# Patient Record
Sex: MALE | Race: WHITE | Employment: OTHER | URBAN - METROPOLITAN AREA
[De-identification: names, ages, dates, MRNs, and addresses within clinical notes are randomized per-mention and may not be internally consistent; named-entity substitution may affect disease eponyms.]

---

## 2017-02-15 PROBLEM — I10 HTN (HYPERTENSION), BENIGN: Status: ACTIVE | Noted: 2017-02-15

## 2017-02-15 PROBLEM — L71.9 ROSACEA: Status: ACTIVE | Noted: 2017-02-15

## 2018-05-14 ENCOUNTER — HOSPITAL ENCOUNTER (OUTPATIENT)
Dept: MRI IMAGING | Age: 78
Discharge: HOME | End: 2018-05-14
Attending: FAMILY MEDICINE
Payer: MEDICARE

## 2018-05-14 DIAGNOSIS — M54.16 LUMBAR RADICULAR SYNDROME: ICD-10-CM

## 2018-05-14 DIAGNOSIS — G89.29 CHRONIC BILATERAL LOW BACK PAIN WITH BILATERAL SCIATICA: ICD-10-CM

## 2018-05-14 DIAGNOSIS — R93.7 ABNORMAL X-RAY OF PELVIS: ICD-10-CM

## 2018-05-14 DIAGNOSIS — R29.898 WEAKNESS OF BOTH LEGS: ICD-10-CM

## 2018-05-14 DIAGNOSIS — M54.42 CHRONIC BILATERAL LOW BACK PAIN WITH BILATERAL SCIATICA: ICD-10-CM

## 2018-05-14 DIAGNOSIS — M54.41 CHRONIC BILATERAL LOW BACK PAIN WITH BILATERAL SCIATICA: ICD-10-CM

## 2018-05-14 PROCEDURE — 72148 MRI LUMBAR SPINE W/O DYE: CPT

## 2018-05-14 PROCEDURE — 72195 MRI PELVIS W/O DYE: CPT

## 2018-05-15 NOTE — PROGRESS NOTES
Please let him know he has severe canal stenosis at L3-L4 with narrowing of the nerve canal is exiting from there as well. He also has moderate canal stenosis at L2-L3. Definitely need to get him in with neurosurgery for evaluation of treatment options. Please refer to [address] [facility] spine surgery.

## 2018-06-07 PROBLEM — M41.20 IDIOPATHIC SCOLIOSIS: Status: ACTIVE | Noted: 2018-06-07

## 2018-06-07 PROBLEM — M48.062 LUMBAR STENOSIS WITH NEUROGENIC CLAUDICATION: Status: ACTIVE | Noted: 2018-06-07

## 2018-06-21 ENCOUNTER — HOSPITAL ENCOUNTER (OUTPATIENT)
Dept: PHYSICAL THERAPY | Age: 78
Discharge: HOME | End: 2018-06-21
Attending: NEUROLOGICAL SURGERY
Payer: MEDICARE

## 2018-06-21 DIAGNOSIS — M41.20 OTHER IDIOPATHIC SCOLIOSIS, UNSPECIFIED SPINAL REGION: ICD-10-CM

## 2018-06-21 DIAGNOSIS — M48.062 LUMBAR STENOSIS WITH NEUROGENIC CLAUDICATION: ICD-10-CM

## 2018-06-21 PROCEDURE — G8978 MOBILITY CURRENT STATUS: HCPCS

## 2018-06-21 PROCEDURE — G8979 MOBILITY GOAL STATUS: HCPCS

## 2018-06-21 PROCEDURE — 97110 THERAPEUTIC EXERCISES: CPT

## 2018-06-21 PROCEDURE — 97161 PT EVAL LOW COMPLEX 20 MIN: CPT

## 2018-06-21 NOTE — THERAPY EVALUATION
Ly Elise [facility]  : 1940  [place] / [place] / Product Type: Medicare /  [address] at [address] [facility] [address], [address] [facility], [place] [address] [address] [facility]  [phone]   [phone]                OUTPATIENT PHYSICAL THERAPY:Initial Assessment 2018    ICD-10: Treatment Diagnosis:    Muscle weakness (generalized) (M62.81)     Lumbago with sciatica, right side (M54.41)     Lumbago with sciatica, left side (M54.42)       PRECAUTIONS/ALLERGIES:   Review of patient's allergies indicates no known allergies. FALL RISK SCORE: 2 (? 5 = High Risk)    MD ORDERS: Eval and Treat MEDICAL/REFERRING DIAGNOSIS:  Lumbar stenosis with neurogenic claudication [M48.062]  Other idiopathic scoliosis, unspecified spinal region [M41.20]    DATE OF ONSET: 2 months ago    REFERRING PHYSICIAN: Wilfredo Mckenzie MD    RETURN PHYSICIAN APPOINTMENT: TBD by patient     INITIAL ASSESSMENT:  Mr. Lakia Salguero has attended 1 physical therapy session including initial evaluation as of 18. Lakia Salguero exhibits decreased B hamstring flexibility, increased pain with extension, decreased postural and core strength, decreased functional tolerance, and decreased general LE strength. L iliac upslip with anterior innominate rotation present in sitting. Lakia Salguero will benefit from skilled PT (medically necessary) to address above deficits affecting participation in basic ADLs and overall functional tolerance. Manual techniques (stretching, joint mobilizations, soft tissue mobilization/myofascial release), postural exercises/education, therapeutic techniques/activities, and HEP will be performed as appropriate addressing Ly Jolly's current condition. PROBLEM LIST (Impacting functional limitations):  1. Decreased Strength  2. Decreased ADL/Functional Activities  3. Decreased Transfer Abilities  4.  Decreased Ambulation Ability/Technique  5. Decreased Balance  6. Increased Pain  7. Decreased Activity Tolerance  8. Increased Fatigue  9. Increased Shortness of Breath  10. Decreased Flexibility/Joint Mobility  11. Decreased [place] with Home Exercise Program INTERVENTIONS PLANNED:  1. Balance Exercise  2. Bed Mobility  3. Cold  4. Cryotherapy  5. Electrical Stimulation  6. Family Education  7. Gait Training  8. Heat  9. Home Exercise Program (HEP)  10. Manual Therapy  11. Neuromuscular Re-education/Strengthening  12. Range of Motion (ROM)  13. Therapeutic Activites  14. Therapeutic Exercise/Strengthening  15. Transfer Training  16. Mechanical traction  17. Aquatic Therapy   TREATMENT PLAN:  Effective Dates: 6/21/2018 TO 9/19/2018 (90 days). Frequency/Duration: 2 times a week for 90 Days    GOALS: (Goals have been discussed and agreed upon with patient.)  Long Term Goals 8 weeks   1. Hilaria Bhat will demonstrate an 12 point improvement on the Oswestry to show improvement in function. 2. Hilaria Bhat will report <=2/10 pain during ADLs to improve QOL. 3. Hilaria Bhat will demonstrate 5/5 LE strength on manual muscle testing to improve functional mobility. [address] will be able to perform SLS >10 seconds bilaterally to help with gait and improve balance. [address] will be able to demonstrate safe lifting and transfer mechanics without cueing for improved safety with home, childcare, and community activities. Rehabilitation Potential For Stated Goals: Good    Regarding Betty Jolly's therapy, I certify that the treatment plan above will be carried out by a therapist or under their direction. Thank you for this referral,  Abilio Garcia, PT, DPT       Referring Physician Signature: Manuel Melvin MD              Date                    HISTORY:   PATIENT GOAL FOR PHYSICAL THERAPY:  Pt reports he would like to move a little better.      HISTORY OF PRESENT INJURY/ILLNESS (REASON FOR REFERRAL):  Pt states the pain has come on over a period of 2 months. Pt states the pain began in his low back, moved to his R hip, and then down B legs. Pt states the leg pain is anterior and posterior. Pt states the leg pain is worse than the back pain. Pt describes the pain as a very sharp achy pain when he is standing walking. Pt describes the pain in his legs as a dull ache. Pt reports occasionally he will do a certain movement which will cause a sharp shooting pain that does not linger and goes away when he re-adjusts. Pt reports the most difficulty with bed mobility, walking, standing, bending over, and bending backwards. Pt states pain can get as high as 8/10 and as low as 1/10. Note: Patient denies any increase of symptoms with cough, sneeze or valsalva. Patient denies any saddle paresthesia or bowel/bladder deficits. PAST MEDICAL HISTORY/COMORBIDITIES:   Mr. Wally Torres  has a past medical history of Anxiety disorder (12/9/2014); Arthritis (12/9/2014); Depression (12/9/2014); Gout (12/9/2014); HTN (hypertension), benign (2/15/2017); Hyperlipemia (12/9/2014); Hypertension; Idiopathic chronic gout of right ankle (12/9/2014); Insomnia (12/9/2014); Kidney stone; Mixed hyperlipidemia (12/9/2014); Panic disorder (12/9/2014); Primary insomnia (12/9/2014); and Rosacea (2/15/2017). Mr. Wally Torres  has a past surgical history that includes hx other surgical; hx cyst removal; hx cyst removal; hx hernia repair (Bilateral); hx ankle fracture tx (Left, 6/1974); hx wrist fracture tx (Bilateral, 6/1993); hx colonoscopy (2007); and hx cyst removal.    SOCIAL HISTORY/LIVING ENVIRONMENT:     Pt lives in a 2 story home with spouse. Social History     Social History    Marital status:      Spouse name: N/A    Number of children: N/A    Years of education: N/A     Occupational History    Not on file.      Social History Main Topics    Smoking status: Former Smoker     Types: Cigarettes     Quit date: 1/1/1970    Smokeless tobacco: Never Used    Alcohol use 0.0 oz/week     0 Standard drinks or equivalent per week    Drug use: No    Sexual activity: Yes     Partners: Female     Other Topics Concern    Not on file     Social History Narrative       PRIOR LEVEL OF FUNCTION/WORK/ACTIVITY:  Pt very mobile prior to onset of pain. Pt is currently not working, he is retired. Wife states he was able to cut the grass and walk long distances prior to pain.     Active Ambulatory Problems     Diagnosis Date Noted    MEGGAN (generalized anxiety disorder) 12/09/2014    Arthritis 12/09/2014    Idiopathic chronic gout of right ankle 12/09/2014    Mixed hyperlipidemia 12/09/2014    Primary insomnia 12/09/2014    Panic disorder 12/09/2014    HTN (hypertension), benign 02/15/2017    Rosacea 02/15/2017    Idiopathic scoliosis 06/07/2018    Lumbar stenosis with neurogenic claudication 06/07/2018     Resolved Ambulatory Problems     Diagnosis Date Noted    Depression 12/09/2014     Past Medical History:   Diagnosis Date    Anxiety disorder 12/9/2014    Arthritis 12/9/2014    Depression 12/9/2014    Gout 12/9/2014    HTN (hypertension), benign 2/15/2017    Hyperlipemia 12/9/2014    Hypertension     Idiopathic chronic gout of right ankle 12/9/2014    Insomnia 12/9/2014    Kidney stone     Mixed hyperlipidemia 12/9/2014    Panic disorder 12/9/2014    Primary insomnia 12/9/2014    Rosacea 2/15/2017       CURRENT MEDICATIONS:    Current Outpatient Prescriptions:     diclofenac sodium (PENNSAID) 1.5 % drop, by Apply Externally route., Disp: , Rfl:     predniSONE (DELTASONE) 20 mg tablet, 60mg daily x 3 days, then 40mg x 3 days, then 20mg x 3 days, then 10mg x 3 days, then D/C., Disp: 21 Tab, Rfl: 0    zolpidem (AMBIEN) 10 mg tablet, TAKE 1 TABLET BY MOUTH AT BEDTIME AS NEEDED FOR SLEEP, Disp: 30 Tab, Rfl: 5    gabapentin (NEURONTIN) 100 mg capsule, Take 100 mg by mouth three (3) times daily., Disp: , Rfl:     atorvastatin (LIPITOR) 40 mg tablet, TAKE 1 TABLET BY MOUTH EVERY DAY, Disp: 90 Tab, Rfl: 3    potassium chloride SR (KLOR-CON 10) 10 mEq tablet, Take 1 Tab by mouth daily. , Disp: 90 Tab, Rfl: 3    amLODIPine-Olmesartan (CAROLA) 10-40 mg tab, Take 1 Tab by mouth daily. , Disp: 90 Tab, Rfl: 3    sertraline (ZOLOFT) 100 mg tablet, Take 1 Tab by mouth daily. One and half tab every day, Disp: 135 Tab, Rfl: 3    metroNIDAZOLE (METROGEL) 1 % topical gel, Apply  to affected area daily. Use a thin layer to affected areas after washing, Disp: 45 g, Rfl: 3    allopurinol (ZYLOPRIM) 100 mg tablet, Take 2 Tabs by mouth daily. , Disp: 180 Tab, Rfl: 3    diclofenac (VOLTAREN) 1 % gel, Apply 4 g to affected area four (4) times daily. , Disp: 2 Each, Rfl: 3    multivitamin (ONE A DAY) tablet, Take 1 Tab by mouth daily. , Disp: , Rfl:     cholecalciferol, vitamin D3, (VITAMIN D3) 2,000 unit tab, Take  by mouth., Disp: , Rfl:      Date Last Reviewed:  6/22/2018   Number of Personal Factors/Comorbidities that affect the Plan of Care: 0: LOW COMPLEXITY   EXAMINATION:   OBSERVATION/ORTHOSTATIC POSTURAL ASSESSMENT:          Pt sits with forward head and rounded shoulders which indicate tight anterior chest musculature, upper trapezius, and levator scapula and weak posterior scapula musculature and deep cervical flexors. Pt displays decreased core motor control indicating weak core and low back musculature.     PALPATION:    -PA glides: L1-L2= grade 2, L2-L3= grade 4, L3-L4= grade 4, L4-L5= grade 3     -TTP: B gluteal muscles, piriformis, hamstring (L>R), IT band, TFL    -L iliac upslip with anterior innominate rotation noted in sitting    AROM/PROM:    AROM/PROM         Joint: Date: 6/20/18  Date:  Date:    Active LE ROM Right Left Right Left Right Left   Hip Flexion [facility]       Hip Extension [facility] [place]       Knee Extension [facility]       Knee Flexion [facility]       Knee Extension [facility]       Lumbar Flexion 45 degrees --       Lumbar Extension 3 degrees  -painful at end range --       Lumbar Side-Bending 15 degrees 15 degrees       Lumbar Rotation WFL  -Pain at end range [facility]  -Pain at end range         STRENGTH         Joint: Date: 6/21/18  Date:  Date:     Right Left Right Left Right Left   Hip Abduction 4/5 4/5       Hip Adduction 4/5 4/5       Hip IR 4-/5 4-/5       Hip ER 4-/5 4-/5       Hip Flexion 4/5 4/5       Knee Extension 4+/5 4+/5       Knee Flexion 4+/5 4+/5       Ankle DF 4+/5 4+/5       Ankle PF 4+/5 4+/5       Ankle IV 4+/5 4+/5       Ankle EV 4+/5 4+/5         SPECIAL TESTS: Assessed @ Initial Visit    -90/90:     -R: 110    -L: 122   -SLR: Positive B (R>L)   -SI POST.  GAPPING: Negative B   -SOL 4: Positive for B tightness   -SLUMP TEST: Negative B   -LUMBAR STENOSIS:      -Bilateral symptoms: Yes      -Leg pain more than back pain: Yes      -Pain during walking/standing: Yes      -Pain relief upon sitting: Yes      -Age greater than 48 years: Yes    STRUCTURE FINDING     Primary Disc Flattened Back Yes   Radiographic Instability Excessive Lordosis  No   SI Joint Dysfunction Flattened Back No   Secondary Disc (DJD) Hypertrophic Supraspinous Ligament: thickening along spinous process No   Spine Stenosis Flattened Back Yes   Facet Impingement Flexed Back, usually unilateral No   Nerve Root Adhesion Bad Posture, Avoid Forward Flexion, Stand With Knees Bent No       NEUROLOGICAL SCREEN: Assessed @ Initial Visit    -RADIATING SYMPTOMS: YES     -DERMATOMES: Intact    -MYOTOMES Date: 6/21/18  Date:  Date:     Right Left Right Left Right Left   L2 & L3   (Hip Flexors) 4/5 4/5       L3-L4  (Knee Extensors) 5/5 5/5       L4  (Ankle DFs) 5/5 5/5       L5  (Hallux Ext) 5/5 5/5       L5-S1  (Ankle PFs) 5/5 5/5       S1-S2  (Ankle EVs) 5/5 5/5         -REFLEXES: Date: 6/22/18  Date:  Date:     Right Left Right Left Right Left   L4  (Quadriceps) NT NT       S1  (Achilles) NT NT         RED FLAGS: Date: 6/21/18 Date: Date:   Non-Mechanical pain distribution (cannot be produced, changed, or reduced during exam): NO     Cauda Equina Dysfunction: NO     Upper lumbar disc herniation in younger patients (femoral nerve tension test for lateral disc herniation in lower lumbar): NO     Lumbar compression fracture (age > 48, trauma, corticosteroid use NO     Spine Cancer (age > 48, pervious history of cancer, failure to improve in 1 month of therapy, no relief - be rest, duration > 1 month, unexplained weight loss, insidious onset, constitutional symptoms): NO     Ankylosing Spondylitis (age < 36, pain not relieved by supine, morning back stiffness, pain duration > 3 months, improved by exercise): NO     Sacral Fracture: NO         FUNCTIONAL MOBILITY:  Assessed @ Initial Visit    -Affecting participation in basic ADLs and functional tasks.   -Limited tolerance of walking and standing   -Ambulation/Gait: Slow to rise from sitting, decreased step length, shuffling gait for first few steps, forward trunk lean, decreased stance time B LEs.   -Bed mobility: Difficulty getting in and out of bed and rolling over. -Stairs: Difficulty ascending and descending stairs secondary to fatigue and low back pain that radiates into LEs.   -Transfers: Mod I   -Wheelchair: N/A    BALANCE:  SLS: Date: 6/22/18 Date: Date:   Right: NT     Left: NT          Body Structures Involved:  1. Bones  2. Joints  3. Muscles  4. Ligaments Body Functions Affected:  1. Sensory/Pain  2. Neuromusculoskeletal  3. Movement Related Activities and Participation Affected:  1. Mobility  2. Self Care  3. Domestic Life  4. Interpersonal Interactions and Relationships  5. Community, Social and [place]   Number of elements that affect the Plan of Care: 4+: HIGH COMPLEXITY   CLINICAL PRESENTATION:   Presentation: Stable and uncomplicated: LOW COMPLEXITY   CLINICAL DECISION MAKING:   OUTCOME MEASURE USED:   Tool Used:  Tool Used: Modified Oswestry Low Back Pain Questionnaire  Score:  Initial: 24/50  Most Recent: X/50 (Date: -- )   Interpretation of Score: Each section is scored on a 0-5 scale, 5 representing the greatest disability. The scores of each section are added together for a total score of 50. Score 0 1-10 11-20 21-30 31-40 41-49 50   Modifier CH CI CJ CK CL CM CN     ? Mobility - Walking and Moving Around:     - CURRENT STATUS: CK - 40%-59% impaired, limited or restricted    - GOAL STATUS:   CJ - 20%-39% impaired, limited or restricted    - D/C STATUS: ---------------To be determined---------------    [place] / Product Type: Medicare /     MEDICAL NECESSITY:  · Skilled intervention continues to be required due to above deficits affecting participation in basic ADLs and overall functional tolerance. REASON FOR SERVICES/OTHER COMMENTS:  · Patient continues to require skilled intervention due to  above deficits affecting participation in basic ADLs and overall functional tolerance. Use of outcome tool(s) and clinical judgement create a POC that gives a: Questionable prediction of patient's progress: MODERATE COMPLEXITY   TREATMENT:   (In addition to Assessment/Re-Assessment sessions the following treatments were rendered)  THERAPEUTIC EXERCISE: (10 minutes):  Exercises per grid below to improve mobility, strength and balance. Required minimal visual and verbal cues to promote proper body alignment and promote proper body posture. Progressed resistance, range and complexity of movement as indicated.      Date:  6/21/18 Date:   Date:     Activity/Exercise Parameters Parameters Parameters   Pt education POC, Anatomy, HEP     Hamstring Stretch 30\"x3     LTR 10\"X10     SKTC 30\"x3                               MANUAL THERAPY: (0 minutes): Joint mobilization, Soft tissue mobilization and Manipulation was utilized and necessary because of the patient's restricted joint motion, painful spasm, loss of articular motion and restricted motion of soft tissue. (Used abbreviations: MET - muscle energy technique; PNF - proprioceptive neuromuscular facilitation; NMR - neuromuscular re-education; AP - anterior to posterior; PA - posterior to anterior)    MODALITIES: (0 minutes):      NOT TODAY        TREATMENT/SESSION ASSESSMENT:  Hayde Patient M[place] verbalized understanding of role of PT and POC. HEP handout provided to pt. · Pain/ Symptoms: Initial:  4/10 Post Session:  4/10     · Compliance with Program/Exercises: Will assess as treatment progresses. · Recommendations/Intent for next treatment session: \"Next visit will focus on advancements to more challenging activities\". Total Treatment Duration:  PT Patient Time In/Time Out  Time In: 1105  Time Out: 202 [facility]  Patty Browne DPT

## 2018-06-21 NOTE — PROGRESS NOTES
Ambulatory/Rehab Services H2 Model Falls Risk Assessment    Risk Factor Pts. ·   Confusion/Disorientation/Impulsivity  []    4 ·   Symptomatic Depression  []   2 ·   Altered Elimination  []   1 ·   Dizziness/Vertigo  []   1 ·   Gender (Male)  [x]   1 ·   Any administered antiepileptics (anticonvulsants):  []   2 ·   Any administered benzodiazepines:  []   1 ·   Visual Impairment (specify):  []   1 ·   Portable Oxygen Use  []   1 ·   Orthostatic ? BP  []   1 ·   History of Recent Falls (within 3 mos.)  []   5     Ability to Rise from Chair (choose one) Pts. ·   Ability to rise in a single movement  []   0 ·   Pushes up, successful in one attempt  [x]   1 ·   Multiple attempts, but successful  []   3 ·   Unable to rise without assistance  []   4   Total: (5 or greater = High Risk) 2     Falls Prevention Plan:   []                Physical Limitations to Exercise (specify):   []                Mobility Assistance Device (type):   []                Exercise/Equipment Adaptation (specify):    ©2010 [facility] of [address] Patent #[id number].  Federal Law prohibits the replication, distribution or use without written permission from [facility] [place]

## 2018-06-27 ENCOUNTER — HOSPITAL ENCOUNTER (OUTPATIENT)
Dept: PHYSICAL THERAPY | Age: 78
Discharge: HOME | End: 2018-06-27
Attending: NEUROLOGICAL SURGERY
Payer: MEDICARE

## 2018-06-27 NOTE — PROGRESS NOTES
Xander Kaur [facility]  : 1940  [place]  Secondary: [place] at [facility]  [address], [address]  [phone]   [phone]      OUTPATIENT DAILY NOTE    NAME/AGE/GENDER: Fly Reddy is a 78 y.o. male. DATE: 2018    SUBJECTIVE:  Patient called to cancel yesterday for appointment today due to having a shot today. Will plan to follow up on next scheduled visit.     Selene Cruz, PT, DPT

## 2018-06-29 ENCOUNTER — HOSPITAL ENCOUNTER (OUTPATIENT)
Dept: PHYSICAL THERAPY | Age: 78
Discharge: HOME | End: 2018-06-29
Attending: NEUROLOGICAL SURGERY

## 2018-06-29 PROCEDURE — 97110 THERAPEUTIC EXERCISES: CPT

## 2018-06-29 NOTE — THERAPY EVALUATION
Sendy [facility]  : 1940  [place] / [place] / Product Type: Medicare /  [address]  at [facility]  [address], [address], [address]  [phone]   [phone]                OUTPATIENT PHYSICAL THERAPY:Daily Note 2018    ICD-10: Treatment Diagnosis:    Muscle weakness (generalized) (M62.81)     Lumbago with sciatica, right side (M54.41)     Lumbago with sciatica, left side (M54.42)       PRECAUTIONS/ALLERGIES:   Review of patient's allergies indicates no known allergies. FALL RISK SCORE: 2 (? 5 = High Risk)    MD ORDERS: Eval and Treat MEDICAL/REFERRING DIAGNOSIS:  back    DATE OF ONSET: 2 months ago    REFERRING PHYSICIAN: Anna Hooks MD    RETURN PHYSICIAN APPOINTMENT: TBD by patient     INITIAL ASSESSMENT:  Mr. Abby Bucio has attended 1 physical therapy session including initial evaluation as of 18. Abby Bucio exhibits decreased B hamstring flexibility, increased pain with extension, decreased postural and core strength, decreased functional tolerance, and decreased general LE strength. L iliac upslip with anterior innominate rotation present in sitting. Abby Bucio will benefit from skilled PT (medically necessary) to address above deficits affecting participation in basic ADLs and overall functional tolerance. Manual techniques (stretching, joint mobilizations, soft tissue mobilization/myofascial release), postural exercises/education, therapeutic techniques/activities, and HEP will be performed as appropriate addressing Sendy Jolly's current condition. PROBLEM LIST (Impacting functional limitations):  1. Decreased Strength  2. Decreased ADL/Functional Activities  3. Decreased Transfer Abilities  4. Decreased Ambulation Ability/Technique  5. Decreased Balance  6. Increased Pain  7. Decreased Activity Tolerance  8.  Increased Fatigue  9. Increased Shortness of Breath  10. Decreased Flexibility/Joint Mobility  11. Decreased [place] with Home Exercise Program INTERVENTIONS PLANNED:  1. Balance Exercise  2. Bed Mobility  3. Cold  4. Cryotherapy  5. Electrical Stimulation  6. Family Education  7. Gait Training  8. Heat  9. Home Exercise Program (HEP)  10. Manual Therapy  11. Neuromuscular Re-education/Strengthening  12. Range of Motion (ROM)  13. Therapeutic Activites  14. Therapeutic Exercise/Strengthening  15. Transfer Training  16. Mechanical traction  17. Aquatic Therapy   TREATMENT PLAN:  Effective Dates: 6/21/2018 TO 9/19/2018 (90 days). Frequency/Duration: 2 times a week for 90 Days    GOALS: (Goals have been discussed and agreed upon with patient.)  Long Term Goals 8 weeks   1. Amber Christine will demonstrate an 12 point improvement on the Oswestry to show improvement in function. 2. Amber Christine will report <=2/10 pain during ADLs to improve QOL. 3. Amber Christine will demonstrate 5/5 LE strength on manual muscle testing to improve functional mobility. [address] will be able to perform SLS >10 seconds bilaterally to help with gait and improve balance. [address] will be able to demonstrate safe lifting and transfer mechanics without cueing for improved safety with home, childcare, and community activities. Rehabilitation Potential For Stated Goals: Good    Regarding Jaleel Jolly's therapy, I certify that the treatment plan above will be carried out by a therapist or under their direction. Thank you for this referral,  Evelin Mtz, PT, DPT       Referring Physician Signature: Jenetta Kehr, MD              Date                    HISTORY:   PATIENT GOAL FOR PHYSICAL THERAPY:  Pt reports he would like to move a little better. HISTORY OF PRESENT INJURY/ILLNESS (REASON FOR REFERRAL):  Pt states the pain has come on over a period of 2 months.  Pt states the pain began in his low back, moved to his R hip, and then down B legs. Pt states the leg pain is anterior and posterior. Pt states the leg pain is worse than the back pain. Pt describes the pain as a very sharp achy pain when he is standing walking. Pt describes the pain in his legs as a dull ache. Pt reports occasionally he will do a certain movement which will cause a sharp shooting pain that does not linger and goes away when he re-adjusts. Pt reports the most difficulty with bed mobility, walking, standing, bending over, and bending backwards. Pt states pain can get as high as 8/10 and as low as 1/10. Note: Patient denies any increase of symptoms with cough, sneeze or valsalva. Patient denies any saddle paresthesia or bowel/bladder deficits. PAST MEDICAL HISTORY/COMORBIDITIES:   Mr. Kolton Boswell  has a past medical history of Anxiety disorder (12/9/2014); Arthritis (12/9/2014); Depression (12/9/2014); Gout (12/9/2014); HTN (hypertension), benign (2/15/2017); Hyperlipemia (12/9/2014); Hypertension; Idiopathic chronic gout of right ankle (12/9/2014); Insomnia (12/9/2014); Kidney stone; Mixed hyperlipidemia (12/9/2014); Panic disorder (12/9/2014); Primary insomnia (12/9/2014); and Rosacea (2/15/2017). Mr. Kolton Boswell  has a past surgical history that includes hx other surgical; hx cyst removal; hx cyst removal; hx hernia repair (Bilateral); hx ankle fracture tx (Left, 6/1974); hx wrist fracture tx (Bilateral, 6/1993); hx colonoscopy (2007); and hx cyst removal.    SOCIAL HISTORY/LIVING ENVIRONMENT:     Pt lives in a 2 story home with spouse. Social History     Social History    Marital status:      Spouse name: N/A    Number of children: N/A    Years of education: N/A     Occupational History    Not on file.      Social History Main Topics    Smoking status: Former Smoker     Types: Cigarettes     Quit date: 1/1/1970    Smokeless tobacco: Never Used    Alcohol use 0.0 oz/week     0 Standard drinks or equivalent per week    Drug use: No    Sexual activity: Yes     Partners: Female     Other Topics Concern    Not on file     Social History Narrative       PRIOR LEVEL OF FUNCTION/WORK/ACTIVITY:  Pt very mobile prior to onset of pain. Pt is currently not working, he is retired. Wife states he was able to cut the grass and walk long distances prior to pain. Active Ambulatory Problems     Diagnosis Date Noted    MEGGAN (generalized anxiety disorder) 12/09/2014    Arthritis 12/09/2014    Idiopathic chronic gout of right ankle 12/09/2014    Mixed hyperlipidemia 12/09/2014    Primary insomnia 12/09/2014    Panic disorder 12/09/2014    HTN (hypertension), benign 02/15/2017    Rosacea 02/15/2017    Idiopathic scoliosis 06/07/2018    Lumbar stenosis with neurogenic claudication 06/07/2018     Resolved Ambulatory Problems     Diagnosis Date Noted    Depression 12/09/2014     Past Medical History:   Diagnosis Date    Anxiety disorder 12/9/2014    Arthritis 12/9/2014    Depression 12/9/2014    Gout 12/9/2014    HTN (hypertension), benign 2/15/2017    Hyperlipemia 12/9/2014    Hypertension     Idiopathic chronic gout of right ankle 12/9/2014    Insomnia 12/9/2014    Kidney stone     Mixed hyperlipidemia 12/9/2014    Panic disorder 12/9/2014    Primary insomnia 12/9/2014    Rosacea 2/15/2017       CURRENT MEDICATIONS:    Current Outpatient Prescriptions:     diclofenac sodium (PENNSAID) 1.5 % drop, by Apply Externally route., Disp: , Rfl:     predniSONE (DELTASONE) 20 mg tablet, 60mg daily x 3 days, then 40mg x 3 days, then 20mg x 3 days, then 10mg x 3 days, then D/C., Disp: 21 Tab, Rfl: 0    zolpidem (AMBIEN) 10 mg tablet, TAKE 1 TABLET BY MOUTH AT BEDTIME AS NEEDED FOR SLEEP, Disp: 30 Tab, Rfl: 5    gabapentin (NEURONTIN) 100 mg capsule, Take 100 mg by mouth three (3) times daily. , Disp: , Rfl:     atorvastatin (LIPITOR) 40 mg tablet, TAKE 1 TABLET BY MOUTH EVERY DAY, Disp: 90 Tab, Rfl: 3   potassium chloride SR (KLOR-CON 10) 10 mEq tablet, Take 1 Tab by mouth daily. , Disp: 90 Tab, Rfl: 3    amLODIPine-Olmesartan (CAROLA) 10-40 mg tab, Take 1 Tab by mouth daily. , Disp: 90 Tab, Rfl: 3    sertraline (ZOLOFT) 100 mg tablet, Take 1 Tab by mouth daily. One and half tab every day, Disp: 135 Tab, Rfl: 3    metroNIDAZOLE (METROGEL) 1 % topical gel, Apply  to affected area daily. Use a thin layer to affected areas after washing, Disp: 45 g, Rfl: 3    allopurinol (ZYLOPRIM) 100 mg tablet, Take 2 Tabs by mouth daily. , Disp: 180 Tab, Rfl: 3    diclofenac (VOLTAREN) 1 % gel, Apply 4 g to affected area four (4) times daily. , Disp: 2 Each, Rfl: 3    multivitamin (ONE A DAY) tablet, Take 1 Tab by mouth daily. , Disp: , Rfl:     cholecalciferol, vitamin D3, (VITAMIN D3) 2,000 unit tab, Take  by mouth., Disp: , Rfl:      Date Last Reviewed:  6/29/2018   Number of Personal Factors/Comorbidities that affect the Plan of Care: 0: LOW COMPLEXITY   EXAMINATION:   OBSERVATION/ORTHOSTATIC POSTURAL ASSESSMENT:          Pt sits with forward head and rounded shoulders which indicate tight anterior chest musculature, upper trapezius, and levator scapula and weak posterior scapula musculature and deep cervical flexors. Pt displays decreased core motor control indicating weak core and low back musculature.     PALPATION:    -PA glides: L1-L2= grade 2, L2-L3= grade 4, L3-L4= grade 4, L4-L5= grade 3     -TTP: B gluteal muscles, piriformis, hamstring (L>R), IT band, TFL    -L iliac upslip with anterior innominate rotation noted in sitting    AROM/PROM:    AROM/PROM         Joint: Date: 6/20/18  Date:  Date:    Active LE ROM Right Left Right Left Right Left   Hip Flexion [facility]       Hip Extension [facility]       Knee Extension [facility]       Knee Flexion [facility]       Knee Extension [facility]       Lumbar Flexion 45 degrees --       Lumbar Extension 3 degrees  -painful at end range --       Lumbar Side-Bending 15 degrees 15 degrees Lumbar Rotation WFL  -Pain at end range [facility]  -Pain at end range         STRENGTH         Joint: Date: 6/21/18  Date:  Date:     Right Left Right Left Right Left   Hip Abduction 4/5 4/5       Hip Adduction 4/5 4/5       Hip IR 4-/5 4-/5       Hip ER 4-/5 4-/5       Hip Flexion 4/5 4/5       Knee Extension 4+/5 4+/5       Knee Flexion 4+/5 4+/5       Ankle DF 4+/5 4+/5       Ankle PF 4+/5 4+/5       Ankle IV 4+/5 4+/5       Ankle EV 4+/5 4+/5         SPECIAL TESTS: Assessed @ Initial Visit    -90/90:     -R: 110    -L: 122   -SLR: Positive B (R>L)   -SI POST.  GAPPING: Negative B   -SOL 4: Positive for B tightness   -SLUMP TEST: Negative B   -LUMBAR STENOSIS:      -Bilateral symptoms: Yes      -Leg pain more than back pain: Yes      -Pain during walking/standing: Yes      -Pain relief upon sitting: Yes      -Age greater than 48 years: Yes    STRUCTURE FINDING     Primary Disc Flattened Back Yes   Radiographic Instability Excessive Lordosis  No   SI Joint Dysfunction Flattened Back No   Secondary Disc (DJD) Hypertrophic Supraspinous Ligament: thickening along spinous process No   Spine Stenosis Flattened Back Yes   Facet Impingement Flexed Back, usually unilateral No   Nerve Root Adhesion Bad Posture, Avoid Forward Flexion, Stand With Knees Bent No       NEUROLOGICAL SCREEN: Assessed @ Initial Visit    -RADIATING SYMPTOMS: YES     -DERMATOMES: Intact    -MYOTOMES Date: 6/21/18  Date:  Date:     Right Left Right Left Right Left   L2 & L3   (Hip Flexors) 4/5 4/5       L3-L4  (Knee Extensors) 5/5 5/5       L4  (Ankle DFs) 5/5 5/5       L5  (Hallux Ext) 5/5 5/5       L5-S1  (Ankle PFs) 5/5 5/5       S1-S2  (Ankle EVs) 5/5 5/5         -REFLEXES: Date: 6/22/18  Date:  Date:     Right Left Right Left Right Left   L4  (Quadriceps) NT NT       S1  (Achilles) NT NT         RED FLAGS: Date: 6/21/18 Date:  Date:   Non-Mechanical pain distribution (cannot be produced, changed, or reduced during exam): NO     Cauda Equina Dysfunction: NO     Upper lumbar disc herniation in younger patients (femoral nerve tension test for lateral disc herniation in lower lumbar): NO     Lumbar compression fracture (age > 48, trauma, corticosteroid use NO     Spine Cancer (age > 48, pervious history of cancer, failure to improve in 1 month of therapy, no relief - be rest, duration > 1 month, unexplained weight loss, insidious onset, constitutional symptoms): NO     Ankylosing Spondylitis (age < 36, pain not relieved by supine, morning back stiffness, pain duration > 3 months, improved by exercise): NO     Sacral Fracture: NO         FUNCTIONAL MOBILITY:  Assessed @ Initial Visit    -Affecting participation in basic ADLs and functional tasks.   -Limited tolerance of walking and standing   -Ambulation/Gait: Slow to rise from sitting, decreased step length, shuffling gait for first few steps, forward trunk lean, decreased stance time B LEs.   -Bed mobility: Difficulty getting in and out of bed and rolling over. -Stairs: Difficulty ascending and descending stairs secondary to fatigue and low back pain that radiates into LEs.   -Transfers: Mod I   -Wheelchair: N/A    BALANCE:  SLS: Date: 6/22/18 Date: Date:   Right: NT     Left: NT          Body Structures Involved:  1. Bones  2. Joints  3. Muscles  4. Ligaments Body Functions Affected:  1. Sensory/Pain  2. Neuromusculoskeletal  3. Movement Related Activities and Participation Affected:  1. Mobility  2. Self Care  3. Domestic Life  4. Interpersonal Interactions and Relationships  5. Community, Social and [place]   Number of elements that affect the Plan of Care: 4+: HIGH COMPLEXITY   CLINICAL PRESENTATION:   Presentation: Stable and uncomplicated: LOW COMPLEXITY   CLINICAL DECISION MAKING:   OUTCOME MEASURE USED:   Tool Used:  Tool Used: Modified Oswestry Low Back Pain Questionnaire  Score:  Initial: 24/50  Most Recent: X/50 (Date: -- )   Interpretation of Score: Each section is scored on a 0-5 scale, 5 representing the greatest disability. The scores of each section are added together for a total score of 50. Score 0 1-10 11-20 21-30 31-40 41-49 50   Modifier CH CI CJ CK CL CM CN     ? Mobility - Walking and Moving Around:     - CURRENT STATUS: CK - 40%-59% impaired, limited or restricted    - GOAL STATUS:   CJ - 20%-39% impaired, limited or restricted    - D/C STATUS: ---------------To be determined---------------    [place] / [place] / Product Type: Medicare /     MEDICAL NECESSITY:  · Skilled intervention continues to be required due to above deficits affecting participation in basic ADLs and overall functional tolerance. REASON FOR SERVICES/OTHER COMMENTS:  · Patient continues to require skilled intervention due to  above deficits affecting participation in basic ADLs and overall functional tolerance. Use of outcome tool(s) and clinical judgement create a POC that gives a: Questionable prediction of patient's progress: MODERATE COMPLEXITY   TREATMENT:   (In addition to Assessment/Re-Assessment sessions the following treatments were rendered)  PRE-TREATMENT SYMPTOMS: Pt states he had a cortisone injection a few days ago which has helped with his pain tremendously. Pt reports he did not have any pain in his lower back or leg last night. Pt states he still gets slight pain when he gets out of bed or gets up from seated position. THERAPEUTIC EXERCISE: (53 minutes):  Exercises per grid below to improve mobility, strength and balance. Required minimal visual and verbal cues to promote proper body alignment and promote proper body posture. Progressed resistance, range and complexity of movement as indicated.      Date:  6/21/18 Date:  6/29/18 Date:     Activity/Exercise Parameters Parameters Parameters   Pt education POC, Anatomy, HEP     Hamstring Stretch 30\"x3     Nu-step  12min  -level 4    LTR 10\"X10 2x10 reps  -hold 5s  -red physioball    [facility] 30\"x3 3x30s    Glute set  1x10 reps  -hold 10s    Bridging  1x12 reps  -hold 3s  -slow descent     SLR  1x10 reps B  -not as high on R LE  -5s up/5s hold/5s down    Pelvic tilts  [id number]   -hold 10s    Seated piriformis stretch  3x30s    Seated hamstring stretch  3x30s    Sit to stand education  5 min  -with [id number]      MANUAL THERAPY: (0 minutes): Joint mobilization, Soft tissue mobilization and Manipulation was utilized and necessary because of the patient's restricted joint motion, painful spasm, loss of articular motion and restricted motion of soft tissue. (Used abbreviations: MET - muscle energy technique; PNF - proprioceptive neuromuscular facilitation; NMR - neuromuscular re-education; AP - anterior to posterior; PA - posterior to anterior)    MODALITIES: (0 minutes):      NOT TODAY        TREATMENT/SESSION ASSESSMENT:  [facility] displayed improved gait mechanics today and stated it was because he was non longer in any pain. Pt ambulating with cane at the moment. Pt displayed increased fatigue with all exercises on R LE. Pt reports it felt heavy and had slight pain on posterior leg during SLR exercise. Pt educated on proper sit to stand mechanics and displayed improved technique afterwards. Pt stated his R leg felt very weak and tired after session but did not complain of any pain. · Pain/ Symptoms Initial:  0/10 Post Session:  0/10     · Compliance with Program/Exercises: Will assess as treatment progresses. · Recommendations/Intent for next treatment session: \"Next visit will focus on advancements to more challenging activities\". Total Treatment Duration:  PT Patient Time In/Time Out  Time In: 1025  Time Out: Julian 91  Omer Hernandez DPT

## 2018-07-03 ENCOUNTER — HOSPITAL ENCOUNTER (OUTPATIENT)
Dept: PHYSICAL THERAPY | Age: 78
Discharge: HOME | End: 2018-07-03
Attending: NEUROLOGICAL SURGERY
Payer: MEDICARE

## 2018-07-03 PROCEDURE — 97110 THERAPEUTIC EXERCISES: CPT

## 2018-07-03 NOTE — THERAPY EVALUATION
[facility]  : 1940  [place] / Product Type: Medicare /  [address]  at [address] [facility] [address], [address] [facility], [place] [address] [address] [facility]  [phone]   [phone]                OUTPATIENT PHYSICAL THERAPY:Daily Note 7/3/2018    ICD-10: Treatment Diagnosis:    Muscle weakness (generalized) (M62.81)     Lumbago with sciatica, right side (M54.41)     Lumbago with sciatica, left side (M54.42)       PRECAUTIONS/ALLERGIES:   Review of patient's allergies indicates no known allergies. FALL RISK SCORE: 2 (? 5 = High Risk)    MD ORDERS: Eval and Treat MEDICAL/REFERRING DIAGNOSIS:  back    DATE OF ONSET: 2 months ago    REFERRING PHYSICIAN: Esperanza Del Rio MD    RETURN PHYSICIAN APPOINTMENT: TBD by patient     INITIAL ASSESSMENT:  Mr. Viki Corcoran has attended 1 physical therapy session including initial evaluation as of 18. Viki Corcoran exhibits decreased B hamstring flexibility, increased pain with extension, decreased postural and core strength, decreased functional tolerance, and decreased general LE strength. L iliac upslip with anterior innominate rotation present in sitting. Viki Corcoran will benefit from skilled PT (medically necessary) to address above deficits affecting participation in basic ADLs and overall functional tolerance. Manual techniques (stretching, joint mobilizations, soft tissue mobilization/myofascial release), postural exercises/education, therapeutic techniques/activities, and HEP will be performed as appropriate addressing Milli Jolly's current condition. PROBLEM LIST (Impacting functional limitations):  1. Decreased Strength  2. Decreased ADL/Functional Activities  3. Decreased Transfer Abilities  4. Decreased Ambulation Ability/Technique  5. Decreased Balance  6. Increased Pain  7. Decreased Activity Tolerance  8.  Increased Fatigue  9. Increased Shortness of Breath  10. Decreased Flexibility/Joint Mobility  11. Decreased Spink with Home Exercise Program INTERVENTIONS PLANNED:  1. Balance Exercise  2. Bed Mobility  3. Cold  4. Cryotherapy  5. Electrical Stimulation  6. Family Education  7. Gait Training  8. Heat  9. Home Exercise Program (HEP)  10. Manual Therapy  11. Neuromuscular Re-education/Strengthening  12. Range of Motion (ROM)  13. Therapeutic Activites  14. Therapeutic Exercise/Strengthening  15. Transfer Training  16. Mechanical traction  17. Aquatic Therapy   TREATMENT PLAN:  Effective Dates: 6/21/2018 TO 9/19/2018 (90 days). Frequency/Duration: 2 times a week for 90 Days    GOALS: (Goals have been discussed and agreed upon with patient.)  Long Term Goals 8 weeks   1. Jonna Vivas will demonstrate an 12 point improvement on the Oswestry to show improvement in function. 2. Jonna Vivas will report <=2/10 pain during ADLs to improve QOL. 3. Jonna Vivas will demonstrate 5/5 LE strength on manual muscle testing to improve functional mobility. [address] will be able to perform SLS >10 seconds bilaterally to help with gait and improve balance. [address] will be able to demonstrate safe lifting and transfer mechanics without cueing for improved safety with home, childcare, and community activities. Rehabilitation Potential For Stated Goals: Good    Regarding Zoranjaden Suzan Rik's therapy, I certify that the treatment plan above will be carried out by a therapist or under their direction. Thank you for this referral,  Demond Hancock, PT, DPT       Referring Physician Signature: Luc Villa MD              Date                    HISTORY:   PATIENT GOAL FOR PHYSICAL THERAPY:  Pt reports he would like to move a little better. HISTORY OF PRESENT INJURY/ILLNESS (REASON FOR REFERRAL):  Pt states the pain has come on over a period of 2 months.  Pt states the pain began in his low back, moved to his R hip, and then down B legs. Pt states the leg pain is anterior and posterior. Pt states the leg pain is worse than the back pain. Pt describes the pain as a very sharp achy pain when he is standing walking. Pt describes the pain in his legs as a dull ache. Pt reports occasionally he will do a certain movement which will cause a sharp shooting pain that does not linger and goes away when he re-adjusts. Pt reports the most difficulty with bed mobility, walking, standing, bending over, and bending backwards. Pt states pain can get as high as 8/10 and as low as 1/10. Note: Patient denies any increase of symptoms with cough, sneeze or valsalva. Patient denies any saddle paresthesia or bowel/bladder deficits. PAST MEDICAL HISTORY/COMORBIDITIES:   Mr. Indira Ashby  has a past medical history of Anxiety disorder (12/9/2014); Arthritis (12/9/2014); Depression (12/9/2014); Gout (12/9/2014); HTN (hypertension), benign (2/15/2017); Hyperlipemia (12/9/2014); Hypertension; Idiopathic chronic gout of right ankle (12/9/2014); Insomnia (12/9/2014); Kidney stone; Mixed hyperlipidemia (12/9/2014); Panic disorder (12/9/2014); Primary insomnia (12/9/2014); and Rosacea (2/15/2017). Mr. Indira Ashby  has a past surgical history that includes hx other surgical; hx cyst removal; hx cyst removal; hx hernia repair (Bilateral); hx ankle fracture tx (Left, 6/1974); hx wrist fracture tx (Bilateral, 6/1993); hx colonoscopy (2007); and hx cyst removal.    SOCIAL HISTORY/LIVING ENVIRONMENT:     Pt lives in a 2 story home with spouse. Social History     Social History    Marital status:      Spouse name: N/A    Number of children: N/A    Years of education: N/A     Occupational History    Not on file.      Social History Main Topics    Smoking status: Former Smoker     Types: Cigarettes     Quit date: 1/1/1970    Smokeless tobacco: Never Used    Alcohol use 0.0 oz/week     0 Standard drinks or equivalent per week    Drug use: No    Sexual activity: Yes     Partners: Female     Other Topics Concern    Not on file     Social History Narrative       PRIOR LEVEL OF FUNCTION/WORK/ACTIVITY:  Pt very mobile prior to onset of pain. Pt is currently not working, he is retired. Wife states he was able to cut the grass and walk long distances prior to pain. Active Ambulatory Problems     Diagnosis Date Noted    MEGGAN (generalized anxiety disorder) 12/09/2014    Arthritis 12/09/2014    Idiopathic chronic gout of right ankle 12/09/2014    Mixed hyperlipidemia 12/09/2014    Primary insomnia 12/09/2014    Panic disorder 12/09/2014    HTN (hypertension), benign 02/15/2017    Rosacea 02/15/2017    Idiopathic scoliosis 06/07/2018    Lumbar stenosis with neurogenic claudication 06/07/2018     Resolved Ambulatory Problems     Diagnosis Date Noted    Depression 12/09/2014     Past Medical History:   Diagnosis Date    Anxiety disorder 12/9/2014    Arthritis 12/9/2014    Depression 12/9/2014    Gout 12/9/2014    HTN (hypertension), benign 2/15/2017    Hyperlipemia 12/9/2014    Hypertension     Idiopathic chronic gout of right ankle 12/9/2014    Insomnia 12/9/2014    Kidney stone     Mixed hyperlipidemia 12/9/2014    Panic disorder 12/9/2014    Primary insomnia 12/9/2014    Rosacea 2/15/2017       CURRENT MEDICATIONS:    Current Outpatient Prescriptions:     diclofenac sodium (PENNSAID) 1.5 % drop, by Apply Externally route., Disp: , Rfl:     predniSONE (DELTASONE) 20 mg tablet, 60mg daily x 3 days, then 40mg x 3 days, then 20mg x 3 days, then 10mg x 3 days, then D/C., Disp: 21 Tab, Rfl: 0    zolpidem (AMBIEN) 10 mg tablet, TAKE 1 TABLET BY MOUTH AT BEDTIME AS NEEDED FOR SLEEP, Disp: 30 Tab, Rfl: 5    gabapentin (NEURONTIN) 100 mg capsule, Take 100 mg by mouth three (3) times daily. , Disp: , Rfl:     atorvastatin (LIPITOR) 40 mg tablet, TAKE 1 TABLET BY MOUTH EVERY DAY, Disp: 90 Tab, Rfl: 3   potassium chloride SR (KLOR-CON 10) 10 mEq tablet, Take 1 Tab by mouth daily. , Disp: 90 Tab, Rfl: 3    amLODIPine-Olmesartan (CAROLA) 10-40 mg tab, Take 1 Tab by mouth daily. , Disp: 90 Tab, Rfl: 3    sertraline (ZOLOFT) 100 mg tablet, Take 1 Tab by mouth daily. One and half tab every day, Disp: 135 Tab, Rfl: 3    metroNIDAZOLE (METROGEL) 1 % topical gel, Apply  to affected area daily. Use a thin layer to affected areas after washing, Disp: 45 g, Rfl: 3    allopurinol (ZYLOPRIM) 100 mg tablet, Take 2 Tabs by mouth daily. , Disp: 180 Tab, Rfl: 3    diclofenac (VOLTAREN) 1 % gel, Apply 4 g to affected area four (4) times daily. , Disp: 2 Each, Rfl: 3    multivitamin (ONE A DAY) tablet, Take 1 Tab by mouth daily. , Disp: , Rfl:     cholecalciferol, vitamin D3, (VITAMIN D3) 2,000 unit tab, Take  by mouth., Disp: , Rfl:      Date Last Reviewed:  7/3/2018   Number of Personal Factors/Comorbidities that affect the Plan of Care: 0: LOW COMPLEXITY   EXAMINATION:   OBSERVATION/ORTHOSTATIC POSTURAL ASSESSMENT:          Pt sits with forward head and rounded shoulders which indicate tight anterior chest musculature, upper trapezius, and levator scapula and weak posterior scapula musculature and deep cervical flexors. Pt displays decreased core motor control indicating weak core and low back musculature.     PALPATION:    -PA glides: L1-L2= grade 2, L2-L3= grade 4, L3-L4= grade 4, L4-L5= grade 3     -TTP: B gluteal muscles, piriformis, hamstring (L>R), IT band, TFL    -L iliac upslip with anterior innominate rotation noted in sitting    AROM/PROM:    AROM/PROM         Joint: Date: 6/20/18  Date:  Date:    Active LE ROM Right Left Right Left Right Left   Hip Flexion [facility]       Hip Extension [facility]       Knee Extension [facility]       Knee Flexion [facility]       Knee Extension [facility]       Lumbar Flexion 45 degrees --       Lumbar Extension 3 degrees  -painful at end range --       Lumbar Side-Bending 15 degrees 15 degrees Lumbar Rotation WFL  -Pain at end range [facility]  -Pain at end range         STRENGTH         Joint: Date: 6/21/18  Date:  Date:     Right Left Right Left Right Left   Hip Abduction 4/5 4/5       Hip Adduction 4/5 4/5       Hip IR 4-/5 4-/5       Hip ER 4-/5 4-/5       Hip Flexion 4/5 4/5       Knee Extension 4+/5 4+/5       Knee Flexion 4+/5 4+/5       Ankle DF 4+/5 4+/5       Ankle PF 4+/5 4+/5       Ankle IV 4+/5 4+/5       Ankle EV 4+/5 4+/5         SPECIAL TESTS: Assessed @ Initial Visit    -90/90:     -R: 110    -L: 122   -SLR: Positive B (R>L)   -SI POST.  GAPPING: Negative B   -SOL 4: Positive for B tightness   -SLUMP TEST: Negative B   -LUMBAR STENOSIS:      -Bilateral symptoms: Yes      -Leg pain more than back pain: Yes      -Pain during walking/standing: Yes      -Pain relief upon sitting: Yes      -Age greater than 48 years: Yes    STRUCTURE FINDING     Primary Disc Flattened Back Yes   Radiographic Instability Excessive Lordosis  No   SI Joint Dysfunction Flattened Back No   Secondary Disc (DJD) Hypertrophic Supraspinous Ligament: thickening along spinous process No   Spine Stenosis Flattened Back Yes   Facet Impingement Flexed Back, usually unilateral No   Nerve Root Adhesion Bad Posture, Avoid Forward Flexion, Stand With Knees Bent No       NEUROLOGICAL SCREEN: Assessed @ Initial Visit    -RADIATING SYMPTOMS: YES     -DERMATOMES: Intact    -MYOTOMES Date: 6/21/18  Date:  Date:     Right Left Right Left Right Left   L2 & L3   (Hip Flexors) 4/5 4/5       L3-L4  (Knee Extensors) 5/5 5/5       L4  (Ankle DFs) 5/5 5/5       L5  (Hallux Ext) 5/5 5/5       L5-S1  (Ankle PFs) 5/5 5/5       S1-S2  (Ankle EVs) 5/5 5/5         -REFLEXES: Date: 6/22/18  Date:  Date:     Right Left Right Left Right Left   L4  (Quadriceps) NT NT       S1  (Achilles) NT NT         RED FLAGS: Date: 6/21/18 Date:  Date:   Non-Mechanical pain distribution (cannot be produced, changed, or reduced during exam): NO     Cauda Equina Dysfunction: NO     Upper lumbar disc herniation in younger patients (femoral nerve tension test for lateral disc herniation in lower lumbar): NO     Lumbar compression fracture (age > 48, trauma, corticosteroid use NO     Spine Cancer (age > 48, pervious history of cancer, failure to improve in 1 month of therapy, no relief - be rest, duration > 1 month, unexplained weight loss, insidious onset, constitutional symptoms): NO     Ankylosing Spondylitis (age < 36, pain not relieved by supine, morning back stiffness, pain duration > 3 months, improved by exercise): NO     Sacral Fracture: NO         FUNCTIONAL MOBILITY:  Assessed @ Initial Visit    -Affecting participation in basic ADLs and functional tasks.   -Limited tolerance of walking and standing   -Ambulation/Gait: Slow to rise from sitting, decreased step length, shuffling gait for first few steps, forward trunk lean, decreased stance time B LEs.   -Bed mobility: Difficulty getting in and out of bed and rolling over. -Stairs: Difficulty ascending and descending stairs secondary to fatigue and low back pain that radiates into LEs.   -Transfers: Mod I   -Wheelchair: N/A    BALANCE:  SLS: Date: 6/22/18 Date: Date:   Right: NT     Left: NT          Body Structures Involved:  1. Bones  2. Joints  3. Muscles  4. Ligaments Body Functions Affected:  1. Sensory/Pain  2. Neuromusculoskeletal  3. Movement Related Activities and Participation Affected:  1. Mobility  2. Self Care  3. Domestic Life  4. Interpersonal Interactions and Relationships  5. Community, Social and [place]   Number of elements that affect the Plan of Care: 4+: HIGH COMPLEXITY   CLINICAL PRESENTATION:   Presentation: Stable and uncomplicated: LOW COMPLEXITY   CLINICAL DECISION MAKING:   OUTCOME MEASURE USED:   Tool Used:  Tool Used: Modified Oswestry Low Back Pain Questionnaire  Score:  Initial: 24/50  Most Recent: X/50 (Date: -- )   Interpretation of Score: Each section is scored on a 0-5 scale, 5 representing the greatest disability. The scores of each section are added together for a total score of 50. Score 0 1-10 11-20 21-30 31-40 41-49 50   Modifier CH CI CJ CK CL CM CN     ? Mobility - Walking and Moving Around:     - CURRENT STATUS: CK - 40%-59% impaired, limited or restricted    - GOAL STATUS:   CJ - 20%-39% impaired, limited or restricted    - D/C STATUS: ---------------To be determined---------------    [place] / Product Type: Medicare /     MEDICAL NECESSITY:  · Skilled intervention continues to be required due to above deficits affecting participation in basic ADLs and overall functional tolerance. REASON FOR SERVICES/OTHER COMMENTS:  · Patient continues to require skilled intervention due to  above deficits affecting participation in basic ADLs and overall functional tolerance. Use of outcome tool(s) and clinical judgement create a POC that gives a: Questionable prediction of patient's progress: MODERATE COMPLEXITY   TREATMENT:   (In addition to Assessment/Re-Assessment sessions the following treatments were rendered)  PRE-TREATMENT SYMPTOMS: Pt states he has not had any low back pain but is now experiencing pain in B calf region. Pt describes pain as muscle strain but is unsure as to whether it is coming from back or from walking more. Pt reports     THERAPEUTIC EXERCISE: (40 minutes):  Exercises per grid below to improve mobility, strength and balance. Required minimal visual and verbal cues to promote proper body alignment and promote proper body posture. Progressed resistance, range and complexity of movement as indicated.      Date:  6/21/18 Date:  6/29/18 Date:  7/3/18     Activity/Exercise Parameters Parameters Parameters   Pt education POC, Anatomy, HEP     Hamstring Stretch 30\"x3     Nu-step  12min  -level 4    Physio-step   10min  -level 3  5min fwd/5min bckwd   LTR 10\"X10 2x10 reps  -hold 5s  -red physioball    SKTC 30\"x3 3x30s    Glute set  1x10 reps  -hold 10s    Bridging  1x12 reps  -hold 3s  -slow descent     SLR  1x10 reps B  -not as high on R LE  -5s up/5s hold/5s down    Pelvic tilts  [id number]   -hold 10s    Seated piriformis stretch  3x30s    Seated hamstring stretch  3x30s    Standing calf/toe raises   1x15 reps   Standing hip abduction   1x15 reps   Standing hip flexion   1x15 reps   Standing hip extension   1x15 reps   Standing step-ups   1x15 reps B  -4\" step   Standing cone taps      Standing calf stretch   3x30s   Sit to stand education  5 min  -with [id number]      MANUAL THERAPY: (0 minutes): Joint mobilization, Soft tissue mobilization and Manipulation was utilized and necessary because of the patient's restricted joint motion, painful spasm, loss of articular motion and restricted motion of soft tissue. (Used abbreviations: MET - muscle energy technique; PNF - proprioceptive neuromuscular facilitation; NMR - neuromuscular re-education; AP - anterior to posterior; PA - posterior to anterior)    MODALITIES: (0 minutes):      NOT TODAY        TREATMENT/SESSION ASSESSMENT:  Lisbet Dela Cruz did not complain of calf pain during physio-step. Pt stated his legs felt very weak during exercises and stated it was due to muscle weakness. Pt continues to ambulate with cane. Pt required rest breaks after each exercise due to fatigue. Pt stated the stretching sensation he was experiencing during the standing calf stretch was the same as what he has been feeling these past few days. Pt instructed to begin performing stretching exercise at home. Pt stated he was fatigued at the end of treatment session but was not in any pain and rated it 0/10. · Pain/ Symptoms Initial:  0/10 Post Session:  0/10     · Compliance with Program/Exercises: Will assess as treatment progresses. · Recommendations/Intent for next treatment session: \"Next visit will focus on advancements to more challenging activities\".     Total Treatment Duration:  PT Patient Time In/Time Out  Time In: 1015  Time Out: 3887 [address]  [place]

## 2018-07-06 ENCOUNTER — HOSPITAL ENCOUNTER (OUTPATIENT)
Dept: PHYSICAL THERAPY | Age: 78
Discharge: HOME | End: 2018-07-06
Attending: NEUROLOGICAL SURGERY
Payer: MEDICARE

## 2018-07-06 PROCEDURE — 97110 THERAPEUTIC EXERCISES: CPT

## 2018-07-06 PROCEDURE — 97140 MANUAL THERAPY 1/> REGIONS: CPT

## 2018-07-06 NOTE — PROGRESS NOTES
Ofelia Sewell [facility]  : 1940  [place] / [place] / Product Type: Medicare /  [address]  at [facility]  [address], [address], [facility], [address] [facility]  [phone]   [phone]                OUTPATIENT PHYSICAL THERAPY:Daily Note 2018    ICD-10: Treatment Diagnosis:    Muscle weakness (generalized) (M62.81)     Lumbago with sciatica, right side (M54.41)     Lumbago with sciatica, left side (M54.42)       PRECAUTIONS/ALLERGIES:   Review of patient's allergies indicates no known allergies. FALL RISK SCORE: 2 (? 5 = High Risk)    MD ORDERS: Eval and Treat MEDICAL/REFERRING DIAGNOSIS:  back    DATE OF ONSET: 2 months ago    REFERRING PHYSICIAN: Rooney Dancer, MD    RETURN PHYSICIAN APPOINTMENT: TBD by patient     INITIAL ASSESSMENT:  Mr. Zachary Ha has attended 1 physical therapy session including initial evaluation as of 18. Zachary Ha exhibits decreased B hamstring flexibility, increased pain with extension, decreased postural and core strength, decreased functional tolerance, and decreased general LE strength. L iliac upslip with anterior innominate rotation present in sitting. Zachary Ha will benefit from skilled PT (medically necessary) to address above deficits affecting participation in basic ADLs and overall functional tolerance. Manual techniques (stretching, joint mobilizations, soft tissue mobilization/myofascial release), postural exercises/education, therapeutic techniques/activities, and HEP will be performed as appropriate addressing Ofelia Jolly's current condition. PROBLEM LIST (Impacting functional limitations):  1. Decreased Strength  2. Decreased ADL/Functional Activities  3. Decreased Transfer Abilities  4. Decreased Ambulation Ability/Technique  5. Decreased Balance  6. Increased Pain  7. Decreased Activity Tolerance  8.  Increased Fatigue  9. Increased Shortness of Breath  10. Decreased Flexibility/Joint Mobility  11. Decreased [place] with Home Exercise Program INTERVENTIONS PLANNED:  1. Balance Exercise  2. Bed Mobility  3. Cold  4. Cryotherapy  5. Electrical Stimulation  6. Family Education  7. Gait Training  8. Heat  9. Home Exercise Program (HEP)  10. Manual Therapy  11. Neuromuscular Re-education/Strengthening  12. Range of Motion (ROM)  13. Therapeutic Activites  14. Therapeutic Exercise/Strengthening  15. Transfer Training  16. Mechanical traction  17. Aquatic Therapy   TREATMENT PLAN:  Effective Dates: 6/21/2018 TO 9/19/2018 (90 days). Frequency/Duration: 2 times a week for 90 Days    GOALS: (Goals have been discussed and agreed upon with patient.)  Long Term Goals 8 weeks   1. Sanjana Clay will demonstrate an 12 point improvement on the Oswestry to show improvement in function. 2. Sanjana Clay will report <=2/10 pain during ADLs to improve QOL. 3. Sanjana Clay will demonstrate 5/5 LE strength on manual muscle testing to improve functional mobility. [address] will be able to perform SLS >10 seconds bilaterally to help with gait and improve balance. [address] will be able to demonstrate safe lifting and transfer mechanics without cueing for improved safety with home, childcare, and community activities. Rehabilitation Potential For Stated Goals: Good    Regarding Galo Jolly's therapy, I certify that the treatment plan above will be carried out by a therapist or under their direction. Thank you for this referral,  Gi Ulrich, PT, DPT       Referring Physician Signature: Beryle Few, MD              Date                    HISTORY:   PATIENT GOAL FOR PHYSICAL THERAPY:  Pt reports he would like to move a little better. HISTORY OF PRESENT INJURY/ILLNESS (REASON FOR REFERRAL):  Pt states the pain has come on over a period of 2 months.  Pt states the pain began in his low back, moved to his R hip, and then down B legs. Pt states the leg pain is anterior and posterior. Pt states the leg pain is worse than the back pain. Pt describes the pain as a very sharp achy pain when he is standing walking. Pt describes the pain in his legs as a dull ache. Pt reports occasionally he will do a certain movement which will cause a sharp shooting pain that does not linger and goes away when he re-adjusts. Pt reports the most difficulty with bed mobility, walking, standing, bending over, and bending backwards. Pt states pain can get as high as 8/10 and as low as 1/10. Note: Patient denies any increase of symptoms with cough, sneeze or valsalva. Patient denies any saddle paresthesia or bowel/bladder deficits. PAST MEDICAL HISTORY/COMORBIDITIES:   Mr. Conrad Wahl  has a past medical history of Anxiety disorder (12/9/2014); Arthritis (12/9/2014); Depression (12/9/2014); Gout (12/9/2014); HTN (hypertension), benign (2/15/2017); Hyperlipemia (12/9/2014); Hypertension; Idiopathic chronic gout of right ankle (12/9/2014); Insomnia (12/9/2014); Kidney stone; Mixed hyperlipidemia (12/9/2014); Panic disorder (12/9/2014); Primary insomnia (12/9/2014); and Rosacea (2/15/2017). Mr. Conrad Wahl  has a past surgical history that includes hx other surgical; hx cyst removal; hx cyst removal; hx hernia repair (Bilateral); hx ankle fracture tx (Left, 6/1974); hx wrist fracture tx (Bilateral, 6/1993); hx colonoscopy (2007); and hx cyst removal.    SOCIAL HISTORY/LIVING ENVIRONMENT:     Pt lives in a 2 story home with spouse. Social History     Social History    Marital status:      Spouse name: N/A    Number of children: N/A    Years of education: N/A     Occupational History    Not on file.      Social History Main Topics    Smoking status: Former Smoker     Types: Cigarettes     Quit date: 1/1/1970    Smokeless tobacco: Never Used    Alcohol use 0.0 oz/week     0 Standard drinks or equivalent per week    Drug use: No    Sexual activity: Yes     Partners: Female     Other Topics Concern    Not on file     Social History Narrative       PRIOR LEVEL OF FUNCTION/WORK/ACTIVITY:  Pt very mobile prior to onset of pain. Pt is currently not working, he is retired. Wife states he was able to cut the grass and walk long distances prior to pain. Active Ambulatory Problems     Diagnosis Date Noted    MEGGAN (generalized anxiety disorder) 12/09/2014    Arthritis 12/09/2014    Idiopathic chronic gout of right ankle 12/09/2014    Mixed hyperlipidemia 12/09/2014    Primary insomnia 12/09/2014    Panic disorder 12/09/2014    HTN (hypertension), benign 02/15/2017    Rosacea 02/15/2017    Idiopathic scoliosis 06/07/2018    Lumbar stenosis with neurogenic claudication 06/07/2018     Resolved Ambulatory Problems     Diagnosis Date Noted    Depression 12/09/2014     Past Medical History:   Diagnosis Date    Anxiety disorder 12/9/2014    Arthritis 12/9/2014    Depression 12/9/2014    Gout 12/9/2014    HTN (hypertension), benign 2/15/2017    Hyperlipemia 12/9/2014    Hypertension     Idiopathic chronic gout of right ankle 12/9/2014    Insomnia 12/9/2014    Kidney stone     Mixed hyperlipidemia 12/9/2014    Panic disorder 12/9/2014    Primary insomnia 12/9/2014    Rosacea 2/15/2017       CURRENT MEDICATIONS:    Current Outpatient Prescriptions:     LORazepam (ATIVAN) 0.5 mg tablet, TAKE 1 TABLET BY MOUTH DAILY. , Disp: 30 Tab, Rfl: 3    diclofenac sodium (PENNSAID) 1.5 % drop, by Apply Externally route., Disp: , Rfl:     predniSONE (DELTASONE) 20 mg tablet, 60mg daily x 3 days, then 40mg x 3 days, then 20mg x 3 days, then 10mg x 3 days, then D/C., Disp: 21 Tab, Rfl: 0    zolpidem (AMBIEN) 10 mg tablet, TAKE 1 TABLET BY MOUTH AT BEDTIME AS NEEDED FOR SLEEP, Disp: 30 Tab, Rfl: 5    gabapentin (NEURONTIN) 100 mg capsule, Take 100 mg by mouth three (3) times daily. , Disp: , Rfl:    atorvastatin (LIPITOR) 40 mg tablet, TAKE 1 TABLET BY MOUTH EVERY DAY, Disp: 90 Tab, Rfl: 3    potassium chloride SR (KLOR-CON 10) 10 mEq tablet, Take 1 Tab by mouth daily. , Disp: 90 Tab, Rfl: 3    amLODIPine-Olmesartan (CAROLA) 10-40 mg tab, Take 1 Tab by mouth daily. , Disp: 90 Tab, Rfl: 3    sertraline (ZOLOFT) 100 mg tablet, Take 1 Tab by mouth daily. One and half tab every day, Disp: 135 Tab, Rfl: 3    metroNIDAZOLE (METROGEL) 1 % topical gel, Apply  to affected area daily. Use a thin layer to affected areas after washing, Disp: 45 g, Rfl: 3    allopurinol (ZYLOPRIM) 100 mg tablet, Take 2 Tabs by mouth daily. , Disp: 180 Tab, Rfl: 3    diclofenac (VOLTAREN) 1 % gel, Apply 4 g to affected area four (4) times daily. , Disp: 2 Each, Rfl: 3    multivitamin (ONE A DAY) tablet, Take 1 Tab by mouth daily. , Disp: , Rfl:     cholecalciferol, vitamin D3, (VITAMIN D3) 2,000 unit tab, Take  by mouth., Disp: , Rfl:      Date Last Reviewed:  7/6/2018   Number of Personal Factors/Comorbidities that affect the Plan of Care: 0: LOW COMPLEXITY   EXAMINATION:   OBSERVATION/ORTHOSTATIC POSTURAL ASSESSMENT:          Pt sits with forward head and rounded shoulders which indicate tight anterior chest musculature, upper trapezius, and levator scapula and weak posterior scapula musculature and deep cervical flexors. Pt displays decreased core motor control indicating weak core and low back musculature.     PALPATION:    -PA glides: L1-L2= grade 2, L2-L3= grade 4, L3-L4= grade 4, L4-L5= grade 3     -TTP: B gluteal muscles, piriformis, hamstring (L>R), IT band, TFL    -L iliac upslip with anterior innominate rotation noted in sitting    AROM/PROM:    AROM/PROM         Joint: Date: 6/20/18  Date:  Date:    Active LE ROM Right Left Right Left Right Left   Hip Flexion [facility]       Hip Extension [facility]       Knee Extension [facility]       Knee Flexion [facility]       Knee Extension [facility]       Lumbar Flexion 45 degrees --       Lumbar Extension 3 degrees  -painful at end range --       Lumbar Side-Bending 15 degrees 15 degrees       Lumbar Rotation WFL  -Pain at end range [facility]  -Pain at end range         STRENGTH         Joint: Date: 6/21/18  Date:  Date:     Right Left Right Left Right Left   Hip Abduction 4/5 4/5       Hip Adduction 4/5 4/5       Hip IR 4-/5 4-/5       Hip ER 4-/5 4-/5       Hip Flexion 4/5 4/5       Knee Extension 4+/5 4+/5       Knee Flexion 4+/5 4+/5       Ankle DF 4+/5 4+/5       Ankle PF 4+/5 4+/5       Ankle IV 4+/5 4+/5       Ankle EV 4+/5 4+/5         SPECIAL TESTS: Assessed @ Initial Visit    -90/90:     -R: 110    -L: 122   -SLR: Positive B (R>L)   -SI POST.  GAPPING: Negative B   -SOL 4: Positive for B tightness   -SLUMP TEST: Negative B   -LUMBAR STENOSIS:      -Bilateral symptoms: Yes      -Leg pain more than back pain: Yes      -Pain during walking/standing: Yes      -Pain relief upon sitting: Yes      -Age greater than 48 years: Yes    STRUCTURE FINDING     Primary Disc Flattened Back Yes   Radiographic Instability Excessive Lordosis  No   SI Joint Dysfunction Flattened Back No   Secondary Disc (DJD) Hypertrophic Supraspinous Ligament: thickening along spinous process No   Spine Stenosis Flattened Back Yes   Facet Impingement Flexed Back, usually unilateral No   Nerve Root Adhesion Bad Posture, Avoid Forward Flexion, Stand With Knees Bent No       NEUROLOGICAL SCREEN: Assessed @ Initial Visit    -RADIATING SYMPTOMS: YES     -DERMATOMES: Intact    -MYOTOMES Date: 6/21/18  Date:  Date:     Right Left Right Left Right Left   L2 & L3   (Hip Flexors) 4/5 4/5       L3-L4  (Knee Extensors) 5/5 5/5       L4  (Ankle DFs) 5/5 5/5       L5  (Hallux Ext) 5/5 5/5       L5-S1  (Ankle PFs) 5/5 5/5       S1-S2  (Ankle EVs) 5/5 5/5         -REFLEXES: Date: 6/22/18  Date:  Date:     Right Left Right Left Right Left   L4  (Quadriceps) NT NT       S1  (Achilles) NT NT         RED FLAGS: Date: 6/21/18 Date:  Date:   Non-Mechanical pain distribution (cannot be produced, changed, or reduced during exam): NO     Cauda Equina Dysfunction: NO     Upper lumbar disc herniation in younger patients (femoral nerve tension test for lateral disc herniation in lower lumbar): NO     Lumbar compression fracture (age > 48, trauma, corticosteroid use NO     Spine Cancer (age > 48, pervious history of cancer, failure to improve in 1 month of therapy, no relief - be rest, duration > 1 month, unexplained weight loss, insidious onset, constitutional symptoms): NO     Ankylosing Spondylitis (age < 36, pain not relieved by supine, morning back stiffness, pain duration > 3 months, improved by exercise): NO     Sacral Fracture: NO         FUNCTIONAL MOBILITY:  Assessed @ Initial Visit    -Affecting participation in basic ADLs and functional tasks.   -Limited tolerance of walking and standing   -Ambulation/Gait: Slow to rise from sitting, decreased step length, shuffling gait for first few steps, forward trunk lean, decreased stance time B LEs.   -Bed mobility: Difficulty getting in and out of bed and rolling over. -Stairs: Difficulty ascending and descending stairs secondary to fatigue and low back pain that radiates into LEs.   -Transfers: Mod I   -Wheelchair: N/A    BALANCE:  SLS: Date: 6/22/18 Date: Date:   Right: NT     Left: NT          Body Structures Involved:  1. Bones  2. Joints  3. Muscles  4. Ligaments Body Functions Affected:  1. Sensory/Pain  2. Neuromusculoskeletal  3. Movement Related Activities and Participation Affected:  1. Mobility  2. Self Care  3. Domestic Life  4. Interpersonal Interactions and Relationships  5. Community, Social and [place]   Number of elements that affect the Plan of Care: 4+: HIGH COMPLEXITY   CLINICAL PRESENTATION:   Presentation: Stable and uncomplicated: LOW COMPLEXITY   CLINICAL DECISION MAKING:   OUTCOME MEASURE USED:   Tool Used:  Tool Used: Modified Oswestry Low Back Pain Questionnaire  Score:  Initial: 24/50 Most Recent: X/50 (Date: -- )   Interpretation of Score: Each section is scored on a 0-5 scale, 5 representing the greatest disability. The scores of each section are added together for a total score of 50. Score 0 1-10 11-20 21-30 31-40 41-49 50   Modifier CH CI CJ CK CL CM CN     ? Mobility - Walking and Moving Around:     - CURRENT STATUS: CK - 40%-59% impaired, limited or restricted    - GOAL STATUS:   CJ - 20%-39% impaired, limited or restricted    - D/C STATUS: ---------------To be determined---------------    [place] / Product Type: Medicare /     MEDICAL NECESSITY:  · Skilled intervention continues to be required due to above deficits affecting participation in basic ADLs and overall functional tolerance. REASON FOR SERVICES/OTHER COMMENTS:  · Patient continues to require skilled intervention due to  above deficits affecting participation in basic ADLs and overall functional tolerance. Use of outcome tool(s) and clinical judgement create a POC that gives a: Questionable prediction of patient's progress: MODERATE COMPLEXITY   TREATMENT:   (In addition to Assessment/Re-Assessment sessions the following treatments were rendered)  PRE-TREATMENT SYMPTOMS: Pt states he has not had any low back pain but continues to experience pain in B calf region. Pt states to no change since last treatment session. Pt rates pain 2/10 today. THERAPEUTIC EXERCISE: (25 minutes):  Exercises per grid below to improve mobility, strength and balance. Required minimal visual and verbal cues to promote proper body alignment and promote proper body posture. Progressed resistance, range and complexity of movement as indicated.      Date:  6/21/18 Date:  6/29/18 Date:  7/3/18   Date:  7/6/18   Activity/Exercise Parameters Parameters Parameters    Pt education POC, Anatomy, HEP      Hamstring Stretch 30\"x3      Nu-step  12min  -level 4     Physio-step   10min  -level 3  5min fwd/5min bckwd 10min  -level 4  5min fwd/5min bckwd   LTR 10\"X10 2x10 reps  -hold 5s  -red physioball     SKTC 30\"x3 3x30s     Glute set  1x10 reps  -hold 10s     Bridging  1x12 reps  -hold 3s  -slow descent      SLR  1x10 reps B  -not as high on R LE  -5s up/5s hold/5s down     Pelvic tilts  [id number]   -hold 10s     Seated piriformis stretch  3x30s     Seated hamstring stretch  3x30s     Standing marching    1x15 reps   Standing calf/toe raises   1x15 reps 1x15 reps   Standing hip abduction   1x15 reps 1x15 reps   Standing hip flexion   1x15 reps 1x15 reps   Standing hip extension   1x15 reps 1x15 reps   Standing step-ups   1x15 reps B  -4\" step    Standing cone taps       Standing calf stretch   3x30s 4x1min   Sit to stand education  5 min  -with [id number]       MANUAL THERAPY: (15 minutes): Joint mobilization, Soft tissue mobilization and Manipulation was utilized and necessary because of the patient's restricted joint motion, painful spasm, loss of articular motion and restricted motion of soft tissue. Pt prone for Chapincito & Ilsley roll out to B lumbar paraspinals, B gluteal region, B piriformis, B hamstrings, B gastroc. Trigger point release to L hamstring and gastroc to relieve tension and promote AROM. (Used abbreviations: MET - muscle energy technique; PNF - proprioceptive neuromuscular facilitation; NMR - neuromuscular re-education; AP - anterior to posterior; PA - posterior to anterior)    MODALITIES: (0 minutes):      NOT TODAY        TREATMENT/SESSION ASSESSMENT:  Sameera Jolly tolerated stretching well but complained of increased tension during the first few reps. Pt had increased tenderness throughout L hamstring and gastroc region with trigger points throughout. Pt reported relief afterwards and stated he did not have any tightness or discomfort in them. Pt performed exercises slowly but well. Will progress exercises next visit and include airex and weights for standing hip exercises.  Pt reported 0/10 pain at the end of treatment session. · Pain/ Symptoms Initial:  2/10 Post Session:  0/10     · Compliance with Program/Exercises: Will assess as treatment progresses. · Recommendations/Intent for next treatment session: \"Next visit will focus on advancements to more challenging activities\". Total Treatment Duration:  PT Patient Time In/Time Out  Time In: 1015  Time Out: 2732 [address].  MUKUND EspinozaT

## 2018-07-10 ENCOUNTER — HOSPITAL ENCOUNTER (OUTPATIENT)
Dept: PHYSICAL THERAPY | Age: 78
Discharge: HOME | End: 2018-07-10
Attending: NEUROLOGICAL SURGERY
Payer: MEDICARE

## 2018-07-10 PROCEDURE — 97110 THERAPEUTIC EXERCISES: CPT

## 2018-07-10 NOTE — PROGRESS NOTES
[facility]  : 1940  [place] / [place] / Product Type: Medicare /  [id number]  at [facility]  [address], [address], [address]  [phone]   [phone]                OUTPATIENT PHYSICAL THERAPY:Daily Note 7/10/2018    ICD-10: Treatment Diagnosis:    Muscle weakness (generalized) (M62.81)     Lumbago with sciatica, right side (M54.41)     Lumbago with sciatica, left side (M54.42)       PRECAUTIONS/ALLERGIES:   Review of patient's allergies indicates no known allergies. FALL RISK SCORE: 2 (? 5 = High Risk)    MD ORDERS: Eval and Treat MEDICAL/REFERRING DIAGNOSIS:  back    DATE OF ONSET: 2 months ago    REFERRING PHYSICIAN: Peter Jurado MD    RETURN PHYSICIAN APPOINTMENT: TBD by patient     INITIAL ASSESSMENT:  Mr. Sadaf Rondon has attended 1 physical therapy session including initial evaluation as of 18. Sadaf Rondon exhibits decreased B hamstring flexibility, increased pain with extension, decreased postural and core strength, decreased functional tolerance, and decreased general LE strength. L iliac upslip with anterior innominate rotation present in sitting. Sadaf Rondon will benefit from skilled PT (medically necessary) to address above deficits affecting participation in basic ADLs and overall functional tolerance. Manual techniques (stretching, joint mobilizations, soft tissue mobilization/myofascial release), postural exercises/education, therapeutic techniques/activities, and HEP will be performed as appropriate addressing Titus Love Eulaon's current condition. PROBLEM LIST (Impacting functional limitations):  1. Decreased Strength  2. Decreased ADL/Functional Activities  3. Decreased Transfer Abilities  4. Decreased Ambulation Ability/Technique  5. Decreased Balance  6. Increased Pain  7. Decreased Activity Tolerance  8.  Increased Fatigue  9. Increased Shortness of Breath  10. Decreased Flexibility/Joint Mobility  11. Decreased [place] with Home Exercise Program INTERVENTIONS PLANNED:  1. Balance Exercise  2. Bed Mobility  3. Cold  4. Cryotherapy  5. Electrical Stimulation  6. Family Education  7. Gait Training  8. Heat  9. Home Exercise Program (HEP)  10. Manual Therapy  11. Neuromuscular Re-education/Strengthening  12. Range of Motion (ROM)  13. Therapeutic Activites  14. Therapeutic Exercise/Strengthening  15. Transfer Training  16. Mechanical traction  17. Aquatic Therapy   TREATMENT PLAN:  Effective Dates: 6/21/2018 TO 9/19/2018 (90 days). Frequency/Duration: 2 times a week for 90 Days    GOALS: (Goals have been discussed and agreed upon with patient.)  Long Term Goals 8 weeks   1. Dhara Ashing will demonstrate an 12 point improvement on the Oswestry to show improvement in function. 2. Dhara Ashing will report <=2/10 pain during ADLs to improve QOL. 3. Dhara Ashing will demonstrate 5/5 LE strength on manual muscle testing to improve functional mobility. [address] will be able to perform SLS >10 seconds bilaterally to help with gait and improve balance. [address] will be able to demonstrate safe lifting and transfer mechanics without cueing for improved safety with home, childcare, and community activities. Rehabilitation Potential For Stated Goals: Good    Regarding Sameera Jolly's therapy, I certify that the treatment plan above will be carried out by a therapist or under their direction. Thank you for this referral,  Bar Henning PT, DPT       Referring Physician Signature: Mariza Mcgarry MD              Date                    HISTORY:   PATIENT GOAL FOR PHYSICAL THERAPY:  Pt reports he would like to move a little better. HISTORY OF PRESENT INJURY/ILLNESS (REASON FOR REFERRAL):  Pt states the pain has come on over a period of 2 months.  Pt states the pain began in his low back, moved to his R hip, and then down B legs. Pt states the leg pain is anterior and posterior. Pt states the leg pain is worse than the back pain. Pt describes the pain as a very sharp achy pain when he is standing walking. Pt describes the pain in his legs as a dull ache. Pt reports occasionally he will do a certain movement which will cause a sharp shooting pain that does not linger and goes away when he re-adjusts. Pt reports the most difficulty with bed mobility, walking, standing, bending over, and bending backwards. Pt states pain can get as high as 8/10 and as low as 1/10. Note: Patient denies any increase of symptoms with cough, sneeze or valsalva. Patient denies any saddle paresthesia or bowel/bladder deficits. PAST MEDICAL HISTORY/COMORBIDITIES:   Mr. Alvan Aase  has a past medical history of Anxiety disorder (12/9/2014); Arthritis (12/9/2014); Depression (12/9/2014); Gout (12/9/2014); HTN (hypertension), benign (2/15/2017); Hyperlipemia (12/9/2014); Hypertension; Idiopathic chronic gout of right ankle (12/9/2014); Insomnia (12/9/2014); Kidney stone; Mixed hyperlipidemia (12/9/2014); Panic disorder (12/9/2014); Primary insomnia (12/9/2014); and Rosacea (2/15/2017). Mr. Alvan Aase  has a past surgical history that includes hx other surgical; hx cyst removal; hx cyst removal; hx hernia repair (Bilateral); hx ankle fracture tx (Left, 6/1974); hx wrist fracture tx (Bilateral, 6/1993); hx colonoscopy (2007); and hx cyst removal.    SOCIAL HISTORY/LIVING ENVIRONMENT:     Pt lives in a 2 story home with spouse. Social History     Social History    Marital status:      Spouse name: N/A    Number of children: N/A    Years of education: N/A     Occupational History    Not on file.      Social History Main Topics    Smoking status: Former Smoker     Types: Cigarettes     Quit date: 1/1/1970    Smokeless tobacco: Never Used    Alcohol use 0.0 oz/week     0 Standard drinks or equivalent per week    Drug use: No    Sexual activity: Yes     Partners: Female     Other Topics Concern    Not on file     Social History Narrative       PRIOR LEVEL OF FUNCTION/WORK/ACTIVITY:  Pt very mobile prior to onset of pain. Pt is currently not working, he is retired. Wife states he was able to cut the grass and walk long distances prior to pain. Active Ambulatory Problems     Diagnosis Date Noted    MEGGAN (generalized anxiety disorder) 12/09/2014    Arthritis 12/09/2014    Idiopathic chronic gout of right ankle 12/09/2014    Mixed hyperlipidemia 12/09/2014    Primary insomnia 12/09/2014    Panic disorder 12/09/2014    HTN (hypertension), benign 02/15/2017    Rosacea 02/15/2017    Idiopathic scoliosis 06/07/2018    Lumbar stenosis with neurogenic claudication 06/07/2018     Resolved Ambulatory Problems     Diagnosis Date Noted    Depression 12/09/2014     Past Medical History:   Diagnosis Date    Anxiety disorder 12/9/2014    Arthritis 12/9/2014    Depression 12/9/2014    Gout 12/9/2014    HTN (hypertension), benign 2/15/2017    Hyperlipemia 12/9/2014    Hypertension     Idiopathic chronic gout of right ankle 12/9/2014    Insomnia 12/9/2014    Kidney stone     Mixed hyperlipidemia 12/9/2014    Panic disorder 12/9/2014    Primary insomnia 12/9/2014    Rosacea 2/15/2017       CURRENT MEDICATIONS:    Current Outpatient Prescriptions:     LORazepam (ATIVAN) 0.5 mg tablet, TAKE 1 TABLET BY MOUTH DAILY. , Disp: 30 Tab, Rfl: 3    diclofenac sodium (PENNSAID) 1.5 % drop, by Apply Externally route., Disp: , Rfl:     predniSONE (DELTASONE) 20 mg tablet, 60mg daily x 3 days, then 40mg x 3 days, then 20mg x 3 days, then 10mg x 3 days, then D/C., Disp: 21 Tab, Rfl: 0    zolpidem (AMBIEN) 10 mg tablet, TAKE 1 TABLET BY MOUTH AT BEDTIME AS NEEDED FOR SLEEP, Disp: 30 Tab, Rfl: 5    gabapentin (NEURONTIN) 100 mg capsule, Take 100 mg by mouth three (3) times daily. , Disp: , Rfl:    atorvastatin (LIPITOR) 40 mg tablet, TAKE 1 TABLET BY MOUTH EVERY DAY, Disp: 90 Tab, Rfl: 3    potassium chloride SR (KLOR-CON 10) 10 mEq tablet, Take 1 Tab by mouth daily. , Disp: 90 Tab, Rfl: 3    amLODIPine-Olmesartan (CAROLA) 10-40 mg tab, Take 1 Tab by mouth daily. , Disp: 90 Tab, Rfl: 3    sertraline (ZOLOFT) 100 mg tablet, Take 1 Tab by mouth daily. One and half tab every day, Disp: 135 Tab, Rfl: 3    metroNIDAZOLE (METROGEL) 1 % topical gel, Apply  to affected area daily. Use a thin layer to affected areas after washing, Disp: 45 g, Rfl: 3    allopurinol (ZYLOPRIM) 100 mg tablet, Take 2 Tabs by mouth daily. , Disp: 180 Tab, Rfl: 3    diclofenac (VOLTAREN) 1 % gel, Apply 4 g to affected area four (4) times daily. , Disp: 2 Each, Rfl: 3    multivitamin (ONE A DAY) tablet, Take 1 Tab by mouth daily. , Disp: , Rfl:     cholecalciferol, vitamin D3, (VITAMIN D3) 2,000 unit tab, Take  by mouth., Disp: , Rfl:      Date Last Reviewed:  7/10/2018   Number of Personal Factors/Comorbidities that affect the Plan of Care: 0: LOW COMPLEXITY   EXAMINATION:   OBSERVATION/ORTHOSTATIC POSTURAL ASSESSMENT:          Pt sits with forward head and rounded shoulders which indicate tight anterior chest musculature, upper trapezius, and levator scapula and weak posterior scapula musculature and deep cervical flexors. Pt displays decreased core motor control indicating weak core and low back musculature.     PALPATION:    -PA glides: L1-L2= grade 2, L2-L3= grade 4, L3-L4= grade 4, L4-L5= grade 3     -TTP: B gluteal muscles, piriformis, hamstring (L>R), IT band, TFL    -L iliac upslip with anterior innominate rotation noted in sitting    AROM/PROM:    AROM/PROM         Joint: Date: 6/20/18  Date:  Date:    Active LE ROM Right Left Right Left Right Left   Hip Flexion [facility]       Hip Extension [facility]       Knee Extension [facility]       Knee Flexion [facility]       Knee Extension [facility]       Lumbar Flexion 45 degrees --       Lumbar Extension 3 degrees  -painful at end range --       Lumbar Side-Bending 15 degrees 15 degrees       Lumbar Rotation WFL  -Pain at end range [facility]  -Pain at end range         STRENGTH         Joint: Date: 6/21/18  Date:  Date:     Right Left Right Left Right Left   Hip Abduction 4/5 4/5       Hip Adduction 4/5 4/5       Hip IR 4-/5 4-/5       Hip ER 4-/5 4-/5       Hip Flexion 4/5 4/5       Knee Extension 4+/5 4+/5       Knee Flexion 4+/5 4+/5       Ankle DF 4+/5 4+/5       Ankle PF 4+/5 4+/5       Ankle IV 4+/5 4+/5       Ankle EV 4+/5 4+/5         SPECIAL TESTS: Assessed @ Initial Visit    -90/90:     -R: 110    -L: 122   -SLR: Positive B (R>L)   -SI POST.  GAPPING: Negative B   -SOL 4: Positive for B tightness   -SLUMP TEST: Negative B   -LUMBAR STENOSIS:      -Bilateral symptoms: Yes      -Leg pain more than back pain: Yes      -Pain during walking/standing: Yes      -Pain relief upon sitting: Yes      -Age greater than 48 years: Yes    STRUCTURE FINDING     Primary Disc Flattened Back Yes   Radiographic Instability Excessive Lordosis  No   SI Joint Dysfunction Flattened Back No   Secondary Disc (DJD) Hypertrophic Supraspinous Ligament: thickening along spinous process No   Spine Stenosis Flattened Back Yes   Facet Impingement Flexed Back, usually unilateral No   Nerve Root Adhesion Bad Posture, Avoid Forward Flexion, Stand With Knees Bent No       NEUROLOGICAL SCREEN: Assessed @ Initial Visit    -RADIATING SYMPTOMS: YES     -DERMATOMES: Intact    -MYOTOMES Date: 6/21/18  Date:  Date:     Right Left Right Left Right Left   L2 & L3   (Hip Flexors) 4/5 4/5       L3-L4  (Knee Extensors) 5/5 5/5       L4  (Ankle DFs) 5/5 5/5       L5  (Hallux Ext) 5/5 5/5       L5-S1  (Ankle PFs) 5/5 5/5       S1-S2  (Ankle EVs) 5/5 5/5         -REFLEXES: Date: 6/22/18  Date:  Date:     Right Left Right Left Right Left   L4  (Quadriceps) NT NT       S1  (Achilles) NT NT         RED FLAGS: Date: 6/21/18 Date:  Date:   Non-Mechanical pain distribution (cannot be produced, changed, or reduced during exam): NO     Cauda Equina Dysfunction: NO     Upper lumbar disc herniation in younger patients (femoral nerve tension test for lateral disc herniation in lower lumbar): NO     Lumbar compression fracture (age > 48, trauma, corticosteroid use NO     Spine Cancer (age > 48, pervious history of cancer, failure to improve in 1 month of therapy, no relief - be rest, duration > 1 month, unexplained weight loss, insidious onset, constitutional symptoms): NO     Ankylosing Spondylitis (age < 36, pain not relieved by supine, morning back stiffness, pain duration > 3 months, improved by exercise): NO     Sacral Fracture: NO         FUNCTIONAL MOBILITY:  Assessed @ Initial Visit    -Affecting participation in basic ADLs and functional tasks.   -Limited tolerance of walking and standing   -Ambulation/Gait: Slow to rise from sitting, decreased step length, shuffling gait for first few steps, forward trunk lean, decreased stance time B LEs.   -Bed mobility: Difficulty getting in and out of bed and rolling over. -Stairs: Difficulty ascending and descending stairs secondary to fatigue and low back pain that radiates into LEs.   -Transfers: Mod I   -Wheelchair: N/A    BALANCE:  SLS: Date: 6/22/18 Date: Date:   Right: NT     Left: NT          Body Structures Involved:  1. Bones  2. Joints  3. Muscles  4. Ligaments Body Functions Affected:  1. Sensory/Pain  2. Neuromusculoskeletal  3. Movement Related Activities and Participation Affected:  1. Mobility  2. Self Care  3. Domestic Life  4. Interpersonal Interactions and Relationships  5. Community, Social and [place]   Number of elements that affect the Plan of Care: 4+: HIGH COMPLEXITY   CLINICAL PRESENTATION:   Presentation: Stable and uncomplicated: LOW COMPLEXITY   CLINICAL DECISION MAKING:   OUTCOME MEASURE USED:   Tool Used:  Tool Used: Modified Oswestry Low Back Pain Questionnaire  Score:  Initial: 24/50 Most Recent: X/50 (Date: -- )   Interpretation of Score: Each section is scored on a 0-5 scale, 5 representing the greatest disability. The scores of each section are added together for a total score of 50. Score 0 1-10 11-20 21-30 31-40 41-49 50   Modifier CH CI CJ CK CL CM CN     ? Mobility - Walking and Moving Around:     - CURRENT STATUS: CK - 40%-59% impaired, limited or restricted    - GOAL STATUS:   CJ - 20%-39% impaired, limited or restricted    - D/C STATUS: ---------------To be determined---------------    [place] / [place] / Product Type: Medicare /     MEDICAL NECESSITY:  · Skilled intervention continues to be required due to above deficits affecting participation in basic ADLs and overall functional tolerance. REASON FOR SERVICES/OTHER COMMENTS:  · Patient continues to require skilled intervention due to  above deficits affecting participation in basic ADLs and overall functional tolerance. Use of outcome tool(s) and clinical judgement create a POC that gives a: Questionable prediction of patient's progress: MODERATE COMPLEXITY   TREATMENT:   (In addition to Assessment/Re-Assessment sessions the following treatments were rendered)  PRE-TREATMENT SYMPTOMS: Pt states he cut the grass yesterday and did not have any back pain. Pt reports his gasroc region was very sore the day after last treatment session ias not had any low back pain but continues to experience pain in B calf region. Pt states his calf pain is a lot better today and rates all pain 0/10. THERAPEUTIC EXERCISE: (30 minutes):  Exercises per grid below to improve mobility, strength and balance. Required minimal visual and verbal cues to promote proper body alignment and promote proper body posture. Progressed resistance, range and complexity of movement as indicated.      Date:  6/21/18 Date:  6/29/18 Date:  7/3/18 Date:  7/6/18 Date:  7/10/18   Activity/Exercise Parameters Parameters Parameters     Pt education POC, Anatomy, HEP       Hamstring Stretch 30\"x3       Nu-step  12min  -level 4      Physio-step   10min  -level 3  5min fwd/5min bckwd 10min  -level 4  5min fwd/5min bckwd 12min  -level 4  5min fwd/5min bckwd   LTR 10\"X10 2x10 reps  -hold 5s  -red physioball      SKTC 30\"x3 3x30s      Glute set  1x10 reps  -hold 10s      Bridging  1x12 reps  -hold 3s  -slow descent       SLR  1x10 reps B  -not as high on R LE  -5s up/5s hold/5s down      Pelvic tilts  [id number]   -hold 10s      Seated piriformis stretch  3x30s      Seated hamstring stretch  3x30s      Standing marching    1x15 reps 1x15 reps  -2# ankle weights   Standing calf/toe raises   1x15 reps 1x15 reps 1x20 reps   Standing hip abduction   1x15 reps 1x15 reps 1x15 reps  -2# ankle weights   Standing hip flexion   1x15 reps 1x15 reps 1x15 reps  -2# ankle weights   Standing hip extension   1x15 reps 1x15 reps 1x15 reps  -2# ankle weights   Standing step-ups   1x15 reps B  -4\" step     Standing cone taps        Standing calf stretch   3x30s 4x1min 3x1min   Sit to stand education  5 min  -with [id number]      Sit to stands with weighted ball     2x10 reps  -holding yellow ball                             MANUAL THERAPY: (10 minutes): Joint mobilization, Soft tissue mobilization and Manipulation was utilized and necessary because of the patient's restricted joint motion, painful spasm, loss of articular motion and restricted motion of soft tissue. Pt prone for Chapincito & Ilsley roll out to B lumbar paraspinals, B gluteal region, B piriformis, B hamstrings, B gastroc. Trigger point release to L hamstring and gastroc to relieve tension and promote AROM.     (Used abbreviations: MET - muscle energy technique; PNF - proprioceptive neuromuscular facilitation; NMR - neuromuscular re-education; AP - anterior to posterior; PA - posterior to anterior)    MODALITIES: (0 minutes):      NOT TODAY        TREATMENT/SESSION ASSESSMENT:  Galo Gilbert Rik was unable to perform sit to stand exercise with 1# dowel overhead due to UE weakness and fatigue. Pt displayed decreased balance when UEs were overhead and modified exercise to just holding yellow weighted ball. Pt did not have as much discomfort during STM and tolerated it much better compared to last treatment session. Pt stated it was much easier for him to perform standing exercises on L LE compared to R LE indicating weakness in R LE. Pt rated pain 0/10 at the end of treatment session. · Pain/ Symptoms Initial:  0/10 Post Session:  0/10     · Compliance with Program/Exercises: Will assess as treatment progresses. · Recommendations/Intent for next treatment session: \"Next visit will focus on advancements to more challenging activities\". Total Treatment Duration:  PT Patient Time In/Time Out  Time In: 1015  Time Out: 6949 [address].  Gary Brown DPT

## 2018-07-12 ENCOUNTER — HOSPITAL ENCOUNTER (OUTPATIENT)
Dept: PHYSICAL THERAPY | Age: 78
Discharge: HOME | End: 2018-07-12
Attending: NEUROLOGICAL SURGERY
Payer: MEDICARE

## 2018-07-12 PROCEDURE — 97110 THERAPEUTIC EXERCISES: CPT

## 2018-07-12 NOTE — PROGRESS NOTES
[facility]  : 1940  [place] / [place] / Product Type: Medicare /  [id number]  at [facility]  [address], [place] [address] [address] [facility]  [phone]   [phone]                OUTPATIENT PHYSICAL THERAPY:Daily Note 2018    ICD-10: Treatment Diagnosis:    Muscle weakness (generalized) (M62.81)     Lumbago with sciatica, right side (M54.41)     Lumbago with sciatica, left side (M54.42)       PRECAUTIONS/ALLERGIES:   Review of patient's allergies indicates no known allergies. FALL RISK SCORE: 2 (? 5 = High Risk)    MD ORDERS: Eval and Treat MEDICAL/REFERRING DIAGNOSIS:  back    DATE OF ONSET: 2 months ago    REFERRING PHYSICIAN: Jacy Hoang MD    RETURN PHYSICIAN APPOINTMENT: TBD by patient     INITIAL ASSESSMENT:  Mr. Carlitos Delaney has attended 1 physical therapy session including initial evaluation as of 18. Carlitos Delaney exhibits decreased B hamstring flexibility, increased pain with extension, decreased postural and core strength, decreased functional tolerance, and decreased general LE strength. L iliac upslip with anterior innominate rotation present in sitting. Carlitos Delaney will benefit from skilled PT (medically necessary) to address above deficits affecting participation in basic ADLs and overall functional tolerance. Manual techniques (stretching, joint mobilizations, soft tissue mobilization/myofascial release), postural exercises/education, therapeutic techniques/activities, and HEP will be performed as appropriate addressing Fabiola Jolly's current condition. PROBLEM LIST (Impacting functional limitations):  1. Decreased Strength  2. Decreased ADL/Functional Activities  3. Decreased Transfer Abilities  4. Decreased Ambulation Ability/Technique  5. Decreased Balance  6. Increased Pain  7. Decreased Activity Tolerance  8.  Increased Fatigue  9. Increased Shortness of Breath  10. Decreased Flexibility/Joint Mobility  11. Decreased [place] with Home Exercise Program INTERVENTIONS PLANNED:  1. Balance Exercise  2. Bed Mobility  3. Cold  4. Cryotherapy  5. Electrical Stimulation  6. Family Education  7. Gait Training  8. Heat  9. Home Exercise Program (HEP)  10. Manual Therapy  11. Neuromuscular Re-education/Strengthening  12. Range of Motion (ROM)  13. Therapeutic Activites  14. Therapeutic Exercise/Strengthening  15. Transfer Training  16. Mechanical traction  17. Aquatic Therapy   TREATMENT PLAN:  Effective Dates: 6/21/2018 TO 9/19/2018 (90 days). Frequency/Duration: 2 times a week for 90 Days    GOALS: (Goals have been discussed and agreed upon with patient.)  Long Term Goals 8 weeks   1. Sadaf Card will demonstrate an 12 point improvement on the Oswestry to show improvement in function. 2. Sadaf Card will report <=2/10 pain during ADLs to improve QOL. 3. Sadaf Card will demonstrate 5/5 LE strength on manual muscle testing to improve functional mobility. [address] will be able to perform SLS >10 seconds bilaterally to help with gait and improve balance. [address] will be able to demonstrate safe lifting and transfer mechanics without cueing for improved safety with home, childcare, and community activities. Rehabilitation Potential For Stated Goals: Good    Regarding Titus Jolly's therapy, I certify that the treatment plan above will be carried out by a therapist or under their direction. Thank you for this referral,  Bora Meza PTA       Referring Physician Signature: Peter Jurado MD              Date                    HISTORY:   PATIENT GOAL FOR PHYSICAL THERAPY:  Pt reports he would like to move a little better. HISTORY OF PRESENT INJURY/ILLNESS (REASON FOR REFERRAL):  Pt states the pain has come on over a period of 2 months.  Pt states the pain began in his low back, moved to his R hip, and then down B legs. Pt states the leg pain is anterior and posterior. Pt states the leg pain is worse than the back pain. Pt describes the pain as a very sharp achy pain when he is standing walking. Pt describes the pain in his legs as a dull ache. Pt reports occasionally he will do a certain movement which will cause a sharp shooting pain that does not linger and goes away when he re-adjusts. Pt reports the most difficulty with bed mobility, walking, standing, bending over, and bending backwards. Pt states pain can get as high as 8/10 and as low as 1/10. Note: Patient denies any increase of symptoms with cough, sneeze or valsalva. Patient denies any saddle paresthesia or bowel/bladder deficits. PAST MEDICAL HISTORY/COMORBIDITIES:   Mr. Vandana Lopez  has a past medical history of Anxiety disorder (12/9/2014); Arthritis (12/9/2014); Depression (12/9/2014); Gout (12/9/2014); HTN (hypertension), benign (2/15/2017); Hyperlipemia (12/9/2014); Hypertension; Idiopathic chronic gout of right ankle (12/9/2014); Insomnia (12/9/2014); Kidney stone; Mixed hyperlipidemia (12/9/2014); Panic disorder (12/9/2014); Primary insomnia (12/9/2014); and Rosacea (2/15/2017). Mr. Vandana Lopez  has a past surgical history that includes hx other surgical; hx cyst removal; hx cyst removal; hx hernia repair (Bilateral); hx ankle fracture tx (Left, 6/1974); hx wrist fracture tx (Bilateral, 6/1993); hx colonoscopy (2007); and hx cyst removal.    SOCIAL HISTORY/LIVING ENVIRONMENT:     Pt lives in a 2 story home with spouse. Social History     Social History    Marital status:      Spouse name: N/A    Number of children: N/A    Years of education: N/A     Occupational History    Not on file.      Social History Main Topics    Smoking status: Former Smoker     Types: Cigarettes     Quit date: 1/1/1970    Smokeless tobacco: Never Used    Alcohol use 0.0 oz/week     0 Standard drinks or equivalent per week    Drug use: No    Sexual activity: Yes     Partners: Female     Other Topics Concern    Not on file     Social History Narrative       PRIOR LEVEL OF FUNCTION/WORK/ACTIVITY:  Pt very mobile prior to onset of pain. Pt is currently not working, he is retired. Wife states he was able to cut the grass and walk long distances prior to pain. Active Ambulatory Problems     Diagnosis Date Noted    MEGGAN (generalized anxiety disorder) 12/09/2014    Arthritis 12/09/2014    Idiopathic chronic gout of right ankle 12/09/2014    Mixed hyperlipidemia 12/09/2014    Primary insomnia 12/09/2014    Panic disorder 12/09/2014    HTN (hypertension), benign 02/15/2017    Rosacea 02/15/2017    Idiopathic scoliosis 06/07/2018    Lumbar stenosis with neurogenic claudication 06/07/2018     Resolved Ambulatory Problems     Diagnosis Date Noted    Depression 12/09/2014     Past Medical History:   Diagnosis Date    Anxiety disorder 12/9/2014    Arthritis 12/9/2014    Depression 12/9/2014    Gout 12/9/2014    HTN (hypertension), benign 2/15/2017    Hyperlipemia 12/9/2014    Hypertension     Idiopathic chronic gout of right ankle 12/9/2014    Insomnia 12/9/2014    Kidney stone     Mixed hyperlipidemia 12/9/2014    Panic disorder 12/9/2014    Primary insomnia 12/9/2014    Rosacea 2/15/2017       CURRENT MEDICATIONS:    Current Outpatient Prescriptions:     LORazepam (ATIVAN) 0.5 mg tablet, TAKE 1 TABLET BY MOUTH DAILY. , Disp: 30 Tab, Rfl: 3    diclofenac sodium (PENNSAID) 1.5 % drop, by Apply Externally route., Disp: , Rfl:     predniSONE (DELTASONE) 20 mg tablet, 60mg daily x 3 days, then 40mg x 3 days, then 20mg x 3 days, then 10mg x 3 days, then D/C., Disp: 21 Tab, Rfl: 0    zolpidem (AMBIEN) 10 mg tablet, TAKE 1 TABLET BY MOUTH AT BEDTIME AS NEEDED FOR SLEEP, Disp: 30 Tab, Rfl: 5    gabapentin (NEURONTIN) 100 mg capsule, Take 100 mg by mouth three (3) times daily. , Disp: , Rfl:    atorvastatin (LIPITOR) 40 mg tablet, TAKE 1 TABLET BY MOUTH EVERY DAY, Disp: 90 Tab, Rfl: 3    potassium chloride SR (KLOR-CON 10) 10 mEq tablet, Take 1 Tab by mouth daily. , Disp: 90 Tab, Rfl: 3    amLODIPine-Olmesartan (CAROLA) 10-40 mg tab, Take 1 Tab by mouth daily. , Disp: 90 Tab, Rfl: 3    sertraline (ZOLOFT) 100 mg tablet, Take 1 Tab by mouth daily. One and half tab every day, Disp: 135 Tab, Rfl: 3    metroNIDAZOLE (METROGEL) 1 % topical gel, Apply  to affected area daily. Use a thin layer to affected areas after washing, Disp: 45 g, Rfl: 3    allopurinol (ZYLOPRIM) 100 mg tablet, Take 2 Tabs by mouth daily. , Disp: 180 Tab, Rfl: 3    diclofenac (VOLTAREN) 1 % gel, Apply 4 g to affected area four (4) times daily. , Disp: 2 Each, Rfl: 3    multivitamin (ONE A DAY) tablet, Take 1 Tab by mouth daily. , Disp: , Rfl:     cholecalciferol, vitamin D3, (VITAMIN D3) 2,000 unit tab, Take  by mouth., Disp: , Rfl:      Date Last Reviewed:  7/12/2018   Number of Personal Factors/Comorbidities that affect the Plan of Care: 0: LOW COMPLEXITY   EXAMINATION:   OBSERVATION/ORTHOSTATIC POSTURAL ASSESSMENT:          Pt sits with forward head and rounded shoulders which indicate tight anterior chest musculature, upper trapezius, and levator scapula and weak posterior scapula musculature and deep cervical flexors. Pt displays decreased core motor control indicating weak core and low back musculature.     PALPATION:    -PA glides: L1-L2= grade 2, L2-L3= grade 4, L3-L4= grade 4, L4-L5= grade 3     -TTP: B gluteal muscles, piriformis, hamstring (L>R), IT band, TFL    -L iliac upslip with anterior innominate rotation noted in sitting    AROM/PROM:    AROM/PROM         Joint: Date: 6/20/18  Date:  Date:    Active LE ROM Right Left Right Left Right Left   Hip Flexion [facility]       Hip Extension [facility]       Knee Extension [facility]       Knee Flexion [facility]       Knee Extension [facility]       Lumbar Flexion 45 degrees --       Lumbar Extension 3 degrees  -painful at end range --       Lumbar Side-Bending 15 degrees 15 degrees       Lumbar Rotation WFL  -Pain at end range [facility]  -Pain at end range         STRENGTH         Joint: Date: 6/21/18  Date:  Date:     Right Left Right Left Right Left   Hip Abduction 4/5 4/5       Hip Adduction 4/5 4/5       Hip IR 4-/5 4-/5       Hip ER 4-/5 4-/5       Hip Flexion 4/5 4/5       Knee Extension 4+/5 4+/5       Knee Flexion 4+/5 4+/5       Ankle DF 4+/5 4+/5       Ankle PF 4+/5 4+/5       Ankle IV 4+/5 4+/5       Ankle EV 4+/5 4+/5         SPECIAL TESTS: Assessed @ Initial Visit    -90/90:     -R: 110    -L: 122   -SLR: Positive B (R>L)   -SI POST.  GAPPING: Negative B   -SOL 4: Positive for B tightness   -SLUMP TEST: Negative B   -LUMBAR STENOSIS:      -Bilateral symptoms: Yes      -Leg pain more than back pain: Yes      -Pain during walking/standing: Yes      -Pain relief upon sitting: Yes      -Age greater than 48 years: Yes    STRUCTURE FINDING     Primary Disc Flattened Back Yes   Radiographic Instability Excessive Lordosis  No   SI Joint Dysfunction Flattened Back No   Secondary Disc (DJD) Hypertrophic Supraspinous Ligament: thickening along spinous process No   Spine Stenosis Flattened Back Yes   Facet Impingement Flexed Back, usually unilateral No   Nerve Root Adhesion Bad Posture, Avoid Forward Flexion, Stand With Knees Bent No       NEUROLOGICAL SCREEN: Assessed @ Initial Visit    -RADIATING SYMPTOMS: YES     -DERMATOMES: Intact    -MYOTOMES Date: 6/21/18  Date:  Date:     Right Left Right Left Right Left   L2 & L3   (Hip Flexors) 4/5 4/5       L3-L4  (Knee Extensors) 5/5 5/5       L4  (Ankle DFs) 5/5 5/5       L5  (Hallux Ext) 5/5 5/5       L5-S1  (Ankle PFs) 5/5 5/5       S1-S2  (Ankle EVs) 5/5 5/5         -REFLEXES: Date: 6/22/18  Date:  Date:     Right Left Right Left Right Left   L4  (Quadriceps) NT NT       S1  (Achilles) NT NT         RED FLAGS: Date: 6/21/18 Date:  Date:   Non-Mechanical pain distribution (cannot be produced, changed, or reduced during exam): NO     Cauda Equina Dysfunction: NO     Upper lumbar disc herniation in younger patients (femoral nerve tension test for lateral disc herniation in lower lumbar): NO     Lumbar compression fracture (age > 48, trauma, corticosteroid use NO     Spine Cancer (age > 48, pervious history of cancer, failure to improve in 1 month of therapy, no relief - be rest, duration > 1 month, unexplained weight loss, insidious onset, constitutional symptoms): NO     Ankylosing Spondylitis (age < 36, pain not relieved by supine, morning back stiffness, pain duration > 3 months, improved by exercise): NO     Sacral Fracture: NO         FUNCTIONAL MOBILITY:  Assessed @ Initial Visit    -Affecting participation in basic ADLs and functional tasks.   -Limited tolerance of walking and standing   -Ambulation/Gait: Slow to rise from sitting, decreased step length, shuffling gait for first few steps, forward trunk lean, decreased stance time B LEs.   -Bed mobility: Difficulty getting in and out of bed and rolling over. -Stairs: Difficulty ascending and descending stairs secondary to fatigue and low back pain that radiates into LEs.   -Transfers: Mod I   -Wheelchair: N/A    BALANCE:  SLS: Date: 6/22/18 Date: Date:   Right: NT     Left: NT          Body Structures Involved:  1. Bones  2. Joints  3. Muscles  4. Ligaments Body Functions Affected:  1. Sensory/Pain  2. Neuromusculoskeletal  3. Movement Related Activities and Participation Affected:  1. Mobility  2. Self Care  3. Domestic Life  4. Interpersonal Interactions and Relationships  5. Community, Social and [place]   Number of elements that affect the Plan of Care: 4+: HIGH COMPLEXITY   CLINICAL PRESENTATION:   Presentation: Stable and uncomplicated: LOW COMPLEXITY   CLINICAL DECISION MAKING:   OUTCOME MEASURE USED:   Tool Used:  Tool Used: Modified Oswestry Low Back Pain Questionnaire  Score:  Initial: 24/50 Most Recent: X/50 (Date: -- )   Interpretation of Score: Each section is scored on a 0-5 scale, 5 representing the greatest disability. The scores of each section are added together for a total score of 50. Score 0 1-10 11-20 21-30 31-40 41-49 50   Modifier CH CI CJ CK CL CM CN     ? Mobility - Walking and Moving Around:     - CURRENT STATUS: CK - 40%-59% impaired, limited or restricted    - GOAL STATUS:   CJ - 20%-39% impaired, limited or restricted    - D/C STATUS: ---------------To be determined---------------    [place] / [place] / Product Type: Medicare /     MEDICAL NECESSITY:  · Skilled intervention continues to be required due to above deficits affecting participation in basic ADLs and overall functional tolerance. REASON FOR SERVICES/OTHER COMMENTS:  · Patient continues to require skilled intervention due to  above deficits affecting participation in basic ADLs and overall functional tolerance. Use of outcome tool(s) and clinical judgement create a POC that gives a: Questionable prediction of patient's progress: MODERATE COMPLEXITY   TREATMENT:   (In addition to Assessment/Re-Assessment sessions the following treatments were rendered)  PRE-TREATMENT SYMPTOMS: Pt states he saw his pain management MD and decided not to have anymore injections at this time. Patient states his pain has pretty much resolved and the pain in his legs are just from exercising more since starting therapy. Patient states he is pleased with progress. THERAPEUTIC EXERCISE: (45 minutes):  Exercises per grid below to improve mobility, strength and balance. Required minimal visual and verbal cues to promote proper body alignment and promote proper body posture. Progressed resistance, range and complexity of movement as indicated.      Date:  6/21/18 Date:  6/29/18 Date:  7/3/18 Date:  7/6/18 Date:  7/10/18 Date  7/12/18   Activity/Exercise Parameters Parameters Parameters Pt education POC, Anatomy, HEP        Hamstring Stretch 30\"x3        Nu-step  12min  -level 4       Physio-step   10min  -level 3  5min fwd/5min bckwd 10min  -level 4  5min fwd/5min bckwd 12min  -level 4  5min fwd/5min bckwd Level 4  12 [id number] fwd/5min bckwd   LTR 10\"X10 2x10 reps  -hold 5s  -red physioball       SKTC 30\"x3 3x30s       Glute set  1x10 reps  -hold 10s       Bridging  1x12 reps  -hold 3s  -slow descent        SLR  1x10 reps B  -not as high on R LE  -5s up/5s hold/5s down       Pelvic tilts  [id number]   -hold 10s       Seated piriformis stretch  3x30s       Seated hamstring stretch  3x30s       Standing marching    1x15 reps 1x15 reps  -2# ankle weights 2# ankle weight  X 15 reps   Standing calf/toe raises   1x15 reps 1x15 reps 1x20 reps X 20 reps B     Standing hip abduction   1x15 reps 1x15 reps 1x15 reps  -2# ankle weights 2 x 10 reps B  2# ankle weight   Standing hip flexion   1x15 reps 1x15 reps 1x15 reps  -2# ankle weights 2 x 10 reps  2# ankle weight   Standing hip extension   1x15 reps 1x15 reps 1x15 reps  -2# ankle weights 2# ankle weight  2 x 10 reps B   Step taps      6\" step  2 x 10 reps alternating feet   Standing step-ups   1x15 reps B  -4\" step   6\" step  X 10 reps B   Standing cone taps      3 cones   X 5 reps B   Standing calf stretch   3x30s 4x1min 3x1min 3 x 1 minute     Sit to stand education  5 min  -with [id number]       Sit to stands with weighted ball     2x10 reps  -holding yellow ball                                 MANUAL THERAPY: ( minutes): Joint mobilization, Soft tissue mobilization and Manipulation was utilized and necessary because of the patient's restricted joint motion, painful spasm, loss of articular motion and restricted motion of soft tissue. Pt prone for [place] roll out to B lumbar paraspinals, B gluteal region, B piriformis, B hamstrings, B gastroc. Trigger point release to L hamstring and gastroc to relieve tension and promote AROM.     (Used abbreviations: MET - muscle energy technique; PNF - proprioceptive neuromuscular facilitation; NMR - neuromuscular re-education; AP - anterior to posterior; PA - posterior to anterior)    MODALITIES: (0 minutes):      NOT TODAY      TREATMENT/SESSION ASSESSMENT:  Bishop Taylor Jolly tolerated treatment well and improving with balance exercises with less hand hold. Continue per plan of care and focus on standing exercises for strength and balance. · Pain/ Symptoms Initial:  0/10 Post Session:  Slight pain in the back of both calves but no number given     · Compliance with Program/Exercises: Will assess as treatment progresses. · Recommendations/Intent for next treatment session: \"Next visit will focus on advancements to more challenging activities\".     Total Treatment Duration:  PT Patient Time In/Time Out  Time In: 1015  Time Out: [place], [place]

## 2018-07-17 ENCOUNTER — HOSPITAL ENCOUNTER (OUTPATIENT)
Dept: PHYSICAL THERAPY | Age: 78
Discharge: HOME | End: 2018-07-17
Attending: NEUROLOGICAL SURGERY
Payer: MEDICARE

## 2018-07-17 PROCEDURE — 97110 THERAPEUTIC EXERCISES: CPT

## 2018-07-17 NOTE — PROGRESS NOTES
Nestor Albright [facility]  : 1940  [place] / [place] / Product Type: Medicare /  [address]  at [facility]  [address], [place] [address] [address] [facility]  [phone]   [phone]                OUTPATIENT PHYSICAL THERAPY:Daily Note 2018    ICD-10: Treatment Diagnosis:    Muscle weakness (generalized) (M62.81)     Lumbago with sciatica, right side (M54.41)     Lumbago with sciatica, left side (M54.42)       PRECAUTIONS/ALLERGIES:   Review of patient's allergies indicates no known allergies. FALL RISK SCORE: 2 (? 5 = High Risk)    MD ORDERS: Eval and Treat MEDICAL/REFERRING DIAGNOSIS:  back    DATE OF ONSET: 2 months ago    REFERRING PHYSICIAN: Sukhdeep Rust MD    RETURN PHYSICIAN APPOINTMENT: TBD by patient     INITIAL ASSESSMENT:  Mr. Luz Van has attended 1 physical therapy session including initial evaluation as of 18. Luz Van exhibits decreased B hamstring flexibility, increased pain with extension, decreased postural and core strength, decreased functional tolerance, and decreased general LE strength. L iliac upslip with anterior innominate rotation present in sitting. Luz Van will benefit from skilled PT (medically necessary) to address above deficits affecting participation in basic ADLs and overall functional tolerance. Manual techniques (stretching, joint mobilizations, soft tissue mobilization/myofascial release), postural exercises/education, therapeutic techniques/activities, and HEP will be performed as appropriate addressing Nestor Jolly's current condition. PROBLEM LIST (Impacting functional limitations):  1. Decreased Strength  2. Decreased ADL/Functional Activities  3. Decreased Transfer Abilities  4. Decreased Ambulation Ability/Technique  5. Decreased Balance  6. Increased Pain  7. Decreased Activity Tolerance  8.  Increased Fatigue  9. Increased Shortness of Breath  10. Decreased Flexibility/Joint Mobility  11. Decreased [place] with Home Exercise Program INTERVENTIONS PLANNED:  1. Balance Exercise  2. Bed Mobility  3. Cold  4. Cryotherapy  5. Electrical Stimulation  6. Family Education  7. Gait Training  8. Heat  9. Home Exercise Program (HEP)  10. Manual Therapy  11. Neuromuscular Re-education/Strengthening  12. Range of Motion (ROM)  13. Therapeutic Activites  14. Therapeutic Exercise/Strengthening  15. Transfer Training  16. Mechanical traction  17. Aquatic Therapy   TREATMENT PLAN:  Effective Dates: 6/21/2018 TO 9/19/2018 (90 days). Frequency/Duration: 2 times a week for 90 Days    GOALS: (Goals have been discussed and agreed upon with patient.)  Long Term Goals 8 weeks   1. Sanjana Clay will demonstrate an 12 point improvement on the Oswestry to show improvement in function. 2. Sanjana Clay will report <=2/10 pain during ADLs to improve QOL. 3. Sanjana Clay will demonstrate 5/5 LE strength on manual muscle testing to improve functional mobility. [address] will be able to perform SLS >10 seconds bilaterally to help with gait and improve balance. [address] will be able to demonstrate safe lifting and transfer mechanics without cueing for improved safety with home, childcare, and community activities. Rehabilitation Potential For Stated Goals: Good    Regarding Galo Jolly's therapy, I certify that the treatment plan above will be carried out by a therapist or under their direction. Thank you for this referral,  Noemy Ansari PTA       Referring Physician Signature: Beryle Few, MD              Date                    HISTORY:   PATIENT GOAL FOR PHYSICAL THERAPY:  Pt reports he would like to move a little better. HISTORY OF PRESENT INJURY/ILLNESS (REASON FOR REFERRAL):  Pt states the pain has come on over a period of 2 months.  Pt states the pain began in his low back, moved to his R hip, and then down B legs. Pt states the leg pain is anterior and posterior. Pt states the leg pain is worse than the back pain. Pt describes the pain as a very sharp achy pain when he is standing walking. Pt describes the pain in his legs as a dull ache. Pt reports occasionally he will do a certain movement which will cause a sharp shooting pain that does not linger and goes away when he re-adjusts. Pt reports the most difficulty with bed mobility, walking, standing, bending over, and bending backwards. Pt states pain can get as high as 8/10 and as low as 1/10. Note: Patient denies any increase of symptoms with cough, sneeze or valsalva. Patient denies any saddle paresthesia or bowel/bladder deficits. PAST MEDICAL HISTORY/COMORBIDITIES:   Mr. Gavi Carrington  has a past medical history of Anxiety disorder (12/9/2014); Arthritis (12/9/2014); Depression (12/9/2014); Gout (12/9/2014); HTN (hypertension), benign (2/15/2017); Hyperlipemia (12/9/2014); Hypertension; Idiopathic chronic gout of right ankle (12/9/2014); Insomnia (12/9/2014); Kidney stone; Mixed hyperlipidemia (12/9/2014); Panic disorder (12/9/2014); Primary insomnia (12/9/2014); and Rosacea (2/15/2017). Mr. Gavi Carrington  has a past surgical history that includes hx other surgical; hx cyst removal; hx cyst removal; hx hernia repair (Bilateral); hx ankle fracture tx (Left, 6/1974); hx wrist fracture tx (Bilateral, 6/1993); hx colonoscopy (2007); and hx cyst removal.    SOCIAL HISTORY/LIVING ENVIRONMENT:     Pt lives in a 2 story home with spouse. Social History     Social History    Marital status:      Spouse name: N/A    Number of children: N/A    Years of education: N/A     Occupational History    Not on file.      Social History Main Topics    Smoking status: Former Smoker     Types: Cigarettes     Quit date: 1/1/1970    Smokeless tobacco: Never Used    Alcohol use 0.0 oz/week     0 Standard drinks or equivalent per week    Drug use: No    Sexual activity: Yes     Partners: Female     Other Topics Concern    Not on file     Social History Narrative       PRIOR LEVEL OF FUNCTION/WORK/ACTIVITY:  Pt very mobile prior to onset of pain. Pt is currently not working, he is retired. Wife states he was able to cut the grass and walk long distances prior to pain. Active Ambulatory Problems     Diagnosis Date Noted    MEGGAN (generalized anxiety disorder) 12/09/2014    Arthritis 12/09/2014    Idiopathic chronic gout of right ankle 12/09/2014    Mixed hyperlipidemia 12/09/2014    Primary insomnia 12/09/2014    Panic disorder 12/09/2014    HTN (hypertension), benign 02/15/2017    Rosacea 02/15/2017    Idiopathic scoliosis 06/07/2018    Lumbar stenosis with neurogenic claudication 06/07/2018     Resolved Ambulatory Problems     Diagnosis Date Noted    Depression 12/09/2014     Past Medical History:   Diagnosis Date    Anxiety disorder 12/9/2014    Arthritis 12/9/2014    Depression 12/9/2014    Gout 12/9/2014    HTN (hypertension), benign 2/15/2017    Hyperlipemia 12/9/2014    Hypertension     Idiopathic chronic gout of right ankle 12/9/2014    Insomnia 12/9/2014    Kidney stone     Mixed hyperlipidemia 12/9/2014    Panic disorder 12/9/2014    Primary insomnia 12/9/2014    Rosacea 2/15/2017       CURRENT MEDICATIONS:    Current Outpatient Prescriptions:     LORazepam (ATIVAN) 0.5 mg tablet, TAKE 1 TABLET BY MOUTH DAILY. , Disp: 30 Tab, Rfl: 3    diclofenac sodium (PENNSAID) 1.5 % drop, by Apply Externally route., Disp: , Rfl:     predniSONE (DELTASONE) 20 mg tablet, 60mg daily x 3 days, then 40mg x 3 days, then 20mg x 3 days, then 10mg x 3 days, then D/C., Disp: 21 Tab, Rfl: 0    zolpidem (AMBIEN) 10 mg tablet, TAKE 1 TABLET BY MOUTH AT BEDTIME AS NEEDED FOR SLEEP, Disp: 30 Tab, Rfl: 5    gabapentin (NEURONTIN) 100 mg capsule, Take 100 mg by mouth three (3) times daily. , Disp: , Rfl:    atorvastatin (LIPITOR) 40 mg tablet, TAKE 1 TABLET BY MOUTH EVERY DAY, Disp: 90 Tab, Rfl: 3    potassium chloride SR (KLOR-CON 10) 10 mEq tablet, Take 1 Tab by mouth daily. , Disp: 90 Tab, Rfl: 3    amLODIPine-Olmesartan (CAROLA) 10-40 mg tab, Take 1 Tab by mouth daily. , Disp: 90 Tab, Rfl: 3    sertraline (ZOLOFT) 100 mg tablet, Take 1 Tab by mouth daily. One and half tab every day, Disp: 135 Tab, Rfl: 3    metroNIDAZOLE (METROGEL) 1 % topical gel, Apply  to affected area daily. Use a thin layer to affected areas after washing, Disp: 45 g, Rfl: 3    allopurinol (ZYLOPRIM) 100 mg tablet, Take 2 Tabs by mouth daily. , Disp: 180 Tab, Rfl: 3    diclofenac (VOLTAREN) 1 % gel, Apply 4 g to affected area four (4) times daily. , Disp: 2 Each, Rfl: 3    multivitamin (ONE A DAY) tablet, Take 1 Tab by mouth daily. , Disp: , Rfl:     cholecalciferol, vitamin D3, (VITAMIN D3) 2,000 unit tab, Take  by mouth., Disp: , Rfl:      Date Last Reviewed:  7/17/2018   Number of Personal Factors/Comorbidities that affect the Plan of Care: 0: LOW COMPLEXITY   EXAMINATION:   OBSERVATION/ORTHOSTATIC POSTURAL ASSESSMENT:          Pt sits with forward head and rounded shoulders which indicate tight anterior chest musculature, upper trapezius, and levator scapula and weak posterior scapula musculature and deep cervical flexors. Pt displays decreased core motor control indicating weak core and low back musculature.     PALPATION:    -PA glides: L1-L2= grade 2, L2-L3= grade 4, L3-L4= grade 4, L4-L5= grade 3     -TTP: B gluteal muscles, piriformis, hamstring (L>R), IT band, TFL    -L iliac upslip with anterior innominate rotation noted in sitting    AROM/PROM:    AROM/PROM         Joint: Date: 6/20/18  Date:  Date:    Active LE ROM Right Left Right Left Right Left   Hip Flexion [facility]       Hip Extension [facility]       Knee Extension [facility]       Knee Flexion [facility]       Knee Extension [facility]       Lumbar Flexion 45 degrees --       Lumbar Extension 3 degrees  -painful at end range --       Lumbar Side-Bending 15 degrees 15 degrees       Lumbar Rotation WFL  -Pain at end range [facility]  -Pain at end range         STRENGTH         Joint: Date: 6/21/18  Date:  Date:     Right Left Right Left Right Left   Hip Abduction 4/5 4/5       Hip Adduction 4/5 4/5       Hip IR 4-/5 4-/5       Hip ER 4-/5 4-/5       Hip Flexion 4/5 4/5       Knee Extension 4+/5 4+/5       Knee Flexion 4+/5 4+/5       Ankle DF 4+/5 4+/5       Ankle PF 4+/5 4+/5       Ankle IV 4+/5 4+/5       Ankle EV 4+/5 4+/5         SPECIAL TESTS: Assessed @ Initial Visit    -90/90:     -R: 110    -L: 122   -SLR: Positive B (R>L)   -SI POST.  GAPPING: Negative B   -SOL 4: Positive for B tightness   -SLUMP TEST: Negative B   -LUMBAR STENOSIS:      -Bilateral symptoms: Yes      -Leg pain more than back pain: Yes      -Pain during walking/standing: Yes      -Pain relief upon sitting: Yes      -Age greater than 48 years: Yes    STRUCTURE FINDING     Primary Disc Flattened Back Yes   Radiographic Instability Excessive Lordosis  No   SI Joint Dysfunction Flattened Back No   Secondary Disc (DJD) Hypertrophic Supraspinous Ligament: thickening along spinous process No   Spine Stenosis Flattened Back Yes   Facet Impingement Flexed Back, usually unilateral No   Nerve Root Adhesion Bad Posture, Avoid Forward Flexion, Stand With Knees Bent No       NEUROLOGICAL SCREEN: Assessed @ Initial Visit    -RADIATING SYMPTOMS: YES     -DERMATOMES: Intact    -MYOTOMES Date: 6/21/18  Date:  Date:     Right Left Right Left Right Left   L2 & L3   (Hip Flexors) 4/5 4/5       L3-L4  (Knee Extensors) 5/5 5/5       L4  (Ankle DFs) 5/5 5/5       L5  (Hallux Ext) 5/5 5/5       L5-S1  (Ankle PFs) 5/5 5/5       S1-S2  (Ankle EVs) 5/5 5/5         -REFLEXES: Date: 6/22/18  Date:  Date:     Right Left Right Left Right Left   L4  (Quadriceps) NT NT       S1  (Achilles) NT NT         RED FLAGS: Date: 6/21/18 Date:  Date:   Non-Mechanical pain distribution (cannot be produced, changed, or reduced during exam): NO     Cauda Equina Dysfunction: NO     Upper lumbar disc herniation in younger patients (femoral nerve tension test for lateral disc herniation in lower lumbar): NO     Lumbar compression fracture (age > 48, trauma, corticosteroid use NO     Spine Cancer (age > 48, pervious history of cancer, failure to improve in 1 month of therapy, no relief - be rest, duration > 1 month, unexplained weight loss, insidious onset, constitutional symptoms): NO     Ankylosing Spondylitis (age < 36, pain not relieved by supine, morning back stiffness, pain duration > 3 months, improved by exercise): NO     Sacral Fracture: NO         FUNCTIONAL MOBILITY:  Assessed @ Initial Visit    -Affecting participation in basic ADLs and functional tasks.   -Limited tolerance of walking and standing   -Ambulation/Gait: Slow to rise from sitting, decreased step length, shuffling gait for first few steps, forward trunk lean, decreased stance time B LEs.   -Bed mobility: Difficulty getting in and out of bed and rolling over. -Stairs: Difficulty ascending and descending stairs secondary to fatigue and low back pain that radiates into LEs.   -Transfers: Mod I   -Wheelchair: N/A    BALANCE:  SLS: Date: 6/22/18 Date: Date:   Right: NT     Left: NT          Body Structures Involved:  1. Bones  2. Joints  3. Muscles  4. Ligaments Body Functions Affected:  1. Sensory/Pain  2. Neuromusculoskeletal  3. Movement Related Activities and Participation Affected:  1. Mobility  2. Self Care  3. Domestic Life  4. Interpersonal Interactions and Relationships  5. Community, Social and [place]   Number of elements that affect the Plan of Care: 4+: HIGH COMPLEXITY   CLINICAL PRESENTATION:   Presentation: Stable and uncomplicated: LOW COMPLEXITY   CLINICAL DECISION MAKING:   OUTCOME MEASURE USED:   Tool Used:  Tool Used: Modified Oswestry Low Back Pain Questionnaire  Score:  Initial: 24/50 Most Recent: X/50 (Date: -- )   Interpretation of Score: Each section is scored on a 0-5 scale, 5 representing the greatest disability. The scores of each section are added together for a total score of 50. Score 0 1-10 11-20 21-30 31-40 41-49 50   Modifier CH CI CJ CK CL CM CN     ? Mobility - Walking and Moving Around:     - CURRENT STATUS: CK - 40%-59% impaired, limited or restricted    - GOAL STATUS:   CJ - 20%-39% impaired, limited or restricted    - D/C STATUS: ---------------To be determined---------------    [place] / Product Type: Medicare /     MEDICAL NECESSITY:  · Skilled intervention continues to be required due to above deficits affecting participation in basic ADLs and overall functional tolerance. REASON FOR SERVICES/OTHER COMMENTS:  · Patient continues to require skilled intervention due to  above deficits affecting participation in basic ADLs and overall functional tolerance. Use of outcome tool(s) and clinical judgement create a POC that gives a: Questionable prediction of patient's progress: MODERATE COMPLEXITY   TREATMENT:   (In addition to Assessment/Re-Assessment sessions the following treatments were rendered)  PRE-TREATMENT SYMPTOMS: Pt states pain in posterior thighs. Pain is 3/10. Patient states that he is not sure if pain is coming from exercises or from his low back. Better in low back since injection. THERAPEUTIC EXERCISE: (45 minutes):  Exercises per grid below to improve mobility, strength and balance. Required minimal visual and verbal cues to promote proper body alignment and promote proper body posture. Progressed resistance, range and complexity of movement as indicated.      Date:  6/21/18 Date:  6/29/18 Date:  7/3/18 Date:  7/6/18 Date:  7/10/18 Date  7/12/18 Date  7/17/18   Activity/Exercise Parameters Parameters Parameters       Pt education POC, Anatomy, HEP         Hamstring Stretch 30\"x3      Supine with strap  3 x 30 second hold B   Nu-step  12min  -level 4        Physio-step   10min  -level 3  5min fwd/5min bckwd 10min  -level 4  5min fwd/5min bckwd 12min  -level 4  5min fwd/5min bckwd Level 4  12 [id number] fwd/6min bckwd Level 4  10 [id number] fwd/5min bckwd   LTR 10\"X10 2x10 reps  -hold 5s  -red physioball        SKTC 30\"x3 3x30s        Glute set  1x10 reps  -hold 10s        Bridging  1x12 reps  -hold 3s  -slow descent         SLR  1x10 reps B  -not as high on R LE  -5s up/5s hold/5s down        Pelvic tilts  [id number]   -hold 10s        Seated piriformis stretch  3x30s        Seated hamstring stretch  3x30s        Standing marching    1x15 reps 1x15 reps  -2# ankle weights 2# ankle weight  X 15 reps 2# ankle weight  2 x 10 reps   Standing calf/toe raises   1x15 reps 1x15 reps 1x20 reps X 20 reps B   X 20 reps B   Standing hip abduction   1x15 reps 1x15 reps 1x15 reps  -2# ankle weights 2 x 10 reps B  2# ankle weight 2#  2 x 10 reps   Standing hip flexion   1x15 reps 1x15 reps 1x15 reps  -2# ankle weights 2 x 10 reps  2# ankle weight 2#   2 x 10 reps   Standing hip extension   1x15 reps 1x15 reps 1x15 reps  -2# ankle weights 2# ankle weight  2 x 10 reps B 2# ankle weight  2 x 10 reps B   Step taps      6\" step  2 x 10 reps alternating feet 6\" step  2 x 10 reps B alternating   Standing step-ups   1x15 reps B  -4\" step   6\" step  X 10 reps B 6\" step  X 10 reps B   Standing cone taps      3 cones   X 5 reps B x   Standing calf stretch   3x30s 4x1min 3x1min 3 x 1 minute   3 x 1 minute   Sit to stand education  5 min  -with [id number]        Sit to stands with weighted ball     2x10 reps  -holding yellow ball                                     MANUAL THERAPY: ( minutes): Joint mobilization, Soft tissue mobilization and Manipulation was utilized and necessary because of the patient's restricted joint motion, painful spasm, loss of articular motion and restricted motion of soft tissue.  Pt prone for [place] roll out to B lumbar paraspinals, B gluteal region, B piriformis, B hamstrings, B gastroc. Trigger point release to L hamstring and gastroc to relieve tension and promote AROM. (Used abbreviations: MET - muscle energy technique; PNF - proprioceptive neuromuscular facilitation; NMR - neuromuscular re-education; AP - anterior to posterior; PA - posterior to anterior)    MODALITIES: (0 minutes):      NOT TODAY      TREATMENT/SESSION ASSESSMENT:  John Res Menton tolerated treatment well and improving with balance exercises with less hand hold. Continue per plan of care and focus on standing exercises for strength and balance. · Pain/ Symptoms Initial:  0/10 Post Session:  Slight soreness in hamstrings      · Compliance with Program/Exercises: Will assess as treatment progresses. · Recommendations/Intent for next treatment session: \"Next visit will focus on advancements to more challenging activities\".     Total Treatment Duration:  PT Patient Time In/Time Out  Time In: 1015  Time Out: [place], [place]

## 2018-07-19 ENCOUNTER — HOSPITAL ENCOUNTER (OUTPATIENT)
Dept: PHYSICAL THERAPY | Age: 78
Discharge: HOME | End: 2018-07-19
Attending: NEUROLOGICAL SURGERY
Payer: MEDICARE

## 2018-07-19 NOTE — PROGRESS NOTES
Lena Loyd [facility]  : 1940  [place]  Secondary: [place] at [facility] [address], [address] [facility], [address]  [phone]   [phone]      OUTPATIENT DAILY NOTE    NAME/AGE/GENDER: Mohsen Clement is a 78 y.o. male. DATE: 2018    SUBJECTIVE:  Patient called this morning to cancel for appointment today due to overdoing it in the yard yesterday cutting the grass. Will plan to follow up on next scheduled visit.     Annia Gunn, PT, DPT

## 2018-07-26 ENCOUNTER — HOSPITAL ENCOUNTER (OUTPATIENT)
Dept: PHYSICAL THERAPY | Age: 78
Discharge: HOME | End: 2018-07-26
Attending: NEUROLOGICAL SURGERY
Payer: MEDICARE

## 2018-07-26 PROCEDURE — 97110 THERAPEUTIC EXERCISES: CPT

## 2018-07-26 NOTE — PROGRESS NOTES
Anthony Long [facility]  : 1940  [place] / [place] / Product Type: Medicare /  [address]  at [facility]  [address], [address], [address]  [phone]   [phone]                OUTPATIENT PHYSICAL THERAPY:Daily Note 2018    ICD-10: Treatment Diagnosis:    Muscle weakness (generalized) (M62.81)     Lumbago with sciatica, right side (M54.41)     Lumbago with sciatica, left side (M54.42)       PRECAUTIONS/ALLERGIES:   Review of patient's allergies indicates no known allergies. FALL RISK SCORE: 2 (? 5 = High Risk)    MD ORDERS: Eval and Treat MEDICAL/REFERRING DIAGNOSIS:  back    DATE OF ONSET: 2 months ago    REFERRING PHYSICIAN: Jared Rivas MD    RETURN PHYSICIAN APPOINTMENT: TBD by patient     INITIAL ASSESSMENT:  Mr. Violet Brenner has attended 1 physical therapy session including initial evaluation as of 18. Violet Brenner exhibits decreased B hamstring flexibility, increased pain with extension, decreased postural and core strength, decreased functional tolerance, and decreased general LE strength. L iliac upslip with anterior innominate rotation present in sitting. Violet Brenner will benefit from skilled PT (medically necessary) to address above deficits affecting participation in basic ADLs and overall functional tolerance. Manual techniques (stretching, joint mobilizations, soft tissue mobilization/myofascial release), postural exercises/education, therapeutic techniques/activities, and HEP will be performed as appropriate addressing Anthony Jolly's current condition. PROBLEM LIST (Impacting functional limitations):  1. Decreased Strength  2. Decreased ADL/Functional Activities  3. Decreased Transfer Abilities  4. Decreased Ambulation Ability/Technique  5. Decreased Balance  6. Increased Pain  7. Decreased Activity Tolerance  8.  Increased Fatigue  9. Increased Shortness of Breath  10. Decreased Flexibility/Joint Mobility  11. Decreased Jay with Home Exercise Program INTERVENTIONS PLANNED:  1. Balance Exercise  2. Bed Mobility  3. Cold  4. Cryotherapy  5. Electrical Stimulation  6. Family Education  7. Gait Training  8. Heat  9. Home Exercise Program (HEP)  10. Manual Therapy  11. Neuromuscular Re-education/Strengthening  12. Range of Motion (ROM)  13. Therapeutic Activites  14. Therapeutic Exercise/Strengthening  15. Transfer Training  16. Mechanical traction  17. Aquatic Therapy   TREATMENT PLAN:  Effective Dates: 6/21/2018 TO 9/19/2018 (90 days). Frequency/Duration: 2 times a week for 90 Days    GOALS: (Goals have been discussed and agreed upon with patient.)  Long Term Goals 8 weeks   1. Dhara Ashing will demonstrate an 12 point improvement on the Oswestry to show improvement in function. 2. Dhara Ashing will report <=2/10 pain during ADLs to improve QOL. 3. Dhara Ashing will demonstrate 5/5 LE strength on manual muscle testing to improve functional mobility. [address] will be able to perform SLS >10 seconds bilaterally to help with gait and improve balance. [address] will be able to demonstrate safe lifting and transfer mechanics without cueing for improved safety with home, childcare, and community activities. Rehabilitation Potential For Stated Goals: Good    Regarding Sameera Jolly's therapy, I certify that the treatment plan above will be carried out by a therapist or under their direction. Thank you for this referral,  Bar Henning PT, DPT       Referring Physician Signature: Mariza Mcgarry MD              Date                    HISTORY:   PATIENT GOAL FOR PHYSICAL THERAPY:  Pt reports he would like to move a little better. HISTORY OF PRESENT INJURY/ILLNESS (REASON FOR REFERRAL):  Pt states the pain has come on over a period of 2 months.  Pt states the pain began in his low back, moved to his R hip, and then down B legs. Pt states the leg pain is anterior and posterior. Pt states the leg pain is worse than the back pain. Pt describes the pain as a very sharp achy pain when he is standing walking. Pt describes the pain in his legs as a dull ache. Pt reports occasionally he will do a certain movement which will cause a sharp shooting pain that does not linger and goes away when he re-adjusts. Pt reports the most difficulty with bed mobility, walking, standing, bending over, and bending backwards. Pt states pain can get as high as 8/10 and as low as 1/10. Note: Patient denies any increase of symptoms with cough, sneeze or valsalva. Patient denies any saddle paresthesia or bowel/bladder deficits. PAST MEDICAL HISTORY/COMORBIDITIES:   Mr. Carla Rai  has a past medical history of Anxiety disorder (12/9/2014); Arthritis (12/9/2014); Depression (12/9/2014); Gout (12/9/2014); HTN (hypertension), benign (2/15/2017); Hyperlipemia (12/9/2014); Hypertension; Idiopathic chronic gout of right ankle (12/9/2014); Insomnia (12/9/2014); Kidney stone; Mixed hyperlipidemia (12/9/2014); Panic disorder (12/9/2014); Primary insomnia (12/9/2014); and Rosacea (2/15/2017). Mr. Carla Rai  has a past surgical history that includes hx other surgical; hx cyst removal; hx cyst removal; hx hernia repair (Bilateral); hx ankle fracture tx (Left, 6/1974); hx wrist fracture tx (Bilateral, 6/1993); hx colonoscopy (2007); and hx cyst removal.    SOCIAL HISTORY/LIVING ENVIRONMENT:     Pt lives in a 2 story home with spouse. Social History     Social History    Marital status:      Spouse name: N/A    Number of children: N/A    Years of education: N/A     Occupational History    Not on file.      Social History Main Topics    Smoking status: Former Smoker     Types: Cigarettes     Quit date: 1/1/1970    Smokeless tobacco: Never Used    Alcohol use 0.0 oz/week     0 Standard drinks or equivalent per week    Drug use: No    Sexual activity: Yes     Partners: Female     Other Topics Concern    Not on file     Social History Narrative       PRIOR LEVEL OF FUNCTION/WORK/ACTIVITY:  Pt very mobile prior to onset of pain. Pt is currently not working, he is retired. Wife states he was able to cut the grass and walk long distances prior to pain. Active Ambulatory Problems     Diagnosis Date Noted    MEGGAN (generalized anxiety disorder) 12/09/2014    Arthritis 12/09/2014    Idiopathic chronic gout of right ankle 12/09/2014    Mixed hyperlipidemia 12/09/2014    Primary insomnia 12/09/2014    Panic disorder 12/09/2014    HTN (hypertension), benign 02/15/2017    Rosacea 02/15/2017    Idiopathic scoliosis 06/07/2018    Lumbar stenosis with neurogenic claudication 06/07/2018     Resolved Ambulatory Problems     Diagnosis Date Noted    Depression 12/09/2014     Past Medical History:   Diagnosis Date    Anxiety disorder 12/9/2014    Arthritis 12/9/2014    Depression 12/9/2014    Gout 12/9/2014    HTN (hypertension), benign 2/15/2017    Hyperlipemia 12/9/2014    Hypertension     Idiopathic chronic gout of right ankle 12/9/2014    Insomnia 12/9/2014    Kidney stone     Mixed hyperlipidemia 12/9/2014    Panic disorder 12/9/2014    Primary insomnia 12/9/2014    Rosacea 2/15/2017       CURRENT MEDICATIONS:    Current Outpatient Prescriptions:     LORazepam (ATIVAN) 0.5 mg tablet, TAKE 1 TABLET BY MOUTH DAILY. , Disp: 30 Tab, Rfl: 3    diclofenac sodium (PENNSAID) 1.5 % drop, by Apply Externally route., Disp: , Rfl:     predniSONE (DELTASONE) 20 mg tablet, 60mg daily x 3 days, then 40mg x 3 days, then 20mg x 3 days, then 10mg x 3 days, then D/C., Disp: 21 Tab, Rfl: 0    zolpidem (AMBIEN) 10 mg tablet, TAKE 1 TABLET BY MOUTH AT BEDTIME AS NEEDED FOR SLEEP, Disp: 30 Tab, Rfl: 5    gabapentin (NEURONTIN) 100 mg capsule, Take 100 mg by mouth three (3) times daily. , Disp: , Rfl:    atorvastatin (LIPITOR) 40 mg tablet, TAKE 1 TABLET BY MOUTH EVERY DAY, Disp: 90 Tab, Rfl: 3    potassium chloride SR (KLOR-CON 10) 10 mEq tablet, Take 1 Tab by mouth daily. , Disp: 90 Tab, Rfl: 3    amLODIPine-Olmesartan (CAROLA) 10-40 mg tab, Take 1 Tab by mouth daily. , Disp: 90 Tab, Rfl: 3    sertraline (ZOLOFT) 100 mg tablet, Take 1 Tab by mouth daily. One and half tab every day, Disp: 135 Tab, Rfl: 3    metroNIDAZOLE (METROGEL) 1 % topical gel, Apply  to affected area daily. Use a thin layer to affected areas after washing, Disp: 45 g, Rfl: 3    allopurinol (ZYLOPRIM) 100 mg tablet, Take 2 Tabs by mouth daily. , Disp: 180 Tab, Rfl: 3    diclofenac (VOLTAREN) 1 % gel, Apply 4 g to affected area four (4) times daily. , Disp: 2 Each, Rfl: 3    multivitamin (ONE A DAY) tablet, Take 1 Tab by mouth daily. , Disp: , Rfl:     cholecalciferol, vitamin D3, (VITAMIN D3) 2,000 unit tab, Take  by mouth., Disp: , Rfl:      Date Last Reviewed:  7/26/2018   Number of Personal Factors/Comorbidities that affect the Plan of Care: 0: LOW COMPLEXITY   EXAMINATION:   OBSERVATION/ORTHOSTATIC POSTURAL ASSESSMENT:          Pt sits with forward head and rounded shoulders which indicate tight anterior chest musculature, upper trapezius, and levator scapula and weak posterior scapula musculature and deep cervical flexors. Pt displays decreased core motor control indicating weak core and low back musculature.     PALPATION:    -PA glides: L1-L2= grade 2, L2-L3= grade 4, L3-L4= grade 4, L4-L5= grade 3     -TTP: B gluteal muscles, piriformis, hamstring (L>R), IT band, TFL    -L iliac upslip with anterior innominate rotation noted in sitting    AROM/PROM:    AROM/PROM         Joint: Date: 6/20/18  Date:  Date:    Active LE ROM Right Left Right Left Right Left   Hip Flexion [facility]       Hip Extension [facility]       Knee Extension [facility]       Knee Flexion [facility]       Knee Extension [facility]       Lumbar Flexion 45 degrees --       Lumbar Extension 3 degrees  -painful at end range --       Lumbar Side-Bending 15 degrees 15 degrees       Lumbar Rotation WFL  -Pain at end range [facility]  -Pain at end range         STRENGTH         Joint: Date: 6/21/18  Date:  Date:     Right Left Right Left Right Left   Hip Abduction 4/5 4/5       Hip Adduction 4/5 4/5       Hip IR 4-/5 4-/5       Hip ER 4-/5 4-/5       Hip Flexion 4/5 4/5       Knee Extension 4+/5 4+/5       Knee Flexion 4+/5 4+/5       Ankle DF 4+/5 4+/5       Ankle PF 4+/5 4+/5       Ankle IV 4+/5 4+/5       Ankle EV 4+/5 4+/5         SPECIAL TESTS: Assessed @ Initial Visit    -90/90:     -R: 110    -L: 122   -SLR: Positive B (R>L)   -SI POST.  GAPPING: Negative B   -SOL 4: Positive for B tightness   -SLUMP TEST: Negative B   -LUMBAR STENOSIS:      -Bilateral symptoms: Yes      -Leg pain more than back pain: Yes      -Pain during walking/standing: Yes      -Pain relief upon sitting: Yes      -Age greater than 48 years: Yes    STRUCTURE FINDING     Primary Disc Flattened Back Yes   Radiographic Instability Excessive Lordosis  No   SI Joint Dysfunction Flattened Back No   Secondary Disc (DJD) Hypertrophic Supraspinous Ligament: thickening along spinous process No   Spine Stenosis Flattened Back Yes   Facet Impingement Flexed Back, usually unilateral No   Nerve Root Adhesion Bad Posture, Avoid Forward Flexion, Stand With Knees Bent No       NEUROLOGICAL SCREEN: Assessed @ Initial Visit    -RADIATING SYMPTOMS: YES     -DERMATOMES: Intact    -MYOTOMES Date: 6/21/18  Date:  Date:     Right Left Right Left Right Left   L2 & L3   (Hip Flexors) 4/5 4/5       L3-L4  (Knee Extensors) 5/5 5/5       L4  (Ankle DFs) 5/5 5/5       L5  (Hallux Ext) 5/5 5/5       L5-S1  (Ankle PFs) 5/5 5/5       S1-S2  (Ankle EVs) 5/5 5/5         -REFLEXES: Date: 6/22/18  Date:  Date:     Right Left Right Left Right Left   L4  (Quadriceps) NT NT       S1  (Achilles) NT NT         RED FLAGS: Date: 6/21/18 Date:  Date:   Non-Mechanical pain distribution (cannot be produced, changed, or reduced during exam): NO     Cauda Equina Dysfunction: NO     Upper lumbar disc herniation in younger patients (femoral nerve tension test for lateral disc herniation in lower lumbar): NO     Lumbar compression fracture (age > 48, trauma, corticosteroid use NO     Spine Cancer (age > 48, pervious history of cancer, failure to improve in 1 month of therapy, no relief - be rest, duration > 1 month, unexplained weight loss, insidious onset, constitutional symptoms): NO     Ankylosing Spondylitis (age < 36, pain not relieved by supine, morning back stiffness, pain duration > 3 months, improved by exercise): NO     Sacral Fracture: NO         FUNCTIONAL MOBILITY:  Assessed @ Initial Visit    -Affecting participation in basic ADLs and functional tasks.   -Limited tolerance of walking and standing   -Ambulation/Gait: Slow to rise from sitting, decreased step length, shuffling gait for first few steps, forward trunk lean, decreased stance time B LEs.   -Bed mobility: Difficulty getting in and out of bed and rolling over. -Stairs: Difficulty ascending and descending stairs secondary to fatigue and low back pain that radiates into LEs.   -Transfers: Mod I   -Wheelchair: N/A    BALANCE:  SLS: Date: 6/22/18 Date: Date:   Right: NT     Left: NT          Body Structures Involved:  1. Bones  2. Joints  3. Muscles  4. Ligaments Body Functions Affected:  1. Sensory/Pain  2. Neuromusculoskeletal  3. Movement Related Activities and Participation Affected:  1. Mobility  2. Self Care  3. Domestic Life  4. Interpersonal Interactions and Relationships  5. Community, Social and [place]   Number of elements that affect the Plan of Care: 4+: HIGH COMPLEXITY   CLINICAL PRESENTATION:   Presentation: Stable and uncomplicated: LOW COMPLEXITY   CLINICAL DECISION MAKING:   OUTCOME MEASURE USED:   Tool Used:  Tool Used: Modified Oswestry Low Back Pain Questionnaire  Score:  Initial: 24/50 Most Recent: X/50 (Date: -- )   Interpretation of Score: Each section is scored on a 0-5 scale, 5 representing the greatest disability. The scores of each section are added together for a total score of 50. Score 0 1-10 11-20 21-30 31-40 41-49 50   Modifier CH CI CJ CK CL CM CN     ? Mobility - Walking and Moving Around:     - CURRENT STATUS: CK - 40%-59% impaired, limited or restricted    - GOAL STATUS:   CJ - 20%-39% impaired, limited or restricted    - D/C STATUS: ---------------To be determined---------------    [place] / Product Type: Medicare /     MEDICAL NECESSITY:  · Skilled intervention continues to be required due to above deficits affecting participation in basic ADLs and overall functional tolerance. REASON FOR SERVICES/OTHER COMMENTS:  · Patient continues to require skilled intervention due to  above deficits affecting participation in basic ADLs and overall functional tolerance. Use of outcome tool(s) and clinical judgement create a POC that gives a: Questionable prediction of patient's progress: MODERATE COMPLEXITY   TREATMENT:   (In addition to Assessment/Re-Assessment sessions the following treatments were rendered)  PRE-TREATMENT SYMPTOMS: Pt states he did not come last appointment because his wife was in a car accident. Pt states the pain in his legs has returned and feels it especially after physical activity. Pt states he feels it along anterior and posterior leg. Pain is 3/10 today. THERAPEUTIC EXERCISE: (40 minutes):  Exercises per grid below to improve mobility, strength and balance. Required minimal visual and verbal cues to promote proper body alignment and promote proper body posture. Progressed resistance, range and complexity of movement as indicated.      Date:  7/6/18 Date:  7/10/18 Date  7/12/18 Date  7/17/18 Date  7/25/18   Activity/Exercise        Pt education        Hamstring Stretch    Supine with strap  3 x 30 second hold B Supine with strap  3 x 30 second hold B   Nu-step        Physio-step 10min  -level 4  5min fwd/5min bckwd 12min  -level 4  5min fwd/5min bckwd Level 4  12 [id number] fwd/6min bckwd Level 4  10 [id number] fwd/5min bckwd Level 5  10 minutes   LTR     1x10 reps  -hold 3s  -green physioball   SKTC        Glute set        Bridging        SLR     1x12 reps  -no ankle weights   Pelvic tilts     1x10 reps  -hold 10s   Pelvic tilt w/ marching      1x10 reps   Seated piriformis stretch        Seated hamstring stretch        Standing marching 1x15 reps 1x15 reps  -2# ankle weights 2# ankle weight  X 15 reps 2# ankle weight  2 x 10 reps    Standing calf/toe raises 1x15 reps 1x20 reps X 20 reps B   X 20 reps B    Standing hip abduction 1x15 reps 1x15 reps  -2# ankle weights 2 x 10 reps B  2# ankle weight 2#  2 x 10 reps    Standing hip flexion 1x15 reps 1x15 reps  -2# ankle weights 2 x 10 reps  2# ankle weight 2#   2 x 10 reps    Standing hip extension 1x15 reps 1x15 reps  -2# ankle weights 2# ankle weight  2 x 10 reps B 2# ankle weight  2 x 10 reps B    Step taps   6\" step  2 x 10 reps alternating feet 6\" step  2 x 10 reps B alternating    Standing step-ups   6\" step  X 10 reps B 6\" step  X 10 reps B    Standing cone taps   3 cones   X 5 reps B x    Standing calf stretch 4x1min 3x1min 3 x 1 minute   3 x 1 minute    Sit to stand education        Sit to stands with weighted ball  2x10 reps  -holding yellow ball   2x10 reps  -holding yellow ball                             MANUAL THERAPY: (0 minutes): Joint mobilization, Soft tissue mobilization and Manipulation was utilized and necessary because of the patient's restricted joint motion, painful spasm, loss of articular motion and restricted motion of soft tissue. Pt prone for [place] [employer] [place] roll out to B lumbar paraspinals, B gluteal region, B piriformis, B hamstrings, B gastroc.  Trigger point release to L hamstring and gastroc to relieve tension and promote AROM.    (Used abbreviations: MET - muscle energy technique; PNF - proprioceptive neuromuscular facilitation; NMR - neuromuscular re-education; AP - anterior to posterior; PA - posterior to anterior)    MODALITIES: (0 minutes):      NOT TODAY      TREATMENT/SESSION ASSESSMENT:  Cande Jolly tolerated treatment well. Exercises were kept light due to pt pain in legs and complaints of soreness days after treatment session. Pt fatigued during exercises requiring motor control and core engagement. Pt performed sit to stand exercise well with slight complaints of discomfort and tightness in hamstrings. Pt reported slight relief after hamstring stretch and rated pain. Pt educated to perform stretching exercises every day. Discussed possible d/c next visit per pt request.    · Pain/ Symptoms Initial:  3/10 Post Session:  2/10-Slight soreness in hamstrings      · Compliance with Program/Exercises: Will assess as treatment progresses. · Recommendations/Intent for next treatment session: \"Next visit will focus on advancements to more challenging activities\".     Total Treatment Duration:  PT Patient Time In/Time Out  Time In: 9575  Time Out: [address], PT, DPT

## 2018-07-31 ENCOUNTER — APPOINTMENT (OUTPATIENT)
Dept: PHYSICAL THERAPY | Age: 78
End: 2018-07-31
Attending: NEUROLOGICAL SURGERY
Payer: MEDICARE

## 2018-08-02 ENCOUNTER — APPOINTMENT (OUTPATIENT)
Dept: PHYSICAL THERAPY | Age: 78
End: 2018-08-02
Attending: NEUROLOGICAL SURGERY

## 2018-08-03 ENCOUNTER — HOSPITAL ENCOUNTER (OUTPATIENT)
Dept: PHYSICAL THERAPY | Age: 78
Discharge: HOME | End: 2018-08-03
Attending: NEUROLOGICAL SURGERY
Payer: MEDICARE

## 2018-08-03 PROCEDURE — G8979 MOBILITY GOAL STATUS: HCPCS

## 2018-08-03 PROCEDURE — G8980 MOBILITY D/C STATUS: HCPCS

## 2018-08-03 PROCEDURE — 97110 THERAPEUTIC EXERCISES: CPT

## 2018-08-03 NOTE — THERAPY DISCHARGE
Salena Baxter [facility]  : 1940  [place] / [place] / Product Type: Medicare /  [address]  at [facility]  [address], [address] [facility], [address]  [phone]   [phone]                OUTPATIENT PHYSICAL THERAPY:Daily Note and Discharge 8/3/2018    ICD-10: Treatment Diagnosis:    Muscle weakness (generalized) (M62.81)     Lumbago with sciatica, right side (M54.41)     Lumbago with sciatica, left side (M54.42)       PRECAUTIONS/ALLERGIES:   Review of patient's allergies indicates no known allergies. FALL RISK SCORE: 2 (? 5 = High Risk)    MD ORDERS: Eval and Treat MEDICAL/REFERRING DIAGNOSIS:  back    DATE OF ONSET: 2 months ago    REFERRING PHYSICIAN: Jovita Nuñez MD    RETURN PHYSICIAN APPOINTMENT: TBD by patient     INITIAL ASSESSMENT:  Mr. Marin Westbrook has attended 9 physical therapy session including initial evaluation as of 8/3/18. Marin Westbrook has shown improvements in quality of lumbar flexion/extension, posture, balance, and LE strength. Pt continues to have significant tightness in B hamstrings and slightly decreased balance. Pt no longer displays iliac upslip or innominate rotation. Pt met 3/5 goals and missed the two goals only minimally. PROBLEM LIST (Impacting functional limitations):  1. Decreased Strength  2. Decreased ADL/Functional Activities  3. Decreased Transfer Abilities  4. Decreased Ambulation Ability/Technique  5. Decreased Balance  6. Increased Pain  7. Decreased Activity Tolerance  8. Increased Fatigue  9. Increased Shortness of Breath  10. Decreased Flexibility/Joint Mobility  11. Decreased [place] with Home Exercise Program INTERVENTIONS PLANNED:  1. Balance Exercise  2. Bed Mobility  3. Cold  4. Cryotherapy  5. Electrical Stimulation  6. Family Education  7. Gait Training  8. Heat  9. Home Exercise Program (HEP)  10. Manual Therapy  11.  Neuromuscular Re-education/Strengthening  12. Range of Motion (ROM)  13. Therapeutic Activites  14. Therapeutic Exercise/Strengthening  15. Transfer Training  16. Mechanical traction  17. Aquatic Therapy   TREATMENT PLAN:  Effective Dates: 6/21/2018 TO 9/19/2018 (90 days). Frequency/Duration: 2 times a week for 90 Days    GOALS: (Goals have been discussed and agreed upon with patient.)  Long Term Goals 8 weeks   1. Dominik Evans will demonstrate an 12 point improvement on the Oswestry to show improvement in function. -NOT MET 8/3/18 (by 2 points)  2. Dominik Evans will report <=2/10 pain during ADLs to improve QOL. -MET 8/3/18  3. Dominik Evans will demonstrate 5/5 LE strength on manual muscle testing to improve functional mobility. -NOT MET 8/3/18  4. Dominik Evans will be able to perform SLS >10 seconds bilaterally to help with gait and improve balance. -MET 8/3/18  5. Dominik Evans will be able to demonstrate safe lifting and transfer mechanics without cueing for improved safety with home, childcare, and community activities. - MET 8/3/18    Thank you for this referral,  Temo Sanders, PT, DPT               HISTORY:   PATIENT GOAL FOR PHYSICAL THERAPY:  Pt reports he would like to move a little better. HISTORY OF PRESENT INJURY/ILLNESS (REASON FOR REFERRAL):  Pt states the pain has come on over a period of 2 months. Pt states the pain began in his low back, moved to his R hip, and then down B legs. Pt states the leg pain is anterior and posterior. Pt states the leg pain is worse than the back pain. Pt describes the pain as a very sharp achy pain when he is standing walking. Pt describes the pain in his legs as a dull ache. Pt reports occasionally he will do a certain movement which will cause a sharp shooting pain that does not linger and goes away when he re-adjusts. Pt reports the most difficulty with bed mobility, walking, standing, bending over, and bending backwards.  Pt states pain can get as high as 8/10 and as low as 1/10. Note: Patient denies any increase of symptoms with cough, sneeze or valsalva. Patient denies any saddle paresthesia or bowel/bladder deficits. PAST MEDICAL HISTORY/COMORBIDITIES:   Mr. Jannette Vail  has a past medical history of Anxiety disorder (12/9/2014); Arthritis (12/9/2014); Depression (12/9/2014); Gout (12/9/2014); HTN (hypertension), benign (2/15/2017); Hyperlipemia (12/9/2014); Hypertension; Idiopathic chronic gout of right ankle (12/9/2014); Insomnia (12/9/2014); Kidney stone; Mixed hyperlipidemia (12/9/2014); Panic disorder (12/9/2014); Primary insomnia (12/9/2014); and Rosacea (2/15/2017). Mr. Jannette Vail  has a past surgical history that includes hx other surgical; hx cyst removal; hx cyst removal; hx hernia repair (Bilateral); hx ankle fracture tx (Left, 6/1974); hx wrist fracture tx (Bilateral, 6/1993); hx colonoscopy (2007); and hx cyst removal.    SOCIAL HISTORY/LIVING ENVIRONMENT:     Pt lives in a 2 story home with spouse. Social History     Social History    Marital status:      Spouse name: N/A    Number of children: N/A    Years of education: N/A     Occupational History    Not on file. Social History Main Topics    Smoking status: Former Smoker     Types: Cigarettes     Quit date: 1/1/1970    Smokeless tobacco: Never Used    Alcohol use 0.0 oz/week     0 Standard drinks or equivalent per week    Drug use: No    Sexual activity: Yes     Partners: Female     Other Topics Concern    Not on file     Social History Narrative       PRIOR LEVEL OF FUNCTION/WORK/ACTIVITY:  Pt very mobile prior to onset of pain. Pt is currently not working, he is retired. Wife states he was able to cut the grass and walk long distances prior to pain.     Active Ambulatory Problems     Diagnosis Date Noted    MEGGAN (generalized anxiety disorder) 12/09/2014    Arthritis 12/09/2014    Idiopathic chronic gout of right ankle 12/09/2014    Mixed hyperlipidemia 12/09/2014    Primary insomnia 12/09/2014    Panic disorder 12/09/2014    HTN (hypertension), benign 02/15/2017    Rosacea 02/15/2017    Idiopathic scoliosis 06/07/2018    Lumbar stenosis with neurogenic claudication 06/07/2018     Resolved Ambulatory Problems     Diagnosis Date Noted    Depression 12/09/2014     Past Medical History:   Diagnosis Date    Anxiety disorder 12/9/2014    Arthritis 12/9/2014    Depression 12/9/2014    Gout 12/9/2014    HTN (hypertension), benign 2/15/2017    Hyperlipemia 12/9/2014    Hypertension     Idiopathic chronic gout of right ankle 12/9/2014    Insomnia 12/9/2014    Kidney stone     Mixed hyperlipidemia 12/9/2014    Panic disorder 12/9/2014    Primary insomnia 12/9/2014    Rosacea 2/15/2017       CURRENT MEDICATIONS:    Current Outpatient Prescriptions:     LORazepam (ATIVAN) 0.5 mg tablet, TAKE 1 TABLET BY MOUTH DAILY. , Disp: 30 Tab, Rfl: 3    diclofenac sodium (PENNSAID) 1.5 % drop, by Apply Externally route., Disp: , Rfl:     predniSONE (DELTASONE) 20 mg tablet, 60mg daily x 3 days, then 40mg x 3 days, then 20mg x 3 days, then 10mg x 3 days, then D/C., Disp: 21 Tab, Rfl: 0    zolpidem (AMBIEN) 10 mg tablet, TAKE 1 TABLET BY MOUTH AT BEDTIME AS NEEDED FOR SLEEP, Disp: 30 Tab, Rfl: 5    gabapentin (NEURONTIN) 100 mg capsule, Take 100 mg by mouth three (3) times daily. , Disp: , Rfl:     atorvastatin (LIPITOR) 40 mg tablet, TAKE 1 TABLET BY MOUTH EVERY DAY, Disp: 90 Tab, Rfl: 3    potassium chloride SR (KLOR-CON 10) 10 mEq tablet, Take 1 Tab by mouth daily. , Disp: 90 Tab, Rfl: 3    amLODIPine-Olmesartan (CAROLA) 10-40 mg tab, Take 1 Tab by mouth daily. , Disp: 90 Tab, Rfl: 3    sertraline (ZOLOFT) 100 mg tablet, Take 1 Tab by mouth daily. One and half tab every day, Disp: 135 Tab, Rfl: 3    metroNIDAZOLE (METROGEL) 1 % topical gel, Apply  to affected area daily.  Use a thin layer to affected areas after washing, Disp: 45 g, Rfl: 3   allopurinol (ZYLOPRIM) 100 mg tablet, Take 2 Tabs by mouth daily. , Disp: 180 Tab, Rfl: 3    diclofenac (VOLTAREN) 1 % gel, Apply 4 g to affected area four (4) times daily. , Disp: 2 Each, Rfl: 3    multivitamin (ONE A DAY) tablet, Take 1 Tab by mouth daily. , Disp: , Rfl:     cholecalciferol, vitamin D3, (VITAMIN D3) 2,000 unit tab, Take  by mouth., Disp: , Rfl:      Date Last Reviewed:  8/3/2018   Number of Personal Factors/Comorbidities that affect the Plan of Care: 0: LOW COMPLEXITY   EXAMINATION:   OBSERVATION/ORTHOSTATIC POSTURAL ASSESSMENT:          Pt sits with forward head and rounded shoulders which indicate tight anterior chest musculature, upper trapezius, and levator scapula and weak posterior scapula musculature and deep cervical flexors. Pt displays decreased core motor control indicating weak core and low back musculature.     PALPATION: Assessed 8/3/18    -PA glides: L1-L2= grade 2, L2-L3= grade 4, L3-L4= grade 4, L4-L5= grade 3     -TTP: B hamstrings    AROM/PROM:    AROM/PROM       Joint: Date: 6/20/18  Date:  8/3/18    Active LE ROM Right Left Right Left   Hip Flexion [facility] WFL   Hip Extension [facility] WFL WFL   Knee Extension [facility] WFL WFL   Knee Flexion [facility] WFL WFL   Knee Extension [facility] WFL WFL   Lumbar Flexion 45 degrees -- 45 degrees  -hamstring tightness  --   Lumbar Extension 3 degrees  -painful at end range -- 15 degrees --   Lumbar Side-Bending 15 degrees 15 degrees 15 degrees 15 degrees   Lumbar Rotation WFL  -Pain at end range [facility]  -[facility] range [facility]     STRENGTH       Joint: Date: 6/21/18  Date: 8/3/18     Right Left Right Left   Hip Abduction 4/5 4/5 4/5 4/5   Hip Adduction 4/5 4/5 4/5 4/5   Hip IR 4-/5 4-/5 4/5 4/5   Hip ER 4-/5 4-/5 4/5 4/5   Hip Flexion 4/5 4/5 4/5 4/5   Knee Extension 4+/5 4+/5 4+/5 4+/5   Knee Flexion 4+/5 4+/5 4+/5 4+/5   Ankle DF 4+/5 4+/5 4+/5 4+/5   Ankle PF 4+/5 4+/5 4+/5 4+/5   Ankle IV 4+/5 4+/5 4+/5 4+/5   Ankle EV 4+/5 4+/5 4+/5 4+/5 SPECIAL TESTS: Assessed @ Initial Visit    -90/90:     -R: 110    -L: 122   -SLR: Positive B (R>L)   -SI POST.  GAPPING: Negative B   -SOL 4: Positive for B tightness   -SLUMP TEST: Negative B   -LUMBAR STENOSIS:      -Bilateral symptoms: Yes      -Leg pain more than back pain: Yes      -Pain during walking/standing: Yes      -Pain relief upon sitting: Yes      -Age greater than 48 years: Yes    STRUCTURE FINDING     Primary Disc Flattened Back Yes   Radiographic Instability Excessive Lordosis  No   SI Joint Dysfunction Flattened Back No   Secondary Disc (DJD) Hypertrophic Supraspinous Ligament: thickening along spinous process No   Spine Stenosis Flattened Back Yes   Facet Impingement Flexed Back, usually unilateral No   Nerve Root Adhesion Bad Posture, Avoid Forward Flexion, Stand With Knees Bent No       NEUROLOGICAL SCREEN: Assessed 8/3/18    -RADIATING SYMPTOMS: YES     -DERMATOMES: Intact    -MYOTOMES Date: 6/21/18  Date: 8/3/18     Right Left Right Left   L2 & L3   (Hip Flexors) 4/5 4/5 5/5 5/5   L3-L4  (Knee Extensors) 5/5 5/5 5/5 5/5   L4  (Ankle DFs) 5/5 5/5 5/5 5/5   L5  (Hallux Ext) 5/5 5/5 5/5 5/5   L5-S1  (Ankle PFs) 5/5 5/5 5/5 5/5   S1-S2  (Ankle EVs) 5/5 5/5 5/5 5/5     -REFLEXES: Date: 6/22/18  Date: 8/3/18     Right Left Right Left   L4  (Quadriceps) NT NT NT NT   S1  (Achilles) NT NT NT NT     RED FLAGS: Date: 6/21/18   Non-Mechanical pain distribution (cannot be produced, changed, or reduced during exam): NO   Cauda Equina Dysfunction: NO   Upper lumbar disc herniation in younger patients (femoral nerve tension test for lateral disc herniation in lower lumbar): NO   Lumbar compression fracture (age > 48, trauma, corticosteroid use NO   Spine Cancer (age > 48, pervious history of cancer, failure to improve in 1 month of therapy, no relief - be rest, duration > 1 month, unexplained weight loss, insidious onset, constitutional symptoms): NO   Ankylosing Spondylitis (age < 36, pain not relieved by supine, morning back stiffness, pain duration > 3 months, improved by exercise): NO   Sacral Fracture: NO       FUNCTIONAL MOBILITY:  Assessed 8/3/18   -Affecting participation in basic ADLs and functional tasks.   -Ambulation/Gait: Decreased step length, shuffling gait for first few steps, slight forward trunk lean, decreased stance time B LEs.   -Bed mobility: No difficulty   -Stairs: Difficulty ascending and descending stairs secondary to fatigue.   -Transfers: Mod I   -Wheelchair: N/A    BALANCE:  SLS: Date: 6/22/18 Date:  8/3/18   Right: NT NT   Left: NT NT        Body Structures Involved:  1. Bones  2. Joints  3. Muscles  4. Ligaments Body Functions Affected:  1. Sensory/Pain  2. Neuromusculoskeletal  3. Movement Related Activities and Participation Affected:  1. Mobility  2. Self Care  3. Domestic Life  4. Interpersonal Interactions and Relationships  5. Community, Social and [place]   Number of elements that affect the Plan of Care: 4+: HIGH COMPLEXITY   CLINICAL PRESENTATION:   Presentation: Stable and uncomplicated: LOW COMPLEXITY   CLINICAL DECISION MAKING:   OUTCOME MEASURE USED:   Tool Used: Tool Used: Modified Oswestry Low Back Pain Questionnaire  Score:  Initial: 24/50  Most Recent: 14/50 (Date: -- )   Interpretation of Score: Each section is scored on a 0-5 scale, 5 representing the greatest disability. The scores of each section are added together for a total score of 50. Score 0 1-10 11-20 21-30 31-40 41-49 50   Modifier CH CI CJ CK CL CM CN     ?  Mobility - Walking and Moving Around:     - CURRENT STATUS: CK - 40%-59% impaired, limited or restricted    - GOAL STATUS:   CJ - 20%-39% impaired, limited or restricted    - D/C STATUS: CJ - 20%-39% impaired, limited or restricted    [place] / [place] / Product Type: Medicare /     MEDICAL NECESSITY:  · Skilled intervention continues to be required due to above deficits affecting participation in basic ADLs and overall functional tolerance. REASON FOR SERVICES/OTHER COMMENTS:  · Patient continues to require skilled intervention due to  above deficits affecting participation in basic ADLs and overall functional tolerance. Use of outcome tool(s) and clinical judgement create a POC that gives a: Questionable prediction of patient's progress: MODERATE COMPLEXITY   TREATMENT:   (In addition to Assessment/Re-Assessment sessions the following treatments were rendered)  PRE-TREATMENT SYMPTOMS: Pt states he has been doing well but cannot shake the pain he is having in B hamstrings. Pt reports his lower leg no longer bothers him but continues to have a tightness in his hamstrings. THERAPEUTIC EXERCISE: (45 minutes):  Exercises per grid below to improve mobility, strength and balance. Required minimal visual and verbal cues to promote proper body alignment and promote proper body posture. Progressed resistance, range and complexity of movement as indicated.      Date:  7/6/18 Date:  7/10/18 Date  7/12/18 Date  7/17/18 Date  7/25/18 Date  8/3/18   Activity/Exercise         Pt education      D/C measurements   Hamstring Stretch    Supine with strap  3 x 30 second hold B Supine with strap  3 x 30 second hold B    Nu-step         Physio-step 10min  -level 4  5min fwd/5min bckwd 12min  -level 4  5min fwd/5min bckwd Level 4  12 [id number] fwd/6min bckwd Level 4  10 [id number] fwd/5min bckwd Level 5  10 minutes Level 5  10 minutes   LTR     1x10 reps  -hold 3s  -green physioball    SKTC         Glute set         Bridging         SLR     1x12 reps  -no ankle weights    Pelvic tilts     1x10 reps  -hold 10s    Pelvic tilt w/ marching      1x10 reps    Seated piriformis stretch         Seated hamstring stretch         Standing marching 1x15 reps 1x15 reps  -2# ankle weights 2# ankle weight  X 15 reps 2# ankle weight  2 x 10 reps  2.5#  2 x 10 reps   Standing calf/toe raises 1x15 reps 1x20 reps X 20 reps B   X 20 reps B  X 20 reps B   Standing hip abduction 1x15 reps 1x15 reps  -2# ankle weights 2 x 10 reps B  2# ankle weight 2#  2 x 10 reps  2.5#  2 x 10 reps  Blue airex   Standing hip flexion 1x15 reps 1x15 reps  -2# ankle weights 2 x 10 reps  2# ankle weight 2#   2 x 10 reps  2.5#  2 x 10 reps  Blue airex   Standing hip extension 1x15 reps 1x15 reps  -2# ankle weights 2# ankle weight  2 x 10 reps B 2# ankle weight  2 x 10 reps B  2.5#  2 x 10 reps  Blue airex   Step taps   6\" step  2 x 10 reps alternating feet 6\" step  2 x 10 reps B alternating     Standing step-ups   6\" step  X 10 reps B 6\" step  X 10 reps B  6\" step  X 10 reps B   Standing cone taps   3 cones   X 5 reps B x  3 cones  1x10 reps B   Standing calf stretch 4x1min 3x1min 3 x 1 minute   3 x 1 minute     Sit to stand education         Sit to stands with weighted ball  2x10 reps  -holding yellow ball   2x10 reps  -holding yellow ball    Side stepping on long airex      5 laps   Tandem balance      3x30s B              MANUAL THERAPY: (0 minutes): Joint mobilization, Soft tissue mobilization and Manipulation was utilized and necessary because of the patient's restricted joint motion, painful spasm, loss of articular motion and restricted motion of soft tissue. Pt prone for [place] [employer] [place] roll out to B lumbar paraspinals, B gluteal region, B piriformis, B hamstrings, B gastroc. Trigger point release to L hamstring and gastroc to relieve tension and promote AROM. (Used abbreviations: MET - muscle energy technique; PNF - proprioceptive neuromuscular facilitation; NMR - neuromuscular re-education; AP - anterior to posterior; PA - posterior to anterior)    MODALITIES: (0 minutes):      NOT TODAY      TREATMENT/SESSION ASSESSMENT:  Bo Jolly tolerated treatment well. Pt educated to continue performing HEP at home, especially hamstring stretching. Pt rated pain 3/10 in hamstring region. See above for outcome measures.      · Pain/ Symptoms Initial:  3/10 Post Session:  3/10-Slight soreness in hamstrings      · Compliance with Program/Exercises: Will assess as treatment progresses. · Recommendations/Intent for next treatment session: \"Next visit will focus on advancements to more challenging activities\".     Total Treatment Duration:  PT Patient Time In/Time Out  Time In: 0945  Time Out: 0540 [facility], PT, DPT

## 2018-08-16 PROBLEM — F33.9 RECURRENT DEPRESSION (HCC): Status: ACTIVE | Noted: 2018-08-16

## 2019-02-18 PROBLEM — Z98.890 HISTORY OF LUMBAR LAMINECTOMY FOR SPINAL CORD DECOMPRESSION: Status: ACTIVE | Noted: 2019-02-18

## 2019-03-13 ENCOUNTER — HOSPITAL ENCOUNTER (OUTPATIENT)
Dept: PHYSICAL THERAPY | Age: 79
Discharge: HOME | End: 2019-03-13
Payer: MEDICARE

## 2019-03-13 PROCEDURE — 97161 PT EVAL LOW COMPLEX 20 MIN: CPT

## 2019-03-13 PROCEDURE — 97110 THERAPEUTIC EXERCISES: CPT

## 2019-03-13 NOTE — THERAPY EVALUATION
Eduard Toney [facility]  : 1940  [place] / [place] / Product Type: Medicare /  [address]  at [address] [facility], [facility], [address]  [phone]   [phone]                OUTPATIENT PHYSICAL THERAPY:Initial Assessment 3/13/2019    ICD-10: Treatment Diagnosis:   Difficulty in walking, not elsewhere classified (R26.2)  Muscle weakness (generalized) (M62.81)        PRECAUTIONS/ALLERGIES:   Patient has no known allergies. FALL RISK SCORE: 2 (? 5 = High Risk)    MD ORDERS: Eval and Treat  MEDICAL/REFERRING DIAGNOSIS:  Encounter for follow-up examination after completed treatment for conditions other than malignant neoplasm [Z09]     DATE OF ONSET: Laminectomy L1-L4 2019    REFERRING PHYSICIAN: Bao Pollack MD    RETURN PHYSICIAN APPOINTMENT: TBD by patient      Ambulatory/Rehab Services H2 Model Falls Risk Assessment    Risk Factors:       (1)  Gender [Male] Ability to Rise from Chair:       (1)  Pushes up, successful in one attempt    Falls Prevention Plan: Mobility Assistance Device (specify):  rollator   Total: (5 or greater = High Risk): 2     [facility] [address] [place] [address] [place] Patent #[id number]. Federal Law prohibits the replication, distribution or use without written permission from [address] [place]:  Mr. Mark Chapa has attended 1 physical therapy session including initial evaluation as of 3/13/2019. Mark Chapa exhibits decreased flexibility, decreased postural and core strength, decreased functional tolerance and decreased general LE strength. No pelvic malalignment upon initial evaluation but decreased posture. Mark Chapa will benefit from skilled PT (medically necessary) to address above deficits affecting participation in basic ADLs and overall functional tolerance.   Manual techniques (stretching, joint mobilizations, soft tissue mobilization/myofascial release), postural exercises/education, therapeutic techniques/activities, and HEP will be performed as appropriate addressing Vamshi Jolly's current condition. PROBLEM LIST (Impacting functional limitations):  1. Decreased Strength  2. Decreased ADL/Functional Activities  3. Decreased Transfer Abilities  4. Decreased Ambulation Ability/Technique  5. Decreased Balance  6. Increased Pain  7. Decreased Activity Tolerance  8. Increased Fatigue  9. Increased Shortness of Breath  10. Decreased Flexibility/Joint Mobility  11. Decreased [place] with Home Exercise Program INTERVENTIONS PLANNED:  1. Balance Exercise  2. Bed Mobility  3. Cold  4. Cryotherapy  5. Electrical Stimulation  6. Family Education  7. Gait Training  8. Heat  9. Home Exercise Program (HEP)  10. Manual Therapy  11. Neuromuscular Re-education/Strengthening  12. Range of Motion (ROM)  13. Therapeutic Activites  14. Therapeutic Exercise/Strengthening  15. Transfer Training  16. Mechanical traction  17. Aquatic Therapy  18. Electrical Stimulation   TREATMENT PLAN:  Effective Dates: 3/13/2019 TO 6/11/2019 (90 days). Frequency/Duration: 2 times a week for 90 Days    GOALS: (Goals have been discussed and agreed upon with patient.)  Short Term Goals 4 weeks   1. Norbert Chávez will be independent with HEP to promote self-management of symptoms. 2. Norbert Chávez will demonstrate a 5 point improvement on the LEFS to show improvement in function. 40 Hattie Car will participate in core stabilization exercises to help with stabilization and improve posture during ADLs to help prevent future injuries. 40 Hattie Car will participate in LE strengthening program with weights as appropriate to help with gait and elevations.   40 Hattie Car will participate in static and dynamic balance activities to decrease the risk for falls and improve overall QOL. 40 Hattieulises Car will perform TUG time of 10 seconds with LRAD to decrease risk of falls. Long Term Goals 12 weeks   1. Rocío Arm will demonstrate a 10 point improvement on the LEFS to show improvement in function. 2. Rocío Arm will report <=2/10 pain at rest and during ADLs to improve QOL. 3. Rocío Arm will demonstrate >=4+/5 LE strength on manual muscle testing to improve functional mobility. 40 Htatie Josep will be able to perform SLS >10 seconds bilaterally to help with gait and improve balance. 40 Hattie Josep will be able to demonstrate safe transfer mechanics without cueing for improved safety with home and community activities. 40 Hattie Josep will perform TUG test with <10 seconds with LRAD to decrease risk of falls. Rehabilitation Potential For Stated Goals: Good    Regarding Otilia Jolly's therapy, I certify that the treatment plan above will be carried out by a therapist or under their direction. Thank you for this referral,  Erika Gama       Referring Physician Signature: Juan Smith MD              Date                    HISTORY:   PATIENT GOAL FOR PHYSICAL THERAPY:  \"I want to be able to walk without the rollator. \"      HISTORY OF PRESENT INJURY/ILLNESS (REASON FOR REFERRAL):  Patient had laminectomy on 2/12/2019 at levels L1-L4. Patient had been having pain in lower back for the previous year. Patient has had physical therapy in the past at this location prior to surgery as well as multiple injections to attempt to decrease pain. Note: Patient denies any increase of symptoms with cough, sneeze or valsalva. Patient denies any saddle paresthesia or bowel/bladder deficits.      PAST MEDICAL HISTORY/COMORBIDITIES:   Mr. Eduarda Barber  has a past medical history of Anxiety disorder (12/9/2014), Arthritis (12/9/2014), Depression (12/9/2014), Gout (12/9/2014), History of lumbar laminectomy for spinal cord decompression (2019), HTN (hypertension), benign (2/15/2017), Hyperlipemia (2014), Hypertension, Idiopathic chronic gout of right ankle (2014), Insomnia (2014), Kidney stone, Mixed hyperlipidemia (2014), Panic disorder (2014), Primary insomnia (2014), and Rosacea (2/15/2017). Mr. Jerri Caldwell  has a past surgical history that includes hx other surgical; hx cyst removal; hx cyst removal; hx hernia repair (Bilateral); hx ankle fracture tx (Left, 1974); hx wrist fracture tx (Bilateral, 1993); hx colonoscopy (); and hx cyst removal.    SOCIAL HISTORY/LIVING ENVIRONMENT:        Social History     Socioeconomic History    Marital status:      Spouse name: Not on file    Number of children: Not on file    Years of education: Not on file    Highest education level: Not on file   Social Needs    Financial resource strain: Not on file    Food insecurity - worry: Not on file    Food insecurity - inability: Not on file   [employer] needs - medical: Not on file   [employer] needs - non-medical: Not on file   Occupational History    Not on file   Tobacco Use    Smoking status: Former Smoker     Types: Cigarettes     Last attempt to quit: 1970     Years since quittin.2    Smokeless tobacco: Never Used   Substance and Sexual Activity    Alcohol use: Yes     Alcohol/week: 0.0 oz    Drug use: No    Sexual activity: Yes     Partners: Female   Other Topics Concern    Not on file   Social History Narrative    Not on file       LIFESTYLE Date: 3/13/2019   Occupation: Retired   Vigorous Activity: n/a   Expose individual to vibrations n/a   Unpleasant work environment n/a       PRIOR LEVEL OF FUNCTION/WORK/ACTIVITY:   Patient ambulated with straight cane prior to surgery. Patient's spouse reports he did not use straight cane correctly and should have been using rolling walker for safety.     Active Ambulatory Problems     Diagnosis Date Noted    MEGGAN (generalized anxiety disorder) 12/09/2014    Arthritis 12/09/2014    Idiopathic chronic gout of right ankle 12/09/2014    Mixed hyperlipidemia 12/09/2014    Primary insomnia 12/09/2014    Panic disorder 12/09/2014    HTN (hypertension), benign 02/15/2017    Rosacea 02/15/2017    Idiopathic scoliosis 06/07/2018    Lumbar stenosis with neurogenic claudication 06/07/2018    Recurrent depression ([facility] [address] 08/16/2018    History of lumbar laminectomy for spinal cord decompression 02/18/2019     Resolved Ambulatory Problems     Diagnosis Date Noted    Depression 12/09/2014     Past Medical History:   Diagnosis Date    Anxiety disorder 12/9/2014    Arthritis 12/9/2014    Depression 12/9/2014    Gout 12/9/2014    History of lumbar laminectomy for spinal cord decompression 2/18/2019    HTN (hypertension), benign 2/15/2017    Hyperlipemia 12/9/2014    Hypertension     Idiopathic chronic gout of right ankle 12/9/2014    Insomnia 12/9/2014    Kidney stone     Mixed hyperlipidemia 12/9/2014    Panic disorder 12/9/2014    Primary insomnia 12/9/2014    Rosacea 2/15/2017         CURRENT MEDICATIONS:    Current Outpatient Medications:     valACYclovir (VALTREX) 1 gram tablet, Take 1 Tab by mouth three (3) times daily for 7 days. , Disp: 21 Tab, Rfl: 0    zolpidem (AMBIEN) 10 mg tablet, TAKE 1 TABLET BY MOUTH AT BEDTIME AS NEEDED FOR SLEEP, Disp: 30 Tab, Rfl: 5    atorvastatin (LIPITOR) 40 mg tablet, TAKE 1 TABLET BY MOUTH EVERY DAY, Disp: 90 Tab, Rfl: 3    potassium chloride SR (KLOR-CON 10) 10 mEq tablet, Take 1 Tab by mouth daily. , Disp: 90 Tab, Rfl: 3    amLODIPine-Olmesartan (CAROLA) 10-40 mg tab, Take 1 Tab by mouth daily. , Disp: 90 Tab, Rfl: 3    sertraline (ZOLOFT) 100 mg tablet, Take 1 Tab by mouth daily. One and half tab every day, Disp: 135 Tab, Rfl: 3    metroNIDAZOLE (METROGEL) 1 % topical gel, Apply  to affected area daily.  Use a thin layer to affected areas after washing, Disp: 45 g, Rfl: 3    allopurinol (ZYLOPRIM) 100 mg tablet, Take 2 Tabs by mouth daily. , Disp: 180 Tab, Rfl: 3    Walker misc, Walker DX: lumbar stenosis with neurogenic claudication +antalgic gait with cane, Disp: 1 Each, Rfl: 0    DISABLED PLACARD (DISABLED PLACARD) DMV, To use with handicap placard form, Disp: 1 Each, Rfl: 0    gabapentin (NEURONTIN) 300 mg capsule, Take 1 Cap by mouth three (3) times daily. , Disp: 180 Cap, Rfl: 2    LORazepam (ATIVAN) 0.5 mg tablet, TAKE 1 TABLET BY MOUTH DAILY. , Disp: 30 Tab, Rfl: 3    diclofenac sodium (PENNSAID) 1.5 % drop, by Apply Externally route., Disp: , Rfl:     diclofenac (VOLTAREN) 1 % gel, Apply 4 g to affected area four (4) times daily. , Disp: 2 Each, Rfl: 3    multivitamin (ONE A DAY) tablet, Take 1 Tab by mouth daily. , Disp: , Rfl:     cholecalciferol, vitamin D3, (VITAMIN D3) 2,000 unit tab, Take  by mouth., Disp: , Rfl:      Date Last Reviewed:  3/13/2019   Number of Personal Factors/Comorbidities that affect the Plan of Care: 1-2: MODERATE COMPLEXITY   EXAMINATION:   OBSERVATION/ORTHOSTATIC POSTURAL ASSESSMENT:     -Pt sits with forward head and rounded shoulders which indicate tight anterior chest musculature, upper trapezius, and levator scapula and weak posterior scapula musculature and deep cervical flexors. Pt displays decreased core motor control indicating weak core and low back musculature.       BALANCE Date: 3/13/2019 Date: 3/13/2019   Right     Left       PELVIC COMPONENT Date:   3/13/2019 Date:  3/13/2019   Standing Mason     Standing PSIS     Standing Sacral Sulci     Gillet     Seated Mason     Seated PSIS     Supine-to-Sit leg length         AROM/PROM         Joint: Date: 3/13/2019  Date:  Date:    Active LE ROM Right Left Right Left Right Left   Hip Flexion         Hip Extension         Hip ER         Hip IR         Knee Extension         Knee Flexion         Knee Extension         Lumbar Flexion 55        Lumbar Extension 10        Lumbar Side-Bending 5        Lumbar Rotation NT          STRENGTH         Joint: Date: 3/13/2019  Date:  Date:     Right Left Right Left Right Left   Hip Abduction         Hip Adduction         Hip IR         Hip ER         Hip Flexion         Knee Extension         Knee Flexion         Ankle DF         Ankle PF         Ankle IV         Ankle EV             PALPATION Date:  3/13/2019 Date: Date:   TTP      TONE      BARBIE MCCOY        SPECIAL TESTS: Assessed @ Initial Visit    -SCOUR: Negative   -90/90:     -R: Not tested    -L: Not tested   -SLR: Not tested   -SI COMPRESSION TEST: Not tested   -SI POST.  GAPPING:  Not tested   -GILLET TEST: Not tested   -SOL 4: Negative   -SLUMP TEST: Not tested   -DEEP SQUAT: Not tested       NEUROLOGICAL SCREEN: Assessed @ Initial Visit    -RADIATING SYMPTOMS: No     -DERMATOMES: In tact    -MYOTOMES Date: 3/13/2019  Date:  Date:     Right Left Right Left Right Left   L2 & L3   (Hip Flexors) 5/5 5/5       L3-L4  (Knee Extensors) 5/5 5/5       L4  (Ankle DFs) 4+/5 4+/5       L5  (Hallux Ext) 4+/5 4+/5       L5-S1  (Ankle PFs) 4+/5 4+/5       S1-S2  (Ankle EVs) 4+/5 4+/5         RED FLAGS: Date: 3/13/2019   Non-Mechanical pain distribution (cannot be produced, changed, or reduced during exam): NO   Cauda Equina Dysfunction: NO   Upper lumbar disc herniation in younger patients (femoral nerve tension test for lateral disc herniation in lower lumbar): NO   Lumbar compression fracture (age > 48, trauma, corticosteroid use NO   Spine Cancer (age > 48, pervious history of cancer, failure to improve in 1 month of therapy, no relief - be rest, duration > 1 month, unexplained weight loss, insidious onset, constitutional symptoms): NO   Ankylosing Spondylitis (age < 36, pain not relieved by supine, morning back stiffness, pain duration > 3 months, improved by exercise): NO   Sacral Fracture: NO       FUNCTIONAL MOBILITY:  Assessed @ Initial Visit    -Affecting participation in basic ADLs and functional tasks.   -Limited tolerance of walking and standing   -Ambulation/Gait: Ambulates with mild forward head posture with use of rollator for safety.   -Bed mobility:  MOD I   -Stairs: NT   -Transfers: MOD I    -Wheelchair: N/A     Body Structures Involved:  1. Bones  2. Joints  3. Muscles  4. Ligaments Body Functions Affected:  1. Sensory/Pain  2. Neuromusculoskeletal  3. Movement Related Activities and Participation Affected:  1. Mobility  2. Self Care  3. Domestic Life  4. Interpersonal Interactions and Relationships  5. Community, Social and [place]   Number of elements that affect the Plan of Care: 4+: HIGH COMPLEXITY   CLINICAL PRESENTATION:   Presentation: Stable and uncomplicated: LOW COMPLEXITY   CLINICAL DECISION MAKING:   OUTCOME MEASURE USED:   Tool Used: Tool Used: Lower Extremity Functional Scale (LEFS)  Score:  Initial: 31/80 Most Recent: X/80 (Date: -- )   Interpretation of Score: 20 questions each scored on a 5 point scale with 0 representing \"extreme difficulty or unable to perform\" and 4 representing \"no difficulty\". The lower the score, the greater the functional disability. 80/80 represents no disability. Minimal detectable change is 9 points. Tool Used: Timed Up and Go (TUG)  Score:  Initial: 12.59 seconds (with rollator) Most Recent: X seconds (Date: -- )   Interpretation of Score: The test measures, in seconds, the time taken by an individual to stand up from a standard arm chair (seat height 46 cm [18 in], arm height 65 cm [25.6 in]), walk a distance of 3 meters (118 in, approx 10 ft), turn, walk back to the chair and sit down. If the individual takes longer than 14 seconds to complete TUG, this indicates risk for falls.     TUG attempted second time without use of rollator: 16.45 seconds    [place] / Product Type: Medicare /     MEDICAL NECESSITY:  · Skilled intervention continues to be required due to above deficits affecting participation in basic ADLs and overall functional tolerance. REASON FOR SERVICES/OTHER COMMENTS:  · Patient continues to require skilled intervention due to  above deficits affecting participation in basic ADLs and overall functional tolerance. Use of outcome tool(s) and clinical judgement create a POC that gives a: Clear prediction of patient's progress: LOW COMPLEXITY   TREATMENT:   (In addition to Assessment/Re-Assessment sessions the following treatments were rendered)  THERAPEUTIC EXERCISE: (15 minutes):  Exercises per grid below to improve mobility, strength and balance. Required minimal visual and verbal cues to promote proper body alignment and promote proper body posture. Progressed resistance, range and complexity of movement as indicated. Date:   Date:   Date:     Activity/Exercise Parameters Parameters Parameters   Heel raises 1 x 10     Supine hip abduction 1 x 10 B     Supine heel slides 1 x 10 B                               MANUAL THERAPY: (0 minutes): Joint mobilization, Soft tissue mobilization and Manipulation was utilized and necessary because of the patient's restricted joint motion, painful spasm, loss of articular motion and restricted motion of soft tissue. (Used abbreviations: MET - muscle energy technique; PNF - proprioceptive neuromuscular facilitation; NMR - neuromuscular re-education; AP - anterior to posterior; PA - posterior to anterior)    MODALITIES: (0 minutes):      Not today        TREATMENT/SESSION ASSESSMENT:  Ijeoma Jolly verbalized understanding of role of PT and POC. · Pain/ Symptoms: Initial:   0/10 Post Session:  0/10 ·   Compliance with Program/Exercises: Will assess as treatment progresses. · Recommendations/Intent for next treatment session: \"Next visit will focus on advancements to more challenging activities\". Total Treatment Duration: 45 minutes  PT Patient Time In/Time Out  Time In: 1430  Time Out: [address]   Maine Noriega DPT

## 2019-03-18 ENCOUNTER — HOSPITAL ENCOUNTER (OUTPATIENT)
Dept: PHYSICAL THERAPY | Age: 79
Discharge: HOME | End: 2019-03-18
Payer: MEDICARE

## 2019-03-18 PROCEDURE — 97110 THERAPEUTIC EXERCISES: CPT

## 2019-03-18 NOTE — PROGRESS NOTES
Vinnie Navarro [facility]  : 1940  [place] / [place] / Product Type: Medicare /  [address]  at [facility]  [address], [address], [address]  [phone]   [phone]                OUTPATIENT PHYSICAL THERAPY:Daily Note 3/18/2019    ICD-10: Treatment Diagnosis:   Difficulty in walking, not elsewhere classified (R26.2)  Muscle weakness (generalized) (M62.81)        PRECAUTIONS/ALLERGIES:   Patient has no known allergies. FALL RISK SCORE: 2 (? 5 = High Risk)    MD ORDERS: Eval and Treat  MEDICAL/REFERRING DIAGNOSIS:  Encounter for follow-up examination after completed treatment for conditions other than malignant neoplasm [Z09]     DATE OF ONSET: Laminectomy L1-L4 2019    REFERRING PHYSICIAN: Shannon Villavicencio MD    RETURN PHYSICIAN APPOINTMENT: TBD by patient      Ambulatory/Rehab Services H2 Model Falls Risk Assessment    Risk Factors:       (1)  Gender [Male] Ability to Rise from Chair:       (1)  Pushes up, successful in one attempt    Falls Prevention Plan: Mobility Assistance Device (specify):  rollator   Total: (5 or greater = High Risk): 2     [facility] [address] [place] [address] States Patent #[id number]. Federal Law prohibits the replication, distribution or use without written permission from [address] [place]:  Mr. Nighat Rashid has attended 1 physical therapy session including initial evaluation as of 3/13/2019. Nighat Rashid exhibits decreased flexibility, decreased postural and core strength, decreased functional tolerance and decreased general LE strength. No pelvic malalignment upon initial evaluation but decreased posture. Nighat Rashid will benefit from skilled PT (medically necessary) to address above deficits affecting participation in basic ADLs and overall functional tolerance.   Manual techniques (stretching, joint mobilizations, soft tissue mobilization/myofascial release), postural exercises/education, therapeutic techniques/activities, and HEP will be performed as appropriate addressing Governor Charly Jolly's current condition. PROBLEM LIST (Impacting functional limitations):  1. Decreased Strength  2. Decreased ADL/Functional Activities  3. Decreased Transfer Abilities  4. Decreased Ambulation Ability/Technique  5. Decreased Balance  6. Increased Pain  7. Decreased Activity Tolerance  8. Increased Fatigue  9. Increased Shortness of Breath  10. Decreased Flexibility/Joint Mobility  11. Decreased [place] with Home Exercise Program INTERVENTIONS PLANNED:  1. Balance Exercise  2. Bed Mobility  3. Cold  4. Cryotherapy  5. Electrical Stimulation  6. Family Education  7. Gait Training  8. Heat  9. Home Exercise Program (HEP)  10. Manual Therapy  11. Neuromuscular Re-education/Strengthening  12. Range of Motion (ROM)  13. Therapeutic Activites  14. Therapeutic Exercise/Strengthening  15. Transfer Training  16. Mechanical traction  17. Aquatic Therapy  18. Electrical Stimulation   TREATMENT PLAN:  Effective Dates: 3/13/2019 TO 6/11/2019 (90 days). Frequency/Duration: 2 times a week for 90 Days    GOALS: (Goals have been discussed and agreed upon with patient.)  Short Term Goals 4 weeks   1. Zion Fina will be independent with HEP to promote self-management of symptoms. 2. Forrestda Fina will demonstrate a 5 point improvement on the LEFS to show improvement in function. 40 Hattie Car will participate in core stabilization exercises to help with stabilization and improve posture during ADLs to help prevent future injuries. 40 Hattie Car will participate in LE strengthening program with weights as appropriate to help with gait and elevations.   40 Hattie Car will participate in static and dynamic balance activities to decrease the risk for falls and improve overall QOL. Denise Potts will perform TUG time of 10 seconds with LRAD to decrease risk of falls. Long Term Goals 12 weeks   1. Mark Chapa will demonstrate a 10 point improvement on the LEFS to show improvement in function. 2. Mark Chapa will report <=2/10 pain at rest and during ADLs to improve QOL. 3. Mark Chapa will demonstrate >=4+/5 LE strength on manual muscle testing to improve functional mobility. Denise Potts will be able to perform SLS >10 seconds bilaterally to help with gait and improve balance. Denise Potts will be able to demonstrate safe transfer mechanics without cueing for improved safety with home and community activities. Denise Potts will perform TUG test with <10 seconds with LRAD to decrease risk of falls. Rehabilitation Potential For Stated Goals: Good    Regarding Eduard Jolly's therapy, I certify that the treatment plan above will be carried out by a therapist or under their direction. Thank you for this referral,  Joshua Miller       Referring Physician Signature: Bao Pollack MD              Date                    HISTORY:   PATIENT GOAL FOR PHYSICAL THERAPY:  \"I want to be able to walk without the rollator. \"      HISTORY OF PRESENT INJURY/ILLNESS (REASON FOR REFERRAL):  Patient had laminectomy on 2/12/2019 at levels L1-L4. Patient had been having pain in lower back for the previous year. Patient has had physical therapy in the past at this location prior to surgery as well as multiple injections to attempt to decrease pain. Note: Patient denies any increase of symptoms with cough, sneeze or valsalva. Patient denies any saddle paresthesia or bowel/bladder deficits.      PAST MEDICAL HISTORY/COMORBIDITIES:   Mr. Louann Paez  has a past medical history of Anxiety disorder (12/9/2014), Arthritis (12/9/2014), Depression (12/9/2014), Gout (12/9/2014), History of lumbar laminectomy for spinal cord decompression (2019), HTN (hypertension), benign (2/15/2017), Hyperlipemia (2014), Hypertension, Idiopathic chronic gout of right ankle (2014), Insomnia (2014), Kidney stone, Mixed hyperlipidemia (2014), Panic disorder (2014), Primary insomnia (2014), and Rosacea (2/15/2017). Mr. Anabel Bishop  has a past surgical history that includes hx other surgical; hx cyst removal; hx cyst removal; hx hernia repair (Bilateral); hx ankle fracture tx (Left, 1974); hx wrist fracture tx (Bilateral, 1993); hx colonoscopy (); and hx cyst removal.    SOCIAL HISTORY/LIVING ENVIRONMENT:        Social History     Socioeconomic History    Marital status:      Spouse name: Not on file    Number of children: Not on file    Years of education: Not on file    Highest education level: Not on file   Social Needs    Financial resource strain: Not on file    Food insecurity - worry: Not on file    Food insecurity - inability: Not on file   [employer] needs - medical: Not on file   [employer] needs - non-medical: Not on file   Occupational History    Not on file   Tobacco Use    Smoking status: Former Smoker     Types: Cigarettes     Last attempt to quit: 1970     Years since quittin.2    Smokeless tobacco: Never Used   Substance and Sexual Activity    Alcohol use: Yes     Alcohol/week: 0.0 oz    Drug use: No    Sexual activity: Yes     Partners: Female   Other Topics Concern    Not on file   Social History Narrative    Not on file       LIFESTYLE Date: 3/13/2019   Occupation: Retired   Vigorous Activity: n/a   Expose individual to vibrations n/a   Unpleasant work environment n/a       PRIOR LEVEL OF FUNCTION/WORK/ACTIVITY:   Patient ambulated with straight cane prior to surgery. Patient's spouse reports he did not use straight cane correctly and should have been using rolling walker for safety.     Active Ambulatory Problems     Diagnosis Date Noted    MEGGAN (generalized anxiety disorder) 12/09/2014    Arthritis 12/09/2014    Idiopathic chronic gout of right ankle 12/09/2014    Mixed hyperlipidemia 12/09/2014    Primary insomnia 12/09/2014    Panic disorder 12/09/2014    HTN (hypertension), benign 02/15/2017    Rosacea 02/15/2017    Idiopathic scoliosis 06/07/2018    Lumbar stenosis with neurogenic claudication 06/07/2018    Recurrent depression ([facility] [address] 08/16/2018    History of lumbar laminectomy for spinal cord decompression 02/18/2019     Resolved Ambulatory Problems     Diagnosis Date Noted    Depression 12/09/2014     Past Medical History:   Diagnosis Date    Anxiety disorder 12/9/2014    Arthritis 12/9/2014    Depression 12/9/2014    Gout 12/9/2014    History of lumbar laminectomy for spinal cord decompression 2/18/2019    HTN (hypertension), benign 2/15/2017    Hyperlipemia 12/9/2014    Hypertension     Idiopathic chronic gout of right ankle 12/9/2014    Insomnia 12/9/2014    Kidney stone     Mixed hyperlipidemia 12/9/2014    Panic disorder 12/9/2014    Primary insomnia 12/9/2014    Rosacea 2/15/2017         CURRENT MEDICATIONS:    Current Outpatient Medications:     zolpidem (AMBIEN) 10 mg tablet, TAKE 1 TABLET BY MOUTH AT BEDTIME AS NEEDED FOR SLEEP, Disp: 30 Tab, Rfl: 5    atorvastatin (LIPITOR) 40 mg tablet, TAKE 1 TABLET BY MOUTH EVERY DAY, Disp: 90 Tab, Rfl: 3    potassium chloride SR (KLOR-CON 10) 10 mEq tablet, Take 1 Tab by mouth daily. , Disp: 90 Tab, Rfl: 3    amLODIPine-Olmesartan (CAROLA) 10-40 mg tab, Take 1 Tab by mouth daily. , Disp: 90 Tab, Rfl: 3    sertraline (ZOLOFT) 100 mg tablet, Take 1 Tab by mouth daily. One and half tab every day, Disp: 135 Tab, Rfl: 3    metroNIDAZOLE (METROGEL) 1 % topical gel, Apply  to affected area daily. Use a thin layer to affected areas after washing, Disp: 45 g, Rfl: 3    allopurinol (ZYLOPRIM) 100 mg tablet, Take 2 Tabs by mouth daily. , Disp: 180 Tab, Rfl: 3   Walker misc, Walker DX: lumbar stenosis with neurogenic claudication +antalgic gait with cane, Disp: 1 Each, Rfl: 0    DISABLED PLACARD (DISABLED PLACARD) DMV, To use with handicap placard form, Disp: 1 Each, Rfl: 0    gabapentin (NEURONTIN) 300 mg capsule, Take 1 Cap by mouth three (3) times daily. , Disp: 180 Cap, Rfl: 2    LORazepam (ATIVAN) 0.5 mg tablet, TAKE 1 TABLET BY MOUTH DAILY. , Disp: 30 Tab, Rfl: 3    diclofenac sodium (PENNSAID) 1.5 % drop, by Apply Externally route., Disp: , Rfl:     diclofenac (VOLTAREN) 1 % gel, Apply 4 g to affected area four (4) times daily. , Disp: 2 Each, Rfl: 3    multivitamin (ONE A DAY) tablet, Take 1 Tab by mouth daily. , Disp: , Rfl:     cholecalciferol, vitamin D3, (VITAMIN D3) 2,000 unit tab, Take  by mouth., Disp: , Rfl:      Date Last Reviewed:  3/18/2019   Number of Personal Factors/Comorbidities that affect the Plan of Care: 1-2: MODERATE COMPLEXITY   EXAMINATION:   OBSERVATION/ORTHOSTATIC POSTURAL ASSESSMENT:     -Pt sits with forward head and rounded shoulders which indicate tight anterior chest musculature, upper trapezius, and levator scapula and weak posterior scapula musculature and deep cervical flexors. Pt displays decreased core motor control indicating weak core and low back musculature.       BALANCE Date: 3/13/2019 Date: 3/13/2019   Right     Left       PELVIC COMPONENT Date:   3/13/2019 Date:  3/13/2019   Standing Mason     Standing PSIS     Standing Sacral Sulci     Gillet     Seated Mason     Seated PSIS     Supine-to-Sit leg length         AROM/PROM         Joint: Date: 3/13/2019  Date:  Date:    Active LE ROM Right Left Right Left Right Left   Hip Flexion         Hip Extension         Hip ER         Hip IR         Knee Extension         Knee Flexion         Knee Extension         Lumbar Flexion 55        Lumbar Extension 10        Lumbar Side-Bending 5        Lumbar Rotation NT          STRENGTH         Joint: Date: 3/13/2019  Date:  Date:     Right Left Right Left Right Left   Hip Abduction 4/5 4/5       Hip Adduction 4/5 4/5       Hip IR 4/5 4/5       Hip ER 4/5 4/5       Hip Flexion 4/5 4/5       Knee Extension 4/5 4/5       Knee Flexion 4/5 4/5       Ankle DF 4/5 4/5       Ankle PF 4/5 4/5       Ankle IV         Ankle EV         4/5    PALPATION Date:  3/13/2019 Date: Date:   TTP      TONE      PA GLIDES        SPECIAL TESTS: Assessed @ Initial Visit    -SCOUR: Negative   -90/90:     -R: Not tested    -L: Not tested   -SLR: Not tested   -SI COMPRESSION TEST: Not tested   -SI POST.  GAPPING:  Not tested   -GILLET TEST: Not tested   -SOL 4: Negative   -SLUMP TEST: Not tested   -DEEP SQUAT: Not tested       NEUROLOGICAL SCREEN: Assessed @ Initial Visit    -RADIATING SYMPTOMS: No     -DERMATOMES: In tact    -MYOTOMES Date: 3/13/2019  Date:  Date:     Right Left Right Left Right Left   L2 & L3   (Hip Flexors) 5/5 5/5       L3-L4  (Knee Extensors) 5/5 5/5       L4  (Ankle DFs) 4+/5 4+/5       L5  (Hallux Ext) 4+/5 4+/5       L5-S1  (Ankle PFs) 4+/5 4+/5       S1-S2  (Ankle EVs) 4+/5 4+/5         RED FLAGS: Date: 3/13/2019   Non-Mechanical pain distribution (cannot be produced, changed, or reduced during exam): NO   Cauda Equina Dysfunction: NO   Upper lumbar disc herniation in younger patients (femoral nerve tension test for lateral disc herniation in lower lumbar): NO   Lumbar compression fracture (age > 48, trauma, corticosteroid use NO   Spine Cancer (age > 48, pervious history of cancer, failure to improve in 1 month of therapy, no relief - be rest, duration > 1 month, unexplained weight loss, insidious onset, constitutional symptoms): NO   Ankylosing Spondylitis (age < 36, pain not relieved by supine, morning back stiffness, pain duration > 3 months, improved by exercise): NO   Sacral Fracture: NO       FUNCTIONAL MOBILITY:  Assessed @ Initial Visit    -Affecting participation in basic ADLs and functional tasks.   -Limited tolerance of walking and standing   -Ambulation/Gait: Ambulates with mild forward head posture with use of rollator for safety.   -Bed mobility:  MOD I   -Stairs: NT   -Transfers: MOD I    -Wheelchair: N/A     Body Structures Involved:  1. Bones  2. Joints  3. Muscles  4. Ligaments Body Functions Affected:  1. Sensory/Pain  2. Neuromusculoskeletal  3. Movement Related Activities and Participation Affected:  1. Mobility  2. Self Care  3. Domestic Life  4. Interpersonal Interactions and Relationships  5. Community, Social and [place]   Number of elements that affect the Plan of Care: 4+: HIGH COMPLEXITY   CLINICAL PRESENTATION:   Presentation: Stable and uncomplicated: LOW COMPLEXITY   CLINICAL DECISION MAKING:   OUTCOME MEASURE USED:   Tool Used: Tool Used: Lower Extremity Functional Scale (LEFS)  Score:  Initial: 31/80 Most Recent: X/80 (Date: -- )   Interpretation of Score: 20 questions each scored on a 5 point scale with 0 representing \"extreme difficulty or unable to perform\" and 4 representing \"no difficulty\". The lower the score, the greater the functional disability. 80/80 represents no disability. Minimal detectable change is 9 points. Tool Used: Timed Up and Go (TUG)  Score:  Initial: 12.59 seconds (with rollator) Most Recent: X seconds (Date: -- )   Interpretation of Score: The test measures, in seconds, the time taken by an individual to stand up from a standard arm chair (seat height 46 cm [18 in], arm height 65 cm [25.6 in]), walk a distance of 3 meters (118 in, approx 10 ft), turn, walk back to the chair and sit down. If the individual takes longer than 14 seconds to complete TUG, this indicates risk for falls.     TUG attempted second time without use of rollator: 16.45 seconds    [place] / [place] / Product Type: Medicare /     MEDICAL NECESSITY:  · Skilled intervention continues to be required due to above deficits affecting participation in basic ADLs and overall functional tolerance. REASON FOR SERVICES/OTHER COMMENTS:  · Patient continues to require skilled intervention due to  above deficits affecting participation in basic ADLs and overall functional tolerance. Use of outcome tool(s) and clinical judgement create a POC that gives a: Clear prediction of patient's progress: LOW COMPLEXITY   TREATMENT:   (In addition to Assessment/Re-Assessment sessions the following treatments were rendered)    Pre-treatment Symptoms/Complaints: Patient reports noticing pain in B hips/lower back yesterday following ambulation without rollator. Patient rates pain is 3/10 at this time in B hips/lower back. THERAPEUTIC EXERCISE: (45 minutes):  Exercises per grid below to improve mobility, strength and balance. Required minimal visual and verbal cues to promote proper body alignment and promote proper body posture. Progressed resistance, range and complexity of movement as indicated. Date:  3/13/2019 Date:  3/18/2019 Date:     Activity/Exercise Parameters Parameters Parameters   Heel raises 1 x 10 2 x 10 B    Supine hip abduction 1 x 10 B 2 x 10 B    Supine heel slides 1 x 10 B     SAQ  2 x 10 B 2# cuff    Sit to stand  2 x 5    balance on blue airex foam  3 x 30 sec without UE support    Nustep   10 mins at Level 3                        MANUAL THERAPY: (0 minutes): Joint mobilization, Soft tissue mobilization and Manipulation was utilized and necessary because of the patient's restricted joint motion, painful spasm, loss of articular motion and restricted motion of soft tissue. (Used abbreviations: MET - muscle energy technique; PNF - proprioceptive neuromuscular facilitation; NMR - neuromuscular re-education; AP - anterior to posterior; PA - posterior to anterior)    MODALITIES: (0 minutes):      Not today     TREATMENT/SESSION ASSESSMENT:  Pearletha Koyanagi Menton demonstrated fatigue in musculature with therapeutic exercise.  PT provided education regarding posture with ambulation and benefits of utilizing rollator for improved balance and safety with ambulation. · Pain/ Symptoms: Initial:   0/10 Post Session:  0/10 ·   Compliance with Program/Exercises: Will assess as treatment progresses. · Recommendations/Intent for next treatment session: \"Next visit will focus on advancements to more challenging activities\". Total Treatment Duration: 45 minutes  PT Patient Time In/Time Out  Time In: 1430  Time Out: [address]   Rosio Person DPT

## 2019-03-20 ENCOUNTER — HOSPITAL ENCOUNTER (OUTPATIENT)
Dept: PHYSICAL THERAPY | Age: 79
Discharge: HOME | End: 2019-03-20
Payer: MEDICARE

## 2019-03-20 PROCEDURE — 97110 THERAPEUTIC EXERCISES: CPT

## 2019-03-20 NOTE — PROGRESS NOTES
[facility]  : 1940  [place] / [place] / Product Type: Medicare /  [address]  at [facility]  [address] [facility] [address] [facility] [place], [place] [address] [address] [facility]  [phone]   [phone]                OUTPATIENT PHYSICAL THERAPY:Daily Note 3/20/2019    ICD-10: Treatment Diagnosis:   Difficulty in walking, not elsewhere classified (R26.2)  Muscle weakness (generalized) (M62.81)        PRECAUTIONS/ALLERGIES:   Patient has no known allergies. FALL RISK SCORE: 2 (? 5 = High Risk)    MD ORDERS: Eval and Treat  MEDICAL/REFERRING DIAGNOSIS:  Encounter for follow-up examination after completed treatment for conditions other than malignant neoplasm [Z09]     DATE OF ONSET: Laminectomy L1-L4 2019    REFERRING PHYSICIAN: Avani Llanos MD    RETURN PHYSICIAN APPOINTMENT: TBD by patient      Ambulatory/Rehab Services H2 Model Falls Risk Assessment    Risk Factors:       (1)  Gender [Male] Ability to Rise from Chair:       (1)  Pushes up, successful in one attempt    Falls Prevention Plan: Mobility Assistance Device (specify):  rollator   Total: (5 or greater = High Risk): 2     [facility] [address] [place] [address] States Patent #[id number]. Federal Law prohibits the replication, distribution or use without written permission from [address] [place]:  Mr. Prabhakar Gandara has attended 1 physical therapy session including initial evaluation as of 3/13/2019. Prabhakar Gandara exhibits decreased flexibility, decreased postural and core strength, decreased functional tolerance and decreased general LE strength. No pelvic malalignment upon initial evaluation but decreased posture. Prabhakar Gandara will benefit from skilled PT (medically necessary) to address above deficits affecting participation in basic ADLs and overall functional tolerance.   Manual techniques (stretching, joint mobilizations, soft tissue mobilization/myofascial release), postural exercises/education, therapeutic techniques/activities, and HEP will be performed as appropriate addressing Benedict Jolly's current condition. PROBLEM LIST (Impacting functional limitations):  1. Decreased Strength  2. Decreased ADL/Functional Activities  3. Decreased Transfer Abilities  4. Decreased Ambulation Ability/Technique  5. Decreased Balance  6. Increased Pain  7. Decreased Activity Tolerance  8. Increased Fatigue  9. Increased Shortness of Breath  10. Decreased Flexibility/Joint Mobility  11. Decreased [place] with Home Exercise Program INTERVENTIONS PLANNED:  1. Balance Exercise  2. Bed Mobility  3. Cold  4. Cryotherapy  5. Electrical Stimulation  6. Family Education  7. Gait Training  8. Heat  9. Home Exercise Program (HEP)  10. Manual Therapy  11. Neuromuscular Re-education/Strengthening  12. Range of Motion (ROM)  13. Therapeutic Activites  14. Therapeutic Exercise/Strengthening  15. Transfer Training  16. Mechanical traction  17. Aquatic Therapy  18. Electrical Stimulation   TREATMENT PLAN:  Effective Dates: 3/13/2019 TO 6/11/2019 (90 days). Frequency/Duration: 2 times a week for 90 Days    GOALS: (Goals have been discussed and agreed upon with patient.)  Short Term Goals 4 weeks   1. Jose Hoffman will be independent with HEP to promote self-management of symptoms. 2. Jose Hoffman will demonstrate a 5 point improvement on the LEFS to show improvement in function. 40 Hattie Car will participate in core stabilization exercises to help with stabilization and improve posture during ADLs to help prevent future injuries. 40 Hattie Car will participate in LE strengthening program with weights as appropriate to help with gait and elevations.   40 Hattieulises Car will participate in static and dynamic balance activities to decrease the risk for falls and improve overall QOL. Denise Potts will perform TUG time of 10 seconds with LRAD to decrease risk of falls. Long Term Goals 12 weeks   1. Collin Neves will demonstrate a 10 point improvement on the LEFS to show improvement in function. 2. Collin Neves will report <=2/10 pain at rest and during ADLs to improve QOL. 3. Collin Neves will demonstrate >=4+/5 LE strength on manual muscle testing to improve functional mobility. Denise Potts will be able to perform SLS >10 seconds bilaterally to help with gait and improve balance. Denise Potts will be able to demonstrate safe transfer mechanics without cueing for improved safety with home and community activities. Denise Potts will perform TUG test with <10 seconds with LRAD to decrease risk of falls. Rehabilitation Potential For Stated Goals: Good    Regarding Hayde Jolly's therapy, I certify that the treatment plan above will be carried out by a therapist or under their direction. Thank you for this referral,  Angelina York       Referring Physician Signature: David Bo MD              Date                    HISTORY:   PATIENT GOAL FOR PHYSICAL THERAPY:  \"I want to be able to walk without the rollator. \"      HISTORY OF PRESENT INJURY/ILLNESS (REASON FOR REFERRAL):  Patient had laminectomy on 2/12/2019 at levels L1-L4. Patient had been having pain in lower back for the previous year. Patient has had physical therapy in the past at this location prior to surgery as well as multiple injections to attempt to decrease pain. Note: Patient denies any increase of symptoms with cough, sneeze or valsalva. Patient denies any saddle paresthesia or bowel/bladder deficits.      PAST MEDICAL HISTORY/COMORBIDITIES:   Mr. Violeta Pate  has a past medical history of Anxiety disorder (12/9/2014), Arthritis (12/9/2014), Depression (12/9/2014), Gout (12/9/2014), History of lumbar laminectomy for spinal cord decompression (2019), HTN (hypertension), benign (2/15/2017), Hyperlipemia (2014), Hypertension, Idiopathic chronic gout of right ankle (2014), Insomnia (2014), Kidney stone, Mixed hyperlipidemia (2014), Panic disorder (2014), Primary insomnia (2014), and Rosacea (2/15/2017). Mr. Jerri Caldwell  has a past surgical history that includes hx other surgical; hx cyst removal; hx cyst removal; hx hernia repair (Bilateral); hx ankle fracture tx (Left, 1974); hx wrist fracture tx (Bilateral, 1993); hx colonoscopy (); and hx cyst removal.    SOCIAL HISTORY/LIVING ENVIRONMENT:        Social History     Socioeconomic History    Marital status:      Spouse name: Not on file    Number of children: Not on file    Years of education: Not on file    Highest education level: Not on file   Occupational History    Not on file   Social Needs    Financial resource strain: Not on file    Food insecurity:     Worry: Not on file     Inability: Not on file    Transportation needs:     Medical: Not on file     Non-medical: Not on file   Tobacco Use    Smoking status: Former Smoker     Types: Cigarettes     Last attempt to quit: 1970     Years since quittin.2    Smokeless tobacco: Never Used   Substance and Sexual Activity    Alcohol use:  Yes     Alcohol/week: 0.0 oz    Drug use: No    Sexual activity: Yes     Partners: Female   Lifestyle    Physical activity:     Days per week: Not on file     Minutes per session: Not on file    Stress: Not on file   Relationships    Social connections:     Talks on phone: Not on file     Gets together: Not on file     Attends [employer] service: Not on file     Active member of club or organization: Not on file     Attends meetings of clubs or organizations: Not on file     Relationship status: Not on file    Intimate partner violence:     Fear of current or ex partner: Not on file     Emotionally abused: Not on file     Physically abused: Not on file     Forced sexual activity: Not on file   Other Topics Concern    Not on file   Social History Narrative    Not on file       LIFESTYLE Date: 3/13/2019   Occupation: Retired   Vigorous Activity: n/a   Expose individual to vibrations n/a   Unpleasant work environment n/a       PRIOR LEVEL OF FUNCTION/WORK/ACTIVITY:   Patient ambulated with straight cane prior to surgery. Patient's spouse reports he did not use straight cane correctly and should have been using rolling walker for safety.     Active Ambulatory Problems     Diagnosis Date Noted    MEGGAN (generalized anxiety disorder) 12/09/2014    Arthritis 12/09/2014    Idiopathic chronic gout of right ankle 12/09/2014    Mixed hyperlipidemia 12/09/2014    Primary insomnia 12/09/2014    Panic disorder 12/09/2014    HTN (hypertension), benign 02/15/2017    Rosacea 02/15/2017    Idiopathic scoliosis 06/07/2018    Lumbar stenosis with neurogenic claudication 06/07/2018    Recurrent depression ([facility] [address] 08/16/2018    History of lumbar laminectomy for spinal cord decompression 02/18/2019     Resolved Ambulatory Problems     Diagnosis Date Noted    Depression 12/09/2014     Past Medical History:   Diagnosis Date    Anxiety disorder 12/9/2014    Arthritis 12/9/2014    Depression 12/9/2014    Gout 12/9/2014    History of lumbar laminectomy for spinal cord decompression 2/18/2019    HTN (hypertension), benign 2/15/2017    Hyperlipemia 12/9/2014    Hypertension     Idiopathic chronic gout of right ankle 12/9/2014    Insomnia 12/9/2014    Kidney stone     Mixed hyperlipidemia 12/9/2014    Panic disorder 12/9/2014    Primary insomnia 12/9/2014    Rosacea 2/15/2017         CURRENT MEDICATIONS:    Current Outpatient Medications:     zolpidem (AMBIEN) 10 mg tablet, TAKE 1 TABLET BY MOUTH AT BEDTIME AS NEEDED FOR SLEEP, Disp: 30 Tab, Rfl: 5    atorvastatin (LIPITOR) 40 mg tablet, TAKE 1 TABLET BY MOUTH EVERY DAY, Disp: 90 Tab, Rfl: 3    potassium chloride SR (KLOR-CON 10) 10 mEq tablet, Take 1 Tab by mouth daily. , Disp: 90 Tab, Rfl: 3    amLODIPine-Olmesartan (CAROLA) 10-40 mg tab, Take 1 Tab by mouth daily. , Disp: 90 Tab, Rfl: 3    sertraline (ZOLOFT) 100 mg tablet, Take 1 Tab by mouth daily. One and half tab every day, Disp: 135 Tab, Rfl: 3    metroNIDAZOLE (METROGEL) 1 % topical gel, Apply  to affected area daily. Use a thin layer to affected areas after washing, Disp: 45 g, Rfl: 3    allopurinol (ZYLOPRIM) 100 mg tablet, Take 2 Tabs by mouth daily. , Disp: 180 Tab, Rfl: 3    Walker misc, Walker DX: lumbar stenosis with neurogenic claudication +antalgic gait with cane, Disp: 1 Each, Rfl: 0    DISABLED PLACARD (DISABLED PLACARD) DMV, To use with handicap placard form, Disp: 1 Each, Rfl: 0    gabapentin (NEURONTIN) 300 mg capsule, Take 1 Cap by mouth three (3) times daily. , Disp: 180 Cap, Rfl: 2    LORazepam (ATIVAN) 0.5 mg tablet, TAKE 1 TABLET BY MOUTH DAILY. , Disp: 30 Tab, Rfl: 3    diclofenac sodium (PENNSAID) 1.5 % drop, by Apply Externally route., Disp: , Rfl:     diclofenac (VOLTAREN) 1 % gel, Apply 4 g to affected area four (4) times daily. , Disp: 2 Each, Rfl: 3    multivitamin (ONE A DAY) tablet, Take 1 Tab by mouth daily. , Disp: , Rfl:     cholecalciferol, vitamin D3, (VITAMIN D3) 2,000 unit tab, Take  by mouth., Disp: , Rfl:      Date Last Reviewed:  3/20/2019   Number of Personal Factors/Comorbidities that affect the Plan of Care: 1-2: MODERATE COMPLEXITY   EXAMINATION:   OBSERVATION/ORTHOSTATIC POSTURAL ASSESSMENT:     -Pt sits with forward head and rounded shoulders which indicate tight anterior chest musculature, upper trapezius, and levator scapula and weak posterior scapula musculature and deep cervical flexors. Pt displays decreased core motor control indicating weak core and low back musculature.       BALANCE Date: 3/13/2019 Date: 3/13/2019   Right     Left       PELVIC COMPONENT Date:   3/13/2019 Date:  3/13/2019   Standing Mason     Standing PSIS     Standing Sacral Sulci     Gillet     Seated Mason     Seated PSIS     Supine-to-Sit leg length         AROM/PROM         Joint: Date: 3/13/2019  Date:  Date:    Active LE ROM Right Left Right Left Right Left   Hip Flexion         Hip Extension         Hip ER         Hip IR         Knee Extension         Knee Flexion         Knee Extension         Lumbar Flexion 55        Lumbar Extension 10        Lumbar Side-Bending 5        Lumbar Rotation NT          STRENGTH         Joint: Date: 3/13/2019  Date:  Date:     Right Left Right Left Right Left   Hip Abduction 4/5 4/5       Hip Adduction 4/5 4/5       Hip IR 4/5 4/5       Hip ER 4/5 4/5       Hip Flexion 4/5 4/5       Knee Extension 4/5 4/5       Knee Flexion 4/5 4/5       Ankle DF 4/5 4/5       Ankle PF 4/5 4/5       Ankle IV         Ankle EV         4/5    PALPATION Date:  3/13/2019 Date: Date:   TTP      TONE      PA DARIUS        SPECIAL TESTS: Assessed @ Initial Visit    -SCOUR: Negative   -90/90:     -R: Not tested    -L: Not tested   -SLR: Not tested   -SI COMPRESSION TEST: Not tested   -SI POST.  GAPPING:  Not tested   -GILLET TEST: Not tested   -SOL 4: Negative   -SLUMP TEST: Not tested   -DEEP SQUAT: Not tested       NEUROLOGICAL SCREEN: Assessed @ Initial Visit    -RADIATING SYMPTOMS: No     -DERMATOMES: In tact    -MYOTOMES Date: 3/13/2019  Date:  Date:     Right Left Right Left Right Left   L2 & L3   (Hip Flexors) 5/5 5/5       L3-L4  (Knee Extensors) 5/5 5/5       L4  (Ankle DFs) 4+/5 4+/5       L5  (Hallux Ext) 4+/5 4+/5       L5-S1  (Ankle PFs) 4+/5 4+/5       S1-S2  (Ankle EVs) 4+/5 4+/5         RED FLAGS: Date: 3/13/2019   Non-Mechanical pain distribution (cannot be produced, changed, or reduced during exam): NO   Cauda Equina Dysfunction: NO   Upper lumbar disc herniation in younger patients (femoral nerve tension test for lateral disc herniation in lower lumbar): NO   Lumbar compression fracture (age > 48, trauma, corticosteroid use NO   Spine Cancer (age > 48, pervious history of cancer, failure to improve in 1 month of therapy, no relief - be rest, duration > 1 month, unexplained weight loss, insidious onset, constitutional symptoms): NO   Ankylosing Spondylitis (age < [address], pain not relieved by supine, morning back stiffness, pain duration > 3 months, improved by exercise): NO   Sacral Fracture: NO       FUNCTIONAL MOBILITY:  Assessed @ Initial Visit    -Affecting participation in basic ADLs and functional tasks.   -Limited tolerance of walking and standing   -Ambulation/Gait: Ambulates with mild forward head posture with use of rollator for safety.   -Bed mobility:  MOD I   -Stairs: NT   -Transfers: MOD I    -Wheelchair: N/A     Body Structures Involved:  1. Bones  2. Joints  3. Muscles  4. Ligaments Body Functions Affected:  1. Sensory/Pain  2. Neuromusculoskeletal  3. Movement Related Activities and Participation Affected:  1. Mobility  2. Self Care  3. Domestic Life  4. Interpersonal Interactions and Relationships  5. Community, Social and [place]   Number of elements that affect the Plan of Care: 4+: HIGH COMPLEXITY   CLINICAL PRESENTATION:   Presentation: Stable and uncomplicated: LOW COMPLEXITY   CLINICAL DECISION MAKING:   OUTCOME MEASURE USED:   Tool Used: Tool Used: Lower Extremity Functional Scale (LEFS)  Score:  Initial: 31/80 Most Recent: X/80 (Date: -- )   Interpretation of Score: 20 questions each scored on a 5 point scale with 0 representing \"extreme difficulty or unable to perform\" and 4 representing \"no difficulty\". The lower the score, the greater the functional disability. 80/80 represents no disability. Minimal detectable change is 9 points. Tool Used: Timed Up and Go (TUG)  Score:  Initial: 12.59 seconds (with rollator) Most Recent: X seconds (Date: -- )   Interpretation of Score:  The test measures, in seconds, the time taken by an individual to stand up from a standard arm chair (seat height 46 cm [18 in], arm height 65 cm [25.6 in]), walk a distance of 3 meters (118 in, approx 10 ft), turn, walk back to the chair and sit down. If the individual takes longer than 14 seconds to complete TUG, this indicates risk for falls. TUG attempted second time without use of rollator: 16.45 seconds    [place] / [place] / Product Type: Medicare /     MEDICAL NECESSITY:  · Skilled intervention continues to be required due to above deficits affecting participation in basic ADLs and overall functional tolerance. REASON FOR SERVICES/OTHER COMMENTS:  · Patient continues to require skilled intervention due to  above deficits affecting participation in basic ADLs and overall functional tolerance. Use of outcome tool(s) and clinical judgement create a POC that gives a: Clear prediction of patient's progress: LOW COMPLEXITY   TREATMENT:   (In addition to Assessment/Re-Assessment sessions the following treatments were rendered)    Pre-treatment Symptoms/Complaints:  Patient reports performing HEP yesterday and felt slight increase in pain in lower back. Patient reports mild pain 2/10, in lower back at this time. THERAPEUTIC EXERCISE: (45 minutes):  Exercises per grid below to improve mobility, strength and balance. Required minimal visual and verbal cues to promote proper body alignment and promote proper body posture. Progressed resistance, range and complexity of movement as indicated.      Date:  3/13/2019 Date:  3/18/2019 Date:  3/20/2019   Activity/Exercise Parameters Parameters Parameters   Heel raises 1 x 10 2 x 10 B 2 x 10 B   Supine hip abduction 1 x 10 B 2 x 10 B 2 x 10 B   Supine heel slides 1 x 10 B     SAQ  2 x 10 B 2# cuff 2 x 10 B 2# cuff   Sit to stand  2 x 5    balance on blue airex foam  3 x 30 sec without UE support 3 x 30 sec without UE support   Nustep   10 mins at Level 3 10 mins at Level 3   Gastrocnemius stretch   3 x 30 sec B Sidestepping in parallel bars   X 4 laps (leading with each LE)           MANUAL THERAPY: (0 minutes): Joint mobilization, Soft tissue mobilization and Manipulation was utilized and necessary because of the patient's restricted joint motion, painful spasm, loss of articular motion and restricted motion of soft tissue. (Used abbreviations: MET - muscle energy technique; PNF - proprioceptive neuromuscular facilitation; NMR - neuromuscular re-education; AP - anterior to posterior; PA - posterior to anterior)    MODALITIES: (0 minutes):      Not today     TREATMENT/SESSION ASSESSMENT:  [facility] making steady progress with motor control of B LE with therapeutic exercise. · Pain/ Symptoms: Initial:   2/10 Post Session:  2/10 ·   Compliance with Program/Exercises: Will assess as treatment progresses. · Recommendations/Intent for next treatment session: \"Next visit will focus on advancements to more challenging activities\".     Total Treatment Duration: 45 minutes  PT Patient Time In/Time Out  Time In: 1055  Time Out: 575 Norma Hylton, PT, DPT

## 2019-03-25 ENCOUNTER — HOSPITAL ENCOUNTER (OUTPATIENT)
Dept: PHYSICAL THERAPY | Age: 79
Discharge: HOME | End: 2019-03-25
Payer: MEDICARE

## 2019-03-25 PROCEDURE — 97110 THERAPEUTIC EXERCISES: CPT

## 2019-03-25 NOTE — PROGRESS NOTES
Pearletha Koyanagi [facility]  : 1940  [place] / [place] / Product Type: Medicare /  [employer]  at [facility]  [address], [address], [place] [address] [address] [facility]  [phone]   [phone]                OUTPATIENT PHYSICAL THERAPY:Daily Note 3/25/2019    ICD-10: Treatment Diagnosis:   Difficulty in walking, not elsewhere classified (R26.2)  Muscle weakness (generalized) (M62.81)        PRECAUTIONS/ALLERGIES:   Patient has no known allergies. FALL RISK SCORE: 2 (? 5 = High Risk)    MD ORDERS: Eval and Treat  MEDICAL/REFERRING DIAGNOSIS:  Encounter for follow-up examination after completed treatment for conditions other than malignant neoplasm [Z09]     DATE OF ONSET: Laminectomy L1-L4 2019    REFERRING PHYSICIAN: Diana Garsia MD    RETURN PHYSICIAN APPOINTMENT: TBD by patient      Ambulatory/Rehab Services H2 Model Falls Risk Assessment    Risk Factors:       (1)  Gender [Male] Ability to Rise from Chair:       (1)  Pushes up, successful in one attempt    Falls Prevention Plan: Mobility Assistance Device (specify):  rollator   Total: (5 or greater = High Risk): 2    © [facility] of Adriane [employer] States Patent #[id number]. Federal Law prohibits the replication, distribution or use without written permission from [address] [place]:  Mr. Tadeo Feng has attended 1 physical therapy session including initial evaluation as of 3/13/2019. Tadeo Feng exhibits decreased flexibility, decreased postural and core strength, decreased functional tolerance and decreased general LE strength. No pelvic malalignment upon initial evaluation but decreased posture. Tadeo Feng will benefit from skilled PT (medically necessary) to address above deficits affecting participation in basic ADLs and overall functional tolerance.   Manual techniques (stretching, joint mobilizations, soft tissue mobilization/myofascial release), postural exercises/education, therapeutic techniques/activities, and HEP will be performed as appropriate addressing Roro Jolly's current condition. PROBLEM LIST (Impacting functional limitations):  1. Decreased Strength  2. Decreased ADL/Functional Activities  3. Decreased Transfer Abilities  4. Decreased Ambulation Ability/Technique  5. Decreased Balance  6. Increased Pain  7. Decreased Activity Tolerance  8. Increased Fatigue  9. Increased Shortness of Breath  10. Decreased Flexibility/Joint Mobility  11. Decreased [place] with Home Exercise Program INTERVENTIONS PLANNED:  1. Balance Exercise  2. Bed Mobility  3. Cold  4. Cryotherapy  5. Electrical Stimulation  6. Family Education  7. Gait Training  8. Heat  9. Home Exercise Program (HEP)  10. Manual Therapy  11. Neuromuscular Re-education/Strengthening  12. Range of Motion (ROM)  13. Therapeutic Activites  14. Therapeutic Exercise/Strengthening  15. Transfer Training  16. Mechanical traction  17. Aquatic Therapy  18. Electrical Stimulation   TREATMENT PLAN:  Effective Dates: 3/13/2019 TO 6/11/2019 (90 days). Frequency/Duration: 2 times a week for 90 Days    GOALS: (Goals have been discussed and agreed upon with patient.)  Short Term Goals 4 weeks   1. Katerin French will be independent with HEP to promote self-management of symptoms. 2. Katerin French will demonstrate a 5 point improvement on the LEFS to show improvement in function. 40 Hattie Car will participate in core stabilization exercises to help with stabilization and improve posture during ADLs to help prevent future injuries. 40 Hattie Car will participate in LE strengthening program with weights as appropriate to help with gait and elevations.   40 Hattie Car will participate in static and dynamic balance activities to decrease the risk for falls and improve overall QOL. Denise Potts will perform TUG time of 10 seconds with LRAD to decrease risk of falls. Long Term Goals 12 weeks   1. Collin Neves will demonstrate a 10 point improvement on the LEFS to show improvement in function. 2. Collin Neves will report <=2/10 pain at rest and during ADLs to improve QOL. 3. Collin Neves will demonstrate >=4+/5 LE strength on manual muscle testing to improve functional mobility. Denise Potts will be able to perform SLS >10 seconds bilaterally to help with gait and improve balance. Denise Potts will be able to demonstrate safe transfer mechanics without cueing for improved safety with home and community activities. Denise Potts will perform TUG test with <10 seconds with LRAD to decrease risk of falls. Rehabilitation Potential For Stated Goals: Good    Regarding Hayde Jolly's therapy, I certify that the treatment plan above will be carried out by a therapist or under their direction. Thank you for this referral,  Angelina York       Referring Physician Signature: David Bo MD              Date                    HISTORY:   PATIENT GOAL FOR PHYSICAL THERAPY:  \"I want to be able to walk without the rollator. \"      HISTORY OF PRESENT INJURY/ILLNESS (REASON FOR REFERRAL):  Patient had laminectomy on 2/12/2019 at levels L1-L4. Patient had been having pain in lower back for the previous year. Patient has had physical therapy in the past at this location prior to surgery as well as multiple injections to attempt to decrease pain. Note: Patient denies any increase of symptoms with cough, sneeze or valsalva. Patient denies any saddle paresthesia or bowel/bladder deficits.      PAST MEDICAL HISTORY/COMORBIDITIES:   Mr. Violeta Pate  has a past medical history of Anxiety disorder (12/9/2014), Arthritis (12/9/2014), Depression (12/9/2014), Gout (12/9/2014), History of lumbar laminectomy for spinal cord decompression (2019), HTN (hypertension), benign (2/15/2017), Hyperlipemia (2014), Hypertension, Idiopathic chronic gout of right ankle (2014), Insomnia (2014), Kidney stone, Mixed hyperlipidemia (2014), Panic disorder (2014), Primary insomnia (2014), and Rosacea (2/15/2017). Mr. Joe Mcnally  has a past surgical history that includes hx other surgical; hx cyst removal; hx cyst removal; hx hernia repair (Bilateral); hx ankle fracture tx (Left, 1974); hx wrist fracture tx (Bilateral, 1993); hx colonoscopy (); and hx cyst removal.    SOCIAL HISTORY/LIVING ENVIRONMENT:        Social History     Socioeconomic History    Marital status:      Spouse name: Not on file    Number of children: Not on file    Years of education: Not on file    Highest education level: Not on file   Occupational History    Not on file   Social Needs    Financial resource strain: Not on file    Food insecurity:     Worry: Not on file     Inability: Not on file    Transportation needs:     Medical: Not on file     Non-medical: Not on file   Tobacco Use    Smoking status: Former Smoker     Types: Cigarettes     Last attempt to quit: 1970     Years since quittin.2    Smokeless tobacco: Never Used   Substance and Sexual Activity    Alcohol use:  Yes     Alcohol/week: 0.0 oz    Drug use: No    Sexual activity: Yes     Partners: Female   Lifestyle    Physical activity:     Days per week: Not on file     Minutes per session: Not on file    Stress: Not on file   Relationships    Social connections:     Talks on phone: Not on file     Gets together: Not on file     Attends Anabaptism service: Not on file     Active member of club or organization: Not on file     Attends meetings of clubs or organizations: Not on file     Relationship status: Not on file    Intimate partner violence:     Fear of current or ex partner: Not on file     Emotionally abused: Not on file     Physically abused: Not on file     Forced sexual activity: Not on file   Other Topics Concern    Not on file   Social History Narrative    Not on file       LIFESTYLE Date: 3/13/2019   Occupation: Retired   Vigorous Activity: n/a   Expose individual to vibrations n/a   Unpleasant work environment n/a       PRIOR LEVEL OF FUNCTION/WORK/ACTIVITY:   Patient ambulated with straight cane prior to surgery. Patient's spouse reports he did not use straight cane correctly and should have been using rolling walker for safety.     Active Ambulatory Problems     Diagnosis Date Noted    MEGGAN (generalized anxiety disorder) 12/09/2014    Arthritis 12/09/2014    Idiopathic chronic gout of right ankle 12/09/2014    Mixed hyperlipidemia 12/09/2014    Primary insomnia 12/09/2014    Panic disorder 12/09/2014    HTN (hypertension), benign 02/15/2017    Rosacea 02/15/2017    Idiopathic scoliosis 06/07/2018    Lumbar stenosis with neurogenic claudication 06/07/2018    Recurrent depression ([facility] [address] 08/16/2018    History of lumbar laminectomy for spinal cord decompression 02/18/2019     Resolved Ambulatory Problems     Diagnosis Date Noted    Depression 12/09/2014     Past Medical History:   Diagnosis Date    Anxiety disorder 12/9/2014    Arthritis 12/9/2014    Depression 12/9/2014    Gout 12/9/2014    History of lumbar laminectomy for spinal cord decompression 2/18/2019    HTN (hypertension), benign 2/15/2017    Hyperlipemia 12/9/2014    Hypertension     Idiopathic chronic gout of right ankle 12/9/2014    Insomnia 12/9/2014    Kidney stone     Mixed hyperlipidemia 12/9/2014    Panic disorder 12/9/2014    Primary insomnia 12/9/2014    Rosacea 2/15/2017         CURRENT MEDICATIONS:    Current Outpatient Medications:     zolpidem (AMBIEN) 10 mg tablet, TAKE 1 TABLET BY MOUTH AT BEDTIME AS NEEDED FOR SLEEP, Disp: 30 Tab, Rfl: 5    atorvastatin (LIPITOR) 40 mg tablet, TAKE 1 TABLET BY MOUTH EVERY DAY, Disp: 90 Tab, Rfl: 3    potassium chloride SR (KLOR-CON 10) 10 mEq tablet, Take 1 Tab by mouth daily. , Disp: 90 Tab, Rfl: 3    amLODIPine-Olmesartan (CAROLA) 10-40 mg tab, Take 1 Tab by mouth daily. , Disp: 90 Tab, Rfl: 3    sertraline (ZOLOFT) 100 mg tablet, Take 1 Tab by mouth daily. One and half tab every day, Disp: 135 Tab, Rfl: 3    metroNIDAZOLE (METROGEL) 1 % topical gel, Apply  to affected area daily. Use a thin layer to affected areas after washing, Disp: 45 g, Rfl: 3    allopurinol (ZYLOPRIM) 100 mg tablet, Take 2 Tabs by mouth daily. , Disp: 180 Tab, Rfl: 3    Walker misc, Walker DX: lumbar stenosis with neurogenic claudication +antalgic gait with cane, Disp: 1 Each, Rfl: 0    DISABLED PLACARD (DISABLED PLACARD) DMV, To use with handicap placard form, Disp: 1 Each, Rfl: 0    gabapentin (NEURONTIN) 300 mg capsule, Take 1 Cap by mouth three (3) times daily. , Disp: 180 Cap, Rfl: 2    LORazepam (ATIVAN) 0.5 mg tablet, TAKE 1 TABLET BY MOUTH DAILY. , Disp: 30 Tab, Rfl: 3    diclofenac sodium (PENNSAID) 1.5 % drop, by Apply Externally route., Disp: , Rfl:     diclofenac (VOLTAREN) 1 % gel, Apply 4 g to affected area four (4) times daily. , Disp: 2 Each, Rfl: 3    multivitamin (ONE A DAY) tablet, Take 1 Tab by mouth daily. , Disp: , Rfl:     cholecalciferol, vitamin D3, (VITAMIN D3) 2,000 unit tab, Take  by mouth., Disp: , Rfl:      Date Last Reviewed:  3/25/2019   Number of Personal Factors/Comorbidities that affect the Plan of Care: 1-2: MODERATE COMPLEXITY   EXAMINATION:   OBSERVATION/ORTHOSTATIC POSTURAL ASSESSMENT:     -Pt sits with forward head and rounded shoulders which indicate tight anterior chest musculature, upper trapezius, and levator scapula and weak posterior scapula musculature and deep cervical flexors. Pt displays decreased core motor control indicating weak core and low back musculature.       BALANCE Date: 3/13/2019 Date: 3/13/2019   Right     Left       PELVIC COMPONENT Date:   3/13/2019 Date:  3/13/2019   Standing Mason     Standing PSIS     Standing Sacral Sulci     Gillet     Seated Mason     Seated PSIS     Supine-to-Sit leg length         AROM/PROM         Joint: Date: 3/13/2019  Date:  Date:    Active LE ROM Right Left Right Left Right Left   Hip Flexion         Hip Extension         Hip ER         Hip IR         Knee Extension         Knee Flexion         Knee Extension         Lumbar Flexion 55        Lumbar Extension 10        Lumbar Side-Bending 5        Lumbar Rotation NT          STRENGTH         Joint: Date: 3/13/2019  Date:  Date:     Right Left Right Left Right Left   Hip Abduction 4/5 4/5       Hip Adduction 4/5 4/5       Hip IR 4/5 4/5       Hip ER 4/5 4/5       Hip Flexion 4/5 4/5       Knee Extension 4/5 4/5       Knee Flexion 4/5 4/5       Ankle DF 4/5 4/5       Ankle PF 4/5 4/5       Ankle IV         Ankle EV         4/5    PALPATION Date:  3/13/2019 Date: Date:   TTP      TONE      PA DARIUS        SPECIAL TESTS: Assessed @ Initial Visit    -SCOUR: Negative   -90/90:     -R: Not tested    -L: Not tested   -SLR: Not tested   -SI COMPRESSION TEST: Not tested   -SI POST.  GAPPING:  Not tested   -GILLET TEST: Not tested   -SOL 4: Negative   -SLUMP TEST: Not tested   -DEEP SQUAT: Not tested       NEUROLOGICAL SCREEN: Assessed @ Initial Visit    -RADIATING SYMPTOMS: No     -DERMATOMES: In tact    -MYOTOMES Date: 3/13/2019  Date:  Date:     Right Left Right Left Right Left   L2 & L3   (Hip Flexors) 5/5 5/5       L3-L4  (Knee Extensors) 5/5 5/5       L4  (Ankle DFs) 4+/5 4+/5       L5  (Hallux Ext) 4+/5 4+/5       L5-S1  (Ankle PFs) 4+/5 4+/5       S1-S2  (Ankle EVs) 4+/5 4+/5         RED FLAGS: Date: 3/13/2019   Non-Mechanical pain distribution (cannot be produced, changed, or reduced during exam): NO   Cauda Equina Dysfunction: NO   Upper lumbar disc herniation in younger patients (femoral nerve tension test for lateral disc herniation in lower lumbar): NO   Lumbar compression fracture (age > 48, trauma, corticosteroid use NO   Spine Cancer (age > 48, pervious history of cancer, failure to improve in 1 month of therapy, no relief - be rest, duration > 1 month, unexplained weight loss, insidious onset, constitutional symptoms): NO   Ankylosing Spondylitis (age < 36, pain not relieved by supine, morning back stiffness, pain duration > 3 months, improved by exercise): NO   Sacral Fracture: NO       FUNCTIONAL MOBILITY:  Assessed @ Initial Visit    -Affecting participation in basic ADLs and functional tasks.   -Limited tolerance of walking and standing   -Ambulation/Gait: Ambulates with mild forward head posture with use of rollator for safety.   -Bed mobility:  MOD I   -Stairs: NT   -Transfers: MOD I    -Wheelchair: N/A     Body Structures Involved:  1. Bones  2. Joints  3. Muscles  4. Ligaments Body Functions Affected:  1. Sensory/Pain  2. Neuromusculoskeletal  3. Movement Related Activities and Participation Affected:  1. Mobility  2. Self Care  3. Domestic Life  4. Interpersonal Interactions and Relationships  5. Community, Social and [place]   Number of elements that affect the Plan of Care: 4+: HIGH COMPLEXITY   CLINICAL PRESENTATION:   Presentation: Stable and uncomplicated: LOW COMPLEXITY   CLINICAL DECISION MAKING:   OUTCOME MEASURE USED:   Tool Used: Tool Used: Lower Extremity Functional Scale (LEFS)  Score:  Initial: 31/80 Most Recent: X/80 (Date: -- )   Interpretation of Score: 20 questions each scored on a 5 point scale with 0 representing \"extreme difficulty or unable to perform\" and 4 representing \"no difficulty\". The lower the score, the greater the functional disability. 80/80 represents no disability. Minimal detectable change is 9 points. Tool Used: Timed Up and Go (TUG)  Score:  Initial: 12.59 seconds (with rollator) Most Recent: X seconds (Date: -- )   Interpretation of Score:  The test measures, in seconds, the time taken by an individual to stand up from a standard arm chair (seat height 46 cm [18 in], arm height 65 cm [25.6 in]), walk a distance of 3 meters (118 in, approx 10 ft), turn, walk back to the chair and sit down. If the individual takes longer than 14 seconds to complete TUG, this indicates risk for falls. TUG attempted second time without use of rollator: 16.45 seconds    [place] / Product Type: Medicare /     MEDICAL NECESSITY:  · Skilled intervention continues to be required due to above deficits affecting participation in basic ADLs and overall functional tolerance. REASON FOR SERVICES/OTHER COMMENTS:  · Patient continues to require skilled intervention due to  above deficits affecting participation in basic ADLs and overall functional tolerance. Use of outcome tool(s) and clinical judgement create a POC that gives a: Clear prediction of patient's progress: LOW COMPLEXITY   TREATMENT:   (In addition to Assessment/Re-Assessment sessions the following treatments were rendered)    Pre-treatment Symptoms/Complaints:  Patient reports increased tightness in lower back this AM. Patient reports working in yard yesterday doing light maintenance to RV, did not use rollator while performing work. THERAPEUTIC EXERCISE: (45 minutes):  Exercises per grid below to improve mobility, strength and balance. Required minimal visual and verbal cues to promote proper body alignment and promote proper body posture. Progressed resistance, range and complexity of movement as indicated.      Date:  3/13/2019 Date:  3/18/2019 Date:  3/20/2019 Date:  3/25/2019     Activity/Exercise Parameters Parameters Parameters Parameters     Heel raises 1 x 10 2 x 10 B 2 x 10 B 2 x 10 B     Supine hip abduction 1 x 10 B 2 x 10 B 2 x 10 B      Supine heel slides 1 x 10 B        SAQ  2 x 10 B 2# cuff 2 x 10 B 2# cuff      Sit to stand  2 x 5  2 x 5 reps     balance on blue airex foam  3 x 30 sec without UE support 3 x 30 sec without UE support 3 x 30 sec without UE support     Nustep   10 mins at Level 3 10 mins at Level 3 10 mins at Level 3     Gastrocnemius stretch   3 x 30 sec B      Sidestepping in parallel bars   X 4 laps (leading with each LE)      Hip Abduction    1 x 10 B     Hip extension    1 x 10 B     Standing Marching    1 x 10 B     LAQ    2 x 10 B with 3# cuff weight     Semi tandem progressing towards Tandem stance    2 x 30 sec leading with each LE                         MANUAL THERAPY: (0 minutes): Joint mobilization, Soft tissue mobilization and Manipulation was utilized and necessary because of the patient's restricted joint motion, painful spasm, loss of articular motion and restricted motion of soft tissue. (Used abbreviations: MET - muscle energy technique; PNF - proprioceptive neuromuscular facilitation; NMR - neuromuscular re-education; AP - anterior to posterior; PA - posterior to anterior)    MODALITIES: (0 minutes):      Not today     TREATMENT/SESSION ASSESSMENT:  Santosadia Diana Fordeon demonstrated decreased strength in B hip musculature with therapeutic exercise. Patient to continue current HEP as not ready to safely progress HEP due to decreased functional strength. · Pain/ Symptoms: Initial:   2/10 Post Session:  2/10  ·   Compliance with Program/Exercises: Will assess as treatment progresses. · Recommendations/Intent for next treatment session: \"Next visit will focus on advancements to more challenging activities\".     Total Treatment Duration: 45 minutes   PT Patient Time In/Time Out  Time In: 1015  Time Out: [address], PT, DPT

## 2019-03-27 ENCOUNTER — HOSPITAL ENCOUNTER (OUTPATIENT)
Dept: PHYSICAL THERAPY | Age: 79
Discharge: HOME | End: 2019-03-27
Payer: MEDICARE

## 2019-03-27 PROCEDURE — 97110 THERAPEUTIC EXERCISES: CPT

## 2019-03-27 NOTE — PROGRESS NOTES
[facility]  : 1940  [place] / [place] / Product Type: Medicare /  [address]  at [facility]  [address], [address], [address]  [phone]   [phone]                OUTPATIENT PHYSICAL THERAPY:Daily Note 3/27/2019    ICD-10: Treatment Diagnosis:   Difficulty in walking, not elsewhere classified (R26.2)  Muscle weakness (generalized) (M62.81)        PRECAUTIONS/ALLERGIES:   Patient has no known allergies. FALL RISK SCORE: 2 (? 5 = High Risk)    MD ORDERS: Eval and Treat  MEDICAL/REFERRING DIAGNOSIS:  Encounter for follow-up examination after completed treatment for conditions other than malignant neoplasm [Z09]     DATE OF ONSET: Laminectomy L1-L4 2019    REFERRING PHYSICIAN: Delfino Torres MD    RETURN PHYSICIAN APPOINTMENT: TBD by patient      Ambulatory/Rehab Services H2 Model Falls Risk Assessment    Risk Factors:       (1)  Gender [Male] Ability to Rise from Chair:       (1)  Pushes up, successful in one attempt    Falls Prevention Plan: Mobility Assistance Device (specify):  rollator   Total: (5 or greater = High Risk): 2     [facility] [address] [place] [address] [place] Patent #[id number]. Federal Law prohibits the replication, distribution or use without written permission from [address] [place]:  Mr. Preethi King has attended 1 physical therapy session including initial evaluation as of 3/13/2019. Verline [place] exhibits decreased flexibility, decreased postural and core strength, decreased functional tolerance and decreased general LE strength. No pelvic malalignment upon initial evaluation but decreased posture. Preethi King will benefit from skilled PT (medically necessary) to address above deficits affecting participation in basic ADLs and overall functional tolerance.   Manual techniques (stretching, joint mobilizations, soft tissue mobilization/myofascial release), postural exercises/education, therapeutic techniques/activities, and HEP will be performed as appropriate addressing Otilia Jolly's current condition. PROBLEM LIST (Impacting functional limitations):  1. Decreased Strength  2. Decreased ADL/Functional Activities  3. Decreased Transfer Abilities  4. Decreased Ambulation Ability/Technique  5. Decreased Balance  6. Increased Pain  7. Decreased Activity Tolerance  8. Increased Fatigue  9. Increased Shortness of Breath  10. Decreased Flexibility/Joint Mobility  11. Decreased [place] with Home Exercise Program INTERVENTIONS PLANNED:  1. Balance Exercise  2. Bed Mobility  3. Cold  4. Cryotherapy  5. Electrical Stimulation  6. Family Education  7. Gait Training  8. Heat  9. Home Exercise Program (HEP)  10. Manual Therapy  11. Neuromuscular Re-education/Strengthening  12. Range of Motion (ROM)  13. Therapeutic Activites  14. Therapeutic Exercise/Strengthening  15. Transfer Training  16. Mechanical traction  17. Aquatic Therapy  18. Electrical Stimulation   TREATMENT PLAN:  Effective Dates: 3/13/2019 TO 6/11/2019 (90 days). Frequency/Duration: 2 times a week for 90 Days    GOALS: (Goals have been discussed and agreed upon with patient.)  Short Term Goals 4 weeks   1. Rocío Arm will be independent with HEP to promote self-management of symptoms. 2. Rocío Arm will demonstrate a 5 point improvement on the LEFS to show improvement in function. 40 Hattie Car will participate in core stabilization exercises to help with stabilization and improve posture during ADLs to help prevent future injuries. 40 Hattie Car will participate in LE strengthening program with weights as appropriate to help with gait and elevations.   40 Hattie Car will participate in static and dynamic balance activities to decrease the risk for falls and improve overall QOL. Denise Potts will perform TUG time of 10 seconds with LRAD to decrease risk of falls. Long Term Goals 12 weeks   1. Unruly Mcgrath will demonstrate a 10 point improvement on the LEFS to show improvement in function. 2. Unruly Mcgrath will report <=2/10 pain at rest and during ADLs to improve QOL. 3. Unruly Mcgrath will demonstrate >=4+/5 LE strength on manual muscle testing to improve functional mobility. Denise Potts will be able to perform SLS >10 seconds bilaterally to help with gait and improve balance. Denise 76 will be able to demonstrate safe transfer mechanics without cueing for improved safety with home and community activities. Denise 74 will perform TUG test with <10 seconds with LRAD to decrease risk of falls. Rehabilitation Potential For Stated Goals: Good    Regarding Pauly Jolly's therapy, I certify that the treatment plan above will be carried out by a therapist or under their direction. Thank you for this referral,  Lupis Martinez PTA       Referring Physician Signature: Lion Atkins MD              Date                    HISTORY:   PATIENT GOAL FOR PHYSICAL THERAPY:  \"I want to be able to walk without the rollator. \"      HISTORY OF PRESENT INJURY/ILLNESS (REASON FOR REFERRAL):  Patient had laminectomy on 2/12/2019 at levels L1-L4. Patient had been having pain in lower back for the previous year. Patient has had physical therapy in the past at this location prior to surgery as well as multiple injections to attempt to decrease pain. Note: Patient denies any increase of symptoms with cough, sneeze or valsalva. Patient denies any saddle paresthesia or bowel/bladder deficits.      PAST MEDICAL HISTORY/COMORBIDITIES:   Mr. Fidelia Flynn  has a past medical history of Anxiety disorder (12/9/2014), Arthritis (12/9/2014), Depression (12/9/2014), Gout (12/9/2014), History of lumbar laminectomy for spinal cord decompression (2019), HTN (hypertension), benign (2/15/2017), Hyperlipemia (2014), Hypertension, Idiopathic chronic gout of right ankle (2014), Insomnia (2014), Kidney stone, Mixed hyperlipidemia (2014), Panic disorder (2014), Primary insomnia (2014), and Rosacea (2/15/2017). Mr. Yolande Paez  has a past surgical history that includes hx other surgical; hx cyst removal; hx cyst removal; hx hernia repair (Bilateral); hx ankle fracture tx (Left, 1974); hx wrist fracture tx (Bilateral, 1993); hx colonoscopy (); and hx cyst removal.    SOCIAL HISTORY/LIVING ENVIRONMENT:        Social History     Socioeconomic History    Marital status:      Spouse name: Not on file    Number of children: Not on file    Years of education: Not on file    Highest education level: Not on file   Occupational History    Not on file   Social Needs    Financial resource strain: Not on file    Food insecurity:     Worry: Not on file     Inability: Not on file    Transportation needs:     Medical: Not on file     Non-medical: Not on file   Tobacco Use    Smoking status: Former Smoker     Types: Cigarettes     Last attempt to quit: 1970     Years since quittin.2    Smokeless tobacco: Never Used   Substance and Sexual Activity    Alcohol use:  Yes     Alcohol/week: 0.0 oz    Drug use: No    Sexual activity: Yes     Partners: Female   Lifestyle    Physical activity:     Days per week: Not on file     Minutes per session: Not on file    Stress: Not on file   Relationships    Social connections:     Talks on phone: Not on file     Gets together: Not on file     Attends [employer]ism service: Not on file     Active member of club or organization: Not on file     Attends meetings of clubs or organizations: Not on file     Relationship status: Not on file    Intimate partner violence:     Fear of current or ex partner: Not on file     Emotionally abused: Not on file     Physically abused: Not on file     Forced sexual activity: Not on file   Other Topics Concern    Not on file   Social History Narrative    Not on file       LIFESTYLE Date: 3/13/2019   Occupation: Retired   Vigorous Activity: n/a   Expose individual to vibrations n/a   Unpleasant work environment n/a       PRIOR LEVEL OF FUNCTION/WORK/ACTIVITY:   Patient ambulated with straight cane prior to surgery. Patient's spouse reports he did not use straight cane correctly and should have been using rolling walker for safety.     Active Ambulatory Problems     Diagnosis Date Noted    MEGGAN (generalized anxiety disorder) 12/09/2014    Arthritis 12/09/2014    Idiopathic chronic gout of right ankle 12/09/2014    Mixed hyperlipidemia 12/09/2014    Primary insomnia 12/09/2014    Panic disorder 12/09/2014    HTN (hypertension), benign 02/15/2017    Rosacea 02/15/2017    Idiopathic scoliosis 06/07/2018    Lumbar stenosis with neurogenic claudication 06/07/2018    Recurrent depression ([facility] [address] 08/16/2018    History of lumbar laminectomy for spinal cord decompression 02/18/2019     Resolved Ambulatory Problems     Diagnosis Date Noted    Depression 12/09/2014     Past Medical History:   Diagnosis Date    Anxiety disorder 12/9/2014    Arthritis 12/9/2014    Depression 12/9/2014    Gout 12/9/2014    History of lumbar laminectomy for spinal cord decompression 2/18/2019    HTN (hypertension), benign 2/15/2017    Hyperlipemia 12/9/2014    Hypertension     Idiopathic chronic gout of right ankle 12/9/2014    Insomnia 12/9/2014    Kidney stone     Mixed hyperlipidemia 12/9/2014    Panic disorder 12/9/2014    Primary insomnia 12/9/2014    Rosacea 2/15/2017         CURRENT MEDICATIONS:    Current Outpatient Medications:     zolpidem (AMBIEN) 10 mg tablet, TAKE 1 TABLET BY MOUTH AT BEDTIME AS NEEDED FOR SLEEP, Disp: 30 Tab, Rfl: 5    atorvastatin (LIPITOR) 40 mg tablet, TAKE 1 TABLET BY MOUTH EVERY DAY, Disp: 90 Tab, Rfl: 3    potassium chloride SR (KLOR-CON 10) 10 mEq tablet, Take 1 Tab by mouth daily. , Disp: 90 Tab, Rfl: 3    amLODIPine-Olmesartan (CAROLA) 10-40 mg tab, Take 1 Tab by mouth daily. , Disp: 90 Tab, Rfl: 3    sertraline (ZOLOFT) 100 mg tablet, Take 1 Tab by mouth daily. One and half tab every day, Disp: 135 Tab, Rfl: 3    metroNIDAZOLE (METROGEL) 1 % topical gel, Apply  to affected area daily. Use a thin layer to affected areas after washing, Disp: 45 g, Rfl: 3    allopurinol (ZYLOPRIM) 100 mg tablet, Take 2 Tabs by mouth daily. , Disp: 180 Tab, Rfl: 3    Walker misc, Walker DX: lumbar stenosis with neurogenic claudication +antalgic gait with cane, Disp: 1 Each, Rfl: 0    DISABLED PLACARD (DISABLED PLACARD) DMV, To use with handicap placard form, Disp: 1 Each, Rfl: 0    gabapentin (NEURONTIN) 300 mg capsule, Take 1 Cap by mouth three (3) times daily. , Disp: 180 Cap, Rfl: 2    LORazepam (ATIVAN) 0.5 mg tablet, TAKE 1 TABLET BY MOUTH DAILY. , Disp: 30 Tab, Rfl: 3    diclofenac sodium (PENNSAID) 1.5 % drop, by Apply Externally route., Disp: , Rfl:     diclofenac (VOLTAREN) 1 % gel, Apply 4 g to affected area four (4) times daily. , Disp: 2 Each, Rfl: 3    multivitamin (ONE A DAY) tablet, Take 1 Tab by mouth daily. , Disp: , Rfl:     cholecalciferol, vitamin D3, (VITAMIN D3) 2,000 unit tab, Take  by mouth., Disp: , Rfl:      Date Last Reviewed:  3/27/2019   Number of Personal Factors/Comorbidities that affect the Plan of Care: 1-2: MODERATE COMPLEXITY   EXAMINATION:   OBSERVATION/ORTHOSTATIC POSTURAL ASSESSMENT:     -Pt sits with forward head and rounded shoulders which indicate tight anterior chest musculature, upper trapezius, and levator scapula and weak posterior scapula musculature and deep cervical flexors. Pt displays decreased core motor control indicating weak core and low back musculature.       BALANCE Date: 3/13/2019 Date: 3/13/2019   Right     Left       PELVIC COMPONENT Date:   3/13/2019 Date:  3/13/2019   Standing Mason     Standing PSIS     Standing Sacral Sulci     Gillet     Seated Mason     Seated PSIS     Supine-to-Sit leg length         AROM/PROM         Joint: Date: 3/13/2019  Date:  Date:    Active LE ROM Right Left Right Left Right Left   Hip Flexion         Hip Extension         Hip ER         Hip IR         Knee Extension         Knee Flexion         Knee Extension         Lumbar Flexion 55        Lumbar Extension 10        Lumbar Side-Bending 5        Lumbar Rotation NT          STRENGTH         Joint: Date: 3/13/2019  Date:  Date:     Right Left Right Left Right Left   Hip Abduction 4/5 4/5       Hip Adduction 4/5 4/5       Hip IR 4/5 4/5       Hip ER 4/5 4/5       Hip Flexion 4/5 4/5       Knee Extension 4/5 4/5       Knee Flexion 4/5 4/5       Ankle DF 4/5 4/5       Ankle PF 4/5 4/5       Ankle IV         Ankle EV         4/5    PALPATION Date:  3/13/2019 Date: Date:   TTP      TONE      PA DARIUS        SPECIAL TESTS: Assessed @ Initial Visit    -SCOUR: Negative   -90/90:     -R: Not tested    -L: Not tested   -SLR: Not tested   -SI COMPRESSION TEST: Not tested   -SI POST.  GAPPING:  Not tested   -GILLET TEST: Not tested   -SOL 4: Negative   -SLUMP TEST: Not tested   -DEEP SQUAT: Not tested       NEUROLOGICAL SCREEN: Assessed @ Initial Visit    -RADIATING SYMPTOMS: No     -DERMATOMES: In tact    -MYOTOMES Date: 3/13/2019  Date:  Date:     Right Left Right Left Right Left   L2 & L3   (Hip Flexors) 5/5 5/5       L3-L4  (Knee Extensors) 5/5 5/5       L4  (Ankle DFs) 4+/5 4+/5       L5  (Hallux Ext) 4+/5 4+/5       L5-S1  (Ankle PFs) 4+/5 4+/5       S1-S2  (Ankle EVs) 4+/5 4+/5         RED FLAGS: Date: 3/13/2019   Non-Mechanical pain distribution (cannot be produced, changed, or reduced during exam): NO   Cauda Equina Dysfunction: NO   Upper lumbar disc herniation in younger patients (femoral nerve tension test for lateral disc herniation in lower lumbar): NO   Lumbar compression fracture (age > 48, trauma, corticosteroid use NO   Spine Cancer (age > 48, pervious history of cancer, failure to improve in 1 month of therapy, no relief - be rest, duration > 1 month, unexplained weight loss, insidious onset, constitutional symptoms): NO   Ankylosing Spondylitis (age < 36, pain not relieved by supine, morning back stiffness, pain duration > 3 months, improved by exercise): NO   Sacral Fracture: NO       FUNCTIONAL MOBILITY:  Assessed @ Initial Visit    -Affecting participation in basic ADLs and functional tasks.   -Limited tolerance of walking and standing   -Ambulation/Gait: Ambulates with mild forward head posture with use of rollator for safety.   -Bed mobility:  MOD I   -Stairs: NT   -Transfers: MOD I    -Wheelchair: N/A     Body Structures Involved:  1. Bones  2. Joints  3. Muscles  4. Ligaments Body Functions Affected:  1. Sensory/Pain  2. Neuromusculoskeletal  3. Movement Related Activities and Participation Affected:  1. Mobility  2. Self Care  3. Domestic Life  4. Interpersonal Interactions and Relationships  5. Community, Social and [place]   Number of elements that affect the Plan of Care: 4+: HIGH COMPLEXITY   CLINICAL PRESENTATION:   Presentation: Stable and uncomplicated: LOW COMPLEXITY   CLINICAL DECISION MAKING:   OUTCOME MEASURE USED:   Tool Used: Tool Used: Lower Extremity Functional Scale (LEFS)  Score:  Initial: 31/80 Most Recent: X/80 (Date: -- )   Interpretation of Score: 20 questions each scored on a 5 point scale with 0 representing \"extreme difficulty or unable to perform\" and 4 representing \"no difficulty\". The lower the score, the greater the functional disability. 80/80 represents no disability. Minimal detectable change is 9 points. Tool Used: Timed Up and Go (TUG)  Score:  Initial: 12.59 seconds (with rollator) Most Recent: X seconds (Date: -- )   Interpretation of Score:  The test measures, in seconds, the time taken by an individual to stand up from a standard arm chair (seat height 46 cm [18 in], arm height 65 cm [25.6 in]), walk a distance of 3 meters (118 in, approx 10 ft), turn, walk back to the chair and sit down. If the individual takes longer than 14 seconds to complete TUG, this indicates risk for falls. TUG attempted second time without use of rollator: 16.45 seconds    [place] / [place] / Product Type: Medicare /     MEDICAL NECESSITY:  · Skilled intervention continues to be required due to above deficits affecting participation in basic ADLs and overall functional tolerance. REASON FOR SERVICES/OTHER COMMENTS:  · Patient continues to require skilled intervention due to  above deficits affecting participation in basic ADLs and overall functional tolerance. Use of outcome tool(s) and clinical judgement create a POC that gives a: Clear prediction of patient's progress: LOW COMPLEXITY   TREATMENT:   (In addition to Assessment/Re-Assessment sessions the following treatments were rendered)    Pre-treatment Symptoms/Complaints:Patient reports just a little pain in low back and hips with pain level 2/10. THERAPEUTIC EXERCISE: (45 minutes):  Exercises per grid below to improve mobility, strength and balance. Required minimal visual and verbal cues to promote proper body alignment and promote proper body posture. Progressed resistance, range and complexity of movement as indicated.      Date:  3/13/2019 Date:  3/18/2019 Date:  3/20/2019 Date:  3/25/2019 Date  3/27/19    Activity/Exercise Parameters Parameters Parameters Parameters     Heel raises 1 x 10 2 x 10 B 2 x 10 B 2 x 10 B 2 x 10 reps B    Supine hip abduction 1 x 10 B 2 x 10 B 2 x 10 B      Supine heel slides 1 x 10 B        SAQ  2 x 10 B 2# cuff 2 x 10 B 2# cuff      Sit to stand  2 x 5  2 x 5 reps 2 x 5 reps with verbal cues and demonstration    balance on blue airex foam  3 x 30 sec without UE support 3 x 30 sec without UE support 3 x 30 sec without UE support     Nustep   10 mins at Level 3 10 mins at Level 3 10 mins at Level 3 Level 3  10 minutes     Gastrocnemius stretch   3 x 30 sec B      Sidestepping in parallel bars   X 4 laps (leading with each LE)  X 4 laps leading with each LE with minimal use of hands    Hip Abduction    1 x 10 B 1 x 10 reps B      Hip extension    1 x 10 B 1 x 10 reps B      Standing Marching    1 x 10 B 1 x 10 reps B      LAQ    2 x 10 B with 3# cuff weight 3# cuff weight  2 x 10 reps B    Semi tandem progressing towards Tandem stance    2 x 30 sec leading with each LE 2 x 30 second hold with each LE    Seated hamstring stretch     With foot propped on 8 inch step and leaning forward 3 x 30 second hold B               MANUAL THERAPY: (0 minutes): Joint mobilization, Soft tissue mobilization and Manipulation was utilized and necessary because of the patient's restricted joint motion, painful spasm, loss of articular motion and restricted motion of soft tissue. (Used abbreviations: MET - muscle energy technique; PNF - proprioceptive neuromuscular facilitation; NMR - neuromuscular re-education; AP - anterior to posterior; PA - posterior to anterior)    MODALITIES: (0 minutes):      Not today     TREATMENT/SESSION ASSESSMENT:  Lashaun Jolly  Continues to demonstrated decreased strength in B hip musculature with therapeutic exercise. Patient tolerated exercises well but all exercises challenging. Patient gives good effort but continues to need cues. Patient tolerated new hamstring stretch exercise sitting and was issued a written handout with picture. Patient to continue current HEP as not ready to safely progress HEP due to decreased functional strength. · Pain/ Symptoms: Initial:   2/10 Post Session:  2/10  ·   Compliance with Program/Exercises: Will assess as treatment progresses. · Recommendations/Intent for next treatment session: \"Next visit will focus on advancements to more challenging activities\".     Total Treatment Duration: 55 minutes   PT Patient Time In/Time Out  Time In: 1000  Time Out: Viky Basilio [address] [place], [facility]

## 2019-04-01 ENCOUNTER — HOSPITAL ENCOUNTER (OUTPATIENT)
Dept: PHYSICAL THERAPY | Age: 79
Discharge: HOME | End: 2019-04-01
Payer: MEDICARE

## 2019-04-01 PROCEDURE — 97110 THERAPEUTIC EXERCISES: CPT

## 2019-04-01 NOTE — PROGRESS NOTES
Elisa Rondon [facility]  : 1940  [place] / [place] / Product Type: Medicare /  [address]  at [facility]  [address], [address], [address]  [phone]   [phone]                OUTPATIENT PHYSICAL THERAPY:Daily Note 2019    ICD-10: Treatment Diagnosis:   Difficulty in walking, not elsewhere classified (R26.2)  Muscle weakness (generalized) (M62.81)        PRECAUTIONS/ALLERGIES:   Patient has no known allergies. FALL RISK SCORE: 2 (? 5 = High Risk)    MD ORDERS: Eval and Treat  MEDICAL/REFERRING DIAGNOSIS:  Encounter for follow-up examination after completed treatment for conditions other than malignant neoplasm [Z09]     DATE OF ONSET: Laminectomy L1-L4 2019    REFERRING PHYSICIAN: Radames Lee MD    RETURN PHYSICIAN APPOINTMENT: TBD by patient      Ambulatory/Rehab Services H2 Model Falls Risk Assessment    Risk Factors:       (1)  Gender [Male] Ability to Rise from Chair:       (1)  Pushes up, successful in one attempt    Falls Prevention Plan: Mobility Assistance Device (specify):  rollator   Total: (5 or greater = High Risk): 2     [facility] [address] [place] [address] States Patent #[id number]. Federal Law prohibits the replication, distribution or use without written permission from [address] [place]:  Mr. Elizabeth Hernandez has attended 1 physical therapy session including initial evaluation as of 3/13/2019. Elizabeth Hernandez exhibits decreased flexibility, decreased postural and core strength, decreased functional tolerance and decreased general LE strength. No pelvic malalignment upon initial evaluation but decreased posture. Elizabeth Hernandez will benefit from skilled PT (medically necessary) to address above deficits affecting participation in basic ADLs and overall functional tolerance.   Manual techniques (stretching, joint mobilizations, soft tissue mobilization/myofascial release), postural exercises/education, therapeutic techniques/activities, and HEP will be performed as appropriate addressing Juan Carlos Jolly's current condition. PROBLEM LIST (Impacting functional limitations):  1. Decreased Strength  2. Decreased ADL/Functional Activities  3. Decreased Transfer Abilities  4. Decreased Ambulation Ability/Technique  5. Decreased Balance  6. Increased Pain  7. Decreased Activity Tolerance  8. Increased Fatigue  9. Increased Shortness of Breath  10. Decreased Flexibility/Joint Mobility  11. Decreased [place] with Home Exercise Program INTERVENTIONS PLANNED:  1. Balance Exercise  2. Bed Mobility  3. Cold  4. Cryotherapy  5. Electrical Stimulation  6. Family Education  7. Gait Training  8. Heat  9. Home Exercise Program (HEP)  10. Manual Therapy  11. Neuromuscular Re-education/Strengthening  12. Range of Motion (ROM)  13. Therapeutic Activites  14. Therapeutic Exercise/Strengthening  15. Transfer Training  16. Mechanical traction  17. Aquatic Therapy  18. Electrical Stimulation   TREATMENT PLAN:  Effective Dates: 3/13/2019 TO 6/11/2019 (90 days). Frequency/Duration: 2 times a week for 90 Days    GOALS: (Goals have been discussed and agreed upon with patient.)  Short Term Goals 4 weeks   1. Leena Parisi will be independent with HEP to promote self-management of symptoms. 2. Leena Parisi will demonstrate a 5 point improvement on the LEFS to show improvement in function. 40 Hattie Car will participate in core stabilization exercises to help with stabilization and improve posture during ADLs to help prevent future injuries. 40 Hattie Car will participate in LE strengthening program with weights as appropriate to help with gait and elevations.   40 Hattie Car will participate in static and dynamic balance activities to decrease the risk for falls and improve overall QOL. Denise Potts will perform TUG time of 10 seconds with LRAD to decrease risk of falls. Long Term Goals 12 weeks   1. Cheryl Sheffield will demonstrate a 10 point improvement on the LEFS to show improvement in function. 2. Cheryl Sheffield will report <=2/10 pain at rest and during ADLs to improve QOL. 3. Cheryl Sheffield will demonstrate >=4+/5 LE strength on manual muscle testing to improve functional mobility. Denise Potts will be able to perform SLS >10 seconds bilaterally to help with gait and improve balance. Denise Potts will be able to demonstrate safe transfer mechanics without cueing for improved safety with home and community activities. Denise Potts will perform TUG test with <10 seconds with LRAD to decrease risk of falls. Rehabilitation Potential For Stated Goals: Good    Regarding Deirdre Jolly's therapy, I certify that the treatment plan above will be carried out by a therapist or under their direction. Thank you for this referral,  Pedro Pablo Wu       Referring Physician Signature: Leyla Mcpherson MD              Date                    HISTORY:   PATIENT GOAL FOR PHYSICAL THERAPY:  \"I want to be able to walk without the rollator. \"      HISTORY OF PRESENT INJURY/ILLNESS (REASON FOR REFERRAL):  Patient had laminectomy on 2/12/2019 at levels L1-L4. Patient had been having pain in lower back for the previous year. Patient has had physical therapy in the past at this location prior to surgery as well as multiple injections to attempt to decrease pain. Note: Patient denies any increase of symptoms with cough, sneeze or valsalva. Patient denies any saddle paresthesia or bowel/bladder deficits.      PAST MEDICAL HISTORY/COMORBIDITIES:   Mr. Carmen Alfaro  has a past medical history of Anxiety disorder (12/9/2014), Arthritis (12/9/2014), Depression (12/9/2014), Gout (12/9/2014), History of lumbar laminectomy for spinal cord decompression (2019), HTN (hypertension), benign (2/15/2017), Hyperlipemia (2014), Hypertension, Idiopathic chronic gout of right ankle (2014), Insomnia (2014), Kidney stone, Mixed hyperlipidemia (2014), Panic disorder (2014), Primary insomnia (2014), and Rosacea (2/15/2017). Mr. Emmett Krause  has a past surgical history that includes hx other surgical; hx cyst removal; hx cyst removal; hx hernia repair (Bilateral); hx ankle fracture tx (Left, 1974); hx wrist fracture tx (Bilateral, 1993); hx colonoscopy (); and hx cyst removal.    SOCIAL HISTORY/LIVING ENVIRONMENT:        Social History     Socioeconomic History    Marital status:      Spouse name: Not on file    Number of children: Not on file    Years of education: Not on file    Highest education level: Not on file   Occupational History    Not on file   Social Needs    Financial resource strain: Not on file    Food insecurity:     Worry: Not on file     Inability: Not on file    Transportation needs:     Medical: Not on file     Non-medical: Not on file   Tobacco Use    Smoking status: Former Smoker     Types: Cigarettes     Last attempt to quit: 1970     Years since quittin.2    Smokeless tobacco: Never Used   Substance and Sexual Activity    Alcohol use:  Yes     Alcohol/week: 0.0 oz    Drug use: No    Sexual activity: Yes     Partners: Female   Lifestyle    Physical activity:     Days per week: Not on file     Minutes per session: Not on file    Stress: Not on file   Relationships    Social connections:     Talks on phone: Not on file     Gets together: Not on file     Attends Anabaptist service: Not on file     Active member of club or organization: Not on file     Attends meetings of clubs or organizations: Not on file     Relationship status: Not on file    Intimate partner violence:     Fear of current or ex partner: Not on file     Emotionally abused: Not on file     Physically abused: Not on file     Forced sexual activity: Not on file   Other Topics Concern    Not on file   Social History Narrative    Not on file       LIFESTYLE Date: 3/13/2019   Occupation: Retired   Vigorous Activity: n/a   Expose individual to vibrations n/a   Unpleasant work environment n/a       PRIOR LEVEL OF FUNCTION/WORK/ACTIVITY:   Patient ambulated with straight cane prior to surgery. Patient's spouse reports he did not use straight cane correctly and should have been using rolling walker for safety. Active Ambulatory Problems     Diagnosis Date Noted    MEGGAN (generalized anxiety disorder) 12/09/2014    Arthritis 12/09/2014    Idiopathic chronic gout of right ankle 12/09/2014    Mixed hyperlipidemia 12/09/2014    Primary insomnia 12/09/2014    Panic disorder 12/09/2014    HTN (hypertension), benign 02/15/2017    Rosacea 02/15/2017    Idiopathic scoliosis 06/07/2018    Lumbar stenosis with neurogenic claudication 06/07/2018    Recurrent depression ([facility] [address] 08/16/2018    History of lumbar laminectomy for spinal cord decompression 02/18/2019     Resolved Ambulatory Problems     Diagnosis Date Noted    Depression 12/09/2014     Past Medical History:   Diagnosis Date    Anxiety disorder 12/9/2014    Arthritis 12/9/2014    Depression 12/9/2014    Gout 12/9/2014    History of lumbar laminectomy for spinal cord decompression 2/18/2019    HTN (hypertension), benign 2/15/2017    Hyperlipemia 12/9/2014    Hypertension     Idiopathic chronic gout of right ankle 12/9/2014    Insomnia 12/9/2014    Kidney stone     Mixed hyperlipidemia 12/9/2014    Panic disorder 12/9/2014    Primary insomnia 12/9/2014    Rosacea 2/15/2017         CURRENT MEDICATIONS:    Current Outpatient Medications:     amLODIPine-Olmesartan 10-40 mg tab, TAKE 1 TABLET BY MOUTH DAILY. , Disp: 90 Tab, Rfl: 3    KLOR-CON 10 10 mEq tablet, TAKE 1 TABLET BY MOUTH DAILY. , Disp: 90 Tab, Rfl: 3    zolpidem (AMBIEN) 10 mg tablet, TAKE 1 TABLET BY MOUTH AT BEDTIME AS NEEDED FOR SLEEP, Disp: 30 Tab, Rfl: 5    atorvastatin (LIPITOR) 40 mg tablet, TAKE 1 TABLET BY MOUTH EVERY DAY, Disp: 90 Tab, Rfl: 3    potassium chloride SR (KLOR-CON 10) 10 mEq tablet, Take 1 Tab by mouth daily. , Disp: 90 Tab, Rfl: 3    amLODIPine-Olmesartan (CAROLA) 10-40 mg tab, Take 1 Tab by mouth daily. , Disp: 90 Tab, Rfl: 3    sertraline (ZOLOFT) 100 mg tablet, Take 1 Tab by mouth daily. One and half tab every day, Disp: 135 Tab, Rfl: 3    metroNIDAZOLE (METROGEL) 1 % topical gel, Apply  to affected area daily. Use a thin layer to affected areas after washing, Disp: 45 g, Rfl: 3    allopurinol (ZYLOPRIM) 100 mg tablet, Take 2 Tabs by mouth daily. , Disp: 180 Tab, Rfl: 3    Walker misc, Walker DX: lumbar stenosis with neurogenic claudication +antalgic gait with cane, Disp: 1 Each, Rfl: 0    DISABLED PLACARD (DISABLED PLACARD) DMV, To use with handicap placard form, Disp: 1 Each, Rfl: 0    gabapentin (NEURONTIN) 300 mg capsule, Take 1 Cap by mouth three (3) times daily. , Disp: 180 Cap, Rfl: 2    LORazepam (ATIVAN) 0.5 mg tablet, TAKE 1 TABLET BY MOUTH DAILY. , Disp: 30 Tab, Rfl: 3    diclofenac sodium (PENNSAID) 1.5 % drop, by Apply Externally route., Disp: , Rfl:     diclofenac (VOLTAREN) 1 % gel, Apply 4 g to affected area four (4) times daily. , Disp: 2 Each, Rfl: 3    multivitamin (ONE A DAY) tablet, Take 1 Tab by mouth daily. , Disp: , Rfl:     cholecalciferol, vitamin D3, (VITAMIN D3) 2,000 unit tab, Take  by mouth., Disp: , Rfl:      Date Last Reviewed:  4/1/2019   Number of Personal Factors/Comorbidities that affect the Plan of Care: 1-2: MODERATE COMPLEXITY   EXAMINATION:   OBSERVATION/ORTHOSTATIC POSTURAL ASSESSMENT:     -Pt sits with forward head and rounded shoulders which indicate tight anterior chest musculature, upper trapezius, and levator scapula and weak posterior scapula musculature and deep cervical flexors.  Pt displays decreased core motor control indicating weak core and low back musculature. BALANCE Date: 3/13/2019 Date: 3/13/2019   Right     Left       PELVIC COMPONENT Date:   3/13/2019 Date:  3/13/2019   Standing Mason     Standing PSIS     Standing Sacral Sulci     Gillet     Seated Mason     Seated PSIS     Supine-to-Sit leg length         AROM/PROM         Joint: Date: 3/13/2019  Date:  Date:    Active LE ROM Right Left Right Left Right Left   Hip Flexion         Hip Extension         Hip ER         Hip IR         Knee Extension         Knee Flexion         Knee Extension         Lumbar Flexion 55        Lumbar Extension 10        Lumbar Side-Bending 5        Lumbar Rotation NT          STRENGTH         Joint: Date: 3/13/2019  Date:  Date:     Right Left Right Left Right Left   Hip Abduction 4/5 4/5       Hip Adduction 4/5 4/5       Hip IR 4/5 4/5       Hip ER 4/5 4/5       Hip Flexion 4/5 4/5       Knee Extension 4/5 4/5       Knee Flexion 4/5 4/5       Ankle DF 4/5 4/5       Ankle PF 4/5 4/5       Ankle IV         Ankle EV         4/5    PALPATION Date:  3/13/2019 Date: Date:   TTP      TONE      BARBIE MCCOY        SPECIAL TESTS: Assessed @ Initial Visit    -SCOUR: Negative   -90/90:     -R: Not tested    -L: Not tested   -SLR: Not tested   -SI COMPRESSION TEST: Not tested   -SI POST.  GAPPING:  Not tested   -GILLET TEST: Not tested   -SOL 4: Negative   -SLUMP TEST: Not tested   -DEEP SQUAT: Not tested       NEUROLOGICAL SCREEN: Assessed @ Initial Visit    -RADIATING SYMPTOMS: No     -DERMATOMES: In tact    -MYOTOMES Date: 3/13/2019  Date:  Date:     Right Left Right Left Right Left   L2 & L3   (Hip Flexors) 5/5 5/5       L3-L4  (Knee Extensors) 5/5 5/5       L4  (Ankle DFs) 4+/5 4+/5       L5  (Hallux Ext) 4+/5 4+/5       L5-S1  (Ankle PFs) 4+/5 4+/5       S1-S2  (Ankle EVs) 4+/5 4+/5         RED FLAGS: Date: 3/13/2019   Non-Mechanical pain distribution (cannot be produced, changed, or reduced during exam): NO   Cauda Equina Dysfunction: NO   Upper lumbar disc herniation in younger patients (femoral nerve tension test for lateral disc herniation in lower lumbar): NO   Lumbar compression fracture (age > 48, trauma, corticosteroid use NO   Spine Cancer (age > 48, pervious history of cancer, failure to improve in 1 month of therapy, no relief - be rest, duration > 1 month, unexplained weight loss, insidious onset, constitutional symptoms): NO   Ankylosing Spondylitis (age < 36, pain not relieved by supine, morning back stiffness, pain duration > 3 months, improved by exercise): NO   Sacral Fracture: NO       FUNCTIONAL MOBILITY:  Assessed @ Initial Visit    -Affecting participation in basic ADLs and functional tasks.   -Limited tolerance of walking and standing   -Ambulation/Gait: Ambulates with mild forward head posture with use of rollator for safety.   -Bed mobility:  MOD I   -Stairs: NT   -Transfers: MOD I    -Wheelchair: N/A     Body Structures Involved:  1. Bones  2. Joints  3. Muscles  4. Ligaments Body Functions Affected:  1. Sensory/Pain  2. Neuromusculoskeletal  3. Movement Related Activities and Participation Affected:  1. Mobility  2. Self Care  3. Domestic Life  4. Interpersonal Interactions and Relationships  5. Community, Social and [employer]   Number of elements that affect the Plan of Care: 4+: HIGH COMPLEXITY   CLINICAL PRESENTATION:   Presentation: Stable and uncomplicated: LOW COMPLEXITY   CLINICAL DECISION MAKING:   OUTCOME MEASURE USED:   Tool Used: Tool Used: Lower Extremity Functional Scale (LEFS)  Score:  Initial: 31/80 Most Recent: X/80 (Date: -- )   Interpretation of Score: 20 questions each scored on a 5 point scale with 0 representing \"extreme difficulty or unable to perform\" and 4 representing \"no difficulty\". The lower the score, the greater the functional disability. 80/80 represents no disability. Minimal detectable change is 9 points.       Tool Used: Timed Up and Go (TUG)  Score:  Initial: 12.59 seconds (with rollator) Most Recent: X seconds (Date: -- )   Interpretation of Score: The test measures, in seconds, the time taken by an individual to stand up from a standard arm chair (seat height 46 cm [18 in], arm height 65 cm [25.6 in]), walk a distance of 3 meters (118 in, approx 10 ft), turn, walk back to the chair and sit down. If the individual takes longer than 14 seconds to complete TUG, this indicates risk for falls. TUG attempted second time without use of rollator: 16.45 seconds    [place] / Product Type: Medicare /     MEDICAL NECESSITY:  · Skilled intervention continues to be required due to above deficits affecting participation in basic ADLs and overall functional tolerance. REASON FOR SERVICES/OTHER COMMENTS:  · Patient continues to require skilled intervention due to  above deficits affecting participation in basic ADLs and overall functional tolerance. Use of outcome tool(s) and clinical judgement create a POC that gives a: Clear prediction of patient's progress: LOW COMPLEXITY   TREATMENT:   (In addition to Assessment/Re-Assessment sessions the following treatments were rendered)    Pre-treatment Symptoms/Complaints: Patient reports mild ache in low back at 2/10. Patient reports performing yard work this weekend while taking seated rest breaks as needed. THERAPEUTIC EXERCISE: (45 minutes):  Exercises per grid below to improve mobility, strength and balance. Required minimal visual and verbal cues to promote proper body alignment and promote proper body posture. Progressed resistance, range and complexity of movement as indicated.      Date:  3/13/2019 Date:  3/18/2019 Date:  3/20/2019 Date:  3/25/2019 Date  3/27/19 Date:  4/1/2019   Activity/Exercise Parameters Parameters Parameters Parameters  Parameters   Heel raises 1 x 10 2 x 10 B 2 x 10 B 2 x 10 B 2 x 10 reps B 2 x 10 reps B   Supine hip abduction 1 x 10 B 2 x 10 B 2 x 10 B Supine heel slides 1 x 10 B        SAQ  2 x 10 B 2# cuff 2 x 10 B 2# cuff   2 x 10 B 2# cuff   Sit to stand  2 x 5  2 x 5 reps 2 x 5 reps with verbal cues and demonstration    balance on blue airex foam  3 x 30 sec without UE support 3 x 30 sec without UE support 3 x 30 sec without UE support  3 x 30 sec eyes closed   Nustep   10 mins at Level 3 10 mins at Level 3 10 mins at Level 3 Level 3  10 minutes  Level 3 10 minutes   Gastrocnemius stretch   3 x 30 sec B      Sidestepping in parallel bars   X 4 laps (leading with each LE)  X 4 laps leading with each LE with minimal use of hands X 4 laps leading with each LE with minimal use of hands   Hip Abduction    1 x 10 B 1 x 10 reps B      Hip extension    1 x 10 B 1 x 10 reps B      Standing Marching    1 x 10 B 1 x 10 reps B 1 x 10 reps B     LAQ    2 x 10 B with 3# cuff weight 3# cuff weight  2 x 10 reps B 2# cuff weight 2 x 10 reps B   Semi tandem progressing towards Tandem stance    2 x 30 sec leading with each LE 2 x 30 second hold with each LE    Seated hamstring stretch     With foot propped on 8 inch step and leaning forward 3 x 30 second hold B    Clamshells      1 x 10 B    Supine Bridge      1 x 10                       MANUAL THERAPY: (0 minutes): Joint mobilization, Soft tissue mobilization and Manipulation was utilized and necessary because of the patient's restricted joint motion, painful spasm, loss of articular motion and restricted motion of soft tissue. (Used abbreviations: MET - muscle energy technique; PNF - proprioceptive neuromuscular facilitation; NMR - neuromuscular re-education; AP - anterior to posterior; PA - posterior to anterior)    MODALITIES: (0 minutes):      Not today     TREATMENT/SESSION ASSESSMENT:  Mohsen Clement presents with decreased motor control of B LE and core musculature with therapeutic exercise. HEP updated with clamshells and supine bridge this date.     · Pain/ Symptoms: Initial:   2/10 Post Session:  2/10 ·   Compliance with Program/Exercises: Will assess as treatment progresses. · Recommendations/Intent for next treatment session: \"Next visit will focus on advancements to more challenging activities\".     Total Treatment Duration: 45 minutes   PT Patient Time In/Time Out  Time In: 1100  Time Out: [id number] [facility] Sloan Luong

## 2019-04-03 ENCOUNTER — HOSPITAL ENCOUNTER (OUTPATIENT)
Dept: PHYSICAL THERAPY | Age: 79
Discharge: HOME | End: 2019-04-03
Payer: MEDICARE

## 2019-04-03 PROCEDURE — 97110 THERAPEUTIC EXERCISES: CPT

## 2019-04-03 NOTE — PROGRESS NOTES
Ofelia Sewell [facility]  : 1940  [place] / [place] / Product Type: Medicare /  [address]  at [facility]  [address], [address], [address]  [phone]   [phone]                OUTPATIENT PHYSICAL THERAPY:Daily Note 4/3/2019    ICD-10: Treatment Diagnosis:   Difficulty in walking, not elsewhere classified (R26.2)  Muscle weakness (generalized) (M62.81)        PRECAUTIONS/ALLERGIES:   Patient has no known allergies. FALL RISK SCORE: 2 (? 5 = High Risk)    MD ORDERS: Eval and Treat  MEDICAL/REFERRING DIAGNOSIS:  Encounter for follow-up examination after completed treatment for conditions other than malignant neoplasm [Z09]     DATE OF ONSET: Laminectomy L1-L4 2019    REFERRING PHYSICIAN: Ella Nixon MD    RETURN PHYSICIAN APPOINTMENT: TBD by patient      Ambulatory/Rehab Services H2 Model Falls Risk Assessment    Risk Factors:       (1)  Gender [Male] Ability to Rise from Chair:       (1)  Pushes up, successful in one attempt    Falls Prevention Plan: Mobility Assistance Device (specify):  rollator   Total: (5 or greater = High Risk): 2     [facility] [address] [place] [address] States Patent #[id number]. Federal Law prohibits the replication, distribution or use without written permission from [address] [place]:  Mr. Zachary Ha has attended 1 physical therapy session including initial evaluation as of 3/13/2019. Zachary Ha exhibits decreased flexibility, decreased postural and core strength, decreased functional tolerance and decreased general LE strength. No pelvic malalignment upon initial evaluation but decreased posture. Zachary Ha will benefit from skilled PT (medically necessary) to address above deficits affecting participation in basic ADLs and overall functional tolerance.   Manual techniques (stretching, joint mobilizations, soft tissue mobilization/myofascial release), postural exercises/education, therapeutic techniques/activities, and HEP will be performed as appropriate addressing Sarah Jolly's current condition. PROBLEM LIST (Impacting functional limitations):  1. Decreased Strength  2. Decreased ADL/Functional Activities  3. Decreased Transfer Abilities  4. Decreased Ambulation Ability/Technique  5. Decreased Balance  6. Increased Pain  7. Decreased Activity Tolerance  8. Increased Fatigue  9. Increased Shortness of Breath  10. Decreased Flexibility/Joint Mobility  11. Decreased [place] with Home Exercise Program INTERVENTIONS PLANNED:  1. Balance Exercise  2. Bed Mobility  3. Cold  4. Cryotherapy  5. Electrical Stimulation  6. Family Education  7. Gait Training  8. Heat  9. Home Exercise Program (HEP)  10. Manual Therapy  11. Neuromuscular Re-education/Strengthening  12. Range of Motion (ROM)  13. Therapeutic Activites  14. Therapeutic Exercise/Strengthening  15. Transfer Training  16. Mechanical traction  17. Aquatic Therapy  18. Electrical Stimulation   TREATMENT PLAN:  Effective Dates: 3/13/2019 TO 6/11/2019 (90 days). Frequency/Duration: 2 times a week for 90 Days    GOALS: (Goals have been discussed and agreed upon with patient.)  Short Term Goals 4 weeks   1. Katarina Modi will be independent with HEP to promote self-management of symptoms. 2. Katarina Modi will demonstrate a 5 point improvement on the LEFS to show improvement in function. 40 Hattie Car will participate in core stabilization exercises to help with stabilization and improve posture during ADLs to help prevent future injuries. 40 Hattie Car will participate in LE strengthening program with weights as appropriate to help with gait and elevations.   40 Hattie Car will participate in static and dynamic balance activities to decrease the risk for falls and improve overall QOL. Sireli 76 will perform TUG time of 10 seconds with LRAD to decrease risk of falls. Long Term Goals 12 weeks   1. Cameron Lung will demonstrate a 10 point improvement on the LEFS to show improvement in function. 2. Cameron Lung will report <=2/10 pain at rest and during ADLs to improve QOL. 3. Cameron Lung will demonstrate >=4+/5 LE strength on manual muscle testing to improve functional mobility. Sireli 74 will be able to perform SLS >10 seconds bilaterally to help with gait and improve balance. Sireli 74 will be able to demonstrate safe transfer mechanics without cueing for improved safety with home and community activities. Sireli 74 will perform TUG test with <10 seconds with LRAD to decrease risk of falls. Rehabilitation Potential For Stated Goals: Good    Regarding Bagdad Gomezulises Jolly's therapy, I certify that the treatment plan above will be carried out by a therapist or under their direction. Thank you for this referral,  Michael Vega       Referring Physician Signature: Nena Blas MD              Date                    HISTORY:   PATIENT GOAL FOR PHYSICAL THERAPY:  \"I want to be able to walk without the rollator. \"      HISTORY OF PRESENT INJURY/ILLNESS (REASON FOR REFERRAL):  Patient had laminectomy on 2/12/2019 at levels L1-L4. Patient had been having pain in lower back for the previous year. Patient has had physical therapy in the past at this location prior to surgery as well as multiple injections to attempt to decrease pain. Note: Patient denies any increase of symptoms with cough, sneeze or valsalva. Patient denies any saddle paresthesia or bowel/bladder deficits.      PAST MEDICAL HISTORY/COMORBIDITIES:   Mr. Tuyet Albright  has a past medical history of Anxiety disorder (12/9/2014), Arthritis (12/9/2014), Depression (12/9/2014), Gout (12/9/2014), History of lumbar laminectomy for spinal cord decompression (2019), HTN (hypertension), benign (2/15/2017), Hyperlipemia (2014), Hypertension, Idiopathic chronic gout of right ankle (2014), Insomnia (2014), Kidney stone, Mixed hyperlipidemia (2014), Panic disorder (2014), Primary insomnia (2014), and Rosacea (2/15/2017). Mr. Miryam Schreiber  has a past surgical history that includes hx other surgical; hx cyst removal; hx cyst removal; hx hernia repair (Bilateral); hx ankle fracture tx (Left, 1974); hx wrist fracture tx (Bilateral, 1993); hx colonoscopy (); and hx cyst removal.    SOCIAL HISTORY/LIVING ENVIRONMENT:        Social History     Socioeconomic History    Marital status:      Spouse name: Not on file    Number of children: Not on file    Years of education: Not on file    Highest education level: Not on file   Occupational History    Not on file   Social Needs    Financial resource strain: Not on file    Food insecurity:     Worry: Not on file     Inability: Not on file    Transportation needs:     Medical: Not on file     Non-medical: Not on file   Tobacco Use    Smoking status: Former Smoker     Types: Cigarettes     Last attempt to quit: 1970     Years since quittin.2    Smokeless tobacco: Never Used   Substance and Sexual Activity    Alcohol use:  Yes     Alcohol/week: 0.0 oz    Drug use: No    Sexual activity: Yes     Partners: Female   Lifestyle    Physical activity:     Days per week: Not on file     Minutes per session: Not on file    Stress: Not on file   Relationships    Social connections:     Talks on phone: Not on file     Gets together: Not on file     Attends Quaker service: Not on file     Active member of club or organization: Not on file     Attends meetings of clubs or organizations: Not on file     Relationship status: Not on file    Intimate partner violence:     Fear of current or ex partner: Not on file     Emotionally abused: Not on file     Physically abused: Not on file     Forced sexual activity: Not on file   Other Topics Concern    Not on file   Social History Narrative    Not on file       LIFESTYLE Date: 3/13/2019   Occupation: Retired   Vigorous Activity: n/a   Expose individual to vibrations n/a   Unpleasant work environment n/a       PRIOR LEVEL OF FUNCTION/WORK/ACTIVITY:   Patient ambulated with straight cane prior to surgery. Patient's spouse reports he did not use straight cane correctly and should have been using rolling walker for safety. Active Ambulatory Problems     Diagnosis Date Noted    MEGGAN (generalized anxiety disorder) 12/09/2014    Arthritis 12/09/2014    Idiopathic chronic gout of right ankle 12/09/2014    Mixed hyperlipidemia 12/09/2014    Primary insomnia 12/09/2014    Panic disorder 12/09/2014    HTN (hypertension), benign 02/15/2017    Rosacea 02/15/2017    Idiopathic scoliosis 06/07/2018    Lumbar stenosis with neurogenic claudication 06/07/2018    Recurrent depression ([facility] [address] 08/16/2018    History of lumbar laminectomy for spinal cord decompression 02/18/2019     Resolved Ambulatory Problems     Diagnosis Date Noted    Depression 12/09/2014     Past Medical History:   Diagnosis Date    Anxiety disorder 12/9/2014    Arthritis 12/9/2014    Depression 12/9/2014    Gout 12/9/2014    History of lumbar laminectomy for spinal cord decompression 2/18/2019    HTN (hypertension), benign 2/15/2017    Hyperlipemia 12/9/2014    Hypertension     Idiopathic chronic gout of right ankle 12/9/2014    Insomnia 12/9/2014    Kidney stone     Mixed hyperlipidemia 12/9/2014    Panic disorder 12/9/2014    Primary insomnia 12/9/2014    Rosacea 2/15/2017         CURRENT MEDICATIONS:    Current Outpatient Medications:     amLODIPine-Olmesartan 10-40 mg tab, TAKE 1 TABLET BY MOUTH DAILY. , Disp: 90 Tab, Rfl: 3    KLOR-CON 10 10 mEq tablet, TAKE 1 TABLET BY MOUTH DAILY. , Disp: 90 Tab, Rfl: 3    zolpidem (AMBIEN) 10 mg tablet, TAKE 1 TABLET BY MOUTH AT BEDTIME AS NEEDED FOR SLEEP, Disp: 30 Tab, Rfl: 5    atorvastatin (LIPITOR) 40 mg tablet, TAKE 1 TABLET BY MOUTH EVERY DAY, Disp: 90 Tab, Rfl: 3    potassium chloride SR (KLOR-CON 10) 10 mEq tablet, Take 1 Tab by mouth daily. , Disp: 90 Tab, Rfl: 3    amLODIPine-Olmesartan (CAROLA) 10-40 mg tab, Take 1 Tab by mouth daily. , Disp: 90 Tab, Rfl: 3    sertraline (ZOLOFT) 100 mg tablet, Take 1 Tab by mouth daily. One and half tab every day, Disp: 135 Tab, Rfl: 3    metroNIDAZOLE (METROGEL) 1 % topical gel, Apply  to affected area daily. Use a thin layer to affected areas after washing, Disp: 45 g, Rfl: 3    allopurinol (ZYLOPRIM) 100 mg tablet, Take 2 Tabs by mouth daily. , Disp: 180 Tab, Rfl: 3    Walker misc, Walker DX: lumbar stenosis with neurogenic claudication +antalgic gait with cane, Disp: 1 Each, Rfl: 0    DISABLED PLACARD (DISABLED PLACARD) DMV, To use with handicap placard form, Disp: 1 Each, Rfl: 0    gabapentin (NEURONTIN) 300 mg capsule, Take 1 Cap by mouth three (3) times daily. , Disp: 180 Cap, Rfl: 2    LORazepam (ATIVAN) 0.5 mg tablet, TAKE 1 TABLET BY MOUTH DAILY. , Disp: 30 Tab, Rfl: 3    diclofenac sodium (PENNSAID) 1.5 % drop, by Apply Externally route., Disp: , Rfl:     diclofenac (VOLTAREN) 1 % gel, Apply 4 g to affected area four (4) times daily. , Disp: 2 Each, Rfl: 3    multivitamin (ONE A DAY) tablet, Take 1 Tab by mouth daily. , Disp: , Rfl:     cholecalciferol, vitamin D3, (VITAMIN D3) 2,000 unit tab, Take  by mouth., Disp: , Rfl:      Date Last Reviewed:  4/3/2019   Number of Personal Factors/Comorbidities that affect the Plan of Care: 1-2: MODERATE COMPLEXITY   EXAMINATION:   OBSERVATION/ORTHOSTATIC POSTURAL ASSESSMENT:     -Pt sits with forward head and rounded shoulders which indicate tight anterior chest musculature, upper trapezius, and levator scapula and weak posterior scapula musculature and deep cervical flexors.  Pt displays decreased core motor control indicating weak core and low back musculature. BALANCE Date: 3/13/2019 Date: 3/13/2019   Right     Left       PELVIC COMPONENT Date:   3/13/2019 Date:  3/13/2019   Standing Mason     Standing PSIS     Standing Sacral Sulci     Gillet     Seated Mason     Seated PSIS     Supine-to-Sit leg length         AROM/PROM         Joint: Date: 3/13/2019  Date:  Date:    Active LE ROM Right Left Right Left Right Left   Hip Flexion         Hip Extension         Hip ER         Hip IR         Knee Extension         Knee Flexion         Knee Extension         Lumbar Flexion 55        Lumbar Extension 10        Lumbar Side-Bending 5        Lumbar Rotation NT          STRENGTH         Joint: Date: 3/13/2019  Date:  Date:     Right Left Right Left Right Left   Hip Abduction 4/5 4/5       Hip Adduction 4/5 4/5       Hip IR 4/5 4/5       Hip ER 4/5 4/5       Hip Flexion 4/5 4/5       Knee Extension 4/5 4/5       Knee Flexion 4/5 4/5       Ankle DF 4/5 4/5       Ankle PF 4/5 4/5       Ankle IV         Ankle EV         4/5    PALPATION Date:  3/13/2019 Date: Date:   TTP      TONE      BARBIE MCCOY        SPECIAL TESTS: Assessed @ Initial Visit    -SCOUR: Negative   -90/90:     -R: Not tested    -L: Not tested   -SLR: Not tested   -SI COMPRESSION TEST: Not tested   -SI POST.  GAPPING:  Not tested   -GILLET TEST: Not tested   -SOL 4: Negative   -SLUMP TEST: Not tested   -DEEP SQUAT: Not tested       NEUROLOGICAL SCREEN: Assessed @ Initial Visit    -RADIATING SYMPTOMS: No     -DERMATOMES: In tact    -MYOTOMES Date: 3/13/2019  Date:  Date:     Right Left Right Left Right Left   L2 & L3   (Hip Flexors) 5/5 5/5       L3-L4  (Knee Extensors) 5/5 5/5       L4  (Ankle DFs) 4+/5 4+/5       L5  (Hallux Ext) 4+/5 4+/5       L5-S1  (Ankle PFs) 4+/5 4+/5       S1-S2  (Ankle EVs) 4+/5 4+/5         RED FLAGS: Date: 3/13/2019   Non-Mechanical pain distribution (cannot be produced, changed, or reduced during exam): NO   Cauda Equina Dysfunction: NO   Upper lumbar disc herniation in younger patients (femoral nerve tension test for lateral disc herniation in lower lumbar): NO   Lumbar compression fracture (age > 48, trauma, corticosteroid use NO   Spine Cancer (age > 48, pervious history of cancer, failure to improve in 1 month of therapy, no relief - be rest, duration > 1 month, unexplained weight loss, insidious onset, constitutional symptoms): NO   Ankylosing Spondylitis (age < 36, pain not relieved by supine, morning back stiffness, pain duration > 3 months, improved by exercise): NO   Sacral Fracture: NO       FUNCTIONAL MOBILITY:  Assessed @ Initial Visit    -Affecting participation in basic ADLs and functional tasks.   -Limited tolerance of walking and standing   -Ambulation/Gait: Ambulates with mild forward head posture with use of rollator for safety.   -Bed mobility:  MOD I   -Stairs: NT   -Transfers: MOD I    -Wheelchair: N/A     Body Structures Involved:  1. Bones  2. Joints  3. Muscles  4. Ligaments Body Functions Affected:  1. Sensory/Pain  2. Neuromusculoskeletal  3. Movement Related Activities and Participation Affected:  1. Mobility  2. Self Care  3. Domestic Life  4. Interpersonal Interactions and Relationships  5. Community, Social and [place]o Ferguson   Number of elements that affect the Plan of Care: 4+: HIGH COMPLEXITY   CLINICAL PRESENTATION:   Presentation: Stable and uncomplicated: LOW COMPLEXITY   CLINICAL DECISION MAKING:   OUTCOME MEASURE USED:   Tool Used: Tool Used: Lower Extremity Functional Scale (LEFS)  Score:  Initial: 31/80 Most Recent: X/80 (Date: -- )   Interpretation of Score: 20 questions each scored on a 5 point scale with 0 representing \"extreme difficulty or unable to perform\" and 4 representing \"no difficulty\". The lower the score, the greater the functional disability. 80/80 represents no disability. Minimal detectable change is 9 points.       Tool Used: Timed Up and Go (TUG)  Score:  Initial: 12.59 seconds (with rollator) Most Recent: X seconds (Date: -- )   Interpretation of Score: The test measures, in seconds, the time taken by an individual to stand up from a standard arm chair (seat height 46 cm [18 in], arm height 65 cm [25.6 in]), walk a distance of 3 meters (118 in, approx 10 ft), turn, walk back to the chair and sit down. If the individual takes longer than 14 seconds to complete TUG, this indicates risk for falls. TUG attempted second time without use of rollator: 16.45 seconds    [place] / [place] / Product Type: Medicare /     MEDICAL NECESSITY:  · Skilled intervention continues to be required due to above deficits affecting participation in basic ADLs and overall functional tolerance. REASON FOR SERVICES/OTHER COMMENTS:  · Patient continues to require skilled intervention due to  above deficits affecting participation in basic ADLs and overall functional tolerance. Use of outcome tool(s) and clinical judgement create a POC that gives a: Clear prediction of patient's progress: LOW COMPLEXITY   TREATMENT:   (In addition to Assessment/Re-Assessment sessions the following treatments were rendered)    Pre-treatment Symptoms/Complaints: Patient reports bilateral hips have ached more since last PT visit on 4/1/2019. Patient did not complete HEP yesterday due to B hip ache. THERAPEUTIC EXERCISE: (45 minutes):  Exercises per grid below to improve mobility, strength and balance. Required minimal visual and verbal cues to promote proper body alignment and promote proper body posture. Progressed resistance, range and complexity of movement as indicated.      Date:  3/13/2019 Date:  3/18/2019 Date:  3/20/2019 Date:  3/25/2019 Date  3/27/19 Date:  4/1/2019 Date:  4/3/2019    Activity/Exercise Parameters Parameters Parameters Parameters  Parameters Parameters    Heel raises 1 x 10 2 x 10 B 2 x 10 B 2 x 10 B 2 x 10 reps B 2 x 10 reps B 2 x 10 reps B    Supine hip abduction 1 x 10 B 2 x 10 B 2 x 10 B        Supine heel slides 1 x 10 B          SAQ  2 x 10 B 2# cuff 2 x 10 B 2# cuff   2 x 10 B 2# cuff     Sit to stand  2 x 5  2 x 5 reps 2 x 5 reps with verbal cues and demonstration      balance on blue airex foam  3 x 30 sec without UE support 3 x 30 sec without UE support 3 x 30 sec without UE support  3 x 30 sec eyes closed 3 x 30 sec eyes closed    Nustep   10 mins at Level 3 10 mins at Level 3 10 mins at Level 3 Level 3  10 minutes  Level 3 10 minutes Level 3 10 minutes    Gastrocnemius stretch   3 x 30 sec B        Sidestepping in parallel bars   X 4 laps (leading with each LE)  X 4 laps leading with each LE with minimal use of hands X 4 laps leading with each LE with minimal use of hands X 4 laps leading with each LE with minimal use of hands    Hip Abduction    1 x 10 B 1 x 10 reps B    1 x 10 reps B    Hip extension    1 x 10 B 1 x 10 reps B        Standing Marching    1 x 10 B 1 x 10 reps B 1 x 10 reps B       LAQ    2 x 10 B with 3# cuff weight 3# cuff weight  2 x 10 reps B 2# cuff weight 2 x 10 reps B     Semi tandem progressing towards Tandem stance    2 x 30 sec leading with each LE 2 x 30 second hold with each LE  2 x 30 second hold witheach LE    Seated hamstring stretch     With foot propped on 8 inch step and leaning forward 3 x 30 second hold B      Clamshells      1 x 10 B  1 x 10 B    Supine Bridge      1 x 10 1 x 10                            MANUAL THERAPY: (0 minutes): Joint mobilization, Soft tissue mobilization and Manipulation was utilized and necessary because of the patient's restricted joint motion, painful spasm, loss of articular motion and restricted motion of soft tissue.      (Used abbreviations: MET - muscle energy technique; PNF - proprioceptive neuromuscular facilitation; NMR - neuromuscular re-education; AP - anterior to posterior; PA - posterior to anterior)    MODALITIES: (0 minutes):      Not today     TREATMENT/SESSION ASSESSMENT: Jonna Vivas continues to make good progress with therapy. Patient demonstrating steady progress with functional strength. · Pain/ Symptoms: Initial:   3/10 Post Session:  3/10  ·   Compliance with Program/Exercises: Will assess as treatment progresses. · Recommendations/Intent for next treatment session: \"Next visit will focus on advancements to more challenging activities\".     Total Treatment Duration: 45 minutes   PT Patient Time In/Time Out  Time In: 1100  Time Out: [id number] [facility] Acacia Michel

## 2019-04-08 ENCOUNTER — HOSPITAL ENCOUNTER (OUTPATIENT)
Dept: PHYSICAL THERAPY | Age: 79
Discharge: HOME | End: 2019-04-08
Payer: MEDICARE

## 2019-04-08 PROCEDURE — 97110 THERAPEUTIC EXERCISES: CPT

## 2019-04-08 NOTE — PROGRESS NOTES
[facility]  : 1940  [place] / [place] / Product Type: Medicare /  [address]  at [facility]  [address], [address], [place] [address] [facility]  [phone]   [phone]                OUTPATIENT PHYSICAL THERAPY:Daily Note 2019    ICD-10: Treatment Diagnosis:   Difficulty in walking, not elsewhere classified (R26.2)  Muscle weakness (generalized) (M62.81)        PRECAUTIONS/ALLERGIES:   Patient has no known allergies. FALL RISK SCORE: 2 (? 5 = High Risk)    MD ORDERS: Eval and Treat  MEDICAL/REFERRING DIAGNOSIS:  Encounter for follow-up examination after completed treatment for conditions other than malignant neoplasm [Z09]     DATE OF ONSET: Laminectomy L1-L4 2019    REFERRING PHYSICIAN: Leyla Mcpherson MD    RETURN PHYSICIAN APPOINTMENT: TBD by patient      Ambulatory/Rehab Services H2 Model Falls Risk Assessment    Risk Factors:       (1)  Gender [Male] Ability to Rise from Chair:       (1)  Pushes up, successful in one attempt    Falls Prevention Plan: Mobility Assistance Device (specify):  rollator   Total: (5 or greater = High Risk): 2     [facility] [address] [place] Patent #[id number]. Federal Law prohibits the replication, distribution or use without written permission from [address] [place]:  Mr. Cheryl Sheffield has attended 1 physical therapy session including initial evaluation as of 3/13/2019. Cheryl Sheffield exhibits decreased flexibility, decreased postural and core strength, decreased functional tolerance and decreased general LE strength. No pelvic malalignment upon initial evaluation but decreased posture. Cheryl Sheffield will benefit from skilled PT (medically necessary) to address above deficits affecting participation in basic ADLs and overall functional tolerance.   Manual techniques (stretching, joint mobilizations, soft tissue mobilization/myofascial release), postural exercises/education, therapeutic techniques/activities, and HEP will be performed as appropriate addressing Eren Jolly's current condition. PROBLEM LIST (Impacting functional limitations):  1. Decreased Strength  2. Decreased ADL/Functional Activities  3. Decreased Transfer Abilities  4. Decreased Ambulation Ability/Technique  5. Decreased Balance  6. Increased Pain  7. Decreased Activity Tolerance  8. Increased Fatigue  9. Increased Shortness of Breath  10. Decreased Flexibility/Joint Mobility  11. Decreased [place] with Home Exercise Program INTERVENTIONS PLANNED:  1. Balance Exercise  2. Bed Mobility  3. Cold  4. Cryotherapy  5. Electrical Stimulation  6. Family Education  7. Gait Training  8. Heat  9. Home Exercise Program (HEP)  10. Manual Therapy  11. Neuromuscular Re-education/Strengthening  12. Range of Motion (ROM)  13. Therapeutic Activites  14. Therapeutic Exercise/Strengthening  15. Transfer Training  16. Mechanical traction  17. Aquatic Therapy  18. Electrical Stimulation   TREATMENT PLAN:  Effective Dates: 3/13/2019 TO 6/11/2019 (90 days). Frequency/Duration: 2 times a week for 90 Days    GOALS: (Goals have been discussed and agreed upon with patient.)  Short Term Goals 4 weeks   1. Aria Peace will be independent with HEP to promote self-management of symptoms. 2. Aria Peace will demonstrate a 5 point improvement on the LEFS to show improvement in function. 40 Hattie Car will participate in core stabilization exercises to help with stabilization and improve posture during ADLs to help prevent future injuries. 40 Hattie Car will participate in LE strengthening program with weights as appropriate to help with gait and elevations.   40 Hattie Car will participate in static and dynamic balance activities to decrease the risk for falls and improve overall QOL. Denise Potts will perform TUG time of 10 seconds with LRAD to decrease risk of falls. Long Term Goals 12 weeks   1. Jason Kirk will demonstrate a 10 point improvement on the LEFS to show improvement in function. 2. Jason Kirk will report <=2/10 pain at rest and during ADLs to improve QOL. 3. Jason Kirk will demonstrate >=4+/5 LE strength on manual muscle testing to improve functional mobility. Denise Potts will be able to perform SLS >10 seconds bilaterally to help with gait and improve balance. Denise Potts will be able to demonstrate safe transfer mechanics without cueing for improved safety with home and community activities. Denise Potts will perform TUG test with <10 seconds with LRAD to decrease risk of falls. Rehabilitation Potential For Stated Goals: Good    Regarding Samara Jolly's therapy, I certify that the treatment plan above will be carried out by a therapist or under their direction. Thank you for this referral,  Seven Marc PTA       Referring Physician Signature: Markus Leblanc MD              Date                    HISTORY:   PATIENT GOAL FOR PHYSICAL THERAPY:  \"I want to be able to walk without the rollator. \"      HISTORY OF PRESENT INJURY/ILLNESS (REASON FOR REFERRAL):  Patient had laminectomy on 2/12/2019 at levels L1-L4. Patient had been having pain in lower back for the previous year. Patient has had physical therapy in the past at this location prior to surgery as well as multiple injections to attempt to decrease pain. Note: Patient denies any increase of symptoms with cough, sneeze or valsalva. Patient denies any saddle paresthesia or bowel/bladder deficits.      PAST MEDICAL HISTORY/COMORBIDITIES:   Mr. Javier Baez  has a past medical history of Anxiety disorder (12/9/2014), Arthritis (12/9/2014), Depression (12/9/2014), Gout (12/9/2014), History of lumbar laminectomy for spinal cord decompression (2019), HTN (hypertension), benign (2/15/2017), Hyperlipemia (2014), Hypertension, Idiopathic chronic gout of right ankle (2014), Insomnia (2014), Kidney stone, Mixed hyperlipidemia (2014), Panic disorder (2014), Primary insomnia (2014), and Rosacea (2/15/2017). Mr. Álvaro Kee  has a past surgical history that includes hx other surgical; hx cyst removal; hx cyst removal; hx hernia repair (Bilateral); hx ankle fracture tx (Left, 1974); hx wrist fracture tx (Bilateral, 1993); hx colonoscopy (); and hx cyst removal.    SOCIAL HISTORY/LIVING ENVIRONMENT:        Social History     Socioeconomic History    Marital status:      Spouse name: Not on file    Number of children: Not on file    Years of education: Not on file    Highest education level: Not on file   Occupational History    Not on file   Social Needs    Financial resource strain: Not on file    Food insecurity:     Worry: Not on file     Inability: Not on file    Transportation needs:     Medical: Not on file     Non-medical: Not on file   Tobacco Use    Smoking status: Former Smoker     Types: Cigarettes     Last attempt to quit: 1970     Years since quittin.2    Smokeless tobacco: Never Used   Substance and Sexual Activity    Alcohol use:  Yes     Alcohol/week: 0.0 oz    Drug use: No    Sexual activity: Yes     Partners: Female   Lifestyle    Physical activity:     Days per week: Not on file     Minutes per session: Not on file    Stress: Not on file   Relationships    Social connections:     Talks on phone: Not on file     Gets together: Not on file     Attends Quaker service: Not on file     Active member of club or organization: Not on file     Attends meetings of clubs or organizations: Not on file     Relationship status: Not on file    Intimate partner violence:     Fear of current or ex partner: Not on file     Emotionally abused: Not on file     Physically abused: Not on file     Forced sexual activity: Not on file   Other Topics Concern    Not on file   Social History Narrative    Not on file       LIFESTYLE Date: 3/13/2019   Occupation: Retired   Vigorous Activity: n/a   Expose individual to vibrations n/a   Unpleasant work environment n/a       PRIOR LEVEL OF FUNCTION/WORK/ACTIVITY:   Patient ambulated with straight cane prior to surgery. Patient's spouse reports he did not use straight cane correctly and should have been using rolling walker for safety. Active Ambulatory Problems     Diagnosis Date Noted    MEGGAN (generalized anxiety disorder) 12/09/2014    Arthritis 12/09/2014    Idiopathic chronic gout of right ankle 12/09/2014    Mixed hyperlipidemia 12/09/2014    Primary insomnia 12/09/2014    Panic disorder 12/09/2014    HTN (hypertension), benign 02/15/2017    Rosacea 02/15/2017    Idiopathic scoliosis 06/07/2018    Lumbar stenosis with neurogenic claudication 06/07/2018    Recurrent depression ([facility] [address] 08/16/2018    History of lumbar laminectomy for spinal cord decompression 02/18/2019     Resolved Ambulatory Problems     Diagnosis Date Noted    Depression 12/09/2014     Past Medical History:   Diagnosis Date    Anxiety disorder 12/9/2014    Arthritis 12/9/2014    Depression 12/9/2014    Gout 12/9/2014    History of lumbar laminectomy for spinal cord decompression 2/18/2019    HTN (hypertension), benign 2/15/2017    Hyperlipemia 12/9/2014    Hypertension     Idiopathic chronic gout of right ankle 12/9/2014    Insomnia 12/9/2014    Kidney stone     Mixed hyperlipidemia 12/9/2014    Panic disorder 12/9/2014    Primary insomnia 12/9/2014    Rosacea 2/15/2017         CURRENT MEDICATIONS:    Current Outpatient Medications:     amLODIPine-Olmesartan 10-40 mg tab, TAKE 1 TABLET BY MOUTH DAILY. , Disp: 90 Tab, Rfl: 3    KLOR-CON 10 10 mEq tablet, TAKE 1 TABLET BY MOUTH DAILY. , Disp: 90 Tab, Rfl: 3    zolpidem (AMBIEN) 10 mg tablet, TAKE 1 TABLET BY MOUTH AT BEDTIME AS NEEDED FOR SLEEP, Disp: 30 Tab, Rfl: 5    atorvastatin (LIPITOR) 40 mg tablet, TAKE 1 TABLET BY MOUTH EVERY DAY, Disp: 90 Tab, Rfl: 3    potassium chloride SR (KLOR-CON 10) 10 mEq tablet, Take 1 Tab by mouth daily. , Disp: 90 Tab, Rfl: 3    amLODIPine-Olmesartan (CAROLA) 10-40 mg tab, Take 1 Tab by mouth daily. , Disp: 90 Tab, Rfl: 3    sertraline (ZOLOFT) 100 mg tablet, Take 1 Tab by mouth daily. One and half tab every day, Disp: 135 Tab, Rfl: 3    metroNIDAZOLE (METROGEL) 1 % topical gel, Apply  to affected area daily. Use a thin layer to affected areas after washing, Disp: 45 g, Rfl: 3    allopurinol (ZYLOPRIM) 100 mg tablet, Take 2 Tabs by mouth daily. , Disp: 180 Tab, Rfl: 3    Walker misc, Walker DX: lumbar stenosis with neurogenic claudication +antalgic gait with cane, Disp: 1 Each, Rfl: 0    DISABLED PLACARD (DISABLED PLACARD) DMV, To use with handicap placard form, Disp: 1 Each, Rfl: 0    gabapentin (NEURONTIN) 300 mg capsule, Take 1 Cap by mouth three (3) times daily. , Disp: 180 Cap, Rfl: 2    LORazepam (ATIVAN) 0.5 mg tablet, TAKE 1 TABLET BY MOUTH DAILY. , Disp: 30 Tab, Rfl: 3    diclofenac sodium (PENNSAID) 1.5 % drop, by Apply Externally route., Disp: , Rfl:     diclofenac (VOLTAREN) 1 % gel, Apply 4 g to affected area four (4) times daily. , Disp: 2 Each, Rfl: 3    multivitamin (ONE A DAY) tablet, Take 1 Tab by mouth daily. , Disp: , Rfl:     cholecalciferol, vitamin D3, (VITAMIN D3) 2,000 unit tab, Take  by mouth., Disp: , Rfl:      Date Last Reviewed:  4/8/2019   Number of Personal Factors/Comorbidities that affect the Plan of Care: 1-2: MODERATE COMPLEXITY   EXAMINATION:   OBSERVATION/ORTHOSTATIC POSTURAL ASSESSMENT:     -Pt sits with forward head and rounded shoulders which indicate tight anterior chest musculature, upper trapezius, and levator scapula and weak posterior scapula musculature and deep cervical flexors.  Pt displays decreased core motor control indicating weak core and low back musculature. BALANCE Date: 3/13/2019 Date: 3/13/2019   Right     Left       PELVIC COMPONENT Date:   3/13/2019 Date:  3/13/2019   Standing Mason     Standing PSIS     Standing Sacral Sulci     Gillet     Seated Mason     Seated PSIS     Supine-to-Sit leg length         AROM/PROM         Joint: Date: 3/13/2019  Date:  Date:    Active LE ROM Right Left Right Left Right Left   Hip Flexion         Hip Extension         Hip ER         Hip IR         Knee Extension         Knee Flexion         Knee Extension         Lumbar Flexion 55        Lumbar Extension 10        Lumbar Side-Bending 5        Lumbar Rotation NT          STRENGTH         Joint: Date: 3/13/2019  Date:  Date:     Right Left Right Left Right Left   Hip Abduction 4/5 4/5       Hip Adduction 4/5 4/5       Hip IR 4/5 4/5       Hip ER 4/5 4/5       Hip Flexion 4/5 4/5       Knee Extension 4/5 4/5       Knee Flexion 4/5 4/5       Ankle DF 4/5 4/5       Ankle PF 4/5 4/5       Ankle IV         Ankle EV         4/5    PALPATION Date:  3/13/2019 Date: Date:   TTP      TONE      BARBIE MCCOY        SPECIAL TESTS: Assessed @ Initial Visit    -SCOUR: Negative   -90/90:     -R: Not tested    -L: Not tested   -SLR: Not tested   -SI COMPRESSION TEST: Not tested   -SI POST.  GAPPING:  Not tested   -GILLET TEST: Not tested   -SOL 4: Negative   -SLUMP TEST: Not tested   -DEEP SQUAT: Not tested       NEUROLOGICAL SCREEN: Assessed @ Initial Visit    -RADIATING SYMPTOMS: No     -DERMATOMES: In tact    -MYOTOMES Date: 3/13/2019  Date:  Date:     Right Left Right Left Right Left   L2 & L3   (Hip Flexors) 5/5 5/5       L3-L4  (Knee Extensors) 5/5 5/5       L4  (Ankle DFs) 4+/5 4+/5       L5  (Hallux Ext) 4+/5 4+/5       L5-S1  (Ankle PFs) 4+/5 4+/5       S1-S2  (Ankle EVs) 4+/5 4+/5         RED FLAGS: Date: 3/13/2019   Non-Mechanical pain distribution (cannot be produced, changed, or reduced during exam): NO   Cauda Equina Dysfunction: NO   Upper lumbar disc herniation in younger patients (femoral nerve tension test for lateral disc herniation in lower lumbar): NO   Lumbar compression fracture (age > 48, trauma, corticosteroid use NO   Spine Cancer (age > 48, pervious history of cancer, failure to improve in 1 month of therapy, no relief - be rest, duration > 1 month, unexplained weight loss, insidious onset, constitutional symptoms): NO   Ankylosing Spondylitis (age < 36, pain not relieved by supine, morning back stiffness, pain duration > 3 months, improved by exercise): NO   Sacral Fracture: NO       FUNCTIONAL MOBILITY:  Assessed @ Initial Visit    -Affecting participation in basic ADLs and functional tasks.   -Limited tolerance of walking and standing   -Ambulation/Gait: Ambulates with mild forward head posture with use of rollator for safety.   -Bed mobility:  MOD I   -Stairs: NT   -Transfers: MOD I    -Wheelchair: N/A     Body Structures Involved:  1. Bones  2. Joints  3. Muscles  4. Ligaments Body Functions Affected:  1. Sensory/Pain  2. Neuromusculoskeletal  3. Movement Related Activities and Participation Affected:  1. Mobility  2. Self Care  3. Domestic Life  4. Interpersonal Interactions and Relationships  5. Community, Social and [place]   Number of elements that affect the Plan of Care: 4+: HIGH COMPLEXITY   CLINICAL PRESENTATION:   Presentation: Stable and uncomplicated: LOW COMPLEXITY   CLINICAL DECISION MAKING:   OUTCOME MEASURE USED:   Tool Used: Tool Used: Lower Extremity Functional Scale (LEFS)  Score:  Initial: 31/80 Most Recent: X/80 (Date: -- )   Interpretation of Score: 20 questions each scored on a 5 point scale with 0 representing \"extreme difficulty or unable to perform\" and 4 representing \"no difficulty\". The lower the score, the greater the functional disability. 80/80 represents no disability. Minimal detectable change is 9 points.       Tool Used: Timed Up and Go (TUG)  Score:  Initial: 12.59 seconds (with rollator) Most Recent: X seconds (Date: -- )   Interpretation of Score: The test measures, in seconds, the time taken by an individual to stand up from a standard arm chair (seat height 46 cm [18 in], arm height 65 cm [25.6 in]), walk a distance of 3 meters (118 in, approx 10 ft), turn, walk back to the chair and sit down. If the individual takes longer than 14 seconds to complete TUG, this indicates risk for falls. TUG attempted second time without use of rollator: 16.45 seconds    [place] / Product Type: Medicare /     MEDICAL NECESSITY:  · Skilled intervention continues to be required due to above deficits affecting participation in basic ADLs and overall functional tolerance. REASON FOR SERVICES/OTHER COMMENTS:  · Patient continues to require skilled intervention due to  above deficits affecting participation in basic ADLs and overall functional tolerance. Use of outcome tool(s) and clinical judgement create a POC that gives a: Clear prediction of patient's progress: LOW COMPLEXITY   TREATMENT:   (In addition to Assessment/Re-Assessment sessions the following treatments were rendered)    Pre-treatment Symptoms/Complaints: Patient reports back stiffness and pain level 2/10 today in low back. THERAPEUTIC EXERCISE: (45 minutes):  Exercises per grid below to improve mobility, strength and balance. Required minimal visual and verbal cues to promote proper body alignment and promote proper body posture. Progressed resistance, range and complexity of movement as indicated.      Date:  3/13/2019 Date:  3/18/2019 Date:  3/20/2019 Date:  3/25/2019 Date  3/27/19 Date:  4/1/2019 Date:  4/3/2019 Date  4/8/19   Activity/Exercise Parameters Parameters Parameters Parameters  Parameters Parameters    Heel raises 1 x 10 2 x 10 B 2 x 10 B 2 x 10 B 2 x 10 reps B 2 x 10 reps B 2 x 10 reps B 2 x 10 reps B     Supine hip abduction 1 x 10 B 2 x 10 B 2 x 10 B Supine heel slides 1 x 10 B          SAQ  2 x 10 B 2# cuff 2 x 10 B 2# cuff   2 x 10 B 2# cuff     Sit to stand  2 x 5  2 x 5 reps 2 x 5 reps with verbal cues and demonstration      balance on blue airex foam  3 x 30 sec without UE support 3 x 30 sec without UE support 3 x 30 sec without UE support  3 x 30 sec eyes closed 3 x 30 sec eyes closed 3 x 30 second hold with eyes closed   Nustep   10 mins at Level 3 10 mins at Level 3 10 mins at Level 3 Level 3  10 minutes  Level 3 10 minutes Level 3 10 minutes Level 3  10 minutes   Gastrocnemius stretch   3 x 30 sec B     Incline board stretch  3 x 30 second hold   Sidestepping in parallel bars   X 4 laps (leading with each LE)  X 4 laps leading with each LE with minimal use of hands X 4 laps leading with each LE with minimal use of hands X 4 laps leading with each LE with minimal use of hands    Hip Abduction    1 x 10 B 1 x 10 reps B    1 x 10 reps B 1 x 10 reps     Hip extension    1 x 10 B 1 x 10 reps B        Standing Marching    1 x 10 B 1 x 10 reps B 1 x 10 reps B       LAQ    2 x 10 B with 3# cuff weight 3# cuff weight  2 x 10 reps B 2# cuff weight 2 x 10 reps B     Semi tandem progressing towards Tandem stance    2 x 30 sec leading with each LE 2 x 30 second hold with each LE  2 x 30 second hold witheach LE 2 x 30 second hold with each LE with some use of hands   Seated hamstring stretch     With foot propped on 8 inch step and leaning forward 3 x 30 second hold B      Clamshells      1 x 10 B  1 x 10 B 2 x 10 reps B     Supine Bridge      1 x 10 1 x 10 1 x 10 reps      Sit to stand        2 x 5 reps with verbal cues and demonstration                MANUAL THERAPY: (0 minutes): Joint mobilization, Soft tissue mobilization and Manipulation was utilized and necessary because of the patient's restricted joint motion, painful spasm, loss of articular motion and restricted motion of soft tissue.      (Used abbreviations: MET - muscle energy technique; PNF - proprioceptive neuromuscular facilitation; NMR - neuromuscular re-education; AP - anterior to posterior; PA - posterior to anterior)    MODALITIES: (0 minutes):      Not today    TREATMENT/SESSION ASSESSMENT:  Aria Peace is progressing well with improved balance with less use of hands. Patient reported fatigue in legs today towards end of treatment but patient stated that the exercise program was fine. Patient gives good effort and well motivated. Worked on sit to stand today with patient improving with instructions. Continue per plan of care. · Pain/ Symptoms: Initial:   2/10 Post Session:  2/10 fatigue in legs ·   Compliance with Program/Exercises: Good compliance. · Recommendations/Intent for next treatment session: \"Next visit will focus on advancements to more challenging activities\".     Total Treatment Duration: 45 minutes   PT Patient Time In/Time Out  Time In: 1430  Time Out: [address] [facility] ADONIS [place]

## 2019-04-10 ENCOUNTER — HOSPITAL ENCOUNTER (OUTPATIENT)
Dept: PHYSICAL THERAPY | Age: 79
Discharge: HOME | End: 2019-04-10
Payer: MEDICARE

## 2019-04-10 PROCEDURE — 97110 THERAPEUTIC EXERCISES: CPT

## 2019-04-10 NOTE — PROGRESS NOTES
[facility]  : 1940  [place] / [place] / Product Type: Medicare /  [employer] [employer]  at [facility]  Alejandra Green \[facility]\"" [address], [place] [address] [facility]  [phone]   [phone]                OUTPATIENT PHYSICAL THERAPY:Daily Note 4/10/2019    ICD-10: Treatment Diagnosis:   Difficulty in walking, not elsewhere classified (R26.2)  Muscle weakness (generalized) (M62.81)        PRECAUTIONS/ALLERGIES:   Patient has no known allergies. FALL RISK SCORE: 2 (? 5 = High Risk)    MD ORDERS: Eval and Treat  MEDICAL/REFERRING DIAGNOSIS:  Encounter for follow-up examination after completed treatment for conditions other than malignant neoplasm [Z09]     DATE OF ONSET: Laminectomy L1-L4 2019    REFERRING PHYSICIAN: Evelin Vivar MD    RETURN PHYSICIAN APPOINTMENT: TBD by patient      Ambulatory/Rehab Services H2 Model Falls Risk Assessment    Risk Factors:       (1)  Gender [Male] Ability to Rise from Chair:       (1)  Pushes up, successful in one attempt    Falls Prevention Plan: Mobility Assistance Device (specify):  rollator   Total: (5 or greater = High Risk): 2     [facility] [address] [place] [address] States Patent #[id number]. Federal Law prohibits the replication, distribution or use without written permission from [address] [place]:  Mr. Abby Bucio has attended 1 physical therapy session including initial evaluation as of 3/13/2019. Abby Bucio exhibits decreased flexibility, decreased postural and core strength, decreased functional tolerance and decreased general LE strength. No pelvic malalignment upon initial evaluation but decreased posture. Abby Bucio will benefit from skilled PT (medically necessary) to address above deficits affecting participation in basic ADLs and overall functional tolerance.   Manual techniques (stretching, joint mobilizations, soft tissue mobilization/myofascial release), postural exercises/education, therapeutic techniques/activities, and HEP will be performed as appropriate addressing Juan Carlos Jolly's current condition. PROBLEM LIST (Impacting functional limitations):  1. Decreased Strength  2. Decreased ADL/Functional Activities  3. Decreased Transfer Abilities  4. Decreased Ambulation Ability/Technique  5. Decreased Balance  6. Increased Pain  7. Decreased Activity Tolerance  8. Increased Fatigue  9. Increased Shortness of Breath  10. Decreased Flexibility/Joint Mobility  11. Decreased [place] with Home Exercise Program INTERVENTIONS PLANNED:  1. Balance Exercise  2. Bed Mobility  3. Cold  4. Cryotherapy  5. Electrical Stimulation  6. Family Education  7. Gait Training  8. Heat  9. Home Exercise Program (HEP)  10. Manual Therapy  11. Neuromuscular Re-education/Strengthening  12. Range of Motion (ROM)  13. Therapeutic Activites  14. Therapeutic Exercise/Strengthening  15. Transfer Training  16. Mechanical traction  17. Aquatic Therapy  18. Electrical Stimulation   TREATMENT PLAN:  Effective Dates: 3/13/2019 TO 6/11/2019 (90 days). Frequency/Duration: 2 times a week for 90 Days    GOALS: (Goals have been discussed and agreed upon with patient.)  Short Term Goals 4 weeks   1. Leena Parisi will be independent with HEP to promote self-management of symptoms. 2. Leena Parisi will demonstrate a 5 point improvement on the LEFS to show improvement in function. 40 Hattie Car will participate in core stabilization exercises to help with stabilization and improve posture during ADLs to help prevent future injuries. 40 Hattie Car will participate in LE strengthening program with weights as appropriate to help with gait and elevations.   40 Hattie Car will participate in static and dynamic balance activities to decrease the risk for falls and improve overall QOL. 1509 Steven Valdez will perform TUG time of 10 seconds with LRAD to decrease risk of falls. Long Term Goals 12 weeks   1. Violet Brenner will demonstrate a 10 point improvement on the LEFS to show improvement in function. 2. Violet Brenner will report <=2/10 pain at rest and during ADLs to improve QOL. 3. Violet Brenner will demonstrate >=4+/5 LE strength on manual muscle testing to improve functional mobility. 1509 Steven Valdez will be able to perform SLS >10 seconds bilaterally to help with gait and improve balance. 1509 Steven Valdez will be able to demonstrate safe transfer mechanics without cueing for improved safety with home and community activities. 1509 Steven Valdez will perform TUG test with <10 seconds with LRAD to decrease risk of falls. Rehabilitation Potential For Stated Goals: Good    Regarding Anthony Jolly's therapy, I certify that the treatment plan above will be carried out by a therapist or under their direction. Thank you for this referral,  Lizbeth Recinos PTA       Referring Physician Signature: Aleksandr Albarran MD              Date                    HISTORY:   PATIENT GOAL FOR PHYSICAL THERAPY:  \"I want to be able to walk without the rollator. \"      HISTORY OF PRESENT INJURY/ILLNESS (REASON FOR REFERRAL):  Patient had laminectomy on 2/12/2019 at levels L1-L4. Patient had been having pain in lower back for the previous year. Patient has had physical therapy in the past at this location prior to surgery as well as multiple injections to attempt to decrease pain. Note: Patient denies any increase of symptoms with cough, sneeze or valsalva. Patient denies any saddle paresthesia or bowel/bladder deficits.      PAST MEDICAL HISTORY/COMORBIDITIES:   Mr. Vince Moore  has a past medical history of Anxiety disorder (12/9/2014), Arthritis (12/9/2014), Depression (12/9/2014), Gout (12/9/2014), History of lumbar laminectomy for spinal cord decompression (2019), HTN (hypertension), benign (2/15/2017), Hyperlipemia (2014), Hypertension, Idiopathic chronic gout of right ankle (2014), Insomnia (2014), Kidney stone, Mixed hyperlipidemia (2014), Panic disorder (2014), Primary insomnia (2014), and Rosacea (2/15/2017). Mr. Indira Ashby  has a past surgical history that includes hx other surgical; hx cyst removal; hx cyst removal; hx hernia repair (Bilateral); hx ankle fracture tx (Left, 1974); hx wrist fracture tx (Bilateral, 1993); hx colonoscopy (); and hx cyst removal.    SOCIAL HISTORY/LIVING ENVIRONMENT:        Social History     Socioeconomic History    Marital status:      Spouse name: Not on file    Number of children: Not on file    Years of education: Not on file    Highest education level: Not on file   Occupational History    Not on file   Social Needs    Financial resource strain: Not on file    Food insecurity:     Worry: Not on file     Inability: Not on file    Transportation needs:     Medical: Not on file     Non-medical: Not on file   Tobacco Use    Smoking status: Former Smoker     Types: Cigarettes     Last attempt to quit: 1970     Years since quittin.3    Smokeless tobacco: Never Used   Substance and Sexual Activity    Alcohol use:  Yes     Alcohol/week: 0.0 oz    Drug use: No    Sexual activity: Yes     Partners: Female   Lifestyle    Physical activity:     Days per week: Not on file     Minutes per session: Not on file    Stress: Not on file   Relationships    Social connections:     Talks on phone: Not on file     Gets together: Not on file     Attends Christian service: Not on file     Active member of club or organization: Not on file     Attends meetings of clubs or organizations: Not on file     Relationship status: Not on file    Intimate partner violence:     Fear of current or ex partner: Not on file     Emotionally abused: Not on file     Physically abused: Not on file     Forced sexual activity: Not on file   Other Topics Concern    Not on file   Social History Narrative    Not on file       LIFESTYLE Date: 3/13/2019   Occupation: Retired   Vigorous Activity: n/a   Expose individual to vibrations n/a   Unpleasant work environment n/a       PRIOR LEVEL OF FUNCTION/WORK/ACTIVITY:   Patient ambulated with straight cane prior to surgery. Patient's spouse reports he did not use straight cane correctly and should have been using rolling walker for safety. Active Ambulatory Problems     Diagnosis Date Noted    MEGGAN (generalized anxiety disorder) 12/09/2014    Arthritis 12/09/2014    Idiopathic chronic gout of right ankle 12/09/2014    Mixed hyperlipidemia 12/09/2014    Primary insomnia 12/09/2014    Panic disorder 12/09/2014    HTN (hypertension), benign 02/15/2017    Rosacea 02/15/2017    Idiopathic scoliosis 06/07/2018    Lumbar stenosis with neurogenic claudication 06/07/2018    Recurrent depression ([facility] [address] 08/16/2018    History of lumbar laminectomy for spinal cord decompression 02/18/2019     Resolved Ambulatory Problems     Diagnosis Date Noted    Depression 12/09/2014     Past Medical History:   Diagnosis Date    Anxiety disorder 12/9/2014    Arthritis 12/9/2014    Depression 12/9/2014    Gout 12/9/2014    History of lumbar laminectomy for spinal cord decompression 2/18/2019    HTN (hypertension), benign 2/15/2017    Hyperlipemia 12/9/2014    Hypertension     Idiopathic chronic gout of right ankle 12/9/2014    Insomnia 12/9/2014    Kidney stone     Mixed hyperlipidemia 12/9/2014    Panic disorder 12/9/2014    Primary insomnia 12/9/2014    Rosacea 2/15/2017         CURRENT MEDICATIONS:    Current Outpatient Medications:     amLODIPine-Olmesartan 10-40 mg tab, TAKE 1 TABLET BY MOUTH DAILY. , Disp: 90 Tab, Rfl: 3    KLOR-CON 10 10 mEq tablet, TAKE 1 TABLET BY MOUTH DAILY. , Disp: 90 Tab, Rfl: 3    zolpidem (AMBIEN) 10 mg tablet, TAKE 1 TABLET BY MOUTH AT BEDTIME AS NEEDED FOR SLEEP, Disp: 30 Tab, Rfl: 5    atorvastatin (LIPITOR) 40 mg tablet, TAKE 1 TABLET BY MOUTH EVERY DAY, Disp: 90 Tab, Rfl: 3    potassium chloride SR (KLOR-CON 10) 10 mEq tablet, Take 1 Tab by mouth daily. , Disp: 90 Tab, Rfl: 3    amLODIPine-Olmesartan (CAROLA) 10-40 mg tab, Take 1 Tab by mouth daily. , Disp: 90 Tab, Rfl: 3    sertraline (ZOLOFT) 100 mg tablet, Take 1 Tab by mouth daily. One and half tab every day, Disp: 135 Tab, Rfl: 3    metroNIDAZOLE (METROGEL) 1 % topical gel, Apply  to affected area daily. Use a thin layer to affected areas after washing, Disp: 45 g, Rfl: 3    allopurinol (ZYLOPRIM) 100 mg tablet, Take 2 Tabs by mouth daily. , Disp: 180 Tab, Rfl: 3    Walker misc, Walker DX: lumbar stenosis with neurogenic claudication +antalgic gait with cane, Disp: 1 Each, Rfl: 0    DISABLED PLACARD (DISABLED PLACARD) DMV, To use with handicap placard form, Disp: 1 Each, Rfl: 0    gabapentin (NEURONTIN) 300 mg capsule, Take 1 Cap by mouth three (3) times daily. , Disp: 180 Cap, Rfl: 2    LORazepam (ATIVAN) 0.5 mg tablet, TAKE 1 TABLET BY MOUTH DAILY. , Disp: 30 Tab, Rfl: 3    diclofenac sodium (PENNSAID) 1.5 % drop, by Apply Externally route., Disp: , Rfl:     diclofenac (VOLTAREN) 1 % gel, Apply 4 g to affected area four (4) times daily. , Disp: 2 Each, Rfl: 3    multivitamin (ONE A DAY) tablet, Take 1 Tab by mouth daily. , Disp: , Rfl:     cholecalciferol, vitamin D3, (VITAMIN D3) 2,000 unit tab, Take  by mouth., Disp: , Rfl:      Date Last Reviewed:  4/10/2019   Number of Personal Factors/Comorbidities that affect the Plan of Care: 1-2: MODERATE COMPLEXITY   EXAMINATION:   OBSERVATION/ORTHOSTATIC POSTURAL ASSESSMENT:     -Pt sits with forward head and rounded shoulders which indicate tight anterior chest musculature, upper trapezius, and levator scapula and weak posterior scapula musculature and deep cervical flexors.  Pt displays decreased core motor control indicating weak core and low back musculature. BALANCE Date: 3/13/2019 Date: 3/13/2019   Right     Left       PELVIC COMPONENT Date:   3/13/2019 Date:  3/13/2019   Standing Mason     Standing PSIS     Standing Sacral Sulci     Gillet     Seated Mason     Seated PSIS     Supine-to-Sit leg length         AROM/PROM         Joint: Date: 3/13/2019  Date:  Date:    Active LE ROM Right Left Right Left Right Left   Hip Flexion         Hip Extension         Hip ER         Hip IR         Knee Extension         Knee Flexion         Knee Extension         Lumbar Flexion 55        Lumbar Extension 10        Lumbar Side-Bending 5        Lumbar Rotation NT          STRENGTH         Joint: Date: 3/13/2019  Date:  Date:     Right Left Right Left Right Left   Hip Abduction 4/5 4/5       Hip Adduction 4/5 4/5       Hip IR 4/5 4/5       Hip ER 4/5 4/5       Hip Flexion 4/5 4/5       Knee Extension 4/5 4/5       Knee Flexion 4/5 4/5       Ankle DF 4/5 4/5       Ankle PF 4/5 4/5       Ankle IV         Ankle EV         4/5    PALPATION Date:  3/13/2019 Date: Date:   TTP      TONE      BARBIE MCCOY        SPECIAL TESTS: Assessed @ Initial Visit    -SCOUR: Negative   -90/90:     -R: Not tested    -L: Not tested   -SLR: Not tested   -SI COMPRESSION TEST: Not tested   -SI POST.  GAPPING:  Not tested   -GILLET TEST: Not tested   -SOL 4: Negative   -SLUMP TEST: Not tested   -DEEP SQUAT: Not tested       NEUROLOGICAL SCREEN: Assessed @ Initial Visit    -RADIATING SYMPTOMS: No     -DERMATOMES: In tact    -MYOTOMES Date: 3/13/2019  Date:  Date:     Right Left Right Left Right Left   L2 & L3   (Hip Flexors) 5/5 5/5       L3-L4  (Knee Extensors) 5/5 5/5       L4  (Ankle DFs) 4+/5 4+/5       L5  (Hallux Ext) 4+/5 4+/5       L5-S1  (Ankle PFs) 4+/5 4+/5       S1-S2  (Ankle EVs) 4+/5 4+/5         RED FLAGS: Date: 3/13/2019   Non-Mechanical pain distribution (cannot be produced, changed, or reduced during exam): NO   Cauda Equina Dysfunction: NO   Upper lumbar disc herniation in younger patients (femoral nerve tension test for lateral disc herniation in lower lumbar): NO   Lumbar compression fracture (age > 48, trauma, corticosteroid use NO   Spine Cancer (age > 48, pervious history of cancer, failure to improve in 1 month of therapy, no relief - be rest, duration > 1 month, unexplained weight loss, insidious onset, constitutional symptoms): NO   Ankylosing Spondylitis (age < 36, pain not relieved by supine, morning back stiffness, pain duration > 3 months, improved by exercise): NO   Sacral Fracture: NO       FUNCTIONAL MOBILITY:  Assessed @ Initial Visit    -Affecting participation in basic ADLs and functional tasks.   -Limited tolerance of walking and standing   -Ambulation/Gait: Ambulates with mild forward head posture with use of rollator for safety.   -Bed mobility:  MOD I   -Stairs: NT   -Transfers: MOD I    -Wheelchair: N/A     Body Structures Involved:  1. Bones  2. Joints  3. Muscles  4. Ligaments Body Functions Affected:  1. Sensory/Pain  2. Neuromusculoskeletal  3. Movement Related Activities and Participation Affected:  1. Mobility  2. Self Care  3. Domestic Life  4. Interpersonal Interactions and Relationships  5. Community, Social and [place]   Number of elements that affect the Plan of Care: 4+: HIGH COMPLEXITY   CLINICAL PRESENTATION:   Presentation: Stable and uncomplicated: LOW COMPLEXITY   CLINICAL DECISION MAKING:   OUTCOME MEASURE USED:   Tool Used: Tool Used: Lower Extremity Functional Scale (LEFS)  Score:  Initial: 31/80 Most Recent: X/80 (Date: -- )   Interpretation of Score: 20 questions each scored on a 5 point scale with 0 representing \"extreme difficulty or unable to perform\" and 4 representing \"no difficulty\". The lower the score, the greater the functional disability. 80/80 represents no disability. Minimal detectable change is 9 points.       Tool Used: Timed Up and Go (TUG)  Score:  Initial: 12.59 seconds (with rollator) Most Recent: X seconds (Date: -- )   Interpretation of Score: The test measures, in seconds, the time taken by an individual to stand up from a standard arm chair (seat height 46 cm [18 in], arm height 65 cm [25.6 in]), walk a distance of 3 meters (118 in, approx 10 ft), turn, walk back to the chair and sit down. If the individual takes longer than 14 seconds to complete TUG, this indicates risk for falls. TUG attempted second time without use of rollator: 16.45 seconds    [place] / [place] / Product Type: Medicare /     MEDICAL NECESSITY:  · Skilled intervention continues to be required due to above deficits affecting participation in basic ADLs and overall functional tolerance. REASON FOR SERVICES/OTHER COMMENTS:  · Patient continues to require skilled intervention due to  above deficits affecting participation in basic ADLs and overall functional tolerance. Use of outcome tool(s) and clinical judgement create a POC that gives a: Clear prediction of patient's progress: LOW COMPLEXITY   TREATMENT:   (In addition to Assessment/Re-Assessment sessions the following treatments were rendered)    Pre-treatment Symptoms/Complaints: Patient reports back stiffness and pain continues to be  level 2/10 today in low back. Patient ambulated into clinic without walker today. Patient with antalgic gait ambulating without walker. THERAPEUTIC EXERCISE: (45 minutes):  Exercises per grid below to improve mobility, strength and balance. Required minimal visual and verbal cues to promote proper body alignment and promote proper body posture. Progressed resistance, range and complexity of movement as indicated.      Date:  3/20/2019 Date:  3/25/2019 Date  3/27/19 Date:  4/1/2019 Date:  4/3/2019 Date  4/8/19 Date  4/10/19   Activity/Exercise Parameters Parameters  Parameters Parameters     Heel raises 2 x 10 B 2 x 10 B 2 x 10 reps B 2 x 10 reps B 2 x 10 reps B 2 x 10 reps B      Supine hip abduction 2 x 10 B         Supine heel slides          SAQ 2 x 10 B 2# cuff   2 x 10 B 2# cuff      Sit to stand  2 x 5 reps 2 x 5 reps with verbal cues and demonstration    2 x 5 reps with verbal cues and demonstration   balance on blue airex foam 3 x 30 sec without UE support 3 x 30 sec without UE support  3 x 30 sec eyes closed 3 x 30 sec eyes closed 3 x 30 second hold with eyes closed 3 x 30 second hold with eyes closed   Nustep  10 mins at Level 3 10 mins at Level 3 Level 3  10 minutes  Level 3 10 minutes Level 3 10 minutes Level 3  10 minutes Level 3  10 minutes   Gastrocnemius stretch 3 x 30 sec B     Incline board stretch  3 x 30 second hold ncline board stretch  3 x 30 second hold   Sidestepping in parallel bars X 4 laps (leading with each LE)  X 4 laps leading with each LE with minimal use of hands X 4 laps leading with each LE with minimal use of hands X 4 laps leading with each LE with minimal use of hands     Hip Abduction  1 x 10 B 1 x 10 reps B    1 x 10 reps B 1 x 10 reps   2 x 10 reps B   Hip extension  1 x 10 B 1 x 10 reps B      2 x 10 reps B   Standing Marching  1 x 10 B 1 x 10 reps B 1 x 10 reps B     Marching/high stepping in ll bars down and back x 2    LAQ  2 x 10 B with 3# cuff weight 3# cuff weight  2 x 10 reps B 2# cuff weight 2 x 10 reps B      Semi tandem progressing towards Tandem stance  2 x 30 sec leading with each LE 2 x 30 second hold with each LE  2 x 30 second hold witheach LE 2 x 30 second hold with each LE with some use of hands 2 x 30 second hold with each LE with minimal use of hands   Seated hamstring stretch   With foot propped on 8 inch step and leaning forward 3 x 30 second hold B       Clamshells    1 x 10 B  1 x 10 B 2 x 10 reps B      Supine Bridge    1 x 10 1 x 10 1 x 10 reps       Ambulation        Patient ambulated 200 feet without assistive device with SBA and verbal cues for heel strike and bigger step.  Patient ambulated 100 feet with slower gait and then instructed to walk faster for 100 feet                MANUAL THERAPY: (0 minutes): Joint mobilization, Soft tissue mobilization and Manipulation was utilized and necessary because of the patient's restricted joint motion, painful spasm, loss of articular motion and restricted motion of soft tissue. (Used abbreviations: MET - muscle energy technique; PNF - proprioceptive neuromuscular facilitation; NMR - neuromuscular re-education; AP - anterior to posterior; PA - posterior to anterior)    MODALITIES: (0 minutes):      Not today    TREATMENT/SESSION ASSESSMENT:  Samara Jolly tolerated exercises well but reported that his legs were tired towards the end of the treatment. Patient may need to continue to walk with rolling walker if gait continues to be antalgic. Patient well motivated. Continue per plan of care. · Pain/ Symptoms: Initial:   2/10 Post Session:  2/10 fatigue in legs ·   Compliance with Program/Exercises: Good compliance. · Recommendations/Intent for next treatment session: \"Next visit will focus on advancements to more challenging activities\".     Total Treatment Duration: 40 minutes   PT Patient Time In/Time Out  Time In: 1435  Time Out: 39 Yeimy Bhakta, ADONIS

## 2019-04-15 ENCOUNTER — HOSPITAL ENCOUNTER (OUTPATIENT)
Dept: PHYSICAL THERAPY | Age: 79
Discharge: HOME | End: 2019-04-15
Payer: MEDICARE

## 2019-04-15 PROCEDURE — 97110 THERAPEUTIC EXERCISES: CPT

## 2019-04-15 NOTE — PROGRESS NOTES
Titus Love [facility]  : 1940  [place] / [place] / Product Type: Medicare /  [address]  at [facility]  [address] [facility], [address]  [phone]   [phone]                OUTPATIENT PHYSICAL THERAPY:Progress Report 4/15/2019   Progress Note: 4/15/2019     ICD-10: Treatment Diagnosis:   Difficulty in walking, not elsewhere classified (R26.2)  Muscle weakness (generalized) (M62.81)        PRECAUTIONS/ALLERGIES:   Patient has no known allergies. FALL RISK SCORE: 2 (? 5 = High Risk)    MD ORDERS: Eval and Treat  MEDICAL/REFERRING DIAGNOSIS:  Encounter for follow-up examination after completed treatment for conditions other than malignant neoplasm [Z09]     DATE OF ONSET: Laminectomy L1-L4 2019    REFERRING PHYSICIAN: Janell Noble MD    RETURN PHYSICIAN APPOINTMENT: TBD by patient      Ambulatory/Rehab Services H2 Model Falls Risk Assessment    Risk Factors:       (1)  Gender [Male] Ability to Rise from Chair:       (1)  Pushes up, successful in one attempt    Falls Prevention Plan: Mobility Assistance Device (specify):  rollator   Total: (5 or greater = High Risk): 2     [facility] [address] [place] [address] States Patent #[id number]. Federal Law prohibits the replication, distribution or use without written permission from [facility] [address]:  Mr. Sadaf Rondon has attended 10 physical therapy session including initial evaluation as of 3/13/2019. Sadaf Rondon has met 5/5 STGs at this point. He is demonstrating improved dynamic balance with ambulation with no longer using rollator for assistance. Patient continues to demonstrate weakness in B hip musculature with intermittent narrowed FERMIN and trendelenburg gait pattern during single limb stance.   [facility] will benefit from continued skilled therapy to increase functional strength, balance, and coordination to improve safety with functional mobility. PROBLEM LIST (Impacting functional limitations):  1. Decreased Strength  2. Decreased ADL/Functional Activities  3. Decreased Transfer Abilities  4. Decreased Ambulation Ability/Technique  5. Decreased Balance  6. Increased Pain  7. Decreased Activity Tolerance  8. Increased Fatigue  9. Increased Shortness of Breath  10. Decreased Flexibility/Joint Mobility  11. Decreased Isabela with Home Exercise Program INTERVENTIONS PLANNED:  1. Balance Exercise  2. Bed Mobility  3. Cold  4. Cryotherapy  5. Electrical Stimulation  6. Family Education  7. Gait Training  8. Heat  9. Home Exercise Program (HEP)  10. Manual Therapy  11. Neuromuscular Re-education/Strengthening  12. Range of Motion (ROM)  13. Therapeutic Activites  14. Therapeutic Exercise/Strengthening  15. Transfer Training  16. Mechanical traction  17. Aquatic Therapy  18. Electrical Stimulation   TREATMENT PLAN:  Effective Dates: 3/13/2019 TO 6/11/2019 (90 days). Frequency/Duration: 2 times a week for 90 Days    GOALS: (Goals have been discussed and agreed upon with patient.)  Short Term Goals 4 weeks   1. Jonah Conway will be independent with HEP to promote self-management of symptoms. MET  2. Jonah Conway will demonstrate a 5 point improvement on the LEFS to show improvement in function. MET  3. Jonah Conway will participate in core stabilization exercises to help with stabilization and improve posture during ADLs to help prevent future injuries. MET  4. Jonah Conway will participate in LE strengthening program with weights as appropriate to help with gait and elevations. MET  5. Jonah Conway will participate in static and dynamic balance activities to decrease the risk for falls and improve overall QOL. MET  6. Jonah Conway will perform TUG time of 10 seconds with LRAD to decrease risk of falls.  MET    Long Term Goals 12 weeks [address] will demonstrate a 10 point improvement on the LEFS to show improvement in function. 2. Marin Westbrook will report <=2/10 pain at rest and during ADLs to improve QOL. 3. Marin Westbrook will demonstrate >=4+/5 LE strength on manual muscle testing to improve functional mobility. [address] Valdez will be able to perform SLS >10 seconds bilaterally to help with gait and improve balance. Tayla[address] Steven Valdez will be able to demonstrate safe transfer mechanics without cueing for improved safety with home and community activities. Nataly Valdez will perform TUG test with <10 seconds with LRAD to decrease risk of falls. Rehabilitation Potential For Stated Goals: Good    Regarding Salena Sahil Rik's therapy, I certify that the treatment plan above will be carried out by a therapist or under their direction. Thank you for this referral,  Keyana Early       Referring Physician Signature: Ashleigh Zurita MD              Date                    HISTORY:   PATIENT GOAL FOR PHYSICAL THERAPY:  \"I want to be able to walk without the rollator. \"      HISTORY OF PRESENT INJURY/ILLNESS (REASON FOR REFERRAL):  Patient had laminectomy on 2/12/2019 at levels L1-L4. Patient had been having pain in lower back for the previous year. Patient has had physical therapy in the past at this location prior to surgery as well as multiple injections to attempt to decrease pain. Note: Patient denies any increase of symptoms with cough, sneeze or valsalva. Patient denies any saddle paresthesia or bowel/bladder deficits.      PAST MEDICAL HISTORY/COMORBIDITIES:   Mr. Alvan Aase  has a past medical history of Anxiety disorder (12/9/2014), Arthritis (12/9/2014), Depression (12/9/2014), Gout (12/9/2014), History of lumbar laminectomy for spinal cord decompression (2/18/2019), HTN (hypertension), benign (2/15/2017), Hyperlipemia (12/9/2014), Hypertension, Idiopathic chronic gout of right ankle (2014), Insomnia (2014), Kidney stone, Mixed hyperlipidemia (2014), Panic disorder (2014), Primary insomnia (2014), and Rosacea (2/15/2017). Mr. Vince Moore  has a past surgical history that includes hx other surgical; hx cyst removal; hx cyst removal; hx hernia repair (Bilateral); hx ankle fracture tx (Left, 1974); hx wrist fracture tx (Bilateral, 1993); hx colonoscopy (); and hx cyst removal.    SOCIAL HISTORY/LIVING ENVIRONMENT:        Social History     Socioeconomic History    Marital status:      Spouse name: Not on file    Number of children: Not on file    Years of education: Not on file    Highest education level: Not on file   Occupational History    Not on file   Social Needs    Financial resource strain: Not on file    Food insecurity:     Worry: Not on file     Inability: Not on file    Transportation needs:     Medical: Not on file     Non-medical: Not on file   Tobacco Use    Smoking status: Former Smoker     Types: Cigarettes     Last attempt to quit: 1970     Years since quittin.3    Smokeless tobacco: Never Used   Substance and Sexual Activity    Alcohol use:  Yes     Alcohol/week: 0.0 oz    Drug use: No    Sexual activity: Yes     Partners: Female   Lifestyle    Physical activity:     Days per week: Not on file     Minutes per session: Not on file    Stress: Not on file   Relationships    Social connections:     Talks on phone: Not on file     Gets together: Not on file     Attends Religion service: Not on file     Active member of club or organization: Not on file     Attends meetings of clubs or organizations: Not on file     Relationship status: Not on file    Intimate partner violence:     Fear of current or ex partner: Not on file     Emotionally abused: Not on file     Physically abused: Not on file     Forced sexual activity: Not on file   Other Topics Concern    Not on file   Social History Narrative    Not on file       LIFESTYLE Date: 3/13/2019   Occupation: Retired   Vigorous Activity: n/a   Expose individual to vibrations n/a   Unpleasant work environment n/a       PRIOR LEVEL OF FUNCTION/WORK/ACTIVITY:   Patient ambulated with straight cane prior to surgery. Patient's spouse reports he did not use straight cane correctly and should have been using rolling walker for safety. Active Ambulatory Problems     Diagnosis Date Noted    MEGGAN (generalized anxiety disorder) 12/09/2014    Arthritis 12/09/2014    Idiopathic chronic gout of right ankle 12/09/2014    Mixed hyperlipidemia 12/09/2014    Primary insomnia 12/09/2014    Panic disorder 12/09/2014    HTN (hypertension), benign 02/15/2017    Rosacea 02/15/2017    Idiopathic scoliosis 06/07/2018    Lumbar stenosis with neurogenic claudication 06/07/2018    [facility] 08/16/2018    History of lumbar laminectomy for spinal cord decompression 02/18/2019     Resolved Ambulatory Problems     Diagnosis Date Noted    Depression 12/09/2014     Past Medical History:   Diagnosis Date    Anxiety disorder 12/9/2014    Arthritis 12/9/2014    Depression 12/9/2014    Gout 12/9/2014    History of lumbar laminectomy for spinal cord decompression 2/18/2019    HTN (hypertension), benign 2/15/2017    Hyperlipemia 12/9/2014    Hypertension     Idiopathic chronic gout of right ankle 12/9/2014    Insomnia 12/9/2014    Kidney stone     Mixed hyperlipidemia 12/9/2014    Panic disorder 12/9/2014    Primary insomnia 12/9/2014    Rosacea 2/15/2017         CURRENT MEDICATIONS:    Current Outpatient Medications:     amLODIPine-Olmesartan 10-40 mg tab, TAKE 1 TABLET BY MOUTH DAILY. , Disp: 90 Tab, Rfl: 3    KLOR-CON 10 10 mEq tablet, TAKE 1 TABLET BY MOUTH DAILY. , Disp: 90 Tab, Rfl: 3    zolpidem (AMBIEN) 10 mg tablet, TAKE 1 TABLET BY MOUTH AT BEDTIME AS NEEDED FOR SLEEP, Disp: 30 Tab, Rfl: 5    atorvastatin (LIPITOR) 40 mg tablet, TAKE 1 TABLET BY MOUTH EVERY DAY, Disp: 90 Tab, Rfl: 3    potassium chloride SR (KLOR-CON 10) 10 mEq tablet, Take 1 Tab by mouth daily. , Disp: 90 Tab, Rfl: 3    amLODIPine-Olmesartan (CAROLA) 10-40 mg tab, Take 1 Tab by mouth daily. , Disp: 90 Tab, Rfl: 3    sertraline (ZOLOFT) 100 mg tablet, Take 1 Tab by mouth daily. One and half tab every day, Disp: 135 Tab, Rfl: 3    metroNIDAZOLE (METROGEL) 1 % topical gel, Apply  to affected area daily. Use a thin layer to affected areas after washing, Disp: 45 g, Rfl: 3    allopurinol (ZYLOPRIM) 100 mg tablet, Take 2 Tabs by mouth daily. , Disp: 180 Tab, Rfl: 3    Walker misc, Walker DX: lumbar stenosis with neurogenic claudication +antalgic gait with cane, Disp: 1 Each, Rfl: 0    DISABLED PLACARD (DISABLED PLACARD) DMV, To use with handicap placard form, Disp: 1 Each, Rfl: 0    gabapentin (NEURONTIN) 300 mg capsule, Take 1 Cap by mouth three (3) times daily. , Disp: 180 Cap, Rfl: 2    LORazepam (ATIVAN) 0.5 mg tablet, TAKE 1 TABLET BY MOUTH DAILY. , Disp: 30 Tab, Rfl: 3    diclofenac sodium (PENNSAID) 1.5 % drop, by Apply Externally route., Disp: , Rfl:     diclofenac (VOLTAREN) 1 % gel, Apply 4 g to affected area four (4) times daily. , Disp: 2 Each, Rfl: 3    multivitamin (ONE A DAY) tablet, Take 1 Tab by mouth daily. , Disp: , Rfl:     cholecalciferol, vitamin D3, (VITAMIN D3) 2,000 unit tab, Take  by mouth., Disp: , Rfl:      Date Last Reviewed:  4/15/2019   Number of Personal Factors/Comorbidities that affect the Plan of Care: 1-2: MODERATE COMPLEXITY   EXAMINATION:   OBSERVATION/ORTHOSTATIC POSTURAL ASSESSMENT:     -Pt sits with forward head and rounded shoulders which indicate tight anterior chest musculature, upper trapezius, and levator scapula and weak posterior scapula musculature and deep cervical flexors. Pt displays decreased core motor control indicating weak core and low back musculature.       BALANCE Date: 3/13/2019 Date: 3/13/2019   Right     Left       PELVIC COMPONENT Date:   3/13/2019 Date:  3/13/2019   Standing Mason     Standing PSIS     Standing Sacral Sulci     Gillet     Seated Mason     Seated PSIS     Supine-to-Sit leg length         AROM/PROM         Joint: Date: 3/13/2019  Date:  Date:    Active LE ROM Right Left Right Left Right Left   Hip Flexion         Hip Extension         Hip ER         Hip IR         Knee Extension         Knee Flexion         Knee Extension         Lumbar Flexion 55        Lumbar Extension 10        Lumbar Side-Bending 5        Lumbar Rotation NT          STRENGTH         Joint: Date: 3/13/2019  Date:  Date:     Right Left Right Left Right Left   Hip Abduction 4/5 4/5       Hip Adduction 4/5 4/5       Hip IR 4/5 4/5       Hip ER 4/5 4/5       Hip Flexion 4/5 4/5       Knee Extension 4/5 4/5       Knee Flexion 4/5 4/5       Ankle DF 4/5 4/5       Ankle PF 4/5 4/5       Ankle IV         Ankle EV         4/5    PALPATION Date:  3/13/2019 Date: Date:   TTP      TONE      PA GLIDES        SPECIAL TESTS: Assessed @ Initial Visit    -SCOUR: Negative   -90/90:     -R: Not tested    -L: Not tested   -SLR: Not tested   -SI COMPRESSION TEST: Not tested   -SI POST.  GAPPING:  Not tested   -GILLET TEST: Not tested   -SOL 4: Negative   -SLUMP TEST: Not tested   -DEEP SQUAT: Not tested       NEUROLOGICAL SCREEN: Assessed @ Initial Visit    -RADIATING SYMPTOMS: No     -DERMATOMES: In tact    -MYOTOMES Date: 3/13/2019  Date:  Date:     Right Left Right Left Right Left   L2 & L3   (Hip Flexors) 5/5 5/5       L3-L4  (Knee Extensors) 5/5 5/5       L4  (Ankle DFs) 4+/5 4+/5       L5  (Hallux Ext) 4+/5 4+/5       L5-S1  (Ankle PFs) 4+/5 4+/5       S1-S2  (Ankle EVs) 4+/5 4+/5         RED FLAGS: Date: 3/13/2019   Non-Mechanical pain distribution (cannot be produced, changed, or reduced during exam): NO   Cauda Equina Dysfunction: NO   Upper lumbar disc herniation in younger patients (femoral nerve tension test for lateral disc herniation in lower lumbar): NO Lumbar compression fracture (age > 48, trauma, corticosteroid use NO   Spine Cancer (age > 48, pervious history of cancer, failure to improve in 1 month of therapy, no relief - be rest, duration > 1 month, unexplained weight loss, insidious onset, constitutional symptoms): NO   Ankylosing Spondylitis (age < 36, pain not relieved by supine, morning back stiffness, pain duration > 3 months, improved by exercise): NO   Sacral Fracture: NO       FUNCTIONAL MOBILITY:  Assessed @ Initial Visit    -Affecting participation in basic ADLs and functional tasks.   -Limited tolerance of walking and standing   -Ambulation/Gait: Ambulates with mild forward head posture with use of rollator for safety.   -Bed mobility:  MOD I   -Stairs: NT   -Transfers: MOD I    -Wheelchair: N/A     Body Structures Involved:  1. Bones  2. Joints  3. Muscles  4. Ligaments Body Functions Affected:  1. Sensory/Pain  2. Neuromusculoskeletal  3. Movement Related Activities and Participation Affected:  1. Mobility  2. Self Care  3. Domestic Life  4. Interpersonal Interactions and Relationships  5. Community, Social and [place]   Number of elements that affect the Plan of Care: 4+: HIGH COMPLEXITY   CLINICAL PRESENTATION:   Presentation: Stable and uncomplicated: LOW COMPLEXITY   CLINICAL DECISION MAKING:   OUTCOME MEASURE USED:   Tool Used: Tool Used: Lower Extremity Functional Scale (LEFS)  Score:  Initial: 31/80 Most Recent: 37/80 (Date: 4/15/2019 )   Interpretation of Score: 20 questions each scored on a 5 point scale with 0 representing \"extreme difficulty or unable to perform\" and 4 representing \"no difficulty\". The lower the score, the greater the functional disability. 80/80 represents no disability. Minimal detectable change is 9 points. Tool Used: Timed Up and Go (TUG)  Score:  Initial: 12.59 seconds (with rollator) Most Recent: 9.86 seconds (Date: 4/15/2019 )   Interpretation of Score:  The test measures, in seconds, the time taken by an individual to stand up from a standard arm chair (seat height 46 cm [18 in], arm height 65 cm [25.6 in]), walk a distance of 3 meters (118 in, approx 10 ft), turn, walk back to the chair and sit down. If the individual takes longer than 14 seconds to complete TUG, this indicates risk for falls. TUG attempted second time without use of rollator: 16.45 seconds    [place] / [place] / Product Type: Medicare /     MEDICAL NECESSITY:  · Skilled intervention continues to be required due to above deficits affecting participation in basic ADLs and overall functional tolerance. REASON FOR SERVICES/OTHER COMMENTS:  · Patient continues to require skilled intervention due to  above deficits affecting participation in basic ADLs and overall functional tolerance. Use of outcome tool(s) and clinical judgement create a POC that gives a: Clear prediction of patient's progress: LOW COMPLEXITY   TREATMENT:   (In addition to Assessment/Re-Assessment sessions the following treatments were rendered)    Pre-treatment Symptoms/Complaints: See assessment above. THERAPEUTIC EXERCISE: (40 minutes):  Exercises per grid below to improve mobility, strength and balance. Required minimal visual and verbal cues to promote proper body alignment and promote proper body posture. Progressed resistance, range and complexity of movement as indicated.      Date:  3/25/2019 Date  3/27/19 Date:  4/1/2019 Date:  4/3/2019 Date  4/8/19 Date  4/10/19 Date:  4/15/19   Activity/Exercise Parameters  Parameters Parameters      Heel raises 2 x 10 B 2 x 10 reps B 2 x 10 reps B 2 x 10 reps B 2 x 10 reps B    2 x 10 reps B   Supine hip abduction          Supine heel slides          SAQ   2 x 10 B 2# cuff       Sit to stand 2 x 5 reps 2 x 5 reps with verbal cues and demonstration    2 x 5 reps with verbal cues and demonstration 2 x 5 reps with verbal cues and demonstration   balance on blue airex foam 3 x 30 sec without UE support  3 x 30 sec eyes closed 3 x 30 sec eyes closed 3 x 30 second hold with eyes closed 3 x 30 second hold with eyes closed 3 x 30 second hold with eyes closed   Nustep  10 mins at Level 3 Level 3  10 minutes  Level 3 10 minutes Level 3 10 minutes Level 3  10 minutes Level 3  10 minutes Level 3   10 minutes   Gastrocnemius stretch     Incline board stretch  3 x 30 second hold ncline board stretch  3 x 30 second hold Incline board stretch 3 x 30 sec hold   Sidestepping in parallel bars  X 4 laps leading with each LE with minimal use of hands X 4 laps leading with each LE with minimal use of hands X 4 laps leading with each LE with minimal use of hands   X 4 laps leading with each LE, minimal use of hands   Hip Abduction 1 x 10 B 1 x 10 reps B    1 x 10 reps B 1 x 10 reps   2 x 10 reps B 2 x 10 reps B   Hip extension 1 x 10 B 1 x 10 reps B      2 x 10 reps B 2 x 10 reps B   Standing Marching 1 x 10 B 1 x 10 reps B 1 x 10 reps B     Marching/high stepping in ll bars down and back x 2  2 x 10 reps B   LAQ 2 x 10 B with 3# cuff weight 3# cuff weight  2 x 10 reps B 2# cuff weight 2 x 10 reps B       Semi tandem progressing towards Tandem stance 2 x 30 sec leading with each LE 2 x 30 second hold with each LE  2 x 30 second hold witheach LE 2 x 30 second hold with each LE with some use of hands 2 x 30 second hold with each LE with minimal use of hands    Seated hamstring stretch  With foot propped on 8 inch step and leaning forward 3 x 30 second hold B        Clamshells   1 x 10 B  1 x 10 B 2 x 10 reps B       Supine Bridge   1 x 10 1 x 10 1 x 10 reps        Ambulation       Patient ambulated 200 feet without assistive device with SBA and verbal cues for heel strike and bigger step. Patient ambulated 100 feet with slower gait and then instructed to walk faster for 100 feet  Patient ambulated 200 ft without assistive device with SBA and verbal cues for heel strike and wider FERMIN.                MANUAL THERAPY: (0 minutes): Joint mobilization, Soft tissue mobilization and Manipulation was utilized and necessary because of the patient's restricted joint motion, painful spasm, loss of articular motion and restricted motion of soft tissue. (Used abbreviations: MET - muscle energy technique; PNF - proprioceptive neuromuscular facilitation; NMR - neuromuscular re-education; AP - anterior to posterior; PA - posterior to anterior)    MODALITIES: (0 minutes):      Not today    TREATMENT/SESSION ASSESSMENT:  Sanjana Clay continues to ambulate with narrowed FERMIN with increased risk of falls. PT educated patient on continuing to use rollator for ambulation for improved stability. · Pain/ Symptoms: Initial:   2/10 Post Session:  2/10 fatigue in legs ·   Compliance with Program/Exercises: Good compliance. · Recommendations/Intent for next treatment session: \"Next visit will focus on advancements to more challenging activities\".     Total Treatment Duration: 45 minutes   PT Patient Time In/Time Out  Time In: 1300  Time Out: [address]

## 2019-04-17 ENCOUNTER — HOSPITAL ENCOUNTER (OUTPATIENT)
Dept: PHYSICAL THERAPY | Age: 79
Discharge: HOME | End: 2019-04-17
Payer: MEDICARE

## 2019-04-17 PROCEDURE — 97110 THERAPEUTIC EXERCISES: CPT

## 2019-04-17 NOTE — PROGRESS NOTES
Adán Cedeño [facility]  : 1940  [place] / [place] / Product Type: Medicare /  [address]  at [facility]  [address] [facility], [address]  [phone]   [phone]                OUTPATIENT PHYSICAL THERAPY:Daily Note 2019   Progress Note: 4/15/2019     ICD-10: Treatment Diagnosis:   Difficulty in walking, not elsewhere classified (R26.2)  Muscle weakness (generalized) (M62.81)        PRECAUTIONS/ALLERGIES:   Patient has no known allergies. FALL RISK SCORE: 2 (? 5 = High Risk)    MD ORDERS: Eval and Treat  MEDICAL/REFERRING DIAGNOSIS:  Encounter for follow-up examination after completed treatment for conditions other than malignant neoplasm [Z09]     DATE OF ONSET: Laminectomy L1-L4 2019    REFERRING PHYSICIAN: Anel Allison MD    RETURN PHYSICIAN APPOINTMENT: TBD by patient      Ambulatory/Rehab Services H2 Model Falls Risk Assessment    Risk Factors:       (1)  Gender [Male] Ability to Rise from Chair:       (1)  Pushes up, successful in one attempt    Falls Prevention Plan: Mobility Assistance Device (specify):  rollator   Total: (5 or greater = High Risk): 2     [facility] [address] [place] [address] States Patent #[id number]. Federal Law prohibits the replication, distribution or use without written permission from [facility]:  Mr. Millie Vela has attended 10 physical therapy session including initial evaluation as of 3/13/2019. Millie Vela has met 5/5 STGs at this point. He is demonstrating improved dynamic balance with ambulation with no longer using rollator for assistance. Patient continues to demonstrate weakness in B hip musculature with intermittent narrowed FERMIN and trendelenburg gait pattern during single limb stance.   [facility] will benefit from continued skilled therapy to increase functional strength, balance, and coordination to improve safety with functional mobility. PROBLEM LIST (Impacting functional limitations):  1. Decreased Strength  2. Decreased ADL/Functional Activities  3. Decreased Transfer Abilities  4. Decreased Ambulation Ability/Technique  5. Decreased Balance  6. Increased Pain  7. Decreased Activity Tolerance  8. Increased Fatigue  9. Increased Shortness of Breath  10. Decreased Flexibility/Joint Mobility  11. Decreased [place] with Home Exercise Program INTERVENTIONS PLANNED:  1. Balance Exercise  2. Bed Mobility  3. Cold  4. Cryotherapy  5. Electrical Stimulation  6. Family Education  7. Gait Training  8. Heat  9. Home Exercise Program (HEP)  10. Manual Therapy  11. Neuromuscular Re-education/Strengthening  12. Range of Motion (ROM)  13. Therapeutic Activites  14. Therapeutic Exercise/Strengthening  15. Transfer Training  16. Mechanical traction  17. Aquatic Therapy  18. Electrical Stimulation   TREATMENT PLAN:  Effective Dates: 3/13/2019 TO 6/11/2019 (90 days). Frequency/Duration: 2 times a week for 90 Days    GOALS: (Goals have been discussed and agreed upon with patient.)  Short Term Goals 4 weeks   1. Keshav Sandhu will be independent with HEP to promote self-management of symptoms. MET  2. Keshav Sandhu will demonstrate a 5 point improvement on the LEFS to show improvement in function. MET  3. Keshav Sandhu will participate in core stabilization exercises to help with stabilization and improve posture during ADLs to help prevent future injuries. MET  4. Keshav Sandhu will participate in LE strengthening program with weights as appropriate to help with gait and elevations. MET  5. Keshav Sandhu will participate in static and dynamic balance activities to decrease the risk for falls and improve overall QOL. MET  6. Keshav Sandhu will perform TUG time of 10 seconds with LRAD to decrease risk of falls.  MET    Long Term Goals 12 weeks [address] Valdez will demonstrate a 10 point improvement on the LEFS to show improvement in function. 2. Rosa Isela Gonzalez will report <=2/10 pain at rest and during ADLs to improve QOL. 3. Rosa Isela Gonzalez will demonstrate >=4+/5 LE strength on manual muscle testing to improve functional mobility. [address] Steven Valdez will be able to perform SLS >10 seconds bilaterally to help with gait and improve balance. Tayla9 Steven Valdez will be able to demonstrate safe transfer mechanics without cueing for improved safety with home and community activities. Nataly Valdez will perform TUG test with <10 seconds with LRAD to decrease risk of falls. Rehabilitation Potential For Stated Goals: Good    Regarding Mandeep Jolly's therapy, I certify that the treatment plan above will be carried out by a therapist or under their direction. Thank you for this referral,  Lulu Alcocer       Referring Physician Signature: Rosas Castellon MD              Date                    HISTORY:   PATIENT GOAL FOR PHYSICAL THERAPY:  \"I want to be able to walk without the rollator. \"      HISTORY OF PRESENT INJURY/ILLNESS (REASON FOR REFERRAL):  Patient had laminectomy on 2/12/2019 at levels L1-L4. Patient had been having pain in lower back for the previous year. Patient has had physical therapy in the past at this location prior to surgery as well as multiple injections to attempt to decrease pain. Note: Patient denies any increase of symptoms with cough, sneeze or valsalva. Patient denies any saddle paresthesia or bowel/bladder deficits.      PAST MEDICAL HISTORY/COMORBIDITIES:   Mr. Ashley Wilson  has a past medical history of Anxiety disorder (12/9/2014), Arthritis (12/9/2014), Depression (12/9/2014), Gout (12/9/2014), History of lumbar laminectomy for spinal cord decompression (2/18/2019), HTN (hypertension), benign (2/15/2017), Hyperlipemia (12/9/2014), Hypertension, Idiopathic chronic gout of right ankle (2014), Insomnia (2014), Kidney stone, Mixed hyperlipidemia (2014), Panic disorder (2014), Primary insomnia (2014), and Rosacea (2/15/2017). Mr. Ewa Block  has a past surgical history that includes hx other surgical; hx cyst removal; hx cyst removal; hx hernia repair (Bilateral); hx ankle fracture tx (Left, 1974); hx wrist fracture tx (Bilateral, 1993); hx colonoscopy (); and hx cyst removal.    SOCIAL HISTORY/LIVING ENVIRONMENT:        Social History     Socioeconomic History    Marital status:      Spouse name: Not on file    Number of children: Not on file    Years of education: Not on file    Highest education level: Not on file   Occupational History    Not on file   Social Needs    Financial resource strain: Not on file    Food insecurity:     Worry: Not on file     Inability: Not on file    Transportation needs:     Medical: Not on file     Non-medical: Not on file   Tobacco Use    Smoking status: Former Smoker     Types: Cigarettes     Last attempt to quit: 1970     Years since quittin.3    Smokeless tobacco: Never Used   Substance and Sexual Activity    Alcohol use:  Yes     Alcohol/week: 0.0 oz    Drug use: No    Sexual activity: Yes     Partners: Female   Lifestyle    Physical activity:     Days per week: Not on file     Minutes per session: Not on file    Stress: Not on file   Relationships    Social connections:     Talks on phone: Not on file     Gets together: Not on file     Attends Taoism service: Not on file     Active member of club or organization: Not on file     Attends meetings of clubs or organizations: Not on file     Relationship status: Not on file    Intimate partner violence:     Fear of current or ex partner: Not on file     Emotionally abused: Not on file     Physically abused: Not on file     Forced sexual activity: Not on file   Other Topics Concern    Not on file   Social History Narrative    Not on file       LIFESTYLE Date: 3/13/2019   Occupation: Retired   Vigorous Activity: n/a   Expose individual to vibrations n/a   Unpleasant work environment n/a       PRIOR LEVEL OF FUNCTION/WORK/ACTIVITY:   Patient ambulated with straight cane prior to surgery. Patient's spouse reports he did not use straight cane correctly and should have been using rolling walker for safety. Active Ambulatory Problems     Diagnosis Date Noted    MEGGAN (generalized anxiety disorder) 12/09/2014    Arthritis 12/09/2014    Idiopathic chronic gout of right ankle 12/09/2014    Mixed hyperlipidemia 12/09/2014    Primary insomnia 12/09/2014    Panic disorder 12/09/2014    HTN (hypertension), benign 02/15/2017    Rosacea 02/15/2017    Idiopathic scoliosis 06/07/2018    Lumbar stenosis with neurogenic claudication 06/07/2018    [facility] 08/16/2018    History of lumbar laminectomy for spinal cord decompression 02/18/2019     Resolved Ambulatory Problems     Diagnosis Date Noted    Depression 12/09/2014     Past Medical History:   Diagnosis Date    Anxiety disorder 12/9/2014    Arthritis 12/9/2014    Depression 12/9/2014    Gout 12/9/2014    History of lumbar laminectomy for spinal cord decompression 2/18/2019    HTN (hypertension), benign 2/15/2017    Hyperlipemia 12/9/2014    Hypertension     Idiopathic chronic gout of right ankle 12/9/2014    Insomnia 12/9/2014    Kidney stone     Mixed hyperlipidemia 12/9/2014    Panic disorder 12/9/2014    Primary insomnia 12/9/2014    Rosacea 2/15/2017         CURRENT MEDICATIONS:    Current Outpatient Medications:     amLODIPine-Olmesartan 10-40 mg tab, TAKE 1 TABLET BY MOUTH DAILY. , Disp: 90 Tab, Rfl: 3    KLOR-CON 10 10 mEq tablet, TAKE 1 TABLET BY MOUTH DAILY. , Disp: 90 Tab, Rfl: 3    zolpidem (AMBIEN) 10 mg tablet, TAKE 1 TABLET BY MOUTH AT BEDTIME AS NEEDED FOR SLEEP, Disp: 30 Tab, Rfl: 5    atorvastatin (LIPITOR) 40 mg tablet, TAKE 1 TABLET BY MOUTH EVERY DAY, Disp: 90 Tab, Rfl: 3    potassium chloride SR (KLOR-CON 10) 10 mEq tablet, Take 1 Tab by mouth daily. , Disp: 90 Tab, Rfl: 3    amLODIPine-Olmesartan (CAROLA) 10-40 mg tab, Take 1 Tab by mouth daily. , Disp: 90 Tab, Rfl: 3    sertraline (ZOLOFT) 100 mg tablet, Take 1 Tab by mouth daily. One and half tab every day, Disp: 135 Tab, Rfl: 3    metroNIDAZOLE (METROGEL) 1 % topical gel, Apply  to affected area daily. Use a thin layer to affected areas after washing, Disp: 45 g, Rfl: 3    allopurinol (ZYLOPRIM) 100 mg tablet, Take 2 Tabs by mouth daily. , Disp: 180 Tab, Rfl: 3    Walker misc, Walker DX: lumbar stenosis with neurogenic claudication +antalgic gait with cane, Disp: 1 Each, Rfl: 0    DISABLED PLACARD (DISABLED PLACARD) DMV, To use with handicap placard form, Disp: 1 Each, Rfl: 0    gabapentin (NEURONTIN) 300 mg capsule, Take 1 Cap by mouth three (3) times daily. , Disp: 180 Cap, Rfl: 2    LORazepam (ATIVAN) 0.5 mg tablet, TAKE 1 TABLET BY MOUTH DAILY. , Disp: 30 Tab, Rfl: 3    diclofenac sodium (PENNSAID) 1.5 % drop, by Apply Externally route., Disp: , Rfl:     diclofenac (VOLTAREN) 1 % gel, Apply 4 g to affected area four (4) times daily. , Disp: 2 Each, Rfl: 3    multivitamin (ONE A DAY) tablet, Take 1 Tab by mouth daily. , Disp: , Rfl:     cholecalciferol, vitamin D3, (VITAMIN D3) 2,000 unit tab, Take  by mouth., Disp: , Rfl:      Date Last Reviewed:  4/17/2019   Number of Personal Factors/Comorbidities that affect the Plan of Care: 1-2: MODERATE COMPLEXITY   EXAMINATION:   OBSERVATION/ORTHOSTATIC POSTURAL ASSESSMENT:     -Pt sits with forward head and rounded shoulders which indicate tight anterior chest musculature, upper trapezius, and levator scapula and weak posterior scapula musculature and deep cervical flexors. Pt displays decreased core motor control indicating weak core and low back musculature.       BALANCE Date: 3/13/2019 Date: 3/13/2019   Right     Left       PELVIC COMPONENT Date:   3/13/2019 Date:  3/13/2019   Standing Mason     Standing PSIS     Standing Sacral Sulci     Gillet     Seated Mason     Seated PSIS     Supine-to-Sit leg length         AROM/PROM         Joint: Date: 3/13/2019  Date:  Date:    Active LE ROM Right Left Right Left Right Left   Hip Flexion         Hip Extension         Hip ER         Hip IR         Knee Extension         Knee Flexion         Knee Extension         Lumbar Flexion 55        Lumbar Extension 10        Lumbar Side-Bending 5        Lumbar Rotation NT          STRENGTH         Joint: Date: 3/13/2019  Date:  Date:     Right Left Right Left Right Left   Hip Abduction 4/5 4/5       Hip Adduction 4/5 4/5       Hip IR 4/5 4/5       Hip ER 4/5 4/5       Hip Flexion 4/5 4/5       Knee Extension 4/5 4/5       Knee Flexion 4/5 4/5       Ankle DF 4/5 4/5       Ankle PF 4/5 4/5       Ankle IV         Ankle EV         4/5    PALPATION Date:  3/13/2019 Date: Date:   TTP      TONE      PA GLIDES        SPECIAL TESTS: Assessed @ Initial Visit    -SCOUR: Negative   -90/90:     -R: Not tested    -L: Not tested   -SLR: Not tested   -SI COMPRESSION TEST: Not tested   -SI POST.  GAPPING:  Not tested   -GILLET TEST: Not tested   -SOL 4: Negative   -SLUMP TEST: Not tested   -DEEP SQUAT: Not tested       NEUROLOGICAL SCREEN: Assessed @ Initial Visit    -RADIATING SYMPTOMS: No     -DERMATOMES: In tact    -MYOTOMES Date: 3/13/2019  Date:  Date:     Right Left Right Left Right Left   L2 & L3   (Hip Flexors) 5/5 5/5       L3-L4  (Knee Extensors) 5/5 5/5       L4  (Ankle DFs) 4+/5 4+/5       L5  (Hallux Ext) 4+/5 4+/5       L5-S1  (Ankle PFs) 4+/5 4+/5       S1-S2  (Ankle EVs) 4+/5 4+/5         RED FLAGS: Date: 3/13/2019   Non-Mechanical pain distribution (cannot be produced, changed, or reduced during exam): NO   Cauda Equina Dysfunction: NO   Upper lumbar disc herniation in younger patients (femoral nerve tension test for lateral disc herniation in lower lumbar): NO Lumbar compression fracture (age > 48, trauma, corticosteroid use NO   Spine Cancer (age > 48, pervious history of cancer, failure to improve in 1 month of therapy, no relief - be rest, duration > 1 month, unexplained weight loss, insidious onset, constitutional symptoms): NO   Ankylosing Spondylitis (age < 36, pain not relieved by supine, morning back stiffness, pain duration > 3 months, improved by exercise): NO   Sacral Fracture: NO       FUNCTIONAL MOBILITY:  Assessed @ Initial Visit    -Affecting participation in basic ADLs and functional tasks.   -Limited tolerance of walking and standing   -Ambulation/Gait: Ambulates with mild forward head posture with use of rollator for safety.   -Bed mobility:  MOD I   -Stairs: NT   -Transfers: MOD I    -Wheelchair: N/A     Body Structures Involved:  1. Bones  2. Joints  3. Muscles  4. Ligaments Body Functions Affected:  1. Sensory/Pain  2. Neuromusculoskeletal  3. Movement Related Activities and Participation Affected:  1. Mobility  2. Self Care  3. Domestic Life  4. Interpersonal Interactions and Relationships  5. Community, Social and [employer]   Number of elements that affect the Plan of Care: 4+: HIGH COMPLEXITY   CLINICAL PRESENTATION:   Presentation: Stable and uncomplicated: LOW COMPLEXITY   CLINICAL DECISION MAKING:   OUTCOME MEASURE USED:   Tool Used: Tool Used: Lower Extremity Functional Scale (LEFS)  Score:  Initial: 31/80 Most Recent: 37/80 (Date: 4/15/2019 )   Interpretation of Score: 20 questions each scored on a 5 point scale with 0 representing \"extreme difficulty or unable to perform\" and 4 representing \"no difficulty\". The lower the score, the greater the functional disability. 80/80 represents no disability. Minimal detectable change is 9 points. Tool Used: Timed Up and Go (TUG)  Score:  Initial: 12.59 seconds (with rollator) Most Recent: 9.86 seconds (Date: 4/15/2019 )   Interpretation of Score:  The test measures, in seconds, the time taken by an individual to stand up from a standard arm chair (seat height 46 cm [18 in], arm height 65 cm [25.6 in]), walk a distance of 3 meters (118 in, approx 10 ft), turn, walk back to the chair and sit down. If the individual takes longer than 14 seconds to complete TUG, this indicates risk for falls. TUG attempted second time without use of rollator: 16.45 seconds    [place] / [place] / Product Type: Medicare /     MEDICAL NECESSITY:  · Skilled intervention continues to be required due to above deficits affecting participation in basic ADLs and overall functional tolerance. REASON FOR SERVICES/OTHER COMMENTS:  · Patient continues to require skilled intervention due to  above deficits affecting participation in basic ADLs and overall functional tolerance. Use of outcome tool(s) and clinical judgement create a POC that gives a: Clear prediction of patient's progress: LOW COMPLEXITY   TREATMENT:   (In addition to Assessment/Re-Assessment sessions the following treatments were rendered)    Pre-treatment Symptoms/Complaints: Patient reports he feels his walking is slowly improving. THERAPEUTIC EXERCISE: (45 minutes):  Exercises per grid below to improve mobility, strength and balance. Required minimal visual and verbal cues to promote proper body alignment and promote proper body posture. Progressed resistance, range and complexity of movement as indicated.      Date  3/27/19 Date:  4/1/2019 Date:  4/3/2019 Date  4/8/19 Date  4/10/19 Date:  4/15/19 Date:  4/17/19   Activity/Exercise  Parameters Parameters       Heel raises 2 x 10 reps B 2 x 10 reps B 2 x 10 reps B 2 x 10 reps B    2 x 10 reps B 2 x 10 reps B   SAQ  2 x 10 B 2# cuff        Sit to stand 2 x 5 reps with verbal cues and demonstration    2 x 5 reps with verbal cues and demonstration 2 x 5 reps with verbal cues and demonstration 2 x 5 reps with verbal cues and demonstration   balance on blue airex foam  3 x 30 sec eyes closed 3 x 30 sec eyes closed 3 x 30 second hold with eyes closed 3 x 30 second hold with eyes closed 3 x 30 second hold with eyes closed 3 x 30 second hold with eyes closed   Nustep  Level 3  10 minutes  Level 3 10 minutes Level 3 10 minutes Level 3  10 minutes Level 3  10 minutes Level 3   10 minutes Level 3   10 minutes   Gastrocnemius stretch    Incline board stretch  3 x 30 second hold ncline board stretch  3 x 30 second hold Incline board stretch 3 x 30 sec hold Incline board stretch 3 x 30 sec hold   Sidestepping in parallel bars X 4 laps leading with each LE with minimal use of hands X 4 laps leading with each LE with minimal use of hands X 4 laps leading with each LE with minimal use of hands   X 4 laps leading with each LE, minimal use of hands X 4 laps leading with each LE, minimal use of hands   Hip Abduction 1 x 10 reps B    1 x 10 reps B 1 x 10 reps   2 x 10 reps B 2 x 10 reps B 2 x 10 reps B   Hip extension 1 x 10 reps B      2 x 10 reps B 2 x 10 reps B 2 x 10 reps B   Standing Marching 1 x 10 reps B 1 x 10 reps B     Marching/high stepping in ll bars down and back x 2  2 x 10 reps B 2 x 10 reps B   LAQ 3# cuff weight  2 x 10 reps B 2# cuff weight 2 x 10 reps B        Semi tandem progressing towards Tandem stance 2 x 30 second hold with each LE  2 x 30 second hold witheach LE 2 x 30 second hold with each LE with some use of hands 2 x 30 second hold with each LE with minimal use of hands     Seated hamstring stretch With foot propped on 8 inch step and leaning forward 3 x 30 second hold B         Clamshells  1 x 10 B  1 x 10 B 2 x 10 reps B     2 x 10 reps B   Supine Bridge  1 x 10 1 x 10 1 x 10 reps      1 x 10 reps   Ambulation      Patient ambulated 200 feet without assistive device with SBA and verbal cues for heel strike and bigger step.  Patient ambulated 100 feet with slower gait and then instructed to walk faster for 100 feet  Patient ambulated 200 ft without assistive device with SBA and verbal cues for heel strike and wider FERMIN. Patient ambulated 200 ft without assistive device with  Verbal cues for wider FERMIN               MANUAL THERAPY: (0 minutes): Joint mobilization, Soft tissue mobilization and Manipulation was utilized and necessary because of the patient's restricted joint motion, painful spasm, loss of articular motion and restricted motion of soft tissue. (Used abbreviations: MET - muscle energy technique; PNF - proprioceptive neuromuscular facilitation; NMR - neuromuscular re-education; AP - anterior to posterior; PA - posterior to anterior)    MODALITIES: (0 minutes):      Not today    TREATMENT/SESSION ASSESSMENT:  Fermin Speaker demonstrated improved safety with ambulation with verbal cues for wider FERMIN. Improved bilateral hip stability with therapeutic exercise. · Pain/ Symptoms: Initial:   2/10 Post Session:  2/10 fatigue in legs ·   Compliance with Program/Exercises: Good compliance. · Recommendations/Intent for next treatment session: \"Next visit will focus on advancements to more challenging activities\".     Total Treatment Duration: 45 minutes   PT Patient Time In/Time Out  Time In: 1300  Time Out: [address]

## 2019-04-22 ENCOUNTER — HOSPITAL ENCOUNTER (OUTPATIENT)
Dept: PHYSICAL THERAPY | Age: 79
Discharge: HOME | End: 2019-04-22
Payer: MEDICARE

## 2019-04-22 PROCEDURE — 97110 THERAPEUTIC EXERCISES: CPT

## 2019-04-22 NOTE — PROGRESS NOTES
Sarah Moran [facility]  : 1940  [place] / [place] / Product Type: Medicare /  [address]  at   Alejandra Green [facility], [address], [place] [address] [facility]  [phone]   [phone]                OUTPATIENT PHYSICAL THERAPY:Daily Note 2019   Progress Note: 4/15/2019     ICD-10: Treatment Diagnosis:   Difficulty in walking, not elsewhere classified (R26.2)  Muscle weakness (generalized) (M62.81)        PRECAUTIONS/ALLERGIES:   Patient has no known allergies. FALL RISK SCORE: 2 (? 5 = High Risk)    MD ORDERS: Eval and Treat  MEDICAL/REFERRING DIAGNOSIS:  Encounter for follow-up examination after completed treatment for conditions other than malignant neoplasm [Z09]     DATE OF ONSET: Laminectomy L1-L4 2019    REFERRING PHYSICIAN: Barb Martin MD    RETURN PHYSICIAN APPOINTMENT: TBD by patient      Ambulatory/Rehab Services H2 Model Falls Risk Assessment    Risk Factors:       (1)  Gender [Male] Ability to Rise from Chair:       (1)  Pushes up, successful in one attempt    Falls Prevention Plan: Mobility Assistance Device (specify):  rollator   Total: (5 or greater = High Risk): 2     [facility] [address] [place] [address] States Patent #[id number]. Federal Law prohibits the replication, distribution or use without written permission from [facility] [address]:  Mr. Katarina Modi has attended 10 physical therapy session including initial evaluation as of 3/13/2019. Katarina Modi has met 5/5 STGs at this point. He is demonstrating improved dynamic balance with ambulation with no longer using rollator for assistance. Patient continues to demonstrate weakness in B hip musculature with intermittent narrowed FERMIN and trendelenburg gait pattern during single limb stance.   [facility] will benefit from continued skilled therapy to increase functional strength, balance, and coordination to improve safety with functional mobility. PROBLEM LIST (Impacting functional limitations):  1. Decreased Strength  2. Decreased ADL/Functional Activities  3. Decreased Transfer Abilities  4. Decreased Ambulation Ability/Technique  5. Decreased Balance  6. Increased Pain  7. Decreased Activity Tolerance  8. Increased Fatigue  9. Increased Shortness of Breath  10. Decreased Flexibility/Joint Mobility  11. Decreased [place] with Home Exercise Program INTERVENTIONS PLANNED:  1. Balance Exercise  2. Bed Mobility  3. Cold  4. Cryotherapy  5. Electrical Stimulation  6. Family Education  7. Gait Training  8. Heat  9. Home Exercise Program (HEP)  10. Manual Therapy  11. Neuromuscular Re-education/Strengthening  12. Range of Motion (ROM)  13. Therapeutic Activites  14. Therapeutic Exercise/Strengthening  15. Transfer Training  16. Mechanical traction  17. Aquatic Therapy  18. Electrical Stimulation   TREATMENT PLAN:  Effective Dates: 3/13/2019 TO 6/11/2019 (90 days). Frequency/Duration: 2 times a week for 90 Days    GOALS: (Goals have been discussed and agreed upon with patient.)  Short Term Goals 4 weeks   1. Jason Kirk will be independent with HEP to promote self-management of symptoms. MET  2. Jason Kirk will demonstrate a 5 point improvement on the LEFS to show improvement in function. MET  3. Jason Kirk will participate in core stabilization exercises to help with stabilization and improve posture during ADLs to help prevent future injuries. MET  4. Jason Kirk will participate in LE strengthening program with weights as appropriate to help with gait and elevations. MET  5. Jason Kirk will participate in static and dynamic balance activities to decrease the risk for falls and improve overall QOL. MET  6. Jason Kirk will perform TUG time of 10 seconds with LRAD to decrease risk of falls.  MET    Long Term Goals 12 weeks [address] Valdez will demonstrate a 10 point improvement on the LEFS to show improvement in function. 2. Sadaf Rondon will report <=2/10 pain at rest and during ADLs to improve QOL. 3. Sadaf Rondon will demonstrate >=4+/5 LE strength on manual muscle testing to improve functional mobility. [address] Valdez will be able to perform SLS >10 seconds bilaterally to help with gait and improve balance. Nataly Valdez will be able to demonstrate safe transfer mechanics without cueing for improved safety with home and community activities. Nataly Valdez will perform TUG test with <10 seconds with LRAD to decrease risk of falls. Rehabilitation Potential For Stated Goals: Good    Regarding Wykami Kate Jolly's therapy, I certify that the treatment plan above will be carried out by a therapist or under their direction. Thank you for this referral,  Junior Loja       Referring Physician Signature: Janell Noble MD              Date                    HISTORY:   PATIENT GOAL FOR PHYSICAL THERAPY:  \"I want to be able to walk without the rollator. \"      HISTORY OF PRESENT INJURY/ILLNESS (REASON FOR REFERRAL):  Patient had laminectomy on 2/12/2019 at levels L1-L4. Patient had been having pain in lower back for the previous year. Patient has had physical therapy in the past at this location prior to surgery as well as multiple injections to attempt to decrease pain. Note: Patient denies any increase of symptoms with cough, sneeze or valsalva. Patient denies any saddle paresthesia or bowel/bladder deficits.      PAST MEDICAL HISTORY/COMORBIDITIES:   Mr. Tiffany Cavazos  has a past medical history of Anxiety disorder (12/9/2014), Arthritis (12/9/2014), Depression (12/9/2014), Gout (12/9/2014), History of lumbar laminectomy for spinal cord decompression (2/18/2019), HTN (hypertension), benign (2/15/2017), Hyperlipemia (12/9/2014), Hypertension, Idiopathic chronic gout of right ankle (2014), Insomnia (2014), Kidney stone, Mixed hyperlipidemia (2014), Panic disorder (2014), Primary insomnia (2014), and Rosacea (2/15/2017). Mr. Alvan Aase  has a past surgical history that includes hx other surgical; hx cyst removal; hx cyst removal; hx hernia repair (Bilateral); hx ankle fracture tx (Left, 1974); hx wrist fracture tx (Bilateral, 1993); hx colonoscopy (); and hx cyst removal.    SOCIAL HISTORY/LIVING ENVIRONMENT:        Social History     Socioeconomic History    Marital status:      Spouse name: Not on file    Number of children: Not on file    Years of education: Not on file    Highest education level: Not on file   Occupational History    Not on file   Social Needs    Financial resource strain: Not on file    Food insecurity:     Worry: Not on file     Inability: Not on file    Transportation needs:     Medical: Not on file     Non-medical: Not on file   Tobacco Use    Smoking status: Former Smoker     Types: Cigarettes     Last attempt to quit: 1970     Years since quittin.3    Smokeless tobacco: Never Used   Substance and Sexual Activity    Alcohol use:  Yes     Alcohol/week: 0.0 oz    Drug use: No    Sexual activity: Yes     Partners: Female   Lifestyle    Physical activity:     Days per week: Not on file     Minutes per session: Not on file    Stress: Not on file   Relationships    Social connections:     Talks on phone: Not on file     Gets together: Not on file     Attends Holiness service: Not on file     Active member of club or organization: Not on file     Attends meetings of clubs or organizations: Not on file     Relationship status: Not on file    Intimate partner violence:     Fear of current or ex partner: Not on file     Emotionally abused: Not on file     Physically abused: Not on file     Forced sexual activity: Not on file   Other Topics Concern    Not on file   Social History Narrative    Not on file       LIFESTYLE Date: 3/13/2019   Occupation: Retired   Vigorous Activity: n/a   Expose individual to vibrations n/a   Unpleasant work environment n/a       PRIOR LEVEL OF FUNCTION/WORK/ACTIVITY:   Patient ambulated with straight cane prior to surgery. Patient's spouse reports he did not use straight cane correctly and should have been using rolling walker for safety. Active Ambulatory Problems     Diagnosis Date Noted    MEGGAN (generalized anxiety disorder) 12/09/2014    Arthritis 12/09/2014    Idiopathic chronic gout of right ankle 12/09/2014    Mixed hyperlipidemia 12/09/2014    Primary insomnia 12/09/2014    Panic disorder 12/09/2014    HTN (hypertension), benign 02/15/2017    Rosacea 02/15/2017    Idiopathic scoliosis 06/07/2018    Lumbar stenosis with neurogenic claudication 06/07/2018    [facility] 08/16/2018    History of lumbar laminectomy for spinal cord decompression 02/18/2019     Resolved Ambulatory Problems     Diagnosis Date Noted    Depression 12/09/2014     Past Medical History:   Diagnosis Date    Anxiety disorder 12/9/2014    Arthritis 12/9/2014    Depression 12/9/2014    Gout 12/9/2014    History of lumbar laminectomy for spinal cord decompression 2/18/2019    HTN (hypertension), benign 2/15/2017    Hyperlipemia 12/9/2014    Hypertension     Idiopathic chronic gout of right ankle 12/9/2014    Insomnia 12/9/2014    Kidney stone     Mixed hyperlipidemia 12/9/2014    Panic disorder 12/9/2014    Primary insomnia 12/9/2014    Rosacea 2/15/2017         CURRENT MEDICATIONS:    Current Outpatient Medications:     amLODIPine-Olmesartan 10-40 mg tab, TAKE 1 TABLET BY MOUTH DAILY. , Disp: 90 Tab, Rfl: 3    KLOR-CON 10 10 mEq tablet, TAKE 1 TABLET BY MOUTH DAILY. , Disp: 90 Tab, Rfl: 3    zolpidem (AMBIEN) 10 mg tablet, TAKE 1 TABLET BY MOUTH AT BEDTIME AS NEEDED FOR SLEEP, Disp: 30 Tab, Rfl: 5    atorvastatin (LIPITOR) 40 mg tablet, TAKE 1 TABLET BY MOUTH EVERY DAY, Disp: 90 Tab, Rfl: 3    potassium chloride SR (KLOR-CON 10) 10 mEq tablet, Take 1 Tab by mouth daily. , Disp: 90 Tab, Rfl: 3    amLODIPine-Olmesartan (CAROLA) 10-40 mg tab, Take 1 Tab by mouth daily. , Disp: 90 Tab, Rfl: 3    sertraline (ZOLOFT) 100 mg tablet, Take 1 Tab by mouth daily. One and half tab every day, Disp: 135 Tab, Rfl: 3    metroNIDAZOLE (METROGEL) 1 % topical gel, Apply  to affected area daily. Use a thin layer to affected areas after washing, Disp: 45 g, Rfl: 3    allopurinol (ZYLOPRIM) 100 mg tablet, Take 2 Tabs by mouth daily. , Disp: 180 Tab, Rfl: 3    Walker misc, Walker DX: lumbar stenosis with neurogenic claudication +antalgic gait with cane, Disp: 1 Each, Rfl: 0    DISABLED PLACARD (DISABLED PLACARD) DMV, To use with handicap placard form, Disp: 1 Each, Rfl: 0    gabapentin (NEURONTIN) 300 mg capsule, Take 1 Cap by mouth three (3) times daily. , Disp: 180 Cap, Rfl: 2    LORazepam (ATIVAN) 0.5 mg tablet, TAKE 1 TABLET BY MOUTH DAILY. , Disp: 30 Tab, Rfl: 3    diclofenac sodium (PENNSAID) 1.5 % drop, by Apply Externally route., Disp: , Rfl:     diclofenac (VOLTAREN) 1 % gel, Apply 4 g to affected area four (4) times daily. , Disp: 2 Each, Rfl: 3    multivitamin (ONE A DAY) tablet, Take 1 Tab by mouth daily. , Disp: , Rfl:     cholecalciferol, vitamin D3, (VITAMIN D3) 2,000 unit tab, Take  by mouth., Disp: , Rfl:      Date Last Reviewed:  4/22/2019   Number of Personal Factors/Comorbidities that affect the Plan of Care: 1-2: MODERATE COMPLEXITY   EXAMINATION:   OBSERVATION/ORTHOSTATIC POSTURAL ASSESSMENT:     -Pt sits with forward head and rounded shoulders which indicate tight anterior chest musculature, upper trapezius, and levator scapula and weak posterior scapula musculature and deep cervical flexors. Pt displays decreased core motor control indicating weak core and low back musculature.       BALANCE Date: 3/13/2019 Date: 3/13/2019   Right     Left       PELVIC COMPONENT Date:   3/13/2019 Date:  3/13/2019   Standing Mason     Standing PSIS     Standing Sacral Sulci     Gillet     Seated Mason     Seated PSIS     Supine-to-Sit leg length         AROM/PROM         Joint: Date: 3/13/2019  Date:  Date:    Active LE ROM Right Left Right Left Right Left   Hip Flexion         Hip Extension         Hip ER         Hip IR         Knee Extension         Knee Flexion         Knee Extension         Lumbar Flexion 55        Lumbar Extension 10        Lumbar Side-Bending 5        Lumbar Rotation NT          STRENGTH         Joint: Date: 3/13/2019  Date:  Date:     Right Left Right Left Right Left   Hip Abduction 4/5 4/5       Hip Adduction 4/5 4/5       Hip IR 4/5 4/5       Hip ER 4/5 4/5       Hip Flexion 4/5 4/5       Knee Extension 4/5 4/5       Knee Flexion 4/5 4/5       Ankle DF 4/5 4/5       Ankle PF 4/5 4/5       Ankle IV         Ankle EV         4/5    PALPATION Date:  3/13/2019 Date: Date:   TTP      TONE      PA GLIDES        SPECIAL TESTS: Assessed @ Initial Visit    -SCOUR: Negative   -90/90:     -R: Not tested    -L: Not tested   -SLR: Not tested   -SI COMPRESSION TEST: Not tested   -SI POST.  GAPPING:  Not tested   -GILLET TEST: Not tested   -SOL 4: Negative   -SLUMP TEST: Not tested   -DEEP SQUAT: Not tested       NEUROLOGICAL SCREEN: Assessed @ Initial Visit    -RADIATING SYMPTOMS: No     -DERMATOMES: In tact    -MYOTOMES Date: 3/13/2019  Date:  Date:     Right Left Right Left Right Left   L2 & L3   (Hip Flexors) 5/5 5/5       L3-L4  (Knee Extensors) 5/5 5/5       L4  (Ankle DFs) 4+/5 4+/5       L5  (Hallux Ext) 4+/5 4+/5       L5-S1  (Ankle PFs) 4+/5 4+/5       S1-S2  (Ankle EVs) 4+/5 4+/5         RED FLAGS: Date: 3/13/2019   Non-Mechanical pain distribution (cannot be produced, changed, or reduced during exam): NO   Cauda Equina Dysfunction: NO   Upper lumbar disc herniation in younger patients (femoral nerve tension test for lateral disc herniation in lower lumbar): NO Lumbar compression fracture (age > 48, trauma, corticosteroid use NO   Spine Cancer (age > 48, pervious history of cancer, failure to improve in 1 month of therapy, no relief - be rest, duration > 1 month, unexplained weight loss, insidious onset, constitutional symptoms): NO   Ankylosing Spondylitis (age < 36, pain not relieved by supine, morning back stiffness, pain duration > 3 months, improved by exercise): NO   Sacral Fracture: NO       FUNCTIONAL MOBILITY:  Assessed @ Initial Visit    -Affecting participation in basic ADLs and functional tasks.   -Limited tolerance of walking and standing   -Ambulation/Gait: Ambulates with mild forward head posture with use of rollator for safety.   -Bed mobility:  MOD I   -Stairs: NT   -Transfers: MOD I    -Wheelchair: N/A     Body Structures Involved:  1. Bones  2. Joints  3. Muscles  4. Ligaments Body Functions Affected:  1. Sensory/Pain  2. Neuromusculoskeletal  3. Movement Related Activities and Participation Affected:  1. Mobility  2. Self Care  3. Domestic Life  4. Interpersonal Interactions and Relationships  5. Community, Social and [place]   Number of elements that affect the Plan of Care: 4+: HIGH COMPLEXITY   CLINICAL PRESENTATION:   Presentation: Stable and uncomplicated: LOW COMPLEXITY   CLINICAL DECISION MAKING:   OUTCOME MEASURE USED:   Tool Used: Tool Used: Lower Extremity Functional Scale (LEFS)  Score:  Initial: 31/80 Most Recent: 37/80 (Date: 4/15/2019 )   Interpretation of Score: 20 questions each scored on a 5 point scale with 0 representing \"extreme difficulty or unable to perform\" and 4 representing \"no difficulty\". The lower the score, the greater the functional disability. 80/80 represents no disability. Minimal detectable change is 9 points. Tool Used: Timed Up and Go (TUG)  Score:  Initial: 12.59 seconds (with rollator) Most Recent: 9.86 seconds (Date: 4/15/2019 )   Interpretation of Score:  The test measures, in seconds, the time taken by an individual to stand up from a standard arm chair (seat height 46 cm [18 in], arm height 65 cm [25.6 in]), walk a distance of 3 meters (118 in, approx 10 ft), turn, walk back to the chair and sit down. If the individual takes longer than 14 seconds to complete TUG, this indicates risk for falls. TUG attempted second time without use of rollator: 16.45 seconds    [place] / Product Type: Medicare /     MEDICAL NECESSITY:  · Skilled intervention continues to be required due to above deficits affecting participation in basic ADLs and overall functional tolerance. REASON FOR SERVICES/OTHER COMMENTS:  · Patient continues to require skilled intervention due to  above deficits affecting participation in basic ADLs and overall functional tolerance. Use of outcome tool(s) and clinical judgement create a POC that gives a: Clear prediction of patient's progress: LOW COMPLEXITY   TREATMENT:   (In addition to Assessment/Re-Assessment sessions the following treatments were rendered)    Pre-treatment Symptoms/Complaints: Patient reports not performing HEP over weekend due to holiday weekend. Patient continuing to increase time with ambulation without use of rollator. THERAPEUTIC EXERCISE: (45 minutes):  Exercises per grid below to improve mobility, strength and balance. Required minimal visual and verbal cues to promote proper body alignment and promote proper body posture. Progressed resistance, range and complexity of movement as indicated.      Date:  4/1/2019 Date:  4/3/2019 Date  4/8/19 Date  4/10/19 Date:  4/15/19 Date:  4/17/19 Date:  4/22/19   Activity/Exercise Parameters Parameters        Heel raises 2 x 10 reps B 2 x 10 reps B 2 x 10 reps B    2 x 10 reps B 2 x 10 reps B 2 x 10 reps B   SAQ 2 x 10 B 2# cuff         Sit to stand    2 x 5 reps with verbal cues and demonstration 2 x 5 reps with verbal cues and demonstration 2 x 5 reps with verbal cues and demonstration 2 x 5 reps with verbal cues and demonstartion   balance on blue airex foam 3 x 30 sec eyes closed 3 x 30 sec eyes closed 3 x 30 second hold with eyes closed 3 x 30 second hold with eyes closed 3 x 30 second hold with eyes closed 3 x 30 second hold with eyes closed 3 x 30 second hold with eyes closed   Nustep  Level 3 10 minutes Level 3 10 minutes Level 3  10 minutes Level 3  10 minutes Level 3   10 minutes Level 3   10 minutes Level 3   10 minutes   Gastrocnemius stretch   Incline board stretch  3 x 30 second hold ncline board stretch  3 x 30 second hold Incline board stretch 3 x 30 sec hold Incline board stretch 3 x 30 sec hold Incline board stretch 3 x 30 sec hold   Sidestepping in parallel bars X 4 laps leading with each LE with minimal use of hands X 4 laps leading with each LE with minimal use of hands   X 4 laps leading with each LE, minimal use of hands X 4 laps leading with each LE, minimal use of hands    Hip Abduction    1 x 10 reps B 1 x 10 reps   2 x 10 reps B 2 x 10 reps B 2 x 10 reps B 2 x 10 reps B   Hip extension      2 x 10 reps B 2 x 10 reps B 2 x 10 reps B 2 x 10 reps B   Standing Marching 1 x 10 reps B     Marching/high stepping in ll bars down and back x 2  2 x 10 reps B 2 x 10 reps B 2 x 10 reps B   LAQ 2# cuff weight 2 x 10 reps B         Semi tandem progressing towards Tandem stance  2 x 30 second hold witheach LE 2 x 30 second hold with each LE with some use of hands 2 x 30 second hold with each LE with minimal use of hands      Seated hamstring stretch          Clamshells 1 x 10 B  1 x 10 B 2 x 10 reps B     2 x 10 reps B    Supine Bridge 1 x 10 1 x 10 1 x 10 reps      1 x 10 reps    Ambulation     Patient ambulated 200 feet without assistive device with SBA and verbal cues for heel strike and bigger step.  Patient ambulated 100 feet with slower gait and then instructed to walk faster for 100 feet  Patient ambulated 200 ft without assistive device with SBA and verbal cues for heel strike and wider FERMIN. Patient ambulated 200 ft without assistive device with  Verbal cues for wider FERMIN Patient ambulated 15' x 3 with mirror placed in front of patient for visual cues for feedback regarding body mechanics gait. MANUAL THERAPY: (0 minutes): Joint mobilization, Soft tissue mobilization and Manipulation was utilized and necessary because of the patient's restricted joint motion, painful spasm, loss of articular motion and restricted motion of soft tissue. (Used abbreviations: MET - muscle energy technique; PNF - proprioceptive neuromuscular facilitation; NMR - neuromuscular re-education; AP - anterior to posterior; PA - posterior to anterior)    MODALITIES: (0 minutes):      Not today    TREATMENT/SESSION ASSESSMENT:  Sadaf Card demonstrated increased endurance with therapeutic exercise. Patient continues to demonstrate weakness in B hip musculature which may be leading towards increased lateral trunk lean during single limb stance with ambulation. · Pain/ Symptoms: Initial:   2/10 Post Session:  2/10 fatigue in legs ·   Compliance with Program/Exercises: Good compliance. · Recommendations/Intent for next treatment session: \"Next visit will focus on advancements to more challenging activities\".     Total Treatment Duration: 45 minutes   PT Patient Time In/Time Out  Time In: 1100  Time Out: [id number] [facility]

## 2019-04-24 ENCOUNTER — HOSPITAL ENCOUNTER (OUTPATIENT)
Dept: PHYSICAL THERAPY | Age: 79
Discharge: HOME | End: 2019-04-24
Payer: MEDICARE

## 2019-04-24 PROCEDURE — 97110 THERAPEUTIC EXERCISES: CPT

## 2019-04-24 NOTE — PROGRESS NOTES
Juan Carlos Hassan [facility]  : 1940  [place] / [place] / Product Type: Medicare /  [address]  at [address]  [phone]   [phone]                OUTPATIENT PHYSICAL THERAPY:Daily Note 2019   Progress Note: 4/15/2019     ICD-10: Treatment Diagnosis:   Difficulty in walking, not elsewhere classified (R26.2)  Muscle weakness (generalized) (M62.81)        PRECAUTIONS/ALLERGIES:   Patient has no known allergies. FALL RISK SCORE: 2 (? 5 = High Risk)    MD ORDERS: Eval and Treat  MEDICAL/REFERRING DIAGNOSIS:  Encounter for follow-up examination after completed treatment for conditions other than malignant neoplasm [Z09]     DATE OF ONSET: Laminectomy L1-L4 2019    REFERRING PHYSICIAN: Valentin Sullivan MD    RETURN PHYSICIAN APPOINTMENT: TBD by patient      Ambulatory/Rehab Services H2 Model Falls Risk Assessment    Risk Factors:       (1)  Gender [Male] Ability to Rise from Chair:       (1)  Pushes up, successful in one attempt    Falls Prevention Plan: Mobility Assistance Device (specify):  rollator   Total: (5 or greater = High Risk): 2     [facility] [place] [address] [place] [address] States Patent #[id number]. Federal Law prohibits the replication, distribution or use without written permission from [facility]:  Mr. Leena Parisi has attended 10 physical therapy session including initial evaluation as of 3/13/2019. Leena Parisi has met 5/5 STGs at this point. He is demonstrating improved dynamic balance with ambulation with no longer using rollator for assistance. Patient continues to demonstrate weakness in B hip musculature with intermittent narrowed FERMIN and trendelenburg gait pattern during single limb stance.  Mr. CAMACHO [facility] will benefit from continued skilled therapy to increase functional strength, balance, and coordination to improve safety with functional mobility. PROBLEM LIST (Impacting functional limitations):  1. Decreased Strength  2. Decreased ADL/Functional Activities  3. Decreased Transfer Abilities  4. Decreased Ambulation Ability/Technique  5. Decreased Balance  6. Increased Pain  7. Decreased Activity Tolerance  8. Increased Fatigue  9. Increased Shortness of Breath  10. Decreased Flexibility/Joint Mobility  11. Decreased Peck with Home Exercise Program INTERVENTIONS PLANNED:  1. Balance Exercise  2. Bed Mobility  3. Cold  4. Cryotherapy  5. Electrical Stimulation  6. Family Education  7. Gait Training  8. Heat  9. Home Exercise Program (HEP)  10. Manual Therapy  11. Neuromuscular Re-education/Strengthening  12. Range of Motion (ROM)  13. Therapeutic Activites  14. Therapeutic Exercise/Strengthening  15. Transfer Training  16. Mechanical traction  17. Aquatic Therapy  18. Electrical Stimulation   TREATMENT PLAN:  Effective Dates: 3/13/2019 TO 6/11/2019 (90 days). Frequency/Duration: 2 times a week for 90 Days    GOALS: (Goals have been discussed and agreed upon with patient.)  Short Term Goals 4 weeks   1. Rosa Isela Gonzalez will be independent with HEP to promote self-management of symptoms. MET  2. Rosa Isela Gonzalez will demonstrate a 5 point improvement on the LEFS to show improvement in function. MET  3. Rosa Isela Gonzalez will participate in core stabilization exercises to help with stabilization and improve posture during ADLs to help prevent future injuries. MET  4. Rosa Isela Gonzalez will participate in LE strengthening program with weights as appropriate to help with gait and elevations. MET  5. Rosa Isela Gonzalez will participate in static and dynamic balance activities to decrease the risk for falls and improve overall QOL. MET  6. Rosa Isela Gonzalez will perform TUG time of 10 seconds with LRAD to decrease risk of falls.  MET    Long Term Goals 12 weeks [address] will demonstrate a 10 point improvement on the LEFS to show improvement in function. 2. Katarina Modi will report <=2/10 pain at rest and during ADLs to improve QOL. 3. Katarina Modi will demonstrate >=4+/5 LE strength on manual muscle testing to improve functional mobility. [address] Valdez will be able to perform SLS >10 seconds bilaterally to help with gait and improve balance. Tayla9 Steven Valdez will be able to demonstrate safe transfer mechanics without cueing for improved safety with home and community activities. Tayla9 Steven Valdez will perform TUG test with <10 seconds with LRAD to decrease risk of falls. Rehabilitation Potential For Stated Goals: Good    Regarding Sarahdiomedes Jolly's therapy, I certify that the treatment plan above will be carried out by a therapist or under their direction. Thank you for this referral,  Zayra Dooley       Referring Physician Signature: Barb Martin MD              Date                    HISTORY:   PATIENT GOAL FOR PHYSICAL THERAPY:  \"I want to be able to walk without the rollator. \"      HISTORY OF PRESENT INJURY/ILLNESS (REASON FOR REFERRAL):  Patient had laminectomy on 2/12/2019 at levels L1-L4. Patient had been having pain in lower back for the previous year. Patient has had physical therapy in the past at this location prior to surgery as well as multiple injections to attempt to decrease pain. Note: Patient denies any increase of symptoms with cough, sneeze or valsalva. Patient denies any saddle paresthesia or bowel/bladder deficits.      PAST MEDICAL HISTORY/COMORBIDITIES:   Mr. Jodie Dance  has a past medical history of Anxiety disorder (12/9/2014), Arthritis (12/9/2014), Depression (12/9/2014), Gout (12/9/2014), History of lumbar laminectomy for spinal cord decompression (2/18/2019), HTN (hypertension), benign (2/15/2017), Hyperlipemia (12/9/2014), Hypertension, Idiopathic chronic gout of right ankle (2014), Insomnia (2014), Kidney stone, Mixed hyperlipidemia (2014), Panic disorder (2014), Primary insomnia (2014), and Rosacea (2/15/2017). Mr. Lito Moody  has a past surgical history that includes hx other surgical; hx cyst removal; hx cyst removal; hx hernia repair (Bilateral); hx ankle fracture tx (Left, 1974); hx wrist fracture tx (Bilateral, 1993); hx colonoscopy (); and hx cyst removal.    SOCIAL HISTORY/LIVING ENVIRONMENT:        Social History     Socioeconomic History    Marital status:      Spouse name: Not on file    Number of children: Not on file    Years of education: Not on file    Highest education level: Not on file   Occupational History    Not on file   Social Needs    Financial resource strain: Not on file    Food insecurity:     Worry: Not on file     Inability: Not on file    Transportation needs:     Medical: Not on file     Non-medical: Not on file   Tobacco Use    Smoking status: Former Smoker     Types: Cigarettes     Last attempt to quit: 1970     Years since quittin.3    Smokeless tobacco: Never Used   Substance and Sexual Activity    Alcohol use:  Yes     Alcohol/week: 0.0 oz    Drug use: No    Sexual activity: Yes     Partners: Female   Lifestyle    Physical activity:     Days per week: Not on file     Minutes per session: Not on file    Stress: Not on file   Relationships    Social connections:     Talks on phone: Not on file     Gets together: Not on file     Attends [employer] service: Not on file     Active member of club or organization: Not on file     Attends meetings of clubs or organizations: Not on file     Relationship status: Not on file    Intimate partner violence:     Fear of current or ex partner: Not on file     Emotionally abused: Not on file     Physically abused: Not on file     Forced sexual activity: Not on file   Other Topics Concern    Not on file   Social History Narrative    Not on file       LIFESTYLE Date: 3/13/2019   Occupation: Retired   Vigorous Activity: n/a   Expose individual to vibrations n/a   Unpleasant work environment n/a       PRIOR LEVEL OF FUNCTION/WORK/ACTIVITY:   Patient ambulated with straight cane prior to surgery. Patient's spouse reports he did not use straight cane correctly and should have been using rolling walker for safety. Active Ambulatory Problems     Diagnosis Date Noted    MEGGAN (generalized anxiety disorder) 12/09/2014    Arthritis 12/09/2014    Idiopathic chronic gout of right ankle 12/09/2014    Mixed hyperlipidemia 12/09/2014    Primary insomnia 12/09/2014    Panic disorder 12/09/2014    HTN (hypertension), benign 02/15/2017    Rosacea 02/15/2017    Idiopathic scoliosis 06/07/2018    Lumbar stenosis with neurogenic claudication 06/07/2018    [facility] 08/16/2018    History of lumbar laminectomy for spinal cord decompression 02/18/2019     Resolved Ambulatory Problems     Diagnosis Date Noted    Depression 12/09/2014     Past Medical History:   Diagnosis Date    Anxiety disorder 12/9/2014    Arthritis 12/9/2014    Depression 12/9/2014    Gout 12/9/2014    History of lumbar laminectomy for spinal cord decompression 2/18/2019    HTN (hypertension), benign 2/15/2017    Hyperlipemia 12/9/2014    Hypertension     Idiopathic chronic gout of right ankle 12/9/2014    Insomnia 12/9/2014    Kidney stone     Mixed hyperlipidemia 12/9/2014    Panic disorder 12/9/2014    Primary insomnia 12/9/2014    Rosacea 2/15/2017         CURRENT MEDICATIONS:    Current Outpatient Medications:     amLODIPine-Olmesartan 10-40 mg tab, TAKE 1 TABLET BY MOUTH DAILY. , Disp: 90 Tab, Rfl: 3    KLOR-CON 10 10 mEq tablet, TAKE 1 TABLET BY MOUTH DAILY. , Disp: 90 Tab, Rfl: 3    zolpidem (AMBIEN) 10 mg tablet, TAKE 1 TABLET BY MOUTH AT BEDTIME AS NEEDED FOR SLEEP, Disp: 30 Tab, Rfl: 5    atorvastatin (LIPITOR) 40 mg tablet, TAKE 1 TABLET BY MOUTH EVERY DAY, Disp: 90 Tab, Rfl: 3    potassium chloride SR (KLOR-CON 10) 10 mEq tablet, Take 1 Tab by mouth daily. , Disp: 90 Tab, Rfl: 3    amLODIPine-Olmesartan (CAROLA) 10-40 mg tab, Take 1 Tab by mouth daily. , Disp: 90 Tab, Rfl: 3    sertraline (ZOLOFT) 100 mg tablet, Take 1 Tab by mouth daily. One and half tab every day, Disp: 135 Tab, Rfl: 3    metroNIDAZOLE (METROGEL) 1 % topical gel, Apply  to affected area daily. Use a thin layer to affected areas after washing, Disp: 45 g, Rfl: 3    allopurinol (ZYLOPRIM) 100 mg tablet, Take 2 Tabs by mouth daily. , Disp: 180 Tab, Rfl: 3    Walker misc, Walker DX: lumbar stenosis with neurogenic claudication +antalgic gait with cane, Disp: 1 Each, Rfl: 0    DISABLED PLACARD (DISABLED PLACARD) DMV, To use with handicap placard form, Disp: 1 Each, Rfl: 0    gabapentin (NEURONTIN) 300 mg capsule, Take 1 Cap by mouth three (3) times daily. , Disp: 180 Cap, Rfl: 2    LORazepam (ATIVAN) 0.5 mg tablet, TAKE 1 TABLET BY MOUTH DAILY. , Disp: 30 Tab, Rfl: 3    diclofenac sodium (PENNSAID) 1.5 % drop, by Apply Externally route., Disp: , Rfl:     diclofenac (VOLTAREN) 1 % gel, Apply 4 g to affected area four (4) times daily. , Disp: 2 Each, Rfl: 3    multivitamin (ONE A DAY) tablet, Take 1 Tab by mouth daily. , Disp: , Rfl:     cholecalciferol, vitamin D3, (VITAMIN D3) 2,000 unit tab, Take  by mouth., Disp: , Rfl:      Date Last Reviewed:  4/24/2019   Number of Personal Factors/Comorbidities that affect the Plan of Care: 1-2: MODERATE COMPLEXITY   EXAMINATION:   OBSERVATION/ORTHOSTATIC POSTURAL ASSESSMENT:     -Pt sits with forward head and rounded shoulders which indicate tight anterior chest musculature, upper trapezius, and levator scapula and weak posterior scapula musculature and deep cervical flexors. Pt displays decreased core motor control indicating weak core and low back musculature.       BALANCE Date: 3/13/2019 Date: 3/13/2019   Right     Left       PELVIC COMPONENT Date:   3/13/2019 Date:  3/13/2019   Standing Mason     Standing PSIS     Standing Sacral Sulci     Gillet     Seated Mason     Seated PSIS     Supine-to-Sit leg length         AROM/PROM         Joint: Date: 3/13/2019  Date:  Date:    Active LE ROM Right Left Right Left Right Left   Hip Flexion         Hip Extension         Hip ER         Hip IR         Knee Extension         Knee Flexion         Knee Extension         Lumbar Flexion 55        Lumbar Extension 10        Lumbar Side-Bending 5        Lumbar Rotation NT          STRENGTH         Joint: Date: 3/13/2019  Date:  Date:     Right Left Right Left Right Left   Hip Abduction 4/5 4/5       Hip Adduction 4/5 4/5       Hip IR 4/5 4/5       Hip ER 4/5 4/5       Hip Flexion 4/5 4/5       Knee Extension 4/5 4/5       Knee Flexion 4/5 4/5       Ankle DF 4/5 4/5       Ankle PF 4/5 4/5       Ankle IV         Ankle EV         4/5    PALPATION Date:  3/13/2019 Date: Date:   TTP      TONE      PA GLIDES        SPECIAL TESTS: Assessed @ Initial Visit    -SCOUR: Negative   -90/90:     -R: Not tested    -L: Not tested   -SLR: Not tested   -SI COMPRESSION TEST: Not tested   -SI POST.  GAPPING:  Not tested   -GILLET TEST: Not tested   -SOL 4: Negative   -SLUMP TEST: Not tested   -DEEP SQUAT: Not tested       NEUROLOGICAL SCREEN: Assessed @ Initial Visit    -RADIATING SYMPTOMS: No     -DERMATOMES: In tact    -MYOTOMES Date: 3/13/2019  Date:  Date:     Right Left Right Left Right Left   L2 & L3   (Hip Flexors) 5/5 5/5       L3-L4  (Knee Extensors) 5/5 5/5       L4  (Ankle DFs) 4+/5 4+/5       L5  (Hallux Ext) 4+/5 4+/5       L5-S1  (Ankle PFs) 4+/5 4+/5       S1-S2  (Ankle EVs) 4+/5 4+/5         RED FLAGS: Date: 3/13/2019   Non-Mechanical pain distribution (cannot be produced, changed, or reduced during exam): NO   Cauda Equina Dysfunction: NO   Upper lumbar disc herniation in younger patients (femoral nerve tension test for lateral disc herniation in lower lumbar): NO Lumbar compression fracture (age > 48, trauma, corticosteroid use NO   Spine Cancer (age > 48, pervious history of cancer, failure to improve in 1 month of therapy, no relief - be rest, duration > 1 month, unexplained weight loss, insidious onset, constitutional symptoms): NO   Ankylosing Spondylitis (age < 36, pain not relieved by supine, morning back stiffness, pain duration > 3 months, improved by exercise): NO   Sacral Fracture: NO       FUNCTIONAL MOBILITY:  Assessed @ Initial Visit    -Affecting participation in basic ADLs and functional tasks.   -Limited tolerance of walking and standing   -Ambulation/Gait: Ambulates with mild forward head posture with use of rollator for safety.   -Bed mobility:  MOD I   -Stairs: NT   -Transfers: MOD I    -Wheelchair: N/A     Body Structures Involved:  1. Bones  2. Joints  3. Muscles  4. Ligaments Body Functions Affected:  1. Sensory/Pain  2. Neuromusculoskeletal  3. Movement Related Activities and Participation Affected:  1. Mobility  2. Self Care  3. Domestic Life  4. Interpersonal Interactions and Relationships  5. Community, Social and [place]   Number of elements that affect the Plan of Care: 4+: HIGH COMPLEXITY   CLINICAL PRESENTATION:   Presentation: Stable and uncomplicated: LOW COMPLEXITY   CLINICAL DECISION MAKING:   OUTCOME MEASURE USED:   Tool Used: Tool Used: Lower Extremity Functional Scale (LEFS)  Score:  Initial: 31/80 Most Recent: 37/80 (Date: 4/15/2019 )   Interpretation of Score: 20 questions each scored on a 5 point scale with 0 representing \"extreme difficulty or unable to perform\" and 4 representing \"no difficulty\". The lower the score, the greater the functional disability. 80/80 represents no disability. Minimal detectable change is 9 points. Tool Used: Timed Up and Go (TUG)  Score:  Initial: 12.59 seconds (with rollator) Most Recent: 9.86 seconds (Date: 4/15/2019 )   Interpretation of Score:  The test measures, in seconds, the time taken by an individual to stand up from a standard arm chair (seat height 46 cm [18 in], arm height 65 cm [25.6 in]), walk a distance of 3 meters (118 in, approx 10 ft), turn, walk back to the chair and sit down. If the individual takes longer than 14 seconds to complete TUG, this indicates risk for falls. TUG attempted second time without use of rollator: 16.45 seconds    [place] / [place] / Product Type: Medicare /     MEDICAL NECESSITY:  · Skilled intervention continues to be required due to above deficits affecting participation in basic ADLs and overall functional tolerance. REASON FOR SERVICES/OTHER COMMENTS:  · Patient continues to require skilled intervention due to  above deficits affecting participation in basic ADLs and overall functional tolerance. Use of outcome tool(s) and clinical judgement create a POC that gives a: Clear prediction of patient's progress: LOW COMPLEXITY   TREATMENT:   (In addition to Assessment/Re-Assessment sessions the following treatments were rendered)    Pre-treatment Symptoms/Complaints: Mr. Nima Powell reports he felt sore yesterday in bilateral hips following therapy session on Monday (4/22/19). Patient reports he is using rollator less and less at home. THERAPEUTIC EXERCISE: (45 minutes):  Exercises per grid below to improve mobility, strength and balance. Required minimal visual and verbal cues to promote proper body alignment and promote proper body posture. Progressed resistance, range and complexity of movement as indicated.      Date:  4/3/2019 Date  4/8/19 Date  4/10/19 Date:  4/15/19 Date:  4/17/19 Date:  4/22/19 Date:  4/24/19   Activity/Exercise Parameters         Heel raises 2 x 10 reps B 2 x 10 reps B    2 x 10 reps B 2 x 10 reps B 2 x 10 reps B 2 x 10 reps B   SAQ          Sit to stand   2 x 5 reps with verbal cues and demonstration 2 x 5 reps with verbal cues and demonstration 2 x 5 reps with verbal cues and demonstration 2 x 5 reps with verbal cues and demonstartion    balance on blue airex foam 3 x 30 sec eyes closed 3 x 30 second hold with eyes closed 3 x 30 second hold with eyes closed 3 x 30 second hold with eyes closed 3 x 30 second hold with eyes closed 3 x 30 second hold with eyes closed    Nustep  Level 3 10 minutes Level 3  10 minutes Level 3  10 minutes Level 3   10 minutes Level 3   10 minutes Level 3   10 minutes Level 3  10 minutes   Gastrocnemius stretch  Incline board stretch  3 x 30 second hold ncline board stretch  3 x 30 second hold Incline board stretch 3 x 30 sec hold Incline board stretch 3 x 30 sec hold Incline board stretch 3 x 30 sec hold Incline board stretch 3 x 30 sec hold   Sidestepping in parallel bars X 4 laps leading with each LE with minimal use of hands   X 4 laps leading with each LE, minimal use of hands X 4 laps leading with each LE, minimal use of hands     Hip Abduction 1 x 10 reps B 1 x 10 reps   2 x 10 reps B 2 x 10 reps B 2 x 10 reps B 2 x 10 reps B 2 x 10 reps B   Hip extension   2 x 10 reps B 2 x 10 reps B 2 x 10 reps B 2 x 10 reps B 2 x 10 reps B   Standing Marching   Marching/high stepping in ll bars down and back x 2  2 x 10 reps B 2 x 10 reps B 2 x 10 reps B 2 x 10 reps B   Balance Board       AP/ML tapping x 20    AP/ML 30 sec hold for balance x 2   Semi tandem progressing towards Tandem stance 2 x 30 second hold witheach LE 2 x 30 second hold with each LE with some use of hands 2 x 30 second hold with each LE with minimal use of hands    2 x 30 sec hold with each LE leading, minimal hold of B UE   Seated hamstring stretch          Clamshells 1 x 10 B 2 x 10 reps B     2 x 10 reps B     Supine Bridge 1 x 10 1 x 10 reps      1 x 10 reps     Ambulation    Patient ambulated 200 feet without assistive device with SBA and verbal cues for heel strike and bigger step.  Patient ambulated 100 feet with slower gait and then instructed to walk faster for 100 feet  Patient ambulated 200 ft without assistive device with SBA and verbal cues for heel strike and wider FERMIN. Patient ambulated 200 ft without assistive device with  Verbal cues for wider FERMIN Patient ambulated 15' x 3 with mirror placed in front of patient for visual cues for feedback regarding body mechanics gait. Patient ambulated 200 ft without assistive device with verbal cues for wider FERMIN               MANUAL THERAPY: (0 minutes): Joint mobilization, Soft tissue mobilization and Manipulation was utilized and necessary because of the patient's restricted joint motion, painful spasm, loss of articular motion and restricted motion of soft tissue. (Used abbreviations: MET - muscle energy technique; PNF - proprioceptive neuromuscular facilitation; NMR - neuromuscular re-education; AP - anterior to posterior; PA - posterior to anterior)    MODALITIES: (0 minutes):      Not today    TREATMENT/SESSION ASSESSMENT:  Elizabeth Hernandez demonstrated improved balance with therapeutic exercise this visit. Reported increased fatigue in legs following session. · Pain/ Symptoms: Initial:   2/10 Post Session:  2/10 fatigue in legs ·   Compliance with Program/Exercises: Good compliance. · Recommendations/Intent for next treatment session: \"Next visit will focus on advancements to more challenging activities\".     Total Treatment Duration: 45 minutes   PT Patient Time In/Time Out  Time In: 1015  Time Out: [facility]

## 2019-04-29 ENCOUNTER — HOSPITAL ENCOUNTER (OUTPATIENT)
Dept: PHYSICAL THERAPY | Age: 79
Discharge: HOME | End: 2019-04-29
Payer: MEDICARE

## 2019-04-29 PROCEDURE — 97110 THERAPEUTIC EXERCISES: CPT

## 2019-04-29 NOTE — PROGRESS NOTES
[facility]  : 1940  [place] / [place] / Product Type: Medicare /  [address]  at [facility]  [address] [facility], [address]  [phone]   [phone]                OUTPATIENT PHYSICAL THERAPY:Daily Note 2019   Progress Note: 4/15/2019     ICD-10: Treatment Diagnosis:   Difficulty in walking, not elsewhere classified (R26.2)  Muscle weakness (generalized) (M62.81)        PRECAUTIONS/ALLERGIES:   Patient has no known allergies. FALL RISK SCORE: 2 (? 5 = High Risk)    MD ORDERS: Eval and Treat  MEDICAL/REFERRING DIAGNOSIS:  Encounter for follow-up examination after completed treatment for conditions other than malignant neoplasm [Z09]     DATE OF ONSET: Laminectomy L1-L4 2019    REFERRING PHYSICIAN: Khari Wick MD    RETURN PHYSICIAN APPOINTMENT: TBD by patient      Ambulatory/Rehab Services H2 Model Falls Risk Assessment    Risk Factors:       (1)  Gender [Male] Ability to Rise from Chair:       (1)  Pushes up, successful in one attempt    Falls Prevention Plan: Mobility Assistance Device (specify):  rollator   Total: (5 or greater = High Risk): 2     [facility] [address] [place] [address] States Patent #[id number]. Federal Law prohibits the replication, distribution or use without written permission from [facility] [address]:  Mr. Carlos Apple has attended 10 physical therapy session including initial evaluation as of 3/13/2019. Carlos Apple has met 5/5 STGs at this point. He is demonstrating improved dynamic balance with ambulation with no longer using rollator for assistance. Patient continues to demonstrate weakness in B hip musculature with intermittent narrowed FERMIN and trendelenburg gait pattern during single limb stance.   [facility] will benefit from continued skilled therapy to increase functional strength, balance, and coordination to improve safety with functional mobility. PROBLEM LIST (Impacting functional limitations):  1. Decreased Strength  2. Decreased ADL/Functional Activities  3. Decreased Transfer Abilities  4. Decreased Ambulation Ability/Technique  5. Decreased Balance  6. Increased Pain  7. Decreased Activity Tolerance  8. Increased Fatigue  9. Increased Shortness of Breath  10. Decreased Flexibility/Joint Mobility  11. Decreased [place] with Home Exercise Program INTERVENTIONS PLANNED:  1. Balance Exercise  2. Bed Mobility  3. Cold  4. Cryotherapy  5. Electrical Stimulation  6. Family Education  7. Gait Training  8. Heat  9. Home Exercise Program (HEP)  10. Manual Therapy  11. Neuromuscular Re-education/Strengthening  12. Range of Motion (ROM)  13. Therapeutic Activites  14. Therapeutic Exercise/Strengthening  15. Transfer Training  16. Mechanical traction  17. Aquatic Therapy  18. Electrical Stimulation   TREATMENT PLAN:  Effective Dates: 3/13/2019 TO 6/11/2019 (90 days). Frequency/Duration: 2 times a week for 90 Days    GOALS: (Goals have been discussed and agreed upon with patient.)  Short Term Goals 4 weeks   1. Sanjana Clay will be independent with HEP to promote self-management of symptoms. MET  2. Sanjana Clay will demonstrate a 5 point improvement on the LEFS to show improvement in function. MET  3. Sanjana Clay will participate in core stabilization exercises to help with stabilization and improve posture during ADLs to help prevent future injuries. MET  4. Sanjana Clay will participate in LE strengthening program with weights as appropriate to help with gait and elevations. MET  5. Sanjana Clay will participate in static and dynamic balance activities to decrease the risk for falls and improve overall QOL. MET  6. Sanjana Clay will perform TUG time of 10 seconds with LRAD to decrease risk of falls.  MET    Long Term Goals 12 weeks [address] will demonstrate a 10 point improvement on the LEFS to show improvement in function. 2. Violet Brenner will report <=2/10 pain at rest and during ADLs to improve QOL. 3. Violet Brenner will demonstrate >=4+/5 LE strength on manual muscle testing to improve functional mobility. [address] will be able to perform SLS >10 seconds bilaterally to help with gait and improve balance. [address] will be able to demonstrate safe transfer mechanics without cueing for improved safety with home and community activities. [address] will perform TUG test with <10 seconds with LRAD to decrease risk of falls. Rehabilitation Potential For Stated Goals: Good    Regarding Anthony Jolly's therapy, I certify that the treatment plan above will be carried out by a therapist or under their direction. Thank you for this referral,  Tree Kaiser       Referring Physician Signature: Aleksandr Albarran MD              Date                    HISTORY:   PATIENT GOAL FOR PHYSICAL THERAPY:  \"I want to be able to walk without the rollator. \"      HISTORY OF PRESENT INJURY/ILLNESS (REASON FOR REFERRAL):  Patient had laminectomy on 2/12/2019 at levels L1-L4. Patient had been having pain in lower back for the previous year. Patient has had physical therapy in the past at this location prior to surgery as well as multiple injections to attempt to decrease pain. Note: Patient denies any increase of symptoms with cough, sneeze or valsalva. Patient denies any saddle paresthesia or bowel/bladder deficits.      PAST MEDICAL HISTORY/COMORBIDITIES:   Mr. Vince Moore  has a past medical history of Anxiety disorder (12/9/2014), Arthritis (12/9/2014), Depression (12/9/2014), Gout (12/9/2014), History of lumbar laminectomy for spinal cord decompression (2/18/2019), HTN (hypertension), benign (2/15/2017), Hyperlipemia (12/9/2014), Hypertension, Idiopathic chronic gout of right ankle (2014), Insomnia (2014), Kidney stone, Mixed hyperlipidemia (2014), Panic disorder (2014), Primary insomnia (2014), and Rosacea (2/15/2017). Mr. Di Conde  has a past surgical history that includes hx other surgical; hx cyst removal; hx cyst removal; hx hernia repair (Bilateral); hx ankle fracture tx (Left, 1974); hx wrist fracture tx (Bilateral, 1993); hx colonoscopy (); and hx cyst removal.    SOCIAL HISTORY/LIVING ENVIRONMENT:        Social History     Socioeconomic History    Marital status:      Spouse name: Not on file    Number of children: Not on file    Years of education: Not on file    Highest education level: Not on file   Occupational History    Not on file   Social Needs    Financial resource strain: Not on file    Food insecurity:     Worry: Not on file     Inability: Not on file    Transportation needs:     Medical: Not on file     Non-medical: Not on file   Tobacco Use    Smoking status: Former Smoker     Types: Cigarettes     Last attempt to quit: 1970     Years since quittin.3    Smokeless tobacco: Never Used   Substance and Sexual Activity    Alcohol use:  Yes     Alcohol/week: 0.0 oz    Drug use: No    Sexual activity: Yes     Partners: Female   Lifestyle    Physical activity:     Days per week: Not on file     Minutes per session: Not on file    Stress: Not on file   Relationships    Social connections:     Talks on phone: Not on file     Gets together: Not on file     Attends [employer] service: Not on file     Active member of club or organization: Not on file     Attends meetings of clubs or organizations: Not on file     Relationship status: Not on file    Intimate partner violence:     Fear of current or ex partner: Not on file     Emotionally abused: Not on file     Physically abused: Not on file     Forced sexual activity: Not on file   Other Topics Concern    Not on file   Social History Narrative    Not on file       LIFESTYLE Date: 3/13/2019   Occupation: Retired   Vigorous Activity: n/a   Expose individual to vibrations n/a   Unpleasant work environment n/a       PRIOR LEVEL OF FUNCTION/WORK/ACTIVITY:   Patient ambulated with straight cane prior to surgery. Patient's spouse reports he did not use straight cane correctly and should have been using rolling walker for safety. Active Ambulatory Problems     Diagnosis Date Noted    MEGGAN (generalized anxiety disorder) 12/09/2014    Arthritis 12/09/2014    Idiopathic chronic gout of right ankle 12/09/2014    Mixed hyperlipidemia 12/09/2014    Primary insomnia 12/09/2014    Panic disorder 12/09/2014    HTN (hypertension), benign 02/15/2017    Rosacea 02/15/2017    Idiopathic scoliosis 06/07/2018    Lumbar stenosis with neurogenic claudication 06/07/2018    [facility] 08/16/2018    History of lumbar laminectomy for spinal cord decompression 02/18/2019     Resolved Ambulatory Problems     Diagnosis Date Noted    Depression 12/09/2014     Past Medical History:   Diagnosis Date    Anxiety disorder 12/9/2014    Arthritis 12/9/2014    Depression 12/9/2014    Gout 12/9/2014    History of lumbar laminectomy for spinal cord decompression 2/18/2019    HTN (hypertension), benign 2/15/2017    Hyperlipemia 12/9/2014    Hypertension     Idiopathic chronic gout of right ankle 12/9/2014    Insomnia 12/9/2014    Kidney stone     Mixed hyperlipidemia 12/9/2014    Panic disorder 12/9/2014    Primary insomnia 12/9/2014    Rosacea 2/15/2017         CURRENT MEDICATIONS:    Current Outpatient Medications:     amLODIPine-Olmesartan 10-40 mg tab, TAKE 1 TABLET BY MOUTH DAILY. , Disp: 90 Tab, Rfl: 3    KLOR-CON 10 10 mEq tablet, TAKE 1 TABLET BY MOUTH DAILY. , Disp: 90 Tab, Rfl: 3    zolpidem (AMBIEN) 10 mg tablet, TAKE 1 TABLET BY MOUTH AT BEDTIME AS NEEDED FOR SLEEP, Disp: 30 Tab, Rfl: 5    atorvastatin (LIPITOR) 40 mg tablet, TAKE 1 TABLET BY MOUTH EVERY DAY, Disp: 90 Tab, Rfl: 3    potassium chloride SR (KLOR-CON 10) 10 mEq tablet, Take 1 Tab by mouth daily. , Disp: 90 Tab, Rfl: 3    amLODIPine-Olmesartan (CAROLA) 10-40 mg tab, Take 1 Tab by mouth daily. , Disp: 90 Tab, Rfl: 3    sertraline (ZOLOFT) 100 mg tablet, Take 1 Tab by mouth daily. One and half tab every day, Disp: 135 Tab, Rfl: 3    metroNIDAZOLE (METROGEL) 1 % topical gel, Apply  to affected area daily. Use a thin layer to affected areas after washing, Disp: 45 g, Rfl: 3    allopurinol (ZYLOPRIM) 100 mg tablet, Take 2 Tabs by mouth daily. , Disp: 180 Tab, Rfl: 3    Walker misc, Walker DX: lumbar stenosis with neurogenic claudication +antalgic gait with cane, Disp: 1 Each, Rfl: 0    DISABLED PLACARD (DISABLED PLACARD) DMV, To use with handicap placard form, Disp: 1 Each, Rfl: 0    gabapentin (NEURONTIN) 300 mg capsule, Take 1 Cap by mouth three (3) times daily. , Disp: 180 Cap, Rfl: 2    LORazepam (ATIVAN) 0.5 mg tablet, TAKE 1 TABLET BY MOUTH DAILY. , Disp: 30 Tab, Rfl: 3    diclofenac sodium (PENNSAID) 1.5 % drop, by Apply Externally route., Disp: , Rfl:     diclofenac (VOLTAREN) 1 % gel, Apply 4 g to affected area four (4) times daily. , Disp: 2 Each, Rfl: 3    multivitamin (ONE A DAY) tablet, Take 1 Tab by mouth daily. , Disp: , Rfl:     cholecalciferol, vitamin D3, (VITAMIN D3) 2,000 unit tab, Take  by mouth., Disp: , Rfl:      Date Last Reviewed:  4/29/2019   Number of Personal Factors/Comorbidities that affect the Plan of Care: 1-2: MODERATE COMPLEXITY   EXAMINATION:   OBSERVATION/ORTHOSTATIC POSTURAL ASSESSMENT:     -Pt sits with forward head and rounded shoulders which indicate tight anterior chest musculature, upper trapezius, and levator scapula and weak posterior scapula musculature and deep cervical flexors. Pt displays decreased core motor control indicating weak core and low back musculature.       BALANCE Date: 3/13/2019 Date: 3/13/2019   Right     Left       PELVIC COMPONENT Date:   3/13/2019 Date:  3/13/2019   Standing Mason     Standing PSIS     Standing Sacral Sulci     Gillet     Seated Mason     Seated PSIS     Supine-to-Sit leg length         AROM/PROM         Joint: Date: 3/13/2019  Date:  Date:    Active LE ROM Right Left Right Left Right Left   Hip Flexion         Hip Extension         Hip ER         Hip IR         Knee Extension         Knee Flexion         Knee Extension         Lumbar Flexion 55        Lumbar Extension 10        Lumbar Side-Bending 5        Lumbar Rotation NT          STRENGTH         Joint: Date: 3/13/2019  Date:  Date:     Right Left Right Left Right Left   Hip Abduction 4/5 4/5       Hip Adduction 4/5 4/5       Hip IR 4/5 4/5       Hip ER 4/5 4/5       Hip Flexion 4/5 4/5       Knee Extension 4/5 4/5       Knee Flexion 4/5 4/5       Ankle DF 4/5 4/5       Ankle PF 4/5 4/5       Ankle IV         Ankle EV         4/5    PALPATION Date:  3/13/2019 Date: Date:   TTP      TONE      PA GLIDES        SPECIAL TESTS: Assessed @ Initial Visit    -SCOUR: Negative   -90/90:     -R: Not tested    -L: Not tested   -SLR: Not tested   -SI COMPRESSION TEST: Not tested   -SI POST.  GAPPING:  Not tested   -GILLET TEST: Not tested   -SOL 4: Negative   -SLUMP TEST: Not tested   -DEEP SQUAT: Not tested       NEUROLOGICAL SCREEN: Assessed @ Initial Visit    -RADIATING SYMPTOMS: No     -DERMATOMES: In tact    -MYOTOMES Date: 3/13/2019  Date:  Date:     Right Left Right Left Right Left   L2 & L3   (Hip Flexors) 5/5 5/5       L3-L4  (Knee Extensors) 5/5 5/5       L4  (Ankle DFs) 4+/5 4+/5       L5  (Hallux Ext) 4+/5 4+/5       L5-S1  (Ankle PFs) 4+/5 4+/5       S1-S2  (Ankle EVs) 4+/5 4+/5         RED FLAGS: Date: 3/13/2019   Non-Mechanical pain distribution (cannot be produced, changed, or reduced during exam): NO   Cauda Equina Dysfunction: NO   Upper lumbar disc herniation in younger patients (femoral nerve tension test for lateral disc herniation in lower lumbar): NO Lumbar compression fracture (age > 48, trauma, corticosteroid use NO   Spine Cancer (age > 48, pervious history of cancer, failure to improve in 1 month of therapy, no relief - be rest, duration > 1 month, unexplained weight loss, insidious onset, constitutional symptoms): NO   Ankylosing Spondylitis (age < 36, pain not relieved by supine, morning back stiffness, pain duration > 3 months, improved by exercise): NO   Sacral Fracture: NO       FUNCTIONAL MOBILITY:  Assessed @ Initial Visit    -Affecting participation in basic ADLs and functional tasks.   -Limited tolerance of walking and standing   -Ambulation/Gait: Ambulates with mild forward head posture with use of rollator for safety.   -Bed mobility:  MOD I   -Stairs: NT   -Transfers: MOD I    -Wheelchair: N/A     Body Structures Involved:  1. Bones  2. Joints  3. Muscles  4. Ligaments Body Functions Affected:  1. Sensory/Pain  2. Neuromusculoskeletal  3. Movement Related Activities and Participation Affected:  1. Mobility  2. Self Care  3. Domestic Life  4. Interpersonal Interactions and Relationships  5. Community, Social and [place]   Number of elements that affect the Plan of Care: 4+: HIGH COMPLEXITY   CLINICAL PRESENTATION:   Presentation: Stable and uncomplicated: LOW COMPLEXITY   CLINICAL DECISION MAKING:   OUTCOME MEASURE USED:   Tool Used: Tool Used: Lower Extremity Functional Scale (LEFS)  Score:  Initial: 31/80 Most Recent: 37/80 (Date: 4/15/2019 )   Interpretation of Score: 20 questions each scored on a 5 point scale with 0 representing \"extreme difficulty or unable to perform\" and 4 representing \"no difficulty\". The lower the score, the greater the functional disability. 80/80 represents no disability. Minimal detectable change is 9 points. Tool Used: Timed Up and Go (TUG)  Score:  Initial: 12.59 seconds (with rollator) Most Recent: 9.86 seconds (Date: 4/15/2019 )   Interpretation of Score:  The test measures, in seconds, the time taken by an individual to stand up from a standard arm chair (seat height 46 cm [18 in], arm height 65 cm [25.6 in]), walk a distance of 3 meters (118 in, approx 10 ft), turn, walk back to the chair and sit down. If the individual takes longer than 14 seconds to complete TUG, this indicates risk for falls. TUG attempted second time without use of rollator: 16.45 seconds    [place] / [place] / Product Type: Medicare /     MEDICAL NECESSITY:  · Skilled intervention continues to be required due to above deficits affecting participation in basic ADLs and overall functional tolerance. REASON FOR SERVICES/OTHER COMMENTS:  · Patient continues to require skilled intervention due to  above deficits affecting participation in basic ADLs and overall functional tolerance. Use of outcome tool(s) and clinical judgement create a POC that gives a: Clear prediction of patient's progress: LOW COMPLEXITY   TREATMENT:   (In addition to Assessment/Re-Assessment sessions the following treatments were rendered)    Pre-treatment Symptoms/Complaints: Mr. Indira Ashby reports working in [employer] over weekend. He had lawn chair outside with him where he was able to take seated rest breaks throughout his work. Patient pleased with progress. THERAPEUTIC EXERCISE: (45 minutes):  Exercises per grid below to improve mobility, strength and balance. Required minimal visual and verbal cues to promote proper body alignment and promote proper body posture. Progressed resistance, range and complexity of movement as indicated.      Date:  4/3/2019 Date  4/8/19 Date  4/10/19 Date:  4/15/19 Date:  4/17/19 Date:  4/22/19 Date:  4/24/19   Activity/Exercise Parameters         Heel raises 2 x 10 reps B 2 x 10 reps B    2 x 10 reps B 2 x 10 reps B 2 x 10 reps B 2 x 10 reps B   SAQ          Sit to stand   2 x 5 reps with verbal cues and demonstration 2 x 5 reps with verbal cues and demonstration 2 x 5 reps with verbal cues and demonstration 2 x 5 reps with verbal cues and demonstartion    balance on blue airex foam 3 x 30 sec eyes closed 3 x 30 second hold with eyes closed 3 x 30 second hold with eyes closed 3 x 30 second hold with eyes closed 3 x 30 second hold with eyes closed 3 x 30 second hold with eyes closed    Nustep  Level 3 10 minutes Level 3  10 minutes Level 3  10 minutes Level 3   10 minutes Level 3   10 minutes Level 3   10 minutes Level 3  10 minutes   Gastrocnemius stretch  Incline board stretch  3 x 30 second hold ncline board stretch  3 x 30 second hold Incline board stretch 3 x 30 sec hold Incline board stretch 3 x 30 sec hold Incline board stretch 3 x 30 sec hold Incline board stretch 3 x 30 sec hold   Sidestepping in parallel bars X 4 laps leading with each LE with minimal use of hands   X 4 laps leading with each LE, minimal use of hands X 4 laps leading with each LE, minimal use of hands     Hip Abduction 1 x 10 reps B 1 x 10 reps   2 x 10 reps B 2 x 10 reps B 2 x 10 reps B 2 x 10 reps B 2 x 10 reps B   Hip extension   2 x 10 reps B 2 x 10 reps B 2 x 10 reps B 2 x 10 reps B 2 x 10 reps B   Standing Marching   Marching/high stepping in ll bars down and back x 2  2 x 10 reps B 2 x 10 reps B 2 x 10 reps B 2 x 10 reps B   Balance Board       AP/ML tapping x 20    AP/ML 30 sec hold for balance x 2   Semi tandem progressing towards Tandem stance 2 x 30 second hold witheach LE 2 x 30 second hold with each LE with some use of hands 2 x 30 second hold with each LE with minimal use of hands    2 x 30 sec hold with each LE leading, minimal hold of B UE   Seated hamstring stretch          Clamshells 1 x 10 B 2 x 10 reps B     2 x 10 reps B     Supine Bridge 1 x 10 1 x 10 reps      1 x 10 reps     Ambulation    Patient ambulated 200 feet without assistive device with SBA and verbal cues for heel strike and bigger step.  Patient ambulated 100 feet with slower gait and then instructed to walk faster for 100 feet  Patient ambulated 200 ft without assistive device with SBA and verbal cues for heel strike and wider FERMIN. Patient ambulated 200 ft without assistive device with  Verbal cues for wider FERMIN Patient ambulated 15' x 3 with mirror placed in front of patient for visual cues for feedback regarding body mechanics gait. Patient ambulated 200 ft without assistive device with verbal cues for wider FERMIN   PT assist Hamstring stretch       3 x 30 sec B     MANUAL THERAPY: (0 minutes): Joint mobilization, Soft tissue mobilization and Manipulation was utilized and necessary because of the patient's restricted joint motion, painful spasm, loss of articular motion and restricted motion of soft tissue. (Used abbreviations: MET - muscle energy technique; PNF - proprioceptive neuromuscular facilitation; NMR - neuromuscular re-education; AP - anterior to posterior; PA - posterior to anterior)    MODALITIES: (0 minutes):      Not today    TREATMENT/SESSION ASSESSMENT:  Fly Reddy demonstrated increased tightness in B LE musculature with PT assisted stretching this AM. Patient educated on HEP and continuing to stretch bilateral hamstrings to improve ROM and upright posture. · Pain/ Symptoms: Initial:   2/10 Post Session:  2/10 fatigue in legs ·   Compliance with Program/Exercises: Good compliance. · Recommendations/Intent for next treatment session: \"Next visit will focus on advancements to more challenging activities\".     Total Treatment Duration: 45 minutes   PT Patient Time In/Time Out  Time In: 1015  Time Out: 325 [facility]

## 2019-05-01 ENCOUNTER — HOSPITAL ENCOUNTER (OUTPATIENT)
Dept: PHYSICAL THERAPY | Age: 79
Discharge: HOME | End: 2019-05-01
Payer: MEDICARE

## 2019-05-01 PROCEDURE — 97110 THERAPEUTIC EXERCISES: CPT

## 2019-05-01 NOTE — PROGRESS NOTES
Sarah Moran [facility]  : 1940  [place] / [place] / Product Type: Medicare /  [address]  at [facility]  [address] [facility], [address]  [phone]   [phone]                OUTPATIENT PHYSICAL THERAPY:Daily Note 2019   Progress Note: 4/15/2019     ICD-10: Treatment Diagnosis:   Difficulty in walking, not elsewhere classified (R26.2)  Muscle weakness (generalized) (M62.81)        PRECAUTIONS/ALLERGIES:   Patient has no known allergies. FALL RISK SCORE: 2 (? 5 = High Risk)    MD ORDERS: Eval and Treat  MEDICAL/REFERRING DIAGNOSIS:  Encounter for follow-up examination after completed treatment for conditions other than malignant neoplasm [Z09]     DATE OF ONSET: Laminectomy L1-L4 2019    REFERRING PHYSICIAN: Barb Martin MD    RETURN PHYSICIAN APPOINTMENT: TBD by patient      Ambulatory/Rehab Services H2 Model Falls Risk Assessment    Risk Factors:       (1)  Gender [Male] Ability to Rise from Chair:       (1)  Pushes up, successful in one attempt    Falls Prevention Plan: Mobility Assistance Device (specify):  rollator   Total: (5 or greater = High Risk): 2     [facility] [place] [address] [place] [address] States Patent #[id number]. Federal Law prohibits the replication, distribution or use without written permission from [facility] [address]:  Mr. Katarina Modi has attended 10 physical therapy session including initial evaluation as of 3/13/2019. Katarina Modi has met 5/5 STGs at this point. He is demonstrating improved dynamic balance with ambulation with no longer using rollator for assistance. Patient continues to demonstrate weakness in B hip musculature with intermittent narrowed FERMIN and trendelenburg gait pattern during single limb stance.   [facility] will benefit from continued skilled therapy to increase functional strength, balance, and coordination to improve safety with functional mobility. PROBLEM LIST (Impacting functional limitations):  1. Decreased Strength  2. Decreased ADL/Functional Activities  3. Decreased Transfer Abilities  4. Decreased Ambulation Ability/Technique  5. Decreased Balance  6. Increased Pain  7. Decreased Activity Tolerance  8. Increased Fatigue  9. Increased Shortness of Breath  10. Decreased Flexibility/Joint Mobility  11. Decreased [place] with Home Exercise Program INTERVENTIONS PLANNED:  1. Balance Exercise  2. Bed Mobility  3. Cold  4. Cryotherapy  5. Electrical Stimulation  6. Family Education  7. Gait Training  8. Heat  9. Home Exercise Program (HEP)  10. Manual Therapy  11. Neuromuscular Re-education/Strengthening  12. Range of Motion (ROM)  13. Therapeutic Activites  14. Therapeutic Exercise/Strengthening  15. Transfer Training  16. Mechanical traction  17. Aquatic Therapy  18. Electrical Stimulation   TREATMENT PLAN:  Effective Dates: 3/13/2019 TO 6/11/2019 (90 days). Frequency/Duration: 2 times a week for 90 Days    GOALS: (Goals have been discussed and agreed upon with patient.)  Short Term Goals 4 weeks   1. Abby Bucio will be independent with HEP to promote self-management of symptoms. MET  2. Abby Bucio will demonstrate a 5 point improvement on the LEFS to show improvement in function. MET  3. Abby Bucio will participate in core stabilization exercises to help with stabilization and improve posture during ADLs to help prevent future injuries. MET  4. Abby Bucio will participate in LE strengthening program with weights as appropriate to help with gait and elevations. MET  5. Abby Bucio will participate in static and dynamic balance activities to decrease the risk for falls and improve overall QOL. MET  6. Abby Bucio will perform TUG time of 10 seconds with LRAD to decrease risk of falls.  MET    Long Term Goals 12 weeks [address] will demonstrate a 10 point improvement on the LEFS to show improvement in function. 2. Fermin Speaker will report <=2/10 pain at rest and during ADLs to improve QOL. 3. Fermin Speaker will demonstrate >=4+/5 LE strength on manual muscle testing to improve functional mobility. [address] will be able to perform SLS >10 seconds bilaterally to help with gait and improve balance. Nataly Valdez will be able to demonstrate safe transfer mechanics without cueing for improved safety with home and community activities. Nataly Valdez will perform TUG test with <10 seconds with LRAD to decrease risk of falls. Rehabilitation Potential For Stated Goals: Good    Regarding Sana Jolly's therapy, I certify that the treatment plan above will be carried out by a therapist or under their direction. Thank you for this referral,  Leonardo Fernandez       Referring Physician Signature: Karina Hein MD              Date                    HISTORY:   PATIENT GOAL FOR PHYSICAL THERAPY:  \"I want to be able to walk without the rollator. \"      HISTORY OF PRESENT INJURY/ILLNESS (REASON FOR REFERRAL):  Patient had laminectomy on 2/12/2019 at levels L1-L4. Patient had been having pain in lower back for the previous year. Patient has had physical therapy in the past at this location prior to surgery as well as multiple injections to attempt to decrease pain. Note: Patient denies any increase of symptoms with cough, sneeze or valsalva. Patient denies any saddle paresthesia or bowel/bladder deficits.      PAST MEDICAL HISTORY/COMORBIDITIES:   Mr. Pop Macias  has a past medical history of Anxiety disorder (12/9/2014), Arthritis (12/9/2014), Depression (12/9/2014), Gout (12/9/2014), History of lumbar laminectomy for spinal cord decompression (2/18/2019), HTN (hypertension), benign (2/15/2017), Hyperlipemia (12/9/2014), Hypertension, Idiopathic chronic gout of right ankle (2014), Insomnia (2014), Kidney stone, Mixed hyperlipidemia (2014), Panic disorder (2014), Primary insomnia (2014), and Rosacea (2/15/2017). Mr. Roland Dueñas  has a past surgical history that includes hx other surgical; hx cyst removal; hx cyst removal; hx hernia repair (Bilateral); hx ankle fracture tx (Left, 1974); hx wrist fracture tx (Bilateral, 1993); hx colonoscopy (); and hx cyst removal.    SOCIAL HISTORY/LIVING ENVIRONMENT:        Social History     Socioeconomic History    Marital status:      Spouse name: Not on file    Number of children: Not on file    Years of education: Not on file    Highest education level: Not on file   Occupational History    Not on file   Social Needs    Financial resource strain: Not on file    Food insecurity:     Worry: Not on file     Inability: Not on file    Transportation needs:     Medical: Not on file     Non-medical: Not on file   Tobacco Use    Smoking status: Former Smoker     Types: Cigarettes     Last attempt to quit: 1970     Years since quittin.3    Smokeless tobacco: Never Used   Substance and Sexual Activity    Alcohol use:  Yes     Alcohol/week: 0.0 oz    Drug use: No    Sexual activity: Yes     Partners: Female   Lifestyle    Physical activity:     Days per week: Not on file     Minutes per session: Not on file    Stress: Not on file   Relationships    Social connections:     Talks on phone: Not on file     Gets together: Not on file     Attends Voodoo service: Not on file     Active member of club or organization: Not on file     Attends meetings of clubs or organizations: Not on file     Relationship status: Not on file    Intimate partner violence:     Fear of current or ex partner: Not on file     Emotionally abused: Not on file     Physically abused: Not on file     Forced sexual activity: Not on file   Other Topics Concern    Not on file   Social History Narrative    Not on file       LIFESTYLE Date: 3/13/2019   Occupation: Retired   Vigorous Activity: n/a   Expose individual to vibrations n/a   Unpleasant work environment n/a       PRIOR LEVEL OF FUNCTION/WORK/ACTIVITY:   Patient ambulated with straight cane prior to surgery. Patient's spouse reports he did not use straight cane correctly and should have been using rolling walker for safety. Active Ambulatory Problems     Diagnosis Date Noted    MEGGAN (generalized anxiety disorder) 12/09/2014    Arthritis 12/09/2014    Idiopathic chronic gout of right ankle 12/09/2014    Mixed hyperlipidemia 12/09/2014    Primary insomnia 12/09/2014    Panic disorder 12/09/2014    HTN (hypertension), benign 02/15/2017    Rosacea 02/15/2017    Idiopathic scoliosis 06/07/2018    Lumbar stenosis with neurogenic claudication 06/07/2018    [facility] 08/16/2018    History of lumbar laminectomy for spinal cord decompression 02/18/2019     Resolved Ambulatory Problems     Diagnosis Date Noted    Depression 12/09/2014     Past Medical History:   Diagnosis Date    Anxiety disorder 12/9/2014    Arthritis 12/9/2014    Depression 12/9/2014    Gout 12/9/2014    History of lumbar laminectomy for spinal cord decompression 2/18/2019    HTN (hypertension), benign 2/15/2017    Hyperlipemia 12/9/2014    Hypertension     Idiopathic chronic gout of right ankle 12/9/2014    Insomnia 12/9/2014    Kidney stone     Mixed hyperlipidemia 12/9/2014    Panic disorder 12/9/2014    Primary insomnia 12/9/2014    Rosacea 2/15/2017         CURRENT MEDICATIONS:    Current Outpatient Medications:     amLODIPine-Olmesartan 10-40 mg tab, TAKE 1 TABLET BY MOUTH DAILY. , Disp: 90 Tab, Rfl: 3    KLOR-CON 10 10 mEq tablet, TAKE 1 TABLET BY MOUTH DAILY. , Disp: 90 Tab, Rfl: 3    zolpidem (AMBIEN) 10 mg tablet, TAKE 1 TABLET BY MOUTH AT BEDTIME AS NEEDED FOR SLEEP, Disp: 30 Tab, Rfl: 5    atorvastatin (LIPITOR) 40 mg tablet, TAKE 1 TABLET BY MOUTH EVERY DAY, Disp: 90 Tab, Rfl: 3    potassium chloride SR (KLOR-CON 10) 10 mEq tablet, Take 1 Tab by mouth daily. , Disp: 90 Tab, Rfl: 3    amLODIPine-Olmesartan (CAROLA) 10-40 mg tab, Take 1 Tab by mouth daily. , Disp: 90 Tab, Rfl: 3    sertraline (ZOLOFT) 100 mg tablet, Take 1 Tab by mouth daily. One and half tab every day, Disp: 135 Tab, Rfl: 3    metroNIDAZOLE (METROGEL) 1 % topical gel, Apply  to affected area daily. Use a thin layer to affected areas after washing, Disp: 45 g, Rfl: 3    allopurinol (ZYLOPRIM) 100 mg tablet, Take 2 Tabs by mouth daily. , Disp: 180 Tab, Rfl: 3    Walker misc, Walker DX: lumbar stenosis with neurogenic claudication +antalgic gait with cane, Disp: 1 Each, Rfl: 0    DISABLED PLACARD (DISABLED PLACARD) DMV, To use with handicap placard form, Disp: 1 Each, Rfl: 0    gabapentin (NEURONTIN) 300 mg capsule, Take 1 Cap by mouth three (3) times daily. , Disp: 180 Cap, Rfl: 2    LORazepam (ATIVAN) 0.5 mg tablet, TAKE 1 TABLET BY MOUTH DAILY. , Disp: 30 Tab, Rfl: 3    diclofenac sodium (PENNSAID) 1.5 % drop, by Apply Externally route., Disp: , Rfl:     diclofenac (VOLTAREN) 1 % gel, Apply 4 g to affected area four (4) times daily. , Disp: 2 Each, Rfl: 3    multivitamin (ONE A DAY) tablet, Take 1 Tab by mouth daily. , Disp: , Rfl:     cholecalciferol, vitamin D3, (VITAMIN D3) 2,000 unit tab, Take  by mouth., Disp: , Rfl:      Date Last Reviewed:  5/1/2019   Number of Personal Factors/Comorbidities that affect the Plan of Care: 1-2: MODERATE COMPLEXITY   EXAMINATION:   OBSERVATION/ORTHOSTATIC POSTURAL ASSESSMENT:     -Pt sits with forward head and rounded shoulders which indicate tight anterior chest musculature, upper trapezius, and levator scapula and weak posterior scapula musculature and deep cervical flexors. Pt displays decreased core motor control indicating weak core and low back musculature.       BALANCE Date: 3/13/2019 Date: 3/13/2019   Right     Left       PELVIC COMPONENT Date:   3/13/2019 Date:  3/13/2019   Standing Mason     Standing PSIS     Standing Sacral Sulci     Gillet     Seated Mason     Seated PSIS     Supine-to-Sit leg length         AROM/PROM         Joint: Date: 3/13/2019  Date:  Date:    Active LE ROM Right Left Right Left Right Left   Hip Flexion         Hip Extension         Hip ER         Hip IR         Knee Extension         Knee Flexion         Knee Extension         Lumbar Flexion 55        Lumbar Extension 10        Lumbar Side-Bending 5        Lumbar Rotation NT          STRENGTH         Joint: Date: 3/13/2019  Date:  Date:     Right Left Right Left Right Left   Hip Abduction 4/5 4/5       Hip Adduction 4/5 4/5       Hip IR 4/5 4/5       Hip ER 4/5 4/5       Hip Flexion 4/5 4/5       Knee Extension 4/5 4/5       Knee Flexion 4/5 4/5       Ankle DF 4/5 4/5       Ankle PF 4/5 4/5       Ankle IV         Ankle EV         4/5    PALPATION Date:  3/13/2019 Date: Date:   TTP      TONE      PA GLIDES        SPECIAL TESTS: Assessed @ Initial Visit    -SCOUR: Negative   -90/90:     -R: Not tested    -L: Not tested   -SLR: Not tested   -SI COMPRESSION TEST: Not tested   -SI POST.  GAPPING:  Not tested   -GILLET TEST: Not tested   -SOL 4: Negative   -SLUMP TEST: Not tested   -DEEP SQUAT: Not tested       NEUROLOGICAL SCREEN: Assessed @ Initial Visit    -RADIATING SYMPTOMS: No     -DERMATOMES: In tact    -MYOTOMES Date: 3/13/2019  Date:  Date:     Right Left Right Left Right Left   L2 & L3   (Hip Flexors) 5/5 5/5       L3-L4  (Knee Extensors) 5/5 5/5       L4  (Ankle DFs) 4+/5 4+/5       L5  (Hallux Ext) 4+/5 4+/5       L5-S1  (Ankle PFs) 4+/5 4+/5       S1-S2  (Ankle EVs) 4+/5 4+/5         RED FLAGS: Date: 3/13/2019   Non-Mechanical pain distribution (cannot be produced, changed, or reduced during exam): NO   Cauda Equina Dysfunction: NO   Upper lumbar disc herniation in younger patients (femoral nerve tension test for lateral disc herniation in lower lumbar): NO   Lumbar compression fracture (age > 48, trauma, corticosteroid use NO   Spine Cancer (age > 48, pervious history of cancer, failure to improve in 1 month of therapy, no relief - be rest, duration > 1 month, unexplained weight loss, insidious onset, constitutional symptoms): NO   Ankylosing Spondylitis (age < 36, pain not relieved by supine, morning back stiffness, pain duration > 3 months, improved by exercise): NO   Sacral Fracture: NO       FUNCTIONAL MOBILITY:  Assessed @ Initial Visit    -Affecting participation in basic ADLs and functional tasks.   -Limited tolerance of walking and standing   -Ambulation/Gait: Ambulates with mild forward head posture with use of rollator for safety.   -Bed mobility:  MOD I   -Stairs: NT   -Transfers: MOD I    -Wheelchair: N/A     Body Structures Involved:  1. Bones  2. Joints  3. Muscles  4. Ligaments Body Functions Affected:  1. Sensory/Pain  2. Neuromusculoskeletal  3. Movement Related Activities and Participation Affected:  1. Mobility  2. Self Care  3. Domestic Life  4. Interpersonal Interactions and Relationships  5. Community, Social and [place]   Number of elements that affect the Plan of Care: 4+: HIGH COMPLEXITY   CLINICAL PRESENTATION:   Presentation: Stable and uncomplicated: LOW COMPLEXITY   CLINICAL DECISION MAKING:   OUTCOME MEASURE USED:   Tool Used: Tool Used: Lower Extremity Functional Scale (LEFS)  Score:  Initial: 31/80 Most Recent: 37/80 (Date: 4/15/2019 )   Interpretation of Score: 20 questions each scored on a 5 point scale with 0 representing \"extreme difficulty or unable to perform\" and 4 representing \"no difficulty\". The lower the score, the greater the functional disability. 80/80 represents no disability. Minimal detectable change is 9 points. Tool Used: Timed Up and Go (TUG)  Score:  Initial: 12.59 seconds (with rollator) Most Recent: 9.86 seconds (Date: 4/15/2019 )   Interpretation of Score:  The test measures, in seconds, the time taken by an individual to stand up from a standard arm chair (seat height 46 cm [18 in], arm height 65 cm [25.6 in]), walk a distance of 3 meters (118 in, approx 10 ft), turn, walk back to the chair and sit down. If the individual takes longer than 14 seconds to complete TUG, this indicates risk for falls. TUG attempted second time without use of rollator: 16.45 seconds    [place] / [place] / Product Type: Medicare /     MEDICAL NECESSITY:  · Skilled intervention continues to be required due to above deficits affecting participation in basic ADLs and overall functional tolerance. REASON FOR SERVICES/OTHER COMMENTS:  · Patient continues to require skilled intervention due to  above deficits affecting participation in basic ADLs and overall functional tolerance. Use of outcome tool(s) and clinical judgement create a POC that gives a: Clear prediction of patient's progress: LOW COMPLEXITY   TREATMENT:   (In addition to Assessment/Re-Assessment sessions the following treatments were rendered)    Pre-treatment Symptoms/Complaints: Patient reports having MD appointment earlier today. Patient reports not using rollator at all yesterday and didn't feel off balance ambulating around house. THERAPEUTIC EXERCISE: (45 minutes):  Exercises per grid below to improve mobility, strength and balance. Required minimal visual and verbal cues to promote proper body alignment and promote proper body posture. Progressed resistance, range and complexity of movement as indicated.      Date  4/8/19 Date  4/10/19 Date:  4/15/19 Date:  4/17/19 Date:  4/22/19 Date:  4/24/19 Date:  5/1/19   Activity/Exercise          Heel raises 2 x 10 reps B    2 x 10 reps B 2 x 10 reps B 2 x 10 reps B 2 x 10 reps B 2 x 10 reps B   SAQ          Sit to stand  2 x 5 reps with verbal cues and demonstration 2 x 5 reps with verbal cues and demonstration 2 x 5 reps with verbal cues and demonstration 2 x 5 reps with verbal cues and demonstartion     balance on blue airex foam 3 x 30 second hold with eyes closed 3 x 30 second hold with eyes closed 3 x 30 second hold with eyes closed 3 x 30 second hold with eyes closed 3 x 30 second hold with eyes closed     Nustep  Level 3  10 minutes Level 3  10 minutes Level 3   10 minutes Level 3   10 minutes Level 3   10 minutes Level 3  10 minutes Level 3  12 minutes   Gastrocnemius stretch Incline board stretch  3 x 30 second hold ncline board stretch  3 x 30 second hold Incline board stretch 3 x 30 sec hold Incline board stretch 3 x 30 sec hold Incline board stretch 3 x 30 sec hold Incline board stretch 3 x 30 sec hold Incline board stretch 3 x 30 sec hold   Sidestepping in parallel bars   X 4 laps leading with each LE, minimal use of hands X 4 laps leading with each LE, minimal use of hands   X  4 laps leading with each LE, no use of hands   Hip Abduction 1 x 10 reps   2 x 10 reps B 2 x 10 reps B 2 x 10 reps B 2 x 10 reps B 2 x 10 reps B 2 x 10 reps B   Hip extension  2 x 10 reps B 2 x 10 reps B 2 x 10 reps B 2 x 10 reps B 2 x 10 reps B 2 x 10 reps B   Standing Marching  Marching/high stepping in ll bars down and back x 2  2 x 10 reps B 2 x 10 reps B 2 x 10 reps B 2 x 10 reps B 2 x 10 reps B   Balance Board      AP/ML tapping x 20    AP/ML 30 sec hold for balance x 2 AP/ML tapping x 20    AP/ML 30 sec hold for balance x 2   Semi tandem progressing towards Tandem stance 2 x 30 second hold with each LE with some use of hands 2 x 30 second hold with each LE with minimal use of hands    2 x 30 sec hold with each LE leading, minimal hold of B UE    Seated hamstring stretch          Clamshells 2 x 10 reps B     2 x 10 reps B      Supine Bridge 1 x 10 reps      1 x 10 reps      Ambulation   Patient ambulated 200 feet without assistive device with SBA and verbal cues for heel strike and bigger step.  Patient ambulated 100 feet with slower gait and then instructed to walk faster for 100 feet  Patient ambulated 200 ft without assistive device with SBA and verbal cues for heel strike and wider FERMIN. Patient ambulated 200 ft without assistive device with  Verbal cues for wider FERMIN Patient ambulated 15' x 3 with mirror placed in front of patient for visual cues for feedback regarding body mechanics gait. Patient ambulated 200 ft without assistive device with verbal cues for wider FERMIN Patient ambulated 200 ft without assistive device with verbal cues for wider FERMIN   PT assist Hamstring stretch      3 x 30 sec B      MANUAL THERAPY: (0 minutes): Joint mobilization, Soft tissue mobilization and Manipulation was utilized and necessary because of the patient's restricted joint motion, painful spasm, loss of articular motion and restricted motion of soft tissue. (Used abbreviations: MET - muscle energy technique; PNF - proprioceptive neuromuscular facilitation; NMR - neuromuscular re-education; AP - anterior to posterior; PA - posterior to anterior)    MODALITIES: (0 minutes):      Not today    TREATMENT/SESSION ASSESSMENT:  Carlos Apple demonstrated improved dynamic balance and strength with therapeutic exercise this date. Patient continuing to make good progress towards goals. · Pain/ Symptoms: Initial:   2/10 Post Session:  1/10 fatigue in legs ·   Compliance with Program/Exercises: Good compliance. · Recommendations/Intent for next treatment session: \"Next visit will focus on advancements to more challenging activities\".     Total Treatment Duration: 45 minutes   PT Patient Time In/Time Out  Time In: 1300  Time Out: 0441 Abundio Parra

## 2019-05-06 ENCOUNTER — HOSPITAL ENCOUNTER (OUTPATIENT)
Dept: PHYSICAL THERAPY | Age: 79
Discharge: HOME | End: 2019-05-06
Payer: MEDICARE

## 2019-05-06 PROCEDURE — 97140 MANUAL THERAPY 1/> REGIONS: CPT

## 2019-05-06 PROCEDURE — 97110 THERAPEUTIC EXERCISES: CPT

## 2019-05-06 NOTE — PROGRESS NOTES
Bo Dowd [facility]  : 1940  [place] / [place] / Product Type: Medicare /  [address]  at [facility]  [address], [address], [address]  [phone]   [phone]                OUTPATIENT PHYSICAL THERAPY:Daily Note 2019   Progress Note: 4/15/2019     ICD-10: Treatment Diagnosis:   Difficulty in walking, not elsewhere classified (R26.2)  Muscle weakness (generalized) (M62.81)        PRECAUTIONS/ALLERGIES:   Patient has no known allergies. FALL RISK SCORE: 2 (? 5 = High Risk)    MD ORDERS: Eval and Treat  MEDICAL/REFERRING DIAGNOSIS:  Encounter for follow-up examination after completed treatment for conditions other than malignant neoplasm [Z09]     DATE OF ONSET: Laminectomy L1-L4 2019    REFERRING PHYSICIAN: Brynn Lamar MD    RETURN PHYSICIAN APPOINTMENT: TBD by patient      Ambulatory/Rehab Services H2 Model Falls Risk Assessment    Risk Factors:       (1)  Gender [Male] Ability to Rise from Chair:       (1)  Pushes up, successful in one attempt    Falls Prevention Plan: Mobility Assistance Device (specify):  rollator   Total: (5 or greater = High Risk): 2     [facility] [address] [place] [address] States Patent #[id number]. Federal Law prohibits the replication, distribution or use without written permission from [facility] [address]:  Mr. Clark Reed has attended 10 physical therapy session including initial evaluation as of 3/13/2019. Clark Reed has met 5/5 STGs at this point. He is demonstrating improved dynamic balance with ambulation with no longer using rollator for assistance. Patient continues to demonstrate weakness in B hip musculature with intermittent narrowed FERMIN and trendelenburg gait pattern during single limb stance.   [facility] will benefit from continued skilled therapy to increase functional strength, balance, and coordination to improve safety with functional mobility. PROBLEM LIST (Impacting functional limitations):  1. Decreased Strength  2. Decreased ADL/Functional Activities  3. Decreased Transfer Abilities  4. Decreased Ambulation Ability/Technique  5. Decreased Balance  6. Increased Pain  7. Decreased Activity Tolerance  8. Increased Fatigue  9. Increased Shortness of Breath  10. Decreased Flexibility/Joint Mobility  11. Decreased [place] with Home Exercise Program INTERVENTIONS PLANNED:  1. Balance Exercise  2. Bed Mobility  3. Cold  4. Cryotherapy  5. Electrical Stimulation  6. Family Education  7. Gait Training  8. Heat  9. Home Exercise Program (HEP)  10. Manual Therapy  11. Neuromuscular Re-education/Strengthening  12. Range of Motion (ROM)  13. Therapeutic Activites  14. Therapeutic Exercise/Strengthening  15. Transfer Training  16. Mechanical traction  17. Aquatic Therapy  18. Electrical Stimulation   TREATMENT PLAN:  Effective Dates: 3/13/2019 TO 6/11/2019 (90 days). Frequency/Duration: 2 times a week for 90 Days    GOALS: (Goals have been discussed and agreed upon with patient.)  Short Term Goals 4 weeks   1. Carlos Apple will be independent with HEP to promote self-management of symptoms. MET  2. Carlos Apple will demonstrate a 5 point improvement on the LEFS to show improvement in function. MET  3. Carlos Apple will participate in core stabilization exercises to help with stabilization and improve posture during ADLs to help prevent future injuries. MET  4. Carlos Apple will participate in LE strengthening program with weights as appropriate to help with gait and elevations. MET  5. Carlos Apple will participate in static and dynamic balance activities to decrease the risk for falls and improve overall QOL. MET  6. Carlos Apple will perform TUG time of 10 seconds with LRAD to decrease risk of falls.  MET    Long Term Goals 12 weeks [address] will demonstrate a 10 point improvement on the LEFS to show improvement in function. 2. Elizabeth Hernandez will report <=2/10 pain at rest and during ADLs to improve QOL. 3. Elizabeth Hernandez will demonstrate >=4+/5 LE strength on manual muscle testing to improve functional mobility. [address] will be able to perform SLS >10 seconds bilaterally to help with gait and improve balance. Tayla[address] Steven Valdez will be able to demonstrate safe transfer mechanics without cueing for improved safety with home and community activities. Nataly Valdez will perform TUG test with <10 seconds with LRAD to decrease risk of falls. Rehabilitation Potential For Stated Goals: Good    Regarding Elisa Jolly's therapy, I certify that the treatment plan above will be carried out by a therapist or under their direction. Thank you for this referral,  Darci Recinos       Referring Physician Signature: Radames Lee MD              Date                    HISTORY:   PATIENT GOAL FOR PHYSICAL THERAPY:  \"I want to be able to walk without the rollator. \"      HISTORY OF PRESENT INJURY/ILLNESS (REASON FOR REFERRAL):  Patient had laminectomy on 2/12/2019 at levels L1-L4. Patient had been having pain in lower back for the previous year. Patient has had physical therapy in the past at this location prior to surgery as well as multiple injections to attempt to decrease pain. Note: Patient denies any increase of symptoms with cough, sneeze or valsalva. Patient denies any saddle paresthesia or bowel/bladder deficits.      PAST MEDICAL HISTORY/COMORBIDITIES:   Mr. Gavi Carrington  has a past medical history of Anxiety disorder (12/9/2014), Arthritis (12/9/2014), Depression (12/9/2014), Gout (12/9/2014), History of lumbar laminectomy for spinal cord decompression (2/18/2019), HTN (hypertension), benign (2/15/2017), Hyperlipemia (12/9/2014), Hypertension, Idiopathic chronic gout of right ankle (2014), Insomnia (2014), Kidney stone, Mixed hyperlipidemia (2014), Panic disorder (2014), Primary insomnia (2014), and Rosacea (2/15/2017). Mr. Vandana Lopez  has a past surgical history that includes hx other surgical; hx cyst removal; hx cyst removal; hx hernia repair (Bilateral); hx ankle fracture tx (Left, 1974); hx wrist fracture tx (Bilateral, 1993); hx colonoscopy (); and hx cyst removal.    SOCIAL HISTORY/LIVING ENVIRONMENT:        Social History     Socioeconomic History    Marital status:      Spouse name: Not on file    Number of children: Not on file    Years of education: Not on file    Highest education level: Not on file   Occupational History    Not on file   Social Needs    Financial resource strain: Not on file    Food insecurity:     Worry: Not on file     Inability: Not on file    Transportation needs:     Medical: Not on file     Non-medical: Not on file   Tobacco Use    Smoking status: Former Smoker     Types: Cigarettes     Last attempt to quit: 1970     Years since quittin.3    Smokeless tobacco: Never Used   Substance and Sexual Activity    Alcohol use:  Yes     Alcohol/week: 0.0 oz    Drug use: No    Sexual activity: Yes     Partners: Female   Lifestyle    Physical activity:     Days per week: Not on file     Minutes per session: Not on file    Stress: Not on file   Relationships    Social connections:     Talks on phone: Not on file     Gets together: Not on file     Attends Mandaen service: Not on file     Active member of club or organization: Not on file     Attends meetings of clubs or organizations: Not on file     Relationship status: Not on file    Intimate partner violence:     Fear of current or ex partner: Not on file     Emotionally abused: Not on file     Physically abused: Not on file     Forced sexual activity: Not on file   Other Topics Concern    Not on file   Social History Narrative    Not on file       LIFESTYLE Date: 3/13/2019   Occupation: Retired   Vigorous Activity: n/a   Expose individual to vibrations n/a   Unpleasant work environment n/a       PRIOR LEVEL OF FUNCTION/WORK/ACTIVITY:   Patient ambulated with straight cane prior to surgery. Patient's spouse reports he did not use straight cane correctly and should have been using rolling walker for safety. Active Ambulatory Problems     Diagnosis Date Noted    MEGGAN (generalized anxiety disorder) 12/09/2014    Arthritis 12/09/2014    Idiopathic chronic gout of right ankle 12/09/2014    Mixed hyperlipidemia 12/09/2014    Primary insomnia 12/09/2014    Panic disorder 12/09/2014    HTN (hypertension), benign 02/15/2017    Rosacea 02/15/2017    Idiopathic scoliosis 06/07/2018    Lumbar stenosis with neurogenic claudication 06/07/2018    [facility] 08/16/2018    History of lumbar laminectomy for spinal cord decompression 02/18/2019     Resolved Ambulatory Problems     Diagnosis Date Noted    Depression 12/09/2014     Past Medical History:   Diagnosis Date    Anxiety disorder 12/9/2014    Arthritis 12/9/2014    Depression 12/9/2014    Gout 12/9/2014    History of lumbar laminectomy for spinal cord decompression 2/18/2019    HTN (hypertension), benign 2/15/2017    Hyperlipemia 12/9/2014    Hypertension     Idiopathic chronic gout of right ankle 12/9/2014    Insomnia 12/9/2014    Kidney stone     Mixed hyperlipidemia 12/9/2014    Panic disorder 12/9/2014    Primary insomnia 12/9/2014    Rosacea 2/15/2017         CURRENT MEDICATIONS:    Current Outpatient Medications:     amLODIPine-Olmesartan 10-40 mg tab, TAKE 1 TABLET BY MOUTH DAILY. , Disp: 90 Tab, Rfl: 3    KLOR-CON 10 10 mEq tablet, TAKE 1 TABLET BY MOUTH DAILY. , Disp: 90 Tab, Rfl: 3    zolpidem (AMBIEN) 10 mg tablet, TAKE 1 TABLET BY MOUTH AT BEDTIME AS NEEDED FOR SLEEP, Disp: 30 Tab, Rfl: 5    atorvastatin (LIPITOR) 40 mg tablet, TAKE 1 TABLET BY MOUTH EVERY DAY, Disp: 90 Tab, Rfl: 3    potassium chloride SR (KLOR-CON 10) 10 mEq tablet, Take 1 Tab by mouth daily. , Disp: 90 Tab, Rfl: 3    amLODIPine-Olmesartan (CAROLA) 10-40 mg tab, Take 1 Tab by mouth daily. , Disp: 90 Tab, Rfl: 3    sertraline (ZOLOFT) 100 mg tablet, Take 1 Tab by mouth daily. One and half tab every day, Disp: 135 Tab, Rfl: 3    metroNIDAZOLE (METROGEL) 1 % topical gel, Apply  to affected area daily. Use a thin layer to affected areas after washing, Disp: 45 g, Rfl: 3    allopurinol (ZYLOPRIM) 100 mg tablet, Take 2 Tabs by mouth daily. , Disp: 180 Tab, Rfl: 3    Walker misc, Walker DX: lumbar stenosis with neurogenic claudication +antalgic gait with cane, Disp: 1 Each, Rfl: 0    DISABLED PLACARD (DISABLED PLACARD) DMV, To use with handicap placard form, Disp: 1 Each, Rfl: 0    gabapentin (NEURONTIN) 300 mg capsule, Take 1 Cap by mouth three (3) times daily. , Disp: 180 Cap, Rfl: 2    LORazepam (ATIVAN) 0.5 mg tablet, TAKE 1 TABLET BY MOUTH DAILY. , Disp: 30 Tab, Rfl: 3    diclofenac sodium (PENNSAID) 1.5 % drop, by Apply Externally route., Disp: , Rfl:     diclofenac (VOLTAREN) 1 % gel, Apply 4 g to affected area four (4) times daily. , Disp: 2 Each, Rfl: 3    multivitamin (ONE A DAY) tablet, Take 1 Tab by mouth daily. , Disp: , Rfl:     cholecalciferol, vitamin D3, (VITAMIN D3) 2,000 unit tab, Take  by mouth., Disp: , Rfl:      Date Last Reviewed:  5/6/2019   Number of Personal Factors/Comorbidities that affect the Plan of Care: 1-2: MODERATE COMPLEXITY   EXAMINATION:   OBSERVATION/ORTHOSTATIC POSTURAL ASSESSMENT:     -Pt sits with forward head and rounded shoulders which indicate tight anterior chest musculature, upper trapezius, and levator scapula and weak posterior scapula musculature and deep cervical flexors. Pt displays decreased core motor control indicating weak core and low back musculature.       BALANCE Date: 3/13/2019 Date: 3/13/2019   Right     Left       PELVIC COMPONENT Date:   3/13/2019 Date:  3/13/2019   Standing Mason     Standing PSIS     Standing Sacral Sulci     Gillet     Seated Mason     Seated PSIS     Supine-to-Sit leg length         AROM/PROM         Joint: Date: 3/13/2019  Date:  Date:    Active LE ROM Right Left Right Left Right Left   Hip Flexion         Hip Extension         Hip ER         Hip IR         Knee Extension         Knee Flexion         Knee Extension         Lumbar Flexion 55        Lumbar Extension 10        Lumbar Side-Bending 5        Lumbar Rotation NT          STRENGTH         Joint: Date: 3/13/2019  Date:  Date:     Right Left Right Left Right Left   Hip Abduction 4/5 4/5       Hip Adduction 4/5 4/5       Hip IR 4/5 4/5       Hip ER 4/5 4/5       Hip Flexion 4/5 4/5       Knee Extension 4/5 4/5       Knee Flexion 4/5 4/5       Ankle DF 4/5 4/5       Ankle PF 4/5 4/5       Ankle IV         Ankle EV         4/5    PALPATION Date:  3/13/2019 Date: Date:   TTP      TONE      PA GLIDES        SPECIAL TESTS: Assessed @ Initial Visit    -SCOUR: Negative   -90/90:     -R: Not tested    -L: Not tested   -SLR: Not tested   -SI COMPRESSION TEST: Not tested   -SI POST.  GAPPING:  Not tested   -GILLET TEST: Not tested   -SOL 4: Negative   -SLUMP TEST: Not tested   -DEEP SQUAT: Not tested       NEUROLOGICAL SCREEN: Assessed @ Initial Visit    -RADIATING SYMPTOMS: No     -DERMATOMES: In tact    -MYOTOMES Date: 3/13/2019  Date:  Date:     Right Left Right Left Right Left   L2 & L3   (Hip Flexors) 5/5 5/5       L3-L4  (Knee Extensors) 5/5 5/5       L4  (Ankle DFs) 4+/5 4+/5       L5  (Hallux Ext) 4+/5 4+/5       L5-S1  (Ankle PFs) 4+/5 4+/5       S1-S2  (Ankle EVs) 4+/5 4+/5         RED FLAGS: Date: 3/13/2019   Non-Mechanical pain distribution (cannot be produced, changed, or reduced during exam): NO   Cauda Equina Dysfunction: NO   Upper lumbar disc herniation in younger patients (femoral nerve tension test for lateral disc herniation in lower lumbar): NO   Lumbar compression fracture (age > 48, trauma, corticosteroid use NO   Spine Cancer (age > 48, pervious history of cancer, failure to improve in 1 month of therapy, no relief - be rest, duration > 1 month, unexplained weight loss, insidious onset, constitutional symptoms): NO   Ankylosing Spondylitis (age < 36, pain not relieved by supine, morning back stiffness, pain duration > 3 months, improved by exercise): NO   Sacral Fracture: NO       FUNCTIONAL MOBILITY:  Assessed @ Initial Visit    -Affecting participation in basic ADLs and functional tasks.   -Limited tolerance of walking and standing   -Ambulation/Gait: Ambulates with mild forward head posture with use of rollator for safety.   -Bed mobility:  MOD I   -Stairs: NT   -Transfers: MOD I    -Wheelchair: N/A     Body Structures Involved:  1. Bones  2. Joints  3. Muscles  4. Ligaments Body Functions Affected:  1. Sensory/Pain  2. Neuromusculoskeletal  3. Movement Related Activities and Participation Affected:  1. Mobility  2. Self Care  3. Domestic Life  4. Interpersonal Interactions and Relationships  5. Community, Social and [place]   Number of elements that affect the Plan of Care: 4+: HIGH COMPLEXITY   CLINICAL PRESENTATION:   Presentation: Stable and uncomplicated: LOW COMPLEXITY   CLINICAL DECISION MAKING:   OUTCOME MEASURE USED:   Tool Used: Tool Used: Lower Extremity Functional Scale (LEFS)  Score:  Initial: 31/80 Most Recent: 37/80 (Date: 4/15/2019 )   Interpretation of Score: 20 questions each scored on a 5 point scale with 0 representing \"extreme difficulty or unable to perform\" and 4 representing \"no difficulty\". The lower the score, the greater the functional disability. 80/80 represents no disability. Minimal detectable change is 9 points. Tool Used: Timed Up and Go (TUG)  Score:  Initial: 12.59 seconds (with rollator) Most Recent: 9.86 seconds (Date: 4/15/2019 )   Interpretation of Score:  The test measures, in seconds, the time taken by an individual to stand up from a standard arm chair (seat height 46 cm [18 in], arm height 65 cm [25.6 in]), walk a distance of 3 meters (118 in, approx 10 ft), turn, walk back to the chair and sit down. If the individual takes longer than 14 seconds to complete TUG, this indicates risk for falls. TUG attempted second time without use of rollator: 16.45 seconds    [place] / [place] / Product Type: Medicare /     MEDICAL NECESSITY:  · Skilled intervention continues to be required due to above deficits affecting participation in basic ADLs and overall functional tolerance. REASON FOR SERVICES/OTHER COMMENTS:  · Patient continues to require skilled intervention due to  above deficits affecting participation in basic ADLs and overall functional tolerance. Use of outcome tool(s) and clinical judgement create a POC that gives a: Clear prediction of patient's progress: LOW COMPLEXITY   TREATMENT:   (In addition to Assessment/Re-Assessment sessions the following treatments were rendered)    Pre-treatment Symptoms/Complaints: Patient drove to PT session on his own, no ambulation device. Patient does report increased tightness in bilateral hamstring musculature. THERAPEUTIC EXERCISE: (35 minutes):  Exercises per grid below to improve mobility, strength and balance. Required minimal visual and verbal cues to promote proper body alignment and promote proper body posture. Progressed resistance, range and complexity of movement as indicated.      Date  4/10/19 Date:  4/15/19 Date:  4/17/19 Date:  4/22/19 Date:  4/24/19 Date:  5/1/19 Date:  5/6/19   Activity/Exercise          Heel raises  2 x 10 reps B 2 x 10 reps B 2 x 10 reps B 2 x 10 reps B 2 x 10 reps B 1 x 10 B   Sit to stand 2 x 5 reps with verbal cues and demonstration 2 x 5 reps with verbal cues and demonstration 2 x 5 reps with verbal cues and demonstration 2 x 5 reps with verbal cues and demonstartion      balance on blue airex foam 3 x 30 second hold with eyes closed 3 x 30 second hold with eyes closed 3 x 30 second hold with eyes closed 3 x 30 second hold with eyes closed      Nustep  Level 3  10 minutes Level 3   10 minutes Level 3   10 minutes Level 3   10 minutes Level 3  10 minutes Level 3  12 minutes Level 3  10 minutes   Gastrocnemius stretch ncline board stretch  3 x 30 second hold Incline board stretch 3 x 30 sec hold Incline board stretch 3 x 30 sec hold Incline board stretch 3 x 30 sec hold Incline board stretch 3 x 30 sec hold Incline board stretch 3 x 30 sec hold Incline board stretch 3 x 30 sec hold   Sidestepping in parallel bars  X 4 laps leading with each LE, minimal use of hands X 4 laps leading with each LE, minimal use of hands   X  4 laps leading with each LE, no use of hands    Hip Abduction 2 x 10 reps B 2 x 10 reps B 2 x 10 reps B 2 x 10 reps B 2 x 10 reps B 2 x 10 reps B 1 x 10 reps B   Hip extension 2 x 10 reps B 2 x 10 reps B 2 x 10 reps B 2 x 10 reps B 2 x 10 reps B 2 x 10 reps B 1 x 10 reps B   Standing Marching Marching/high stepping in ll bars down and back x 2  2 x 10 reps B 2 x 10 reps B 2 x 10 reps B 2 x 10 reps B 2 x 10 reps B 1 x 10 reps B   Balance Board     AP/ML tapping x 20    AP/ML 30 sec hold for balance x 2 AP/ML tapping x 20    AP/ML 30 sec hold for balance x 2    Semi tandem progressing towards Tandem stance 2 x 30 second hold with each LE with minimal use of hands    2 x 30 sec hold with each LE leading, minimal hold of B UE     Seated hamstring stretch       3 x 30 sec B   Clamshells   2 x 10 reps B       Supine Bridge   1 x 10 reps    2 x 10 reps B   Ambulation  Patient ambulated 200 feet without assistive device with SBA and verbal cues for heel strike and bigger step. Patient ambulated 100 feet with slower gait and then instructed to walk faster for 100 feet  Patient ambulated 200 ft without assistive device with SBA and verbal cues for heel strike and wider FERMIN.  Patient ambulated 200 ft without assistive device with  Verbal cues for wider FERMIN Patient ambulated 15' x 3 with mirror placed in front of patient for visual cues for feedback regarding body mechanics gait. Patient ambulated 200 ft without assistive device with verbal cues for wider FERMIN Patient ambulated 200 ft without assistive device with verbal cues for wider FERMIN    PT assist Hamstring stretch     3 x 30 sec B       MANUAL THERAPY: (10 minutes): Joint mobilization, Soft tissue mobilization and Manipulation was utilized and necessary because of the patient's restricted joint motion, painful spasm, loss of articular motion and restricted motion of soft tissue to patient's bilateral hamstring musculature. (Used abbreviations: MET - muscle energy technique; PNF - proprioceptive neuromuscular facilitation; NMR - neuromuscular re-education; AP - anterior to posterior; PA - posterior to anterior)    MODALITIES: (0 minutes):      Not today    TREATMENT/SESSION ASSESSMENT:  [facility] demonstrated increase tightness in bilateral hamstring musculature. PT re-educated patient on seated hamstring stretch and importance of continuing to stretch for increased ROM and decreased pain. · Pain/ Symptoms: Initial:   2/10 Post Session:  1/10 fatigue in legs ·   Compliance with Program/Exercises: Good compliance. · Recommendations/Intent for next treatment session: \"Next visit will focus on advancements to more challenging activities\".     Total Treatment Duration: 45 minutes   PT Patient Time In/Time Out  Time In: 1015  Time Out: [address] [facility]

## 2019-05-08 ENCOUNTER — HOSPITAL ENCOUNTER (OUTPATIENT)
Dept: PHYSICAL THERAPY | Age: 79
Discharge: HOME | End: 2019-05-08
Payer: MEDICARE

## 2019-05-08 PROCEDURE — 97110 THERAPEUTIC EXERCISES: CPT

## 2019-05-08 NOTE — PROGRESS NOTES
Chava Martinez [facility]  : 1940  [place] / [place] / Product Type: Medicare /  [address]  at [facility]  [address] [facility], [address]  [phone]   [phone]                OUTPATIENT PHYSICAL THERAPY:Daily Note 2019   Progress Note: 4/15/2019     ICD-10: Treatment Diagnosis:   Difficulty in walking, not elsewhere classified (R26.2)  Muscle weakness (generalized) (M62.81)        PRECAUTIONS/ALLERGIES:   Patient has no known allergies. FALL RISK SCORE: 2 (? 5 = High Risk)    MD ORDERS: Eval and Treat  MEDICAL/REFERRING DIAGNOSIS:  Encounter for follow-up examination after completed treatment for conditions other than malignant neoplasm [Z09]     DATE OF ONSET: Laminectomy L1-L4 2019    REFERRING PHYSICIAN: Naomy Garcia MD    RETURN PHYSICIAN APPOINTMENT: TBD by patient      Ambulatory/Rehab Services H2 Model Falls Risk Assessment    Risk Factors:       (1)  Gender [Male] Ability to Rise from Chair:       (1)  Pushes up, successful in one attempt    Falls Prevention Plan: Mobility Assistance Device (specify):  rollator   Total: (5 or greater = High Risk): 2     [facility] States Patent #[id number]. Federal Law prohibits the replication, distribution or use without written permission from [facility]:  Mr. Sonia Nobles has attended 10 physical therapy session including initial evaluation as of 3/13/2019. Sonia Nobles has met 5/5 STGs at this point. He is demonstrating improved dynamic balance with ambulation with no longer using rollator for assistance. Patient continues to demonstrate weakness in B hip musculature with intermittent narrowed FERMIN and trendelenburg gait pattern during single limb stance.  Mr. JANICE MCKINNEY will benefit from continued skilled therapy to increase functional strength, balance, and coordination to improve safety with functional mobility. PROBLEM LIST (Impacting functional limitations):  1. Decreased Strength  2. Decreased ADL/Functional Activities  3. Decreased Transfer Abilities  4. Decreased Ambulation Ability/Technique  5. Decreased Balance  6. Increased Pain  7. Decreased Activity Tolerance  8. Increased Fatigue  9. Increased Shortness of Breath  10. Decreased Flexibility/Joint Mobility  11. Decreased [place] with Home Exercise Program INTERVENTIONS PLANNED:  1. Balance Exercise  2. Bed Mobility  3. Cold  4. Cryotherapy  5. Electrical Stimulation  6. Family Education  7. Gait Training  8. Heat  9. Home Exercise Program (HEP)  10. Manual Therapy  11. Neuromuscular Re-education/Strengthening  12. Range of Motion (ROM)  13. Therapeutic Activites  14. Therapeutic Exercise/Strengthening  15. Transfer Training  16. Mechanical traction  17. Aquatic Therapy  18. Electrical Stimulation   TREATMENT PLAN:  Effective Dates: 3/13/2019 TO 6/11/2019 (90 days). Frequency/Duration: 2 times a week for 90 Days    GOALS: (Goals have been discussed and agreed upon with patient.)  Short Term Goals 4 weeks   1. Jonah Conway will be independent with HEP to promote self-management of symptoms. MET  2. Jonah Conway will demonstrate a 5 point improvement on the LEFS to show improvement in function. MET  3. Jonah Conway will participate in core stabilization exercises to help with stabilization and improve posture during ADLs to help prevent future injuries. MET  4. Jonah Conway will participate in LE strengthening program with weights as appropriate to help with gait and elevations. MET  5. Jonah Conway will participate in static and dynamic balance activities to decrease the risk for falls and improve overall QOL. MET  6. Jonah Conway will perform TUG time of 10 seconds with LRAD to decrease risk of falls.  MET    Long Term Goals 12 weeks [address] Valdez will demonstrate a 10 point improvement on the LEFS to show improvement in function. 2. Jonah Conway will report <=2/10 pain at rest and during ADLs to improve QOL. 3. Jonah Conway will demonstrate >=4+/5 LE strength on manual muscle testing to improve functional mobility. [address] Valdez will be able to perform SLS >10 seconds bilaterally to help with gait and improve balance. Nataly Valdez will be able to demonstrate safe transfer mechanics without cueing for improved safety with home and community activities. Nataly Valdez will perform TUG test with <10 seconds with LRAD to decrease risk of falls. Rehabilitation Potential For Stated Goals: Good    Regarding Bishop Taylor Jolly's therapy, I certify that the treatment plan above will be carried out by a therapist or under their direction. Thank you for this referral,  Juan Pablo Nguyen       Referring Physician Signature: Lisandra Goss MD              Date                    HISTORY:   PATIENT GOAL FOR PHYSICAL THERAPY:  \"I want to be able to walk without the rollator. \"      HISTORY OF PRESENT INJURY/ILLNESS (REASON FOR REFERRAL):  Patient had laminectomy on 2/12/2019 at levels L1-L4. Patient had been having pain in lower back for the previous year. Patient has had physical therapy in the past at this location prior to surgery as well as multiple injections to attempt to decrease pain. Note: Patient denies any increase of symptoms with cough, sneeze or valsalva. Patient denies any saddle paresthesia or bowel/bladder deficits.      PAST MEDICAL HISTORY/COMORBIDITIES:   Mr. Henderson Or  has a past medical history of Anxiety disorder (12/9/2014), Arthritis (12/9/2014), Depression (12/9/2014), Gout (12/9/2014), History of lumbar laminectomy for spinal cord decompression (2/18/2019), HTN (hypertension), benign (2/15/2017), Hyperlipemia (12/9/2014), Hypertension, Idiopathic chronic gout of right ankle (2014), Insomnia (2014), Kidney stone, Mixed hyperlipidemia (2014), Panic disorder (2014), Primary insomnia (2014), and Rosacea (2/15/2017). Mr. Vince Moore  has a past surgical history that includes hx other surgical; hx cyst removal; hx cyst removal; hx hernia repair (Bilateral); hx ankle fracture tx (Left, 1974); hx wrist fracture tx (Bilateral, 1993); hx colonoscopy (); and hx cyst removal.    SOCIAL HISTORY/LIVING ENVIRONMENT:        Social History     Socioeconomic History    Marital status:      Spouse name: Not on file    Number of children: Not on file    Years of education: Not on file    Highest education level: Not on file   Occupational History    Not on file   Social Needs    Financial resource strain: Not on file    Food insecurity:     Worry: Not on file     Inability: Not on file    Transportation needs:     Medical: Not on file     Non-medical: Not on file   Tobacco Use    Smoking status: Former Smoker     Types: Cigarettes     Last attempt to quit: 1970     Years since quittin.3    Smokeless tobacco: Never Used   Substance and Sexual Activity    Alcohol use:  Yes     Alcohol/week: 0.0 oz    Drug use: No    Sexual activity: Yes     Partners: Female   Lifestyle    Physical activity:     Days per week: Not on file     Minutes per session: Not on file    Stress: Not on file   Relationships    Social connections:     Talks on phone: Not on file     Gets together: Not on file     Attends Mandaen service: Not on file     Active member of club or organization: Not on file     Attends meetings of clubs or organizations: Not on file     Relationship status: Not on file    Intimate partner violence:     Fear of current or ex partner: Not on file     Emotionally abused: Not on file     Physically abused: Not on file     Forced sexual activity: Not on file   Other Topics Concern    Not on file   Social History Narrative    Not on file       LIFESTYLE Date: 3/13/2019   Occupation: Retired   Vigorous Activity: n/a   Expose individual to vibrations n/a   Unpleasant work environment n/a       PRIOR LEVEL OF FUNCTION/WORK/ACTIVITY:   Patient ambulated with straight cane prior to surgery. Patient's spouse reports he did not use straight cane correctly and should have been using rolling walker for safety. Active Ambulatory Problems     Diagnosis Date Noted    MEGGAN (generalized anxiety disorder) 12/09/2014    Arthritis 12/09/2014    Idiopathic chronic gout of right ankle 12/09/2014    Mixed hyperlipidemia 12/09/2014    Primary insomnia 12/09/2014    Panic disorder 12/09/2014    HTN (hypertension), benign 02/15/2017    Rosacea 02/15/2017    Idiopathic scoliosis 06/07/2018    Lumbar stenosis with neurogenic claudication 06/07/2018    [facility] 08/16/2018    History of lumbar laminectomy for spinal cord decompression 02/18/2019     Resolved Ambulatory Problems     Diagnosis Date Noted    Depression 12/09/2014     Past Medical History:   Diagnosis Date    Anxiety disorder 12/9/2014    Arthritis 12/9/2014    Depression 12/9/2014    Gout 12/9/2014    History of lumbar laminectomy for spinal cord decompression 2/18/2019    HTN (hypertension), benign 2/15/2017    Hyperlipemia 12/9/2014    Hypertension     Idiopathic chronic gout of right ankle 12/9/2014    Insomnia 12/9/2014    Kidney stone     Mixed hyperlipidemia 12/9/2014    Panic disorder 12/9/2014    Primary insomnia 12/9/2014    Rosacea 2/15/2017         CURRENT MEDICATIONS:    Current Outpatient Medications:     amLODIPine-Olmesartan 10-40 mg tab, TAKE 1 TABLET BY MOUTH DAILY. , Disp: 90 Tab, Rfl: 3    KLOR-CON 10 10 mEq tablet, TAKE 1 TABLET BY MOUTH DAILY. , Disp: 90 Tab, Rfl: 3    zolpidem (AMBIEN) 10 mg tablet, TAKE 1 TABLET BY MOUTH AT BEDTIME AS NEEDED FOR SLEEP (Patient taking differently: 10 mg.  TAKE 1 TABLET BY MOUTH AT BEDTIME AS NEEDED FOR SLEEP  Indications: Difficulty Falling Asleep), Disp: 30 Tab, Rfl: 5    atorvastatin (LIPITOR) 40 mg tablet, TAKE 1 TABLET BY MOUTH EVERY DAY, Disp: 90 Tab, Rfl: 3    potassium chloride SR (KLOR-CON 10) 10 mEq tablet, Take 1 Tab by mouth daily. , Disp: 90 Tab, Rfl: 3    amLODIPine-Olmesartan (CAROLA) 10-40 mg tab, Take 1 Tab by mouth daily. , Disp: 90 Tab, Rfl: 3    sertraline (ZOLOFT) 100 mg tablet, Take 1 Tab by mouth daily. One and half tab every day, Disp: 135 Tab, Rfl: 3    metroNIDAZOLE (METROGEL) 1 % topical gel, Apply  to affected area daily. Use a thin layer to affected areas after washing, Disp: 45 g, Rfl: 3    allopurinol (ZYLOPRIM) 100 mg tablet, Take 2 Tabs by mouth daily. , Disp: 180 Tab, Rfl: 3    Walker misc, Walker DX: lumbar stenosis with neurogenic claudication +antalgic gait with cane, Disp: 1 Each, Rfl: 0    DISABLED PLACARD (DISABLED PLACARD) DMV, To use with handicap placard form, Disp: 1 Each, Rfl: 0    gabapentin (NEURONTIN) 300 mg capsule, Take 1 Cap by mouth three (3) times daily. , Disp: 180 Cap, Rfl: 2    LORazepam (ATIVAN) 0.5 mg tablet, TAKE 1 TABLET BY MOUTH DAILY. , Disp: 30 Tab, Rfl: 3    diclofenac sodium (PENNSAID) 1.5 % drop, by Apply Externally route., Disp: , Rfl:     diclofenac (VOLTAREN) 1 % gel, Apply 4 g to affected area four (4) times daily. , Disp: 2 Each, Rfl: 3    multivitamin (ONE A DAY) tablet, Take 1 Tab by mouth daily. , Disp: , Rfl:     cholecalciferol, vitamin D3, (VITAMIN D3) 2,000 unit tab, Take  by mouth., Disp: , Rfl:      Date Last Reviewed:  5/8/2019   Number of Personal Factors/Comorbidities that affect the Plan of Care: 1-2: MODERATE COMPLEXITY   EXAMINATION:   OBSERVATION/ORTHOSTATIC POSTURAL ASSESSMENT:     -Pt sits with forward head and rounded shoulders which indicate tight anterior chest musculature, upper trapezius, and levator scapula and weak posterior scapula musculature and deep cervical flexors.  Pt displays decreased core motor control indicating weak core and low back musculature. BALANCE Date: 3/13/2019 Date: 3/13/2019   Right     Left       PELVIC COMPONENT Date:   3/13/2019 Date:  3/13/2019   Standing Mason     Standing PSIS     Standing Sacral Sulci     Gillet     Seated Mason     Seated PSIS     Supine-to-Sit leg length         AROM/PROM         Joint: Date: 3/13/2019  Date:  Date:    Active LE ROM Right Left Right Left Right Left   Hip Flexion         Hip Extension         Hip ER         Hip IR         Knee Extension         Knee Flexion         Knee Extension         Lumbar Flexion 55        Lumbar Extension 10        Lumbar Side-Bending 5        Lumbar Rotation NT          STRENGTH         Joint: Date: 3/13/2019  Date:  Date:     Right Left Right Left Right Left   Hip Abduction 4/5 4/5       Hip Adduction 4/5 4/5       Hip IR 4/5 4/5       Hip ER 4/5 4/5       Hip Flexion 4/5 4/5       Knee Extension 4/5 4/5       Knee Flexion 4/5 4/5       Ankle DF 4/5 4/5       Ankle PF 4/5 4/5       Ankle IV         Ankle EV         4/5    PALPATION Date:  3/13/2019 Date: Date:   TTP      TONE      PA GLIMARSHA        SPECIAL TESTS: Assessed @ Initial Visit    -SCOUR: Negative   -90/90:     -R: Not tested    -L: Not tested   -SLR: Not tested   -SI COMPRESSION TEST: Not tested   -SI POST.  GAPPING:  Not tested   -GILLET TEST: Not tested   -SOL 4: Negative   -SLUMP TEST: Not tested   -DEEP SQUAT: Not tested       NEUROLOGICAL SCREEN: Assessed @ Initial Visit    -RADIATING SYMPTOMS: No     -DERMATOMES: In tact    -MYOTOMES Date: 3/13/2019  Date:  Date:     Right Left Right Left Right Left   L2 & L3   (Hip Flexors) 5/5 5/5       L3-L4  (Knee Extensors) 5/5 5/5       L4  (Ankle DFs) 4+/5 4+/5       L5  (Hallux Ext) 4+/5 4+/5       L5-S1  (Ankle PFs) 4+/5 4+/5       S1-S2  (Ankle EVs) 4+/5 4+/5         RED FLAGS: Date: 3/13/2019   Non-Mechanical pain distribution (cannot be produced, changed, or reduced during exam): NO   Cauda Equina Dysfunction: NO Upper lumbar disc herniation in younger patients (femoral nerve tension test for lateral disc herniation in lower lumbar): NO   Lumbar compression fracture (age > 48, trauma, corticosteroid use NO   Spine Cancer (age > 48, pervious history of cancer, failure to improve in 1 month of therapy, no relief - be rest, duration > 1 month, unexplained weight loss, insidious onset, constitutional symptoms): NO   Ankylosing Spondylitis (age < 36, pain not relieved by supine, morning back stiffness, pain duration > 3 months, improved by exercise): NO   Sacral Fracture: NO       FUNCTIONAL MOBILITY:  Assessed @ Initial Visit    -Affecting participation in basic ADLs and functional tasks.   -Limited tolerance of walking and standing   -Ambulation/Gait: Ambulates with mild forward head posture with use of rollator for safety.   -Bed mobility:  MOD I   -Stairs: NT   -Transfers: MOD I    -Wheelchair: N/A     Body Structures Involved:  1. Bones  2. Joints  3. Muscles  4. Ligaments Body Functions Affected:  1. Sensory/Pain  2. Neuromusculoskeletal  3. Movement Related Activities and Participation Affected:  1. Mobility  2. Self Care  3. Domestic Life  4. Interpersonal Interactions and Relationships  5. Community, Social and [place]in   Number of elements that affect the Plan of Care: 4+: HIGH COMPLEXITY   CLINICAL PRESENTATION:   Presentation: Stable and uncomplicated: LOW COMPLEXITY   CLINICAL DECISION MAKING:   OUTCOME MEASURE USED:   Tool Used: Tool Used: Lower Extremity Functional Scale (LEFS)  Score:  Initial: 31/80 Most Recent: 37/80 (Date: 4/15/2019 )   Interpretation of Score: 20 questions each scored on a 5 point scale with 0 representing \"extreme difficulty or unable to perform\" and 4 representing \"no difficulty\". The lower the score, the greater the functional disability. 80/80 represents no disability. Minimal detectable change is 9 points.       Tool Used: Timed Up and Go (TUG)  Score:  Initial: 12.59 seconds (with rollator) Most Recent: 9.86 seconds (Date: 4/15/2019 )   Interpretation of Score: The test measures, in seconds, the time taken by an individual to stand up from a standard arm chair (seat height 46 cm [18 in], arm height 65 cm [25.6 in]), walk a distance of 3 meters (118 in, approx 10 ft), turn, walk back to the chair and sit down. If the individual takes longer than 14 seconds to complete TUG, this indicates risk for falls. TUG attempted second time without use of rollator: 16.45 seconds    [place] / [place] / Product Type: Medicare /     MEDICAL NECESSITY:  · Skilled intervention continues to be required due to above deficits affecting participation in basic ADLs and overall functional tolerance. REASON FOR SERVICES/OTHER COMMENTS:  · Patient continues to require skilled intervention due to  above deficits affecting participation in basic ADLs and overall functional tolerance. Use of outcome tool(s) and clinical judgement create a POC that gives a: Clear prediction of patient's progress: LOW COMPLEXITY   TREATMENT:   (In addition to Assessment/Re-Assessment sessions the following treatments were rendered)    Pre-treatment Symptoms/Complaints: Patient reports no pain this morning. Has not completed stretching HEP. THERAPEUTIC EXERCISE: (45 minutes):  Exercises per grid below to improve mobility, strength and balance. Required minimal visual and verbal cues to promote proper body alignment and promote proper body posture. Progressed resistance, range and complexity of movement as indicated.      Date:  4/15/19 Date:  4/17/19 Date:  4/22/19 Date:  4/24/19 Date:  5/1/19 Date:  5/6/19 Date:  5/8/19   Activity/Exercise          Heel raises 2 x 10 reps B 2 x 10 reps B 2 x 10 reps B 2 x 10 reps B 2 x 10 reps B 1 x 10 B 1 x 10 B   Sit to stand 2 x 5 reps with verbal cues and demonstration 2 x 5 reps with verbal cues and demonstration 2 x 5 reps with verbal cues and demonstartion       balance on blue airex foam 3 x 30 second hold with eyes closed 3 x 30 second hold with eyes closed 3 x 30 second hold with eyes closed       Nustep  Level 3   10 minutes Level 3   10 minutes Level 3   10 minutes Level 3  10 minutes Level 3  12 minutes Level 3  10 minutes Level 3  10 minutes   Gastrocnemius stretch Incline board stretch 3 x 30 sec hold Incline board stretch 3 x 30 sec hold Incline board stretch 3 x 30 sec hold Incline board stretch 3 x 30 sec hold Incline board stretch 3 x 30 sec hold Incline board stretch 3 x 30 sec hold Incline board stretch 3 x 30 sec hold   Sidestepping in parallel bars X 4 laps leading with each LE, minimal use of hands X 4 laps leading with each LE, minimal use of hands   X  4 laps leading with each LE, no use of hands     Hip Abduction 2 x 10 reps B 2 x 10 reps B 2 x 10 reps B 2 x 10 reps B 2 x 10 reps B 1 x 10 reps B 1 x 10 reps B   Hip extension 2 x 10 reps B 2 x 10 reps B 2 x 10 reps B 2 x 10 reps B 2 x 10 reps B 1 x 10 reps B 1 x 10 reps B   Standing Marching 2 x 10 reps B 2 x 10 reps B 2 x 10 reps B 2 x 10 reps B 2 x 10 reps B 1 x 10 reps B 1  X 10 reps B   Balance Board    AP/ML tapping x 20    AP/ML 30 sec hold for balance x 2 AP/ML tapping x 20    AP/ML 30 sec hold for balance x 2  AP/ML tapping x 20    AP/ML 30 sec hold for balance x 2   Semi tandem progressing towards Tandem stance    2 x 30 sec hold with each LE leading, minimal hold of B UE      Seated hamstring stretch      3 x 30 sec B 3 x 30 sec B   Clamshells  2 x 10 reps B     2 x 10 reps B   Supine Bridge  1 x 10 reps    2 x 10 reps B 2 x 10 reps B   Ambulation  Patient ambulated 200 ft without assistive device with SBA and verbal cues for heel strike and wider FERMIN. Patient ambulated 200 ft without assistive device with  Verbal cues for wider FERMIN Patient ambulated 15' x 3 with mirror placed in front of patient for visual cues for feedback regarding body mechanics gait.  Patient ambulated 200 ft without assistive device with verbal cues for wider FERMIN Patient ambulated 200 ft without assistive device with verbal cues for wider FERMIN     PT assist Hamstring stretch    3 x 30 sec B        MANUAL THERAPY: (0 minutes): Joint mobilization, Soft tissue mobilization and Manipulation was utilized and necessary because of the patient's restricted joint motion, painful spasm, loss of articular motion and restricted motion of soft tissue to patient's bilateral hamstring musculature. (Used abbreviations: MET - muscle energy technique; PNF - proprioceptive neuromuscular facilitation; NMR - neuromuscular re-education; AP - anterior to posterior; PA - posterior to anterior)    MODALITIES: (0 minutes):      Not today    TREATMENT/SESSION ASSESSMENT:  Dominik Evans demonstrated improved dynamic balance with therapeutic exercise this AM. Patient educated on continuing stretching HEP to improve ROM and decrease stiffness. · Pain/ Symptoms: Initial:   0/10 Post Session:  0/10  ·   Compliance with Program/Exercises: Good compliance. · Recommendations/Intent for next treatment session: \"Next visit will focus on advancements to more challenging activities\".     Total Treatment Duration: 45 minutes   PT Patient Time In/Time Out  Time In: 1015  Time Out: 325 [facility]

## 2019-05-13 ENCOUNTER — HOSPITAL ENCOUNTER (OUTPATIENT)
Dept: PHYSICAL THERAPY | Age: 79
Discharge: HOME | End: 2019-05-13
Payer: MEDICARE

## 2019-05-13 PROCEDURE — 97110 THERAPEUTIC EXERCISES: CPT

## 2019-05-13 NOTE — PROGRESS NOTES
[facility]  : 1940  [place] / [place] / Product Type: Medicare /  [address]  at [facility]  [address], [address], [address]  [phone]   [phone]                OUTPATIENT PHYSICAL THERAPY:Progress Report 2019   Progress Note: 2019     ICD-10: Treatment Diagnosis:   Difficulty in walking, not elsewhere classified (R26.2)  Muscle weakness (generalized) (M62.81)        PRECAUTIONS/ALLERGIES:   Patient has no known allergies. FALL RISK SCORE: 2 (? 5 = High Risk)    MD ORDERS: Eval and Treat  MEDICAL/REFERRING DIAGNOSIS:  Encounter for follow-up examination after completed treatment for conditions other than malignant neoplasm [Z09]     DATE OF ONSET: Laminectomy L1-L4 2019    REFERRING PHYSICIAN: Ольга Machado MD    RETURN PHYSICIAN APPOINTMENT: TBD by patient      Ambulatory/Rehab Services H2 Model Falls Risk Assessment    Risk Factors:       (1)  Gender [Male] Ability to Rise from Chair:       (1)  Pushes up, successful in one attempt    Falls Prevention Plan: Mobility Assistance Device (specify):  rollator   Total: (5 or greater = High Risk): 2     [facility] [address] [place] [address] States Patent #[id number]. Federal Law prohibits the replication, distribution or use without written permission from [facility] [place]:  Mr. Arnulfo Vaz has attended 18 physical therapy session including initial evaluation as of 3/13/2019. Arnulfo Vaz has met 5/5 STGs at this point as well as 4/6 LTGs. He is demonstrating improved dynamic balance with ambulation and continues to not use rollator for assistance.  [facility] reports increased tolerance for ADL's and doing his usual activities at home.   [facility] is making steady progress with functional strength but demonstrates fatigue in B hip musculature with increased ambulation or therapeutic exercise. Mr. Indira Ashby will benefit from continued skilled therapy to increase functional strength, balance, and coordination to improve safety with functional mobility. PROBLEM LIST (Impacting functional limitations):  1. Decreased Strength  2. Decreased ADL/Functional Activities  3. Decreased Transfer Abilities  4. Decreased Ambulation Ability/Technique  5. Decreased Balance  6. Increased Pain  7. Decreased Activity Tolerance  8. Increased Fatigue  9. Increased Shortness of Breath  10. Decreased Flexibility/Joint Mobility  11. Decreased [place] with Home Exercise Program INTERVENTIONS PLANNED:  1. Balance Exercise  2. Bed Mobility  3. Cold  4. Cryotherapy  5. Electrical Stimulation  6. Family Education  7. Gait Training  8. Heat  9. Home Exercise Program (HEP)  10. Manual Therapy  11. Neuromuscular Re-education/Strengthening  12. Range of Motion (ROM)  13. Therapeutic Activites  14. Therapeutic Exercise/Strengthening  15. Transfer Training  16. Mechanical traction  17. Aquatic Therapy  18. Electrical Stimulation   TREATMENT PLAN:  Effective Dates: 3/13/2019 TO 6/11/2019 (90 days). Frequency/Duration: 2 times a week for 90 Days    GOALS: (Goals have been discussed and agreed upon with patient.)  Short Term Goals 4 weeks   1. Viki Corcoran will be independent with HEP to promote self-management of symptoms. MET  2. Viki Corcoran will demonstrate a 5 point improvement on the LEFS to show improvement in function. MET  3. Viki Corcoran will participate in core stabilization exercises to help with stabilization and improve posture during ADLs to help prevent future injuries. MET  4. Viki Corcoran will participate in LE strengthening program with weights as appropriate to help with gait and elevations. MET  5. Viki Corcoran will participate in static and dynamic balance activities to decrease the risk for falls and improve overall QOL. MET  6. Ruthe Angelucci will perform TUG time of 10 seconds with LRAD to decrease risk of falls. MET    Long Term Goals 12 weeks   1. Ruthe Angelucci will demonstrate a 10 point improvement on the LEFS to show improvement in function. MET  2. Ruthe Angelucci will report <=2/10 pain at rest and during ADLs to improve QOL. MET  3. Ruthe Angelucci will demonstrate >=4+/5 LE strength on manual muscle testing to improve functional mobility. NOT MET  4. Ruthe Angelucci will be able to perform SLS >10 seconds bilaterally to help with gait and improve balance. NOT MET  5. Ruthe Angelucci will be able to demonstrate safe transfer mechanics without cueing for improved safety with home and community activities. MET  7. Ruthe Angelucci will perform TUG test with <10 seconds with LRAD to decrease risk of falls. MET      Rehabilitation Potential For Stated Goals: Good    Regarding Nnamdi Jolly's therapy, I certify that the treatment plan above will be carried out by a therapist or under their direction. Thank you for this referral,  Eyal Diaz       Referring Physician Signature: Marsha Pressley MD              Date                    HISTORY:   PATIENT GOAL FOR PHYSICAL THERAPY:  \"I want to be able to walk without the rollator. \"      HISTORY OF PRESENT INJURY/ILLNESS (REASON FOR REFERRAL):  Patient had laminectomy on 2/12/2019 at levels L1-L4. Patient had been having pain in lower back for the previous year. Patient has had physical therapy in the past at this location prior to surgery as well as multiple injections to attempt to decrease pain. Note: Patient denies any increase of symptoms with cough, sneeze or valsalva. Patient denies any saddle paresthesia or bowel/bladder deficits.      PAST MEDICAL HISTORY/COMORBIDITIES:   Mr. Brigida Peraza  has a past medical history of Anxiety disorder (12/9/2014), Arthritis (12/9/2014), Depression (12/9/2014), Gout (12/9/2014), History of lumbar laminectomy for spinal cord decompression (2019), HTN (hypertension), benign (2/15/2017), Hyperlipemia (2014), Hypertension, Idiopathic chronic gout of right ankle (2014), Insomnia (2014), Kidney stone, Mixed hyperlipidemia (2014), Panic disorder (2014), Primary insomnia (2014), and Rosacea (2/15/2017). Mr. Pop Macias  has a past surgical history that includes hx other surgical; hx cyst removal; hx cyst removal; hx hernia repair (Bilateral); hx ankle fracture tx (Left, 1974); hx wrist fracture tx (Bilateral, 1993); hx colonoscopy (); and hx cyst removal.    SOCIAL HISTORY/LIVING ENVIRONMENT:        Social History     Socioeconomic History    Marital status:      Spouse name: Not on file    Number of children: Not on file    Years of education: Not on file    Highest education level: Not on file   Occupational History    Not on file   Social Needs    Financial resource strain: Not on file    Food insecurity:     Worry: Not on file     Inability: Not on file    Transportation needs:     Medical: Not on file     Non-medical: Not on file   Tobacco Use    Smoking status: Former Smoker     Types: Cigarettes     Last attempt to quit: 1970     Years since quittin.3    Smokeless tobacco: Never Used   Substance and Sexual Activity    Alcohol use:  Yes     Alcohol/week: 0.0 oz    Drug use: No    Sexual activity: Yes     Partners: Female   Lifestyle    Physical activity:     Days per week: Not on file     Minutes per session: Not on file    Stress: Not on file   Relationships    Social connections:     Talks on phone: Not on file     Gets together: Not on file     Attends Buddhism service: Not on file     Active member of club or organization: Not on file     Attends meetings of clubs or organizations: Not on file     Relationship status: Not on file    Intimate partner violence:     Fear of current or ex partner: Not on file     Emotionally abused: Not on file     Physically abused: Not on file     Forced sexual activity: Not on file   Other Topics Concern    Not on file   Social History Narrative    Not on file       LIFESTYLE Date: 3/13/2019   Occupation: Retired   Vigorous Activity: n/a   Expose individual to vibrations n/a   Unpleasant work environment n/a       PRIOR LEVEL OF FUNCTION/WORK/ACTIVITY:   Patient ambulated with straight cane prior to surgery. Patient's spouse reports he did not use straight cane correctly and should have been using rolling walker for safety. Active Ambulatory Problems     Diagnosis Date Noted    MEGGAN (generalized anxiety disorder) 12/09/2014    Arthritis 12/09/2014    Idiopathic chronic gout of right ankle 12/09/2014    Mixed hyperlipidemia 12/09/2014    Primary insomnia 12/09/2014    Panic disorder 12/09/2014    HTN (hypertension), benign 02/15/2017    Rosacea 02/15/2017    Idiopathic scoliosis 06/07/2018    Lumbar stenosis with neurogenic claudication 06/07/2018    Recurrent depression ([facility] [address] 08/16/2018    History of lumbar laminectomy for spinal cord decompression 02/18/2019     Resolved Ambulatory Problems     Diagnosis Date Noted    Depression 12/09/2014     Past Medical History:   Diagnosis Date    Anxiety disorder 12/9/2014    Arthritis 12/9/2014    Depression 12/9/2014    Gout 12/9/2014    History of lumbar laminectomy for spinal cord decompression 2/18/2019    HTN (hypertension), benign 2/15/2017    Hyperlipemia 12/9/2014    Hypertension     Idiopathic chronic gout of right ankle 12/9/2014    Insomnia 12/9/2014    Kidney stone     Mixed hyperlipidemia 12/9/2014    Panic disorder 12/9/2014    Primary insomnia 12/9/2014    Rosacea 2/15/2017         CURRENT MEDICATIONS:    Current Outpatient Medications:     amLODIPine-Olmesartan 10-40 mg tab, TAKE 1 TABLET BY MOUTH DAILY. , Disp: 90 Tab, Rfl: 3    KLOR-CON 10 10 mEq tablet, TAKE 1 TABLET BY MOUTH DAILY. , Disp: 90 Tab, Rfl: 3   zolpidem (AMBIEN) 10 mg tablet, TAKE 1 TABLET BY MOUTH AT BEDTIME AS NEEDED FOR SLEEP (Patient taking differently: 10 mg. TAKE 1 TABLET BY MOUTH AT BEDTIME AS NEEDED FOR SLEEP  Indications: Difficulty Falling Asleep), Disp: 30 Tab, Rfl: 5    atorvastatin (LIPITOR) 40 mg tablet, TAKE 1 TABLET BY MOUTH EVERY DAY, Disp: 90 Tab, Rfl: 3    potassium chloride SR (KLOR-CON 10) 10 mEq tablet, Take 1 Tab by mouth daily. , Disp: 90 Tab, Rfl: 3    amLODIPine-Olmesartan (CAROLA) 10-40 mg tab, Take 1 Tab by mouth daily. , Disp: 90 Tab, Rfl: 3    sertraline (ZOLOFT) 100 mg tablet, Take 1 Tab by mouth daily. One and half tab every day, Disp: 135 Tab, Rfl: 3    metroNIDAZOLE (METROGEL) 1 % topical gel, Apply  to affected area daily. Use a thin layer to affected areas after washing, Disp: 45 g, Rfl: 3    allopurinol (ZYLOPRIM) 100 mg tablet, Take 2 Tabs by mouth daily. , Disp: 180 Tab, Rfl: 3    Walker misc, Walker DX: lumbar stenosis with neurogenic claudication +antalgic gait with cane, Disp: 1 Each, Rfl: 0    DISABLED PLACARD (DISABLED PLACARD) DMV, To use with handicap placard form, Disp: 1 Each, Rfl: 0    gabapentin (NEURONTIN) 300 mg capsule, Take 1 Cap by mouth three (3) times daily. , Disp: 180 Cap, Rfl: 2    LORazepam (ATIVAN) 0.5 mg tablet, TAKE 1 TABLET BY MOUTH DAILY. , Disp: 30 Tab, Rfl: 3    diclofenac sodium (PENNSAID) 1.5 % drop, by Apply Externally route., Disp: , Rfl:     diclofenac (VOLTAREN) 1 % gel, Apply 4 g to affected area four (4) times daily. , Disp: 2 Each, Rfl: 3    multivitamin (ONE A DAY) tablet, Take 1 Tab by mouth daily. , Disp: , Rfl:     cholecalciferol, vitamin D3, (VITAMIN D3) 2,000 unit tab, Take  by mouth., Disp: , Rfl:      Date Last Reviewed:  5/13/2019   Number of Personal Factors/Comorbidities that affect the Plan of Care: 1-2: MODERATE COMPLEXITY   EXAMINATION:   OBSERVATION/ORTHOSTATIC POSTURAL ASSESSMENT:     -Pt sits with forward head and rounded shoulders which indicate tight anterior chest musculature, upper trapezius, and levator scapula and weak posterior scapula musculature and deep cervical flexors. Pt displays decreased core motor control indicating weak core and low back musculature. BALANCE Date: 3/13/2019 Date: 3/13/2019   Right     Left       PELVIC COMPONENT Date:   3/13/2019 Date:  3/13/2019   Standing Mason     Standing PSIS     Standing Sacral Sulci     Gillet     Seated Mason     Seated PSIS     Supine-to-Sit leg length         AROM/PROM         Joint: Date: 3/13/2019  Date:  Date:    Active LE ROM Right Left Right Left Right Left   Hip Flexion         Hip Extension         Hip ER         Hip IR         Knee Extension         Knee Flexion         Knee Extension         Lumbar Flexion 55        Lumbar Extension 10        Lumbar Side-Bending 5        Lumbar Rotation NT          STRENGTH         Joint: Date: 3/13/2019  Date:  Date:     Right Left Right Left Right Left   Hip Abduction 4/5 4/5       Hip Adduction 4/5 4/5       Hip IR 4/5 4/5       Hip ER 4/5 4/5       Hip Flexion 4/5 4/5       Knee Extension 4/5 4/5       Knee Flexion 4/5 4/5       Ankle DF 4/5 4/5       Ankle PF 4/5 4/5       Ankle IV         Ankle EV         4/5    PALPATION Date:  3/13/2019 Date: Date:   TTP      TONE      PA GLIDES        SPECIAL TESTS: Assessed @ Initial Visit    -SCOUR: Negative   -90/90:     -R: Not tested    -L: Not tested   -SLR: Not tested   -SI COMPRESSION TEST: Not tested   -SI POST.  GAPPING:  Not tested   -GILLET TEST: Not tested   -SOL 4: Negative   -SLUMP TEST: Not tested   -DEEP SQUAT: Not tested       NEUROLOGICAL SCREEN: Assessed @ Initial Visit    -RADIATING SYMPTOMS: No     -DERMATOMES: In tact    -MYOTOMES Date: 3/13/2019  Date:  Date:     Right Left Right Left Right Left   L2 & L3   (Hip Flexors) 5/5 5/5       L3-L4  (Knee Extensors) 5/5 5/5       L4  (Ankle DFs) 4+/5 4+/5       L5  (Hallux Ext) 4+/5 4+/5       L5-S1  (Ankle PFs) 4+/5 4+/5       S1-S2  (Ankle EVs) 4+/5 4+/5         RED FLAGS: Date: 3/13/2019   Non-Mechanical pain distribution (cannot be produced, changed, or reduced during exam): NO   Cauda Equina Dysfunction: NO   Upper lumbar disc herniation in younger patients (femoral nerve tension test for lateral disc herniation in lower lumbar): NO   Lumbar compression fracture (age > 48, trauma, corticosteroid use NO   Spine Cancer (age > 48, pervious history of cancer, failure to improve in 1 month of therapy, no relief - be rest, duration > 1 month, unexplained weight loss, insidious onset, constitutional symptoms): NO   Ankylosing Spondylitis (age < 36, pain not relieved by supine, morning back stiffness, pain duration > 3 months, improved by exercise): NO   Sacral Fracture: NO       FUNCTIONAL MOBILITY:  Assessed @ Initial Visit    -Affecting participation in basic ADLs and functional tasks.   -Limited tolerance of walking and standing   -Ambulation/Gait: Ambulates with mild forward head posture with use of rollator for safety.   -Bed mobility:  MOD I   -Stairs: NT   -Transfers: MOD I    -Wheelchair: N/A     Body Structures Involved:  1. Bones  2. Joints  3. Muscles  4. Ligaments Body Functions Affected:  1. Sensory/Pain  2. Neuromusculoskeletal  3. Movement Related Activities and Participation Affected:  1. Mobility  2. Self Care  3. Domestic Life  4. Interpersonal Interactions and Relationships  5. Community, Social and [place]   Number of elements that affect the Plan of Care: 4+: HIGH COMPLEXITY   CLINICAL PRESENTATION:   Presentation: Stable and uncomplicated: LOW COMPLEXITY   CLINICAL DECISION MAKING:   OUTCOME MEASURE USED:   Tool Used: Tool Used: Lower Extremity Functional Scale (LEFS)  Score:  Initial: 31/80 Most Recent: 48/80 (Date: 5/13/2019 )   Interpretation of Score: 20 questions each scored on a 5 point scale with 0 representing \"extreme difficulty or unable to perform\" and 4 representing \"no difficulty\".   The lower the score, the greater the functional disability. 80/80 represents no disability. Minimal detectable change is 9 points. Tool Used: Timed Up and Go (TUG)  Score:  Initial: 12.59 seconds (with rollator) Most Recent: 9.86 seconds (Date: 4/15/2019 )   Interpretation of Score: The test measures, in seconds, the time taken by an individual to stand up from a standard arm chair (seat height 46 cm [18 in], arm height 65 cm [25.6 in]), walk a distance of 3 meters (118 in, approx 10 ft), turn, walk back to the chair and sit down. If the individual takes longer than 14 seconds to complete TUG, this indicates risk for falls. TUG attempted second time without use of rollator: 16.45 seconds    [place] / [place] / Product Type: Medicare /     MEDICAL NECESSITY:  · Skilled intervention continues to be required due to above deficits affecting participation in basic ADLs and overall functional tolerance. REASON FOR SERVICES/OTHER COMMENTS:  · Patient continues to require skilled intervention due to  above deficits affecting participation in basic ADLs and overall functional tolerance. Use of outcome tool(s) and clinical judgement create a POC that gives a: Clear prediction of patient's progress: LOW COMPLEXITY   TREATMENT:   (In addition to Assessment/Re-Assessment sessions the following treatments were rendered)    Pre-treatment Symptoms/Complaints: Patient reports no pain this morning. Has not completed stretching HEP. THERAPEUTIC EXERCISE: (45 minutes):  Exercises per grid below to improve mobility, strength and balance. Required minimal visual and verbal cues to promote proper body alignment and promote proper body posture. Progressed resistance, range and complexity of movement as indicated.      Date:  4/17/19 Date:  4/22/19 Date:  4/24/19 Date:  5/1/19 Date:  5/6/19 Date:  5/8/19 Date:  5/13/19   Activity/Exercise       Parameters   Heel raises 2 x 10 reps B 2 x 10 reps B 2 x 10 reps B 2 x 10 reps B 1 x 10 B 1 x 10 B 1 x 10 B   Sit to stand 2 x 5 reps with verbal cues and demonstration 2 x 5 reps with verbal cues and demonstartion        balance on blue airex foam 3 x 30 second hold with eyes closed 3 x 30 second hold with eyes closed        Nustep  Level 3   10 minutes Level 3   10 minutes Level 3  10 minutes Level 3  12 minutes Level 3  10 minutes Level 3  10 minutes Level 3  10 minutes   Gastrocnemius stretch Incline board stretch 3 x 30 sec hold Incline board stretch 3 x 30 sec hold Incline board stretch 3 x 30 sec hold Incline board stretch 3 x 30 sec hold Incline board stretch 3 x 30 sec hold Incline board stretch 3 x 30 sec hold Incline board stretch 3 x 30 sec hold   Sidestepping in parallel bars X 4 laps leading with each LE, minimal use of hands   X  4 laps leading with each LE, no use of hands      Hip Abduction 2 x 10 reps B 2 x 10 reps B 2 x 10 reps B 2 x 10 reps B 1 x 10 reps B 1 x 10 reps B 2 x 10 reps B   Hip extension 2 x 10 reps B 2 x 10 reps B 2 x 10 reps B 2 x 10 reps B 1 x 10 reps B 1 x 10 reps B 2 x 10 reps B   Standing Marching 2 x 10 reps B 2 x 10 reps B 2 x 10 reps B 2 x 10 reps B 1 x 10 reps B 1  X 10 reps B 2 x 10 reps B   Balance Board   AP/ML tapping x 20    AP/ML 30 sec hold for balance x 2 AP/ML tapping x 20    AP/ML 30 sec hold for balance x 2  AP/ML tapping x 20    AP/ML 30 sec hold for balance x 2 AP/ML tapping x 20    AP/ML 30 sec hold for balance x 3   Semi tandem progressing towards Tandem stance   2 x 30 sec hold with each LE leading, minimal hold of B UE       Seated hamstring stretch     3 x 30 sec B 3 x 30 sec B 3 x 30 sec B   Clamshells 2 x 10 reps B     2 x 10 reps B    Supine Bridge 1 x 10 reps    2 x 10 reps B 2 x 10 reps B    Ambulation  Patient ambulated 200 ft without assistive device with  Verbal cues for wider FERMIN Patient ambulated 15' x 3 with mirror placed in front of patient for visual cues for feedback regarding body mechanics gait.  Patient ambulated 200 ft without assistive device with verbal cues for wider FERMIN Patient ambulated 200 ft without assistive device with verbal cues for wider FERMIN      PT assist Hamstring stretch   3 x 30 sec B         MANUAL THERAPY: (0 minutes): Joint mobilization, Soft tissue mobilization and Manipulation was utilized and necessary because of the patient's restricted joint motion, painful spasm, loss of articular motion and restricted motion of soft tissue to patient's bilateral hamstring musculature. (Used abbreviations: MET - muscle energy technique; PNF - proprioceptive neuromuscular facilitation; NMR - neuromuscular re-education; AP - anterior to posterior; PA - posterior to anterior)    MODALITIES: (0 minutes):      Not today    TREATMENT/SESSION ASSESSMENT:  Marin Westbrook continues to make steady progress with functional strength and balance. He required one seated rest break during session as he reported his legs were getting tired. · Pain/ Symptoms: Initial:   0/10 Post Session:  0/10  ·   Compliance with Program/Exercises: Good compliance. · Recommendations/Intent for next treatment session: \"Next visit will focus on advancements to more challenging activities\".     Total Treatment Duration: 45 minutes   PT Patient Time In/Time Out  Time In: 1015  Time Out: [address]

## 2019-05-15 ENCOUNTER — HOSPITAL ENCOUNTER (OUTPATIENT)
Dept: PHYSICAL THERAPY | Age: 79
Discharge: HOME | End: 2019-05-15
Payer: MEDICARE

## 2019-05-15 NOTE — PROGRESS NOTES
Mitchel Dyerulises [facility]  : 1940  [place]  Secondary: [place] at [facility], [facility], [address]  [phone]   [phone]      PT Note:    Patient called and cancelled PT appointment today. Will follow-up with patient at next visit.     Lamin Leavitt, PT, DPT

## 2019-05-20 ENCOUNTER — HOSPITAL ENCOUNTER (OUTPATIENT)
Dept: PHYSICAL THERAPY | Age: 79
Discharge: HOME | End: 2019-05-20
Payer: MEDICARE

## 2019-05-20 PROCEDURE — 97110 THERAPEUTIC EXERCISES: CPT

## 2019-05-20 NOTE — PROGRESS NOTES
Dinah [facility]  : 1940  [place] / [place] / Product Type: Medicare /  [address]  at [facility]  [address] [facility], [address]  [phone]   [phone]                OUTPATIENT PHYSICAL THERAPY:Daily Note 2019   Progress Note: 2019     ICD-10: Treatment Diagnosis:   Difficulty in walking, not elsewhere classified (R26.2)  Muscle weakness (generalized) (M62.81)        PRECAUTIONS/ALLERGIES:   Patient has no known allergies. FALL RISK SCORE: 2 (? 5 = High Risk)    MD ORDERS: Eval and Treat  MEDICAL/REFERRING DIAGNOSIS:  Encounter for follow-up examination after completed treatment for conditions other than malignant neoplasm [Z09]     DATE OF ONSET: Laminectomy L1-L4 2019    REFERRING PHYSICIAN: Eduard Solomon MD    RETURN PHYSICIAN APPOINTMENT: TBD by patient      Ambulatory/Rehab Services H2 Model Falls Risk Assessment    Risk Factors:       (1)  Gender [Male] Ability to Rise from Chair:       (1)  Pushes up, successful in one attempt    Falls Prevention Plan: Mobility Assistance Device (specify):  rollator   Total: (5 or greater = High Risk): 2     [facility] [employer] States Patent #[id number]. Federal Law prohibits the replication, distribution or use without written permission from [facility] [address]:  Mr. Keshav Sandhu has attended 18 physical therapy session including initial evaluation as of 3/13/2019. Keshav Sandhu has met 5/5 STGs at this point as well as 4/6 LTGs. He is demonstrating improved dynamic balance with ambulation and continues to not use rollator for assistance.  [facility] reports increased tolerance for ADL's and doing his usual activities at home.   [facility] HANK is making steady progress with functional strength but demonstrates fatigue in B hip musculature with increased ambulation or therapeutic exercise. Mr. Paris Baird will benefit from continued skilled therapy to increase functional strength, balance, and coordination to improve safety with functional mobility. PROBLEM LIST (Impacting functional limitations):  1. Decreased Strength  2. Decreased ADL/Functional Activities  3. Decreased Transfer Abilities  4. Decreased Ambulation Ability/Technique  5. Decreased Balance  6. Increased Pain  7. Decreased Activity Tolerance  8. Increased Fatigue  9. Increased Shortness of Breath  10. Decreased Flexibility/Joint Mobility  11. Decreased Coos with Home Exercise Program INTERVENTIONS PLANNED:  1. Balance Exercise  2. Bed Mobility  3. Cold  4. Cryotherapy  5. Electrical Stimulation  6. Family Education  7. Gait Training  8. Heat  9. Home Exercise Program (HEP)  10. Manual Therapy  11. Neuromuscular Re-education/Strengthening  12. Range of Motion (ROM)  13. Therapeutic Activites  14. Therapeutic Exercise/Strengthening  15. Transfer Training  16. Mechanical traction  17. Aquatic Therapy  18. Electrical Stimulation   TREATMENT PLAN:  Effective Dates: 3/13/2019 TO 6/11/2019 (90 days). Frequency/Duration: 2 times a week for 90 Days    GOALS: (Goals have been discussed and agreed upon with patient.)  Short Term Goals 4 weeks   1. Dhara Austin will be independent with HEP to promote self-management of symptoms. MET  2. Dhara Austin will demonstrate a 5 point improvement on the LEFS to show improvement in function. MET  3. Dhara Austin will participate in core stabilization exercises to help with stabilization and improve posture during ADLs to help prevent future injuries. MET  4. Dhara Austin will participate in LE strengthening program with weights as appropriate to help with gait and elevations. MET  5. Dhara Austin will participate in static and dynamic balance activities to decrease the risk for falls and improve overall QOL. MET  6.  Sameera Grant Rik will perform TUG time of 10 seconds with LRAD to decrease risk of falls. MET    Long Term Goals 12 weeks   1. Levon Hi will demonstrate a 10 point improvement on the LEFS to show improvement in function. MET  2. Levon Hi will report <=2/10 pain at rest and during ADLs to improve QOL. MET  3. Levon Hi will demonstrate >=4+/5 LE strength on manual muscle testing to improve functional mobility. NOT MET  4. Levon Hi will be able to perform SLS >10 seconds bilaterally to help with gait and improve balance. NOT MET  5. Levon Hi will be able to demonstrate safe transfer mechanics without cueing for improved safety with home and community activities. MET  7. Levon Hi will perform TUG test with <10 seconds with LRAD to decrease risk of falls. MET      Rehabilitation Potential For Stated Goals: Good    Regarding Yusuf Jolly's therapy, I certify that the treatment plan above will be carried out by a therapist or under their direction. Thank you for this referral,  Yaneth Meehan       Referring Physician Signature: Skye Cochran MD              Date                    HISTORY:   PATIENT GOAL FOR PHYSICAL THERAPY:  \"I want to be able to walk without the rollator. \"      HISTORY OF PRESENT INJURY/ILLNESS (REASON FOR REFERRAL):  Patient had laminectomy on 2/12/2019 at levels L1-L4. Patient had been having pain in lower back for the previous year. Patient has had physical therapy in the past at this location prior to surgery as well as multiple injections to attempt to decrease pain. Note: Patient denies any increase of symptoms with cough, sneeze or valsalva. Patient denies any saddle paresthesia or bowel/bladder deficits.      PAST MEDICAL HISTORY/COMORBIDITIES:   Mr. JANICE MCKINNEY  has a past medical history of Anxiety disorder (12/9/2014), Arthritis (12/9/2014), Depression (12/9/2014), Gout (12/9/2014), History of lumbar laminectomy for spinal cord decompression (2019), HTN (hypertension), benign (2/15/2017), Hyperlipemia (2014), Hypertension, Idiopathic chronic gout of right ankle (2014), Insomnia (2014), Kidney stone, Mixed hyperlipidemia (2014), Panic disorder (2014), Primary insomnia (2014), and Rosacea (2/15/2017). Mr. Álvaro Kee  has a past surgical history that includes hx other surgical; hx cyst removal; hx cyst removal; hx hernia repair (Bilateral); hx ankle fracture tx (Left, 1974); hx wrist fracture tx (Bilateral, 1993); hx colonoscopy (); and hx cyst removal.    SOCIAL HISTORY/LIVING ENVIRONMENT:        Social History     Socioeconomic History    Marital status:      Spouse name: Not on file    Number of children: Not on file    Years of education: Not on file    Highest education level: Not on file   Occupational History    Not on file   Social Needs    Financial resource strain: Not on file    Food insecurity:     Worry: Not on file     Inability: Not on file    Transportation needs:     Medical: Not on file     Non-medical: Not on file   Tobacco Use    Smoking status: Former Smoker     Types: Cigarettes     Last attempt to quit: 1970     Years since quittin.4    Smokeless tobacco: Never Used   Substance and Sexual Activity    Alcohol use:  Yes     Alcohol/week: 0.0 oz    Drug use: No    Sexual activity: Yes     Partners: Female   Lifestyle    Physical activity:     Days per week: Not on file     Minutes per session: Not on file    Stress: Not on file   Relationships    Social connections:     Talks on phone: Not on file     Gets together: Not on file     Attends [employer] service: Not on file     Active member of club or organization: Not on file     Attends meetings of clubs or organizations: Not on file     Relationship status: Not on file    Intimate partner violence:     Fear of current or ex partner: Not on file     Emotionally abused: Not on file Physically abused: Not on file     Forced sexual activity: Not on file   Other Topics Concern    Not on file   Social History Narrative    Not on file       LIFESTYLE Date: 3/13/2019   Occupation: Retired   Vigorous Activity: n/a   Expose individual to vibrations n/a   Unpleasant work environment n/a       PRIOR LEVEL OF FUNCTION/WORK/ACTIVITY:   Patient ambulated with straight cane prior to surgery. Patient's spouse reports he did not use straight cane correctly and should have been using rolling walker for safety. Active Ambulatory Problems     Diagnosis Date Noted    MEGGAN (generalized anxiety disorder) 12/09/2014    Arthritis 12/09/2014    Idiopathic chronic gout of right ankle 12/09/2014    Mixed hyperlipidemia 12/09/2014    Primary insomnia 12/09/2014    Panic disorder 12/09/2014    HTN (hypertension), benign 02/15/2017    Rosacea 02/15/2017    Idiopathic scoliosis 06/07/2018    Lumbar stenosis with neurogenic claudication 06/07/2018    Recurrent depression ([facility] [address] 08/16/2018    History of lumbar laminectomy for spinal cord decompression 02/18/2019     Resolved Ambulatory Problems     Diagnosis Date Noted    Depression 12/09/2014     Past Medical History:   Diagnosis Date    Anxiety disorder 12/9/2014    Arthritis 12/9/2014    Depression 12/9/2014    Gout 12/9/2014    History of lumbar laminectomy for spinal cord decompression 2/18/2019    HTN (hypertension), benign 2/15/2017    Hyperlipemia 12/9/2014    Hypertension     Idiopathic chronic gout of right ankle 12/9/2014    Insomnia 12/9/2014    Kidney stone     Mixed hyperlipidemia 12/9/2014    Panic disorder 12/9/2014    Primary insomnia 12/9/2014    Rosacea 2/15/2017         CURRENT MEDICATIONS:    Current Outpatient Medications:     amLODIPine-Olmesartan 10-40 mg tab, TAKE 1 TABLET BY MOUTH DAILY. , Disp: 90 Tab, Rfl: 3    KLOR-CON 10 10 mEq tablet, TAKE 1 TABLET BY MOUTH DAILY. , Disp: 90 Tab, Rfl: 3    zolpidem (AMBIEN) 10 mg tablet, TAKE 1 TABLET BY MOUTH AT BEDTIME AS NEEDED FOR SLEEP (Patient taking differently: 10 mg. TAKE 1 TABLET BY MOUTH AT BEDTIME AS NEEDED FOR SLEEP  Indications: Difficulty Falling Asleep), Disp: 30 Tab, Rfl: 5    atorvastatin (LIPITOR) 40 mg tablet, TAKE 1 TABLET BY MOUTH EVERY DAY, Disp: 90 Tab, Rfl: 3    potassium chloride SR (KLOR-CON 10) 10 mEq tablet, Take 1 Tab by mouth daily. , Disp: 90 Tab, Rfl: 3    amLODIPine-Olmesartan (CAROLA) 10-40 mg tab, Take 1 Tab by mouth daily. , Disp: 90 Tab, Rfl: 3    sertraline (ZOLOFT) 100 mg tablet, Take 1 Tab by mouth daily. One and half tab every day, Disp: 135 Tab, Rfl: 3    metroNIDAZOLE (METROGEL) 1 % topical gel, Apply  to affected area daily. Use a thin layer to affected areas after washing, Disp: 45 g, Rfl: 3    allopurinol (ZYLOPRIM) 100 mg tablet, Take 2 Tabs by mouth daily. , Disp: 180 Tab, Rfl: 3    Walker misc, Walker DX: lumbar stenosis with neurogenic claudication +antalgic gait with cane, Disp: 1 Each, Rfl: 0    DISABLED PLACARD (DISABLED PLACARD) DMV, To use with handicap placard form, Disp: 1 Each, Rfl: 0    gabapentin (NEURONTIN) 300 mg capsule, Take 1 Cap by mouth three (3) times daily. , Disp: 180 Cap, Rfl: 2    LORazepam (ATIVAN) 0.5 mg tablet, TAKE 1 TABLET BY MOUTH DAILY. , Disp: 30 Tab, Rfl: 3    diclofenac sodium (PENNSAID) 1.5 % drop, by Apply Externally route., Disp: , Rfl:     diclofenac (VOLTAREN) 1 % gel, Apply 4 g to affected area four (4) times daily. , Disp: 2 Each, Rfl: 3    multivitamin (ONE A DAY) tablet, Take 1 Tab by mouth daily. , Disp: , Rfl:     cholecalciferol, vitamin D3, (VITAMIN D3) 2,000 unit tab, Take  by mouth., Disp: , Rfl:      Date Last Reviewed:  5/20/2019   Number of Personal Factors/Comorbidities that affect the Plan of Care: 1-2: MODERATE COMPLEXITY   EXAMINATION:   OBSERVATION/ORTHOSTATIC POSTURAL ASSESSMENT:     -Pt sits with forward head and rounded shoulders which indicate tight anterior chest musculature, upper trapezius, and levator scapula and weak posterior scapula musculature and deep cervical flexors. Pt displays decreased core motor control indicating weak core and low back musculature. BALANCE Date: 3/13/2019 Date: 3/13/2019   Right     Left       PELVIC COMPONENT Date:   3/13/2019 Date:  3/13/2019   Standing Mason     Standing PSIS     Standing Sacral Sulci     Gillet     Seated Mason     Seated PSIS     Supine-to-Sit leg length         AROM/PROM         Joint: Date: 3/13/2019  Date:  Date:    Active LE ROM Right Left Right Left Right Left   Hip Flexion         Hip Extension         Hip ER         Hip IR         Knee Extension         Knee Flexion         Knee Extension         Lumbar Flexion 55        Lumbar Extension 10        Lumbar Side-Bending 5        Lumbar Rotation NT          STRENGTH         Joint: Date: 3/13/2019  Date:  Date:     Right Left Right Left Right Left   Hip Abduction 4/5 4/5       Hip Adduction 4/5 4/5       Hip IR 4/5 4/5       Hip ER 4/5 4/5       Hip Flexion 4/5 4/5       Knee Extension 4/5 4/5       Knee Flexion 4/5 4/5       Ankle DF 4/5 4/5       Ankle PF 4/5 4/5       Ankle IV         Ankle EV         4/5    PALPATION Date:  3/13/2019 Date: Date:   TTP      TONE      PA GLIMARSHA        SPECIAL TESTS: Assessed @ Initial Visit    -SCOUR: Negative   -90/90:     -R: Not tested    -L: Not tested   -SLR: Not tested   -SI COMPRESSION TEST: Not tested   -SI POST.  GAPPING:  Not tested   -GILLET TEST: Not tested   -SOL 4: Negative   -SLUMP TEST: Not tested   -DEEP SQUAT: Not tested       NEUROLOGICAL SCREEN: Assessed @ Initial Visit    -RADIATING SYMPTOMS: No     -DERMATOMES: In tact    -MYOTOMES Date: 3/13/2019  Date:  Date:     Right Left Right Left Right Left   L2 & L3   (Hip Flexors) 5/5 5/5       L3-L4  (Knee Extensors) 5/5 5/5       L4  (Ankle DFs) 4+/5 4+/5       L5  (Hallux Ext) 4+/5 4+/5       L5-S1  (Ankle PFs) 4+/5 4+/5       S1-S2  (Ankle EVs) 4+/5 4+/5         RED FLAGS: Date: 3/13/2019   Non-Mechanical pain distribution (cannot be produced, changed, or reduced during exam): NO   Cauda Equina Dysfunction: NO   Upper lumbar disc herniation in younger patients (femoral nerve tension test for lateral disc herniation in lower lumbar): NO   Lumbar compression fracture (age > 48, trauma, corticosteroid use NO   Spine Cancer (age > 48, pervious history of cancer, failure to improve in 1 month of therapy, no relief - be rest, duration > 1 month, unexplained weight loss, insidious onset, constitutional symptoms): NO   Ankylosing Spondylitis (age < 36, pain not relieved by supine, morning back stiffness, pain duration > 3 months, improved by exercise): NO   Sacral Fracture: NO       FUNCTIONAL MOBILITY:  Assessed @ Initial Visit    -Affecting participation in basic ADLs and functional tasks.   -Limited tolerance of walking and standing   -Ambulation/Gait: Ambulates with mild forward head posture with use of rollator for safety.   -Bed mobility:  MOD I   -Stairs: NT   -Transfers: MOD I    -Wheelchair: N/A     Body Structures Involved:  1. Bones  2. Joints  3. Muscles  4. Ligaments Body Functions Affected:  1. Sensory/Pain  2. Neuromusculoskeletal  3. Movement Related Activities and Participation Affected:  1. Mobility  2. Self Care  3. Domestic Life  4. Interpersonal Interactions and Relationships  5. Community, Social and [place]   Number of elements that affect the Plan of Care: 4+: HIGH COMPLEXITY   CLINICAL PRESENTATION:   Presentation: Stable and uncomplicated: LOW COMPLEXITY   CLINICAL DECISION MAKING:   OUTCOME MEASURE USED:   Tool Used: Tool Used: Lower Extremity Functional Scale (LEFS)  Score:  Initial: 31/80 Most Recent: 48/80 (Date: 5/13/2019 )   Interpretation of Score: 20 questions each scored on a 5 point scale with 0 representing \"extreme difficulty or unable to perform\" and 4 representing \"no difficulty\". The lower the score, the greater the functional disability.  80/80 represents no disability. Minimal detectable change is 9 points. Tool Used: Timed Up and Go (TUG)  Score:  Initial: 12.59 seconds (with rollator) Most Recent: 9.86 seconds (Date: 4/15/2019 )   Interpretation of Score: The test measures, in seconds, the time taken by an individual to stand up from a standard arm chair (seat height 46 cm [18 in], arm height 65 cm [25.6 in]), walk a distance of 3 meters (118 in, approx 10 ft), turn, walk back to the chair and sit down. If the individual takes longer than 14 seconds to complete TUG, this indicates risk for falls. TUG attempted second time without use of rollator: 16.45 seconds    [place] / [place] / Product Type: Medicare /     MEDICAL NECESSITY:  · Skilled intervention continues to be required due to above deficits affecting participation in basic ADLs and overall functional tolerance. REASON FOR SERVICES/OTHER COMMENTS:  · Patient continues to require skilled intervention due to  above deficits affecting participation in basic ADLs and overall functional tolerance. Use of outcome tool(s) and clinical judgement create a POC that gives a: Clear prediction of patient's progress: LOW COMPLEXITY   TREATMENT:   (In addition to Assessment/Re-Assessment sessions the following treatments were rendered)    Pre-treatment Symptoms/Complaints: Patient reports he started projects while doing yard work that he had to finish so he ended up working longer than expected and experienced increased pain in lower back and fatigue, causing to cancel last appointment. Patient reports today he feels the usual mild low back pain and rates it at 1/10. THERAPEUTIC EXERCISE: (45 minutes):  Exercises per grid below to improve mobility, strength and balance. Required minimal visual and verbal cues to promote proper body alignment and promote proper body posture. Progressed resistance, range and complexity of movement as indicated. Date:  4/22/19 Date:  4/24/19 Date:  5/1/19 Date:  5/6/19 Date:  5/8/19 Date:  5/13/19 Date:  5/20/19   Activity/Exercise      Parameters Parameters   Heel raises 2 x 10 reps B 2 x 10 reps B 2 x 10 reps B 1 x 10 B 1 x 10 B 1 x 10 B 1 x 10 B   Sit to stand 2 x 5 reps with verbal cues and demonstartion         balance on blue airex foam 3 x 30 second hold with eyes closed         Nustep  Level 3   10 minutes Level 3  10 minutes Level 3  12 minutes Level 3  10 minutes Level 3  10 minutes Level 3  10 minutes Level 4   10 minutes   Gastrocnemius stretch Incline board stretch 3 x 30 sec hold Incline board stretch 3 x 30 sec hold Incline board stretch 3 x 30 sec hold Incline board stretch 3 x 30 sec hold Incline board stretch 3 x 30 sec hold Incline board stretch 3 x 30 sec hold Incline board stretch 3 x 30 sec hold   Sidestepping in parallel bars   X  4 laps leading with each LE, no use of hands       Hip Abduction 2 x 10 reps B 2 x 10 reps B 2 x 10 reps B 1 x 10 reps B 1 x 10 reps B 2 x 10 reps B 2 x 10 B   Hip extension 2 x 10 reps B 2 x 10 reps B 2 x 10 reps B 1 x 10 reps B 1 x 10 reps B 2 x 10 reps B 2 x 10 B   Standing Marching 2 x 10 reps B 2 x 10 reps B 2 x 10 reps B 1 x 10 reps B 1  X 10 reps B 2 x 10 reps B 2 x 10 B   Balance Board  AP/ML tapping x 20    AP/ML 30 sec hold for balance x 2 AP/ML tapping x 20    AP/ML 30 sec hold for balance x 2  AP/ML tapping x 20    AP/ML 30 sec hold for balance x 2 AP/ML tapping x 20    AP/ML 30 sec hold for balance x 3 AP/ML tapping x 20    AP/ML 30 sec hold for balance x 3   Semi tandem progressing towards Tandem stance  2 x 30 sec hold with each LE leading, minimal hold of B UE        Seated hamstring stretch    3 x 30 sec B 3 x 30 sec B 3 x 30 sec B 3 x 30 sec B   Clamshells     2 x 10 reps B     Supine Bridge    2 x 10 reps B 2 x 10 reps B     Ambulation  Patient ambulated 15' x 3 with mirror placed in front of patient for visual cues for feedback regarding body mechanics gait. Patient ambulated 200 ft without assistive device with verbal cues for wider FERMIN Patient ambulated 200 ft without assistive device with verbal cues for wider FERMIN       PT assist Hamstring stretch  3 x 30 sec B          MANUAL THERAPY: (0 minutes): Joint mobilization, Soft tissue mobilization and Manipulation was utilized and necessary because of the patient's restricted joint motion, painful spasm, loss of articular motion and restricted motion of soft tissue to patient's bilateral hamstring musculature. (Used abbreviations: MET - muscle energy technique; PNF - proprioceptive neuromuscular facilitation; NMR - neuromuscular re-education; AP - anterior to posterior; PA - posterior to anterior)    MODALITIES: (0 minutes):      Not today    TREATMENT/SESSION ASSESSMENT:  Leena Parisi demonstrated improved dynamic balance with therapeutic exercise and improved core stabilization with advanced standing positions. · Pain/ Symptoms: Initial:   1/10 Post Session:  0/10  ·   Compliance with Program/Exercises: Good compliance. · Recommendations/Intent for next treatment session: \"Next visit will focus on advancements to more challenging activities\".     Total Treatment Duration: 45 minutes   PT Patient Time In/Time Out  Time In: 1015  Time Out: [facility]

## 2019-05-22 ENCOUNTER — HOSPITAL ENCOUNTER (OUTPATIENT)
Dept: PHYSICAL THERAPY | Age: 79
Discharge: HOME | End: 2019-05-22
Payer: MEDICARE

## 2019-05-22 PROCEDURE — 97110 THERAPEUTIC EXERCISES: CPT

## 2019-05-22 NOTE — PROGRESS NOTES
Mandeep Keller [facility]  : 1940  [place] / [place] / Product Type: Medicare /  [address]  at [facility]  [address], [address], [facility], [address] [facility]  [phone]   [phone]                OUTPATIENT PHYSICAL THERAPY:Daily Note 2019   Progress Note: 2019     ICD-10: Treatment Diagnosis:   Difficulty in walking, not elsewhere classified (R26.2)  Muscle weakness (generalized) (M62.81)        PRECAUTIONS/ALLERGIES:   Patient has no known allergies. FALL RISK SCORE: 2 (? 5 = High Risk)    MD ORDERS: Eval and Treat  MEDICAL/REFERRING DIAGNOSIS:  Encounter for follow-up examination after completed treatment for conditions other than malignant neoplasm [Z09]     DATE OF ONSET: Laminectomy L1-L4 2019    REFERRING PHYSICIAN: Rosas Castellon MD    RETURN PHYSICIAN APPOINTMENT: TBD by patient      Ambulatory/Rehab Services H2 Model Falls Risk Assessment    Risk Factors:       (1)  Gender [Male] Ability to Rise from Chair:       (1)  Pushes up, successful in one attempt    Falls Prevention Plan: Mobility Assistance Device (specify):  rollator   Total: (5 or greater = High Risk): 2     [facility] [address] [place] [address] States Patent #[id number]. Federal Law prohibits the replication, distribution or use without written permission from [facility] [address]:  Mr. Rosa Isela Gonzalez has attended 18 physical therapy session including initial evaluation as of 3/13/2019. Rosa Isela Gonzalez has met 5/5 STGs at this point as well as 4/6 LTGs. He is demonstrating improved dynamic balance with ambulation and continues to not use rollator for assistance. Mr. JANICE MCKINNEY reports increased tolerance for ADL's and doing his usual activities at home.  Mr. JANICE MCKINNEY is making steady progress with functional strength but demonstrates fatigue in B hip musculature with increased ambulation or therapeutic exercise. Mr. Conrad Wahl will benefit from continued skilled therapy to increase functional strength, balance, and coordination to improve safety with functional mobility. PROBLEM LIST (Impacting functional limitations):  1. Decreased Strength  2. Decreased ADL/Functional Activities  3. Decreased Transfer Abilities  4. Decreased Ambulation Ability/Technique  5. Decreased Balance  6. Increased Pain  7. Decreased Activity Tolerance  8. Increased Fatigue  9. Increased Shortness of Breath  10. Decreased Flexibility/Joint Mobility  11. Decreased [place] with Home Exercise Program INTERVENTIONS PLANNED:  1. Balance Exercise  2. Bed Mobility  3. Cold  4. Cryotherapy  5. Electrical Stimulation  6. Family Education  7. Gait Training  8. Heat  9. Home Exercise Program (HEP)  10. Manual Therapy  11. Neuromuscular Re-education/Strengthening  12. Range of Motion (ROM)  13. Therapeutic Activites  14. Therapeutic Exercise/Strengthening  15. Transfer Training  16. Mechanical traction  17. Aquatic Therapy  18. Electrical Stimulation   TREATMENT PLAN:  Effective Dates: 3/13/2019 TO 6/11/2019 (90 days). Frequency/Duration: 2 times a week for 90 Days    GOALS: (Goals have been discussed and agreed upon with patient.)  Short Term Goals 4 weeks   1. Tiffany Shelby will be independent with HEP to promote self-management of symptoms. MET  2. Tiffany Shelby will demonstrate a 5 point improvement on the LEFS to show improvement in function. MET  3. Tiffany Shelby will participate in core stabilization exercises to help with stabilization and improve posture during ADLs to help prevent future injuries. MET  4. Tiffany Shelby will participate in LE strengthening program with weights as appropriate to help with gait and elevations. MET  5. Tiffany Shelby will participate in static and dynamic balance activities to decrease the risk for falls and improve overall QOL. MET  6.  Cande Abdi Rik will perform TUG time of 10 seconds with LRAD to decrease risk of falls. MET    Long Term Goals 12 weeks   1. Amber Christine will demonstrate a 10 point improvement on the LEFS to show improvement in function. MET  2. Amber Christine will report <=2/10 pain at rest and during ADLs to improve QOL. MET  3. Amber Crhistine will demonstrate >=4+/5 LE strength on manual muscle testing to improve functional mobility. NOT MET  4. Amber Christine will be able to perform SLS >10 seconds bilaterally to help with gait and improve balance. NOT MET  5. Amber Christine will be able to demonstrate safe transfer mechanics without cueing for improved safety with home and community activities. MET  7. Amber Christine will perform TUG test with <10 seconds with LRAD to decrease risk of falls. MET      Rehabilitation Potential For Stated Goals: Good    Regarding Jaleel Jolly's therapy, I certify that the treatment plan above will be carried out by a therapist or under their direction. Thank you for this referral,  Renita Graves       Referring Physician Signature: Nabor Belle MD              Date                    HISTORY:   PATIENT GOAL FOR PHYSICAL THERAPY:  \"I want to be able to walk without the rollator. \"      HISTORY OF PRESENT INJURY/ILLNESS (REASON FOR REFERRAL):  Patient had laminectomy on 2/12/2019 at levels L1-L4. Patient had been having pain in lower back for the previous year. Patient has had physical therapy in the past at this location prior to surgery as well as multiple injections to attempt to decrease pain. Note: Patient denies any increase of symptoms with cough, sneeze or valsalva. Patient denies any saddle paresthesia or bowel/bladder deficits.      PAST MEDICAL HISTORY/COMORBIDITIES:   Mr. Miryam Schreiber  has a past medical history of Anxiety disorder (12/9/2014), Arthritis (12/9/2014), Depression (12/9/2014), Gout (12/9/2014), History of lumbar laminectomy for spinal cord decompression (2019), HTN (hypertension), benign (2/15/2017), Hyperlipemia (2014), Hypertension, Idiopathic chronic gout of right ankle (2014), Insomnia (2014), Kidney stone, Mixed hyperlipidemia (2014), Panic disorder (2014), Primary insomnia (2014), and Rosacea (2/15/2017). Mr. Angle Edwards  has a past surgical history that includes hx other surgical; hx cyst removal; hx cyst removal; hx hernia repair (Bilateral); hx ankle fracture tx (Left, 1974); hx wrist fracture tx (Bilateral, 1993); hx colonoscopy (); and hx cyst removal.    SOCIAL HISTORY/LIVING ENVIRONMENT:        Social History     Socioeconomic History    Marital status:      Spouse name: Not on file    Number of children: Not on file    Years of education: Not on file    Highest education level: Not on file   Occupational History    Not on file   Social Needs    Financial resource strain: Not on file    Food insecurity:     Worry: Not on file     Inability: Not on file    Transportation needs:     Medical: Not on file     Non-medical: Not on file   Tobacco Use    Smoking status: Former Smoker     Types: Cigarettes     Last attempt to quit: 1970     Years since quittin.4    Smokeless tobacco: Never Used   Substance and Sexual Activity    Alcohol use:  Yes     Alcohol/week: 0.0 oz    Drug use: No    Sexual activity: Yes     Partners: Female   Lifestyle    Physical activity:     Days per week: Not on file     Minutes per session: Not on file    Stress: Not on file   Relationships    Social connections:     Talks on phone: Not on file     Gets together: Not on file     Attends Latter day service: Not on file     Active member of club or organization: Not on file     Attends meetings of clubs or organizations: Not on file     Relationship status: Not on file    Intimate partner violence:     Fear of current or ex partner: Not on file     Emotionally abused: Not on file Physically abused: Not on file     Forced sexual activity: Not on file   Other Topics Concern    Not on file   Social History Narrative    Not on file       LIFESTYLE Date: 3/13/2019   Occupation: Retired   Vigorous Activity: n/a   Expose individual to vibrations n/a   Unpleasant work environment n/a       PRIOR LEVEL OF FUNCTION/WORK/ACTIVITY:   Patient ambulated with straight cane prior to surgery. Patient's spouse reports he did not use straight cane correctly and should have been using rolling walker for safety. Active Ambulatory Problems     Diagnosis Date Noted    MEGGAN (generalized anxiety disorder) 12/09/2014    Arthritis 12/09/2014    Idiopathic chronic gout of right ankle 12/09/2014    Mixed hyperlipidemia 12/09/2014    Primary insomnia 12/09/2014    Panic disorder 12/09/2014    HTN (hypertension), benign 02/15/2017    Rosacea 02/15/2017    Idiopathic scoliosis 06/07/2018    Lumbar stenosis with neurogenic claudication 06/07/2018    Recurrent depression ([facility] [address] 08/16/2018    History of lumbar laminectomy for spinal cord decompression 02/18/2019     Resolved Ambulatory Problems     Diagnosis Date Noted    Depression 12/09/2014     Past Medical History:   Diagnosis Date    Anxiety disorder 12/9/2014    Arthritis 12/9/2014    Depression 12/9/2014    Gout 12/9/2014    History of lumbar laminectomy for spinal cord decompression 2/18/2019    HTN (hypertension), benign 2/15/2017    Hyperlipemia 12/9/2014    Hypertension     Idiopathic chronic gout of right ankle 12/9/2014    Insomnia 12/9/2014    Kidney stone     Mixed hyperlipidemia 12/9/2014    Panic disorder 12/9/2014    Primary insomnia 12/9/2014    Rosacea 2/15/2017         CURRENT MEDICATIONS:    Current Outpatient Medications:     amLODIPine-Olmesartan 10-40 mg tab, TAKE 1 TABLET BY MOUTH DAILY. , Disp: 90 Tab, Rfl: 3    KLOR-CON 10 10 mEq tablet, TAKE 1 TABLET BY MOUTH DAILY. , Disp: 90 Tab, Rfl: 3    zolpidem (AMBIEN) 10 mg tablet, TAKE 1 TABLET BY MOUTH AT BEDTIME AS NEEDED FOR SLEEP (Patient taking differently: 10 mg. TAKE 1 TABLET BY MOUTH AT BEDTIME AS NEEDED FOR SLEEP  Indications: Difficulty Falling Asleep), Disp: 30 Tab, Rfl: 5    atorvastatin (LIPITOR) 40 mg tablet, TAKE 1 TABLET BY MOUTH EVERY DAY, Disp: 90 Tab, Rfl: 3    potassium chloride SR (KLOR-CON 10) 10 mEq tablet, Take 1 Tab by mouth daily. , Disp: 90 Tab, Rfl: 3    amLODIPine-Olmesartan (CAROLA) 10-40 mg tab, Take 1 Tab by mouth daily. , Disp: 90 Tab, Rfl: 3    sertraline (ZOLOFT) 100 mg tablet, Take 1 Tab by mouth daily. One and half tab every day, Disp: 135 Tab, Rfl: 3    metroNIDAZOLE (METROGEL) 1 % topical gel, Apply  to affected area daily. Use a thin layer to affected areas after washing, Disp: 45 g, Rfl: 3    allopurinol (ZYLOPRIM) 100 mg tablet, Take 2 Tabs by mouth daily. , Disp: 180 Tab, Rfl: 3    Walker misc, Walker DX: lumbar stenosis with neurogenic claudication +antalgic gait with cane, Disp: 1 Each, Rfl: 0    DISABLED PLACARD (DISABLED PLACARD) DMV, To use with handicap placard form, Disp: 1 Each, Rfl: 0    gabapentin (NEURONTIN) 300 mg capsule, Take 1 Cap by mouth three (3) times daily. , Disp: 180 Cap, Rfl: 2    LORazepam (ATIVAN) 0.5 mg tablet, TAKE 1 TABLET BY MOUTH DAILY. , Disp: 30 Tab, Rfl: 3    diclofenac sodium (PENNSAID) 1.5 % drop, by Apply Externally route., Disp: , Rfl:     diclofenac (VOLTAREN) 1 % gel, Apply 4 g to affected area four (4) times daily. , Disp: 2 Each, Rfl: 3    multivitamin (ONE A DAY) tablet, Take 1 Tab by mouth daily. , Disp: , Rfl:     cholecalciferol, vitamin D3, (VITAMIN D3) 2,000 unit tab, Take  by mouth., Disp: , Rfl:      Date Last Reviewed:  5/22/2019   Number of Personal Factors/Comorbidities that affect the Plan of Care: 1-2: MODERATE COMPLEXITY   EXAMINATION:   OBSERVATION/ORTHOSTATIC POSTURAL ASSESSMENT:     -Pt sits with forward head and rounded shoulders which indicate tight anterior chest musculature, upper trapezius, and levator scapula and weak posterior scapula musculature and deep cervical flexors. Pt displays decreased core motor control indicating weak core and low back musculature. BALANCE Date: 3/13/2019 Date: 3/13/2019   Right     Left       PELVIC COMPONENT Date:   3/13/2019 Date:  3/13/2019   Standing Mason     Standing PSIS     Standing Sacral Sulci     Gillet     Seated Mason     Seated PSIS     Supine-to-Sit leg length         AROM/PROM         Joint: Date: 3/13/2019  Date:  Date:    Active LE ROM Right Left Right Left Right Left   Hip Flexion         Hip Extension         Hip ER         Hip IR         Knee Extension         Knee Flexion         Knee Extension         Lumbar Flexion 55        Lumbar Extension 10        Lumbar Side-Bending 5        Lumbar Rotation NT          STRENGTH         Joint: Date: 3/13/2019  Date:  Date:     Right Left Right Left Right Left   Hip Abduction 4/5 4/5       Hip Adduction 4/5 4/5       Hip IR 4/5 4/5       Hip ER 4/5 4/5       Hip Flexion 4/5 4/5       Knee Extension 4/5 4/5       Knee Flexion 4/5 4/5       Ankle DF 4/5 4/5       Ankle PF 4/5 4/5       Ankle IV         Ankle EV         4/5    PALPATION Date:  3/13/2019 Date: Date:   TTP      TONE      PA GLIMARSHA        SPECIAL TESTS: Assessed @ Initial Visit    -SCOUR: Negative   -90/90:     -R: Not tested    -L: Not tested   -SLR: Not tested   -SI COMPRESSION TEST: Not tested   -SI POST.  GAPPING:  Not tested   -GILLET TEST: Not tested   -SOL 4: Negative   -SLUMP TEST: Not tested   -DEEP SQUAT: Not tested       NEUROLOGICAL SCREEN: Assessed @ Initial Visit    -RADIATING SYMPTOMS: No     -DERMATOMES: In tact    -MYOTOMES Date: 3/13/2019  Date:  Date:     Right Left Right Left Right Left   L2 & L3   (Hip Flexors) 5/5 5/5       L3-L4  (Knee Extensors) 5/5 5/5       L4  (Ankle DFs) 4+/5 4+/5       L5  (Hallux Ext) 4+/5 4+/5       L5-S1  (Ankle PFs) 4+/5 4+/5       S1-S2  (Ankle EVs) 4+/5 4+/5         RED FLAGS: Date: 3/13/2019   Non-Mechanical pain distribution (cannot be produced, changed, or reduced during exam): NO   Cauda Equina Dysfunction: NO   Upper lumbar disc herniation in younger patients (femoral nerve tension test for lateral disc herniation in lower lumbar): NO   Lumbar compression fracture (age > 48, trauma, corticosteroid use NO   Spine Cancer (age > 48, pervious history of cancer, failure to improve in 1 month of therapy, no relief - be rest, duration > 1 month, unexplained weight loss, insidious onset, constitutional symptoms): NO   Ankylosing Spondylitis (age < 36, pain not relieved by supine, morning back stiffness, pain duration > 3 months, improved by exercise): NO   Sacral Fracture: NO       FUNCTIONAL MOBILITY:  Assessed @ Initial Visit    -Affecting participation in basic ADLs and functional tasks.   -Limited tolerance of walking and standing   -Ambulation/Gait: Ambulates with mild forward head posture with use of rollator for safety.   -Bed mobility:  MOD I   -Stairs: NT   -Transfers: MOD I    -Wheelchair: N/A     Body Structures Involved:  1. Bones  2. Joints  3. Muscles  4. Ligaments Body Functions Affected:  1. Sensory/Pain  2. Neuromusculoskeletal  3. Movement Related Activities and Participation Affected:  1. Mobility  2. Self Care  3. Domestic Life  4. Interpersonal Interactions and Relationships  5. Community, Social and [place]   Number of elements that affect the Plan of Care: 4+: HIGH COMPLEXITY   CLINICAL PRESENTATION:   Presentation: Stable and uncomplicated: LOW COMPLEXITY   CLINICAL DECISION MAKING:   OUTCOME MEASURE USED:   Tool Used: Tool Used: Lower Extremity Functional Scale (LEFS)  Score:  Initial: 31/80 Most Recent: 48/80 (Date: 5/13/2019 )   Interpretation of Score: 20 questions each scored on a 5 point scale with 0 representing \"extreme difficulty or unable to perform\" and 4 representing \"no difficulty\". The lower the score, the greater the functional disability.  80/80 represents no disability. Minimal detectable change is 9 points. Tool Used: Timed Up and Go (TUG)  Score:  Initial: 12.59 seconds (with rollator) Most Recent: 9.86 seconds (Date: 4/15/2019 )   Interpretation of Score: The test measures, in seconds, the time taken by an individual to stand up from a standard arm chair (seat height 46 cm [18 in], arm height 65 cm [25.6 in]), walk a distance of 3 meters (118 in, approx 10 ft), turn, walk back to the chair and sit down. If the individual takes longer than 14 seconds to complete TUG, this indicates risk for falls. TUG attempted second time without use of rollator: 16.45 seconds    [place] / [place] / Product Type: Medicare /     MEDICAL NECESSITY:  · Skilled intervention continues to be required due to above deficits affecting participation in basic ADLs and overall functional tolerance. REASON FOR SERVICES/OTHER COMMENTS:  · Patient continues to require skilled intervention due to  above deficits affecting participation in basic ADLs and overall functional tolerance. Use of outcome tool(s) and clinical judgement create a POC that gives a: Clear prediction of patient's progress: LOW COMPLEXITY   TREATMENT:   (In addition to Assessment/Re-Assessment sessions the following treatments were rendered)    Pre-treatment Symptoms/Complaints: Patient reports he was busy yesterday running errands. Patient reports he feels he needs to continue to work on his endurance. PT educated patient on Well Walkers program to increase endurance with ambulation in a safe controlled environment. THERAPEUTIC EXERCISE: (45 minutes):  Exercises per grid below to improve mobility, strength and balance. Required minimal visual and verbal cues to promote proper body alignment and promote proper body posture. Progressed resistance, range and complexity of movement as indicated.      Date:  4/24/19 Date:  5/1/19 Date:  5/6/19 Date:  5/8/19 Date:  5/13/19 Date:  5/20/19 Date:  5/22/19   Activity/Exercise     Parameters Parameters Parameters   Heel raises 2 x 10 reps B 2 x 10 reps B 1 x 10 B 1 x 10 B 1 x 10 B 1 x 10 B 1 x 10 B   Sit to stand          balance on blue airex foam          Nustep  Level 3  10 minutes Level 3  12 minutes Level 3  10 minutes Level 3  10 minutes Level 3  10 minutes Level 4   10 minutes Level 4  10 minutes   Gastrocnemius stretch Incline board stretch 3 x 30 sec hold Incline board stretch 3 x 30 sec hold Incline board stretch 3 x 30 sec hold Incline board stretch 3 x 30 sec hold Incline board stretch 3 x 30 sec hold Incline board stretch 3 x 30 sec hold Incline board stretch 3 x 30 sec hold   Sidestepping in parallel bars  X  4 laps leading with each LE, no use of hands        Hip Abduction 2 x 10 reps B 2 x 10 reps B 1 x 10 reps B 1 x 10 reps B 2 x 10 reps B 2 x 10 B 2 x 10 B   Hip extension 2 x 10 reps B 2 x 10 reps B 1 x 10 reps B 1 x 10 reps B 2 x 10 reps B 2 x 10 B 2 x 10 B   Standing Marching 2 x 10 reps B 2 x 10 reps B 1 x 10 reps B 1  X 10 reps B 2 x 10 reps B 2 x 10 B 2 x 10 B   Balance Board AP/ML tapping x 20    AP/ML 30 sec hold for balance x 2 AP/ML tapping x 20    AP/ML 30 sec hold for balance x 2  AP/ML tapping x 20    AP/ML 30 sec hold for balance x 2 AP/ML tapping x 20    AP/ML 30 sec hold for balance x 3 AP/ML tapping x 20    AP/ML 30 sec hold for balance x 3 AP/ML tapping x 20    AP/ML 30 sec hold for balance x 3   Semi tandem progressing towards Tandem stance 2 x 30 sec hold with each LE leading, minimal hold of B UE         Seated hamstring stretch   3 x 30 sec B 3 x 30 sec B 3 x 30 sec B 3 x 30 sec B 3 x 30 sec B   Clamshells    2 x 10 reps B      Supine Bridge   2 x 10 reps B 2 x 10 reps B      Ambulation  Patient ambulated 200 ft without assistive device with verbal cues for wider FERMIN Patient ambulated 200 ft without assistive device with verbal cues for wider FERMIN        PT assist Hamstring stretch 3 x 30 sec B           MANUAL THERAPY: (0 minutes): Joint mobilization, Soft tissue mobilization and Manipulation was utilized and necessary because of the patient's restricted joint motion, painful spasm, loss of articular motion and restricted motion of soft tissue to patient's bilateral hamstring musculature. (Used abbreviations: MET - muscle energy technique; PNF - proprioceptive neuromuscular facilitation; NMR - neuromuscular re-education; AP - anterior to posterior; PA - posterior to anterior)    MODALITIES: (0 minutes):      Not today    TREATMENT/SESSION ASSESSMENT:  Katarina Modi demonstrated improved flexibility in bilateral lower extremity musculature with functional mobility and therapeutic exercise. · Pain/ Symptoms: Initial:   1/10 Post Session:  0/10  ·   Compliance with Program/Exercises: Good compliance. · Recommendations/Intent for next treatment session: \"Next visit will focus on advancements to more challenging activities\".     Total Treatment Duration: 45 minutes   PT Patient Time In/Time Out  Time In: 1015  Time Out: [facility]

## 2019-05-29 ENCOUNTER — HOSPITAL ENCOUNTER (OUTPATIENT)
Dept: PHYSICAL THERAPY | Age: 79
Discharge: HOME | End: 2019-05-29
Payer: MEDICARE

## 2019-05-29 PROCEDURE — 97110 THERAPEUTIC EXERCISES: CPT

## 2019-05-29 NOTE — THERAPY DISCHARGE
Nathalia Mares [facility]  : 1940  [place] / [place] / Product Type: Medicare /  [address]  at [facility]  [address] \[facility] [place] [facility]\"", [address]  [phone]   [phone]                OUTPATIENT PHYSICAL THERAPY:Discharge 2019   Progress Note: 2019     ICD-10: Treatment Diagnosis:   Difficulty in walking, not elsewhere classified (R26.2)  Muscle weakness (generalized) (M62.81)        PRECAUTIONS/ALLERGIES:   Patient has no known allergies. FALL RISK SCORE: 1 (? 5 = High Risk)    MD ORDERS: Eval and Treat  MEDICAL/REFERRING DIAGNOSIS:  Encounter for follow-up examination after completed treatment for conditions other than malignant neoplasm [Z09]     DATE OF ONSET: Laminectomy L1-L4 2019    REFERRING PHYSICIAN: Suman Phillips MD    RETURN PHYSICIAN APPOINTMENT: TBD by patient      Ambulatory/Rehab Services H2 Model Falls Risk Assessment    Risk Factors:       (1)  Gender [Male] Ability to Rise from Chair:       (0)  Ability to rise in a single movement    Falls Prevention Plan:       No modifications necessary   Total: (5 or greater = High Risk): 1     [facility] of [employer]. All Rights Reserved. [facility] Patent #[id number]. Federal Law prohibits the replication, distribution or use without written permission from [facility] of [facility] [employer]:  Mr. Jonna Vivas has attended 21 physical therapy session including initial evaluation as of 3/13/2019. Jonna Vivas has met 5/5 STGs at this point as well as 6/6 LTGs. He is demonstrating improved dynamic balance with ambulation and no longer relies on using rollator for balance with ambulation.  [facility] reports increased tolerance for ADL's and doing his usual activities at home.   [facility] HANK benefited from therapeutic exercise, stretching, and balance activities to allow for greater independence with functional mobility. Mr. Aiden Lane will be discharged from physical therapy at this time with detailed HEP to continue strengthening and balance program.         PROBLEM LIST (Impacting functional limitations):  1. Decreased Strength  2. Decreased ADL/Functional Activities  3. Decreased Transfer Abilities  4. Decreased Ambulation Ability/Technique  5. Decreased Balance  6. Increased Pain  7. Decreased Activity Tolerance  8. Increased Fatigue  9. Increased Shortness of Breath  10. Decreased Flexibility/Joint Mobility  11. Decreased [place] with Home Exercise Program INTERVENTIONS PLANNED:  1. Balance Exercise  2. Bed Mobility  3. Cold  4. Cryotherapy  5. Electrical Stimulation  6. Family Education  7. Gait Training  8. Heat  9. Home Exercise Program (HEP)  10. Manual Therapy  11. Neuromuscular Re-education/Strengthening  12. Range of Motion (ROM)  13. Therapeutic Activites  14. Therapeutic Exercise/Strengthening  15. Transfer Training  16. Mechanical traction  17. Aquatic Therapy  18. Electrical Stimulation   TREATMENT PLAN:  Effective Dates: 3/13/2019 TO 6/11/2019 (90 days). Frequency/Duration: 2 times a week for 90 Days    GOALS: (Goals have been discussed and agreed upon with patient.)  Short Term Goals 4 weeks   1. Carlos Apple will be independent with HEP to promote self-management of symptoms. MET  2. Carlos Apple will demonstrate a 5 point improvement on the LEFS to show improvement in function. MET  3. Carlos Apple will participate in core stabilization exercises to help with stabilization and improve posture during ADLs to help prevent future injuries. MET  4. Carlos Apple will participate in LE strengthening program with weights as appropriate to help with gait and elevations. MET  5. Carlos Apple will participate in static and dynamic balance activities to decrease the risk for falls and improve overall QOL. MET  6.  Carlos Apple will perform TUG time of 10 seconds with LRAD to decrease risk of falls. MET    Long Term Goals 12 weeks   1. Tiffany Shelby will demonstrate a 10 point improvement on the LEFS to show improvement in function. MET  2. Tiffany Shelby will report <=2/10 pain at rest and during ADLs to improve QOL. MET  3. Tiffany Shelby will demonstrate >=4+/5 LE strength on manual muscle testing to improve functional mobility. MET  4. Tiffany Shelby will be able to perform SLS >10 seconds bilaterally to help with gait and improve balance. MET  5. Tiffany Shelby will be able to demonstrate safe transfer mechanics without cueing for improved safety with home and community activities. MET  7. Tiffany Shelby will perform TUG test with <10 seconds with LRAD to decrease risk of falls. MET      Rehabilitation Potential For Stated Goals: Good    Regarding Cande Jolly's therapy, I certify that the treatment plan above will be carried out by a therapist or under their direction. Thank you for this referral,  Ani Schwarz               HISTORY:   PATIENT GOAL FOR PHYSICAL THERAPY:  \"I want to be able to walk without the rollator. \"      HISTORY OF PRESENT INJURY/ILLNESS (REASON FOR REFERRAL):  Patient had laminectomy on 2/12/2019 at levels L1-L4. Patient had been having pain in lower back for the previous year. Patient has had physical therapy in the past at this location prior to surgery as well as multiple injections to attempt to decrease pain. Note: Patient denies any increase of symptoms with cough, sneeze or valsalva. Patient denies any saddle paresthesia or bowel/bladder deficits.      PAST MEDICAL HISTORY/COMORBIDITIES:   Mr. Conrad Wahl  has a past medical history of Anxiety disorder (12/9/2014), Arthritis (12/9/2014), Depression (12/9/2014), Gout (12/9/2014), History of lumbar laminectomy for spinal cord decompression (2/18/2019), HTN (hypertension), benign (2/15/2017), Hyperlipemia (12/9/2014), Hypertension, Idiopathic chronic gout of right ankle (2014), Insomnia (2014), Kidney stone, Mixed hyperlipidemia (2014), Panic disorder (2014), Primary insomnia (2014), and Rosacea (2/15/2017). Mr. Carmen Alfaro  has a past surgical history that includes hx other surgical; hx cyst removal; hx cyst removal; hx hernia repair (Bilateral); hx ankle fracture tx (Left, 1974); hx wrist fracture tx (Bilateral, 1993); hx colonoscopy (); and hx cyst removal.    SOCIAL HISTORY/LIVING ENVIRONMENT:        Social History     Socioeconomic History    Marital status:      Spouse name: Not on file    Number of children: Not on file    Years of education: Not on file    Highest education level: Not on file   Occupational History    Not on file   Social Needs    Financial resource strain: Not on file    Food insecurity:     Worry: Not on file     Inability: Not on file    Transportation needs:     Medical: Not on file     Non-medical: Not on file   Tobacco Use    Smoking status: Former Smoker     Types: Cigarettes     Last attempt to quit: 1970     Years since quittin.4    Smokeless tobacco: Never Used   Substance and Sexual Activity    Alcohol use:  Yes     Alcohol/week: 0.0 oz    Drug use: No    Sexual activity: Yes     Partners: Female   Lifestyle    Physical activity:     Days per week: Not on file     Minutes per session: Not on file    Stress: Not on file   Relationships    Social connections:     Talks on phone: Not on file     Gets together: Not on file     Attends Scientologist service: Not on file     Active member of club or organization: Not on file     Attends meetings of clubs or organizations: Not on file     Relationship status: Not on file    Intimate partner violence:     Fear of current or ex partner: Not on file     Emotionally abused: Not on file     Physically abused: Not on file     Forced sexual activity: Not on file   Other Topics Concern    Not on file   Social History Narrative  Not on file       LIFESTYLE Date: 3/13/2019   Occupation: Retired   Vigorous Activity: n/a   Expose individual to vibrations n/a   Unpleasant work environment n/a       PRIOR LEVEL OF FUNCTION/WORK/ACTIVITY:   Patient ambulated with straight cane prior to surgery. Patient's spouse reports he did not use straight cane correctly and should have been using rolling walker for safety. Active Ambulatory Problems     Diagnosis Date Noted    MEGGAN (generalized anxiety disorder) 12/09/2014    Arthritis 12/09/2014    Idiopathic chronic gout of right ankle 12/09/2014    Mixed hyperlipidemia 12/09/2014    Primary insomnia 12/09/2014    Panic disorder 12/09/2014    HTN (hypertension), benign 02/15/2017    Rosacea 02/15/2017    Idiopathic scoliosis 06/07/2018    Lumbar stenosis with neurogenic claudication 06/07/2018    [facility] 08/16/2018    History of lumbar laminectomy for spinal cord decompression 02/18/2019     Resolved Ambulatory Problems     Diagnosis Date Noted    Depression 12/09/2014     Past Medical History:   Diagnosis Date    Anxiety disorder 12/9/2014    Arthritis 12/9/2014    Depression 12/9/2014    Gout 12/9/2014    History of lumbar laminectomy for spinal cord decompression 2/18/2019    HTN (hypertension), benign 2/15/2017    Hyperlipemia 12/9/2014    Hypertension     Idiopathic chronic gout of right ankle 12/9/2014    Insomnia 12/9/2014    Kidney stone     Mixed hyperlipidemia 12/9/2014    Panic disorder 12/9/2014    Primary insomnia 12/9/2014    Rosacea 2/15/2017         CURRENT MEDICATIONS:    Current Outpatient Medications:     amLODIPine-Olmesartan 10-40 mg tab, TAKE 1 TABLET BY MOUTH DAILY. , Disp: 90 Tab, Rfl: 3    KLOR-CON 10 10 mEq tablet, TAKE 1 TABLET BY MOUTH DAILY. , Disp: 90 Tab, Rfl: 3    zolpidem (AMBIEN) 10 mg tablet, TAKE 1 TABLET BY MOUTH AT BEDTIME AS NEEDED FOR SLEEP (Patient taking differently: 10 mg.  TAKE 1 TABLET BY MOUTH AT BEDTIME AS NEEDED FOR SLEEP  Indications: Difficulty Falling Asleep), Disp: 30 Tab, Rfl: 5    atorvastatin (LIPITOR) 40 mg tablet, TAKE 1 TABLET BY MOUTH EVERY DAY, Disp: 90 Tab, Rfl: 3    potassium chloride SR (KLOR-CON 10) 10 mEq tablet, Take 1 Tab by mouth daily. , Disp: 90 Tab, Rfl: 3    amLODIPine-Olmesartan (CAROLA) 10-40 mg tab, Take 1 Tab by mouth daily. , Disp: 90 Tab, Rfl: 3    sertraline (ZOLOFT) 100 mg tablet, Take 1 Tab by mouth daily. One and half tab every day, Disp: 135 Tab, Rfl: 3    metroNIDAZOLE (METROGEL) 1 % topical gel, Apply  to affected area daily. Use a thin layer to affected areas after washing, Disp: 45 g, Rfl: 3    allopurinol (ZYLOPRIM) 100 mg tablet, Take 2 Tabs by mouth daily. , Disp: 180 Tab, Rfl: 3    Walker misc, Walker DX: lumbar stenosis with neurogenic claudication +antalgic gait with cane, Disp: 1 Each, Rfl: 0    DISABLED PLACARD (DISABLED PLACARD) DMV, To use with handicap placard form, Disp: 1 Each, Rfl: 0    gabapentin (NEURONTIN) 300 mg capsule, Take 1 Cap by mouth three (3) times daily. , Disp: 180 Cap, Rfl: 2    LORazepam (ATIVAN) 0.5 mg tablet, TAKE 1 TABLET BY MOUTH DAILY. , Disp: 30 Tab, Rfl: 3    diclofenac sodium (PENNSAID) 1.5 % drop, by Apply Externally route., Disp: , Rfl:     diclofenac (VOLTAREN) 1 % gel, Apply 4 g to affected area four (4) times daily. , Disp: 2 Each, Rfl: 3    multivitamin (ONE A DAY) tablet, Take 1 Tab by mouth daily. , Disp: , Rfl:     cholecalciferol, vitamin D3, (VITAMIN D3) 2,000 unit tab, Take  by mouth., Disp: , Rfl:      Date Last Reviewed:  5/29/2019   Number of Personal Factors/Comorbidities that affect the Plan of Care: 1-2: MODERATE COMPLEXITY   EXAMINATION:   OBSERVATION/ORTHOSTATIC POSTURAL ASSESSMENT:     -Pt sits with forward head and rounded shoulders which indicate tight anterior chest musculature, upper trapezius, and levator scapula and weak posterior scapula musculature and deep cervical flexors.  Pt displays decreased core motor control indicating weak core and low back musculature. BALANCE Date: 3/13/2019 Date: 3/13/2019   Right     Left       PELVIC COMPONENT Date:   3/13/2019 Date:  3/13/2019   Standing Mason     Standing PSIS     Standing Sacral Sulci     Gillet     Seated Mason     Seated PSIS     Supine-to-Sit leg length         AROM/PROM         Joint: Date: 3/13/2019  Date:  Date:    Active LE ROM Right Left Right Left Right Left   Hip Flexion         Hip Extension         Hip ER         Hip IR         Knee Extension         Knee Flexion         Knee Extension         Lumbar Flexion 55        Lumbar Extension 10        Lumbar Side-Bending 5        Lumbar Rotation NT          STRENGTH         Joint: Date: 3/13/2019  Date:  5/29/19  Date:     Right Left Right Left Right Left   Hip Abduction 4/5 4/5 5/5 5/5     Hip Adduction 4/5 4/5 5/5 5/5     Hip IR 4/5 4/5 5/5 5/5     Hip ER 4/5 4/5 5/5 5/5     Hip Flexion 4/5 4/5 5/5 5/5     Knee Extension 4/5 4/5 5/5 5/5     Knee Flexion 4/5 4/5 5/5 5/5     Ankle DF 4/5 4/5 5/5 5/5     Ankle PF 4/5 4/5 5/5 5/5     Ankle IV         Ankle EV         4/5    PALPATION Date:  3/13/2019 Date: Date:   TTP      TONE      PA GLIDES        SPECIAL TESTS: Assessed @ Initial Visit    -SCOUR: Negative   -90/90:     -R: Not tested    -L: Not tested   -SLR: Not tested   -SI COMPRESSION TEST: Not tested   -SI POST.  GAPPING:  Not tested   -GILLET TEST: Not tested   -SOL 4: Negative   -SLUMP TEST: Not tested   -DEEP SQUAT: Not tested       NEUROLOGICAL SCREEN: Assessed @ Initial Visit    -RADIATING SYMPTOMS: No     -DERMATOMES: In tact    -MYOTOMES Date: 3/13/2019  Date:  Date:     Right Left Right Left Right Left   L2 & L3   (Hip Flexors) 5/5 5/5       L3-L4  (Knee Extensors) 5/5 5/5       L4  (Ankle DFs) 4+/5 4+/5       L5  (Hallux Ext) 4+/5 4+/5       L5-S1  (Ankle PFs) 4+/5 4+/5       S1-S2  (Ankle EVs) 4+/5 4+/5         RED FLAGS: Date: 3/13/2019   Non-Mechanical pain distribution (cannot be produced, changed, or reduced during exam): NO   Cauda Equina Dysfunction: NO   Upper lumbar disc herniation in younger patients (femoral nerve tension test for lateral disc herniation in lower lumbar): NO   Lumbar compression fracture (age > 48, trauma, corticosteroid use NO   Spine Cancer (age > 48, pervious history of cancer, failure to improve in 1 month of therapy, no relief - be rest, duration > 1 month, unexplained weight loss, insidious onset, constitutional symptoms): NO   Ankylosing Spondylitis (age < 36, pain not relieved by supine, morning back stiffness, pain duration > 3 months, improved by exercise): NO   Sacral Fracture: NO       FUNCTIONAL MOBILITY:  Assessed @ Initial Visit    -Affecting participation in basic ADLs and functional tasks.   -Limited tolerance of walking and standing   -Ambulation/Gait: Ambulates with mild forward head posture with use of rollator for safety.   -Bed mobility:  MOD I   -Stairs: NT   -Transfers: MOD I    -Wheelchair: N/A     Body Structures Involved:  1. Bones  2. Joints  3. Muscles  4. Ligaments Body Functions Affected:  1. Sensory/Pain  2. Neuromusculoskeletal  3. Movement Related Activities and Participation Affected:  1. Mobility  2. Self Care  3. Domestic Life  4. Interpersonal Interactions and Relationships  5. Community, Social and [place]   Number of elements that affect the Plan of Care: 4+: HIGH COMPLEXITY   CLINICAL PRESENTATION:   Presentation: Stable and uncomplicated: LOW COMPLEXITY   CLINICAL DECISION MAKING:   OUTCOME MEASURE USED:   Tool Used: Tool Used: Lower Extremity Functional Scale (LEFS)  Score:  Initial: 31/80 Most Recent: 57/80 (Date: 5/29/2019 )   Interpretation of Score: 20 questions each scored on a 5 point scale with 0 representing \"extreme difficulty or unable to perform\" and 4 representing \"no difficulty\". The lower the score, the greater the functional disability. 80/80 represents no disability.   Minimal detectable change is 9 points. Tool Used: Timed Up and Go (TUG)  Score:  Initial: 12.59 seconds (with rollator) Most Recent: 9.1 seconds (Date: 5/29/2019 ) (without rollator)   Interpretation of Score: The test measures, in seconds, the time taken by an individual to stand up from a standard arm chair (seat height 46 cm [18 in], arm height 65 cm [25.6 in]), walk a distance of 3 meters (118 in, approx 10 ft), turn, walk back to the chair and sit down. If the individual takes longer than 14 seconds to complete TUG, this indicates risk for falls. TUG attempted second time without use of rollator: 16.45 seconds at initial evaluation. [place] / [place] B / Product Type: Medicare /     MEDICAL NECESSITY:  · Skilled intervention continues to be required due to above deficits affecting participation in basic ADLs and overall functional tolerance. REASON FOR SERVICES/OTHER COMMENTS:  · Patient continues to require skilled intervention due to  above deficits affecting participation in basic ADLs and overall functional tolerance. Use of outcome tool(s) and clinical judgement create a POC that gives a: Clear prediction of patient's progress: LOW COMPLEXITY   TREATMENT:   (In addition to Assessment/Re-Assessment sessions the following treatments were rendered)    Pre-treatment Symptoms/Complaints: Patient reports his legs feel sore this morning. THERAPEUTIC EXERCISE: (40 minutes):  Exercises per grid below to improve mobility, strength and balance. Required minimal visual and verbal cues to promote proper body alignment and promote proper body posture. Progressed resistance, range and complexity of movement as indicated.      Date:  4/24/19 Date:  5/1/19 Date:  5/6/19 Date:  5/8/19 Date:  5/13/19 Date:  5/20/19 Date:  5/22/19 Date:  5/29/19   Activity/Exercise     Parameters Parameters Parameters Parameters   Heel raises 2 x 10 reps B 2 x 10 reps B 1 x 10 B 1 x 10 B 1 x 10 B 1 x 10 B 1 x 10 B 1 x 10 B   Sit to stand           balance on blue airex foam           Nustep  Level 3  10 minutes Level 3  12 minutes Level 3  10 minutes Level 3  10 minutes Level 3  10 minutes Level 4   10 minutes Level 4  10 minutes Level 4  10 minutes   Gastrocnemius stretch Incline board stretch 3 x 30 sec hold Incline board stretch 3 x 30 sec hold Incline board stretch 3 x 30 sec hold Incline board stretch 3 x 30 sec hold Incline board stretch 3 x 30 sec hold Incline board stretch 3 x 30 sec hold Incline board stretch 3 x 30 sec hold Incline board stretch 3 x 30 sec hold   Sidestepping in parallel bars  X  4 laps leading with each LE, no use of hands         Hip Abduction 2 x 10 reps B 2 x 10 reps B 1 x 10 reps B 1 x 10 reps B 2 x 10 reps B 2 x 10 B 2 x 10 B 2 x 10 B   Hip extension 2 x 10 reps B 2 x 10 reps B 1 x 10 reps B 1 x 10 reps B 2 x 10 reps B 2 x 10 B 2 x 10 B 2 x 10 B   Standing Marching 2 x 10 reps B 2 x 10 reps B 1 x 10 reps B 1  X 10 reps B 2 x 10 reps B 2 x 10 B 2 x 10 B 2 x 10 B   Balance Board AP/ML tapping x 20    AP/ML 30 sec hold for balance x 2 AP/ML tapping x 20    AP/ML 30 sec hold for balance x 2  AP/ML tapping x 20    AP/ML 30 sec hold for balance x 2 AP/ML tapping x 20    AP/ML 30 sec hold for balance x 3 AP/ML tapping x 20    AP/ML 30 sec hold for balance x 3 AP/ML tapping x 20    AP/ML 30 sec hold for balance x 3 AP/ML tapping x 20    AP/ML 30 sec hold for balance x 3   Semi tandem progressing towards Tandem stance 2 x 30 sec hold with each LE leading, minimal hold of B UE          Seated hamstring stretch   3 x 30 sec B 3 x 30 sec B 3 x 30 sec B 3 x 30 sec B 3 x 30 sec B 3 x 30 sec B   Clamshells    2 x 10 reps B       Supine Bridge   2 x 10 reps B 2 x 10 reps B       Ambulation  Patient ambulated 200 ft without assistive device with verbal cues for wider FERMIN Patient ambulated 200 ft without assistive device with verbal cues for wider FERMIN         PT assist Hamstring stretch 3 x 30 sec B MANUAL THERAPY: (0 minutes): Joint mobilization, Soft tissue mobilization and Manipulation was utilized and necessary because of the patient's restricted joint motion, painful spasm, loss of articular motion and restricted motion of soft tissue to patient's bilateral hamstring musculature. (Used abbreviations: MET - muscle energy technique; PNF - proprioceptive neuromuscular facilitation; NMR - neuromuscular re-education; AP - anterior to posterior; PA - posterior to anterior)    MODALITIES: (0 minutes):      Not today    TREATMENT/SESSION ASSESSMENT:  Ruthe Angelucci demonstrated good understanding of HEP and body mechanics with functional activities. · Pain/ Symptoms: Initial:   0/10 Post Session:  0/10  ·   Compliance with Program/Exercises: Good compliance. · Recommendations/Intent for next treatment session: Patient to be discharged with detailed HEP at this time.     Total Treatment Duration: 45 minutes   PT Patient Time In/Time Out  Time In: 2756  Time Out: [id number] [facility]

## 2019-08-19 PROBLEM — F33.9 RECURRENT DEPRESSION (HCC): Status: RESOLVED | Noted: 2018-08-16 | Resolved: 2019-08-19

## 2020-09-28 PROBLEM — M48.062 LUMBAR STENOSIS WITH NEUROGENIC CLAUDICATION: Status: RESOLVED | Noted: 2018-06-07 | Resolved: 2020-09-28

## 2020-12-25 ENCOUNTER — APPOINTMENT (OUTPATIENT)
Dept: GENERAL RADIOLOGY | Age: 80
DRG: 025 | End: 2020-12-25
Attending: EMERGENCY MEDICINE
Payer: MEDICARE

## 2020-12-25 ENCOUNTER — HOSPITAL ENCOUNTER (INPATIENT)
Age: 80
LOS: 10 days | Discharge: NURSING, REHAB OR LONG-TERM CARE | DRG: 025 | End: 2021-01-04
Attending: EMERGENCY MEDICINE | Admitting: INTERNAL MEDICINE
Payer: MEDICARE

## 2020-12-25 ENCOUNTER — APPOINTMENT (OUTPATIENT)
Dept: CT IMAGING | Age: 80
DRG: 025 | End: 2020-12-25
Attending: EMERGENCY MEDICINE
Payer: MEDICARE

## 2020-12-25 ENCOUNTER — APPOINTMENT (OUTPATIENT)
Dept: MRI IMAGING | Age: 80
DRG: 025 | End: 2020-12-25
Attending: INTERNAL MEDICINE
Payer: MEDICARE

## 2020-12-25 DIAGNOSIS — F51.01 PRIMARY INSOMNIA: ICD-10-CM

## 2020-12-25 DIAGNOSIS — I61.9 HEMORRHAGIC STROKE (HCC): ICD-10-CM

## 2020-12-25 DIAGNOSIS — R56.9 SEIZURES (HCC): ICD-10-CM

## 2020-12-25 DIAGNOSIS — R41.89 UNRESPONSIVE STATE: ICD-10-CM

## 2020-12-25 DIAGNOSIS — G93.40 ENCEPHALOPATHY: ICD-10-CM

## 2020-12-25 DIAGNOSIS — Z98.890 HISTORY OF LUMBAR LAMINECTOMY FOR SPINAL CORD DECOMPRESSION: ICD-10-CM

## 2020-12-25 DIAGNOSIS — I10 ESSENTIAL HYPERTENSION: Chronic | ICD-10-CM

## 2020-12-25 DIAGNOSIS — I10 HTN (HYPERTENSION), BENIGN: ICD-10-CM

## 2020-12-25 DIAGNOSIS — M19.90 ARTHRITIS: ICD-10-CM

## 2020-12-25 DIAGNOSIS — F41.0 PANIC DISORDER: ICD-10-CM

## 2020-12-25 DIAGNOSIS — N17.9 ACUTE RENAL FAILURE, UNSPECIFIED ACUTE RENAL FAILURE TYPE (HCC): ICD-10-CM

## 2020-12-25 DIAGNOSIS — F41.1 GAD (GENERALIZED ANXIETY DISORDER): ICD-10-CM

## 2020-12-25 DIAGNOSIS — R90.0 INTRACRANIAL MASS: ICD-10-CM

## 2020-12-25 DIAGNOSIS — E78.2 MIXED HYPERLIPIDEMIA: ICD-10-CM

## 2020-12-25 DIAGNOSIS — D49.6 BRAIN TUMOR (HCC): Primary | ICD-10-CM

## 2020-12-25 DIAGNOSIS — I61.0 NONTRAUMATIC SUBCORTICAL HEMORRHAGE OF RIGHT CEREBRAL HEMISPHERE (HCC): ICD-10-CM

## 2020-12-25 LAB
ABO + RH BLD: NORMAL
ANION GAP SERPL CALC-SCNC: 11 MMOL/L (ref 7–16)
ARTERIAL PATENCY WRIST A: YES
ATRIAL RATE: 227 BPM
BASE DEFICIT BLD-SCNC: 12 MMOL/L
BASOPHILS # BLD: 0.2 K/UL (ref 0–0.2)
BASOPHILS NFR BLD: 1 % (ref 0–2)
BDY SITE: ABNORMAL
BLOOD GROUP ANTIBODIES SERPL: NORMAL
BUN SERPL-MCNC: 26 MG/DL (ref 8–23)
CALCIUM SERPL-MCNC: 9.1 MG/DL (ref 8.3–10.4)
CALCULATED R AXIS, ECG10: 41 DEGREES
CALCULATED T AXIS, ECG11: -5 DEGREES
CHLORIDE SERPL-SCNC: 112 MMOL/L (ref 98–107)
CO2 BLD-SCNC: 17 MMOL/L
CO2 SERPL-SCNC: 19 MMOL/L (ref 21–32)
COLLECT TIME,HTIME: 614
CREAT SERPL-MCNC: 2.12 MG/DL (ref 0.8–1.5)
DIAGNOSIS, 93000: NORMAL
DIFFERENTIAL METHOD BLD: ABNORMAL
EOSINOPHIL # BLD: 0 K/UL (ref 0–0.8)
EOSINOPHIL NFR BLD: 0 % (ref 0.5–7.8)
ERYTHROCYTE [DISTWIDTH] IN BLOOD BY AUTOMATED COUNT: 13.8 % (ref 11.9–14.6)
EST. AVERAGE GLUCOSE BLD GHB EST-MCNC: 126 MG/DL
EXHALED MINUTE VOLUME, VE: 25 L/MIN
GAS FLOW.O2 O2 DELIVERY SYS: ABNORMAL L/MIN
GLUCOSE BLD STRIP.AUTO-MCNC: 258 MG/DL (ref 65–100)
GLUCOSE SERPL-MCNC: 177 MG/DL (ref 65–100)
HBA1C MFR BLD: 6 % (ref 4.8–6)
HCO3 BLD-SCNC: 15.7 MMOL/L (ref 22–26)
HCT VFR BLD AUTO: 42.9 % (ref 41.1–50.3)
HGB BLD-MCNC: 14.4 G/DL (ref 13.6–17.2)
IMM GRANULOCYTES # BLD AUTO: 1.3 K/UL (ref 0–0.5)
IMM GRANULOCYTES NFR BLD AUTO: 4 % (ref 0–5)
INR BLD: 1.9 (ref 0.9–1.2)
INR PPP: 1.1
LYMPHOCYTES # BLD: 1.7 K/UL (ref 0.5–4.6)
LYMPHOCYTES NFR BLD: 5 % (ref 13–44)
MCH RBC QN AUTO: 31.9 PG (ref 26.1–32.9)
MCHC RBC AUTO-ENTMCNC: 33.6 G/DL (ref 31.4–35)
MCV RBC AUTO: 94.9 FL (ref 79.6–97.8)
MONOCYTES # BLD: 1.8 K/UL (ref 0.1–1.3)
MONOCYTES NFR BLD: 5 % (ref 4–12)
NEUTS SEG # BLD: 31.3 K/UL (ref 1.7–8.2)
NEUTS SEG NFR BLD: 86 % (ref 43–78)
NRBC # BLD: 0.02 K/UL (ref 0–0.2)
O2/TOTAL GAS SETTING VFR VENT: 100 %
PCO2 BLD: 43.2 MMHG (ref 35–45)
PEEP RESPIRATORY: 8 CMH2O
PH BLD: 7.17 [PH] (ref 7.35–7.45)
PLATELET # BLD AUTO: 227 K/UL (ref 150–450)
PMV BLD AUTO: 11.6 FL (ref 9.4–12.3)
PO2 BLD: 128 MMHG (ref 75–100)
POTASSIUM SERPL-SCNC: 4.4 MMOL/L (ref 3.5–5.1)
PRESSURE SUPPORT SETTING VENT: 12 CMH2O
PROTHROMBIN TIME: 14.2 SEC (ref 12.5–14.7)
PT BLD: 22.5 SECS (ref 9.6–11.6)
Q-T INTERVAL, ECG07: 322 MS
QRS DURATION, ECG06: 82 MS
QTC CALCULATION (BEZET), ECG08: 441 MS
RBC # BLD AUTO: 4.52 M/UL (ref 4.23–5.6)
SAO2 % BLD: 98 % (ref 95–98)
SERVICE CMNT-IMP: ABNORMAL
SERVICE CMNT-IMP: ABNORMAL
SODIUM SERPL-SCNC: 142 MMOL/L (ref 136–145)
SPECIMEN EXP DATE BLD: NORMAL
SPECIMEN TYPE: ABNORMAL
TOTAL RESP. RATE, ITRR: 28
TROPONIN-HIGH SENSITIVITY: 105.5 PG/ML (ref 0–14)
VENTILATION MODE VENT: ABNORMAL
VENTRICULAR RATE, ECG03: 113 BPM
WBC # BLD AUTO: 36.3 K/UL (ref 4.3–11.1)

## 2020-12-25 PROCEDURE — 74011000258 HC RX REV CODE- 258: Performed by: EMERGENCY MEDICINE

## 2020-12-25 PROCEDURE — 65610000006 HC RM INTENSIVE CARE

## 2020-12-25 PROCEDURE — 74011000250 HC RX REV CODE- 250: Performed by: EMERGENCY MEDICINE

## 2020-12-25 PROCEDURE — 77030040393 HC DRSG OPTIFOAM GENT MDII -B

## 2020-12-25 PROCEDURE — 93005 ELECTROCARDIOGRAM TRACING: CPT | Performed by: EMERGENCY MEDICINE

## 2020-12-25 PROCEDURE — 96375 TX/PRO/DX INJ NEW DRUG ADDON: CPT

## 2020-12-25 PROCEDURE — 74011000636 HC RX REV CODE- 636: Performed by: EMERGENCY MEDICINE

## 2020-12-25 PROCEDURE — 77030040361 HC SLV COMPR DVT MDII -B

## 2020-12-25 PROCEDURE — 83036 HEMOGLOBIN GLYCOSYLATED A1C: CPT

## 2020-12-25 PROCEDURE — 70553 MRI BRAIN STEM W/O & W/DYE: CPT

## 2020-12-25 PROCEDURE — 70450 CT HEAD/BRAIN W/O DYE: CPT

## 2020-12-25 PROCEDURE — 36415 COLL VENOUS BLD VENIPUNCTURE: CPT

## 2020-12-25 PROCEDURE — 71045 X-RAY EXAM CHEST 1 VIEW: CPT

## 2020-12-25 PROCEDURE — 82803 BLOOD GASES ANY COMBINATION: CPT

## 2020-12-25 PROCEDURE — 82962 GLUCOSE BLOOD TEST: CPT

## 2020-12-25 PROCEDURE — 36600 WITHDRAWAL OF ARTERIAL BLOOD: CPT

## 2020-12-25 PROCEDURE — 95816 EEG AWAKE AND DROWSY: CPT | Performed by: INTERNAL MEDICINE

## 2020-12-25 PROCEDURE — 99223 1ST HOSP IP/OBS HIGH 75: CPT | Performed by: INTERNAL MEDICINE

## 2020-12-25 PROCEDURE — 70498 CT ANGIOGRAPHY NECK: CPT

## 2020-12-25 PROCEDURE — 74011250636 HC RX REV CODE- 250/636: Performed by: EMERGENCY MEDICINE

## 2020-12-25 PROCEDURE — 36430 TRANSFUSION BLD/BLD COMPNT: CPT

## 2020-12-25 PROCEDURE — 74011000258 HC RX REV CODE- 258: Performed by: INTERNAL MEDICINE

## 2020-12-25 PROCEDURE — 85025 COMPLETE CBC W/AUTO DIFF WBC: CPT

## 2020-12-25 PROCEDURE — A9575 INJ GADOTERATE MEGLUMI 0.1ML: HCPCS | Performed by: INTERNAL MEDICINE

## 2020-12-25 PROCEDURE — 99285 EMERGENCY DEPT VISIT HI MDM: CPT

## 2020-12-25 PROCEDURE — P9059 PLASMA, FRZ BETWEEN 8-24HOUR: HCPCS

## 2020-12-25 PROCEDURE — 94002 VENT MGMT INPAT INIT DAY: CPT

## 2020-12-25 PROCEDURE — 5A1945Z RESPIRATORY VENTILATION, 24-96 CONSECUTIVE HOURS: ICD-10-PCS | Performed by: EMERGENCY MEDICINE

## 2020-12-25 PROCEDURE — 99223 1ST HOSP IP/OBS HIGH 75: CPT | Performed by: PSYCHIATRY & NEUROLOGY

## 2020-12-25 PROCEDURE — 0BH17EZ INSERTION OF ENDOTRACHEAL AIRWAY INTO TRACHEA, VIA NATURAL OR ARTIFICIAL OPENING: ICD-10-PCS | Performed by: EMERGENCY MEDICINE

## 2020-12-25 PROCEDURE — 51702 INSERT TEMP BLADDER CATH: CPT

## 2020-12-25 PROCEDURE — 0T9B70Z DRAINAGE OF BLADDER WITH DRAINAGE DEVICE, VIA NATURAL OR ARTIFICIAL OPENING: ICD-10-PCS | Performed by: INTERNAL MEDICINE

## 2020-12-25 PROCEDURE — 85610 PROTHROMBIN TIME: CPT

## 2020-12-25 PROCEDURE — 2709999900 HC NON-CHARGEABLE SUPPLY

## 2020-12-25 PROCEDURE — 80048 BASIC METABOLIC PNL TOTAL CA: CPT

## 2020-12-25 PROCEDURE — 84484 ASSAY OF TROPONIN QUANT: CPT

## 2020-12-25 PROCEDURE — 96374 THER/PROPH/DIAG INJ IV PUSH: CPT

## 2020-12-25 PROCEDURE — 74018 RADEX ABDOMEN 1 VIEW: CPT

## 2020-12-25 PROCEDURE — 30233K1 TRANSFUSION OF NONAUTOLOGOUS FROZEN PLASMA INTO PERIPHERAL VEIN, PERCUTANEOUS APPROACH: ICD-10-PCS | Performed by: INTERNAL MEDICINE

## 2020-12-25 PROCEDURE — 74011250636 HC RX REV CODE- 250/636: Performed by: INTERNAL MEDICINE

## 2020-12-25 PROCEDURE — 86901 BLOOD TYPING SEROLOGIC RH(D): CPT

## 2020-12-25 PROCEDURE — 4A03X5D MEASUREMENT OF ARTERIAL FLOW, INTRACRANIAL, EXTERNAL APPROACH: ICD-10-PCS | Performed by: RADIOLOGY

## 2020-12-25 RX ORDER — GADOTERATE MEGLUMINE 376.9 MG/ML
20 INJECTION INTRAVENOUS
Status: COMPLETED | OUTPATIENT
Start: 2020-12-25 | End: 2020-12-25

## 2020-12-25 RX ORDER — FENTANYL CITRATE-0.9 % NACL/PF 25 MCG/ML
0-200 PLASTIC BAG, INJECTION (ML) INJECTION
Status: DISCONTINUED | OUTPATIENT
Start: 2020-12-25 | End: 2020-12-26

## 2020-12-25 RX ORDER — ONDANSETRON 2 MG/ML
4 INJECTION INTRAMUSCULAR; INTRAVENOUS
Status: DISCONTINUED | OUTPATIENT
Start: 2020-12-25 | End: 2021-01-04 | Stop reason: HOSPADM

## 2020-12-25 RX ORDER — POLYETHYLENE GLYCOL 3350 17 G/17G
17 POWDER, FOR SOLUTION ORAL DAILY PRN
Status: DISCONTINUED | OUTPATIENT
Start: 2020-12-25 | End: 2021-01-04 | Stop reason: HOSPADM

## 2020-12-25 RX ORDER — ONDANSETRON 2 MG/ML
4 INJECTION INTRAMUSCULAR; INTRAVENOUS
Status: COMPLETED | OUTPATIENT
Start: 2020-12-25 | End: 2020-12-25

## 2020-12-25 RX ORDER — DEXAMETHASONE SODIUM PHOSPHATE 100 MG/10ML
10 INJECTION INTRAMUSCULAR; INTRAVENOUS
Status: COMPLETED | OUTPATIENT
Start: 2020-12-25 | End: 2020-12-25

## 2020-12-25 RX ORDER — SODIUM CHLORIDE 0.9 % (FLUSH) 0.9 %
10 SYRINGE (ML) INJECTION
Status: COMPLETED | OUTPATIENT
Start: 2020-12-25 | End: 2020-12-25

## 2020-12-25 RX ORDER — SODIUM CHLORIDE 9 MG/ML
250 INJECTION, SOLUTION INTRAVENOUS AS NEEDED
Status: DISCONTINUED | OUTPATIENT
Start: 2020-12-25 | End: 2021-01-04 | Stop reason: HOSPADM

## 2020-12-25 RX ORDER — SODIUM CHLORIDE, SODIUM LACTATE, POTASSIUM CHLORIDE, CALCIUM CHLORIDE 600; 310; 30; 20 MG/100ML; MG/100ML; MG/100ML; MG/100ML
75 INJECTION, SOLUTION INTRAVENOUS CONTINUOUS
Status: DISCONTINUED | OUTPATIENT
Start: 2020-12-25 | End: 2020-12-27

## 2020-12-25 RX ORDER — DEXAMETHASONE SODIUM PHOSPHATE 100 MG/10ML
6 INJECTION INTRAMUSCULAR; INTRAVENOUS EVERY 6 HOURS
Status: DISCONTINUED | OUTPATIENT
Start: 2020-12-25 | End: 2020-12-29

## 2020-12-25 RX ORDER — ACETAMINOPHEN 650 MG/1
650 SUPPOSITORY RECTAL
Status: DISCONTINUED | OUTPATIENT
Start: 2020-12-25 | End: 2021-01-04 | Stop reason: HOSPADM

## 2020-12-25 RX ORDER — MIDAZOLAM HYDROCHLORIDE 1 MG/ML
5 INJECTION, SOLUTION INTRAMUSCULAR; INTRAVENOUS
Status: COMPLETED | OUTPATIENT
Start: 2020-12-25 | End: 2020-12-25

## 2020-12-25 RX ORDER — SODIUM CHLORIDE 0.9 % (FLUSH) 0.9 %
5-40 SYRINGE (ML) INJECTION EVERY 8 HOURS
Status: DISCONTINUED | OUTPATIENT
Start: 2020-12-25 | End: 2021-01-04 | Stop reason: HOSPADM

## 2020-12-25 RX ORDER — PROMETHAZINE HYDROCHLORIDE 25 MG/1
12.5 TABLET ORAL
Status: DISCONTINUED | OUTPATIENT
Start: 2020-12-25 | End: 2021-01-04 | Stop reason: HOSPADM

## 2020-12-25 RX ORDER — SODIUM CHLORIDE 0.9 % (FLUSH) 0.9 %
5-40 SYRINGE (ML) INJECTION AS NEEDED
Status: DISCONTINUED | OUTPATIENT
Start: 2020-12-25 | End: 2021-01-04 | Stop reason: HOSPADM

## 2020-12-25 RX ORDER — PROPOFOL 10 MG/ML
0-50 VIAL (ML) INTRAVENOUS
Status: DISCONTINUED | OUTPATIENT
Start: 2020-12-25 | End: 2020-12-26

## 2020-12-25 RX ORDER — ACETAMINOPHEN 325 MG/1
650 TABLET ORAL
Status: DISCONTINUED | OUTPATIENT
Start: 2020-12-25 | End: 2021-01-04 | Stop reason: HOSPADM

## 2020-12-25 RX ORDER — PROPOFOL 10 MG/ML
0-50 VIAL (ML) INTRAVENOUS
Status: DISCONTINUED | OUTPATIENT
Start: 2020-12-25 | End: 2020-12-25

## 2020-12-25 RX ADMIN — Medication 40 ML: at 13:26

## 2020-12-25 RX ADMIN — MIDAZOLAM 5 MG: 1 INJECTION INTRAMUSCULAR; INTRAVENOUS at 05:44

## 2020-12-25 RX ADMIN — DEXAMETHASONE SODIUM PHOSPHATE 6 MG: 10 INJECTION, SOLUTION INTRAMUSCULAR; INTRAVENOUS at 18:00

## 2020-12-25 RX ADMIN — SODIUM CHLORIDE 1000 MG: 900 INJECTION, SOLUTION INTRAVENOUS at 07:48

## 2020-12-25 RX ADMIN — SODIUM CHLORIDE, SODIUM LACTATE, POTASSIUM CHLORIDE, AND CALCIUM CHLORIDE 150 ML/HR: 600; 310; 30; 20 INJECTION, SOLUTION INTRAVENOUS at 08:17

## 2020-12-25 RX ADMIN — PROPOFOL 35 MCG/KG/MIN: 10 INJECTION, EMULSION INTRAVENOUS at 09:36

## 2020-12-25 RX ADMIN — PROPOFOL 10 MCG/KG/MIN: 10 INJECTION, EMULSION INTRAVENOUS at 05:42

## 2020-12-25 RX ADMIN — Medication 10 ML: at 20:58

## 2020-12-25 RX ADMIN — Medication 10 ML: at 15:18

## 2020-12-25 RX ADMIN — IOPAMIDOL 100 ML: 755 INJECTION, SOLUTION INTRAVENOUS at 05:53

## 2020-12-25 RX ADMIN — SODIUM CHLORIDE, SODIUM LACTATE, POTASSIUM CHLORIDE, AND CALCIUM CHLORIDE 150 ML/HR: 600; 310; 30; 20 INJECTION, SOLUTION INTRAVENOUS at 21:57

## 2020-12-25 RX ADMIN — ONDANSETRON 4 MG: 2 INJECTION INTRAMUSCULAR; INTRAVENOUS at 05:44

## 2020-12-25 RX ADMIN — PROPOFOL 40 MCG/KG/MIN: 10 INJECTION, EMULSION INTRAVENOUS at 19:14

## 2020-12-25 RX ADMIN — Medication 10 ML: at 05:53

## 2020-12-25 RX ADMIN — NICARDIPINE HYDROCHLORIDE 5 MG/HR: 2.5 INJECTION, SOLUTION INTRAVENOUS at 09:51

## 2020-12-25 RX ADMIN — SODIUM CHLORIDE 1000 MG: 900 INJECTION, SOLUTION INTRAVENOUS at 20:43

## 2020-12-25 RX ADMIN — DEXAMETHASONE SODIUM PHOSPHATE 10 MG: 10 INJECTION INTRAMUSCULAR; INTRAVENOUS at 06:45

## 2020-12-25 RX ADMIN — GADOTERATE MEGLUMINE 20 ML: 376.9 INJECTION INTRAVENOUS at 15:18

## 2020-12-25 RX ADMIN — SODIUM CHLORIDE, SODIUM LACTATE, POTASSIUM CHLORIDE, AND CALCIUM CHLORIDE 150 ML/HR: 600; 310; 30; 20 INJECTION, SOLUTION INTRAVENOUS at 14:22

## 2020-12-25 RX ADMIN — PROPOFOL 40 MCG/KG/MIN: 10 INJECTION, EMULSION INTRAVENOUS at 14:22

## 2020-12-25 RX ADMIN — FENTANYL CITRATE 50 MCG/HR: 0.05 INJECTION, SOLUTION INTRAMUSCULAR; INTRAVENOUS at 09:32

## 2020-12-25 RX ADMIN — SODIUM CHLORIDE 100 ML: 900 INJECTION, SOLUTION INTRAVENOUS at 05:53

## 2020-12-25 RX ADMIN — NICARDIPINE HYDROCHLORIDE 5 MG/HR: 2.5 INJECTION, SOLUTION INTRAVENOUS at 15:52

## 2020-12-25 RX ADMIN — NICARDIPINE HYDROCHLORIDE 5 MG/HR: 2.5 INJECTION, SOLUTION INTRAVENOUS at 20:44

## 2020-12-25 NOTE — H&P
H&P HPI NOTE    [facility]    12/25/2020    Date of Admission:  12/25/2020    The patient's chart is reviewed and the patient is discussed with the staff. Subjective:     Patient is a 68 y.o.  male seen and evaluated at the request of  ED staff. History was obtained by the wife by the bedside patient does not provide any history is intubated patient is hypertensive obese snores he does not smoke his wife went to bed around 9 or 10 PM usually he goes to bed after that she woke up at 4 did not found him in bed looked for him found in the bathroom she thinks that he was stiffened up as she calls it either seizing or just stuff up in one position and making weird noises with his breathing called EMS EMS intubated him but an eye on his left shoulder got him to the ED in the ED patient had a CAT scan which showed that he had either intracranial hemorrhage or mass patient was stabilized on propofol no evidence of seizure here wife is denying patient had fever chills or any systemic symptoms before this he had never history and he had no history of seizures anticoagulation or and closed head trauma or if she is aware of any intracranial pathology. Neurosurgery and telemetry surgery was called teleneurology neurosurgery no surgical intervention teleneurology follow-up MRI with and without contrast.    Review of Systems  Review of systems not obtained due to patient factors. Patient Active Problem List   Diagnosis Code    MEGGAN (generalized anxiety disorder) F41.1    Arthritis M19.90    Idiopathic chronic gout of right ankle M1A.0710    Mixed hyperlipidemia E78.2    Primary insomnia F51.01    Panic disorder F41.0    HTN (hypertension), benign I10    Rosacea L71.9    Idiopathic scoliosis M41.20    History of lumbar laminectomy for spinal cord decompression Z98.890    ICH (intracerebral hemorrhage) ([facility] [address] I61.9       Home DME company .     Prior to Admission Medications Prescriptions Last Dose Informant Patient Reported? Taking?   allopurinoL (ZYLOPRIM) 100 mg tablet   No No   Sig: Take 2 Tabs by mouth daily. amLODIPine-Olmesartan (Cuco) 10-40 mg tab   No No   Sig: Take 1 Tab by mouth daily. atorvastatin (LIPITOR) 40 mg tablet   No No   Sig: TAKE 1 TABLET BY MOUTH EVERY DAY   cholecalciferol, vitamin D3, (VITAMIN D3) 2,000 unit tab   Yes No   Sig: Take  by mouth. diclofenac (VOLTAREN) 1 % gel   No No   Sig: Apply 4 g to affected area four (4) times daily. diclofenac sodium (PENNSAID) 1.5 % drop   Yes No   Sig: by Apply Externally route. furosemide (LASIX) 20 mg tablet   No No   Sig: Take 1 Tab by mouth daily. metroNIDAZOLE (METROGEL) 1 % topical gel   No No   Sig: Apply  to affected area daily. Use a thin layer to affected areas after washing   multivitamin (ONE A DAY) tablet   Yes No   Sig: Take 1 Tab by mouth daily. potassium chloride SR (Klor-Con 10) 10 mEq tablet   No No   Sig: Take 1 Tab by mouth daily. sertraline (ZOLOFT) 100 mg tablet   No No   Sig: Take 1.5 Tabs by mouth daily.  One and half tab every day   zolpidem (AMBIEN) 10 mg tablet   No No   Sig: TAKE 1 TABLET BY MOUTH AT BEDTIME AS NEEDED FOR SLEEP  Indications: difficulty falling asleep      Facility-Administered Medications: None       Past Medical History:   Diagnosis Date    Anxiety disorder 12/9/2014    Arthritis 12/9/2014    Depression 12/9/2014    Gout 12/9/2014    History of lumbar laminectomy for spinal cord decompression 2/18/2019    HTN (hypertension), benign 2/15/2017    Hyperlipemia 12/9/2014    Hypertension     Idiopathic chronic gout of right ankle 12/9/2014    Insomnia 12/9/2014    Kidney stone     Mixed hyperlipidemia 12/9/2014    Panic disorder 12/9/2014    Primary insomnia 12/9/2014    Rosacea 2/15/2017     Past Surgical History:   Procedure Laterality Date    HX ANKLE FRACTURE TX Left 6/1974    HX COLONOSCOPY  2007    HX CYST REMOVAL      HX CYST REMOVAL pylonidal cyst    HX CYST REMOVAL      HX HERNIA REPAIR Bilateral     HX OTHER SURGICAL      wrist    HX WRIST FRACTURE TX Bilateral 1993    wrist plate/pin     Social History     Socioeconomic History    Marital status:      Spouse name: Not on file    Number of children: Not on file    Years of education: Not on file    Highest education level: Not on file   Occupational History    Not on file   Social Needs    Financial resource strain: Not on file    Food insecurity     Worry: Not on file     Inability: Not on file    Transportation needs     Medical: Not on file     Non-medical: Not on file   Tobacco Use    Smoking status: Former Smoker     Types: Cigarettes     Quit date: 1970     Years since quittin.0    Smokeless tobacco: Never Used   Substance and Sexual Activity    Alcohol use:  Yes     Alcohol/week: 0.0 standard drinks     Comment: occ    Drug use: No    Sexual activity: Yes     Partners: Female   Lifestyle    Physical activity     Days per week: Not on file     Minutes per session: Not on file    Stress: Not on file   Relationships    Social connections     Talks on phone: Not on file     Gets together: Not on file     Attends Zoroastrian service: Not on file     Active member of club or organization: Not on file     Attends meetings of clubs or organizations: Not on file     Relationship status: Not on file    Intimate partner violence     Fear of current or ex partner: Not on file     Emotionally abused: Not on file     Physically abused: Not on file     Forced sexual activity: Not on file   Other Topics Concern    Not on file   Social History Narrative    Not on file     Family History   Problem Relation Age of Onset    Cancer Mother         lung ca    Hypertension Mother     Heart Disease Mother     Arthritis-osteo Father     Cancer Brother         stomach     No Known Allergies    Current Facility-Administered Medications   Medication Dose Route Frequency  propofol (DIPRIVAN) 10 mg/mL infusion  0-50 mcg/kg/min IntraVENous TITRATE    niCARdipine in Saline (CARDENE) 25 MG/250 mL infusion kit  5-15 mg/hr IntraVENous TITRATE    0.9% sodium chloride infusion 250 mL  250 mL IntraVENous PRN    levETIRAcetam (KEPPRA) 1,000 mg in 0.9% sodium chloride 100 mL IVPB  1,000 mg IntraVENous Q12H    propofol (DIPRIVAN) 10 mg/mL infusion  0-50 mcg/kg/min IntraVENous TITRATE    fentaNYL in normal saline (pf) 25 mcg/mL infusion  0-200 mcg/hr IntraVENous TITRATE     Current Outpatient Medications   Medication Sig    sertraline (ZOLOFT) 100 mg tablet Take 1.5 Tabs by mouth daily. One and half tab every day    allopurinoL (ZYLOPRIM) 100 mg tablet Take 2 Tabs by mouth daily.  amLODIPine-Olmesartan (Cuco) 10-40 mg tab Take 1 Tab by mouth daily.  atorvastatin (LIPITOR) 40 mg tablet TAKE 1 TABLET BY MOUTH EVERY DAY    zolpidem (AMBIEN) 10 mg tablet TAKE 1 TABLET BY MOUTH AT BEDTIME AS NEEDED FOR SLEEP  Indications: difficulty falling asleep    potassium chloride SR (Klor-Con 10) 10 mEq tablet Take 1 Tab by mouth daily.  metroNIDAZOLE (METROGEL) 1 % topical gel Apply  to affected area daily. Use a thin layer to affected areas after washing    furosemide (LASIX) 20 mg tablet Take 1 Tab by mouth daily.  diclofenac sodium (PENNSAID) 1.5 % drop by Apply Externally route.  diclofenac (VOLTAREN) 1 % gel Apply 4 g to affected area four (4) times daily.  multivitamin (ONE A DAY) tablet Take 1 Tab by mouth daily.  cholecalciferol, vitamin D3, (VITAMIN D3) 2,000 unit tab Take  by mouth.          Objective:     Vitals:    12/25/20 0701 12/25/20 0702 12/25/20 0710 12/25/20 0729   BP:       Pulse: (!) 111 (!) 111 (!) 108 (!) 104   Resp: 22 21 19 16   Temp:       SpO2: 100% 99% 100% 98%   Weight:           PHYSICAL EXAM     Constitutional: Intubated sedated no evidence of seizures  EENMT:  Sclera clear, pupils equal, oral mucosa moist  Respiratory: Good bilateral air entry cardiovascular:  RRR without M,G,R  Gastrointestinal: soft and non-tender; with positive bowel sounds. Musculoskeletal: warm without cyanosis. There is no lower extremity edema. Skin:  no jaundice or rashes, no wounds   Neurologic: no gross neuro deficits     Psychiatric: Deeply sedated Babinski unequivocal.  CXR:        Recent Labs     12/25/20 0627   WBC 36.3*   HGB 14.4   HCT 42.9      INR 1.1  1.9*     No results for input(s): NA, K, CL, GLU, CO2, BUN, CREA, MG, PHOS, CA, TROIQ, ALB, TBIL, TBILI, ALT, BNPP in the last 72 hours. No lab exists for component: TROIP, GPT, SGOT  Recent Labs     12/25/20  0616   PHI 7.17*   PCO2I 43.2   PO2I 128*   HCO3I 15.7*     No results for input(s): LCAD, LAC in the last 72 hours. Assessment:  (Medical Decision Making)     Hospital Problems  Date Reviewed: 9/28/2020          Codes Class Noted POA    ICH (intracerebral hemorrhage) ([facility] [address] ICD-10-CM: I61.9  ICD-9-CM: 366  12/25/2020 Unknown              Plan:  (Medical Decision Making)     --Intracranial hemorrhage. --Possible seizure. --Possible intracranial mass. --Respiratory arrest leading to intubation. --We will start the patient on antiseizure medication Keppra  --Get an EEG  --Get an MRI with and without contrast  --No anticoagulation  --Follow-up with neurology and neurosurgery after that. --Keep systolic blood pressure below 140 and above 90. --Continue with vent support until mental status is improved prognosis identified. --Call us with questions thank you for this consultation. More than 50% of the time documented was spent in face-to-face contact with the patient and in the care of the patient on the floor/unit where the patient is located. Thank you very much for this referral.  We appreciate the opportunity to participate in this patient's care. Will follow along with above stated plan.     Christian Lopez MD

## 2020-12-25 NOTE — PROGRESS NOTES
Problem: Patient Education: Go to Patient Education Activity  Goal: Patient/Family Education  Outcome: Resolved/Not Met  Note: Talked with and educated wife on Hemorrhagic stroke pathway. She verbalized understanding and all questions were answered at this time     Problem: Hemorrhagic Stroke: Admission Day (0-3 hours)  Goal: Off Pathway (Use only if patient is Off Pathway)  Outcome: Not Progressing Towards Goal  Goal: Activity/Safety  Outcome: Not Progressing Towards Goal  Goal: Consults, if ordered  Outcome: Progressing Towards Goal  Goal: Diagnostic Test/Procedures  Outcome: Progressing Towards Goal  Goal: Nutrition/Diet  Outcome: Progressing Towards Goal  Goal: Medications  Outcome: Progressing Towards Goal  Goal: Respiratory  Outcome: Progressing Towards Goal     Problem: Falls - Risk of  Goal: *Absence of Falls  Description: Document Kalpana Fall Risk and appropriate interventions in the flowsheet. Outcome: Progressing Towards Goal  Note: Fall Risk Interventions:            Medication Interventions: Bed/chair exit alarm, Patient to call before getting OOB    Elimination Interventions: Bed/chair exit alarm, Call light in reach              Problem: Patient Education: Go to Patient Education Activity  Goal: Patient/Family Education  Outcome: Progressing Towards Goal     Problem: Pressure Injury - Risk of  Goal: *Prevention of pressure injury  Description: Document Marquez Scale and appropriate interventions in the flowsheet.   Outcome: Progressing Towards Goal  Note: Pressure Injury Interventions:  Sensory Interventions: Assess changes in LOC, Check visual cues for pain, Float heels         Activity Interventions: Chair cushion, Increase time out of bed, Pressure redistribution bed/mattress(bed type), PT/OT evaluation    Mobility Interventions: Chair cushion, Float heels, HOB 30 degrees or less, Pressure redistribution bed/mattress (bed type), PT/OT evaluation    Nutrition Interventions: Document food/fluid/supplement intake, Discuss nutritional consult with provider, Offer support with meals,snacks and hydration    Friction and Shear Interventions: Feet elevated on foot rest, HOB 30 degrees or less, Lift sheet, Lift team/patient mobility team                Problem: Patient Education: Go to Patient Education Activity  Goal: Patient/Family Education  Outcome: Progressing Towards Goal

## 2020-12-25 NOTE — ED TRIAGE NOTES
Patient in via [id number]. Per EMS wife found patient unresponsive on toilet around [phone]. Wife last saw patient at 11pm. Patient was given 30mg etomidate, 150 succ, and 10mg of Versed and RSI'ed in field. Code stroke called by Dr. Genet Worthington.

## 2020-12-25 NOTE — CONSULTS
Consult    Patient: Raoul Merino MRN: [id number]  SSN: [id number]    YOB: 1940  Age: 68 y.o. Sex: male        Assessment:       Probable mass with hemorrhage, RT parietal. Patient intubated and sedated. Because these GCS is scored as invalid in the circumstance the ICH score cannot be calculated. Hospital Problems  Date Reviewed: 9/28/2020          Codes Class Noted POA    ICH (intracerebral hemorrhage) ([facility] [address] ICD-10-CM: I61.9  ICD-9-CM: 702  12/25/2020 Unknown              Plan:     MRI brain W wo contrast.     ICH Management   Admit to ICU with [facility] neurological checks  Maintain SBP < 140 mmHg  STAT CT head for clinical worsening  Head of the bed at 30 degrees with neck in the neutral position  Repeat CT head 6 hours after initial imaging then 24 hours after initial imaging   Consider MRI or CT with contrast and CTA to investigate underlying pathology such as neoplasms or vascular malformations, respectively  Maintain normoglycemia and normothermia   Dysphagia screen prior to PO intake       Subjective:      Raoul Merino is a 68 y.o. male who is being seen for ICH. According to the medical record the patient was found down around 4:00 this morning. EMS was called. The patient was intubated in the field. At this time patient is intubated and on propofol drip. No seizure activity described. Patient's wife states that he has been without complaint prior to this onset. Past medical history is positive for hypertension.     Past Medical History:   Diagnosis Date    Anxiety disorder 12/9/2014    Arthritis 12/9/2014    Depression 12/9/2014    Gout 12/9/2014    History of lumbar laminectomy for spinal cord decompression 2/18/2019    HTN (hypertension), benign 2/15/2017    Hyperlipemia 12/9/2014    Hypertension     Idiopathic chronic gout of right ankle 12/9/2014    Insomnia 12/9/2014    Kidney stone     Mixed hyperlipidemia 12/9/2014    Panic disorder 12/9/2014  Primary insomnia 2014    Rosacea 2/15/2017     Past Surgical History:   Procedure Laterality Date    HX ANKLE FRACTURE TX Left 1974    HX COLONOSCOPY      HX CYST REMOVAL      HX CYST REMOVAL      pylonidal cyst    HX CYST REMOVAL      HX HERNIA REPAIR Bilateral     HX OTHER SURGICAL      wrist    HX WRIST FRACTURE TX Bilateral 1993    wrist plate/pin      Family History   Problem Relation Age of Onset    Cancer Mother         lung ca    Hypertension Mother     Heart Disease Mother     Arthritis-osteo Father     Cancer Brother         stomach     Social History     Tobacco Use    Smoking status: Former Smoker     Types: Cigarettes     Quit date: 1970     Years since quittin.0    Smokeless tobacco: Never Used   Substance Use Topics    Alcohol use:  Yes     Alcohol/week: 0.0 standard drinks     Comment: occ      Current Facility-Administered Medications   Medication Dose Route Frequency Provider Last Rate Last Admin    propofol (DIPRIVAN) 10 mg/mL infusion  0-50 mcg/kg/min IntraVENous TITRATE Lashaun Freedman MD 18 mL/hr at 20 0641 30 mcg/kg/min at 20 0641    niCARdipine in Saline (CARDENE) 25 MG/250 mL infusion kit  5-15 mg/hr IntraVENous TITRATE Lashaun Freedman MD        0.9% sodium chloride infusion 250 mL  250 mL IntraVENous PRN Lashaun Freedman MD        levETIRAcetam (KEPPRA) 1,000 mg in 0.9% sodium chloride 100 mL IVPB  1,000 mg IntraVENous Q12H Viki Chapa MD   1,000 mg at 20 7191    propofol (DIPRIVAN) 10 mg/mL infusion  0-50 mcg/kg/min IntraVENous TITRATE Viki Chapa MD        fentaNYL in normal saline (pf) 25 mcg/mL infusion  0-200 mcg/hr IntraVENous TITRATE Viki Chapa MD        lactated Ringers infusion  150 mL/hr IntraVENous CONTINUOUS Viki Chapa  mL/hr at 20 6108 150 mL/hr at 20 6396     Current Outpatient Medications   Medication Sig Dispense Refill    sertraline (ZOLOFT) 100 mg tablet Take 1.5 Tabs by mouth daily. One and half tab every day 135 Tab 1    allopurinoL (ZYLOPRIM) 100 mg tablet Take 2 Tabs by mouth daily. 180 Tab 1    amLODIPine-Olmesartan (Cuco) 10-40 mg tab Take 1 Tab by mouth daily. 90 Tab 1    atorvastatin (LIPITOR) 40 mg tablet TAKE 1 TABLET BY MOUTH EVERY DAY 90 Tab 1    zolpidem (AMBIEN) 10 mg tablet TAKE 1 TABLET BY MOUTH AT BEDTIME AS NEEDED FOR SLEEP  Indications: difficulty falling asleep 30 Tab 5    potassium chloride SR (Klor-Con 10) 10 mEq tablet Take 1 Tab by mouth daily. 90 Tab 3    metroNIDAZOLE (METROGEL) 1 % topical gel Apply  to affected area daily. Use a thin layer to affected areas after washing 45 g 3    furosemide (LASIX) 20 mg tablet Take 1 Tab by mouth daily. 90 Tab 1    diclofenac sodium (PENNSAID) 1.5 % drop by Apply Externally route.  diclofenac (VOLTAREN) 1 % gel Apply 4 g to affected area four (4) times daily. 2 Each 3    multivitamin (ONE A DAY) tablet Take 1 Tab by mouth daily.  cholecalciferol, vitamin D3, (VITAMIN D3) 2,000 unit tab Take  by mouth.           No Known Allergies    Review of Systems:    Intubated cannot provide review of system  Objective:     Vitals:    12/25/20 0729 12/25/20 0732 12/25/20 0744 12/25/20 0751   BP:  (!) 101/56 (!) 106/59    Pulse: (!) 104 (!) 106 (!) 105 (!) 103   Resp: 16 19 17 16   Temp:       SpO2: 98% 98%  94%   Weight:            Physical Exam:  Intubated propofol infusing    GCS invalid due to intubation and sedation    Cannot calculate ICH score without GCS    Pupils slightly unequal but reactive to light    No motor response to pain     Deep tendon reflexes are slightly asymmetrical been greater on the left than the right plantar responses are mute bilaterally      Recent Results (from the past 24 hour(s))   POC G3    Collection Time: 12/25/20  6:16 AM   Result Value Ref Range    Device: VENT      FIO2 (POC) 100 %    pH (POC) 7.17 (LL) 7.35 - 7.45      pCO2 (POC) 43.2 35 - 45 MMHG    pO2 (POC) 128 (H) 75 - 100 MMHG    HCO3 (POC) 15.7 (L) 22 - 26 MMOL/L    sO2 (POC) 98 95 - 98 %    Base deficit (POC) 12 mmol/L    Mode VENTILATOR/SPONTANEOUS/PRESSURE SUPPORT      PEEP/CPAP (POC) 8 cmH2O    Pressure support 12 cmH2O    Allens test (POC) YES      Total resp. rate 28      Site RIGHT RADIAL      Specimen type (POC) ARTERIAL      Performed by BerryLulaRT     CO2, POC 17 MMOL/L    Respiratory comment: NurseNotified     Exhaled minute volume 25.00 L/min    COLLECT TIME 614     GLUCOSE, POC    Collection Time: 12/25/20  6:18 AM   Result Value Ref Range    Glucose (POC) 258 (H) 65 - 100 mg/dL   CBC WITH AUTOMATED DIFF    Collection Time: 12/25/20  6:27 AM   Result Value Ref Range    WBC 36.3 (H) 4.3 - 11.1 K/uL    RBC 4.52 4.23 - 5.6 M/uL    HGB 14.4 13.6 - 17.2 g/dL    HCT 42.9 41.1 - 50.3 %    MCV 94.9 79.6 - 97.8 FL    MCH 31.9 26.1 - 32.9 PG    MCHC 33.6 31.4 - 35.0 g/dL    RDW 13.8 11.9 - 14.6 %    PLATELET 793 143 - 189 K/uL    MPV 11.6 9.4 - 12.3 FL    ABSOLUTE NRBC 0.02 0.0 - 0.2 K/uL    DF AUTOMATED      NEUTROPHILS 86 (H) 43 - 78 %    LYMPHOCYTES 5 (L) 13 - 44 %    MONOCYTES 5 4.0 - 12.0 %    EOSINOPHILS 0 (L) 0.5 - 7.8 %    BASOPHILS 1 0.0 - 2.0 %    IMMATURE GRANULOCYTES 4 0.0 - 5.0 %    ABS. NEUTROPHILS 31.3 (H) 1.7 - 8.2 K/UL    ABS. LYMPHOCYTES 1.7 0.5 - 4.6 K/UL    ABS. MONOCYTES 1.8 (H) 0.1 - 1.3 K/UL    ABS. EOSINOPHILS 0.0 0.0 - 0.8 K/UL    ABS. BASOPHILS 0.2 0.0 - 0.2 K/UL    ABS. IMM. GRANS.  1.3 (H) 0.0 - 0.5 K/UL   PROTHROMBIN TIME + INR    Collection Time: 12/25/20  6:27 AM   Result Value Ref Range    Prothrombin time 14.2 12.5 - 14.7 sec    INR 1.1     POC PT/INR    Collection Time: 12/25/20  6:27 AM   Result Value Ref Range    Prothrombin time (POC) 22.5 (H) 9.6 - 11.6 SECS    INR (POC) 1.9 (H) 0.9 - 1.2     EKG, 12 LEAD, INITIAL    Collection Time: 12/25/20  6:33 AM   Result Value Ref Range    Ventricular Rate 113 BPM    Atrial Rate 227 BPM    QRS Duration 82 ms    Q-T Interval 322 ms    QTC Calculation (Bezet) 441 ms    Calculated R Axis 41 degrees    Calculated T Axis -5 degrees    Diagnosis       !! AGE AND GENDER SPECIFIC ECG ANALYSIS !!   Accelerated Junctional rhythm  Nonspecific T wave abnormality  Abnormal ECG  When compared with ECG of 25-DEC-2020 06:29,  Inverted T waves have replaced nonspecific T wave abnormality in Inferior   leads     PLASMA, ALLOCATE    Collection Time: 12/25/20  7:00 AM   Result Value Ref Range    Unit number [id number]     Blood component type FP 24h, Thaw     Unit division 00     Status of unit ISSUED    METABOLIC PANEL, BASIC    Collection Time: 12/25/20  7:31 AM   Result Value Ref Range    Sodium 142 136 - 145 mmol/L    Potassium 4.4 3.5 - 5.1 mmol/L    Chloride 112 (H) 98 - 107 mmol/L    CO2 19 (L) 21 - 32 mmol/L    Anion gap 11 7 - 16 mmol/L    Glucose 177 (H) 65 - 100 mg/dL    BUN 26 (H) 8 - 23 MG/DL    Creatinine 2.12 (H) 0.8 - 1.5 MG/DL    GFR est AA 39 (L) >60 ml/min/1.73m2    GFR est non-AA 32 (L) >60 ml/min/1.73m2    Calcium 9.1 8.3 - 10.4 MG/DL   TYPE & SCREEN    Collection Time: 12/25/20  7:31 AM   Result Value Ref Range    Crossmatch Expiration 12/28/2020,2359     ABO/Rh(D) B POSITIVE     Antibody screen NEG    TROPONIN-HIGH SENSITIVITY    Collection Time: 12/25/20  7:31 AM   Result Value Ref Range    Troponin-High Sensitivity 105.5 (HH) 0 - 14 pg/mL       Lab Results   Component Value Date/Time    Cholesterol, total 110 03/02/2020 12:25 PM    HDL Cholesterol 30 (L) 03/02/2020 12:25 PM    LDL, calculated 37 03/02/2020 12:25 PM    VLDL, calculated 43 (H) 03/02/2020 12:25 PM    Triglyceride 213 (H) 03/02/2020 12:25 PM        Lab Results   Component Value Date/Time    Hemoglobin A1c, External 5.1 05/06/2014        CT Results (most recent):  Results from Hospital Encounter encounter on 12/25/20   CTA CODE NEURO HEAD AND NECK W CONT       Results for orders placed or performed during the hospital encounter of 12/25/20   EKG, 12 LEAD, INITIAL   Result Value Ref Range    Ventricular Rate 113 BPM    Atrial Rate 227 BPM    QRS Duration 82 ms    Q-T Interval 322 ms    QTC Calculation (Bezet) 441 ms    Calculated R Axis 41 degrees    Calculated T Axis -5 degrees    Diagnosis       !! AGE AND GENDER SPECIFIC ECG ANALYSIS !! Accelerated Junctional rhythm  Nonspecific T wave abnormality  Abnormal ECG  When compared with ECG of 25-DEC-2020 06:29,  Inverted T waves have replaced nonspecific T wave abnormality in Inferior   leads             US Results (most recent):  No results found for this or any previous visit. Most recent CTA  Results from [place] encounter on 12/25/20   CTA CODE NEURO HEAD AND NECK W CONT     CT scan of the head reviewed by me demonstrates subtle area of mass-effect with surrounding vasogenic edema in the right parietal region. There is a small juxtacortical area of hemorrhage. In addition there is low-density lesion without mass-effect in the left thalamus.         Signed By: Jane Almanza MD     December 25, 2020

## 2020-12-25 NOTE — PROGRESS NOTES
Patient was intubated with a number 7.5 ET Tube. Tube placement verified by auscultation, by CXR and ETCO2 monitor. ET Tube is secured at the 22 cm gera at the lip. Patient was intubated by EMS on the unknown attempt. Breath sounds are diminished. Patient is Negative for subcutaneous air and chest excursion is symmetric. Trachea is midline. Patient is also Negative for cyanosis and is Negative for pitting edema. Patient placed on ventilator on documented settings. All alarms are set and audible. Resuscitation bag is at the head of the bed.       Ventilator Settings  Mode FIO2 Rate Tidal Volume Pressure PEEP I:E Ratio   Pressure support  80 %       12 cm H2O  8 cm H20         Peak airway pressure: 21 cm H2O   Minute ventilation: 21.7 l/min

## 2020-12-25 NOTE — PROGRESS NOTES
Asked to review uncontrasted CT brain in a man found down at home. Pt reportedly intubated in ER. 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 There is a small hemorrhage within the area. This is located in the right posterior temperal lobe. Some local mass effect with out midline shift. Basilar cisterns are open. 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 This it relative lack of mass effect would not cause his LOC. Seizure or postictal state would be likely vs metabolic issue. Recommend Decadron, anticonvulsant, MRI Brain with and without contrast and CT body, and  neurology consultation.

## 2020-12-25 NOTE — PROGRESS NOTES
TRANSFER - IN REPORT:    Verbal report received from [address] on [address] being received from ED for routine progression of care      Report consisted of patients Situation, Background, Assessment and Recommendations(SBAR). Information from the following report(s) SBAR, Kardex, ED Summary, Intake/Output and Cardiac Rhythm NSR was reviewed with the receiving nurse. Opportunity for questions and clarification was provided. Assessment completed upon patients arrival to unit and care assumed.

## 2020-12-25 NOTE — PROGRESS NOTES
12/25/20 0729   Patient Observations   Pulse (Heart Rate) (!) 104   Resp Rate 16   O2 Sat (%) 98 %   Airway - Endotracheal Tube 12/25/20 Oral   Placement Date: 12/25/20   Inserted By: EMS  Present on Admission/Arrival: Yes  Location: Oral  Placement Verified: BBS; Auscultation;EtCO2  Airway Types: Endotracheal, cuffed  Airway Tube Size: 7.5 mm  Anesthesia ETT Insertion Depth: 22 cm  Anesthesia. Reuben Salter Reuben Salter    Insertion Depth (cm) 22 cm   Line Pernell Lips   Side Secured Device   Cuff Pressure 28 cmH20   Respiratory   Respiratory (WDL) X   Vent Settings   FIO2 (%) 60 %   SpO2/FIO2 Ratio 163.33   Back-Up Rate 15   Pressure Support (cm H2O) 12 cm H2O   PEEP/VENT (cm H2O) 8 cm H20   Flow Trigger 2   Ventilator Measurements   Resp Rate Observed 16   Vt Exhaled (Machine Breath) (ml) 650 ml   Vt Spont (ml) 0 ml   Ve Observed (l/min) 12 l/min   PIP Observed (cm H2O) 20 cm H2O   MAP (cm H2O) 12   Auto PEEP Observed (cm H2O) 0 cm H2O   Safety & Alarms   Backup Mode Checked/Apnea Yes   Pressure Max 50 cm H2O   Pressure Min 2 cm H2O   Ve Min 2   Ve Max 22   RR Min 5   RR Max 50   Ambu Bag Yes   Ambu Mask Yes   Weaning Parameters   Spontaneous Breathing Trial Complete Yes   Vent Method/Mode   Ventilation Method Conventional   Ventilator Mode Pressure support

## 2020-12-25 NOTE — PROGRESS NOTES
Guideline     Guideline Number: -[id number]     Title: Management of the Patient with Mechanical Ventilation (including weaning) and ABCDE Bundle    Effective Date:  03/00    Revised Date: 02/09, 03/10, 7/12, 5/13,                                  10/13, 8/14  Reviewed Date: 07/2015, 04/2016, 06/2017       I. Policy:  Management of the patient requiring mechanical ventilation, including readiness to wean and weaning protocol. The information provided serves as a guideline for patient management. Included in this guidelines is the ABCDE Bundle to provide guidance to staff for evidence based management of pain, agitation/anxiety and delirium in the intensive care unit. The goals of critical care analgesia and sedation are to facilitate mechanical ventilation, to prevent patient and caregiver injury, and to avoid the psychological and physiologic consequences of inadequate treatment of pain, anxiety, agitation, and delirium by maintaining a light level of sedation. Pain occurs commonly in adult ICU patients, regardless of admitting diagnosis. Therefore, pain should be frequently assessed and analgesic medications titrated to prevent adverse effects associated with either inadequate or excessive analgesia. Once pain has been addressed, anxiolytic and/or antipsychotic medications can be utilized to treat unresolved agitation/anxiety and delirium, with the goal to prevent over- or under sedation by using the Petty Agitation Sedation Scale (RASS). Assertive management of these issues has been shown to reduce costs, improve ICU outcomes such as successful extubation and ICU length of stay, and allow for patients to participate in their own care. II. Purpose: The respiratory care practitioner and the critical care RN will utilize the following guideline to provide the most efficient and effective management of mechanical ventilators and weaning and extubation processes.     Goals of Treatment:  1. Adequate management of patients pain and discomfort while maintaining a light level of                   sedation (RASS score of 0 to -1)  2. Both chronic and acute sources of pain should be identified and treated. 3. Sedative agents should be considered if patient still expresses discomfort and/or is not at RASS         goal of 0 to -1 despite adequate management of pain. 4. Patients requiring neuromuscular blockage must have continuous infusions of analgesic and              sedative agents. III. Responsibility: Director Respiratory Care Services and all Respiratory Care Practitioners  with documented competency as well as Critical Care RN staff. General Guidelines    1. Introduction to Ventilator Plan Phase  a. Ventilator Management Phase, General Statement  -  The plan should be initiated in patients who have a secure airway/require invasive mechanical ventilation (endotracheal or tracheostomy) only  -   The provider determines the appropriate medications used for analgesia and agitation/anxiety along with the clinical pharmacist    2. Monitoring Levels of Comfort  a. Pain Assessment  - Pain is monitored using the numerical scales  - A pain assessment should be conducted, at a minimum, every 4 hours and as needed and per guidelines. - The level of pain should be determined as satisfactory by the patient based on patients baseline level of pain , considering any chronic pain that the patient may have. - If the patient is unable to communicate pain level, the nurse can assess for nonverbal indicators including facial grimacing, moaning, tachypnea, tachycardia, hypertension, diaphoresis, etc as a cue to begin further pain assessment.             b.  Sedation Assessment  - Sedation is monitored using the Petty Agitation Sedation Scale (RASS)  Target RASS RASS Description   +4 Combative, violent, danger to staff     +3 Pulls or removes tubes(s) or catheters; aggressive   +2 Frequent nonpurposeful movement, fights ventilator   +1 Anxious, apprehensive, but not aggressive   0 Alert and calm   -1 Awakens to voice (eye opening/contact) > 10 sec   -2 Light sedation, briefly awakens to voice (eye opening/contact) < 10 sec   -3 Moderate sedation, movement or eye opening. No eye contact   -4 Deep sedation, no response to voice, but movement or eye opening to physical stimulation   -5 Unarousable, no response to voice or physical stimulation     -  Goal RASS is 0 to -1, unless otherwise specified by providers order.  - Nursing staff should conduct the RASS every 4 hours and as needed to maintain goal RASS of 0 to -1.  - If RASS is outside of goal range, discuss treatment options with provider.  - A RASS score of +2 to +4 requires further assessment by the nurse. Causes of agitation/anxiety that should be considered include:  a. Pulmonary -   endotracheal tube malposition or patency, mode of ventilation, pneumothorax, hypoxemia, hypercarbia  b. Metabolic  hypoglycemia, hyponatremia, acute renal or hepatic failure  c. Emotional upset  with information and awareness of critical condition, prognosis, need for surgical or invasive procedures, other interventions or complications, family or personal stressors  - C. Sedation Assessment while using Neuromusclar Blocking Agents  - D. Delirium Assessment  a. the ICU ([facility]  ICU)   3. Analgesia  The incidence of significant pain has been reported to be 50% or higher in both medical and surgical ICU patients. These patients also experience discomfort during routine/procedural ICU care and at rest.  However, patients may be unable to self-report their pain (either verbally or with other signs) because of an altered level of consciousness, the use of mechanical ventilation, or high doses of sedative agents or neuromuscular blocking agents.   The short and long term consequences of unrelieved or inadequately treated pain are significant and include patient discomfort, decreased satisfaction with care by family and patient, delirium, agitation/anxiety, post traumatic stress disorder and depression. Therefore, routine assessment and treatment of pain should occur in all ICU patients. Causes and Treatment of Pain in the ICU  a. Acute pain (post-operative, procedural pain, discomfort with usual ICU care or other acute episodes of pain-related to underlying disease)  1. Consider use of PCA for alert and oriented patients with pain needs not met by PRN dosing or opoids. 2. Preemptive analgesia should occur prior to chest tube removal, and should be considered for other procedural pain such as turning and repositioning, would drain removal, wound dressing change, tracheal suctioning, femoral catheter removal or place of central venous catheter. 3. Appropriate analgesic medications for preemptive analgesia are short acting intravenous (IV) agents (i.e. fentanyl, morphine, hydromorphone)  a. Administration of analgesia before patient experiences noxious stimuli prevents amplification and hyperexcitability of the central nervous system. b. Analgesia for Mechanically Ventilated Patients:  1. The approach to sedation and analgesia management for mechanically ventilated patients favors use of analgesia first sedation. The primary goal of this strategy is to address pain and discomfort first, and then if necessary, add anxiolytic agent. 2. Analgesia first sedation reduces dose escalation of medications, decreases the duration of mechanical ventilation and the incidence of VAP, improves the probability of successful extubation, and ultimately shortens ICU length of stay. 3. For pain management, analgesic medications are determined by the provider. Intermittent dosing of the analgesic should be attempted first.    If the patient requires more than 3 doses within 1 hour then provider should be contacted to consider continuous infusion.   4.  Analgesic options for mechanically ventilated patients include:  a. Fentanyl which is considered the drug of choice for patients requiring continuous infusion. b.Morphine may be considered for those patients without renal dysfunction who are hemodynamically stable and require intermittent pain medication. Continuous infusions of morphine may be used for patientl who are receiving comfort care as part of end of life care. c.Hydromorphone is reserved for patients who are refractory to fentanyl or morphine and is typically admininstered by intermittent dosing. 4.  Agitation/Anxiety    Background  Agitation and anxiety frequently occur in ICU patients. Anxiolytic/sedation agents may be indicated to help relieve discomfort, improve synchrony with mechanical ventilation, and decrease the overall work of breathing. Pain control alone may be sufficient to make patients comfortable enough to require no anxiolytic/sedative agent. In addition, non-pharmacologic interventions such as repositioning or verbal assurance may be helpful to comfort or redirect an agitated patient. If these methods are unsuccessful, then anxiolytic/sedative medications such as propofol, dexmedetomidine, or benzodiazepines can be used. Selection of an anxiolytic should be based on the pharmacokinetic properties of the medication, patient specific characteristics, and sedation goal.   However, nonbenzodiazepine sedatives (ie propofol or dexmedetomidine) may be preferable over benzodiazepines (ie midazolam or larazepam) due to more favorable outcomes such as delirum. Causes and Treatment of Agitation/Anxiety  a. Possible underlying causes of agitation and anxiety include pain, delirium, hypoxemia, hypoglycemia, hypotension, or withdrawal from alcohol  and other drugs. b. Analgesia first sedation should be attempted initially to manage pain and provide sedation in appropriate patients. Analgesia alone may be adequate to reach RASS goal of 0 to -1.   If patient remains agitated or anxious despite adequate analgesia (ie RASS +2 to +4) then anxiolytic/sedative should be considered. c. The choice of anxiolytic should be based on desired levels of sedation (ie light sedation or deep sedation) with preference for the use of nonbenzodiazepines such as propofol or dexmedetomidine if appropriate. While light sedation (ie RASS 0 to -1) is preferred for most patients, there are instances when deep sedation (ie RASS -4 to -5) is desired. For example, in the setting of ventilator dysynchrony due to ARDS or for patients receiving NMB agents. d. Medications to maintain light sedation (ie RASS 0 to -1) include  1. Propofol continuous infusion can be considered for hemodynamically stable (ie SBP = 100, MAP = 65 and/or not requiring vasopressor support) patients requiring light sedation. Propofol has a quick onset (1-2 minutes) and offset of action, making it a good agent to assess neurological status and facilitate liberation from the mechanical ventilator. 2.Dexmedetomidine continuous infusion is a good option for hemodynamically stable patients requiring light sedation as it allows for a more awake, interactive patient is associated with less delirium. It has an intermediate onset of action (5-10 min). Therefore, abrupt titrations should be avoided, but use of prn haloperidol or benzodiazepine may be useful to manage agitation until the medication takes effect. 3. Antipsychotics are another option. In particular, haloperidol intermittently dosed may be useful for patients with symptoms of agitation/anxiety and delirium. 4.Benzodiazapines can also be considered for light sedation, but should be intermittently doses. Midazolam is an option for patients without renal dysfunction. It has a short onset of action (2-5 minutes) making it a good agent for acute agitation/anxiety, but short duration of action resulting in frequent dosing. Lorazepam is another option.   It has a longer onset of action (15-20 minutes) in comparison to midazolam, but longer duration of action. e. Medications to maintain deep sedation (RASS -4 to -5) include:  1) Propofol continuous infusion should be considered as a first line option for hemodynamically stable patients. 2)Benzodiazepines can be considered as second line options for deep sedation. Studies comparing these agents to other sedatives have shown that they lead to worse outcomes including delirium, oversedation, delayed extubation, and longer time to discharge. Midazolam is one option for patients without renal dysfunction and lorazepam is another options. If patient requires more than 3 doses within 1 hour then contact provider  to consider initiation of continuous infusion. 5.  Daily Sedation Awakening Trial (SAT) from IV Continuous Analgesia/Sedation  a. Patients are to have daily awakening from sedation while on continuous IV analgesia and/or sedation in the ICU. Continuous analgesia infusions may be maintained only if needed for active pain and RASS is at goal 0 - -1. Unit guideline is for the SAT to occur following ICP rounds each morning.    b. The sedation awakening trial (SAT) is done regardless if the patient meets criteria for spontaneous breathing trial (SBT). c. SAT safety screen is assessed and SAT should not be performed if sedation is being used for active seizures, alcohol withdrawal, hemodynamically unstable or requiring support of vasoactive medications , in conjunction with NMB agents, if ICP is greater than  20mmHg or if sedation is being used to control ICP, patients RASS is +3 or +4 (very agitated or combative). Other exclusion criteria are:  if there is documentation of myocardial ischemia in the past 24 hours; or patient is receiving high frequency oscillator ventilation (HFOV) , if the patient has an open chest /abdomen or is receiving comfort care.   d. Criteria for passing the SAT are the patient opened their eyes to verbal stimuli or tolerated sedative interruption without exhibiting failure criteria.  e. Patients fail the SAT if the develop sustained anxiety, agitation, or pain; a respiratory rate of 35 per minutes for 5 minutes or longer, an SpO2 less than 88% for 5 minutes or longer; an acute cardiac dysrhythmia; two or more signs of respiratory distress including tachycardia, bradycardia; use of accessory muscles; diaphoresis; marked dyspnea; or myocardial ischemia. f. Respiratory therapy staff must verify with the nurse that continuous IV analgesia (unless being use  for active pain) and sedation (unless patient is receiving dexmedetomidine) is off prior to placing patient on SBT. g. DO NOT interrupt infusion of analgesia and sedation medications if patient is receiving neuromuscular blockade.  h. Monitor level of wakefulness unless patient is awake and follows commands (RASS 0 to -1) or patient becomes uncomfortable or agitated (RASS +3 to +4)  i. If agitation prevents successful awakening , administer bolus of analgesia and/or sedation then resume infusion of the medication at ½ previous dose and titrate as needed.  j. If oversedation prevents successful awakening, hole infusion until at goal and resume ½ of prior infusion rate/dose if clinically indicated. 6. Delirium  Background  Delirium is characterized by the acute onset of cerebral dysfunction with a change or fluctuation baseline  mental status, inattention, and either disorganized thinking or an altered level of consciousness. It affects up to 80% of mechanically ventilated adult ICU patients, and is associated with increased mortality,   and treatment is important and may in turn allow for a patient to be conscious yet cooperative enough to participate   in ventilator weaning trials and early mobilization efforts.     Delirium can only be assessed in patients who are able to sufficiently interact and communicate with bedside  clinicians (ie RASS -3 to +4). IV. Procedure:  A. Assessment: The following criteria must be assessed prior to the initiation of weaning from mechanical ventilation. Note: The criteria are general guidelines and must be individualized for each patient. The patients primary nurse will be responsible, in coordination with the RT, the Spontaneous Awakening Trial). The RT will perform the SBT. B. Spontaneous Awakening Trials (SATs  also referred to as Sedation Vacation) and Spontaneous Breathing Trials (SBTs) performed to determine readiness to wean. 1. For patients who meet established criteria, such as those without active seizures, alcohol withdrawal and agitation, myocardial ischemia or those requiring cardiac support devices, without increased intracranial pressure and those not receiving neuromuscular blockade, the nurse will reduce the infusions of sedative by 50% of current used for sedation that was used to achieve a level of light sedation (Chavarria Score 2 or RASS score of 0 to -1) and evaluate patient response to reduction of sedation. Analgesics required for pain control are continued during the test.  Obtain MD order to cover no SAT for that time period if patient has any exclusion criteria as described above. 2. Failure of the spontaneous awakening trial occurs when the patient shows symptoms such as increased agitation, anxiety, pain or signs of respiratory distress including respiratory rate >35/min or oxygen saturation <88% as well as development of acute cardiac arrhythmias. If these symptoms develop during the SAT, the nurse then restarts sedation at 75% of the previous dose and titrates the medications until the patient is comfortable and/or symptoms have abated. 3.  If the patient passes the SAT then the patient moves on to the Spontaneous Breathing Trials as performed by the RT.    The SBT Safety Screen included the following:  No agitation, oxygen saturation > 88%, FIO2 < 50%, PEEP < 7.5 cm H20, no myocardial ischemia, no vasopressor use, and with inspiratory efforts. 4. Patients who pass the spontaneous awakening trial but fail the spontaneous breathing trial are placed back on full ventilator support and reassessed the next day. 5. Failure of the SBT (spontaneous breathing trail) includes any of the following:  Respiratory rate > 35/min, respiratory rate < 8/min, oxygen saturation < 88%, respiratory distress, mental status change, acute cardiac arrhythmia. 6. Extubation is considered for patients who tolerate the spontaneous awakening trial and pass the spontaneous breathing trial.    C. Can the cause of respiratory failure be reversed (i.e. absence of high spinal cord injury or advanced ALS)? D. Is gas exchange adequate? 1. PaO2/FIO2 ratio > 150  200,  2. PEEP < 8 cm H20  3. FIO2 < 50  4. pH > 7.30  5. Rapid shallow breathing index (f/VT) < 105  E. Is patient hemodynamically stable? 1. Absence of clinically significant hypotension (minimal vasopressors such as Dopamine < 5mcg/kg/minute)? F. Is there evidence of intact respiratory drive (NIP/NIF >-56 FKB71, stable VC02)? G. Does patient have an adequate cough, airway clearance ability? H. Is there absence of excessive secretions? V. Initiation:     A. The therapist shall consult with RN to determine if sedation can be discontinued or significantly decreased. If this can be achieved, the therapist shall implement the                     followin. Identify patient and verify name and account number via ID [email or web address]. 2. Perform hand hygiene per hospital policy utilizing Standard Precautions for all patients and following transmission-based isolation as indicated per hospital policy. 3. Perform a ONE-MINUTE SPONTANEOUS TRIAL AND ASSESSMENT. 4. Measure Rapid Shallow Breathing Index (RSBI) and monitor SpO2 and cardiovascular parameters during the spontaneous breathing assessment.   5. If SpO2 and cardiovascular parameters are stable, continue spontaneous breathing trial for at least 30 minutes and up to 120 minutes, as patient tolerates. 6. Monitor ventilatory status, SpO2, and cardiovascular status during spontaneous breathing trial.  7. If patient has a successful trial, consider patient as a candidate for extubation and obtain order. 8. If patient fails the weaning trial, place back on ventilator and adjust settings to provide a non-fatiguing form of ventilatory support for the remainder of the day and night. 9. One attempt at weaning shall be performed each day until successful weaning occurs. The RCP will make every attempt to begin the spontaneous breathing trials between 0500 and 0600 to provide documentation of the trial when the pulmonologist makes rounds. B.  Assessment of SBT or PST:  1. Is gas exchange acceptable? 2. PaO2 > 60 mm Hg. 3. PH > 7.30  4. Increase in PaCO2  < 10 mm Hg  C. Is patient hemodynamically stable? 1. HR < 120 beats/minute  2. HR  < 20%   3. Systolic BP < 291 and > 90 mmHg  4. BP  < 20%, no vasopressors required  D. Does patient have stable ventilatory pattern? 1. Sustained RR < 30 breaths per minute  2. Normal and stable VCO2  3. Patient is not demonstrating any signs of increased work of breathing, such as increased use of accessory muscles. E. Mental status stable throughout trial?  1. Absence of changes such as somnolence, excessive agitation or anxiety  2. Absence of diaphoresis during trial?  IV. Safety:    A. The RCP shall monitor patient according to the above guidelines. If at any time during the weaning process, the respiratory therapist or nurse feels that the patient is not tolerating weaning, the therapist shall place patient back on previous ventilator settings. B. The patient shall be reassessed and the weaning process should be continued the following day. V. Reportable Conditions:    A.  The therapist shall notify the physician, as appropriate, for any of the following conditions:  1. FIO2 increase (sustained) at 10% or greater  2. Poor patient/ventilator interface in spite of adjustments  3. Need for increased sedation for respiratory distress  4. Need for increasing ventilating pressures (i.e. PEEP, PIP, MAP)  5. ABG results meeting panic value criteria or other clinical signs indicating deterioration of patients condition. 6. Unplanned extubation. 7. Unexplained sustained increase in PIP greater than 10 cm H2O.  8. Assessment results regarding ventilator discontinuance process. VI. Ventilator protocol management   A. The following items should be maintained for patients who are being mechanically           ventilated. 1. Obtain STAT Chest X-Ray for ET tube placement after insertion. 2. Chest X-Ray q a.m. while on ventilator. 3. ABGs 30 - 60 minutes after being stable on the ventilator. 4. ABG's q a.m. while on ventilator and prn.  5. Do spontaneous breathing trials when patient is hemodynamically stable, responsive, and without fever. 6. Terminate trials if patient exhibits signs of respiratory distress. 7. Therapists should maintain ABG s as follows:      a. pH -  7.30 - 7.50                   b. PaO2 -   60  100        8. Racemic Epinephrine (0.5cc) for post extubation stridor (2 UA treatments max.)    VII.   Early Mobilization    Mobility Level Criteria Start at Level 1 if:   PaO2/FIO2 <250   Positive end-expiratory Pressure (PEEP) >=10 cm H2O   O2 saturation <90%   Respiratory Rate (RR) Not within 10-30 per min   Cardiac arrhythmias or ischemia New onset   Heart Rate  (HR) <60 or >120 beats per min   Mean arterial pressure (MAP) <55 or >988 mmHg   Systolic blood pressure (SBP) <90 or > 180 mmHg   Vasopressor infusion New or increasing   Petty Agitation Scale (RASS) < - 3       Level I:   Breathe  (Rass -5 to -3)  HOB Angle  improve VAP protocol compliance   Visually confirm the [facility] is elevated >= 30 degrees to comply with VAP prevention protocols   The Centers for Disease Control and Prevention recommends an HOB angle of 30-45 degrees , unless contraindicated  Additional activities to be implemented   Every 2 hour turning   Passive range of motion   Up to 20 degrees reverse trendelenburg with lower extremity exercise/retracting footboard   Continuous lateral rotation therapy can be considered part of early mobility therapy in patients who are at high risk for pulmonary complications  Move to Level 2 when the patient  - Has acceptable oxygenation/hemodynamics  - Tolerates q 2 turning  - Tolerates HOB > 30 degrees or up to 20 degrees reverse trendelenburg    Level 2 :Tilt  Patient Assessment Rass > -3  (eg, opens eyes, may have profound weakness)  Up to 20 degrees Reverse Trendelenburg position and 10 degrees minimum HOB  - Reverse Trendelenburg positioning allows for orthostatic position in fragile patients  - If available , use in conjunction with retracting foot section to allow for partial weight bearing prior to sitting up in the bed or getting out of bed  Additional activities to be implemented  -  Maintain HOB >/= 30 degrees  - Q 2 hour turning  - Passive/active range of motion  - Legs dependent  - PT consultation       Move to Level 3 when the patient . .    -Tolerates active- assistance exercises 2 times per day    -Tolerates lower extremity exercises against footboard/Up to 20 degrees Reverse Trendelenburg    -Tolerates legs dependent /HOB 45 degrees  Level 3 :  SIT  (Rass >- 1 (eg , weak but may move arms/legs independently)   Full chair position (footboard on)   Full upright positioning allows for diaphragmatic excursion and lung expansion   Sitting with legs in a dependent position facilitates gas exchange  Additional activities to be implemented  - Maintain HOB >= 30 degrees  - Q 2 hour turning (assisted)  - Active range of motion  PT/OT actively involved  - Encourage activities of daily living  - Dangling, if patient can move arm against gravity  Move to Level 4 when the patient . .  - Tolerates increasing active exercise in bed  - Actively assists with every- 2- hour turning or turns independently  - Tolerates full chair position 3 times/day  Level 4:  Stand ( RASS >0 (eg, weak but may tolerate increased activity)      Stand Attempts   Full chair position (footboard off/feet on the floor)   Consider using a sit-to-stand lift   Pivot to chair, it tolerates partial weight bearing  Additional activities to be implemented  - Maintain head of bed >= 30 degrees  - Q 2 hr turning (self/assisted)  - Active range of motion  - Encourage activities of daily living  - PT/OT actively involved      Move to Level 5 when the patient .  - Can successfully comply with all activities  - Tolerates trial periods of full chair position (footboard off/feet on floor) 3 times per day  - Tolerates partial weight-bearing stand and pivots to chair    Level 5 :  Move  (RASS > 0    (eg, weak but may tolerate increased activity)   Achieve out of bed   Utilize mobile floor life to ambulate to bedside chair  Additional activities to be implemented  - Maintain HOB > = 30 degrees  - Q 2 hour turning (self/assisted)  - Active range of motion  - Patient stands/bears weight > 1 minute  - Patient marches in place  - PT/OT actively involved    Patient continues to ambulate progressively longer distances as tolerated until they consistently participate and move independently.         E Approved by [address] 2-19-09   N [facility] Clinical Guidelines             OTONIEL HILARIO [facility] Flowsheet Content Variables to select when addressing section Comments   OTONIEL Initiated  Yes/No  RN to address minimum q 24 hours (day shift)   Target RASS  0 = alert and oriented   -1 = drowsy   -2 = light sedation   -3= moderate sedation   -4= deep sedation Target on standard ventilator setting should be -2; -4 with oscillator   CAM -ICU  Positive   Negative   Unable to assess Delirium assessment   SAT Safety Screen Passed  Yes   No Select yes if proceeding on to the sedation vacation (reduction of continuous sedative drip by directed by MD)  Select no if your patient has any of the below reasons for not proceeding on to the daily awakening sedation vacation trial   SAT Screen for Failure  Active seizures   Acute delirium tremors   Agitation that threatens accidental line/tube removal   On paralytics   MI (24-48hr)   Abnormal ICP   Open abdomen Select one of the options when the patient will not undergo the sedation vacation  ALSO MUST OBTAIN AN ORDER FOR no Lurdes Sensing written under nursing miscellaneous for now by either the NP or Intensivist   Daily sedation Vacation/assessment of   Yes   No   Not applicable If yes, MUST see the reduction in sedation on the [facility] and please place in the comment section of the sedative sedation vacation started   SBT Safety Screen Passed  Yes   No Select Yes if the patient has none of the below listed reasons for not proceeding on to the SBT following reduction of sedation   SBT Screen Reason for Failure  Agitation   O2 Sat < or = 88%   FIO2 > 50%   PEEP >7   MI   Vasopressor Use   Bilevel setting on Vent   Oscillator in use   Increased resp effort Select reason as appropriate for NOT proceeding on to the SBT                                               Wake Up and Breathe Protocol

## 2020-12-25 NOTE — ED PROVIDER NOTES
[address] [facility] EMERGENCY DEPT   Arrival Date/Time: 12/25/2020 @  5:40 AM      Marcelo Uriostegiu  MRN: [id number]      68 y.o. male    YOB: 1940   Telephone Information:   Mobile [phone] (home)     Seen in: ER07/07  Seen on 12/25/2020 @ 5:41 AM       Today's Chief Complaint:   Chief Complaint   Patient presents with   Dayne Umanzor     HPI (3): 44-year-old male found on floor by family. Unknown downtime. Last seen about 11 PM    He was intubated by EMS. Pupils are sluggish. HPI    Review of Systems (10+): Review of Systems   Unable to perform ROS: Intubated       Past Medical History: Primary Care Doctor: Scotty Watson DO  [X] Meds, Medical Hx, Surgical Hx, Family Hx are reviewed & located at the end of this note. SOCIAL History:   Tobacco:  reports that he quit smoking about 51 years ago. His smoking use included cigarettes. He has never used smokeless tobacco.    Alcohol:  reports current alcohol use. Drugs:  reports no history of drug use. Allergies: No Known Allergies      Key Anti-Platelet Anticoagulant Meds     The patient is on no antiplatelet meds or anticoagulants. Physical Exam (8+):  [X] Nursing documentation reviewed. Patient Vitals for the past 24 hrs:   Temp Pulse Resp BP SpO2   12/25/20 0659 96.8 °F (36 °C)       12/25/20 0624  (!) 113 20  96 %   12/25/20 0605  (!) 115 30 (!) 145/74 95 %      Estimated body mass index is 31.63 kg/m² as calculated from the following:    Height as of 9/28/20: 5' 10\" (1.778 m). Weight as of this encounter: 100 kg (220 lb 7.4 oz). Vital signs were reviewed. Physical Exam  Vitals signs and nursing note reviewed. Constitutional:       General: He is in acute distress. HENT:      Head: Normocephalic and atraumatic. Eyes:      General: No scleral icterus. Cardiovascular:      Rate and Rhythm: Regular rhythm. Tachycardia present. Pulses: Normal pulses. Heart sounds: Normal heart sounds. Pulmonary:      Comments: Intubated. Breath sounds present bilaterally  Neurological:      Comments: Patient has spontaneous movements of all four extremities. Unable to be further assessed further secondary to being intubated. [facility]  MEDICAL DECISION MAKING: (Including Differential Dx, and Plan)   61-year-old male found unresponsive on the floor at home. Intubated in route. Differential Diagnosis: Stroke, intracranial hemorrhage, electrolyte abnormality, hypoglycemia, arrhythmia   Plan: Code stroke was called. CT of the head obtained. Check labs. Speak with family. This is a new problem that does need additional workup   I requested old records. Labs/Radiograph/ECG were ordered: yesECG/CT/US/XRay/MRI visualized by me: yes   Obtained and Reviewed Old Records or History from someone other than the patient: yes     The patient's problem is:  high    Diagnostic Options are:  low risk    Management Options are:  high risk     Data/Management:  (yunier, . clemencia)   Lab findings during this visit:   Recent Results (from the past 48 hour(s))   POC G3    Collection Time: 12/25/20  6:16 AM   Result Value Ref Range    Device: VENT      FIO2 (POC) 100 %    pH (POC) 7.17 (LL) 7.35 - 7.45      pCO2 (POC) 43.2 35 - 45 MMHG    pO2 (POC) 128 (H) 75 - 100 MMHG    HCO3 (POC) 15.7 (L) 22 - 26 MMOL/L    sO2 (POC) 98 95 - 98 %    Base deficit (POC) 12 mmol/L    Mode VENTILATOR/SPONTANEOUS/PRESSURE SUPPORT      PEEP/CPAP (POC) 8 cmH2O    Pressure support 12 cmH2O    Allens test (POC) YES      Total resp.  rate 28      Site RIGHT RADIAL      Specimen type (POC) ARTERIAL      Performed by Trinity     CO2, POC 17 MMOL/L    Respiratory comment: NurseNotified     Exhaled minute volume 25.00 L/min    COLLECT TIME 614     GLUCOSE, POC    Collection Time: 12/25/20  6:18 AM   Result Value Ref Range    Glucose (POC) 258 (H) 65 - 100 mg/dL   POC PT/INR    Collection Time: 12/25/20  6:27 AM   Result Value Ref Range    Prothrombin time (POC) 22.5 (H) 9.6 - 11.6 SECS    INR (POC) 1.9 (H) 0.9 - 1.2       Radiology studies during this visit: Xr Chest Port    Result Date: 12/25/2020  IMPRESSION: Bibasilar atelectasis or pneumonia. Ct Code Neuro Head Wo Contrast    Result Date: 12/25/2020  IMPRESSION: Right MCA hemorrhagic infarct versus mass. Recommend MRI of the brain with and without contrast. Date of Dictation: 12/25/2020 6:00 AM     Medications given in the ED:   Medications   propofol (DIPRIVAN) 10 mg/mL infusion (30 mcg/kg/min × 100 kg IntraVENous Rate Change 12/25/20 0641)   niCARdipine in Saline (CARDENE) 25 MG/250 mL infusion kit (has no administration in time range)   0.9% sodium chloride infusion 250 mL (has no administration in time range)   midazolam (VERSED) injection 5 mg (5 mg IntraVENous Given 12/25/20 0544)   ondansetron (ZOFRAN) injection 4 mg (4 mg IntraVENous Given 12/25/20 0544)   iopamidoL (ISOVUE-370) 76 % injection 100 mL (100 mL IntraVENous Given 12/25/20 0553)   sodium chloride 0.9 % bolus infusion 100 mL (0 mL IntraVENous IV Completed 12/25/20 0629)   saline peripheral flush soln 10 mL (10 mL InterCATHeter Given 12/25/20 0553)   dexamethasone (DECADRON) 10 mg/mL injection 10 mg (10 mg IntraVENous Given 12/25/20 0645)        Procedure Documentation:    (.addlac  .addabscess   . addreduction   . addintubation    . addprocdoc)      Procedures    Recheck and Additional Documentation:  (use .addrecheck  . addsepsis   . addstroke   . addhip  .addcctime . emergcnt )     I went to CT with the patient. Images visualized. Patient noted to have hemorrhage in the right MCA territory. Questionable underlying mass. A CTA was ordered to evaluate for the presence of an aneurysm. Neuro interventional team was contacted via perfect serve. Consult to neurosurgery.   Since patient has hemorrhage with some underlying vasogenic edema possible mass we will go ahead and give a dose of Decadron. No Antiepileptics    D/w patient's wife --she saw him while at about 11 PM.  About 430 she woke up and heard a noise in the bathroom and went in there and found him laid on the floor stiff shaking. She called 911. CRITICAL CARE Documentation: This patient is critically ill and there is a high probability of of imminent or life threatening deterioration in the patient's condition without immediate management. The nature of the patient's clinical problem is: ams, hemorrhagic stroke    I have spent 50 minutes in direct patient care, documentation, review of labs/xrays/old records, discussion with Family, Staff, Colleague . The time involved in the performance of separately reportable procedures was not counted toward critical care time. Marlon Castellon MD; 12/25/2020 @6:35 AM     Other ED Course Notes:     ED Course as of Dec 25 0701   Fri Dec 25, 2020   3847 Reviewed teleneurology consult    [GH]   0700 ECg reviewed by me in the absence of a cardiologist -- [address] [facility] [address] @ [address], no ST/T wave elevation    [GH]      ED Course User Index  [GH] Fiona Richter MD     0645-discussed with Dr. Ana Lucio from neurosurgery. He recommends MRI of the brain. Will likely also need CT of the chest abdomen and pelvis to identify source of underlying mass. Patient is not on any anticoagulant or antiplatelet medications. Uncertain as to the etiology of his mild coagulopathy with an INR of 1.9. will start FFP    I wore appropriate PPE throughout this patient's ED encounter. Impression and Disposition: (.jasson riojas   . addhandoff  )     Disposition:   Admitted to intensivist service @ 6:12 AM in critical condition     Impression:     ICD-10-CM ICD-9-CM   1. Hemorrhagic stroke ([facility] [address]  I61.9 431   2.  Unresponsive state  R41.89 780.09            Past Medical History:      Past Medical History:   Diagnosis Date    Anxiety disorder 12/9/2014    Arthritis 12/9/2014    Depression 12/9/2014  Gout 2014    History of lumbar laminectomy for spinal cord decompression 2019    HTN (hypertension), benign 2/15/2017    Hyperlipemia 2014    Hypertension     Idiopathic chronic gout of right ankle 2014    Insomnia 2014    Kidney stone     Mixed hyperlipidemia 2014    Panic disorder 2014    Primary insomnia 2014    Rosacea 2/15/2017     Past Surgical History:   Procedure Laterality Date    HX ANKLE FRACTURE TX Left 1974    HX COLONOSCOPY      HX CYST REMOVAL      HX CYST REMOVAL      pylonidal cyst    HX CYST REMOVAL      HX HERNIA REPAIR Bilateral     HX OTHER SURGICAL      wrist    HX WRIST FRACTURE TX Bilateral 1993    wrist plate/pin     Family History   Problem Relation Age of Onset    Cancer Mother         lung ca    Hypertension Mother     Heart Disease Mother     Arthritis-osteo Father     Cancer Brother         stomach      Social History     Tobacco Use    Smoking status: Former Smoker     Types: Cigarettes     Quit date: 1970     Years since quittin.0    Smokeless tobacco: Never Used   Substance Use Topics    Alcohol use: Yes     Alcohol/week: 0.0 standard drinks     Comment: occ    Drug use: No     Prior to Admission Medications   Prescriptions Last Dose Informant Patient Reported? Taking?   allopurinoL (ZYLOPRIM) 100 mg tablet   No No   Sig: Take 2 Tabs by mouth daily. amLODIPine-Olmesartan (Cuco) 10-40 mg tab   No No   Sig: Take 1 Tab by mouth daily. atorvastatin (LIPITOR) 40 mg tablet   No No   Sig: TAKE 1 TABLET BY MOUTH EVERY DAY   cholecalciferol, vitamin D3, (VITAMIN D3) 2,000 unit tab   Yes No   Sig: Take  by mouth. diclofenac (VOLTAREN) 1 % gel   No No   Sig: Apply 4 g to affected area four (4) times daily. diclofenac sodium (PENNSAID) 1.5 % drop   Yes No   Sig: by Apply Externally route. furosemide (LASIX) 20 mg tablet   No No   Sig: Take 1 Tab by mouth daily.    metroNIDAZOLE (METROGEL) 1 % topical gel   No No   Sig: Apply  to affected area daily. Use a thin layer to affected areas after washing   multivitamin (ONE A DAY) tablet   Yes No   Sig: Take 1 Tab by mouth daily. potassium chloride SR (Klor-Con 10) 10 mEq tablet   No No   Sig: Take 1 Tab by mouth daily. sertraline (ZOLOFT) 100 mg tablet   No No   Sig: Take 1.5 Tabs by mouth daily.  One and half tab every day   zolpidem (AMBIEN) 10 mg tablet   No No   Sig: TAKE 1 TABLET BY MOUTH AT BEDTIME AS NEEDED FOR SLEEP  Indications: difficulty falling asleep      Facility-Administered Medications: None

## 2020-12-25 NOTE — ED NOTES
TRANSFER - OUT REPORT:    Verbal report given to Marco A Jurado (name) on Yolande Richards  being transferred to CVICU(unit) for routine progression of care       Report consisted of patients Situation, Background, Assessment and   Recommendations(SBAR). Information from the following report(s) SBAR, ED Summary, MAR, Recent Results, Quality Measures and Dual Neuro Assessment was reviewed with the receiving nurse. Lines:   Peripheral IV 12/25/20 Right Hand (Active)   Site Assessment Clean, dry, & intact 12/25/20 0630   Phlebitis Assessment 0 12/25/20 0630   Infiltration Assessment 0 12/25/20 0630   Dressing Status Clean, dry, & intact 12/25/20 0630   Hub Color/Line Status Blue 12/25/20 0630       Peripheral IV 12/25/20 Right Wrist (Active)   Site Assessment Clean, dry, & intact 12/25/20 0742   Phlebitis Assessment 0 12/25/20 0742   Infiltration Assessment 0 12/25/20 0742   Dressing Status Clean, dry, & intact 12/25/20 0742       Peripheral IV 12/25/20 Right Antecubital (Active)        Opportunity for questions and clarification was provided.       Patient transported with:   Monitor  Registered Nurse   RT

## 2020-12-25 NOTE — PROGRESS NOTES
Admission skin assessment completed with second RN and reveals the following: skin is warm and dry; sacrum is pink and blanchable. Allevyn placed on sacrum for prevention.

## 2020-12-26 ENCOUNTER — APPOINTMENT (OUTPATIENT)
Dept: CT IMAGING | Age: 80
DRG: 025 | End: 2020-12-26
Attending: INTERNAL MEDICINE
Payer: MEDICARE

## 2020-12-26 PROBLEM — N17.9 ACUTE RENAL FAILURE (ARF) (HCC): Status: ACTIVE | Noted: 2020-12-26

## 2020-12-26 PROBLEM — I10 ESSENTIAL HYPERTENSION: Status: ACTIVE | Noted: 2020-12-26

## 2020-12-26 PROBLEM — R90.0 INTRACRANIAL MASS: Status: ACTIVE | Noted: 2020-12-26

## 2020-12-26 PROBLEM — G93.40 ENCEPHALOPATHY: Status: ACTIVE | Noted: 2020-12-26

## 2020-12-26 LAB
ANION GAP SERPL CALC-SCNC: 6 MMOL/L (ref 7–16)
ARTERIAL PATENCY WRIST A: YES
BASE EXCESS BLD CALC-SCNC: 0 MMOL/L
BDY SITE: NORMAL
BLD PROD TYP BPU: NORMAL
BPU ID: NORMAL
BUN SERPL-MCNC: 20 MG/DL (ref 8–23)
CALCIUM SERPL-MCNC: 9.3 MG/DL (ref 8.3–10.4)
CHLORIDE SERPL-SCNC: 106 MMOL/L (ref 98–107)
CO2 BLD-SCNC: 27 MMOL/L
CO2 SERPL-SCNC: 27 MMOL/L (ref 21–32)
COLLECT TIME,HTIME: 330
CREAT SERPL-MCNC: 1.18 MG/DL (ref 0.8–1.5)
ERYTHROCYTE [DISTWIDTH] IN BLOOD BY AUTOMATED COUNT: 12.9 % (ref 11.9–14.6)
EXHALED MINUTE VOLUME, VE: 7 L/MIN
GAS FLOW.O2 O2 DELIVERY SYS: NORMAL L/MIN
GLUCOSE BLD STRIP.AUTO-MCNC: 154 MG/DL (ref 65–100)
GLUCOSE BLD STRIP.AUTO-MCNC: 156 MG/DL (ref 65–100)
GLUCOSE BLD STRIP.AUTO-MCNC: 157 MG/DL (ref 65–100)
GLUCOSE SERPL-MCNC: 161 MG/DL (ref 65–100)
HCO3 BLD-SCNC: 25.2 MMOL/L (ref 22–26)
HCT VFR BLD AUTO: 43.3 % (ref 41.1–50.3)
HGB BLD-MCNC: 14.8 G/DL (ref 13.6–17.2)
MAGNESIUM SERPL-MCNC: 2.5 MG/DL (ref 1.8–2.4)
MCH RBC QN AUTO: 31.8 PG (ref 26.1–32.9)
MCHC RBC AUTO-ENTMCNC: 34.2 G/DL (ref 31.4–35)
MCV RBC AUTO: 93.1 FL (ref 79.6–97.8)
NRBC # BLD: 0 K/UL (ref 0–0.2)
O2/TOTAL GAS SETTING VFR VENT: 45 %
PCO2 BLD: 43.6 MMHG (ref 35–45)
PEEP RESPIRATORY: 8 CMH2O
PH BLD: 7.37 [PH] (ref 7.35–7.45)
PLATELET # BLD AUTO: 148 K/UL (ref 150–450)
PMV BLD AUTO: 10.1 FL (ref 9.4–12.3)
PO2 BLD: 94 MMHG (ref 75–100)
POTASSIUM SERPL-SCNC: 4.6 MMOL/L (ref 3.5–5.1)
PRESSURE SUPPORT SETTING VENT: 12 CMH2O
RBC # BLD AUTO: 4.65 M/UL (ref 4.23–5.6)
SAO2 % BLD: 97 % (ref 95–98)
SERVICE CMNT-IMP: NORMAL
SODIUM SERPL-SCNC: 139 MMOL/L (ref 138–145)
SPECIMEN TYPE: NORMAL
STATUS OF UNIT,%ST: NORMAL
TOTAL RESP. RATE, ITRR: 10
UNIT DIVISION, %UDIV: 0
VENTILATION MODE VENT: NORMAL
WBC # BLD AUTO: 19.4 K/UL (ref 4.3–11.1)

## 2020-12-26 PROCEDURE — 85027 COMPLETE CBC AUTOMATED: CPT

## 2020-12-26 PROCEDURE — 82962 GLUCOSE BLOOD TEST: CPT

## 2020-12-26 PROCEDURE — 80048 BASIC METABOLIC PNL TOTAL CA: CPT

## 2020-12-26 PROCEDURE — 36415 COLL VENOUS BLD VENIPUNCTURE: CPT

## 2020-12-26 PROCEDURE — 70450 CT HEAD/BRAIN W/O DYE: CPT

## 2020-12-26 PROCEDURE — 99232 SBSQ HOSP IP/OBS MODERATE 35: CPT | Performed by: PSYCHIATRY & NEUROLOGY

## 2020-12-26 PROCEDURE — 74011000250 HC RX REV CODE- 250: Performed by: EMERGENCY MEDICINE

## 2020-12-26 PROCEDURE — 74011250637 HC RX REV CODE- 250/637: Performed by: INTERNAL MEDICINE

## 2020-12-26 PROCEDURE — 74011636637 HC RX REV CODE- 636/637: Performed by: INTERNAL MEDICINE

## 2020-12-26 PROCEDURE — 74011250636 HC RX REV CODE- 250/636: Performed by: INTERNAL MEDICINE

## 2020-12-26 PROCEDURE — 99291 CRITICAL CARE FIRST HOUR: CPT | Performed by: INTERNAL MEDICINE

## 2020-12-26 PROCEDURE — 83735 ASSAY OF MAGNESIUM: CPT

## 2020-12-26 PROCEDURE — 82803 BLOOD GASES ANY COMBINATION: CPT

## 2020-12-26 PROCEDURE — 74011250636 HC RX REV CODE- 250/636: Performed by: EMERGENCY MEDICINE

## 2020-12-26 PROCEDURE — 65610000006 HC RM INTENSIVE CARE

## 2020-12-26 PROCEDURE — 2709999900 HC NON-CHARGEABLE SUPPLY

## 2020-12-26 PROCEDURE — 74011000258 HC RX REV CODE- 258: Performed by: INTERNAL MEDICINE

## 2020-12-26 PROCEDURE — 94003 VENT MGMT INPAT SUBQ DAY: CPT

## 2020-12-26 PROCEDURE — 36600 WITHDRAWAL OF ARTERIAL BLOOD: CPT

## 2020-12-26 RX ORDER — AMLODIPINE BESYLATE 5 MG/1
10 TABLET ORAL DAILY
Status: DISCONTINUED | OUTPATIENT
Start: 2020-12-26 | End: 2020-12-27

## 2020-12-26 RX ORDER — FUROSEMIDE 40 MG/1
20 TABLET ORAL DAILY
Status: DISCONTINUED | OUTPATIENT
Start: 2020-12-26 | End: 2020-12-27

## 2020-12-26 RX ADMIN — DEXAMETHASONE SODIUM PHOSPHATE 6 MG: 10 INJECTION, SOLUTION INTRAMUSCULAR; INTRAVENOUS at 19:10

## 2020-12-26 RX ADMIN — DEXAMETHASONE SODIUM PHOSPHATE 6 MG: 10 INJECTION, SOLUTION INTRAMUSCULAR; INTRAVENOUS at 00:00

## 2020-12-26 RX ADMIN — SODIUM CHLORIDE 1000 MG: 900 INJECTION, SOLUTION INTRAVENOUS at 08:29

## 2020-12-26 RX ADMIN — DEXAMETHASONE SODIUM PHOSPHATE 6 MG: 10 INJECTION, SOLUTION INTRAMUSCULAR; INTRAVENOUS at 12:00

## 2020-12-26 RX ADMIN — INSULIN HUMAN 2 UNITS: 100 INJECTION, SOLUTION PARENTERAL at 19:18

## 2020-12-26 RX ADMIN — DEXAMETHASONE SODIUM PHOSPHATE 6 MG: 10 INJECTION, SOLUTION INTRAMUSCULAR; INTRAVENOUS at 06:00

## 2020-12-26 RX ADMIN — Medication 10 ML: at 21:34

## 2020-12-26 RX ADMIN — SODIUM CHLORIDE, SODIUM LACTATE, POTASSIUM CHLORIDE, AND CALCIUM CHLORIDE 75 ML/HR: 600; 310; 30; 20 INJECTION, SOLUTION INTRAVENOUS at 15:40

## 2020-12-26 RX ADMIN — Medication 40 ML: at 13:34

## 2020-12-26 RX ADMIN — NICARDIPINE HYDROCHLORIDE 5 MG/HR: 2.5 INJECTION, SOLUTION INTRAVENOUS at 08:00

## 2020-12-26 RX ADMIN — FUROSEMIDE 20 MG: 40 TABLET ORAL at 10:50

## 2020-12-26 RX ADMIN — SODIUM CHLORIDE 1000 MG: 900 INJECTION, SOLUTION INTRAVENOUS at 21:32

## 2020-12-26 RX ADMIN — INSULIN HUMAN 2 UNITS: 100 INJECTION, SOLUTION PARENTERAL at 12:46

## 2020-12-26 RX ADMIN — NICARDIPINE HYDROCHLORIDE 5 MG/HR: 2.5 INJECTION, SOLUTION INTRAVENOUS at 02:10

## 2020-12-26 RX ADMIN — AMLODIPINE BESYLATE 10 MG: 5 TABLET ORAL at 10:48

## 2020-12-26 RX ADMIN — Medication 10 ML: at 05:51

## 2020-12-26 NOTE — PROGRESS NOTES
Consult    Patient: Dash Quick MRN: [id number]  SSN: [id number]    YOB: 1940  Age: 68 y.o. Sex: male        Assessment:     1. 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 We can relax blood pressure parameters and work toward safe transfer to a non-ICU bed    Hospital Problems  Date Reviewed: 9/28/2020          Codes Class Noted POA    Essential hypertension ICD-10-CM: I10  ICD-9-CM: 401.9  12/26/2020 Unknown        Acute renal failure (ARF) (HCC) ICD-10-CM: N17.9  ICD-9-CM: 584.9  12/26/2020 Unknown        Intracranial mass ICD-10-CM: R90.0  ICD-9-CM: 784.2  12/26/2020 Unknown        Encephalopathy ICD-10-CM: G93.40  ICD-9-CM: 348.30  12/26/2020 Unknown        ICH (intracerebral hemorrhage) ([facility] [address] ICD-10-CM: I61.9  ICD-9-CM: 572  12/25/2020 Unknown              Plan:     Recommend brief survey for primary malignancy (CT chest abdomen pelvis)    Biopsy versus excision per neurosurgery    Continue Decadron    Subjective:      Dash Quick is a 68 y.o. male who is being seen for right parietal mass versus hemorrhage. The patient is now extubated. No events overnight otherwise. Vishal Joshi     Past Medical History:   Diagnosis Date    Anxiety disorder 12/9/2014    Arthritis 12/9/2014    Depression 12/9/2014    Gout 12/9/2014    History of lumbar laminectomy for spinal cord decompression 2/18/2019    HTN (hypertension), benign 2/15/2017    Hyperlipemia 12/9/2014    Hypertension     Idiopathic chronic gout of right ankle 12/9/2014    Insomnia 12/9/2014    Kidney stone     Mixed hyperlipidemia 12/9/2014    Panic disorder 12/9/2014    Primary insomnia 12/9/2014    Rosacea 2/15/2017     Past Surgical History:   Procedure Laterality Date    HX ANKLE FRACTURE TX Left 6/1974    HX COLONOSCOPY  2007    HX CYST REMOVAL      HX CYST REMOVAL      pylonidal cyst    HX CYST REMOVAL      HX HERNIA REPAIR Bilateral     HX OTHER SURGICAL      wrist    HX WRIST FRACTURE TX Bilateral 1993    wrist plate/pin      Family History   Problem Relation Age of Onset    Cancer Mother         lung ca    Hypertension Mother     Heart Disease Mother     Arthritis-osteo Father     Cancer Brother         stomach     Social History     Tobacco Use    Smoking status: Former Smoker     Types: Cigarettes     Quit date: 1970     Years since quittin.0    Smokeless tobacco: Never Used   Substance Use Topics    Alcohol use:  Yes     Alcohol/week: 0.0 standard drinks     Comment: occ      Current Facility-Administered Medications   Medication Dose Route Frequency Provider Last Rate Last Admin    furosemide (LASIX) tablet 20 mg  20 mg Oral DAILY Emmanuel Daigle MD        amLODIPine (NORVASC) tablet 10 mg  10 mg Per G Tube DAILY Emmanuel Daigle MD        insulin regular (Zhen Pucker R, HUMULIN R) injection   SubCUTAneous Q6H Emmanuel Daigle MD        niCARdipine in Saline (CARDENE) 25 MG/250 mL infusion kit  5-15 mg/hr IntraVENous TITRATE Michelle French MD 50 mL/hr at 20 0210 5 mg/hr at 20 0210    0.9% sodium chloride infusion 250 mL  250 mL IntraVENous PRN Michelle French MD        levETIRAcetam (KEPPRA) 1,000 mg in 0.9% sodium chloride 100 mL IVPB  1,000 mg IntraVENous Q12H Alley Vega MD 0 mL/hr at 20 0803 1,000 mg at 20 0829    sodium chloride (NS) flush 5-40 mL  5-40 mL IntraVENous Q8H Alley Vega MD   10 mL at 20 0551    sodium chloride (NS) flush 5-40 mL  5-40 mL IntraVENous PRN Alley Vega MD        acetaminophen (TYLENOL) tablet 650 mg  650 mg Oral Q6H PRN Alley Vega MD        Or    acetaminophen (TYLENOL) suppository 650 mg  650 mg Rectal Q6H PRN Alley Vega MD        polyethylene glycol [facility]) packet 17 g  17 g Oral DAILY PRN Alley Vega MD        [facility]) tablet 12.5 mg  12.5 mg Oral Q6H PRN [facility], [facility]        Or    ondansetron (ZOFRAN) injection 4 mg  4 mg IntraVENous Q6H PRN Rita Zheng MD        lactated Ringers infusion  75 mL/hr IntraVENous CONTINUOUS Garfield Gaytan MD 75 mL/hr at 12/26/20 0828 75 mL/hr at 12/26/20 0828    dexamethasone (DECADRON) 10 mg/mL injection 6 mg  6 mg IntraVENous Q6H Rita Zheng MD   6 mg at 12/26/20 0600        No Known Allergies    Review of Systems:    Patient is very drowsy and unable to provide a review of systems  Objective:     Vitals:    12/26/20 0830 12/26/20 0845 12/26/20 0900 12/26/20 0915   BP: 136/69 (!) 140/72 138/72 133/70   Pulse: 98 97 97 96   Resp: 11 12 12 12   Temp:       SpO2: 95% 96% 96% 96%   Weight:            Physical Exam:  Patient drowsy. Follows commands with difficulty. Nonverbal    Pupils equal and reactive to light    Withdraws to pain in all extremities          Recent Results (from the past 24 hour(s))   HEMOGLOBIN A1C WITH EAG    Collection Time: 12/25/20  7:25 PM   Result Value Ref Range    Hemoglobin A1c 6.0 4.8 - 6.0 %    Est. average glucose 126 mg/dL   POC G3    Collection Time: 12/26/20  3:36 AM   Result Value Ref Range    Device: VENT      FIO2 (POC) 45 %    pH (POC) 7.37 7.35 - 7.45      pCO2 (POC) 43.6 35 - 45 MMHG    pO2 (POC) 94 75 - 100 MMHG    HCO3 (POC) 25.2 22 - 26 MMOL/L    sO2 (POC) 97 95 - 98 %    Base excess (POC) 0 mmol/L    Mode VENTILATOR/SPONTANEOUS/PRESSURE SUPPORT      PEEP/CPAP (POC) 8 cmH2O    Pressure support 12 cmH2O    Allens test (POC) YES      Total resp.  rate 10      Site RIGHT RADIAL      Specimen type (POC) ARTERIAL      Performed by Felicity     CO2, POC 27 MMOL/L    Critical value read back 03:35     Respiratory comment: NurseNotified     Exhaled minute volume 7.00 L/min    COLLECT TIME 330         Lab Results   Component Value Date/Time    Cholesterol, total 110 03/02/2020 12:25 PM    HDL Cholesterol 30 (L) 03/02/2020 12:25 PM    LDL, calculated 37 03/02/2020 12:25 PM    VLDL, calculated 43 (H) 03/02/2020 12:25 PM    Triglyceride 213 (H) 03/02/2020 12:25 PM        Lab Results   Component Value Date/Time    Hemoglobin A1c 6.0 12/25/2020 07:25 PM    Hemoglobin A1c, External 5.1 05/06/2014        CT Results (most recent):  Results from [place] encounter on 12/25/20   CTA CODE NEURO HEAD AND NECK W CONT    Narrative Title:  CT arteriogram of the neck and head. Indication: Cerebrovascular accident (CVA). .    Technique: Axial images of the neck and head were obtained after the uneventful   administration of intravenous iodinated contrast media. Contrast was used to  best identify the arterial structures. Images were reviewed on a separate, free  standing, three-dimensional workstation as per the referring physicians request.       All stenosis percentages derived by comparing the narrowest segment with the  distal Internal Carotid Artery luminal diameter, as described in the [place]  American Symptomatic Carotid Endarterectomy Trial (NASCET) criteria. All CT scans at this facility are performed using dose reduction/dose modulation  techniques, as appropriate the performed exam, including the following:   Automated Exposure Control; Adjustment of the mA and/or kV according to patient  size (this includes techniques or standardized protocols for targeted exams  where dose is matched to indication/reason for exam); and Use of Iterative  Reconstruction Technique. The study was analyzed by the [address] ai algorithm. Comparison: None. Findings:     Lungs:  Atelectatic changes in the dependent portions of the lungs. No focal  consolidation or pleural effusions. Endotracheal tube in appropriate  positioning. No suspicious pulmonary nodules. .    Bones:  No osseous destruction. . Moderate to advanced degenerative changes in  the cervical spine. This is most focal at C5-C6 where there is spinal canal  stenosis secondary to a posterior osteophyte complex. Paranasal sinuses: [facility] Polypoid mucosal thickening in the right maxillary sinus. Brain:  Low-density focus in the right parietal lobe with a small focus of high  density suspicious for intraparenchymal hemorrhage measuring 1.3 x 0.8 cm. Minimal midline shift. No definite enhancing mass lesions. Remote infarct in the  left basal ganglia. No hydrocephalus. Soft tissues:  Within normal limits. .     Dural venous sinuses:  Patent. Aortic arch:  Nonaneurysmal. Moderate calcific atherosclerosis. .    Right brachiocephalic artery:  No significant stenosis or occlusion. .  . Right subclavian artery:  No significant stenosis or occlusion. .  . Left subclavian artery:  No significant stenosis or occlusion. .  . Right common carotid artery:  No significant stenosis or occlusion. .  . Right external carotid artery:  No significant stenosis or occlusion. .  . Right internal carotid artery:  No significant stenosis or occlusion. .  . Vascular calcifications are present along the carotid siphon. Left common carotid artery: No significant stenosis or occlusion. .  . Left external carotid artery:  No significant stenosis or occlusion. .  . Left internal carotid artery: Moderate calcific atherosclerosis at the left  carotid bulb. Vascular calcifications are present along the carotid siphon. .      Right vertebral artery:  No significant stenosis or occlusion. .. Left vertebral artery:  No significant stenosis or occlusion. .   Basilar artery:  No significant stenosis, occlusion, or aneurysm. .      Right middle cerebral artery:  No significant stenosis, occlusion, or aneurysm. Right anterior cerebral artery:  No significant stenosis, occlusion, or  aneurysm. Rawleigh Billing Anterior communicating artery: No significant stenosis, occlusion, or aneurysm. Rawleigh Billing Left middle cerebral artery:  No significant stenosis, occlusion, or aneurysm. Rawleigh Billing Left anterior cerebral artery:  No significant stenosis, occlusion, or  aneurysm. .      Right posterior communicating artery: No significant stenosis, occlusion, or  aneurysm. [place] St[place] Left posterior communicating artery:  No significant stenosis, occlusion, or  aneurysm. .      Right posterior cerebral artery:  No significant stenosis, occlusion, or  aneurysm. [place] St[place] Left posterior cerebral artery:  No significant stenosis, occlusion, or  aneurysm. .        Impression Impression:    1. No large vessel occlusion is identified. Moderate atherosclerotic changes  without high-grade stenosis. 2.  Low-density focus in the right parietal lobe, concerning for infarct. There  is an associated small focus of high density suspicious for intraparenchymal  hemorrhage measuring 1.3 x 0.8 cm. MRI is recommended for further evaluation. 3.  Atelectatic changes in the dependent portions of the lungs. Results for orders placed or performed during the hospital encounter of 12/25/20   EKG, 12 LEAD, INITIAL   Result Value Ref Range    Ventricular Rate 113 BPM    Atrial Rate 227 BPM    QRS Duration 82 ms    Q-T Interval 322 ms    QTC Calculation (Bezet) 441 ms    Calculated R Axis 41 degrees    Calculated T Axis -5 degrees    Diagnosis       Normal sinus rhythm  !!! Poor data quality, interpretation may be adversely affected  Nonspecific T wave abnormality  Nonspecific ST abnormality  Abnormal ECG  No previous ECGs available  Confirmed by Joann Cruz ([id number]) on 12/25/2020 9:36:55 AM          MRI Results (most recent):  Results from [place] encounter on 12/25/20   MRI BRAIN W WO CONT    Narrative MRI brain with and without contrast    History: Intracranial hemorrhage. Abnormal CT scan. Patient found on floor. Imaging sequences: Sagittal short TR/short TE, axial short TR/short TE, long  TR/long TE, FLAIR, gradient recall, diffusion weighted images and ADC mapping. Axial and coronal short TR/short TE postcontrast images.  Imaging was performed  on a 1.5 Claudia magnet, utilizing the uneventful administration of 20 mL of  intravenous Dotarem and order to better evaluate for intracranial pathology. Comparison: None. Correlation is made to the CT scan of the brain 12/25/2020. Findings: There is a heterogeneously enhancing mass within the right  temporoparietal region measuring approximately 3.7 x 3.3 x 3.2 cm in AP,  transverse and craniocaudal dimensions, respectively. There are components of  intrinsic T1 hyperintensity with blooming artifact on the gradient recall  sequence suggesting the presence of calcification and/or hemorrhage. There is  significant surrounding edema. There is mass effect upon the atria of the right  lateral ventricle. There is no significant midline shift. The ventricles are  otherwise normal in size. There are no extra-axial fluid collections. Normal flow voids are present  within all of the major intracranial vessels. There are no areas of restricted  diffusion to suggest an acute or subacute infarction. There is cystic CSF signal  intensity within the left thalamus measuring approximately 2.0 x 1.9 cm, most  likely representing remote infarction. Few scattered punctate foci of T2  hyperintensity within the supratentorial white matter suggest chronic small  vessel ischemic change, unremarkable for age. There are small retention cysts  within the right maxillary sinus. Impression Impression:   1. Enhancing mass within the right temporoparietal region with associated  hemorrhage and/or calcification. The findings would be concerning for neoplasm  which would include a solitary metastatic lesion or primary glial tumor. 2. Cystic changes involving the left thalamus suggesting remote infarction. US Results (most recent):  No results found for this or any previous visit. Most recent CTA  Results from [place] encounter on 12/25/20   CTA CODE NEURO HEAD AND NECK W CONT    Narrative Title:  CT arteriogram of the neck and head.       Indication: Cerebrovascular accident (CVA)..    Technique: Axial images of the neck and head were obtained after the uneventful   administration of intravenous iodinated contrast media. Contrast was used to  best identify the arterial structures. Images were reviewed on a separate, free  standing, three-dimensional workstation as per the referring physicians request.       All stenosis percentages derived by comparing the narrowest segment with the  distal Internal Carotid Artery luminal diameter, as described in the [place]  American Symptomatic Carotid Endarterectomy Trial (NASCET) criteria. All CT scans at this facility are performed using dose reduction/dose modulation  techniques, as appropriate the performed exam, including the following:   Automated Exposure Control; Adjustment of the mA and/or kV according to patient  size (this includes techniques or standardized protocols for targeted exams  where dose is matched to indication/reason for exam); and Use of Iterative  Reconstruction Technique. The study was analyzed by the [address] ai algorithm. Comparison: None. Findings:     Lungs:  Atelectatic changes in the dependent portions of the lungs. No focal  consolidation or pleural effusions. Endotracheal tube in appropriate  positioning. No suspicious pulmonary nodules. .    Bones:  No osseous destruction. . Moderate to advanced degenerative changes in  the cervical spine. This is most focal at C5-C6 where there is spinal canal  stenosis secondary to a posterior osteophyte complex. Paranasal sinuses:  . Polypoid mucosal thickening in the right maxillary sinus. Brain:  Low-density focus in the right parietal lobe with a small focus of high  density suspicious for intraparenchymal hemorrhage measuring 1.3 x 0.8 cm. Minimal midline shift. No definite enhancing mass lesions. Remote infarct in the  left basal ganglia. No hydrocephalus. Soft tissues:  Within normal limits. .     Dural venous sinuses:  Patent.      Aortic arch: Nonaneurysmal. Moderate calcific atherosclerosis. .    Right brachiocephalic artery:  No significant stenosis or occlusion. .  . Right subclavian artery:  No significant stenosis or occlusion. .  . Left subclavian artery:  No significant stenosis or occlusion. .  . Right common carotid artery:  No significant stenosis or occlusion. .  . Right external carotid artery:  No significant stenosis or occlusion. .  . Right internal carotid artery:  No significant stenosis or occlusion. .  . Vascular calcifications are present along the carotid siphon. Left common carotid artery: No significant stenosis or occlusion. .  . Left external carotid artery:  No significant stenosis or occlusion. .  . Left internal carotid artery: Moderate calcific atherosclerosis at the left  carotid bulb. Vascular calcifications are present along the carotid siphon. .      Right vertebral artery:  No significant stenosis or occlusion. .. Left vertebral artery:  No significant stenosis or occlusion. .   Basilar artery:  No significant stenosis, occlusion, or aneurysm. .      Right middle cerebral artery:  No significant stenosis, occlusion, or aneurysm. Right anterior cerebral artery:  No significant stenosis, occlusion, or  aneurysm. María Elena Lara Anterior communicating artery: No significant stenosis, occlusion, or aneurysm. María Elena Lara Left middle cerebral artery:  No significant stenosis, occlusion, or aneurysm. María Elena Jane Left anterior cerebral artery:  No significant stenosis, occlusion, or  aneurysm. .      Right posterior communicating artery: No significant stenosis, occlusion, or  aneurysm. María Elena Janen Left posterior communicating artery:  No significant stenosis, occlusion, or  aneurysm. .      Right posterior cerebral artery:  No significant stenosis, occlusion, or  aneurysm. María Elena Janen Left posterior cerebral artery:  No significant stenosis, occlusion, or  aneurysm. .        Impression Impression:    1. No large vessel occlusion is identified. Moderate atherosclerotic changes  without high-grade stenosis. 2.  Low-density focus in the right parietal lobe, concerning for infarct. There  is an associated small focus of high density suspicious for intraparenchymal  hemorrhage measuring 1.3 x 0.8 cm. MRI is recommended for further evaluation. 3.  Atelectatic changes in the dependent portions of the lungs. Most recent MRI  Results from [place] encounter on 12/25/20   MRI BRAIN W WO CONT    Narrative MRI brain with and without contrast    History: Intracranial hemorrhage. Abnormal CT scan. Patient found on floor. Imaging sequences: Sagittal short TR/short TE, axial short TR/short TE, long  TR/long TE, FLAIR, gradient recall, diffusion weighted images and ADC mapping. Axial and coronal short TR/short TE postcontrast images. Imaging was performed  on a 1.5 Claudia magnet, utilizing the uneventful administration of 20 mL of  intravenous Dotarem and order to better evaluate for intracranial pathology. Comparison: None. Correlation is made to the CT scan of the brain 12/25/2020. Findings: There is a heterogeneously enhancing mass within the right  temporoparietal region measuring approximately 3.7 x 3.3 x 3.2 cm in AP,  transverse and craniocaudal dimensions, respectively. There are components of  intrinsic T1 hyperintensity with blooming artifact on the gradient recall  sequence suggesting the presence of calcification and/or hemorrhage. There is  significant surrounding edema. There is mass effect upon the atria of the right  lateral ventricle. There is no significant midline shift. The ventricles are  otherwise normal in size. There are no extra-axial fluid collections. Normal flow voids are present  within all of the major intracranial vessels. There are no areas of restricted  diffusion to suggest an acute or subacute infarction.  There is cystic CSF signal  intensity within the left thalamus measuring approximately 2.0 x 1.9 cm, most  likely representing remote infarction. Few scattered punctate foci of T2  hyperintensity within the supratentorial white matter suggest chronic small  vessel ischemic change, unremarkable for age. There are small retention cysts  within the right maxillary sinus. Impression Impression:   1. Enhancing mass within the right temporoparietal region with associated  hemorrhage and/or calcification. The findings would be concerning for neoplasm  which would include a solitary metastatic lesion or primary glial tumor. 2. Cystic changes involving the left thalamus suggesting remote infarction.                Signed By: Genaro Johnson MD     December 26, 2020

## 2020-12-26 NOTE — PROGRESS NOTES
Ventilator check complete; patient has a #7.5 ET tube secured at the 24 at the lip. Patient is not sedated. Patient is not able to follow commands. Breath sounds are clear. Trachea is midline, Negative for subcutaneous air, and chest excursion is symmetrical. Patient is also Negative for cyanosis and is Negative for pitting edema. All alarms are set and audible. Resuscitation bag and mask are is at the head of the bed.       Ventilator Settings  Mode FIO2 Rate Tidal Volume Pressure PEEP I:E Ratio   Pressure support  45 %   spont   Avg spont 680 12 cm H2O  8 cm H20  1:2.8      Peak airway pressure: 20 cm H2O   Minute ventilation: 7 l/min

## 2020-12-26 NOTE — PROGRESS NOTES
Critical Care Daily Progress Note: 12/26/2020  Admission Date: 12/25/2020     The patient's chart is reviewed and the patient is discussed with the staff. Panda Andres is a 89KIU with history of hypertension, obesity, anxiety, hyperlipidemia who was admitted on 12/25 with encephalopathy and possible seizure. CT of the head suggested probable right parietal mass with hemorrhage. He is intubated in the CVICU for ICH management. Subjective: This morning Mr. Isabel Glover is awake and is able to follow commands. He is requiring very little ventilator support. Remains on a low rate of nicardipine to maintain BP <140/90  Hyperglycemic and creatinine is up to 2.12  Current Facility-Administered Medications   Medication Dose Route Frequency    niCARdipine in Saline (CARDENE) 25 MG/250 mL infusion kit  5-15 mg/hr IntraVENous TITRATE    0.9% sodium chloride infusion 250 mL  250 mL IntraVENous PRN    levETIRAcetam (KEPPRA) 1,000 mg in 0.9% sodium chloride 100 mL IVPB  1,000 mg IntraVENous Q12H    sodium chloride (NS) flush 5-40 mL  5-40 mL IntraVENous Q8H    sodium chloride (NS) flush 5-40 mL  5-40 mL IntraVENous PRN    acetaminophen (TYLENOL) tablet 650 mg  650 mg Oral Q6H PRN    Or    acetaminophen (TYLENOL) suppository 650 mg  650 mg Rectal Q6H PRN    polyethylene glycol (MIRALAX) packet 17 g  17 g Oral DAILY PRN    promethazine (PHENERGAN) tablet 12.5 mg  12.5 mg Oral Q6H PRN    Or    ondansetron (ZOFRAN) injection 4 mg  4 mg IntraVENous Q6H PRN    lactated Ringers infusion  75 mL/hr IntraVENous CONTINUOUS    dexamethasone (DECADRON) 10 mg/mL injection 6 mg  6 mg IntraVENous Q6H       Review of Systems   Unobtainable due to patient status.     Objective:     Vitals:    12/26/20 0645 12/26/20 0700 12/26/20 0715 12/26/20 0730   BP: 129/62 132/69 134/71 131/69   Pulse: 85 89 87 85   Resp: (!) 7 8 8 8   Temp:   98.2 °F (36.8 °C)    SpO2: 96% 97% 97% 97%   Weight: 217 lb 9.5 oz (98.7 kg) Intake/Output Summary (Last 24 hours) at 12/26/2020 0808  Last data filed at 12/26/2020 0715  Gross per 24 hour   Intake 5339.93 ml   Output 2200 ml   Net 3139.93 ml         Physical Exam:          Constitutional:  intubated and mechanically ventilated. EENMT:  Sclera clear, pupils equal, oral mucosa moist  Respiratory: CTAB  Cardiovascular:  RRR with no M,G,R;  Gastrointestinal:  soft with no tenderness; positive bowel sounds present  Musculoskeletal:  warm with no cyanosis, nolower extremity edema  Skin:  no jaundice or ecchymosis  Neurologic: answers yes/no questions. Able to move all 4 extremities. Follows commands intermittently. LINES:    ETT    DRIPS:    Propofol    CXR:      CT head (review by Dr. Lesa Lomeli) CT scan of the head reviewed by me demonstrates subtle area of mass-effect with surrounding vasogenic edema in the right parietal region. There is a small juxtacortical area of hemorrhage. In addition there is low-density lesion without mass-effect in the left thalamus. MRI    Ventilator Settings  Mode FIO2 Rate Tidal Volume Pressure PEEP   Pressure support  45 %       12 cm H2O  8 cm H20      Peak airway pressure: 20 cm H2O   Minute ventilation: 7 l/min     ABG:   Recent Labs     12/26/20  0336 12/25/20  0616   PHI 7.37 7.17*   PCO2I 43.6 43.2   PO2I 94 128*   HCO3I 25.2 15.7*       LAB  Recent Labs     12/25/20  0618   GLUCPOC 258*     Recent Labs     12/25/20  0627   WBC 36.3*   HGB 14.4   HCT 42.9      INR 1.1  1.9*     Recent Labs     12/25/20  0731      K 4.4   *   CO2 19*   *   BUN 26*   CREA 2.12*   CA 9.1     No results for input(s): LCAD, LAC in the last 72 hours.       Patient Active Problem List   Diagnosis Code    MEGGAN (generalized anxiety disorder) F41.1    Arthritis M19.90    Idiopathic chronic gout of right ankle M1A.0710    Mixed hyperlipidemia E78.2    Primary insomnia F51.01    Panic disorder F41.0    HTN (hypertension), benign I10    Rosacea L71.9    Idiopathic scoliosis M41.20    History of lumbar laminectomy for spinal cord decompression Z98.890    ICH (intracerebral hemorrhage) (HCC) I61.9    Essential hypertension I10    Acute renal failure (ARF) (HCC) N17.9    Intracranial mass R90.0    Encephalopathy G93.40         Assessment:  (Medical Decision Making)     Hospital Problems  Date Reviewed: 9/28/2020          Codes Class Noted POA    Essential hypertension ICD-10-CM: I10  ICD-9-CM: 401.9  12/26/2020 Unknown    Resume daily amlodipine 10mg, lasix 20mg    Acute renal failure (ARF) ([facility] [address] ICD-10-CM: N17.9  ICD-9-CM: 584.9  12/26/2020 Unknown    Continue LR and follow up lab work today. Intracranial mass ICD-10-CM: R90.0  ICD-9-CM: 784.2  12/26/2020 Unknown    Will need further workup likely with neurosurgical diagnostics    Encephalopathy ICD-10-CM: G93.40  ICD-9-CM: 348.30  12/26/2020 Unknown        [facility] ICD-10-CM: I61.9  ICD-9-CM: 024  12/25/2020 Unknown              Plan:  (Medical Decision Making)     --Extubate this morning after propofol discontinued. --Continue ICU mgmt for ICH per neurology:       Goal BP <140/90     Normoglycemia (adding insulin sliding scale now). HOB at [address]     Repeat CT today 24h after initial imaging     Dysphagia screen prior to po intake  --Intracranial mass workup pending. Repeat CT ordered for today per ICH recs yesterday. --consult hospitalist  --consult speech therapy, physical therapy, occupational therapy. More than 50% of the time documented was spent in face-to-face contact with the patient and in the care of the patient on the floor/unit where the patient is located.     Demetris León MD

## 2020-12-26 NOTE — PROGRESS NOTES
TIMEOUT performed prior to extubation on [address] with RT, primary RN, and charge RN present on 12/26/2020 at 8:59 AM.     Per anesthesia team on admission, patient's intubation was Normal    ABG results as follows:    Lab Results   Component Value Date/Time    pH (POC) 7.37 12/26/2020 03:36 AM    pCO2 (POC) 43.6 12/26/2020 03:36 AM    pO2 (POC) 94 12/26/2020 03:36 AM    HCO3 (POC) 25.2 12/26/2020 03:36 AM    sO2 (POC) 97 12/26/2020 03:36 AM    Base deficit (POC) 12 12/25/2020 06:16 AM         The patient is hemodynamically stable and can demonstrate the following on a consistent basis:  follow commands , elevate head off the pillow, nods appropriately to questions. Dr. Ree Gallagher notified of weaning parameters and order to extubate obtained. Patient extubated by (RT name) Swapna Jarvis to (Type of O2 Device) HF NC at (Amount of of O2) 71F without complication.

## 2020-12-26 NOTE — PROGRESS NOTES
Respiratory Mechanics completed and are as follows:  Weaning Parameters  Spontaneous Breathing Trial Complete: Yes  Resp Rate Observed: 12  Ve: 9.6  VT: 800  RSBI: 15  NIF: -25  Petty Agitation Sedation Scale (RASS): Alert and calm  Patient extubated to a 11L High Flow NC. Patient is able to communicate and is negative for stridor. Breath sounds are diminished. No complications with extubation.      Amanuel Labrum

## 2020-12-26 NOTE — PROGRESS NOTES
Pt opening eye's to voice, does not track, spontaneous movement in all extremities, does not follow any commands. Continue to monitor.

## 2020-12-26 NOTE — PROGRESS NOTES
SPEECH PATHOLOGY NOTE:  Consult received and chart reviewed. Patient is currently intubated. Will follow along and initiate speech/swallow evaluation as medically appropriate.   Anh Beth MA, CCC-SLP

## 2020-12-27 ENCOUNTER — APPOINTMENT (OUTPATIENT)
Dept: CT IMAGING | Age: 80
DRG: 025 | End: 2020-12-27
Attending: INTERNAL MEDICINE
Payer: MEDICARE

## 2020-12-27 LAB
ANION GAP SERPL CALC-SCNC: 4 MMOL/L (ref 7–16)
BUN SERPL-MCNC: 24 MG/DL (ref 8–23)
CALCIUM SERPL-MCNC: 9.2 MG/DL (ref 8.3–10.4)
CHLORIDE SERPL-SCNC: 104 MMOL/L (ref 98–107)
CO2 SERPL-SCNC: 31 MMOL/L (ref 21–32)
CREAT SERPL-MCNC: 1.4 MG/DL (ref 0.8–1.5)
ERYTHROCYTE [DISTWIDTH] IN BLOOD BY AUTOMATED COUNT: 12.6 % (ref 11.9–14.6)
GLUCOSE BLD STRIP.AUTO-MCNC: 141 MG/DL (ref 65–100)
GLUCOSE BLD STRIP.AUTO-MCNC: 143 MG/DL (ref 65–100)
GLUCOSE BLD STRIP.AUTO-MCNC: 174 MG/DL (ref 65–100)
GLUCOSE BLD STRIP.AUTO-MCNC: 177 MG/DL (ref 65–100)
GLUCOSE SERPL-MCNC: 141 MG/DL (ref 65–100)
HCT VFR BLD AUTO: 41.9 % (ref 41.1–50.3)
HGB BLD-MCNC: 14.5 G/DL (ref 13.6–17.2)
MCH RBC QN AUTO: 31.5 PG (ref 26.1–32.9)
MCHC RBC AUTO-ENTMCNC: 34.6 G/DL (ref 31.4–35)
MCV RBC AUTO: 90.9 FL (ref 79.6–97.8)
NRBC # BLD: 0 K/UL (ref 0–0.2)
PLATELET # BLD AUTO: 178 K/UL (ref 150–450)
PMV BLD AUTO: 10 FL (ref 9.4–12.3)
POTASSIUM SERPL-SCNC: 3.9 MMOL/L (ref 3.5–5.1)
RBC # BLD AUTO: 4.61 M/UL (ref 4.23–5.6)
SODIUM SERPL-SCNC: 139 MMOL/L (ref 136–145)
WBC # BLD AUTO: 20.6 K/UL (ref 4.3–11.1)

## 2020-12-27 PROCEDURE — 74011250636 HC RX REV CODE- 250/636: Performed by: INTERNAL MEDICINE

## 2020-12-27 PROCEDURE — 74011636637 HC RX REV CODE- 636/637: Performed by: INTERNAL MEDICINE

## 2020-12-27 PROCEDURE — 80048 BASIC METABOLIC PNL TOTAL CA: CPT

## 2020-12-27 PROCEDURE — 36415 COLL VENOUS BLD VENIPUNCTURE: CPT

## 2020-12-27 PROCEDURE — 74011250637 HC RX REV CODE- 250/637: Performed by: INTERNAL MEDICINE

## 2020-12-27 PROCEDURE — 77030008771 HC TU NG SALEM SUMP -A

## 2020-12-27 PROCEDURE — 99233 SBSQ HOSP IP/OBS HIGH 50: CPT | Performed by: INTERNAL MEDICINE

## 2020-12-27 PROCEDURE — 71260 CT THORAX DX C+: CPT

## 2020-12-27 PROCEDURE — 2709999900 HC NON-CHARGEABLE SUPPLY

## 2020-12-27 PROCEDURE — 74011000258 HC RX REV CODE- 258: Performed by: INTERNAL MEDICINE

## 2020-12-27 PROCEDURE — 65660000000 HC RM CCU STEPDOWN

## 2020-12-27 PROCEDURE — 82962 GLUCOSE BLOOD TEST: CPT

## 2020-12-27 PROCEDURE — 92610 EVALUATE SWALLOWING FUNCTION: CPT

## 2020-12-27 PROCEDURE — 74011000636 HC RX REV CODE- 636: Performed by: INTERNAL MEDICINE

## 2020-12-27 PROCEDURE — 85027 COMPLETE CBC AUTOMATED: CPT

## 2020-12-27 PROCEDURE — 95816 EEG AWAKE AND DROWSY: CPT | Performed by: PSYCHIATRY & NEUROLOGY

## 2020-12-27 RX ORDER — AMLODIPINE BESYLATE 10 MG/1
10 TABLET ORAL DAILY
Status: DISCONTINUED | OUTPATIENT
Start: 2020-12-28 | End: 2021-01-04 | Stop reason: HOSPADM

## 2020-12-27 RX ORDER — SODIUM CHLORIDE, SODIUM LACTATE, POTASSIUM CHLORIDE, CALCIUM CHLORIDE 600; 310; 30; 20 MG/100ML; MG/100ML; MG/100ML; MG/100ML
125 INJECTION, SOLUTION INTRAVENOUS CONTINUOUS
Status: DISCONTINUED | OUTPATIENT
Start: 2020-12-27 | End: 2020-12-28

## 2020-12-27 RX ORDER — SODIUM CHLORIDE 0.9 % (FLUSH) 0.9 %
10 SYRINGE (ML) INJECTION
Status: COMPLETED | OUTPATIENT
Start: 2020-12-27 | End: 2020-12-27

## 2020-12-27 RX ORDER — FUROSEMIDE 20 MG/1
20 TABLET ORAL DAILY
Status: DISCONTINUED | OUTPATIENT
Start: 2020-12-28 | End: 2021-01-04 | Stop reason: HOSPADM

## 2020-12-27 RX ADMIN — SODIUM CHLORIDE 100 ML: 900 INJECTION, SOLUTION INTRAVENOUS at 07:55

## 2020-12-27 RX ADMIN — SODIUM CHLORIDE, SODIUM LACTATE, POTASSIUM CHLORIDE, AND CALCIUM CHLORIDE 125 ML/HR: 600; 310; 30; 20 INJECTION, SOLUTION INTRAVENOUS at 17:42

## 2020-12-27 RX ADMIN — INSULIN HUMAN 2 UNITS: 100 INJECTION, SOLUTION PARENTERAL at 11:21

## 2020-12-27 RX ADMIN — FUROSEMIDE 20 MG: 40 TABLET ORAL at 08:15

## 2020-12-27 RX ADMIN — LEVETIRACETAM 1000 MG: 100 SOLUTION ORAL at 21:36

## 2020-12-27 RX ADMIN — Medication 10 ML: at 06:05

## 2020-12-27 RX ADMIN — DEXAMETHASONE SODIUM PHOSPHATE 6 MG: 10 INJECTION, SOLUTION INTRAMUSCULAR; INTRAVENOUS at 11:39

## 2020-12-27 RX ADMIN — DEXAMETHASONE SODIUM PHOSPHATE 6 MG: 10 INJECTION, SOLUTION INTRAMUSCULAR; INTRAVENOUS at 17:43

## 2020-12-27 RX ADMIN — Medication 10 ML: at 07:55

## 2020-12-27 RX ADMIN — INSULIN HUMAN 2 UNITS: 100 INJECTION, SOLUTION PARENTERAL at 17:52

## 2020-12-27 RX ADMIN — DIATRIZOATE MEGLUMINE AND DIATRIZOATE SODIUM 15 ML: 660; 100 LIQUID ORAL; RECTAL at 06:43

## 2020-12-27 RX ADMIN — IOPAMIDOL 100 ML: 755 INJECTION, SOLUTION INTRAVENOUS at 07:55

## 2020-12-27 RX ADMIN — SODIUM CHLORIDE, SODIUM LACTATE, POTASSIUM CHLORIDE, AND CALCIUM CHLORIDE 125 ML/HR: 600; 310; 30; 20 INJECTION, SOLUTION INTRAVENOUS at 08:17

## 2020-12-27 RX ADMIN — Medication 10 ML: at 21:37

## 2020-12-27 RX ADMIN — Medication 40 ML: at 14:18

## 2020-12-27 RX ADMIN — AMLODIPINE BESYLATE 10 MG: 5 TABLET ORAL at 08:15

## 2020-12-27 RX ADMIN — ACETAMINOPHEN 650 MG: 325 TABLET, FILM COATED ORAL at 08:15

## 2020-12-27 RX ADMIN — DEXAMETHASONE SODIUM PHOSPHATE 6 MG: 10 INJECTION, SOLUTION INTRAMUSCULAR; INTRAVENOUS at 06:00

## 2020-12-27 RX ADMIN — SODIUM CHLORIDE 1000 MG: 900 INJECTION, SOLUTION INTRAVENOUS at 08:15

## 2020-12-27 RX ADMIN — DEXAMETHASONE SODIUM PHOSPHATE 6 MG: 10 INJECTION, SOLUTION INTRAMUSCULAR; INTRAVENOUS at 00:00

## 2020-12-27 NOTE — PROCEDURES
[facility] Neurology   Routine Electroencephalogram Report      DATE:  12/26/2020  Time Start: 4866 Time End: 1612    EEG Number:      Indication:  Seizure RT parietal mass    Medications:   Current Facility-Administered Medications   Medication Dose Route Frequency Provider Last Rate Last Admin    furosemide (LASIX) tablet 20 mg  20 mg Oral DAILY Moshe Bruno MD   20 mg at 12/26/20 1050    amLODIPine (NORVASC) tablet 10 mg  10 mg Per G Tube DAILY Moshe Bruno MD   10 mg at 12/26/20 1048    insulin regular (Gonzalo Light, HUMULIN R) injection   SubCUTAneous Q6H Moshe Bruno MD   Stopped at 12/27/20 0000    niCARdipine in Saline (CARDENE) 25 MG/250 mL infusion kit  5-15 mg/hr IntraVENous TITRATE Katia Brown MD   Stopped at 12/26/20 1058    0.9% sodium chloride infusion 250 mL  250 mL IntraVENous PRN Katia Brown MD        levETIRAcetam (KEPPRA) 1,000 mg in 0.9% sodium chloride 100 mL IVPB  1,000 mg IntraVENous Q12H Haylee Kathleen MD 0 mL/hr at 12/25/20 0803 1,000 mg at 12/26/20 2132    sodium chloride (NS) flush 5-40 mL  5-40 mL IntraVENous Q8H Haylee Kathleen MD   10 mL at 12/27/20 9836    sodium chloride (NS) flush 5-40 mL  5-40 mL IntraVENous PRN Haylee Kathleen MD        acetaminophen (TYLENOL) tablet 650 mg  650 mg Oral Q6H PRN Haylee Kathleen MD        Or    acetaminophen (TYLENOL) suppository 650 mg  650 mg Rectal Q6H PRN Haylee Kathleen MD        polyethylene glycol [facility]) packet 17 g  17 g Oral DAILY PRN Haylee Kathleen MD        promethazine (PHENERGAN) tablet 12.5 mg  12.5 mg Oral Q6H PRN Haylee Kathleen MD        Or    ondansetron [facility] injection 4 mg  4 mg IntraVENous Q6H PRN Haylee Kathleen MD        lactated Ringers infusion  75 mL/hr IntraVENous CONTINUOUS Moshe Bruno MD 75 mL/hr at 12/26/20 1540 75 mL/hr at 12/26/20 1540    dexamethasone (DECADRON) 10 mg/mL injection 6 mg  6 mg IntraVENous Q6H Alexis July Karla Pantoja MD   6 mg at 12/27/20 0600       Technique: The EEG was recorded on a 32 channel Verastem digital EEG machine. A full electrode headset was applied in accordance with the International 10-20 System of Electrode Placement. All impedances are less than 5000 Ohms. Time locked digital video of the patient was recorded and reviewed as needed for clinical correlation     State of Consciousness: drowsy       Description:  EEG demonstrates diffuse mixed frequency 1-1.5 Hz,  µV frontal slowing which is more prominent on the right than on the left and continuous 5-7 Hz, 50-70 µV semirhythmic slowing which is seen diffusely. Irregular 0.5 to 1 Hz 50-70 µV slowing is seen focally on the right with occasional right frontal and bifrontal sharply contoured slowing. Intermittent well-defined focal sharp waves are noted centrally on the right. Activation Procedures:  Hyperventilation: Was not performed  Photic Stimulation: Did not alter the record      Interpretation: This is a severely abnormal EEG due to the presence of due to diffuse slowing which is more prominent on the right than the left and focal and bifrontal epileptiform activity. These findings indicate a diffuse disturbance of cerebral function which is worse on the right where there is also an area of cortical hyperexcitability consistent with an epileptic focus.

## 2020-12-27 NOTE — PROGRESS NOTES
12/27/20 1656   Dual Skin Pressure Injury Assessment   Dual Skin Pressure Injury Assessment WDL   Second Care Provider (Based on [address] [place] [address]) Skyler Kaufman   Skin Integumentary   Skin Integumentary (WDL) X    Pressure  Injury Documentation No Pressure Injury Noted-Pressure Ulcer Prevention Initiated   Skin Color Appropriate for ethnicity   Skin Condition/Temp Warm;Dry   Skin Integrity Intact;Scars (comment)   Turgor Non-tenting   Hair Growth Present   Wound Prevention and Protection Methods   Orientation of Wound Prevention Posterior   Location of Wound Prevention Sacrum/Coccyx   Dressing Present  No   Wound Offloading (Prevention Methods) Bed, pressure reduction mattress;Pillows;Repositioning;Turning

## 2020-12-27 NOTE — PROGRESS NOTES
Critical Care Daily Progress Note: 12/27/2020  Admission Date: 12/25/2020     The patient's chart is reviewed and the patient is discussed with the staff. Yaneth Duran is a 48DEX with history of hypertension, obesity, anxiety, hyperlipidemia who was admitted on 12/25 with encephalopathy and possible seizure. CT of the head suggested probable right parietal mass with hemorrhage. He is intubated in the CVICU for ICH management. Subjective: This morning Mr. Kirstie Davidson is awake and is able to follow commands. He was extubated yesterday able to protect airway is awake interactiveHas NG tube has been cleared by speech going for CT abdomen pelvis chest to rule out any primary malignancy disclosing the brain mass he has leukocytosis from dexamethasone.   Current Facility-Administered Medications   Medication Dose Route Frequency    lactated Ringers infusion  125 mL/hr IntraVENous CONTINUOUS    furosemide (LASIX) tablet 20 mg  20 mg Oral DAILY    amLODIPine (NORVASC) tablet 10 mg  10 mg Per G Tube DAILY    insulin regular (NOVOLIN R, HUMULIN R) injection   SubCUTAneous Q6H    niCARdipine in Saline (CARDENE) 25 MG/250 mL infusion kit  5-15 mg/hr IntraVENous TITRATE    0.9% sodium chloride infusion 250 mL  250 mL IntraVENous PRN    levETIRAcetam (KEPPRA) 1,000 mg in 0.9% sodium chloride 100 mL IVPB  1,000 mg IntraVENous Q12H    sodium chloride (NS) flush 5-40 mL  5-40 mL IntraVENous Q8H    sodium chloride (NS) flush 5-40 mL  5-40 mL IntraVENous PRN    acetaminophen (TYLENOL) tablet 650 mg  650 mg Oral Q6H PRN    Or    acetaminophen (TYLENOL) suppository 650 mg  650 mg Rectal Q6H PRN    polyethylene glycol (MIRALAX) packet 17 g  17 g Oral DAILY PRN    promethazine (PHENERGAN) tablet 12.5 mg  12.5 mg Oral Q6H PRN    Or    ondansetron (ZOFRAN) injection 4 mg  4 mg IntraVENous Q6H PRN    dexamethasone (DECADRON) 10 mg/mL injection 6 mg  6 mg IntraVENous Q6H       Review of Systems Unobtainable due to patient status. Objective:     Vitals:    12/27/20 0600 12/27/20 0630 12/27/20 0700 12/27/20 0730   BP: (!) 150/67 (!) 159/81 (!) 161/73 (!) 150/72   Pulse: 86 100 91 90   Resp: 16 (!) 32 16 14   Temp:    98.2 °F (36.8 °C)   SpO2: (!) 89% 93% 94% 95%   Weight: 91.5 kg (201 lb 11.5 oz)            Intake/Output Summary (Last 24 hours) at 12/27/2020 2088  Last data filed at 12/26/2020 1930  Gross per 24 hour   Intake 1384.53 ml   Output 3300 ml   Net -1915.47 ml         Physical Exam:          Constitutional: Awake able to protect airway sluggish pupils equal reactive to light weak but able to move upper and lower extremities. EENMT:  Sclera clear, pupils equal, oral mucosa moist  Respiratory: CTAB  Cardiovascular:  RRR with no M,G,R;  Gastrointestinal:  soft with no tenderness; positive bowel sounds present  Musculoskeletal:  warm with no cyanosis, nolower extremity edema  Skin:  no jaundice or ecchymosis  Neurologic: answers yes/no questions. Able to move all 4 extremities. Follows commands intermittently. LINES:    ETT    DRIPS:    Propofol    CXR:      CT head (review by Dr. Annia Monae) CT scan of the head reviewed by me demonstrates subtle area of mass-effect with surrounding vasogenic edema in the right parietal region. There is a small juxtacortical area of hemorrhage. In addition there is low-density lesion without mass-effect in the left thalamus.     MRI    Ventilator Settings  Mode FIO2 Rate Tidal Volume Pressure PEEP   Pressure support  45 %       12 cm H2O  8 cm H20      Peak airway pressure: 20 cm H2O   Minute ventilation: 7 l/min     ABG:   Recent Labs     12/26/20  0336 12/25/20  0616   PHI 7.37 7.17*   PCO2I 43.6 43.2   PO2I 94 128*   HCO3I 25.2 15.7*       LAB  Recent Labs     12/27/20  0610 12/27/20  0102 12/26/20 2125 12/26/20  1917 12/26/20  1243   GLUCPOC 141* 143* 154* 157* 156*     Recent Labs     12/27/20  0336 12/26/20  1233 12/25/20  0627   WBC 20.6* 19.4* 36.3* HGB 14.5 14.8 14.4   HCT 41.9 43.3 42.9    148* 227   INR  --   --  1.1  1.9*     Recent Labs     12/27/20  0336 12/26/20  1233 12/25/20  0731    139 142   K 3.9 4.6 4.4    106 112*   CO2 31 27 19*   * 161* 177*   BUN 24* 20 26*   CREA 1.40 1.18 2.12*   MG  --  2.5*  --    CA 9.2 9.3 9.1     No results for input(s): LCAD, LAC in the last 72 hours. Patient Active Problem List   Diagnosis Code    MEGGAN (generalized anxiety disorder) F41.1    Arthritis M19.90    Idiopathic chronic gout of right ankle M1A.0710    Mixed hyperlipidemia E78.2    Primary insomnia F51.01    Panic disorder F41.0    HTN (hypertension), benign I10    Rosacea L71.9    Idiopathic scoliosis M41.20    History of lumbar laminectomy for spinal cord decompression Z98.890    ICH (intracerebral hemorrhage) (HCC) I61.9    Essential hypertension I10    Acute renal failure (ARF) ([facility] [address] N17.9    Intracranial mass R90.0    Encephalopathy G93.40         Assessment:  (Medical Decision Making)     Hospital Problems  Date Reviewed: 9/28/2020          Codes Class Noted POA    Essential hypertension ICD-10-CM: I10  ICD-9-CM: 401.9  12/26/2020 Unknown    Resume daily amlodipine 10mg, lasix 20mg    Acute renal failure (ARF) ([facility] [address] ICD-10-CM: N17.9  ICD-9-CM: 584.9  12/26/2020 Unknown    Continue LR and follow up lab work today.     Intracranial mass ICD-10-CM: R90.0  ICD-9-CM: 784.2  12/26/2020 Unknown    Will need further workup likely with neurosurgical diagnostics    Encephalopathy ICD-10-CM: G93.40  ICD-9-CM: 348.30  12/26/2020 Unknown        [facility] ICD-10-CM: I61.9  ICD-9-CM: 674  12/25/2020 Unknown              Plan:  (Medical Decision Making)     --Follow-up on the CT scan results of the abdomen pelvis chest  --No need to get a tight blood pressure patient is more of a mass and less often intracranial hemorrhage is low blood pressure to go up to 160     normoglycemia (adding insulin sliding scale now). Dysphagia screen prior to po intake  --Intracranial mass workup pending. Repeat CT ordered for today per ICH recs yesterday. --consult hospitalist and transfer to telemetry bed. --Continue with Keppra and dexamethasone. --consult speech therapy, physical therapy, occupational therapy. More than 50% of the time documented was spent in face-to-face contact with the patient and in the care of the patient on the floor/unit where the patient is located.     Lance Vargas MD

## 2020-12-27 NOTE — PROGRESS NOTES
Problem: Dysphagia (Adult)  Goal: *Speech Goal: (INSERT TEXT)  Description: LTG: Patient will tolerate least restrictive diet without overt signs or symptoms of airway compromise by discharge. STG: Patient will tolerate chopped mechanical soft diet and nectar liquids without overt signs or symptoms of aspiration. STG: Patient will perform laryngeal strengthening exercises x10 each with 70% accuracy to improve strength and coordination of swallowing function. STG: Patient will participate in modified barium swallow study as clinically indicated. STG: Patient will participate in cognitive/linguistic assessment x1. Outcome: Progressing Towards Goal     SPEECH LANGUAGE PATHOLOGY: DYSPHAGIA- Initial Assessment    NAME/AGE/GENDER: Raoul Merino is a 68 y.o. male  DATE: 12/27/2020  PRIMARY DIAGNOSIS: [facility] [I61.9]      ICD-10: Treatment Diagnosis: R13.12 Dysphagia, Oropharyngeal Phase    RECOMMENDATIONS   DIET:    PO:  Mechanical soft with chopped meat and vegetables   Liquids:  nectar by cup only    MEDICATIONS: Crushed in puree     COMPENSATORY STRATEGIES/MODIFICATIONS INCLUDING:  · Fully awake/alert  · Upright for all PO  · Supervision with all PO  · Small bites and sips  · Cup/sip  · No straws  · Remain upright for 20-30 min after any PO  · Slow rate of PO intake  · Check mouth for pocketed PO  · Meticulous oral care     OTHER RECOMMENDATIONS (including follow up treatment recommendations):   · Treatment to improve/facilitate oral/pharyngeal skills   · Training in laryngeal strengthening and coordination exercises  · Training in use of compensatory safe swallowing strategies/feeding guidelines  · Patient/family education  · MBS as deemed necessary      RECOMMENDATIONS for CONTINUED SPEECH THERAPY:   YES: Anticipate need for ongoing speech therapy during this hospitalization and at next level of care. ASSESSMENT   Patient presents with slow verbal responses.  No bilabial closure with oral manipulation of ice chip with eventual cough after swallow. Patient did close lips around spoon presentation of thin liquids with timely swallow and no overt signs or symptoms of aspiration. However, swallows of cup presentations of thin liquids resulted in immediate strong cough. No overt signs or symptoms of airway compromise with cup presentations of nectar thick liquids. Patient declined straw trials. Timely swallow with no overt signs or symptoms of respiratory compromise observed with applesauce. Mildly increased mastication time with fruit, but patient does eventually clear oral cavity with min cues and no overt signs or symptoms of aspiration. Cracker deferred per patient request.  Recommend initiate chopped mechanical soft diet and nectar liquids by cup. Crush meds in puree. Patient will need supervision with all PO to ensure adequate oral clearance and upright positioning. SLP to follow for diet tolerance, PO trials as indicated for potential diet advancement and cognitive/linguistic assessment as clinically indicated. CONTINUATION OF SKILLED SERVICES/MEDICAL NECESSITY:   Patient is expected to demonstrate progress in  swallow strength, swallow timeliness, swallow function, diet tolerance and swallow safety in order to  improve swallow safety, work toward diet advancement and decrease aspiration risk.  Patient continues to require skilled intervention due to dysphagia. EDUCATION:  · Recommendations discussed with Patient and RN  REHABILITATION POTENTIAL FOR STATED GOALS: Good    PLAN    FREQUENCY/DURATION: Continue to follow patient 3 times a week for duration of hospital stay to address above goals. - Recommendations for next treatment session: Next treatment will address diet tolerance, PO trials, cognitive assessment    SUBJECTIVE   Patient pleasantly confused but cooperative. Slow responses.  RN reports patient is hard of hearing and wears bilateral hearing aids (not available). Oxygen Device: 6L NC  Pain: Pain Scale 1: Numeric (0 - 10)  Pain Intensity 1: 0    History of Present Injury/Illness: Mr. Flores Mata  has a past medical history of Anxiety disorder (12/9/2014), Arthritis (12/9/2014), Depression (12/9/2014), Gout (12/9/2014), History of lumbar laminectomy for spinal cord decompression (2/18/2019), HTN (hypertension), benign (2/15/2017), Hyperlipemia (12/9/2014), Hypertension, Idiopathic chronic gout of right ankle (12/9/2014), Insomnia (12/9/2014), Kidney stone, Mixed hyperlipidemia (12/9/2014), Panic disorder (12/9/2014), Primary insomnia (12/9/2014), and Rosacea (2/15/2017). Emmanuel Elliott He also  has a past surgical history that includes hx other surgical; hx cyst removal; hx cyst removal; hx hernia repair (Bilateral); hx ankle fracture tx (Left, 6/1974); hx wrist fracture tx (Bilateral, 6/1993); hx colonoscopy (2007); and hx cyst removal. PRECAUTIONS/ALLERGIES: Patient has no known allergies. Problem List:  (Impairments causing functional limitations):  1. ICH vs neoplasm    Previous Dysphagia: NONE REPORTED  Diet Prior to Evaluation: NPO - NGT in place but not in use    Orientation:  Person  Place - \"hospital\" \"[place]\"  States year as 2002, even after correction    Cognitive-Linguistic Screening:   Speech Production:   o Slow rate   Expressive Language:  o Needs further assessment   Receptive Language:  o Needs further assessment   Cognition:   o Needs further assessment    Recommendations: Given results of screening, further evaluation is indicated to assess cognitive deficits. OBJECTIVE   Oral Motor:   · Labial: Decreased rate and Impaired coordination  · Dentition: Upper and lower partials placed prior to PO trials  · Oral Hygiene: Dry  · Lingual: Decreased rate and Impaired coordination    Swallow evaluation:   Patient consumed trials of ice chips, thin liquids, nectar liquids, applesauce, mixed consistency. Cracker deferred.  SLP presented all trials, as patient is in bilateral restraints. Tool Used: Dysphagia Outcome and Severity Scale (YUNIOR)    Score Comments   Normal Diet  [] 7 With no strategies or extra time needed   Functional Swallow  [] 6 May have mild oral or pharyngeal delay   Mild Dysphagia  [] 5 Which may require one diet consistency restricted    Mild-Moderate Dysphagia  [] 4 With 1-2 diet consistencies restricted   Moderate Dysphagia  [] 3 With 2 or more diet consistencies restricted   Moderate-Severe Dysphagia  [] 2 With partial PO strategies (trials with ST only)   Severe Dysphagia  [] 1 With inability to tolerate any PO safely      Score:  Initial: 4 Most Recent: 4 (Date 12/27/20 )   Interpretation of Tool: The Dysphagia Outcome and Severity Scale (YUNIOR) is a simple, easy-to-use, 7-point scale developed to systematically rate the functional severity of dysphagia based on objective assessment and make recommendations for diet level, independence level, and type of nutrition. Current Medications:   No current facility-administered medications on file prior to encounter. Current Outpatient Medications on File Prior to Encounter   Medication Sig Dispense Refill    sertraline (ZOLOFT) 100 mg tablet Take 1.5 Tabs by mouth daily. One and half tab every day 135 Tab 1    allopurinoL (ZYLOPRIM) 100 mg tablet Take 2 Tabs by mouth daily. 180 Tab 1    amLODIPine-Olmesartan (Cuco) 10-40 mg tab Take 1 Tab by mouth daily. 90 Tab 1    atorvastatin (LIPITOR) 40 mg tablet TAKE 1 TABLET BY MOUTH EVERY DAY 90 Tab 1    zolpidem (AMBIEN) 10 mg tablet TAKE 1 TABLET BY MOUTH AT BEDTIME AS NEEDED FOR SLEEP  Indications: difficulty falling asleep 30 Tab 5    potassium chloride SR (Klor-Con 10) 10 mEq tablet Take 1 Tab by mouth daily. 90 Tab 3    multivitamin (ONE A DAY) tablet Take 1 Tab by mouth daily.  cholecalciferol, vitamin D3, (VITAMIN D3) 2,000 unit tab Take  by mouth.       metroNIDAZOLE (METROGEL) 1 % topical gel Apply  to affected area daily. Use a thin layer to affected areas after washing 45 g 3    furosemide (LASIX) 20 mg tablet Take 1 Tab by mouth daily. 90 Tab 1    diclofenac sodium (PENNSAID) 1.5 % drop by Apply Externally route.  diclofenac (VOLTAREN) 1 % gel Apply 4 g to affected area four (4) times daily.  2 Each 3        INTERDISCIPLINARY COLLABORATION: RN    After treatment position/precautions:  · Upright in bed  · Restraints in place  · RN notified    Total Treatment Duration:   Time In: 8704  Time Out: 800 [facility] DIANA Bruno, CCC-SLP

## 2020-12-27 NOTE — PROGRESS NOTES
Pt transferred to CT scan at 1845 on monitor and oxygen with RN, returned to room at this time tolerated well.

## 2020-12-27 NOTE — PROGRESS NOTES
Patient transported to room 701 with transporter. Patient left CVICU in stable condition. Wife aware of new room.

## 2020-12-27 NOTE — PROGRESS NOTES
Neurosurgey Consult    Patient: Marcelo Uriostegui MRN: [id number]  SSN: [id number]    YOB: 1940  Age: 68 y.o. Sex: male        Assessment:     1. 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 Abscess is less likely possibility  2. Likely seizure causing admission obtundation resulting in  intubation. Hospital Problems  Date Reviewed: 9/28/2020          Codes Class Noted POA    Essential hypertension ICD-10-CM: I10  ICD-9-CM: 401.9  12/26/2020 Unknown        Acute renal failure (ARF) (HCC) ICD-10-CM: N17.9  ICD-9-CM: 584.9  12/26/2020 Unknown        Intracranial mass ICD-10-CM: R90.0  ICD-9-CM: 784.2  12/26/2020 Unknown        Encephalopathy ICD-10-CM: G93.40  ICD-9-CM: 348.30  12/26/2020 Unknown        ICH (intracerebral hemorrhage) ([facility] ICD-10-CM: I61.9  ICD-9-CM: 163  12/25/2020 Unknown              Plan:     Recommend brief survey for primary malignancy (CT chest abdomen pelvis)    Surgery if no other obvious primary amenable to biopsy with less risk than the crainial lesion. Continue Decadron    Subjective:      Marcelo Uriostegui is a 68 y.o. male who is being seen for right parietal mass with  hemorrhage. The patient is now extubated. Much more awake. Suspect depressed mental status on presentation is seizure with no other obvious cause.     Past Medical History:   Diagnosis Date    Anxiety disorder 12/9/2014    Arthritis 12/9/2014    Depression 12/9/2014    Gout 12/9/2014    History of lumbar laminectomy for spinal cord decompression 2/18/2019    HTN (hypertension), benign 2/15/2017    Hyperlipemia 12/9/2014    Hypertension     Idiopathic chronic gout of right ankle 12/9/2014    Insomnia 12/9/2014    Kidney stone     Mixed hyperlipidemia 12/9/2014    Panic disorder 12/9/2014    Primary insomnia 12/9/2014    Rosacea 2/15/2017     Past Surgical History:   Procedure Laterality Date    HX ANKLE FRACTURE TX Left 6/1974    HX COLONOSCOPY      HX CYST REMOVAL      HX CYST REMOVAL      pylonidal cyst    HX CYST REMOVAL      HX HERNIA REPAIR Bilateral     HX OTHER SURGICAL      wrist    HX WRIST FRACTURE TX Bilateral 1993    wrist plate/pin      Family History   Problem Relation Age of Onset    Cancer Mother         lung ca    Hypertension Mother     Heart Disease Mother     Arthritis-osteo Father     Cancer Brother         stomach     Social History     Tobacco Use    Smoking status: Former Smoker     Types: Cigarettes     Quit date: 1970     Years since quittin.0    Smokeless tobacco: Never Used   Substance Use Topics    Alcohol use:  Yes     Alcohol/week: 0.0 standard drinks     Comment: occ      Current Facility-Administered Medications   Medication Dose Route Frequency Provider Last Rate Last Admin    furosemide (LASIX) tablet 20 mg  20 mg Oral DAILY Amber Miller MD   20 mg at 20 1050    amLODIPine (NORVASC) tablet 10 mg  10 mg Per G Tube DAILY Amber Miller MD   10 mg at 20 1048    insulin regular (Donah Sauger R, HUMULIN R) injection   SubCUTAneous Q6H Amber Miller MD   2 Units at 20 1918    niCARdipine in Saline (CARDENE) 25 MG/250 mL infusion kit  5-15 mg/hr IntraVENous TITRATE Jasbir Alamo MD   Stopped at 20 1058    0.9% sodium chloride infusion 250 mL  250 mL IntraVENous PRN Jasbir Alamo MD        levETIRAcetam (KEPPRA) 1,000 mg in 0.9% sodium chloride 100 mL IVPB  1,000 mg IntraVENous Q12H Evelyn Khan MD 0 mL/hr at 20 0803 1,000 mg at 20 2132    sodium chloride (NS) flush 5-40 mL  5-40 mL IntraVENous Q8H Evelyn Khan MD   10 mL at 20 2134    sodium chloride (NS) flush 5-40 mL  5-40 mL IntraVENous PRN Evelyn Khan MD        acetaminophen (TYLENOL) tablet 650 mg  650 mg Oral Q6H PRN Evelyn Khan MD        Or    acetaminophen (TYLENOL) suppository 650 mg  650 mg Rectal Q6H PRN Evelyn Khan MD  polyethylene glycol (MIRALAX) packet 17 g  17 g Oral DAILY PRN Tianna Lyons MD        promethazine [facility]) tablet 12.5 mg  12.5 mg Oral Q6H PRN Tianna Lyons MD        Or    ondansetron [facility]) injection 4 mg  4 mg IntraVENous Q6H PRN Tianna Lyons MD        lactated Ringers infusion  75 mL/hr IntraVENous CONTINUOUS Geraline MD Sebastián 75 mL/hr at 12/26/20 1540 75 mL/hr at 12/26/20 1540    dexamethasone (DECADRON) 10 mg/mL injection 6 mg  6 mg IntraVENous Q6H Tianna Lyons MD   6 mg at 12/26/20 1910        No Known Allergies    Review of Systems:    Patient is very drowsy and unable to provide a review of systems  Objective:     Vitals:    12/26/20 1915 12/26/20 1930 12/26/20 1945 12/26/20 2000   BP: 138/66 (!) 145/70 (!) 147/71 (!) 146/71   Pulse: 96 100 95 95   Resp: 12 17 15 15   Temp:       SpO2: 92% 94% 91% 90%   Weight:            Physical Exam:  Patient drowsy. Follows commands with difficulty. Answers simple questions. Oriented to name only. Pupils equal and reactive to light. Withdraws to pain in all extremities. Reflexes symmetric    Chest: clear  Heart: RRR  ABd: soft   Ext: atraumatic    Gait not tested. MRI: right parietal ring enhancing lesion with associated vasogenic edema. Differential included primary vs metastatic neoplasm. abcess less likely.       Signed By: Andrei Springer MD     December 26, 2020

## 2020-12-28 PROBLEM — I10 ESSENTIAL HYPERTENSION: Chronic | Status: ACTIVE | Noted: 2020-12-26

## 2020-12-28 PROBLEM — R56.9 SEIZURES (HCC): Status: ACTIVE | Noted: 2020-12-28

## 2020-12-28 PROBLEM — N17.9 ACUTE RENAL FAILURE (ARF) (HCC): Status: RESOLVED | Noted: 2020-12-26 | Resolved: 2020-12-28

## 2020-12-28 LAB
ANION GAP SERPL CALC-SCNC: 6 MMOL/L (ref 7–16)
BUN SERPL-MCNC: 31 MG/DL (ref 8–23)
CALCIUM SERPL-MCNC: 8.7 MG/DL (ref 8.3–10.4)
CHLORIDE SERPL-SCNC: 103 MMOL/L (ref 98–107)
CO2 SERPL-SCNC: 31 MMOL/L (ref 21–32)
CREAT SERPL-MCNC: 1.2 MG/DL (ref 0.8–1.5)
ERYTHROCYTE [DISTWIDTH] IN BLOOD BY AUTOMATED COUNT: 12.2 % (ref 11.9–14.6)
GLUCOSE BLD STRIP.AUTO-MCNC: 152 MG/DL (ref 65–100)
GLUCOSE BLD STRIP.AUTO-MCNC: 160 MG/DL (ref 65–100)
GLUCOSE BLD STRIP.AUTO-MCNC: 161 MG/DL (ref 65–100)
GLUCOSE BLD STRIP.AUTO-MCNC: 162 MG/DL (ref 65–100)
GLUCOSE BLD STRIP.AUTO-MCNC: 166 MG/DL (ref 65–100)
GLUCOSE SERPL-MCNC: 137 MG/DL (ref 65–100)
HCT VFR BLD AUTO: 40.3 % (ref 41.1–50.3)
HGB BLD-MCNC: 14.1 G/DL (ref 13.6–17.2)
MCH RBC QN AUTO: 31.8 PG (ref 26.1–32.9)
MCHC RBC AUTO-ENTMCNC: 35 G/DL (ref 31.4–35)
MCV RBC AUTO: 91 FL (ref 79.6–97.8)
NRBC # BLD: 0 K/UL (ref 0–0.2)
PLATELET # BLD AUTO: 173 K/UL (ref 150–450)
PMV BLD AUTO: 9.9 FL (ref 9.4–12.3)
POTASSIUM SERPL-SCNC: 3.8 MMOL/L (ref 3.5–5.1)
RBC # BLD AUTO: 4.43 M/UL (ref 4.23–5.6)
SODIUM SERPL-SCNC: 140 MMOL/L (ref 136–145)
WBC # BLD AUTO: 16.4 K/UL (ref 4.3–11.1)

## 2020-12-28 PROCEDURE — 36415 COLL VENOUS BLD VENIPUNCTURE: CPT

## 2020-12-28 PROCEDURE — 99232 SBSQ HOSP IP/OBS MODERATE 35: CPT | Performed by: PSYCHIATRY & NEUROLOGY

## 2020-12-28 PROCEDURE — 74011250637 HC RX REV CODE- 250/637: Performed by: INTERNAL MEDICINE

## 2020-12-28 PROCEDURE — 65660000000 HC RM CCU STEPDOWN

## 2020-12-28 PROCEDURE — 80048 BASIC METABOLIC PNL TOTAL CA: CPT

## 2020-12-28 PROCEDURE — 74011636637 HC RX REV CODE- 636/637: Performed by: INTERNAL MEDICINE

## 2020-12-28 PROCEDURE — 85027 COMPLETE CBC AUTOMATED: CPT

## 2020-12-28 PROCEDURE — 74011250636 HC RX REV CODE- 250/636: Performed by: INTERNAL MEDICINE

## 2020-12-28 PROCEDURE — 82962 GLUCOSE BLOOD TEST: CPT

## 2020-12-28 PROCEDURE — 97162 PT EVAL MOD COMPLEX 30 MIN: CPT

## 2020-12-28 PROCEDURE — 92526 ORAL FUNCTION THERAPY: CPT

## 2020-12-28 PROCEDURE — 97112 NEUROMUSCULAR REEDUCATION: CPT

## 2020-12-28 RX ORDER — LOSARTAN POTASSIUM 50 MG/1
25 TABLET ORAL DAILY
Status: DISCONTINUED | OUTPATIENT
Start: 2020-12-28 | End: 2021-01-01

## 2020-12-28 RX ADMIN — AMLODIPINE BESYLATE 10 MG: 5 TABLET ORAL at 08:50

## 2020-12-28 RX ADMIN — LEVETIRACETAM 1000 MG: 100 SOLUTION ORAL at 08:51

## 2020-12-28 RX ADMIN — INSULIN HUMAN 2 UNITS: 100 INJECTION, SOLUTION PARENTERAL at 05:26

## 2020-12-28 RX ADMIN — DEXAMETHASONE SODIUM PHOSPHATE 6 MG: 10 INJECTION, SOLUTION INTRAMUSCULAR; INTRAVENOUS at 05:25

## 2020-12-28 RX ADMIN — DEXAMETHASONE SODIUM PHOSPHATE 6 MG: 10 INJECTION, SOLUTION INTRAMUSCULAR; INTRAVENOUS at 11:35

## 2020-12-28 RX ADMIN — LEVETIRACETAM 1000 MG: 100 SOLUTION ORAL at 23:06

## 2020-12-28 RX ADMIN — INSULIN HUMAN 2 UNITS: 100 INJECTION, SOLUTION PARENTERAL at 23:23

## 2020-12-28 RX ADMIN — SODIUM CHLORIDE, SODIUM LACTATE, POTASSIUM CHLORIDE, AND CALCIUM CHLORIDE 125 ML/HR: 600; 310; 30; 20 INJECTION, SOLUTION INTRAVENOUS at 02:12

## 2020-12-28 RX ADMIN — DEXAMETHASONE SODIUM PHOSPHATE 6 MG: 10 INJECTION, SOLUTION INTRAMUSCULAR; INTRAVENOUS at 23:22

## 2020-12-28 RX ADMIN — Medication 10 ML: at 13:02

## 2020-12-28 RX ADMIN — INSULIN HUMAN 2 UNITS: 100 INJECTION, SOLUTION PARENTERAL at 11:35

## 2020-12-28 RX ADMIN — DEXAMETHASONE SODIUM PHOSPHATE 6 MG: 10 INJECTION, SOLUTION INTRAMUSCULAR; INTRAVENOUS at 02:14

## 2020-12-28 RX ADMIN — INSULIN HUMAN 2 UNITS: 100 INJECTION, SOLUTION PARENTERAL at 02:15

## 2020-12-28 RX ADMIN — LOSARTAN POTASSIUM 25 MG: 25 TABLET, FILM COATED ORAL at 23:06

## 2020-12-28 RX ADMIN — INSULIN HUMAN 2 UNITS: 100 INJECTION, SOLUTION PARENTERAL at 17:29

## 2020-12-28 RX ADMIN — Medication 10 ML: at 23:10

## 2020-12-28 RX ADMIN — DEXAMETHASONE SODIUM PHOSPHATE 6 MG: 10 INJECTION, SOLUTION INTRAMUSCULAR; INTRAVENOUS at 17:30

## 2020-12-28 RX ADMIN — Medication 10 ML: at 05:26

## 2020-12-28 RX ADMIN — FUROSEMIDE 20 MG: 40 TABLET ORAL at 08:50

## 2020-12-28 NOTE — PROGRESS NOTES
NEUROSURGERY PROGRESS NOTE:   Admit Date: 12/25/2020  Subjective:   No acute overnight events. Patient is doing better this am     Objective:  Visit Vitals  BP (!) 146/75 (BP 1 Location: Right arm, BP Patient Position: At rest)   Pulse 82   Temp 97.6 °F (36.4 °C)   Resp 18   Wt 212 lb (96.2 kg)   SpO2 93%   BMI 30.42 kg/m²     General: No acute distress  Awake, alert, and oriented to person, place, time, and situation, with some cognitive slowing   Eyes open spontaneously   PERRL, EOMI, visual fields grossly intact to light and confrontation   Hearing grossly intact   Face symmetric and tongue mid-line on protrusion   No pronation or drift on exam   Patient with 5/5 strength in the bilateral upper and bilateral lower extremities   Mild left drift present     Assessment and Plan:   Hawa Jolly 68 y.o. 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   - No observable lesions present on the CT Chest Abdomen Pelvis   - Will discuss options of biopsy versus resection with family and patient   - Tentative plan for biopsy versus resection later this week   - Continue decadron 4 mg q6H for now   - Appreciate medicine and neurology recommendations    Adam R. Fraser Boast, [address] [place] [address]

## 2020-12-28 NOTE — CONSULTS
HOSPITALIST H&P/CONSULT  NAME:  Venancio Dotson   Age:  68 y.o.  :   1940   MRN:   [id number]  PCP: Millie Davison  Consulting MD:  Treatment Team: Attending Provider: Ori Rosen MD; Consulting Provider: Pj Schultz MD; Utilization Review: Alissa Marin; Consulting Provider: Sussy Luke MD; Student Nurse: Le Hassan RN; Speech Language Pathologist: Karena Noble Nurse: Renetta Ramon; Physical Therapist: Yohan Taylor DPT  HPI:   81yoM with history of hypertension, obesity, anxiety, hyperlipidemia who was admitted on  with encephalopathy and possible seizure. CT of the head suggested probable right parietal mass with hemorrhage. He was intubated in for ICH management. Extubated, cleared by speech. MRI brain showed right temporoparietal region mass with hemorrhage. CT abd pelvis and chest without evidence of metastatic disease. Also seen by neurology and neurosurgery. Hospitalist consulted to resume care on .    : Patient seen and examined, off oxygen, wife at bedside. Reports he feels fine and breathing okay. No seizure-like activity noted. 10 point ROS done and is negative except as noted in HPI.   Past Medical History:   Diagnosis Date    Anxiety disorder 2014    Arthritis 2014    Depression 2014    Gout 2014    History of lumbar laminectomy for spinal cord decompression 2019    HTN (hypertension), benign 2/15/2017    Hyperlipemia 2014    Hypertension     Idiopathic chronic gout of right ankle 2014    Insomnia 2014    Kidney stone     Mixed hyperlipidemia 2014    Panic disorder 2014    Primary insomnia 2014    Rosacea 2/15/2017      Past Surgical History:   Procedure Laterality Date    HX ANKLE FRACTURE TX Left 1974    HX COLONOSCOPY      HX CYST REMOVAL      HX CYST REMOVAL      pylonidal cyst    HX CYST REMOVAL      HX HERNIA REPAIR Bilateral     HX OTHER SURGICAL      wrist    HX WRIST FRACTURE TX Bilateral 1993    wrist plate/pin      Prior to Admission Medications   Prescriptions Last Dose Informant Patient Reported? Taking?   allopurinoL (ZYLOPRIM) 100 mg tablet 2020 at Unknown time  No Yes   Sig: Take 2 Tabs by mouth daily. amLODIPine-Olmesartan (Cuco) 10-40 mg tab 2020 at Unknown time  No Yes   Sig: Take 1 Tab by mouth daily. atorvastatin (LIPITOR) 40 mg tablet 2020 at Unknown time  No Yes   Sig: TAKE 1 TABLET BY MOUTH EVERY DAY   cholecalciferol, vitamin D3, (VITAMIN D3) 2,000 unit tab 2020 at Unknown time  Yes Yes   Sig: Take  by mouth. diclofenac (VOLTAREN) 1 % gel Not Taking at Unknown time  No No   Sig: Apply 4 g to affected area four (4) times daily. diclofenac sodium (PENNSAID) 1.5 % drop Not Taking at Unknown time  Yes No   Sig: by Apply Externally route. furosemide (LASIX) 20 mg tablet Not Taking at Unknown time  No No   Sig: Take 1 Tab by mouth daily. metroNIDAZOLE (METROGEL) 1 % topical gel Not Taking at Unknown time  No No   Sig: Apply  to affected area daily. Use a thin layer to affected areas after washing   multivitamin (ONE A DAY) tablet 2020 at Unknown time  Yes Yes   Sig: Take 1 Tab by mouth daily. potassium chloride SR (Klor-Con 10) 10 mEq tablet 2020 at Unknown time  No Yes   Sig: Take 1 Tab by mouth daily. sertraline (ZOLOFT) 100 mg tablet 2020 at Unknown time  No Yes   Sig: Take 1.5 Tabs by mouth daily. One and half tab every day   zolpidem (AMBIEN) 10 mg tablet 2020 at Unknown time  No Yes   Sig: TAKE 1 TABLET BY MOUTH AT BEDTIME AS NEEDED FOR SLEEP  Indications: difficulty falling asleep      Facility-Administered Medications: None     Home meds reconciled.   No Known Allergies   Social History     Tobacco Use    Smoking status: Former Smoker     Types: Cigarettes     Quit date: 1970     Years since quittin.0    Smokeless tobacco: Never Used   Substance Use Topics    Alcohol use: Yes     Alcohol/week: 0.0 standard drinks     Comment: occ      Family History   Problem Relation Age of Onset    Cancer Mother         lung ca    Hypertension Mother     Heart Disease Mother     Arthritis-osteo Father     Cancer Brother         stomach      Immunization History   Administered Date(s) Administered    Influenza High Dose Vaccine PF 02/10/2016, 10/02/2017, 2018, 10/09/2019    Influenza Vaccine 2013, 2014, 10/09/2019    Influenza, Quadrivalent, Adjuvanted (>65 Yrs FLUAD QUAD 23221) 2020    Pneumococcal Conjugate (PCV-13) 02/10/2016    Pneumococcal Vaccine (Unspecified Type) 10/19/1999, 2013    Td 2011    Zoster Recombinant 2019, 2020    Zoster Vaccine, Live 2011     Objective:     Visit Vitals  BP (!) 149/71 (BP 1 Location: Right arm, BP Patient Position: At rest)   Pulse 72   Temp 97.8 °F (36.6 °C)   Resp 18   Wt 96.2 kg (212 lb)   SpO2 95%   BMI 30.42 kg/m²      Temp (24hrs), Av.2 °F (36.8 °C), Min:97.8 °F (36.6 °C), Max:98.8 °F (37.1 °C)    Oxygen Therapy  O2 Sat (%): 95 % (20 0800)  Pulse via Oximetry: 79 beats per minute (20 1430)  O2 Device: Hi flow nasal cannula (20 1100)  O2 Flow Rate (L/min): 6 l/min (20 1100)  FIO2 (%): 45 % (20 0000)  Physical Exam:  General:    Alert, cooperative, no distress   Head:   NCAT. No obvious deformity  Nose:  Nares normal. No drainage  Lungs:   CTABL. No wheezing/rhonchi/rales  Heart:   RRR. No m/r/g. Abdomen:   S/nt/nd. Bowel sounds normal.   Extremities: No cyanosis. Skin:     No rashes. Has a dark mole on right sided abdominal area. Neurologic: Moves all extremities.   no gross focal deficits      Data Review:   Recent Results (from the past 24 hour(s))   GLUCOSE, POC    Collection Time: 20 11:03 AM   Result Value Ref Range    Glucose (POC) 174 (H) 65 - 100 mg/dL   GLUCOSE, POC    Collection Time: 12/27/20  5:51 PM   Result Value Ref Range    Glucose (POC) 177 (H) 65 - 100 mg/dL   GLUCOSE, POC    Collection Time: 12/28/20  2:12 AM   Result Value Ref Range    Glucose (POC) 166 (H) 65 - 100 mg/dL   GLUCOSE, POC    Collection Time: 12/28/20  5:25 AM   Result Value Ref Range    Glucose (POC) 152 (H) 65 - 974 mg/dL   METABOLIC PANEL, BASIC    Collection Time: 12/28/20  5:43 AM   Result Value Ref Range    Sodium 140 136 - 145 mmol/L    Potassium 3.8 3.5 - 5.1 mmol/L    Chloride 103 98 - 107 mmol/L    CO2 31 21 - 32 mmol/L    Anion gap 6 (L) 7 - 16 mmol/L    Glucose 137 (H) 65 - 100 mg/dL    BUN 31 (H) 8 - 23 MG/DL    Creatinine 1.20 0.8 - 1.5 MG/DL    GFR est AA >60 >60 ml/min/1.73m2    GFR est non-AA >60 >60 ml/min/1.73m2    Calcium 8.7 8.3 - 10.4 MG/DL   CBC W/O DIFF    Collection Time: 12/28/20  5:43 AM   Result Value Ref Range    WBC 16.4 (H) 4.3 - 11.1 K/uL    RBC 4.43 4.23 - 5.6 M/uL    HGB 14.1 13.6 - 17.2 g/dL    HCT 40.3 (L) 41.1 - 50.3 %    MCV 91.0 79.6 - 97.8 FL    MCH 31.8 26.1 - 32.9 PG    MCHC 35.0 31.4 - 35.0 g/dL    RDW 12.2 11.9 - 14.6 %    PLATELET 713 913 - 997 K/uL    MPV 9.9 9.4 - 12.3 FL    ABSOLUTE NRBC 0.00 0.0 - 0.2 K/uL     Imaging /Procedures /Studies:  I personally reviewed all labs, imaging, and other studies this admission:  CXR reviewed by me. EKG reviewed by me. CXR Results  (Last 48 hours)    None        CT Results  (Last 48 hours)               12/27/20 0801  CT CHEST ABD PELV W CONT Final result    Impression:  IMPRESSION:   1. No evidence of metastatic disease in the chest, abdomen or pelvis. 2.  Dependent consolidative opacities in the lower lobes favor atelectasis. 3.  Hepatomegaly and hepatic steatosis. 4.  Simple bilateral renal cysts as described. 5.  Diverticulosis coli. No acute diverticulitis. 6.  Bilateral gynecomastia. 7.  Hernandez catheter within a decompressed bladder. 8.  Prostatic hypertrophy.        Narrative:          CT CHEST ABD PELV W CONT 12/27/2020 0756 hours. INDICATION: Patient found unresponsive. Brain mass with hemorrhage. Evaluation   for primary malignancy. COMPARISON: CT abdomen/pelvis 5/20/2018. TECHNIQUE: ET examination of the chest, abdomen and pelvis was performed   following the administration of 100 cc of Isovue-370. Intravenous contrast was   used for better evaluation of solid organs and vascular structures. Oral   contrast was used for bowel opacification. Coronal reconstructions obtained. Radiation dose reduction techniques were used for this study. Our CT scanners   use one or all of the following: Automated exposure control, adjustment of the   mA and/or kV according to patient size, iterative reconstruction. FINDINGS:       Chest:       Life Support: Gastric tube courses below the diaphragm, tip terminating in the   distal gastric body. Thyroid: Unremarkable. Lymph Nodes: No enlarged mediastinal, hilar or morphologically abnormal axillary   lymph nodes. Mediastinum: Heart is normal in size with no pericardial effusion. Scattered   coronary artery calcifications. The thoracic aorta, great vessels of the aortic   arch and main pulmonary artery are normal in caliber and patent. No acute   mediastinal abnormality. Esophagus: Unremarkable. Lungs and pleura: The trachea and mainstem bronchi are patent and clear retained   secretions. Lungs are adequately expanded with dependent consolidative airspace   opacities in the bases favoring atelectasis. No pleural effusion or   pneumothorax. No suspicious pulmonary nodules or mass. Chest wall: Superficial soft tissues of the thorax intact. Bilateral   gynecomastia. Bones are osteopenic with bridging marginal osteophytes of the   thoracic spine. Abdomen:       Evaluation of the abdomen is degraded by motion artifact. Liver is mildly enlarged and diffusely low in attenuation relative to the   spleen.  No conspicuous focal abnormality within limitations. The gallbladder is   mildly distended with high density material consistent with vicarious excretion   of contrast. No biliary ductal dilatation. The hepatic, portal, superior mesenteric and splenic veins are patent. The spleen is upper limits in normal size. Pancreas is atrophic without focal   abnormality. Adrenal glands normal in appearance. Kidneys normal in location and size with no hydronephrosis, calculi or   suspicious enhancing renal lesion. Mild bilateral perinephric stranding. Incidental note of bilateral perinephric renal cyst. Additional simple exophytic   cyst arising from the lower pole of the right kidney. Ureters are normal in   course and caliber. Delayed images through the abdomen and pelvis obtained demonstrating   opacification of the renal collecting system with no conspicuous filling defect. The ureters are not opacified along their course. The stomach is decompressed. Duodenal sweep is unremarkable. No bowel   obstruction, ileus or focal bowel wall thickening. Oral contrast seen to the   level of the rectum. Diverticuli in the sigmoid colon. No pericolonic   inflammatory changes. The abdominal aorta and major branch vessels are normal in caliber and patent. Scattered atherosclerotic calcifications. No free intraperitoneal air, fluid or adenopathy. Superficial soft tissues of the abdomen intact. Laxity of the right lateral   lower abdominal wall. Scar versus dependent body wall edema, lower midline back. Bones are osteopenic. Right curvature of the lumbar spine with multilevel disc   degenerative changes most pronounced at L2-L4. No aggressive osseous lesion. Pelvis:       Bladder is decompressed with a Hernandez catheter in place. Seminal vesicles   unremarkable. Prostate enlarged. No free pelvic fluid or adenopathy. Rectum   unremarkable. Overlying soft tissues intact.                12/26/20 1850 CT HEAD WO CONT Final result    Impression:  IMPRESSION:    1. Stable appearance right temporoparietal mass with adjacent cerebral edema. Inferior region hematoma versus calcification stable. No new bleed or   herniation. Narrative:  NONCONTRAST CT OF THE BRAIN 12/26/2020       COMPARISON: MR brain 12/25/2020       INDICATION: Follow-up intracranial hematoma       TECHNIQUE: Contiguous axial images were obtained from the skull base through the   vertex without IV contrast. Radiation dose reduction techniques were used for   this study:  Our CT scanners use one or all of the following: Automated exposure   control, adjustment of the mA and/or kVp according to patient's size, iterative   reconstruction. FINDINGS:    Right temporoparietal mass and adjacent white matter edema again identified with   similar elevated density along the inferior margin. No new sites of density. No   midline shift or herniation. No interval evidence for acute cortical based   infarction. Multicystic process left basal ganglia stable. Assessment and Plan: Active Hospital Problems    Diagnosis Date Noted    Seizures ([address] 12/28/2020    Essential hypertension 12/26/2020    Acute renal failure (ARF) ([address] 12/26/2020    Intracranial mass 12/26/2020    Encephalopathy 12/26/2020    ICH (intracerebral hemorrhage) ([address] 12/25/2020       PLAN    · Intracranial mass with hemorrhage: Neurosurgery following. Goal systolic blood pressure less than 160. Dr. Hector Mena will discuss with family and patient regarding biopsy versus excision of mass later this week. Continue with Decadron 4 mg every 6 hours. Follow mental status closely. · Seizures: Confirmed on EEG, started on ant antiepileptics per neurology. Neuro signing off as seizures are likely secondary to brain mass and defer further management to neurosurgery. .  · Hypertension: Blood pressure at goal on Norvasc 10 mg.   Will add ARB as well for better BP control. · INGRID: Resolved after IV fluids. · Acute encephalopathy: Secondary to above problems. Improving mental status. · Depression: Restart Zoloft. · PT/OT/STmodified barium tomorrow. · Acute respiratory failure requiring intubation: Resolved and pulmonary signed off. Remains on low-dose Lasix. We will resume care for this patient    FEN: Cardiac diet  DVT ppx: SCDs  Code status: Full code  Estimated LOS: To be determined  Risk assessment: High risk patient  Plan of care discussed with: patient and wife.     Signed By: Kirkwood Merlin, MD     December 28, 2020

## 2020-12-28 NOTE — PROGRESS NOTES
Critical Care Daily Progress Note: 12/28/2020  Admission Date: 12/25/2020     The patient's chart is reviewed and the patient is discussed with the staff. Fernando Mao is a 47BTK with history of hypertension, obesity, anxiety, hyperlipidemia who was admitted on 12/25 with encephalopathy and possible seizure. CT of the head suggested probable right parietal mass with hemorrhage. He is intubated in the CVICU for ICH management. Extubated, cleared by speech. Ct abd pelvis and chest without evidence of metastatic disease. Subjective:     Transferred to the floor  CT scan without evidence of metastatic disease. Alert, conversing appropriately. He wants to get up. MRI with enhancing mass.       Current Facility-Administered Medications   Medication Dose Route Frequency    white petrolatum-mineral oiL (AKWA TEARS) 83-15 % ophthalmic ointment   Both Eyes PRN    lactated Ringers infusion  125 mL/hr IntraVENous CONTINUOUS    amLODIPine (NORVASC) tablet 10 mg  10 mg Oral DAILY    furosemide (LASIX) tablet 20 mg  20 mg Oral DAILY    levETIRAcetam (KEPPRA) oral solution 1,000 mg  1,000 mg Oral Q12H    insulin regular (NOVOLIN R, HUMULIN R) injection   SubCUTAneous Q6H    niCARdipine in Saline (CARDENE) 25 MG/250 mL infusion kit  5-15 mg/hr IntraVENous TITRATE    0.9% sodium chloride infusion 250 mL  250 mL IntraVENous PRN    sodium chloride (NS) flush 5-40 mL  5-40 mL IntraVENous Q8H    sodium chloride (NS) flush 5-40 mL  5-40 mL IntraVENous PRN    acetaminophen (TYLENOL) tablet 650 mg  650 mg Oral Q6H PRN    Or    acetaminophen (TYLENOL) suppository 650 mg  650 mg Rectal Q6H PRN    polyethylene glycol (MIRALAX) packet 17 g  17 g Oral DAILY PRN    promethazine (PHENERGAN) tablet 12.5 mg  12.5 mg Oral Q6H PRN    Or    ondansetron (ZOFRAN) injection 4 mg  4 mg IntraVENous Q6H PRN    dexamethasone (DECADRON) 10 mg/mL injection 6 mg  6 mg IntraVENous Q6H       Review of Systems Unobtainable due to patient status. Objective:     Vitals:    12/28/20 0000 12/28/20 0400 12/28/20 0638 12/28/20 0800   BP: (!) 155/69 (!) 143/76  (!) 149/71   Pulse: 78 77  72   Resp: 18 18  18   Temp: 98.2 °F (36.8 °C) 98.5 °F (36.9 °C)  97.8 °F (36.6 °C)   SpO2: 98% 98%  95%   Weight:   212 lb (96.2 kg)          Intake/Output Summary (Last 24 hours) at 12/28/2020 1110  Last data filed at 12/27/2020 1400  Gross per 24 hour   Intake    Output 600 ml   Net -600 ml         Physical Exam:          Constitutional:  weak but able to move upper and lower extremities. EENMT:  Sclera clear, pupils equal, oral mucosa moist  Respiratory: CTAB  Cardiovascular:  RRR with no M,G,R;  Gastrointestinal:  soft with no tenderness; positive bowel sounds present  Musculoskeletal:  warm with no cyanosis, nolower extremity edema  Skin:  no jaundice or ecchymosis  Neurologic: answers yes/no questions. Able to move all 4 extremities. Follows commands intermittently. CXR:      CT head (review by Dr. Gonzales Pollack) CT scan of the head reviewed by me demonstrates subtle area of mass-effect with surrounding vasogenic edema in the right parietal region. There is a small juxtacortical area of hemorrhage. In addition there is low-density lesion without mass-effect in the left thalamus.     MRI      ABG:   Recent Labs     12/26/20  0336   PHI 7.37   PCO2I 43.6   PO2I 94   HCO3I 25.2       LAB  Recent Labs     12/28/20  0525 12/28/20  0212 12/27/20  1751 12/27/20  1103 12/27/20  0610   GLUCPOC 152* 166* 177* 174* 141*     Recent Labs     12/28/20  0543 12/27/20  0336 12/26/20  1233   WBC 16.4* 20.6* 19.4*   HGB 14.1 14.5 14.8   HCT 40.3* 41.9 43.3    178 148*     Recent Labs     12/28/20  0543 12/27/20  0336 12/26/20  1233    139 139   K 3.8 3.9 4.6    104 106   CO2 31 31 27   * 141* 161*   BUN 31* 24* 20   CREA 1.20 1.40 1.18   MG  --   --  2.5*   CA 8.7 9.2 9.3     No results for input(s): LCAD, LAC in the last 72 hours. CT abd/pelvis and chest  IMPRESSION:  1. No evidence of metastatic disease in the chest, abdomen or pelvis. 2.  Dependent consolidative opacities in the lower lobes favor atelectasis. 3.  Hepatomegaly and hepatic steatosis. 4.  Simple bilateral renal cysts as described. 5.  Diverticulosis coli. No acute diverticulitis. 6.  Bilateral gynecomastia. 7.  Hernandez catheter within a decompressed bladder. 8.  Prostatic hypertrophy. MRI: right parietal ring enhancing lesion with associated vasogenic edema. Differential included primary vs metastatic neoplasm. abcess less likely.     Impression:   1. Enhancing mass within the right temporoparietal region with associated  hemorrhage and/or calcification. The findings would be concerning for neoplasm  which would include a solitary metastatic lesion or primary glial tumor. 2. Cystic changes involving the left thalamus suggesting remote infarction.     Assessment:  (Medical Decision Making)     Hospital Problems  Date Reviewed: 9/28/2020          Codes Class Noted POA    Essential hypertension ICD-10-CM: I10  ICD-9-CM: 401.9  12/26/2020 Unknown    Resume daily amlodipine 10mg, lasix 20mg    Acute renal failure (ARF) (HCC) ICD-10-CM: N17.9  ICD-9-CM: 584.9  12/26/2020 Unknown    Continue LR and follow up lab work today. Intracranial mass ICD-10-CM: R90.0  ICD-9-CM: 784.2  12/26/2020 Unknown    Will need further workup likely with neurosurgical diagnostics    Encephalopathy ICD-10-CM: G93.40  ICD-9-CM: 348.30  12/26/2020 Unknown        ICH (intracerebral hemorrhage) ([facility] [address] ICD-10-CM: I61.9  ICD-9-CM: 015  12/25/2020 Unknown              Plan:  (Medical Decision Making)   -- hospitalist now following  --neurology signed off. POC per Neurosurgery   -- norvasc and lasix 20 mg daily. -- Stop IV fluids  -- Continue with Keppra and dexamethasone as outlined by neurosurgery  -- speech therapy, physical therapy, occupational therapy.     Respiratory status stable. No further respiratory needs noted at this time. Will sign off of the treatment team.  Please call if we can be of further assistance. Thank you. More than 50% of the time documented was spent in face-to-face contact with the patient and in the care of the patient on the floor/unit where the patient is located. Carola Bonner, NP    I agree with the above assessment and plan as documented. We will be available if needed for any respiratory issues, but otherwise will sign off.     Autumn Velázquez MD

## 2020-12-28 NOTE — PROGRESS NOTES
Progress Note    Patient: Antonia Chavez MRN: [id number]  SSN: [id number]    YOB: 1940  Age: 68 y.o. Sex: male      Admit Date: 12/25/2020    LOS: 3 days     Subjective:     71-year-old gentleman with new onset seizure and discovery of right parietal mass with hemorrhagic component has had CT chest abdomen pelvis with no evidence of metastatic source. Objective:     Vitals:    12/28/20 0000 12/28/20 0400 12/28/20 0638 12/28/20 0800   BP: (!) 155/69 (!) 143/76  (!) 149/71   Pulse: 78 77  72   Resp: 18 18  18   Temp: 98.2 °F (36.8 °C) 98.5 °F (36.9 °C)  97.8 °F (36.6 °C)   SpO2: 98% 98%  95%   Weight:   212 lb (96.2 kg)           Physical Exam:   Awake alert mildly overall blunted however no gaze paralysis no evidence actively of seizures at the present time  Mild drift on the left side no gaze paralysis no visual field deficit.     Lab/Data Review:  Recent Results (from the past 24 hour(s))   GLUCOSE, POC    Collection Time: 12/27/20 11:03 AM   Result Value Ref Range    Glucose (POC) 174 (H) 65 - 100 mg/dL   GLUCOSE, POC    Collection Time: 12/27/20  5:51 PM   Result Value Ref Range    Glucose (POC) 177 (H) 65 - 100 mg/dL   GLUCOSE, POC    Collection Time: 12/28/20  2:12 AM   Result Value Ref Range    Glucose (POC) 166 (H) 65 - 100 mg/dL   GLUCOSE, POC    Collection Time: 12/28/20  5:25 AM   Result Value Ref Range    Glucose (POC) 152 (H) 65 - 078 mg/dL   METABOLIC PANEL, BASIC    Collection Time: 12/28/20  5:43 AM   Result Value Ref Range    Sodium 140 136 - 145 mmol/L    Potassium 3.8 3.5 - 5.1 mmol/L    Chloride 103 98 - 107 mmol/L    CO2 31 21 - 32 mmol/L    Anion gap 6 (L) 7 - 16 mmol/L    Glucose 137 (H) 65 - 100 mg/dL    BUN 31 (H) 8 - 23 MG/DL    Creatinine 1.20 0.8 - 1.5 MG/DL    GFR est AA >60 >60 ml/min/1.73m2    GFR est non-AA >60 >60 ml/min/1.73m2    Calcium 8.7 8.3 - 10.4 MG/DL   CBC W/O DIFF    Collection Time: 12/28/20  5:43 AM   Result Value Ref Range    WBC 16.4 (H) 4.3 - 11.1 K/uL    RBC 4.43 4.23 - 5.6 M/uL    HGB 14.1 13.6 - 17.2 g/dL    HCT 40.3 (L) 41.1 - 50.3 %    MCV 91.0 79.6 - 97.8 FL    MCH 31.8 26.1 - 32.9 PG    MCHC 35.0 31.4 - 35.0 g/dL    RDW 12.2 11.9 - 14.6 %    PLATELET 022 427 - 946 K/uL    MPV 9.9 9.4 - 12.3 FL    ABSOLUTE NRBC 0.00 0.0 - 0.2 K/uL          Assessment:     Active Problems:    ICH (intracerebral hemorrhage) ([facility] [address] (12/25/2020)      Essential hypertension (12/26/2020)      Acute renal failure (ARF) ([facility] [address] (12/26/2020)      Intracranial mass (12/26/2020)      Encephalopathy (12/26/2020)        Plan:      1 continue anticonvulsant coverage with Keppra. 2 results of scans reviewed.   Lesion requires biopsy  3 no other neurological suggestions-this problem is primarily neurosurgical and therefore neurology will sign off  4 only other additional step prior to neurosurgical biopsy would be a thorough skin examination in terms of melanoma as melanoma metastases have a tendency to be hemorrhagic  Signed By: Gigi Fraser MD     December 28, 2020

## 2020-12-28 NOTE — PROGRESS NOTES
ACUTE PHYSICAL THERAPY GOALS:  (Developed with and agreed upon by patient and/or caregiver.)  ST. Patient will perform bed mobility with SUPERVISION within 3 days. 2. Patient will transfer bed to chair with MINIMAL ASSISTANCE x1 within 3 days. 3. Patient will demonstrate FAIR DYNAMIC STANDING balance within 3 day(s). 4. Patient will ambulate 50+ using least restrictive assistive device and MINIMAL ASSISTANCE within 3 days. 5. Patient will tolerate 15+ minutes of therapeutic activity/exercise and/or neuromuscular re-education while maintaining stable vitals to improve functional strength and activity tolerance within 3 days. LT. Patient will perform bed mobility with INDEPENDENCE within 7 days. 2. Patient will transfer bed to chair with CONTACT GUARD ASSISTANCE within 7 days. 3. Patient will demonstrate FAIR+ DYNAMIC STANDING balance within 7 day(s). 4. Patient will ambulate 150+ using least restrictive assistive device and CONTACT GUARD ASSISTANCE within 7 days. 5. Patient will tolerate 25+ minutes of therapeutic activity/exercise and/or neuromuscular re-education while maintaining stable vitals to improve functional strength and activity tolerance within 7 days.       PHYSICAL THERAPY ASSESSMENT: Initial Assessment, Daily Note and PM PT Treatment Day # 1      Garrison Terrazas is a 68 y.o. male   PRIMARY DIAGNOSIS: ICH (intracerebral hemorrhage) ([facility] [address]  ICH (intracerebral hemorrhage) ([facility] [address] [I61.9]       Reason for Referral:    ICD-10: Treatment Diagnosis: Difficulty in walking, Not elsewhere classified (R26.2)  [place] / [place] B / Product Type: Medicare /     ASSESSMENT:     REHAB RECOMMENDATIONS:   Recommendation to date pending progress:  Setting:   IRC vs. [facility] pending progress toward goals; at this time recommend [facility]  Equipment:    To Be Determined     PRIOR LEVEL OF FUNCTION:  (Prior to Hospitalization) INITIAL/CURRENT LEVEL OF FUNCTION:  (Most Recently Demonstrated)   Bed Mobility:   Independent  Sit to Stand:   Independent  Transfers:   Independent  Gait/Mobility:   Independent Bed Mobility:   Contact Guard Assistance  Sit to Stand:   Minimal Assistance x 2  Transfers:   Minimal Assistance x 2  Gait/Mobility:   Minimal Assistance x 2     ASSESSMENT:  Mr. Aurora Gilbert is a 68year old male admitted with seizure and right parietal mass. At baseline, patient is an independent, community-level ambulator. Today, presents with generalized weakness in B LE and trunk as well as mildly decreased L UE strength and coordination. Performed bed mobility with CGA, transfers with minimal assistance x2, and stepping to chair with minimal assistance x2. Patient presents with decreased functional activity tolerance and balance/gait status from independent baseline. Recommend IRC at this time. Recommend OT consult. SUBJECTIVE:   Mr. Aurora Gilbert states, \"It feels so good to get up. \"    SOCIAL HISTORY/LIVING ENVIRONMENT: Lives with spouse in a 2 story residence, all needs on 1st  Floor, 10 steps from garage up to second level. Baseline, I with ADLs and community level ambulation. Endorses he has a build in seat in his shower and he feels like her needs to use it.    Home Environment: Private residence  # Steps to Enter: 15  One/Two Story Residence: One story  Living Alone: No  Support Systems: Spouse/Significant Other/Partner  OBJECTIVE:     PAIN: VITAL SIGNS: LINES/DRAINS:   Pre Treatment: Pain Screen  Pain Scale 1: Numeric (0 - 10)  Pain Intensity 1: 0  Post Treatment: 0/10   Hernandez Catheter and IV  O2 Device: Hi flow nasal cannula     GROSS EVALUATION:   Within Functional Limits Abnormal/ Functional Abnormal/ Non-Functional (see comments) Not Tested Comments:   AROM [x] [] [] []    PROM [] [] [] []    Strength [] [x] [] [] L UE; Generalized; Right hand dominant    Balance [] [x] [] [] Fair   Posture [] [x] [] [] Mild flexion   Sensation [x] [] [] [] Light touch B UE/LE   Coordination [] [x] [] []    Tone [x] [] [] []    Edema [x] [] [] []    Activity Tolerance [] [x] [] []     [] [] [] []      COGNITION/  PERCEPTION: Intact Impaired   (see comments) Comments:   Orientation [x] []    Vision [x] []    Hearing [x] []    Command Following [x] []    Safety Awareness [] [x] Cues for activity pacing; well recieved    [] []      MOBILITY: I Mod I S SBA CGA Min Mod Max Total  NT x2 Comments:   Bed Mobility    Rolling [] [] [] [] [x] [] [] [] [] [] []    Supine to Sit [] [] [] [] [x] [] [] [] [] [] []    Scooting [] [] [] [] [x] [] [] [] [] [] []    Sit to Supine [] [] [] [] [x] [] [] [] [] [] []    Transfers    Sit to Stand [] [] [] [] [] [x] [] [] [] [] [x]    Bed to Chair [] [] [] [] [] [x] [] [] [] [] [x]    Stand to Sit [] [] [] [] [] [x] [] [] [] [] [x]    I=Independent, Mod I=Modified Independent, S=Supervision, SBA=Standby Assistance, CGA=Contact Guard Assistance,   Min=Minimal Assistance, Mod=Moderate Assistance, Max=Maximal Assistance, Total=Total Assistance, NT=Not Tested  GAIT: I Mod I S SBA CGA Min Mod Max Total  NT x2 Comments:   Level of Assistance [] [] [] [] [] [x] [] [] [] [] [x]    Distance 4ft    DME Rolling Walker    Gait Quality Decreased step clearance bilaterally, fair dynamic balance, functional weakness    I=Independent, Mod I=Modified Independent, S=Supervision, SBA=Standby Assistance, CGA=Contact Guard Assistance,   Min=Minimal Assistance, Mod=Moderate Assistance, Max=Maximal Assistance, Total=Total Assistance, NT=Not Tested    [address] [facility] [address] [id number] AM-PAC 6 Clicks   Basic Mobility Inpatient Short Form       How much difficulty does the patient currently have. .. Unable A Lot A Little None   1. Turning over in bed (including adjusting bedclothes, sheets and blankets)? [] 1   [] 2   [x] 3   [] 4   2. Sitting down on and standing up from a chair with arms ( e.g., wheelchair, bedside commode, etc.)   [] 1   [] 2   [x] 3   [] 4   3.   Moving from lying on back to sitting on the side of the bed? [] 1   [] 2   [x] 3   [] 4   How much help from another person does the patient currently need. .. Total A Lot A Little None   4. Moving to and from a bed to a chair (including a wheelchair)? [] 1   [x] 2   [] 3   [] 4   5. Need to walk in hospital room? [] 1   [x] 2   [] 3   [] 4   6. Climbing 3-5 steps with a railing? [] 1   [x] 2   [] 3   [] 4   © 2007, Trustees of [facility] [employer], under license to [employer]. All rights reserved     Score:  Initial: 15 Most Recent: X (Date: -- )    Interpretation of Tool:  Represents activities that are increasingly more difficult (i.e. Bed mobility, Transfers, Gait). PLAN:   FREQUENCY/DURATION: PT Plan of Care: 3 times/week for duration of hospital stay or until stated goals are met, whichever comes first.    PROBLEM LIST:   (Skilled intervention is medically necessary to address:)  1. Decreased ADL/Functional Activities  2. Decreased Activity Tolerance  3. Decreased Balance  4. Decreased Gait Ability  5. Decreased Strength  6. Decreased Transfer Abilities   INTERVENTIONS PLANNED:   (Benefits and precautions of physical therapy have been discussed with the patient.)  1. Self Care Training  2. Therapeutic Activity  3. Therapeutic Exercise/HEP  4. Neuromuscular Re-education  5. Gait Training  6. Manual Therapy  7. Education     TREATMENT:     EVALUATION: Moderate Complexity : (Untimed Charge)    TREATMENT:   ($$ Neuromuscular Re-education: 23-37 mins    )  Neuromuscular Re-education (23 Minutes): Neuromuscular Re-education included Balance Training, Coordination training, Functional mobility with facilitation, Sitting balance training, Standing balance training and Visual field awareness training to improve Balance and Functional Mobility.     AFTER TREATMENT POSITION/PRECAUTIONS:  Alarm Activated, Chair, Needs within reach, RN notified and Needs met and within reach; educated on calling for assistance and oriented to call light    INTERDISCIPLINARY COLLABORATION:  RN/PCT, PT/PTA and Rehab Tech    TOTAL TREATMENT DURATION:  PT Patient Time In/Time Out  Time In: 1350  Time Out: JEFF Mas

## 2020-12-28 NOTE — PROGRESS NOTES
Problem: Ventilator Management  Goal: *Adequate oxygenation and ventilation  Outcome: Progressing Towards Goal  Goal: *Patient maintains clear airway/free of aspiration  Outcome: Progressing Towards Goal  Goal: *Absence of infection signs and symptoms  Outcome: Progressing Towards Goal  Goal: *Normal spontaneous ventilation  Outcome: Progressing Towards Goal     Problem: Patient Education: Go to Patient Education Activity  Goal: Patient/Family Education  Outcome: Progressing Towards Goal     Problem: Patient Education: Go to Patient Education Activity  Goal: Patient/Family Education  Outcome: Progressing Towards Goal     Problem: Hemorrhagic Stroke: Admission Day (0-3 hours)  Goal: Off Pathway (Use only if patient is Off Pathway)  Outcome: Progressing Towards Goal  Goal: Activity/Safety  Outcome: Progressing Towards Goal  Goal: Consults, if ordered  Outcome: Progressing Towards Goal  Goal: Diagnostic Test/Procedures  Outcome: Progressing Towards Goal  Goal: Nutrition/Diet  Outcome: Progressing Towards Goal  Goal: Medications  Outcome: Progressing Towards Goal  Goal: Respiratory  Outcome: Progressing Towards Goal     Problem: Falls - Risk of  Goal: *Absence of Falls  Description: Document Kalpana Fall Risk and appropriate interventions in the flowsheet.   Outcome: Progressing Towards Goal  Note: Fall Risk Interventions:  Mobility Interventions: Bed/chair exit alarm, Communicate number of staff needed for ambulation/transfer, Patient to call before getting OOB    Mentation Interventions: Bed/chair exit alarm, Door open when patient unattended, More frequent rounding, Reorient patient, Room close to nurse's station    Medication Interventions: Bed/chair exit alarm, Patient to call before getting OOB, Teach patient to arise slowly    Elimination Interventions: Bed/chair exit alarm, Call light in reach, Patient to call for help with toileting needs, Stay With Me (per policy), Toilet paper/wipes in reach, Toileting schedule/hourly rounds              Problem: Patient Education: Go to Patient Education Activity  Goal: Patient/Family Education  Outcome: Progressing Towards Goal     Problem: Pressure Injury - Risk of  Goal: *Prevention of pressure injury  Description: Document Marquez Scale and appropriate interventions in the flowsheet. Outcome: Progressing Towards Goal     Problem: Patient Education: Go to Patient Education Activity  Goal: Patient/Family Education  Outcome: Progressing Towards Goal     Problem: Dysphagia (Adult)  Goal: *Speech Goal: (INSERT TEXT)  Description: LTG: Patient will tolerate least restrictive diet without overt signs or symptoms of airway compromise by discharge. STG: Patient will tolerate chopped mechanical soft diet and nectar liquids without overt signs or symptoms of aspiration. STG: Patient will perform laryngeal strengthening exercises x10 each with 70% accuracy to improve strength and coordination of swallowing function. STG: Patient will participate in modified barium swallow study as clinically indicated. STG: Patient will participate in cognitive/linguistic assessment x1.       Outcome: Progressing Towards Goal     Problem: Patient Education: Go to Patient Education Activity  Goal: Patient/Family Education  Outcome: Progressing Towards Goal

## 2020-12-28 NOTE — PROGRESS NOTES
SPEECH PATHOLOGY NOTE:    Attempted to see patient for diet tolerance/po trials, however requesting assistance with restroom. Notified unit secretary and RN. Will re-attempt at later time/date.        [address] CCC-SLP

## 2020-12-28 NOTE — PROGRESS NOTES
Problem: Dysphagia (Adult)  Goal: *Speech Goal: (INSERT TEXT)  Description: LTG: Patient will tolerate least restrictive diet without overt signs or symptoms of airway compromise by discharge. STG: Patient will tolerate chopped mechanical soft diet and nectar liquids without overt signs or symptoms of aspiration. STG: Patient will perform laryngeal strengthening exercises x10 each with 70% accuracy to improve strength and coordination of swallowing function. STG: Patient will participate in modified barium swallow study as clinically indicated. STG: Patient will participate in cognitive/linguistic assessment x1. Outcome: Progressing Towards Goal     SPEECH LANGUAGE PATHOLOGY: DYSPHAGIA- Daily Note 1    NAME/AGE/GENDER: Bryan Guerra is a 68 y.o. male  DATE: 12/28/2020  PRIMARY DIAGNOSIS: [facility] [I61.9]      ICD-10: Treatment Diagnosis: R13.12 Dysphagia, Oropharyngeal Phase    RECOMMENDATIONS   DIET:    PO:  Mechanical soft with chopped meat and vegetables   Liquids:  nectar by cup only    MEDICATIONS: Crushed in puree     COMPENSATORY STRATEGIES/MODIFICATIONS INCLUDING:  · Fully awake/alert  · Upright for all PO  · Supervision with all PO  · Small bites and sips  · Cup/sip  · No straws  · Remain upright for 20-30 min after any PO  · Slow rate of PO intake  · Check mouth for pocketed PO  · Meticulous oral care     OTHER RECOMMENDATIONS (including follow up treatment recommendations):   · Treatment to improve/facilitate oral/pharyngeal skills   · Training in laryngeal strengthening and coordination exercises  · Training in use of compensatory safe swallowing strategies/feeding guidelines  · Patient/family education  · MBS as deemed necessary      RECOMMENDATIONS for CONTINUED SPEECH THERAPY:   YES: Anticipate need for ongoing speech therapy during this hospitalization and at next level of care. ASSESSMENT   Patient presents with ongoing oropharyngeal dysphagia. Slowed oral phase, suspected piecemeal swallow with liquids, and delayed strong coughing x1 with current diet recommendation. Recommend continue current diet: mechanical soft and nectar thick liquids via cup. Patient will need SUPERVISION with all po intake due to impaired cognition and dysphagia. Will objectively assess pharyngeal swallow with modified barium swallow study 12/29. Dr. Hector Mena notified. CONTINUATION OF SKILLED SERVICES/MEDICAL NECESSITY:   Patient is expected to demonstrate progress in  swallow strength, swallow timeliness, swallow function, diet tolerance and swallow safety in order to  improve swallow safety, work toward diet advancement and decrease aspiration risk.  Patient continues to require skilled intervention due to dysphagia. EDUCATION:  · Recommendations discussed with Patient and RN  REHABILITATION POTENTIAL FOR STATED GOALS: Good    PLAN    FREQUENCY/DURATION: Continue to follow patient 3 times a week for duration of hospital stay to address above goals. - Recommendations for next treatment session: Next treatment will address modified barium swallow study    SUBJECTIVE   Confused. Delayed responses. Inconsistent responses at times depending on phrasing of question. Oxygen Device:  NC  Pain: Pain Scale 1: Numeric (0 - 10)  Pain Intensity 1: 0    History of Present Injury/Illness: Mr. 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 Lorean Snare  He also  has a past surgical history that includes hx other surgical; hx cyst removal; hx cyst removal; hx hernia repair (Bilateral); hx ankle fracture tx (Left, 6/1974); hx wrist fracture tx (Bilateral, 6/1993); hx colonoscopy (2007); and hx cyst removal. PRECAUTIONS/ALLERGIES: Patient has no known allergies. Problem List:  (Impairments causing functional limitations):  1. Dysphagia   Orientation:  Person  Place - \"hospital\"  Disoriented to time    OBJECTIVE       Swallow treatment-patient seen for diet tolerance. Presented with nectar thick liquids by cup and regular cracker. Patient demonstrated slowed oral prep and increased time for complete oral clearance but functional. Piecemeal swallow with nectar thick liquids. No immediate overt s/sx with 4oz nectar thick liquids, however Delayed strong coughing x1. Vocal quality clear. Tool Used: Dysphagia Outcome and Severity Scale (YUNIOR)    Score Comments   Normal Diet  [] 7 With no strategies or extra time needed   Functional Swallow  [] 6 May have mild oral or pharyngeal delay   Mild Dysphagia  [] 5 Which may require one diet consistency restricted    Mild-Moderate Dysphagia  [] 4 With 1-2 diet consistencies restricted   Moderate Dysphagia  [] 3 With 2 or more diet consistencies restricted   Moderate-Severe Dysphagia  [] 2 With partial PO strategies (trials with ST only)   Severe Dysphagia  [] 1 With inability to tolerate any PO safely      Score:  Initial: 4 Most Recent: 4 (Date 12/28/20 )   Interpretation of Tool: The Dysphagia Outcome and Severity Scale (YUNIOR) is a simple, easy-to-use, 7-point scale developed to systematically rate the functional severity of dysphagia based on objective assessment and make recommendations for diet level, independence level, and type of nutrition.        INTERDISCIPLINARY COLLABORATION: RN    After treatment position/precautions:  · Upright in bed  · MD at bedside  · CNA notified    Total Treatment Duration:   Time In: 9540  [facility] Out: [facility] [address] [address] [facility], [place], operated [facility]

## 2020-12-29 ENCOUNTER — APPOINTMENT (OUTPATIENT)
Dept: GENERAL RADIOLOGY | Age: 80
DRG: 025 | End: 2020-12-29
Attending: INTERNAL MEDICINE
Payer: MEDICARE

## 2020-12-29 LAB
GLUCOSE BLD STRIP.AUTO-MCNC: 127 MG/DL (ref 65–100)
GLUCOSE BLD STRIP.AUTO-MCNC: 142 MG/DL (ref 65–100)
GLUCOSE BLD STRIP.AUTO-MCNC: 158 MG/DL (ref 65–100)
GLUCOSE BLD STRIP.AUTO-MCNC: 185 MG/DL (ref 65–100)

## 2020-12-29 PROCEDURE — 74011250636 HC RX REV CODE- 250/636: Performed by: NEUROLOGICAL SURGERY

## 2020-12-29 PROCEDURE — 74011250636 HC RX REV CODE- 250/636: Performed by: INTERNAL MEDICINE

## 2020-12-29 PROCEDURE — 74230 X-RAY XM SWLNG FUNCJ C+: CPT

## 2020-12-29 PROCEDURE — 2709999900 HC NON-CHARGEABLE SUPPLY

## 2020-12-29 PROCEDURE — 74011636637 HC RX REV CODE- 636/637: Performed by: INTERNAL MEDICINE

## 2020-12-29 PROCEDURE — 74011250637 HC RX REV CODE- 250/637: Performed by: INTERNAL MEDICINE

## 2020-12-29 PROCEDURE — 74011000255 HC RX REV CODE- 255: Performed by: INTERNAL MEDICINE

## 2020-12-29 PROCEDURE — 92611 MOTION FLUOROSCOPY/SWALLOW: CPT

## 2020-12-29 PROCEDURE — 82962 GLUCOSE BLOOD TEST: CPT

## 2020-12-29 PROCEDURE — 65270000029 HC RM PRIVATE

## 2020-12-29 PROCEDURE — 99221 1ST HOSP IP/OBS SF/LOW 40: CPT | Performed by: PHYSICAL MEDICINE & REHABILITATION

## 2020-12-29 RX ORDER — DEXAMETHASONE SODIUM PHOSPHATE 4 MG/ML
4 INJECTION, SOLUTION INTRA-ARTICULAR; INTRALESIONAL; INTRAMUSCULAR; INTRAVENOUS; SOFT TISSUE EVERY 6 HOURS
Status: DISCONTINUED | OUTPATIENT
Start: 2020-12-29 | End: 2021-01-04 | Stop reason: HOSPADM

## 2020-12-29 RX ORDER — INSULIN LISPRO 100 [IU]/ML
INJECTION, SOLUTION INTRAVENOUS; SUBCUTANEOUS
Status: DISCONTINUED | OUTPATIENT
Start: 2020-12-29 | End: 2021-01-04 | Stop reason: HOSPADM

## 2020-12-29 RX ORDER — CARBOXYMETHYLCELLULOSE SODIUM 10 MG/ML
1 GEL OPHTHALMIC AS NEEDED
Status: DISCONTINUED | OUTPATIENT
Start: 2020-12-29 | End: 2021-01-04 | Stop reason: HOSPADM

## 2020-12-29 RX ORDER — POLYVINYL ALCOHOL 14 MG/ML
1 SOLUTION/ DROPS OPHTHALMIC AS NEEDED
Status: DISCONTINUED | OUTPATIENT
Start: 2020-12-29 | End: 2020-12-29 | Stop reason: SDUPTHER

## 2020-12-29 RX ORDER — SERTRALINE HYDROCHLORIDE 50 MG/1
150 TABLET, FILM COATED ORAL DAILY
Status: DISCONTINUED | OUTPATIENT
Start: 2020-12-29 | End: 2021-01-04 | Stop reason: HOSPADM

## 2020-12-29 RX ADMIN — BARIUM SULFATE 45 ML: 980 POWDER, FOR SUSPENSION ORAL at 08:54

## 2020-12-29 RX ADMIN — DEXAMETHASONE SODIUM PHOSPHATE 6 MG: 10 INJECTION, SOLUTION INTRAMUSCULAR; INTRAVENOUS at 06:29

## 2020-12-29 RX ADMIN — CARBOXYMETHYLCELLULOSE SODIUM 1 DROP: 10 GEL OPHTHALMIC at 11:31

## 2020-12-29 RX ADMIN — Medication 10 ML: at 06:30

## 2020-12-29 RX ADMIN — LOSARTAN POTASSIUM 25 MG: 25 TABLET, FILM COATED ORAL at 09:42

## 2020-12-29 RX ADMIN — INSULIN LISPRO 2 UNITS: 100 INJECTION, SOLUTION INTRAVENOUS; SUBCUTANEOUS at 17:00

## 2020-12-29 RX ADMIN — SERTRALINE HYDROCHLORIDE 150 MG: 100 TABLET ORAL at 11:55

## 2020-12-29 RX ADMIN — LEVETIRACETAM 1000 MG: 100 SOLUTION ORAL at 20:52

## 2020-12-29 RX ADMIN — DEXAMETHASONE SODIUM PHOSPHATE 4 MG: 4 INJECTION, SOLUTION INTRAMUSCULAR; INTRAVENOUS at 11:56

## 2020-12-29 RX ADMIN — Medication 10 ML: at 16:56

## 2020-12-29 RX ADMIN — BARIUM SULFATE 700 MG: 700 TABLET ORAL at 08:54

## 2020-12-29 RX ADMIN — BARIUM SULFATE 15 ML: 400 PASTE ORAL at 08:54

## 2020-12-29 RX ADMIN — DEXAMETHASONE SODIUM PHOSPHATE 4 MG: 4 INJECTION, SOLUTION INTRAMUSCULAR; INTRAVENOUS at 23:46

## 2020-12-29 RX ADMIN — FUROSEMIDE 20 MG: 40 TABLET ORAL at 09:42

## 2020-12-29 RX ADMIN — Medication 10 ML: at 21:03

## 2020-12-29 RX ADMIN — LEVETIRACETAM 1000 MG: 100 SOLUTION ORAL at 09:42

## 2020-12-29 RX ADMIN — AMLODIPINE BESYLATE 10 MG: 5 TABLET ORAL at 09:42

## 2020-12-29 RX ADMIN — INSULIN LISPRO 2 UNITS: 100 INJECTION, SOLUTION INTRAVENOUS; SUBCUTANEOUS at 22:00

## 2020-12-29 RX ADMIN — DEXAMETHASONE SODIUM PHOSPHATE 4 MG: 4 INJECTION, SOLUTION INTRAMUSCULAR; INTRAVENOUS at 16:55

## 2020-12-29 RX ADMIN — CARBOXYMETHYLCELLULOSE SODIUM 1 DROP: 10 GEL OPHTHALMIC at 23:48

## 2020-12-29 NOTE — PROGRESS NOTES
Pt is a 67 yo male admitted due to a ICH. Chart reviewed. No CM consult received. Pt has been evaluated by PT/ST with the current recommendation for STR at [facility]. OT evaluation not ordered. Physiatrist consult ordered. Pt seen by neurosurgeon and tentative plan is for a biopsy or resection later this week. SW will continue to follow to assist as needed. Care Management Interventions  PCP Verified by CM: Yes  Last Visit to PCP: 09/28/20  Transition of Care Consult (CM Consult): (NO CONSULT RECEIVED)  Discharge Durable Medical Equipment: No  Physical Therapy Consult: Yes  Occupational Therapy Consult: No  Speech Therapy Consult: Yes  Current Support Network: Own Home, Lives with Spouse  Confirm Follow Up Transport: Family  The Plan for Transition of Care is Related to the Following Treatment Goals : STR to improve pt's functional abilities for a safe transition to home.   The Patient and/or Patient Representative was Provided with a Choice of Provider and Agrees with the Discharge Plan?: Yes  Freedom of Choice List was Provided with Basic Dialogue that Supports the Patient's Individualized Plan of Care/Goals, Treatment Preferences and Shares the Quality Data Associated with the Providers?: Yes  Discharge Location  Discharge Placement: Unable to determine at this time

## 2020-12-29 NOTE — PROGRESS NOTES
Hospitalist Note     Admit Date:  2020  5:40 AM   Name:  Raoul Merino   Age:  68 y.o.  :  1940   MRN:  [id number]   PCP:  Ghada Morris DO  Treatment Team: Attending Provider: Marques Sosa MD; Consulting Provider: Carola Ramey MD; Utilization Review: Doron Ashby; Consulting Provider: Berry Granger MD; Charge Nurse: Pelon Saldivar RN; Charge Nurse: Krystina Anderson; Physical Therapist: Hilaria Vaca DPT; Consulting Provider: Virgilio Marquez MD; Care Manager: Te Herring LMSW    HPI/Subjective:   Mr. Meseret Perkins is a very nice 67 y/o WM with a h/o HTN, obesity, anxiety, HLD who presented on  with encephalopathy and seizure. He was intubated. CT head with right parietal mass with hemorrhage. Neurosurgery and Neurology consulted. CT c/a/p without evidence of primary or metastatic disease. Brain MRI showed the same. He was started on Keppra. EEG abnormal. Extubated and transferred to the floor. Hospitalist consulted to assume care on . Surgical plans pending, resection vs biopsy. : Back from [facility] today, passed, diet ordered. C/o dry and red eyes. Strength good. Some slurring of speech but otherwise doing well.     No other complaints  Objective:     Patient Vitals for the past 24 hrs:   Temp Pulse Resp BP SpO2   20 0800 98.1 °F (36.7 °C) 85 16 (!) 154/84 93 %   20 0403 98.4 °F (36.9 °C) 78 16 (!) 142/78 92 %   20 2344 97.4 °F (36.3 °C) 80 16 (!) 145/81 90 %   20 2042     91 %   20 2000 (!) 95.7 °F (35.4 °C) 79 18 124/62    20 1623 97.2 °F (36.2 °C) 85 18 (!) 155/71 93 %   20 1200 97.6 °F (36.4 °C) 82 18 (!) 146/75 93 %     Oxygen Therapy  O2 Sat (%): 93 % (20 0800)  Pulse via Oximetry: 79 beats per minute (20 1430)  O2 Device: Hi flow nasal cannula (20 1100)  O2 Flow Rate (L/min): 6 l/min (20 1100)  FIO2 (%): 45 % (20 0000)    Estimated body mass index is 30.42 kg/m² as calculated from the following:    Height as of an earlier encounter on 12/25/20: 5' 10\" (1.778 m). Weight as of this encounter: 96.2 kg (212 lb). Intake/Output Summary (Last 24 hours) at 12/29/2020 0953  Last data filed at 12/29/2020 0030  Gross per 24 hour   Intake    Output 1850 ml   Net -1850 ml       *Note that automatically entered I/Os may not be accurate; dependent on patient compliance with collection and accurate  by techs. General:    Well nourished. No overt distress. Eyes:  Mild b/l conjunctival redness. CV:   RRR. No m/r/g. No edema. No JVD  Lungs:   CTAB. No wheezing, rhonchi, or rales. Unlabored  Abdomen:   Soft, nontender, nondistended. :  Hernandez. Extremities: Warm and dry. No cyanosis   Skin:     No rashes. No jaundice. Normal coloration  Neuro:  No gross focal deficits. Alert. Mild slurring of speech. Sensation intact.  UE strength normal.    Data Reviewed:  I have reviewed all labs, meds, and studies from the last 24 hours:  Recent Results (from the past 24 hour(s))   GLUCOSE, POC    Collection Time: 12/28/20 11:32 AM   Result Value Ref Range    Glucose (POC) 160 (H) 65 - 100 mg/dL   GLUCOSE, POC    Collection Time: 12/28/20  5:28 PM   Result Value Ref Range    Glucose (POC) 161 (H) 65 - 100 mg/dL   GLUCOSE, POC    Collection Time: 12/28/20 11:20 PM   Result Value Ref Range    Glucose (POC) 162 (H) 65 - 100 mg/dL   GLUCOSE, POC    Collection Time: 12/29/20  5:00 AM   Result Value Ref Range    Glucose (POC) 127 (H) 65 - 100 mg/dL       Current Meds:  Current Facility-Administered Medications   Medication Dose Route Frequency    dexamethasone (DECADRON) 4 mg/mL injection 4 mg  4 mg IntraVENous Q6H    insulin lispro (HUMALOG) injection   SubCUTAneous AC&HS    white petrolatum-mineral oiL (STYE LUBRICANT) ointment   Both Eyes PRN    losartan (COZAAR) tablet 25 mg  25 mg Oral DAILY    amLODIPine (NORVASC) tablet 10 mg  10 mg Oral DAILY    furosemide (LASIX) tablet 20 mg  20 mg Oral DAILY    levETIRAcetam (KEPPRA) oral solution 1,000 mg  1,000 mg Oral Q12H    0.9% sodium chloride infusion 250 mL  250 mL IntraVENous PRN    sodium chloride (NS) flush 5-40 mL  5-40 mL IntraVENous Q8H    sodium chloride (NS) flush 5-40 mL  5-40 mL IntraVENous PRN    acetaminophen (TYLENOL) tablet 650 mg  650 mg Oral Q6H PRN    Or    acetaminophen (TYLENOL) suppository 650 mg  650 mg Rectal Q6H PRN    polyethylene glycol (MIRALAX) packet 17 g  17 g Oral DAILY PRN    promethazine (PHENERGAN) tablet 12.5 mg  12.5 mg Oral Q6H PRN    Or    ondansetron (ZOFRAN) injection 4 mg  4 mg IntraVENous Q6H PRN       Other Studies:  No results found for this visit on 12/25/20. Xr Swallow Func Video    Result Date: 12/29/2020  HISTORY: Dysphasia, brain mass EXAM: Modified barium swallow TECHNIQUE: The patient ingested various consistencies of barium under direct fluoroscopic evaluation. The exam is performed in conjunction with speech pathology. 1.45 minutes fluoroscopy time utilized for this procedure. FINDINGS: No laryngeal penetration or aspiration with any consistency of barium. IMPRESSION: No laryngeal penetration or aspiration. Please see detailed report from speech pathology for further description.       All Micro Results     None          SARS-CoV-2 Lab Results  \"Novel Coronavirus\" Test: No results found for: COV2NT   \"Emergent Disease\" Test: No results found for: EDPR  \"SARS-COV-2\" Test: No results found for: XGCOVT  Rapid Test: No results found for: COVR         Assessment and Plan:     Hospital Problems as of 12/29/2020 Date Reviewed: 9/28/2020          Codes Class Noted - Resolved POA    Seizures ([facility] [address] ICD-10-CM: R56.9  ICD-9-CM: 780.39  12/28/2020 - Present Yes        Essential hypertension (Chronic) ICD-10-CM: I10  ICD-9-CM: 401.9  12/26/2020 - Present Yes        Intracranial mass ICD-10-CM: R90.0  ICD-9-CM: 784.2  12/26/2020 - Present Yes Encephalopathy ICD-10-CM: G93.40  ICD-9-CM: 348.30  12/26/2020 - Present Yes        * (Principal) ICH (intracerebral hemorrhage) ([facility] [address] ICD-10-CM: I61.9  ICD-9-CM: 768  12/25/2020 - Present Yes        RESOLVED: Acute renal failure (ARF) ([facility] [address] ICD-10-CM: N17.9  ICD-9-CM: 584.9  12/26/2020 - 12/28/2020 Yes              Plan:  # Right parietal mass with hemorrhage   - Neurosurgery following. Keppra, Decadron, surgical plans pending. Passed MBS today. # HTN   - ARB added to Norvasc (takes at home). SBP <160. # Seizures   - 2/2 above. On Keppra. Neuro s/o. # INGRID   - Resolved. # Acute encephalopathy   - Resolved. 2/2 seizure/parietal mass. # MDD   - Zoloft    DC planning/Dispo: Pending. Diet:  DIET REGULAR  DVT ppx: SCDs only. Other listed chronic conditions stable, cont current management.     Signed:  Zoey Magana MD

## 2020-12-29 NOTE — CONSULTS
Galilea Carrera MD  Medical Director  [address] [place] [address], [address] [facility]  Tel: [phone]       Physical Medicine & Rehabilitation Consult    Subjective:     Date of Consultation:  December 29, 2020  Referring Physician: Dr Namrata Lopez is a 68 y.o. male who is being evaluated at the request of the Hospitalist service for consideration for inpatient rehabilitation following dx of brain mass with ICH    HPI: The patient is a right hand dominant, previously functionally independent 72yo male with a PMH of HTN, HLD, nephrolithiasis, lumbar stenosis, gouty arthritis, depression and anxiety who presented to [facility] on 12/25 with encephalopathy and sz. He was intubated for airway protection. 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 CT chest abdomen pelvis with no evidence of metastatic source. Szs controlled with Keppra. 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 (peripherally enhancing right  parietal lesion with evidence of internal hemorrhage and necrosis measuring 3.7 x 3.3 x 3.2 cm ). This is associated with surrounding vasogenic edema but no significant midline shift. Pt successfully extubated and is currently on the floor and out of the ICU. He is on room air. NS, Dr Jose Juan Celaya,  consulted and will d/w family bx vs excision later this week. OT pending. Passed swallow eval ( MBS) and is on a reg diet with thin liquids. Continues on Decadron 4mg q 6hrs  12/28 PT; min assist of 2 STS and transfers. Ambulated min assist of 2 for 4ft with RW.      Principal Problem:    ICH (intracerebral hemorrhage) ([address] (12/25/2020)    Active Problems:    Essential hypertension (12/26/2020)      Intracranial mass (12/26/2020)      Encephalopathy (12/26/2020)      Seizures ([address] (12/28/2020)      Past Medical History: Diagnosis Date    Anxiety disorder 2014    Arthritis 2014    Depression 2014    Gout 2014    History of lumbar laminectomy for spinal cord decompression 2019    HTN (hypertension), benign 2/15/2017    Hyperlipemia 2014    Hypertension     Idiopathic chronic gout of right ankle 2014    Insomnia 2014    Kidney stone     Mixed hyperlipidemia 2014    Panic disorder 2014    Primary insomnia 2014    Rosacea 2/15/2017      Past Surgical History:   Procedure Laterality Date    HX ANKLE FRACTURE TX Left 1974    HX COLONOSCOPY      HX CYST REMOVAL      HX CYST REMOVAL      pylonidal cyst    HX CYST REMOVAL      HX HERNIA REPAIR Bilateral     HX OTHER SURGICAL      wrist    HX WRIST FRACTURE TX Bilateral 1993    wrist plate/pin      Family History   Problem Relation Age of Onset    Cancer Mother         lung ca    Hypertension Mother     Heart Disease Mother     Arthritis-osteo Father     Cancer Brother         stomach      Social History     Tobacco Use    Smoking status: Former Smoker     Types: Cigarettes     Quit date: 1970     Years since quittin.0    Smokeless tobacco: Never Used   Substance Use Topics    Alcohol use: Yes     Alcohol/week: 0.0 standard drinks     Comment: occ   functionally independent. Retired . [employer]. . 10steps to enter from garage. 2 story home but can live on first floor. Prior to Admission medications    Medication Sig Start Date End Date Taking? Authorizing Provider   sertraline (ZOLOFT) 100 mg tablet Take 1.5 Tabs by mouth daily. One and half tab every day 20  Yes Brothers, Aiden Burk T, DO   allopurinoL (ZYLOPRIM) 100 mg tablet Take 2 Tabs by mouth daily. 20  Yes Brothers, Aiden MÉNDEZ, DO   amLODIPine-Olmesartan (Cuco) 10-40 mg tab Take 1 Tab by mouth daily.  20  Yes Brothers, Aiden MÉNDEZ, DO   atorvastatin (LIPITOR) 40 mg tablet TAKE 1 TABLET BY MOUTH EVERY DAY 20  Yes BrothPat tolentino, DO   zolpidem (AMBIEN) 10 mg tablet TAKE 1 TABLET BY MOUTH AT BEDTIME AS NEEDED FOR SLEEP  Indications: difficulty falling asleep 20  Yes Pat Starks, DO   potassium chloride SR (Klor-Con 10) 10 mEq tablet Take 1 Tab by mouth daily. 20  Yes Brothers, Lucianulises Sree, DO   multivitamin (ONE A DAY) tablet Take 1 Tab by mouth daily. Yes Provider, Historical   cholecalciferol, vitamin D3, (VITAMIN D3) 2,000 unit tab Take  by mouth. Yes Provider, Historical   metroNIDAZOLE (METROGEL) 1 % topical gel Apply  to affected area daily. Use a thin layer to affected areas after washing 3/2/20   BARBIE Joshi   furosemide (LASIX) 20 mg tablet Take 1 Tab by mouth daily. 19   Pat Starks, DO   diclofenac sodium (PENNSAID) 1.5 % drop by Apply Externally route. Provider, Historical   diclofenac (VOLTAREN) 1 % gel Apply 4 g to affected area four (4) times daily. 2/15/17   BrothPat tolentino DEV, DO     No Known Allergies     Review of Systems:  A comprehensive review of systems was negative except for: Constitutional: positive for fatigue  Ears, nose, mouth, throat, and face: positive for nasal congestion and snoring  Respiratory: positive for dyspnea on exertion  Gastrointestinal: positive for reflux symptoms  Musculoskeletal: positive for back pain  Neurological: positive for headaches, speech problems and coordination problems  Behvioral/Psych: positive for anxiety and depression    Objective:     Vitals:  Blood pressure 137/79, pulse 75, temperature 97.3 °F (36.3 °C), resp. rate 16, weight 212 lb (96.2 kg), SpO2 92 %. Temp (24hrs), Av.4 °F (36.3 °C), Min:95.7 °F (35.4 °C), Max:98.4 °F (36.9 °C)      Intake and Output:   1901 -  0700  In: -   Out: 8430 [Urine:1850]    Physical Exam:  General:  Alert, oriented and mood affect appropriate; speech slow , mildly dysarthric, word finding difficulties at times   Lungs:   Clear to auscultation bilaterally. Heart:  Regular rate and rhythm, S1, S2 stable, no murmur, click, rub or gallop. Abdomen:   Soft, non-tender. Bowel sounds present. No masses,  No organomegaly. obese   Genitourinary: defer   Neuro Muscular: Slow speech, word finding difficulties  PERRLA, EOMI  No facial asymm  Tongue midline  Mild left sided drift , mild dysm with FTN on left  Strength intact and equal.      Skin:  No rashes, lesions, or signs/symptoms or infection.  No edema       Labs/Studies:  Recent Results (from the past 72 hour(s))   GLUCOSE, POC    Collection Time: 12/26/20  7:17 PM   Result Value Ref Range    Glucose (POC) 157 (H) 65 - 100 mg/dL   GLUCOSE, POC    Collection Time: 12/26/20  9:25 PM   Result Value Ref Range    Glucose (POC) 154 (H) 65 - 100 mg/dL   GLUCOSE, POC    Collection Time: 12/27/20  1:02 AM   Result Value Ref Range    Glucose (POC) 143 (H) 65 - 734 mg/dL   METABOLIC PANEL, BASIC    Collection Time: 12/27/20  3:36 AM   Result Value Ref Range    Sodium 139 136 - 145 mmol/L    Potassium 3.9 3.5 - 5.1 mmol/L    Chloride 104 98 - 107 mmol/L    CO2 31 21 - 32 mmol/L    Anion gap 4 (L) 7 - 16 mmol/L    Glucose 141 (H) 65 - 100 mg/dL    BUN 24 (H) 8 - 23 MG/DL    Creatinine 1.40 0.8 - 1.5 MG/DL    GFR est AA >60 >60 ml/min/1.73m2    GFR est non-AA 52 (L) >60 ml/min/1.73m2    Calcium 9.2 8.3 - 10.4 MG/DL   CBC W/O DIFF    Collection Time: 12/27/20  3:36 AM   Result Value Ref Range    WBC 20.6 (H) 4.3 - 11.1 K/uL    RBC 4.61 4.23 - 5.6 M/uL    HGB 14.5 13.6 - 17.2 g/dL    HCT 41.9 41.1 - 50.3 %    MCV 90.9 79.6 - 97.8 FL    MCH 31.5 26.1 - 32.9 PG    MCHC 34.6 31.4 - 35.0 g/dL    RDW 12.6 11.9 - 14.6 %    PLATELET 103 085 - 093 K/uL    MPV 10.0 9.4 - 12.3 FL    ABSOLUTE NRBC 0.00 0.0 - 0.2 K/uL   GLUCOSE, POC    Collection Time: 12/27/20  6:10 AM   Result Value Ref Range    Glucose (POC) 141 (H) 65 - 100 mg/dL   GLUCOSE, POC    Collection Time: 12/27/20 11:03 AM   Result Value Ref Range    Glucose (POC) 174 (H) 65 - 100 mg/dL   GLUCOSE, POC    Collection Time: 12/27/20  5:51 PM   Result Value Ref Range    Glucose (POC) 177 (H) 65 - 100 mg/dL   GLUCOSE, POC    Collection Time: 12/28/20  2:12 AM   Result Value Ref Range    Glucose (POC) 166 (H) 65 - 100 mg/dL   GLUCOSE, POC    Collection Time: 12/28/20  5:25 AM   Result Value Ref Range    Glucose (POC) 152 (H) 65 - 960 mg/dL   METABOLIC PANEL, BASIC    Collection Time: 12/28/20  5:43 AM   Result Value Ref Range    Sodium 140 136 - 145 mmol/L    Potassium 3.8 3.5 - 5.1 mmol/L    Chloride 103 98 - 107 mmol/L    CO2 31 21 - 32 mmol/L    Anion gap 6 (L) 7 - 16 mmol/L    Glucose 137 (H) 65 - 100 mg/dL    BUN 31 (H) 8 - 23 MG/DL    Creatinine 1.20 0.8 - 1.5 MG/DL    GFR est AA >60 >60 ml/min/1.73m2    GFR est non-AA >60 >60 ml/min/1.73m2    Calcium 8.7 8.3 - 10.4 MG/DL   CBC W/O DIFF    Collection Time: 12/28/20  5:43 AM   Result Value Ref Range    WBC 16.4 (H) 4.3 - 11.1 K/uL    RBC 4.43 4.23 - 5.6 M/uL    HGB 14.1 13.6 - 17.2 g/dL    HCT 40.3 (L) 41.1 - 50.3 %    MCV 91.0 79.6 - 97.8 FL    MCH 31.8 26.1 - 32.9 PG    MCHC 35.0 31.4 - 35.0 g/dL    RDW 12.2 11.9 - 14.6 %    PLATELET 823 680 - 146 K/uL    MPV 9.9 9.4 - 12.3 FL    ABSOLUTE NRBC 0.00 0.0 - 0.2 K/uL   GLUCOSE, POC    Collection Time: 12/28/20 11:32 AM   Result Value Ref Range    Glucose (POC) 160 (H) 65 - 100 mg/dL   GLUCOSE, POC    Collection Time: 12/28/20  5:28 PM   Result Value Ref Range    Glucose (POC) 161 (H) 65 - 100 mg/dL   GLUCOSE, POC    Collection Time: 12/28/20 11:20 PM   Result Value Ref Range    Glucose (POC) 162 (H) 65 - 100 mg/dL   GLUCOSE, POC    Collection Time: 12/29/20  5:00 AM   Result Value Ref Range    Glucose (POC) 127 (H) 65 - 100 mg/dL   GLUCOSE, POC    Collection Time: 12/29/20 11:47 AM   Result Value Ref Range    Glucose (POC) 142 (H) 65 - 100 mg/dL         Functional Assessment:  Functional Assessment  Baseline Functional Status: Ambulated independently  Fall in Past 12 Months: No  Decline in Gait/Transfer/Balance: No  Decline in Capacity to Feed/Dress/Bathe: No  Developmental Delay: No  Chewing/Swallowing Problems: No  Difficulty with Secretions: No  Speech Slurred/Thick/Garbled: No     Leroy Score:        Speech Assessment:  Dysphagia Screening  Vocal Quality/Secretions: (!) Abnormal  History of Dysphagia: No  O2 Saturation: Normal  Alertness: (!) Abnormal  Pre-Swallow Assessment Score: 2                Ambulation:  Activity and Safety  Activity Level: Up with Assistance  Ambulate: No (Comment)  Activity: In bed  Activity Assistance: Partial (one person)  Weight Bearing Status: WBAT (Weight Bearing as Tolerated)  Mode of Transportation: Stretcher, Oxygen, IV equipment  Repositioned: Head of bed elevated (degrees)  Patient Turned: Turns self  Head of Bed Elevated: HOB 45  Activity Response: Fairly tolerated  Assistive Device: Fall prevention device  Safety Measures: Bed/Chair alarm on, Bed/Chair-Wheels locked, Bed in low position, Call light within reach, Fall prevention (comment), Gripper socks, Nurse at bedside, Side rails X2     Impression / Assessment:     Principal Problem:    ICH (intracerebral hemorrhage) ([facility] [address] (12/25/2020)    Active Problems:    Essential hypertension (12/26/2020)      Intracranial mass (12/26/2020)      Encephalopathy (12/26/2020)      Seizures ([facility] [address] (12/28/2020)    ICH with underlying R parietal mass    Plan / Recommendations / Medical Decision Making:     Recommendations:   Continue Acute Rehab Program  Coordination of rehab / medical care  Counseling of PM&R care issues management  Monitoring and management of medical conditions per plan of care / orders  -awaiting OT eval. Cont PT/ST; will need to detm plan from a surgical standpoint.    -appropriate for [facility] when medically stable depending on progress and tolerance  -discussed rehab with wife and pt  -[facility] is transferring unit to [facility] in [facility] on 12/29 due to need for acute medical beds to meet hospital's needs  -will need Covid testing  Discussion with Family / Caregiver / Staff  Reviewed Therapies / Renell Malgorzata / Lisa Ours / Records    Thank you very much for this referral. We appreciate the opportunity to participate in this patient's care. We will continue to follow. Nohemy Good MD, Medical Director  [address] [place] [address]

## 2020-12-29 NOTE — PROGRESS NOTES
Problem: Dysphagia (Adult)  Goal: *Speech Goal: (INSERT TEXT)  Description: LTG: Patient will tolerate least restrictive diet without overt signs or symptoms of airway compromise by discharge. STG: Patient will tolerate chopped mechanical soft diet and nectar liquids without overt signs or symptoms of aspiration. MET 12/29/20  STG: Patient will perform laryngeal strengthening exercises x10 each with 70% accuracy to improve strength and coordination of swallowing function. NOT INDICATED  STG: Patient will participate in modified barium swallow study as clinically indicated. MET 12/29/30  STG: Patient will tolerate regular diet/thin liquid without s/sx of airway compromise. ADDED 12/29/30  STG: Patient will participate in cognitive/linguistic assessment x1. Outcome: Progressing Towards Goal     SPEECH LANGUAGE PATHOLOGY: MODIFIED BARIUM SWALLOW STUDY  Initial Assessment    NAME/AGE/GENDER: Keshav Ojeda is a 68 y.o. male  DATE: 12/29/2020  PRIMARY DIAGNOSIS: ICH (intracerebral hemorrhage) ([facility] [address] [I61.9]      ICD-10: Treatment Diagnosis: Oral dysphagia (R13.11)  INTERDISCIPLINARY COLLABORATION: Radiologist, Timmy Vieira  PRECAUTIONS/ALLERGIES: Patient has no known allergies. RECOMMENDATIONS/PLAN   DIET:    PO diet texture:  Regular   Liquids:  regular thin    MEDICATIONS: With liquid  Whole in puree  Per patient preference     COMPENSATORY STRATEGIES/MODIFICATIONS INCLUDING:  · Upright for all PO  · Slow rate of PO intake     OTHER RECOMMENDATIONS (including follow up treatment recommendations):   · Patient/family education  · Ongoing speech therapy for cognitive linquistic evaluation. RECOMMENDATIONS for CONTINUED SPEECH THERAPY:   YES: Anticipate need for ongoing speech therapy during this hospitalization. FREQUENCY/DURATION: Continue to follow patient 1 time a week for duration of hospital stay to address above goals. Speech therapy to follow up for assessment of cognitive-linguistic function. Recommendations for next treatment session: Next treatment will address diet tolerance, possible cognitive-linguistic evaluation       ASSESSMENT   Mr. Scooby Marrufo presents with oropharyngeal swallow function that is essentially within normal limits. Study essentially unremarkable aside from single incident of trace, transient penetration on tsp sips of thin. Penetration event related to delayed epiglottic inversion with small bolus size. Hyolaryngeal elevation and excursion is within normal limits, and appropriate epiglottic inversion on all remaining trials. No additional penetration or aspiration. Oral prep was mildly delayed with solid textures, but cleared with single swallow. No residual in pharynx after liquids or solids. Occasional dry swallow was observed after liquid and solids which is believed to be habitual.  Barium tablet attempted as patient report difficulty with large pills. Pill appropriately cleared from pharynx with taken with sip of thin liquids. Recommend regular diet/thin liquids. Medications whole with liquid wash or whole in puree- per patient preference. Will follow up for diet tolerance x1. SUBJECTIVE   Pleasant and cooperative during evaluation. Very inquisitive about radiology equipment and purpose of study. History of Present Injury/Illness: Mr. Scooby Marrufo  has a past medical history of Anxiety disorder (12/9/2014), Arthritis (12/9/2014), Depression (12/9/2014), Gout (12/9/2014), History of lumbar laminectomy for spinal cord decompression (2/18/2019), HTN (hypertension), benign (2/15/2017), Hyperlipemia (12/9/2014), Hypertension, Idiopathic chronic gout of right ankle (12/9/2014), Insomnia (12/9/2014), Kidney stone, Mixed hyperlipidemia (12/9/2014), Panic disorder (12/9/2014), Primary insomnia (12/9/2014), and Rosacea (2/15/2017). Chiquita Napier  He also  has a past surgical history that includes hx other surgical; hx cyst removal; hx cyst removal; hx hernia repair (Bilateral); hx ankle fracture tx (Left, 6/1974); hx wrist fracture tx (Bilateral, 6/1993); hx colonoscopy (2007); and hx cyst removal.   Pain:  Pain Intensity 1: 0  Pain Location 1: Head  Pain Orientation 1: Mid, Anterior  Pain Intervention(s) 1: Medication (see MAR)    Results of most recent clinical bedside swallow assessment: 12/28/20- Mechanical soft/nectar thick liquid recommendations    Current dietary status prior to evaluation today:  Mechanical soft/nectar    Previous Modified Barium Swallow studies: N/A    Current Medications:   No current facility-administered medications on file prior to encounter. Current Outpatient Medications on File Prior to Encounter   Medication Sig Dispense Refill    sertraline (ZOLOFT) 100 mg tablet Take 1.5 Tabs by mouth daily. One and half tab every day 135 Tab 1    allopurinoL (ZYLOPRIM) 100 mg tablet Take 2 Tabs by mouth daily. 180 Tab 1    amLODIPine-Olmesartan (Cuco) 10-40 mg tab Take 1 Tab by mouth daily. 90 Tab 1    atorvastatin (LIPITOR) 40 mg tablet TAKE 1 TABLET BY MOUTH EVERY DAY 90 Tab 1    zolpidem (AMBIEN) 10 mg tablet TAKE 1 TABLET BY MOUTH AT BEDTIME AS NEEDED FOR SLEEP  Indications: difficulty falling asleep 30 Tab 5    potassium chloride SR (Klor-Con 10) 10 mEq tablet Take 1 Tab by mouth daily. 90 Tab 3    multivitamin (ONE A DAY) tablet Take 1 Tab by mouth daily.  cholecalciferol, vitamin D3, (VITAMIN D3) 2,000 unit tab Take  by mouth.  metroNIDAZOLE (METROGEL) 1 % topical gel Apply  to affected area daily. Use a thin layer to affected areas after washing 45 g 3    furosemide (LASIX) 20 mg tablet Take 1 Tab by mouth daily. 90 Tab 1    diclofenac sodium (PENNSAID) 1.5 % drop by Apply Externally route.  diclofenac (VOLTAREN) 1 % gel Apply 4 g to affected area four (4) times daily.  2 Each 3       OBJECTIVE   Orientation:    Person   Place    Oral Assessment:  Labial: No impairment  Dentition: Upper Dentures and Lower Dentures  Oral Hygiene: Adequate  Lingual: No impairment  Vocal Quality: WFL    Modified barium swallow study was performed in the radiology suite with Mr. Alyson Bro in the lateral plane using C-arm. To evaluate his swallow function, barium coated liquid and food was administered in the form of thin liquids, pudding, mixed consistency and cracker. Oral phase of swallow:    slightly prolonged mastication    Pharyngeal phase of swallow:    Swallows of thin liquids were timely. There was trace and transient laryngeal penetration observed during the swallow from tsp sip that was cleared spontaneously during the swallow. No aspiration was observed. No pharyngeal residue after swallow.  Swallows of pudding were timely. No laryngeal penetration or aspiration was observed. No pharyngeal residue after swallow.  Swallows of mixed consistencies were timely. No laryngeal penetration or aspiration was observed. No pharyngeal residue after swallow.  Swallows of cracker were timely. No laryngeal penetration or aspiration was observed. No pharyngeal residue after swallow. Pharyngeal characteristics:   functional pharyngeal swallow  Attempted strategies:    none  Effective strategies:    none  Aspiration/Penetration Scale: 2 (Penetration/no residue. Contrast enters the larynx, remains above the folds/cords, and is cleared.)    Cervical esophageal phase of swallow:    adequate and timely clearance of all boluses through cervical esophagus  **Distal esophagus not assessed due to limitations of MBS study. Assessment/Reassessment only, no treatment provided today.       Tool Used: Dysphagia Outcome and Severity Scale (YUNIOR)    Comments   Normal Diet With no strategies or extra time needed   Functional Swallow May have mild oral or pharyngeal delay   Mild Dysphagia Which may require one diet consistency restricted    Mild-Moderate Dysphagia With 1-2 diet consistencies restricted   Moderate Dysphagia With 2 or more diet consistencies restricted Moderately Severe Dysphagia With partial PO strategies (trials with ST only)   Severe Dysphagia With inability to tolerate any PO safely      Score:  Initial: 7 Most Recent: x (Date:12/29/2020)   Interpretation of Tool: The Dysphagia Outcome and Severity Scale (YUNIOR) is a simple, easy-to-use, 7-point scale developed to systematically rate the functional severity of dysphagia based on objective assessment and make recommendations for diet level, independence level, and type of nutrition. [place] / [place] / Product Type: Medicare /     Education:  · Recommendations discussed with patient at end of study. Safety:   After treatment position/precautions:  · Patient waiting in radiology holding bay for transport back to room.   ·     Total Treatment Duration:  Time In: 0830   Time Out: 0900    DEENA Fernandes, CCC-SLP

## 2020-12-30 ENCOUNTER — ANESTHESIA EVENT (OUTPATIENT)
Dept: SURGERY | Age: 80
DRG: 025 | End: 2020-12-30
Payer: MEDICARE

## 2020-12-30 LAB
ANION GAP SERPL CALC-SCNC: 7 MMOL/L (ref 7–16)
BASOPHILS # BLD: 0.1 K/UL (ref 0–0.2)
BASOPHILS NFR BLD: 0 % (ref 0–2)
BUN SERPL-MCNC: 34 MG/DL (ref 8–23)
CALCIUM SERPL-MCNC: 8.5 MG/DL (ref 8.3–10.4)
CHLORIDE SERPL-SCNC: 105 MMOL/L (ref 98–107)
CO2 SERPL-SCNC: 28 MMOL/L (ref 21–32)
CREAT SERPL-MCNC: 1.14 MG/DL (ref 0.8–1.5)
DIFFERENTIAL METHOD BLD: ABNORMAL
EOSINOPHIL # BLD: 0 K/UL (ref 0–0.8)
EOSINOPHIL NFR BLD: 0 % (ref 0.5–7.8)
ERYTHROCYTE [DISTWIDTH] IN BLOOD BY AUTOMATED COUNT: 12.2 % (ref 11.9–14.6)
GLUCOSE BLD STRIP.AUTO-MCNC: 148 MG/DL (ref 65–100)
GLUCOSE BLD STRIP.AUTO-MCNC: 155 MG/DL (ref 65–100)
GLUCOSE BLD STRIP.AUTO-MCNC: 164 MG/DL (ref 65–100)
GLUCOSE BLD STRIP.AUTO-MCNC: 181 MG/DL (ref 65–100)
GLUCOSE SERPL-MCNC: 134 MG/DL (ref 65–100)
HCT VFR BLD AUTO: 41.3 % (ref 41.1–50.3)
HGB BLD-MCNC: 14.5 G/DL (ref 13.6–17.2)
IMM GRANULOCYTES # BLD AUTO: 0.3 K/UL (ref 0–0.5)
IMM GRANULOCYTES NFR BLD AUTO: 2 % (ref 0–5)
LYMPHOCYTES # BLD: 0.9 K/UL (ref 0.5–4.6)
LYMPHOCYTES NFR BLD: 6 % (ref 13–44)
MCH RBC QN AUTO: 31.7 PG (ref 26.1–32.9)
MCHC RBC AUTO-ENTMCNC: 35.1 G/DL (ref 31.4–35)
MCV RBC AUTO: 90.4 FL (ref 79.6–97.8)
MONOCYTES # BLD: 1.1 K/UL (ref 0.1–1.3)
MONOCYTES NFR BLD: 7 % (ref 4–12)
NEUTS SEG # BLD: 13.8 K/UL (ref 1.7–8.2)
NEUTS SEG NFR BLD: 86 % (ref 43–78)
NRBC # BLD: 0 K/UL (ref 0–0.2)
PLATELET # BLD AUTO: 164 K/UL (ref 150–450)
PMV BLD AUTO: 10.1 FL (ref 9.4–12.3)
POTASSIUM SERPL-SCNC: 3.6 MMOL/L (ref 3.5–5.1)
RBC # BLD AUTO: 4.57 M/UL (ref 4.23–5.6)
SODIUM SERPL-SCNC: 140 MMOL/L (ref 138–145)
WBC # BLD AUTO: 16.2 K/UL (ref 4.3–11.1)

## 2020-12-30 PROCEDURE — 97112 NEUROMUSCULAR REEDUCATION: CPT

## 2020-12-30 PROCEDURE — 74011636637 HC RX REV CODE- 636/637: Performed by: INTERNAL MEDICINE

## 2020-12-30 PROCEDURE — 85025 COMPLETE CBC W/AUTO DIFF WBC: CPT

## 2020-12-30 PROCEDURE — 36415 COLL VENOUS BLD VENIPUNCTURE: CPT

## 2020-12-30 PROCEDURE — 87635 SARS-COV-2 COVID-19 AMP PRB: CPT

## 2020-12-30 PROCEDURE — 82962 GLUCOSE BLOOD TEST: CPT

## 2020-12-30 PROCEDURE — 74011250637 HC RX REV CODE- 250/637: Performed by: INTERNAL MEDICINE

## 2020-12-30 PROCEDURE — 74011250636 HC RX REV CODE- 250/636: Performed by: NEUROLOGICAL SURGERY

## 2020-12-30 PROCEDURE — 99231 SBSQ HOSP IP/OBS SF/LOW 25: CPT | Performed by: PHYSICAL MEDICINE & REHABILITATION

## 2020-12-30 PROCEDURE — 65270000029 HC RM PRIVATE

## 2020-12-30 PROCEDURE — 92526 ORAL FUNCTION THERAPY: CPT

## 2020-12-30 PROCEDURE — 2709999900 HC NON-CHARGEABLE SUPPLY

## 2020-12-30 PROCEDURE — 80048 BASIC METABOLIC PNL TOTAL CA: CPT

## 2020-12-30 RX ORDER — SODIUM CHLORIDE 9 MG/ML
100 INJECTION, SOLUTION INTRAVENOUS CONTINUOUS
Status: DISCONTINUED | OUTPATIENT
Start: 2020-12-31 | End: 2021-01-02

## 2020-12-30 RX ADMIN — DEXAMETHASONE SODIUM PHOSPHATE 4 MG: 4 INJECTION, SOLUTION INTRAMUSCULAR; INTRAVENOUS at 05:09

## 2020-12-30 RX ADMIN — Medication 10 ML: at 05:10

## 2020-12-30 RX ADMIN — LEVETIRACETAM 1000 MG: 100 SOLUTION ORAL at 20:30

## 2020-12-30 RX ADMIN — INSULIN LISPRO 2 UNITS: 100 INJECTION, SOLUTION INTRAVENOUS; SUBCUTANEOUS at 20:29

## 2020-12-30 RX ADMIN — LEVETIRACETAM 1000 MG: 100 SOLUTION ORAL at 08:42

## 2020-12-30 RX ADMIN — DEXAMETHASONE SODIUM PHOSPHATE 4 MG: 4 INJECTION, SOLUTION INTRAMUSCULAR; INTRAVENOUS at 12:02

## 2020-12-30 RX ADMIN — Medication 10 ML: at 20:31

## 2020-12-30 RX ADMIN — Medication 10 ML: at 14:40

## 2020-12-30 RX ADMIN — INSULIN LISPRO 2 UNITS: 100 INJECTION, SOLUTION INTRAVENOUS; SUBCUTANEOUS at 12:01

## 2020-12-30 RX ADMIN — AMLODIPINE BESYLATE 10 MG: 5 TABLET ORAL at 08:43

## 2020-12-30 RX ADMIN — LOSARTAN POTASSIUM 25 MG: 25 TABLET, FILM COATED ORAL at 08:43

## 2020-12-30 RX ADMIN — INSULIN LISPRO 2 UNITS: 100 INJECTION, SOLUTION INTRAVENOUS; SUBCUTANEOUS at 17:37

## 2020-12-30 RX ADMIN — SERTRALINE HYDROCHLORIDE 150 MG: 100 TABLET ORAL at 08:42

## 2020-12-30 RX ADMIN — FUROSEMIDE 20 MG: 40 TABLET ORAL at 08:42

## 2020-12-30 RX ADMIN — DEXAMETHASONE SODIUM PHOSPHATE 4 MG: 4 INJECTION, SOLUTION INTRAMUSCULAR; INTRAVENOUS at 17:36

## 2020-12-30 NOTE — ANESTHESIA PREPROCEDURE EVALUATION
Relevant Problems   NEUROLOGY   (+) MEGGAN (generalized anxiety disorder)   (+) Panic disorder   (+) Seizures (HCC)      CARDIOVASCULAR   (+) Essential hypertension   (+) HTN (hypertension), benign      ENDOCRINE   (+) Arthritis       Anesthetic History   No history of anesthetic complications            Review of Systems / Medical History  Patient summary reviewed and pertinent labs reviewed    Pulmonary  Within defined limits                 Neuro/Psych     seizures: well controlled    Psychiatric history    Comments:   Encephalopathy Cardiovascular    Hypertension: well controlled          Hyperlipidemia    Exercise tolerance: <4 METS     GI/Hepatic/Renal  Within defined limits              Endo/Other        Arthritis     Other Findings   Comments: Admitted 12/25 with AMS, found to have R mass with edema with ICP, intubated, steroids, extubated     right parietal mass with hemorrhage              Anesthetic Plan    ASA: 3  Anesthesia type: general    Monitoring Plan: Arterial line      Induction: Intravenous  Anesthetic plan and risks discussed with: Patient and Spouse

## 2020-12-30 NOTE — PROGRESS NOTES
Clay Still MD  Medical Director  [address] [facility], [address] [facility]  Tel: [phone]       Physical Medicine & Rehab Consult Progress Note      Patient: [facility]au[facility] Laine[facility]  Admit Date: 12/25/2020  Admit Diagnosis: ICH (intracerebral hemorrhage) ([facility] [address] [I61.9]    Recommendations:   Hospital Inpatient Rehab, Continue acute rehabilitation program, Coordination of rehab / medical care, Counseling of PM&R care issues management  -Crani for bx and tumor resection 12/31 per Dr Tati Espinosa. Will f/u post op. Cont PT/OT/ST . Pt with some confusion today and appears more sleepy. Cont Decadron per Dr Tati Espinosa. -IRC pending medical stability and insurance clearance. Will be moving [facility] to [facility] today to free up acute medical beds on 9th floor due to hospital's increasing need. This was already d/w pt and wife yesterday. History / Subjective / Complaint:     Patient seen and examined, interim EMR reviewed. Sleepy, awakens to name and tactile stimuli.      No Known Allergies  Current Facility-Administered Medications   Medication Dose Route Frequency    [START ON 12/31/2020] 0.9% sodium chloride infusion  100 mL/hr IntraVENous CONTINUOUS    dexamethasone (DECADRON) 4 mg/mL injection 4 mg  4 mg IntraVENous Q6H    insulin lispro (HUMALOG) injection   SubCUTAneous AC&HS    sertraline (ZOLOFT) tablet 150 mg  150 mg Oral DAILY    carboxymethylcellulose sodium (REFRESH LIQUIGEL) 1 % ophthalmic solution 1 Drop  1 Drop Both Eyes PRN    white petrolatum-mineral oiL (STYE LUBRICANT) ointment   Both Eyes PRN    losartan (COZAAR) tablet 25 mg  25 mg Oral DAILY    amLODIPine (NORVASC) tablet 10 mg  10 mg Oral DAILY    furosemide (LASIX) tablet 20 mg  20 mg Oral DAILY    levETIRAcetam (KEPPRA) oral solution 1,000 mg  1,000 mg Oral Q12H    0.9% sodium chloride infusion 250 mL  250 mL IntraVENous PRN    sodium chloride (NS) flush 5-40 mL  5-40 mL IntraVENous Q8H  sodium chloride (NS) flush 5-40 mL  5-40 mL IntraVENous PRN    acetaminophen (TYLENOL) tablet 650 mg  650 mg Oral Q6H PRN    Or    acetaminophen (TYLENOL) suppository 650 mg  650 mg Rectal Q6H PRN    polyethylene glycol (MIRALAX) packet 17 g  17 g Oral DAILY PRN    promethazine (PHENERGAN) tablet 12.5 mg  12.5 mg Oral Q6H PRN    Or    ondansetron (ZOFRAN) injection 4 mg  4 mg IntraVENous Q6H PRN       Objective:     Vitals:  Patient Vitals for the past 8 hrs:   BP Temp Pulse Resp SpO2 Weight   12/30/20 0742 (!) 137/93 97.9 °F (36.6 °C) 84 18 93 %    12/30/20 0521      199 lb 12.8 oz (90.6 kg)   12/30/20 0317 (!) 157/72 97.8 °F (36.6 °C) 73 16 93 %       Intake and Output:  12/28 1901 - 12/30 0700  In: -   Out: 947 [Urine:675]    Physical Exam:  Sleepy, speech dysarthric, slow. Thought processing delayed  Initially thought he was at home. Reoriented easily. HEENT. NCAT PERRLA, no nystagmus  CV rrr no m  LUNGS cta b  ABD soft ntnd bs +  EXT no edema  Neuro left  4+ vs 5 on the right, mild left UE drift          Pain 1  Pain Scale 1: Numeric (0 - 10) (12/29/20 1911)  Pain Intensity 1: 0 (12/29/20 1911)  Patient Stated Pain Goal: 0 (12/29/20 1911)  Pain Reassessment 1: Yes (12/29/20 1911)  Pain Onset 1: acute (12/27/20 0815)  Pain Location 1: Head (12/27/20 0815)  Pain Orientation 1: Mid;Anterior (12/27/20 0815)  Pain Description 1: Aching; Sore (12/27/20 0815)  Pain Intervention(s) 1: Medication (see MAR) (12/27/20 0815)     Functional Assessment:                                                  Labs/Studies:  Recent Results (from the past 72 hour(s))   GLUCOSE, POC    Collection Time: 12/27/20 11:03 AM   Result Value Ref Range    Glucose (POC) 174 (H) 65 - 100 mg/dL   GLUCOSE, POC    Collection Time: 12/27/20  5:51 PM   Result Value Ref Range    Glucose (POC) 177 (H) 65 - 100 mg/dL   GLUCOSE, POC    Collection Time: 12/28/20  2:12 AM   Result Value Ref Range    Glucose (POC) 166 (H) 65 - 100 mg/dL   GLUCOSE, POC    Collection Time: 12/28/20  5:25 AM   Result Value Ref Range    Glucose (POC) 152 (H) 65 - 100 mg/dL   METABOLIC PANEL, BASIC    Collection Time: 12/28/20  5:43 AM   Result Value Ref Range    Sodium 140 136 - 145 mmol/L    Potassium 3.8 3.5 - 5.1 mmol/L    Chloride 103 98 - 107 mmol/L    CO2 31 21 - 32 mmol/L    Anion gap 6 (L) 7 - 16 mmol/L    Glucose 137 (H) 65 - 100 mg/dL    BUN 31 (H) 8 - 23 MG/DL    Creatinine 1.20 0.8 - 1.5 MG/DL    GFR est AA >60 >60 ml/min/1.73m2    GFR est non-AA >60 >60 ml/min/1.73m2    Calcium 8.7 8.3 - 10.4 MG/DL   CBC W/O DIFF    Collection Time: 12/28/20  5:43 AM   Result Value Ref Range    WBC 16.4 (H) 4.3 - 11.1 K/uL    RBC 4.43 4.23 - 5.6 M/uL    HGB 14.1 13.6 - 17.2 g/dL    HCT 40.3 (L) 41.1 - 50.3 %    MCV 91.0 79.6 - 97.8 FL    MCH 31.8 26.1 - 32.9 PG    MCHC 35.0 31.4 - 35.0 g/dL    RDW 12.2 11.9 - 14.6 %    PLATELET 173 150 - 450 K/uL    MPV 9.9 9.4 - 12.3 FL    ABSOLUTE NRBC 0.00 0.0 - 0.2 K/uL   GLUCOSE, POC    Collection Time: 12/28/20 11:32 AM   Result Value Ref Range    Glucose (POC) 160 (H) 65 - 100 mg/dL   GLUCOSE, POC    Collection Time: 12/28/20  5:28 PM   Result Value Ref Range    Glucose (POC) 161 (H) 65 - 100 mg/dL   GLUCOSE, POC    Collection Time: 12/28/20 11:20 PM   Result Value Ref Range    Glucose (POC) 162 (H) 65 - 100 mg/dL   GLUCOSE, POC    Collection Time: 12/29/20  5:00 AM   Result Value Ref Range    Glucose (POC) 127 (H) 65 - 100 mg/dL   GLUCOSE, POC    Collection Time: 12/29/20 11:47 AM   Result Value Ref Range    Glucose (POC) 142 (H) 65 - 100 mg/dL   GLUCOSE, POC    Collection Time: 12/29/20  4:59 PM   Result Value Ref Range    Glucose (POC) 185 (H) 65 - 100 mg/dL   GLUCOSE, POC    Collection Time: 12/29/20  9:07 PM   Result Value Ref Range    Glucose (POC) 158 (H) 65 - 100 mg/dL   CBC WITH AUTOMATED DIFF    Collection Time: 12/30/20  6:34 AM   Result Value Ref Range    WBC 16.2 (H) 4.3 - 11.1 K/uL    RBC 4.57 4.23 - 5.6 M/uL    HGB  14.5 13.6 - 17.2 g/dL    HCT 41.3 41.1 - 50.3 %    MCV 90.4 79.6 - 97.8 FL    MCH 31.7 26.1 - 32.9 PG    MCHC 35.1 (H) 31.4 - 35.0 g/dL    RDW 12.2 11.9 - 14.6 %    PLATELET 413 417 - 888 K/uL    MPV 10.1 9.4 - 12.3 FL    ABSOLUTE NRBC 0.00 0.0 - 0.2 K/uL    DF AUTOMATED      NEUTROPHILS 86 (H) 43 - 78 %    LYMPHOCYTES 6 (L) 13 - 44 %    MONOCYTES 7 4.0 - 12.0 %    EOSINOPHILS 0 (L) 0.5 - 7.8 %    BASOPHILS 0 0.0 - 2.0 %    IMMATURE GRANULOCYTES 2 0.0 - 5.0 %    ABS. NEUTROPHILS 13.8 (H) 1.7 - 8.2 K/UL    ABS. LYMPHOCYTES 0.9 0.5 - 4.6 K/UL    ABS. MONOCYTES 1.1 0.1 - 1.3 K/UL    ABS. EOSINOPHILS 0.0 0.0 - 0.8 K/UL    ABS. BASOPHILS 0.1 0.0 - 0.2 K/UL    ABS. IMM.  GRANS. 0.3 0.0 - 0.5 K/UL   METABOLIC PANEL, BASIC    Collection Time: 12/30/20  6:34 AM   Result Value Ref Range    Sodium 140 138 - 145 mmol/L    Potassium 3.6 3.5 - 5.1 mmol/L    Chloride 105 98 - 107 mmol/L    CO2 28 21 - 32 mmol/L    Anion gap 7 7 - 16 mmol/L    Glucose 134 (H) 65 - 100 mg/dL    BUN 34 (H) 8 - 23 MG/DL    Creatinine 1.14 0.8 - 1.5 MG/DL    GFR est AA >60 >60 ml/min/1.73m2    GFR est non-AA >60 >60 ml/min/1.73m2    Calcium 8.5 8.3 - 10.4 MG/DL   GLUCOSE, POC    Collection Time: 12/30/20  7:41 AM   Result Value Ref Range    Glucose (POC) 148 (H) 65 - 100 mg/dL        Assessment:     Principal Problem:    ICH (intracerebral hemorrhage) (HCC) (12/25/2020)    Active Problems:    Essential hypertension (12/26/2020)      Intracranial mass (12/26/2020)      Encephalopathy (12/26/2020)      Seizures (HCC) (12/28/2020)        Plan / Recommendations / Medical Decision Making:     Recommendations:   Continue acute rehabilitation program  Coordination of rehab / medical care  Counseling of PM&R care issues management  Monitoring and management of medical conditions per plan of care / orders    Discussion with Family / Caregiver / Staff    Reviewed Therapies / Harshil Cross / Medications / Records

## 2020-12-30 NOTE — PROGRESS NOTES
CM continues to follow for discharge planning and/or CM needs. At this time, tentative plan is for pt to transition to [facility] when stable if remains medically stable and insurance authorization. Pt with procedure scheduled for tomorrow, 12/31. CM to continue to monitor and update as needed.

## 2020-12-30 NOTE — PROGRESS NOTES
Hospitalist Note     Admit Date:  2020  5:40 AM   Name:  Mark Núñez   Age:  68 y.o.  :  1940   MRN:  [id number]   PCP:  Bruna Borjas DO  Treatment Team: Attending Provider: Khalif Ortega MD; Consulting Provider: Nestor Giles MD; Utilization Review: Vasyl Douglas; Consulting Provider: Fela Flanagan MD; Consulting Provider: Oanh Wheatley MD; Care Manager: Sirisha Arzola [facility]; Charge Nurse: Leann Teran; Physical Therapy Assistant: Juarez Young; Speech Language Pathologist: ZONIA Chan    HPI/Subjective:   Mr. Layla Villaseñor is a very nice 67 y/o WM with a h/o HTN, obesity, anxiety, HLD who presented on  with encephalopathy and seizure. He was intubated. CT head with right parietal mass with hemorrhage. Neurosurgery and Neurology consulted. CT c/a/p without evidence of primary or metastatic disease. Brain MRI showed the same. He was started on Keppra. EEG abnormal. Extubated and transferred to the floor. Hospitalist consulted to assume care on . Surgical plans pending, resection vs biopsy. : Sleeping comfortably, wife at bedside. Patient woke up and asked if surgery was over, but otherwise is oriented after speaking with him. Plans for surgery tomorrow.     No other complaints  Objective:     Patient Vitals for the past 24 hrs:   Temp Pulse Resp BP SpO2   20 0742 97.9 °F (36.6 °C) 84 18 (!) 137/93 93 %   20 0317 97.8 °F (36.6 °C) 73 16 (!) 157/72 93 %   20 2304 97.8 °F (36.6 °C) 80 18 (!) 158/77 94 %   20 1927 97.4 °F (36.3 °C) 71 19 (!) 143/71 93 %   20 1529 98.1 °F (36.7 °C) 94 16 (!) 148/61 97 %   20 1149 97.3 °F (36.3 °C) 75 16 137/79 92 %     Oxygen Therapy  O2 Sat (%): 93 % (20 0742)  Pulse via Oximetry: 79 beats per minute (20 1430)  O2 Device: Hi flow nasal cannula (20 1100)  O2 Flow Rate (L/min): 6 l/min (20 1100)  FIO2 (%): 45 % (20 0000)    Estimated body mass index is 28.67 kg/m² as calculated from the following:    Height as of an earlier encounter on 12/25/20: 5' 10\" (1.778 m). Weight as of this encounter: 90.6 kg (199 lb 12.8 oz). Intake/Output Summary (Last 24 hours) at 12/30/2020 1044  Last data filed at 12/30/2020 0342  Gross per 24 hour   Intake    Output 325 ml   Net -325 ml       *Note that automatically entered I/Os may not be accurate; dependent on patient compliance with collection and accurate  by techs. General:    Well nourished. No overt distress  CV:   RRR. No m/r/g. No edema. No JVD  Lungs:   CTAB. No wheezing, rhonchi, or rales. Unlabored  Abdomen:   Soft, nontender, nondistended. Extremities: Warm and dry. No cyanosis   Skin:     No rashes. No jaundice. Normal coloration  Neuro:  No gross focal deficits. Alert. Mild slurring of speech. Moves all extremities spontaneously.      Data Reviewed:  I have reviewed all labs, meds, and studies from the last 24 hours:  Recent Results (from the past 24 hour(s))   GLUCOSE, POC    Collection Time: 12/29/20 11:47 AM   Result Value Ref Range    Glucose (POC) 142 (H) 65 - 100 mg/dL   GLUCOSE, POC    Collection Time: 12/29/20  4:59 PM   Result Value Ref Range    Glucose (POC) 185 (H) 65 - 100 mg/dL   GLUCOSE, POC    Collection Time: 12/29/20  9:07 PM   Result Value Ref Range    Glucose (POC) 158 (H) 65 - 100 mg/dL   CBC WITH AUTOMATED DIFF    Collection Time: 12/30/20  6:34 AM   Result Value Ref Range    WBC 16.2 (H) 4.3 - 11.1 K/uL    RBC 4.57 4.23 - 5.6 M/uL    HGB 14.5 13.6 - 17.2 g/dL    HCT 41.3 41.1 - 50.3 %    MCV 90.4 79.6 - 97.8 FL    MCH 31.7 26.1 - 32.9 PG    MCHC 35.1 (H) 31.4 - 35.0 g/dL    RDW 12.2 11.9 - 14.6 %    PLATELET 996 743 - 348 K/uL    MPV 10.1 9.4 - 12.3 FL    ABSOLUTE NRBC 0.00 0.0 - 0.2 K/uL    DF AUTOMATED      NEUTROPHILS 86 (H) 43 - 78 %    LYMPHOCYTES 6 (L) 13 - 44 %    MONOCYTES 7 4.0 - 12.0 %    EOSINOPHILS 0 (L) 0.5 - 7.8 % BASOPHILS 0 0.0 - 2.0 %    IMMATURE GRANULOCYTES 2 0.0 - 5.0 %    ABS. NEUTROPHILS 13.8 (H) 1.7 - 8.2 K/UL    ABS. LYMPHOCYTES 0.9 0.5 - 4.6 K/UL    ABS. MONOCYTES 1.1 0.1 - 1.3 K/UL    ABS. EOSINOPHILS 0.0 0.0 - 0.8 K/UL    ABS. BASOPHILS 0.1 0.0 - 0.2 K/UL    ABS. IMM.  GRANS. 0.3 0.0 - 0.5 K/UL   METABOLIC PANEL, BASIC    Collection Time: 12/30/20  6:34 AM   Result Value Ref Range    Sodium 140 138 - 145 mmol/L    Potassium 3.6 3.5 - 5.1 mmol/L    Chloride 105 98 - 107 mmol/L    CO2 28 21 - 32 mmol/L    Anion gap 7 7 - 16 mmol/L    Glucose 134 (H) 65 - 100 mg/dL    BUN 34 (H) 8 - 23 MG/DL    Creatinine 1.14 0.8 - 1.5 MG/DL    GFR est AA >60 >60 ml/min/1.73m2    GFR est non-AA >60 >60 ml/min/1.73m2    Calcium 8.5 8.3 - 10.4 MG/DL   GLUCOSE, POC    Collection Time: 12/30/20  7:41 AM   Result Value Ref Range    Glucose (POC) 148 (H) 65 - 100 mg/dL       Current Meds:  Current Facility-Administered Medications   Medication Dose Route Frequency    [START ON 12/31/2020] 0.9% sodium chloride infusion  100 mL/hr IntraVENous CONTINUOUS    dexamethasone (DECADRON) 4 mg/mL injection 4 mg  4 mg IntraVENous Q6H    insulin lispro (HUMALOG) injection   SubCUTAneous AC&HS    sertraline (ZOLOFT) tablet 150 mg  150 mg Oral DAILY    carboxymethylcellulose sodium (REFRESH LIQUIGEL) 1 % ophthalmic solution 1 Drop  1 Drop Both Eyes PRN    white petrolatum-mineral oiL (STYE LUBRICANT) ointment   Both Eyes PRN    losartan (COZAAR) tablet 25 mg  25 mg Oral DAILY    amLODIPine (NORVASC) tablet 10 mg  10 mg Oral DAILY    furosemide (LASIX) tablet 20 mg  20 mg Oral DAILY    levETIRAcetam (KEPPRA) oral solution 1,000 mg  1,000 mg Oral Q12H    0.9% sodium chloride infusion 250 mL  250 mL IntraVENous PRN    sodium chloride (NS) flush 5-40 mL  5-40 mL IntraVENous Q8H    sodium chloride (NS) flush 5-40 mL  5-40 mL IntraVENous PRN    acetaminophen (TYLENOL) tablet 650 mg  650 mg Oral Q6H PRN    Or    acetaminophen (TYLENOL) suppository 650 mg  650 mg Rectal Q6H PRN    polyethylene glycol (MIRALAX) packet 17 g  17 g Oral DAILY PRN    promethazine (PHENERGAN) tablet 12.5 mg  12.5 mg Oral Q6H PRN    Or    ondansetron (ZOFRAN) injection 4 mg  4 mg IntraVENous Q6H PRN       Other Studies:  No results found for this visit on 12/25/20. No results found. All Micro Results     None          SARS-CoV-2 Lab Results  \"Novel Coronavirus\" Test: No results found for: COV2NT   \"Emergent Disease\" Test: No results found for: EDPR  \"SARS-COV-2\" Test: No results found for: XGCOVT  Rapid Test: No results found for: COVR         Assessment and Plan:     Hospital Problems as of 12/30/2020 Date Reviewed: 9/28/2020          Codes Class Noted - Resolved POA    Seizures ([facility] ICD-10-CM: R56.9  ICD-9-CM: 780.39  12/28/2020 - Present Yes        Essential hypertension (Chronic) ICD-10-CM: I10  ICD-9-CM: 401.9  12/26/2020 - Present Yes        Intracranial mass ICD-10-CM: R90.0  ICD-9-CM: 784.2  12/26/2020 - Present Yes        Encephalopathy ICD-10-CM: G93.40  ICD-9-CM: 348.30  12/26/2020 - Present Yes        * (Principal) ICH (intracerebral hemorrhage) ([facility] ICD-10-CM: I61.9  ICD-9-CM: 445  12/25/2020 - Present Yes        RESOLVED: Acute renal failure (ARF) ([facility] ICD-10-CM: N17.9  ICD-9-CM: 584.9  12/26/2020 - 12/28/2020 Yes              Plan:  # Right parietal mass with hemorrhage              - Neurosurgery following. Keppra, Decadron.   - To OR on 12/31 for craniotomy with bx/resection   - Con't current plan of care otherwise.     # HTN              - Con't BP meds. Range is better.     # Seizures              - 2/2 above. On Keppra. Neuro s/o.     # INGRID              - Resolved.     # Acute encephalopathy              - Resolved. 2/2 seizure/parietal mass.      # MDD              - Zoloft    DC planning/Dispo: Pending. Diet:  DIET REGULAR  DIET NPO  DVT ppx: SCDs    Other listed chronic conditions stable, cont current management.     Signed:  Alejandrina Baltazar Raman Reynolds MD

## 2020-12-30 NOTE — PROGRESS NOTES
Problem: Dysphagia (Adult)  Goal: *Speech Goal: (INSERT TEXT)  Description: LTG: Patient will tolerate least restrictive diet without overt signs or symptoms of airway compromise by discharge. STG: Patient will tolerate chopped mechanical soft diet and nectar liquids without overt signs or symptoms of aspiration. MET 12/29/20  STG: Patient will perform laryngeal strengthening exercises x10 each with 70% accuracy to improve strength and coordination of swallowing function. NOT INDICATED  STG: Patient will participate in modified barium swallow study as clinically indicated. MET 12/29/30  STG: Patient will tolerate regular diet/thin liquid without s/sx of airway compromise. ADDED 12/29/30 MET 12/30/20  STG: Patient will participate in cognitive/linguistic assessment x1. Outcome: Progressing Towards Goal     SPEECH LANGUAGE PATHOLOGY: DYSPHAGIA- Daily Note 1    NAME/AGE/GENDER: Venancio Dotson is a 68 y.o. male  DATE: 12/30/2020  PRIMARY DIAGNOSIS: ICH (intracerebral hemorrhage) ([facility] [I61.9]      ICD-10: Treatment Diagnosis: R13.12 Dysphagia, Oropharyngeal Phase    RECOMMENDATIONS   DIET:    PO:  Regular   Liquids:  regular thin     MEDICATIONS: With liquid     COMPENSATORY STRATEGIES/MODIFICATIONS INCLUDING:  · Slow rate of intake. OTHER RECOMMENDATIONS (including follow up treatment recommendations):   · Possible re-assessment of swallow function if indicated following neurosurgery      RECOMMENDATIONS for CONTINUED SPEECH THERAPY:   YES: Anticipate need for ongoing speech therapy during this hospitalization and at next level of care. ASSESSMENT   Patient tolerating regular diet/thin liquids. Dysphagia has resolved and all goals met. Recommend regular diet/thin liquids. Medications with liquid wash. No additional dysphagia treatment indicated at this time. Will plan to screen swallow function for possible need for evaluation following surgery. Neurosurgery planned for tomorrow.  Plan for full cognitive-linguistic evaluation following surgery. Wife and patient in agreement with plan. CONTINUATION OF SKILLED SERVICES/MEDICAL NECESSITY:   Patient is expected to demonstrate progress in  swallow strength, swallow timeliness, swallow function, diet tolerance and swallow safety in order to  improve swallow safety, work toward diet advancement and decrease aspiration risk.  Patient continues to require skilled intervention due to dysphagia. EDUCATION:  · Recommendations discussed with Patient and RN  REHABILITATION POTENTIAL FOR STATED GOALS: Good    PLAN    FREQUENCY/DURATION: Continue to follow patient 3 times a week for duration of hospital stay to address above goals. - Recommendations for next treatment session: Next treatment will address modified barium swallow study    SUBJECTIVE   Improved cognition today. Remains slow to respond, but able to recall details from modified barium swallow study that occurred yesterday. Oxygen Device:Room air  Pain: Pain Scale 1: Numeric (0 - 10)  Pain Intensity 1: 0      Problem List:  (Impairments causing functional limitations):  1. Dysphagia   Orientation:  Person  Place -   OBJECTIVE     Swallow treatment  Patient able to self feed thin liquids via straw, mixed consistencies, and solid crackers. Appropriate oral prep with all trials. No oral residue. Cough x1 on mixed consistencies, but no additional s/sx of airway compromise on remaining 4 ounces, serials sips of thin by straw, or cracker. Dysphagia goals met.       Tool Used: Dysphagia Outcome and Severity Scale (YUNIOR)    Score Comments   Normal Diet  [] 7 With no strategies or extra time needed   Functional Swallow  [] 6 May have mild oral or pharyngeal delay   Mild Dysphagia  [] 5 Which may require one diet consistency restricted    Mild-Moderate Dysphagia  [] 4 With 1-2 diet consistencies restricted   Moderate Dysphagia  [] 3 With 2 or more diet consistencies restricted   Moderate-Severe Dysphagia [] 2 With partial PO strategies (trials with ST only)   Severe Dysphagia  [] 1 With inability to tolerate any PO safely      Score:  Initial: 4 Most Recent: 7 (Date 12/30/20 )   Interpretation of Tool: The Dysphagia Outcome and Severity Scale (YUNIOR) is a simple, easy-to-use, 7-point scale developed to systematically rate the functional severity of dysphagia based on objective assessment and make recommendations for diet level, independence level, and type of nutrition.        INTERDISCIPLINARY COLLABORATION: RN    After treatment position/precautions:  · Upright in bed  · Wife at bedside  · RN notified    Total Treatment Duration:   Time In: 8727  Time Out: [address] CCC-SLP

## 2020-12-30 NOTE — PROGRESS NOTES
NEUROSURGERY PROGRESS NOTE:   Admit Date: 12/25/2020  Subjective:   No acute overnight events. Patient with some confusion this am as he told me \"surgery was not needed because the tumor had gone away\"    Objective:  Visit Vitals  BP (!) 137/93 (BP 1 Location: Right arm, BP Patient Position: At rest)   Pulse 84   Temp 97.9 °F (36.6 °C)   Resp 18   Wt 199 lb 12.8 oz (90.6 kg)   SpO2 93%   BMI 28.67 kg/m²     General: No acute distress  CV: regular rate  Resp: No increased work of breathing  Awake, alert, and oriented to person, place and year with cognitive slowing and situational confusion   Eyes open spontaneously   PERRL, EOMI  Hearing grossly intact   Face symmetric and tongue mid-line on protrusion   No pronation or drift on exam   Patient with 5/5 strength in the bilateral upper and bilateral lower extremities   Mild left drift present     Assessment and Plan:   Anton Fordeon 68 y.o. 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   - No observable lesions present on the CT Chest Abdomen Pelvis   - Tentative plan for craniotomy for biopsy and resection for Thursday of this week. - Please make NPO and provide IVF at mid-night     - Continue decadron 4 mg q6H for now   - Appreciate medicine and neurology recommendations    Zeb Lopez, [address] [place] [address]

## 2020-12-30 NOTE — PROGRESS NOTES
Problem: Ventilator Management  Goal: *Adequate oxygenation and ventilation  12/30/2020 1403 by Otto Wise RN  Outcome: Progressing Towards Goal  12/30/2020 1403 by Otto Wise RN  Outcome: Progressing Towards Goal  Goal: *Patient maintains clear airway/free of aspiration  12/30/2020 1403 by Otto Wise RN  Outcome: Progressing Towards Goal  12/30/2020 1403 by Otto Wise RN  Outcome: Progressing Towards Goal  Goal: *Absence of infection signs and symptoms  12/30/2020 1403 by Otto Wise RN  Outcome: Progressing Towards Goal  12/30/2020 1403 by Otto Wise RN  Outcome: Progressing Towards Goal  Goal: *Normal spontaneous ventilation  12/30/2020 1403 by Otto Wise RN  Outcome: Progressing Towards Goal  12/30/2020 1403 by Otto Wise RN  Outcome: Progressing Towards Goal     Problem: Patient Education: Go to Patient Education Activity  Goal: Patient/Family Education  Outcome: Progressing Towards Goal     Problem: Pressure Injury - Risk of  Goal: *Prevention of pressure injury  Description: Document Marquez Scale and appropriate interventions in the flowsheet.   Outcome: Progressing Towards Goal  Note: Pressure Injury Interventions:  Sensory Interventions: Assess need for specialty bed    Moisture Interventions: Absorbent underpads, Apply protective barrier, creams and emollients, Assess need for specialty bed    Activity Interventions: Pressure redistribution bed/mattress(bed type)    Mobility Interventions: Pressure redistribution bed/mattress (bed type)    Nutrition Interventions: Offer support with meals,snacks and hydration    Friction and Shear Interventions: HOB 30 degrees or less

## 2020-12-30 NOTE — PROGRESS NOTES
ACUTE PHYSICAL THERAPY GOALS:  (Developed with and agreed upon by patient and/or caregiver.)  ST. Patient will perform bed mobility with SUPERVISION within 3 days. 2. Patient will transfer bed to chair with MINIMAL ASSISTANCE x1 within 3 days. 3. Patient will demonstrate FAIR DYNAMIC STANDING balance within 3 day(s). 4. Patient will ambulate 50+ using least restrictive assistive device and MINIMAL ASSISTANCE within 3 days. 5. Patient will tolerate 15+ minutes of therapeutic activity/exercise and/or neuromuscular re-education while maintaining stable vitals to improve functional strength and activity tolerance within 3 days.     LT. Patient will perform bed mobility with INDEPENDENCE within 7 days. 2. Patient will transfer bed to chair with CONTACT GUARD ASSISTANCE within 7 days. 3. Patient will demonstrate FAIR+ DYNAMIC STANDING balance within 7 day(s). 4. Patient will ambulate 150+ using least restrictive assistive device and CONTACT GUARD ASSISTANCE within 7 days. 5. Patient will tolerate 25+ minutes of therapeutic activity/exercise and/or neuromuscular re-education while maintaining stable vitals to improve functional strength and activity tolerance within 7 days.       PHYSICAL THERAPY: Daily Note and PM Treatment Day # 2    Marcelo Uriostegui is a 68 y.o. male   PRIMARY DIAGNOSIS: ICH (intracerebral hemorrhage) ([facility]  ICH (intracerebral hemorrhage) ([facility] [I61.9]         ASSESSMENT:     REHAB RECOMMENDATIONS: CURRENT LEVEL OF FUNCTION:  (Most Recently Demonstrated)   Recommendation to date pending progress:  [address] [place] [address] vs. Short-term Rehab  Equipment:    Rolling Walker Bed Mobility:   Minimal Assistance  Sit to Stand:   Minimal Assistance  Transfers:   Minimal Assistance  Gait/Mobility:   Minimal Assistance     ASSESSMENT:  Mr. Esdras Padron increased his amb distance today and worked on balance dynamic in standing.  Needs cues for proper use of the walker and safety. All activities and movements slow and require additional time. SUBJECTIVE:   Mr. Abbe Castaneda states, \"I'm glad to get up and walk but I'm sleepy. .\"    SOCIAL HISTORY/ LIVING ENVIRONMENT:   Home Environment: Private residence  # Steps to Enter: 15  One/Two Story Residence: One story  Living Alone: No  Support Systems: Spouse/Significant Other/Partner  OBJECTIVE:     PAIN: VITAL SIGNS: LINES/DRAINS:   Pre Treatment: Pain Screen  Pain Intensity 1: 0  Post Treatment: 0   none       MOBILITY: I Mod I S SBA CGA Min Mod Max Total  NT x2 Comments:   Bed Mobility    Rolling [] [] [] [] [] [] [] [] [] [] []    Supine to Sit [] [] [] [] [] [x] [] [] [] [] []    Scooting [] [] [] [] [] [x] [] [] [] [] []    Sit to Supine [] [] [] [] [] [x] [] [] [] [] []    Transfers    Sit to Stand [] [] [] [] [] [x] [] [] [] [] []    Bed to Chair [] [] [] [] [] [x] [] [] [] [] []    Stand to Sit [] [] [] [] [] [x] [] [] [] [] []    I=Independent, Mod I=Modified Independent, S=Supervision, SBA=Standby Assistance, CGA=Contact Guard Assistance,   Min=Minimal Assistance, Mod=Moderate Assistance, Max=Maximal Assistance, Total=Total Assistance, NT=Not Tested    GAIT: I Mod I S SBA CGA Min Mod Max Total  NT x2 Comments:   Level of Assistance [] [] [] [] [] [x] [] [] [] [] []    Distance 75 x 2    DME Rolling Walker    Gait Quality Slow, unsteady.  Steps out of walker when turning    I=Independent, Mod I=Modified Independent, S=Supervision, SBA=Standby Assistance, CGA=Contact Guard Assistance,   Min=Minimal Assistance, Mod=Moderate Assistance, Max=Maximal Assistance, Total=Total Assistance, NT=Not Tested    PLAN:   FREQUENCY/DURATION: PT Plan of Care: 3 times/week for duration of hospital stay or until stated goals are met, whichever comes first.  TREATMENT:     TREATMENT:   ($$ Neuromuscular Re-education: 23-37 mins    )  Neuromuscular Re-education (24 Minutes): Neuromuscular Re-education included Balance Training, Coordination training, Functional mobility with facilitation, Postural training, Sitting balance training, Standing balance training and Visual field awareness training to improve Balance, Coordination, Functional Mobility and Postural Control.     AFTER TREATMENT POSITION/PRECAUTIONS:  Alarm Activated, Bed, Needs within reach and Visitors at bedside    INTERDISCIPLINARY COLLABORATION:  RN/PCT and PT/PTA    TOTAL TREATMENT DURATION:  PT Patient Time In/Time Out  Time In: 1358  Time Out: [address] ADONIS Talavera

## 2020-12-30 NOTE — PROGRESS NOTES
END OF SHIFT NOTE:    INTAKE/OUTPUT  12/29 0701 - 12/30 0700  In: -   Out: 325 [Urine:325]  Voiding: YES  Catheter: NO  Drain:              Flatus: Patient does have flatus present. Stool:  0 occurrences. Characteristics:  Stool Assessment  Stool Color: Brown  Stool Appearance: Formed  Stool Amount: Medium  Stool Source/Status: Rectum    Emesis: 0 occurrences. Characteristics:        VITAL SIGNS  Patient Vitals for the past 12 hrs:   Temp Pulse Resp BP SpO2   12/30/20 0317 97.8 °F (36.6 °C) 73 16 (!) 157/72 93 %   12/29/20 2304 97.8 °F (36.6 °C) 80 18 (!) 158/77 94 %   12/29/20 1927 97.4 °F (36.3 °C) 71 19 (!) 143/71 93 %       Pain Assessment  Pain Intensity 1: 0 (12/29/20 1911)  Pain Location 1: Head  Pain Intervention(s) 1: Medication (see MAR)  Patient Stated Pain Goal: 0    Ambulating  Yes    Shift report given to oncoming nurse at the bedside.     Phillip Watson RN

## 2020-12-31 ENCOUNTER — APPOINTMENT (OUTPATIENT)
Dept: MRI IMAGING | Age: 80
DRG: 025 | End: 2020-12-31
Attending: NEUROLOGICAL SURGERY
Payer: MEDICARE

## 2020-12-31 ENCOUNTER — ANESTHESIA (OUTPATIENT)
Dept: SURGERY | Age: 80
DRG: 025 | End: 2020-12-31
Payer: MEDICARE

## 2020-12-31 PROBLEM — D49.6 BRAIN TUMOR (HCC): Status: ACTIVE | Noted: 2020-12-26

## 2020-12-31 PROBLEM — G93.6 CEREBRAL EDEMA (HCC): Status: ACTIVE | Noted: 2020-12-31

## 2020-12-31 LAB
BASE EXCESS BLD CALC-SCNC: 3 MMOL/L
BASOPHILS # BLD: 0.1 K/UL (ref 0–0.2)
BASOPHILS NFR BLD: 0 % (ref 0–2)
CA-I BLD-MCNC: 1.07 MMOL/L (ref 1.12–1.32)
COVID-19 RAPID TEST, COVR: NOT DETECTED
DIFFERENTIAL METHOD BLD: ABNORMAL
EOSINOPHIL # BLD: 0 K/UL (ref 0–0.8)
EOSINOPHIL NFR BLD: 0 % (ref 0.5–7.8)
ERYTHROCYTE [DISTWIDTH] IN BLOOD BY AUTOMATED COUNT: 12.2 % (ref 11.9–14.6)
GLUCOSE BLD STRIP.AUTO-MCNC: 132 MG/DL (ref 65–100)
GLUCOSE BLD STRIP.AUTO-MCNC: 141 MG/DL (ref 65–100)
GLUCOSE BLD STRIP.AUTO-MCNC: 159 MG/DL (ref 65–100)
HCO3 BLD-SCNC: 26.7 MMOL/L (ref 22–26)
HCT VFR BLD AUTO: 39.7 % (ref 41.1–50.3)
HGB BLD-MCNC: 13.8 G/DL (ref 13.6–17.2)
IMM GRANULOCYTES # BLD AUTO: 0.3 K/UL (ref 0–0.5)
IMM GRANULOCYTES NFR BLD AUTO: 2 % (ref 0–5)
LYMPHOCYTES # BLD: 0.8 K/UL (ref 0.5–4.6)
LYMPHOCYTES NFR BLD: 5 % (ref 13–44)
MCH RBC QN AUTO: 31.5 PG (ref 26.1–32.9)
MCHC RBC AUTO-ENTMCNC: 34.8 G/DL (ref 31.4–35)
MCV RBC AUTO: 90.6 FL (ref 79.6–97.8)
MONOCYTES # BLD: 1 K/UL (ref 0.1–1.3)
MONOCYTES NFR BLD: 6 % (ref 4–12)
NEUTS SEG # BLD: 14.6 K/UL (ref 1.7–8.2)
NEUTS SEG NFR BLD: 87 % (ref 43–78)
NRBC # BLD: 0 K/UL (ref 0–0.2)
PCO2 BLD: 36.3 MMHG (ref 35–45)
PH BLD: 7.48 [PH] (ref 7.35–7.45)
PLATELET # BLD AUTO: 149 K/UL (ref 150–450)
PMV BLD AUTO: 10.1 FL (ref 9.4–12.3)
PO2 BLD: 115 MMHG (ref 75–100)
POTASSIUM BLD-SCNC: 4.1 MMOL/L (ref 3.5–5.1)
RBC # BLD AUTO: 4.38 M/UL (ref 4.23–5.6)
SAO2 % BLD: 99 % (ref 95–98)
SARS COV-2, XPGCVT: NEGATIVE
SODIUM BLD-SCNC: 134 MMOL/L (ref 136–145)
SOURCE, COVRS: NORMAL
WBC # BLD AUTO: 16.8 K/UL (ref 4.3–11.1)

## 2020-12-31 PROCEDURE — 77030014007 HC SPNG HEMSTAT J&J -B: Performed by: NEUROLOGICAL SURGERY

## 2020-12-31 PROCEDURE — 77030003158 HC GRFT DURA COLGN INLC -G1: Performed by: NEUROLOGICAL SURGERY

## 2020-12-31 PROCEDURE — 74011636637 HC RX REV CODE- 636/637: Performed by: NEUROLOGICAL SURGERY

## 2020-12-31 PROCEDURE — 77030040830 HC CATH URETH FOL MDII -A: Performed by: NEUROLOGICAL SURGERY

## 2020-12-31 PROCEDURE — 77030029099 HC BN WAX SSPC -A: Performed by: NEUROLOGICAL SURGERY

## 2020-12-31 PROCEDURE — 74011250637 HC RX REV CODE- 250/637: Performed by: INTERNAL MEDICINE

## 2020-12-31 PROCEDURE — 88331 PATH CONSLTJ SURG 1 BLK 1SPC: CPT

## 2020-12-31 PROCEDURE — 77030011267 HC ELECTRD BLD COVD -A: Performed by: NEUROLOGICAL SURGERY

## 2020-12-31 PROCEDURE — 77030013138 HC MRK XR SPHR IZIM -B: Performed by: NEUROLOGICAL SURGERY

## 2020-12-31 PROCEDURE — 77030040106 HC FCPS BIPOLAR DISP INLC -D: Performed by: NEUROLOGICAL SURGERY

## 2020-12-31 PROCEDURE — 77030005401 HC CATH RAD ARRO -A: Performed by: ANESTHESIOLOGY

## 2020-12-31 PROCEDURE — 77030014008 HC SPNG HEMSTAT J&J -C: Performed by: NEUROLOGICAL SURGERY

## 2020-12-31 PROCEDURE — 77030008542 HC TBNG MON PRSS EDWD -A: Performed by: ANESTHESIOLOGY

## 2020-12-31 PROCEDURE — 84295 ASSAY OF SERUM SODIUM: CPT

## 2020-12-31 PROCEDURE — 74011000272 HC RX REV CODE- 272: Performed by: NEUROLOGICAL SURGERY

## 2020-12-31 PROCEDURE — 74011250636 HC RX REV CODE- 250/636: Performed by: NEUROLOGICAL SURGERY

## 2020-12-31 PROCEDURE — 70552 MRI BRAIN STEM W/DYE: CPT

## 2020-12-31 PROCEDURE — 65610000006 HC RM INTENSIVE CARE

## 2020-12-31 PROCEDURE — 74011250637 HC RX REV CODE- 250/637: Performed by: NEUROLOGICAL SURGERY

## 2020-12-31 PROCEDURE — 77030020480 HC CLP SCLP RANY DISP J&J -A: Performed by: NEUROLOGICAL SURGERY

## 2020-12-31 PROCEDURE — 74011250636 HC RX REV CODE- 250/636: Performed by: INTERNAL MEDICINE

## 2020-12-31 PROCEDURE — 77030038600 HC TU BPLR IRR DISP STRY -B: Performed by: NEUROLOGICAL SURGERY

## 2020-12-31 PROCEDURE — 77030030722 HC PIN SKULL MAYFLD INLC -B: Performed by: NEUROLOGICAL SURGERY

## 2020-12-31 PROCEDURE — 85025 COMPLETE CBC W/AUTO DIFF WBC: CPT

## 2020-12-31 PROCEDURE — 88305 TISSUE EXAM BY PATHOLOGIST: CPT

## 2020-12-31 PROCEDURE — 36415 COLL VENOUS BLD VENIPUNCTURE: CPT

## 2020-12-31 PROCEDURE — 77030002916 HC SUT ETHLN J&J -A: Performed by: NEUROLOGICAL SURGERY

## 2020-12-31 PROCEDURE — 74011000250 HC RX REV CODE- 250: Performed by: NURSE ANESTHETIST, CERTIFIED REGISTERED

## 2020-12-31 PROCEDURE — 74011250636 HC RX REV CODE- 250/636: Performed by: NURSE ANESTHETIST, CERTIFIED REGISTERED

## 2020-12-31 PROCEDURE — 76210000006 HC OR PH I REC 0.5 TO 1 HR: Performed by: NEUROLOGICAL SURGERY

## 2020-12-31 PROCEDURE — 77030003162 HC GRFT DURA SUB MEDT -F: Performed by: NEUROLOGICAL SURGERY

## 2020-12-31 PROCEDURE — 77030025420 HC BUR NEUR TPS STRY -C: Performed by: NEUROLOGICAL SURGERY

## 2020-12-31 PROCEDURE — 00B00ZZ EXCISION OF BRAIN, OPEN APPROACH: ICD-10-PCS | Performed by: NEUROLOGICAL SURGERY

## 2020-12-31 PROCEDURE — 77030013705 HC HK ELAS STAY COOP -B: Performed by: NEUROLOGICAL SURGERY

## 2020-12-31 PROCEDURE — 2709999900 HC NON-CHARGEABLE SUPPLY: Performed by: NEUROLOGICAL SURGERY

## 2020-12-31 PROCEDURE — 77030028270 HC SRGFL HEMSTAT MTRX J&J -C: Performed by: NEUROLOGICAL SURGERY

## 2020-12-31 PROCEDURE — 77030013794 HC KT TRNSDUC BLD EDWD -B: Performed by: ANESTHESIOLOGY

## 2020-12-31 PROCEDURE — 74011000250 HC RX REV CODE- 250: Performed by: NEUROLOGICAL SURGERY

## 2020-12-31 PROCEDURE — 77030013951 HC SEAL TISS ADH DURASL KT INLC -G1: Performed by: NEUROLOGICAL SURGERY

## 2020-12-31 PROCEDURE — 82803 BLOOD GASES ANY COMBINATION: CPT

## 2020-12-31 PROCEDURE — 76060000040 HC ANESTHESIA 4.5 TO 5 HR: Performed by: NEUROLOGICAL SURGERY

## 2020-12-31 PROCEDURE — 76010000176 HC OR TIME 4.5 TO 5 HR INTENSV-TIER 1: Performed by: NEUROLOGICAL SURGERY

## 2020-12-31 PROCEDURE — 77030037088 HC TUBE ENDOTRACH ORAL NSL COVD-A: Performed by: ANESTHESIOLOGY

## 2020-12-31 PROCEDURE — 77030040922 HC BLNKT HYPOTHRM STRY -A: Performed by: ANESTHESIOLOGY

## 2020-12-31 PROCEDURE — 77030013292 HC BOWL MX PRSM J&J -A: Performed by: ANESTHESIOLOGY

## 2020-12-31 PROCEDURE — 77030025006 HC BUR STRY -C: Performed by: NEUROLOGICAL SURGERY

## 2020-12-31 PROCEDURE — A9575 INJ GADOTERATE MEGLUMI 0.1ML: HCPCS | Performed by: INTERNAL MEDICINE

## 2020-12-31 PROCEDURE — 77030008462 HC STPLR SKN PROX J&J -A: Performed by: NEUROLOGICAL SURGERY

## 2020-12-31 PROCEDURE — 77030002946 HC SUT NRLN J&J -B: Performed by: NEUROLOGICAL SURGERY

## 2020-12-31 PROCEDURE — 77030003029 HC SUT VCRL J&J -B: Performed by: NEUROLOGICAL SURGERY

## 2020-12-31 PROCEDURE — 74011250636 HC RX REV CODE- 250/636: Performed by: ANESTHESIOLOGY

## 2020-12-31 PROCEDURE — 82962 GLUCOSE BLOOD TEST: CPT

## 2020-12-31 PROCEDURE — 88323 CONSLTJ&REPRT MATRL PREP SLD: CPT

## 2020-12-31 PROCEDURE — 77030021678 HC GLIDESCP STAT DISP VERT -B: Performed by: ANESTHESIOLOGY

## 2020-12-31 PROCEDURE — C1769 GUIDE WIRE: HCPCS | Performed by: ANESTHESIOLOGY

## 2020-12-31 PROCEDURE — 77030016683 HC BUR ZYPHR1 STRY -C: Performed by: NEUROLOGICAL SURGERY

## 2020-12-31 PROCEDURE — 77030019908 HC STETH ESOPH SIMS -A: Performed by: ANESTHESIOLOGY

## 2020-12-31 DEVICE — DURAGEN® DURAL GRAFT MATRIX 4 IN X 5 IN (10CM X12.5CM)
Type: IMPLANTABLE DEVICE | Site: BRAIN | Status: FUNCTIONAL
Brand: DURAGEN®

## 2020-12-31 DEVICE — DURA 62105 SUBSTITUTE DUREPAIR 3X3IN NCE
Type: IMPLANTABLE DEVICE | Site: BRAIN | Status: FUNCTIONAL
Brand: DUREPAIR®

## 2020-12-31 DEVICE — DURASEAL® DURAL SEALANT SYSTEM 5ML 5 PACK
Type: IMPLANTABLE DEVICE | Site: BRAIN | Status: FUNCTIONAL
Brand: DURASEAL®

## 2020-12-31 RX ORDER — LABETALOL HYDROCHLORIDE 5 MG/ML
10 INJECTION, SOLUTION INTRAVENOUS AS NEEDED
Status: DISCONTINUED | OUTPATIENT
Start: 2020-12-31 | End: 2021-01-04 | Stop reason: HOSPADM

## 2020-12-31 RX ORDER — SODIUM CHLORIDE, SODIUM LACTATE, POTASSIUM CHLORIDE, CALCIUM CHLORIDE 600; 310; 30; 20 MG/100ML; MG/100ML; MG/100ML; MG/100ML
75 INJECTION, SOLUTION INTRAVENOUS CONTINUOUS
Status: DISCONTINUED | OUTPATIENT
Start: 2020-12-31 | End: 2020-12-31 | Stop reason: HOSPADM

## 2020-12-31 RX ORDER — SODIUM CHLORIDE 0.9 % (FLUSH) 0.9 %
5-40 SYRINGE (ML) INJECTION EVERY 8 HOURS
Status: DISCONTINUED | OUTPATIENT
Start: 2020-12-31 | End: 2021-01-04 | Stop reason: HOSPADM

## 2020-12-31 RX ORDER — LIDOCAINE HYDROCHLORIDE 20 MG/ML
INJECTION, SOLUTION EPIDURAL; INFILTRATION; INTRACAUDAL; PERINEURAL AS NEEDED
Status: DISCONTINUED | OUTPATIENT
Start: 2020-12-31 | End: 2020-12-31 | Stop reason: HOSPADM

## 2020-12-31 RX ORDER — ONDANSETRON 2 MG/ML
INJECTION INTRAMUSCULAR; INTRAVENOUS AS NEEDED
Status: DISCONTINUED | OUTPATIENT
Start: 2020-12-31 | End: 2020-12-31 | Stop reason: HOSPADM

## 2020-12-31 RX ORDER — HYDROMORPHONE HYDROCHLORIDE 2 MG/ML
0.5 INJECTION, SOLUTION INTRAMUSCULAR; INTRAVENOUS; SUBCUTANEOUS
Status: DISCONTINUED | OUTPATIENT
Start: 2020-12-31 | End: 2020-12-31 | Stop reason: HOSPADM

## 2020-12-31 RX ORDER — FENTANYL CITRATE 50 UG/ML
100 INJECTION, SOLUTION INTRAMUSCULAR; INTRAVENOUS ONCE
Status: DISCONTINUED | OUTPATIENT
Start: 2020-12-31 | End: 2020-12-31 | Stop reason: HOSPADM

## 2020-12-31 RX ORDER — NALOXONE HYDROCHLORIDE 0.4 MG/ML
0.4 INJECTION, SOLUTION INTRAMUSCULAR; INTRAVENOUS; SUBCUTANEOUS AS NEEDED
Status: DISCONTINUED | OUTPATIENT
Start: 2020-12-31 | End: 2021-01-04 | Stop reason: HOSPADM

## 2020-12-31 RX ORDER — ONDANSETRON 2 MG/ML
4 INJECTION INTRAMUSCULAR; INTRAVENOUS
Status: DISCONTINUED | OUTPATIENT
Start: 2020-12-31 | End: 2021-01-04 | Stop reason: HOSPADM

## 2020-12-31 RX ORDER — CEFAZOLIN SODIUM/WATER 2 G/20 ML
2 SYRINGE (ML) INTRAVENOUS ONCE
Status: COMPLETED | OUTPATIENT
Start: 2020-12-31 | End: 2020-12-31

## 2020-12-31 RX ORDER — ESMOLOL HYDROCHLORIDE 10 MG/ML
INJECTION INTRAVENOUS AS NEEDED
Status: DISCONTINUED | OUTPATIENT
Start: 2020-12-31 | End: 2020-12-31 | Stop reason: HOSPADM

## 2020-12-31 RX ORDER — AMLODIPINE BESYLATE 5 MG/1
10 TABLET ORAL ONCE
Status: COMPLETED | OUTPATIENT
Start: 2020-12-31 | End: 2020-12-31

## 2020-12-31 RX ORDER — OXYCODONE HYDROCHLORIDE 5 MG/1
5 TABLET ORAL
Status: DISCONTINUED | OUTPATIENT
Start: 2020-12-31 | End: 2020-12-31 | Stop reason: HOSPADM

## 2020-12-31 RX ORDER — HYDROCODONE BITARTRATE AND ACETAMINOPHEN 10; 325 MG/1; MG/1
1 TABLET ORAL
Status: DISCONTINUED | OUTPATIENT
Start: 2020-12-31 | End: 2021-01-04 | Stop reason: HOSPADM

## 2020-12-31 RX ORDER — MANNITOL 20 G/100ML
1 INJECTION, SOLUTION INTRAVENOUS ONCE
Status: COMPLETED | OUTPATIENT
Start: 2020-12-31 | End: 2020-12-31

## 2020-12-31 RX ORDER — BUPIVACAINE HYDROCHLORIDE AND EPINEPHRINE 5; 5 MG/ML; UG/ML
INJECTION, SOLUTION EPIDURAL; INTRACAUDAL; PERINEURAL AS NEEDED
Status: DISCONTINUED | OUTPATIENT
Start: 2020-12-31 | End: 2020-12-31 | Stop reason: HOSPADM

## 2020-12-31 RX ORDER — CEFAZOLIN SODIUM/WATER 2 G/20 ML
2 SYRINGE (ML) INTRAVENOUS EVERY 8 HOURS
Status: COMPLETED | OUTPATIENT
Start: 2020-12-31 | End: 2021-01-01

## 2020-12-31 RX ORDER — MORPHINE SULFATE 10 MG/ML
4 INJECTION, SOLUTION INTRAMUSCULAR; INTRAVENOUS
Status: DISCONTINUED | OUTPATIENT
Start: 2020-12-31 | End: 2021-01-04 | Stop reason: HOSPADM

## 2020-12-31 RX ORDER — VANCOMYCIN HYDROCHLORIDE 1 G/20ML
INJECTION, POWDER, LYOPHILIZED, FOR SOLUTION INTRAVENOUS AS NEEDED
Status: DISCONTINUED | OUTPATIENT
Start: 2020-12-31 | End: 2020-12-31 | Stop reason: HOSPADM

## 2020-12-31 RX ORDER — GADOTERATE MEGLUMINE 376.9 MG/ML
20 INJECTION INTRAVENOUS
Status: COMPLETED | OUTPATIENT
Start: 2020-12-31 | End: 2020-12-31

## 2020-12-31 RX ORDER — HYDROCODONE BITARTRATE AND ACETAMINOPHEN 5; 325 MG/1; MG/1
2 TABLET ORAL AS NEEDED
Status: DISCONTINUED | OUTPATIENT
Start: 2020-12-31 | End: 2020-12-31 | Stop reason: HOSPADM

## 2020-12-31 RX ORDER — PROPOFOL 10 MG/ML
INJECTION, EMULSION INTRAVENOUS AS NEEDED
Status: DISCONTINUED | OUTPATIENT
Start: 2020-12-31 | End: 2020-12-31 | Stop reason: HOSPADM

## 2020-12-31 RX ORDER — SODIUM CHLORIDE 0.9 % (FLUSH) 0.9 %
5-40 SYRINGE (ML) INJECTION AS NEEDED
Status: DISCONTINUED | OUTPATIENT
Start: 2020-12-31 | End: 2021-01-04 | Stop reason: HOSPADM

## 2020-12-31 RX ORDER — ACETAMINOPHEN 325 MG/1
650 TABLET ORAL
Status: DISCONTINUED | OUTPATIENT
Start: 2020-12-31 | End: 2021-01-04 | Stop reason: HOSPADM

## 2020-12-31 RX ORDER — SODIUM CHLORIDE 9 MG/ML
INJECTION, SOLUTION INTRAVENOUS
Status: DISCONTINUED | OUTPATIENT
Start: 2020-12-31 | End: 2020-12-31 | Stop reason: HOSPADM

## 2020-12-31 RX ORDER — SODIUM CHLORIDE 0.9 % (FLUSH) 0.9 %
10 SYRINGE (ML) INJECTION
Status: COMPLETED | OUTPATIENT
Start: 2020-12-31 | End: 2020-12-31

## 2020-12-31 RX ORDER — NICARDIPINE HYDROCHLORIDE 0.1 MG/ML
INJECTION INTRAVENOUS
Status: DISCONTINUED | OUTPATIENT
Start: 2020-12-31 | End: 2020-12-31 | Stop reason: HOSPADM

## 2020-12-31 RX ORDER — FENTANYL CITRATE 50 UG/ML
INJECTION, SOLUTION INTRAMUSCULAR; INTRAVENOUS AS NEEDED
Status: DISCONTINUED | OUTPATIENT
Start: 2020-12-31 | End: 2020-12-31 | Stop reason: HOSPADM

## 2020-12-31 RX ORDER — ROCURONIUM BROMIDE 10 MG/ML
INJECTION, SOLUTION INTRAVENOUS AS NEEDED
Status: DISCONTINUED | OUTPATIENT
Start: 2020-12-31 | End: 2020-12-31 | Stop reason: HOSPADM

## 2020-12-31 RX ORDER — MIDAZOLAM HYDROCHLORIDE 1 MG/ML
4 INJECTION, SOLUTION INTRAMUSCULAR; INTRAVENOUS ONCE
Status: DISCONTINUED | OUTPATIENT
Start: 2020-12-31 | End: 2020-12-31 | Stop reason: HOSPADM

## 2020-12-31 RX ORDER — MIDAZOLAM HYDROCHLORIDE 1 MG/ML
2 INJECTION, SOLUTION INTRAMUSCULAR; INTRAVENOUS
Status: DISCONTINUED | OUTPATIENT
Start: 2020-12-31 | End: 2020-12-31 | Stop reason: HOSPADM

## 2020-12-31 RX ORDER — LIDOCAINE HYDROCHLORIDE 10 MG/ML
0.1 INJECTION INFILTRATION; PERINEURAL AS NEEDED
Status: DISCONTINUED | OUTPATIENT
Start: 2020-12-31 | End: 2020-12-31 | Stop reason: HOSPADM

## 2020-12-31 RX ORDER — NICARDIPINE HYDROCHLORIDE 0.1 MG/ML
INJECTION INTRAVENOUS AS NEEDED
Status: DISCONTINUED | OUTPATIENT
Start: 2020-12-31 | End: 2020-12-31 | Stop reason: HOSPADM

## 2020-12-31 RX ORDER — DEXAMETHASONE SODIUM PHOSPHATE 4 MG/ML
INJECTION, SOLUTION INTRA-ARTICULAR; INTRALESIONAL; INTRAMUSCULAR; INTRAVENOUS; SOFT TISSUE AS NEEDED
Status: DISCONTINUED | OUTPATIENT
Start: 2020-12-31 | End: 2020-12-31 | Stop reason: HOSPADM

## 2020-12-31 RX ADMIN — SODIUM CHLORIDE 100 ML/HR: 900 INJECTION, SOLUTION INTRAVENOUS at 16:30

## 2020-12-31 RX ADMIN — NICARDIPINE HYDROCHLORIDE 100 MCG: 0.1 INJECTION, SOLUTION INTRAVENOUS at 12:26

## 2020-12-31 RX ADMIN — Medication 10 ML: at 16:35

## 2020-12-31 RX ADMIN — ROCURONIUM BROMIDE 10 MG: 10 INJECTION, SOLUTION INTRAVENOUS at 11:05

## 2020-12-31 RX ADMIN — DEXAMETHASONE SODIUM PHOSPHATE 10 MG: 4 INJECTION, SOLUTION INTRAMUSCULAR; INTRAVENOUS at 08:53

## 2020-12-31 RX ADMIN — HYDROCODONE BITARTRATE AND ACETAMINOPHEN 1 TABLET: 10; 325 TABLET ORAL at 18:26

## 2020-12-31 RX ADMIN — INSULIN LISPRO 2 UNITS: 100 INJECTION, SOLUTION INTRAVENOUS; SUBCUTANEOUS at 22:00

## 2020-12-31 RX ADMIN — LOSARTAN POTASSIUM 25 MG: 25 TABLET, FILM COATED ORAL at 17:08

## 2020-12-31 RX ADMIN — Medication 10 ML: at 07:25

## 2020-12-31 RX ADMIN — FENTANYL CITRATE 25 MCG: 50 INJECTION INTRAMUSCULAR; INTRAVENOUS at 10:25

## 2020-12-31 RX ADMIN — NICARDIPINE HYDROCHLORIDE 100 MCG: 0.1 INJECTION, SOLUTION INTRAVENOUS at 12:17

## 2020-12-31 RX ADMIN — ROCURONIUM BROMIDE 50 MG: 10 INJECTION, SOLUTION INTRAVENOUS at 08:56

## 2020-12-31 RX ADMIN — PROPOFOL 100 MG: 10 INJECTION, EMULSION INTRAVENOUS at 08:38

## 2020-12-31 RX ADMIN — FENTANYL CITRATE 50 MCG: 50 INJECTION INTRAMUSCULAR; INTRAVENOUS at 09:24

## 2020-12-31 RX ADMIN — SODIUM CHLORIDE 100 ML/HR: 900 INJECTION, SOLUTION INTRAVENOUS at 00:19

## 2020-12-31 RX ADMIN — Medication 10 ML: at 22:00

## 2020-12-31 RX ADMIN — DEXAMETHASONE SODIUM PHOSPHATE 4 MG: 4 INJECTION, SOLUTION INTRAMUSCULAR; INTRAVENOUS at 17:11

## 2020-12-31 RX ADMIN — LIDOCAINE HYDROCHLORIDE 100 MG: 20 INJECTION, SOLUTION EPIDURAL; INFILTRATION; INTRACAUDAL; PERINEURAL at 08:15

## 2020-12-31 RX ADMIN — DEXAMETHASONE SODIUM PHOSPHATE 4 MG: 4 INJECTION, SOLUTION INTRAMUSCULAR; INTRAVENOUS at 05:57

## 2020-12-31 RX ADMIN — PHENYLEPHRINE HYDROCHLORIDE 120 MCG: 10 INJECTION INTRAVENOUS at 09:17

## 2020-12-31 RX ADMIN — ROCURONIUM BROMIDE 50 MG: 10 INJECTION, SOLUTION INTRAVENOUS at 08:16

## 2020-12-31 RX ADMIN — ESMOLOL HYDROCHLORIDE 10 MG: 10 INJECTION, SOLUTION INTRAVENOUS at 12:38

## 2020-12-31 RX ADMIN — DEXAMETHASONE SODIUM PHOSPHATE 4 MG: 4 INJECTION, SOLUTION INTRAMUSCULAR; INTRAVENOUS at 23:25

## 2020-12-31 RX ADMIN — AMLODIPINE BESYLATE 10 MG: 5 TABLET ORAL at 17:07

## 2020-12-31 RX ADMIN — PHENYLEPHRINE HYDROCHLORIDE 10 MCG/MIN: 10 INJECTION INTRAVENOUS at 09:47

## 2020-12-31 RX ADMIN — LEVETIRACETAM 1000 MG: 100 SOLUTION ORAL at 05:55

## 2020-12-31 RX ADMIN — ROCURONIUM BROMIDE 10 MG: 10 INJECTION, SOLUTION INTRAVENOUS at 10:30

## 2020-12-31 RX ADMIN — ESMOLOL HYDROCHLORIDE 10 MG: 10 INJECTION, SOLUTION INTRAVENOUS at 12:45

## 2020-12-31 RX ADMIN — FENTANYL CITRATE 25 MCG: 50 INJECTION INTRAMUSCULAR; INTRAVENOUS at 10:23

## 2020-12-31 RX ADMIN — Medication 5 ML: at 00:23

## 2020-12-31 RX ADMIN — SUGAMMADEX 200 MG: 100 INJECTION, SOLUTION INTRAVENOUS at 12:26

## 2020-12-31 RX ADMIN — CEFAZOLIN SODIUM 2 G: 100 INJECTION, POWDER, LYOPHILIZED, FOR SOLUTION INTRAVENOUS at 17:22

## 2020-12-31 RX ADMIN — PROPOFOL 100 MG: 10 INJECTION, EMULSION INTRAVENOUS at 08:39

## 2020-12-31 RX ADMIN — SODIUM CHLORIDE, SODIUM LACTATE, POTASSIUM CHLORIDE, AND CALCIUM CHLORIDE 75 ML/HR: 600; 310; 30; 20 INJECTION, SOLUTION INTRAVENOUS at 06:25

## 2020-12-31 RX ADMIN — MANNITOL: 20 INJECTION, SOLUTION INTRAVENOUS at 08:44

## 2020-12-31 RX ADMIN — SERTRALINE HYDROCHLORIDE 150 MG: 100 TABLET ORAL at 05:56

## 2020-12-31 RX ADMIN — SODIUM CHLORIDE: 900 INJECTION, SOLUTION INTRAVENOUS at 08:45

## 2020-12-31 RX ADMIN — LABETALOL HYDROCHLORIDE 10 MG: 5 INJECTION INTRAVENOUS at 17:45

## 2020-12-31 RX ADMIN — LEVETIRACETAM 1000 MG: 100 SOLUTION ORAL at 21:00

## 2020-12-31 RX ADMIN — GADOTERATE MEGLUMINE 20 ML: 376.9 INJECTION INTRAVENOUS at 07:24

## 2020-12-31 RX ADMIN — SODIUM CHLORIDE: 900 INJECTION, SOLUTION INTRAVENOUS at 10:52

## 2020-12-31 RX ADMIN — FENTANYL CITRATE 50 MCG: 50 INJECTION INTRAMUSCULAR; INTRAVENOUS at 08:38

## 2020-12-31 RX ADMIN — Medication 2 G: at 08:57

## 2020-12-31 RX ADMIN — DEXAMETHASONE SODIUM PHOSPHATE 4 MG: 4 INJECTION, SOLUTION INTRAMUSCULAR; INTRAVENOUS at 00:22

## 2020-12-31 RX ADMIN — CARMUSTINE 30.8 MG: 7.7 WAFER INTRACAVITARY at 10:51

## 2020-12-31 RX ADMIN — Medication 5 ML: at 00:19

## 2020-12-31 RX ADMIN — AMLODIPINE BESYLATE 10 MG: 5 TABLET ORAL at 05:57

## 2020-12-31 RX ADMIN — PROPOFOL 200 MG: 10 INJECTION, EMULSION INTRAVENOUS at 08:15

## 2020-12-31 RX ADMIN — Medication 10 ML: at 16:00

## 2020-12-31 RX ADMIN — ROCURONIUM BROMIDE 10 MG: 10 INJECTION, SOLUTION INTRAVENOUS at 11:50

## 2020-12-31 RX ADMIN — NICARDIPINE HYDROCHLORIDE 5 MG/HR: 0.1 INJECTION, SOLUTION INTRAVENOUS at 12:21

## 2020-12-31 RX ADMIN — FENTANYL CITRATE 50 MCG: 50 INJECTION INTRAMUSCULAR; INTRAVENOUS at 08:15

## 2020-12-31 RX ADMIN — NICARDIPINE HYDROCHLORIDE 100 MCG: 0.1 INJECTION, SOLUTION INTRAVENOUS at 12:34

## 2020-12-31 RX ADMIN — Medication 5 ML: at 05:56

## 2020-12-31 RX ADMIN — ONDANSETRON 4 MG: 2 INJECTION INTRAMUSCULAR; INTRAVENOUS at 10:53

## 2020-12-31 RX ADMIN — NICARDIPINE HYDROCHLORIDE 100 MCG: 0.1 INJECTION, SOLUTION INTRAVENOUS at 12:20

## 2020-12-31 NOTE — OP NOTES
NEUROSURGERY OPERATIVE REPORT:    Patient Name: Zhen Briones    Patient MRN: [id number]    Date of Procedure: 12/31/2020    Patient YOB: 1940    Pre-Procedure Diagnosis:   1. 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 Encephalopathy     Post-Procedure Diagnosis: SAME AS ABOVE     Procedure:   1. Right Parietal craniotomy for resection of right parietal tumor with internal hemorrhage and surrounding vasogenic edema. 2. Microsurgical technique  3. Intra-operative Medtronic Stealth Neuronavigation   4. Placement of Intraoperative Gliadel (BCNU) chemotherapeutic wafers in the operative resection bed      Surgeon: Ade Cosby MD     Anesthesiologist: Everardo Gold MD     Surgical Staff:   See Operative Record     Anesthesia: General Endotracheal Anesthesia      Estimated Blood Loss: 250    Procedure In Detail: The patient was transported to the OR and transferred to the OR table. He underwent induction of anesthesia and was intubated by the anesthesiologist without difficulty in the standard fashion. A surgical pause time-out was performed at the beginning of the case prior to incision confirming procedure, sterility and functionality of instruments, surgical site, stability of patient, and anti-biotic administration. The patient was then positioned and padded in the standard supine fashion. I placed the patient in a [place] three point fixation headframe and attached the frame to the bed for a right pterional craniotomy approach. I then completed Stealth Neuronavigation registration and the accuracy was confirmed with anatomic land-marks prior to proceeding with the case. Our trajectory right parietal enhancing tumor with surrounding edema with marked out with the tumor borders marked out on the skin and a surgical incision that would allow access to this tumor was marked on the skin.   The hair over the linear vertical right parietal incision was shaved with clippers. The surgical site was then cleaned with prepped in a standard sterile fashion with alcohol and two chloroprep sticks. The surgical area was then draped in the standard sterile fashion and Ioband dressing was applied. All equipment was then checked for functionality. Local anesthetic was injected along the planned right parietal incision. Incision time out was performed and incision was made along a right right parietal vertical incision incision. Hemostasis was obtained with electrocautery. At this point Humberto clips were applied for hemostasis. The periosteum was elevated and a retractor was placed. At this time the navigated stylette was once again used to gera out the boundaries of the tumor on the calvarium with a marking pen. A craniotomy was planned. At this time 4 bur holes were made using a perforating high-speed drill bit. The dura was freed from the inner table of the calvarium using a Penfield 3 dissector. Then the cranial flap was turned using a high-speed drill with a router bit. The calvarial flap was elevated from the dura using Penfield dissector's. Hemostasis was achieved and the bony edges of the calvarium were waxed with bone wax. At this time the dura was opened in a cruciate fashion with the center of the dural opening being centered over the trajectory to the right parietal enhancing lesion. The dural leaflets were tacked up with dural stay sutures. At this time once again the navigation stylette was used to confirm our trajectory to target the right parietal enhancing tumor. Bipolar cautery was then used to coagulate the pial surface and a corticectomy was performed. At this time dissection was carried out in a plane that was developed between the tumor and the white matter and the tumor was resected using electrocautery, pituitary rongeur forceps and suction.   The tumor encountered was soft and suckable had the appearance of necrotic tissue. Once inside the capsule of the tumor internal hemorrhage was encountered which was evacuated with suction. The resection was carried out circumferentially around the tumor ensuring that a white matter margins were achieved circumferentially around the tumor. At this time frozen specimen was sent and permanent specimen was collected. 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 Hemostasis in the resection cavity was achieved using Gelfoam and Surgi-Charlie. This was removed and irrigated out with normal saline irrigation. Once hemostasis have been adequately achieved the resection cavity was lined with Gliadel BCNU chemotherapeutic wafers. The wafers were in direct contact with the resection cavity bed. Then Surgicel was used to cover the Gliadel wafers in line the resection cavity. After applying the chemotherapeutic wafers the instruments were removed from the field that were used to place the wafers and placed in a chemotherapy containment apparatus along with the changing of our outer gloves. At this point a watertight closure of the dura could not be performed without the assistance of a suturable dural substitute. At this time we brought in a 3 x 3 dura repair suturable dural substitute. An expansile duraplasty was performed using the dural repair substitute in a running fashion securing it to the dura with a 4-0 running Nurolon. A watertight dural closure was obtained. DuraSeal was then applied over the dura the suture lines. Then a piece of DuraGen dural substitute was placed in an onlay fashion over the close dura the calvarial bone flap that had been soaking in antibiotic irrigation was replaced and secured to the calvarium with 4 lillian hole covers and twelve 4 mm screws using a Caterna cranial plating fixation system nd new screws to secure the calvarial bone flap to the calvarium were placed. The wound was irrigated with copious antibiotic irrigation.  The Amelia Loss was closed with interrupted 2-0 Vircyl sutures. The skin was closed with a running locking 2-0 nylon suture. A clean telfa dressing was applied to the incision line. The patient was them removed from [facility]. A clean well fitting headwrap was applied to the patient. All counts were performed and accurate at the end of the case. The patient tolerated the procedure well without complication. He was extubated and transferred to PACU and will go from the PACU to the intensive care unit. Specimen:   Frozen and Permanent    [facility]

## 2020-12-31 NOTE — PROGRESS NOTES
Hospitalist Note     Admit Date:  2020  5:40 AM   Name:  Raoul Merino   Age:  68 y.o.  :  1940   MRN:  [id number]   PCP:  Ghada Morris DO  Treatment Team: Attending Provider: Marques Sosa MD; Consulting Provider: Carola Ramey MD; Utilization Review: Doron Ashby; Consulting Provider: Berry Granger MD; Consulting Provider: Virgilio Marquez MD; Care Manager: Te Herring LMSW    HPI/Subjective:   Mr. Meseret Perkins is a very nice 67 y/o WM with a h/o HTN, obesity, anxiety, HLD who presented on  with encephalopathy and seizure. He was intubated. CT head with right parietal mass with hemorrhage. Neurosurgery and Neurology consulted. CT c/a/p without evidence of primary or metastatic disease. Brain MRI showed the same. He was started on Keppra. EEG abnormal. Extubated and transferred to the floor. Hospitalist consulted to assume care on . To OR on .    : S/p parietal tumor resection today. Pt seen and examined post-op in ICU (PACU). He is tired but wakes up and smiles, moves all extremities.      No other complaints  Objective:     Patient Vitals for the past 24 hrs:   Temp Pulse Resp BP SpO2   20 1629  78  (!) 147/79 95 %   20 1614  72  (!) 140/74 95 %   20 1605  67   95 %   20 1600  70  139/76 96 %   20 1559  70  139/76 95 %   20 1548  72  137/71 95 %   20 1544  73  139/66 94 %   20 1535  74  (!) 142/67 95 %   20 1529 98.9 °F (37.2 °C) 70 16 (!) 142/68 94 %   20 1504  76 16 (!) 141/68 93 %   20 1448  76 16 (!) 141/69 93 %   20 1433  79 16 (!) 140/69 93 %   20 1419  79 16 138/63 95 %   20 1404  81 16 139/65 95 %   20 1400  82 16  95 %   20 1348  80 16 (!) 141/68 95 %   20 1343 97.8 °F (36.6 °C) 85 16 (!) 141/66 95 %   20 1338  84 16 136/63 96 %   20 1333  82 16 136/61 96 %   20 1329  84 16 133/63 95 %   12/31/20 1324  83 16 (!) 133/55 96 %   12/31/20 1319  82 16 136/62 95 %   12/31/20 1314  82 16 133/61 97 %   12/31/20 1309  84 16 129/60 97 %   12/31/20 1304  86 16 128/60 96 %   12/31/20 1259  84 16 126/60 98 %   12/31/20 1257 97.7 °F (36.5 °C) 87 13 (!) 136/58 98 %   12/31/20 0621 97.8 °F (36.6 °C) 75 18 (!) 163/79 97 %   12/31/20 0428 97.8 °F (36.6 °C) 75 18 (!) 145/72 95 %   12/30/20 2326 98.4 °F (36.9 °C) 94 18 132/75 94 %   12/30/20 1938 98.7 °F (37.1 °C) 81 18 (!) 145/73 95 %     Oxygen Therapy  O2 Sat (%): 95 % (12/31/20 1629)  Pulse via Oximetry: 71 beats per minute (12/31/20 1629)  O2 Device: Room air (12/31/20 1529)  O2 Flow Rate (L/min): 2 l/min (12/31/20 1343)  FIO2 (%): 45 % (12/26/20 0000)    Estimated body mass index is 28.67 kg/m² as calculated from the following:    Height as of an earlier encounter on 12/25/20: 5' 10\" (1.778 m). Weight as of this encounter: 90.6 kg (199 lb 12.8 oz). Intake/Output Summary (Last 24 hours) at 12/31/2020 1651  Last data filed at 12/31/2020 1252  Gross per 24 hour   Intake 2650 ml   Output 1500 ml   Net 1150 ml       *Note that automatically entered I/Os may not be accurate; dependent on patient compliance with collection and accurate  by techs. General:    Well nourished. No overt distress. Tired appearing but awakens easily. CV:   RRR. No m/r/g. No edema. No JVD  Lungs:   CTAB. No wheezing, rhonchi, or rales. Unlabored. Abdomen:   Soft, nontender, nondistended. Extremities: Warm and dry. No cyanosis   Skin:     No rashes. No jaundice. Normal coloration  Neuro:  No gross focal deficits. Alert. Head is bandaged. Moves all extremities spontaneously. Follows commands. No facial asymmetry.      Data Reviewed:  I have reviewed all labs, meds, and studies from the last 24 hours:  Recent Results (from the past 24 hour(s))   GLUCOSE, POC    Collection Time: 12/30/20  8:09 PM   Result Value Ref Range    Glucose (POC) 164 (H) 65 - 100 mg/dL   CBC WITH AUTOMATED DIFF    Collection Time: 12/31/20  5:49 AM   Result Value Ref Range    WBC 16.8 (H) 4.3 - 11.1 K/uL    RBC 4.38 4.23 - 5.6 M/uL    HGB 13.8 13.6 - 17.2 g/dL    HCT 39.7 (L) 41.1 - 50.3 %    MCV 90.6 79.6 - 97.8 FL    MCH 31.5 26.1 - 32.9 PG    MCHC 34.8 31.4 - 35.0 g/dL    RDW 12.2 11.9 - 14.6 %    PLATELET 374 (L) 179 - 450 K/uL    MPV 10.1 9.4 - 12.3 FL    ABSOLUTE NRBC 0.00 0.0 - 0.2 K/uL    DF AUTOMATED      NEUTROPHILS 87 (H) 43 - 78 %    LYMPHOCYTES 5 (L) 13 - 44 %    MONOCYTES 6 4.0 - 12.0 %    EOSINOPHILS 0 (L) 0.5 - 7.8 %    BASOPHILS 0 0.0 - 2.0 %    IMMATURE GRANULOCYTES 2 0.0 - 5.0 %    ABS. NEUTROPHILS 14.6 (H) 1.7 - 8.2 K/UL    ABS. LYMPHOCYTES 0.8 0.5 - 4.6 K/UL    ABS. MONOCYTES 1.0 0.1 - 1.3 K/UL    ABS. EOSINOPHILS 0.0 0.0 - 0.8 K/UL    ABS. BASOPHILS 0.1 0.0 - 0.2 K/UL    ABS. IMM.  GRANS. 0.3 0.0 - 0.5 K/UL   POC CG8I    Collection Time: 12/31/20  9:12 AM   Result Value Ref Range    pH (POC) 7.48 (H) 7.35 - 7.45      pCO2 (POC) 36.3 35 - 45 MMHG    pO2 (POC) 115 (H) 75 - 100 MMHG    HCO3 (POC) 26.7 (H) 22 - 26 MMOL/L    sO2 (POC) 99 (H) 95 - 98 %    Base excess (POC) 3 mmol/L    Sodium,  (L) 136 - 145 MMOL/L    Potassium, POC 4.1 3.5 - 5.1 MMOL/L    Glucose,  (H) 65 - 100 MG/DL    Calcium, ionized (POC) 1.07 (L) 1.12 - 1.32 mmol/L   GLUCOSE, POC    Collection Time: 12/31/20  4:38 PM   Result Value Ref Range    Glucose (POC) 132 (H) 65 - 100 mg/dL       Current Meds:  Current Facility-Administered Medications   Medication Dose Route Frequency    sodium chloride (NS) flush 5-40 mL  5-40 mL IntraVENous Q8H    sodium chloride (NS) flush 5-40 mL  5-40 mL IntraVENous PRN    acetaminophen (TYLENOL) tablet 650 mg  650 mg Oral Q4H PRN    HYDROcodone-acetaminophen (NORCO)  mg tablet 1 Tab  1 Tab Oral Q4H PRN    morphine 10 mg/ml injection 4 mg  4 mg IntraVENous Q4H PRN    naloxone (NARCAN) injection 0.4 mg  0.4 mg IntraVENous PRN    ceFAZolin (ANCEF) 2 g/20 mL in sterile water IV syringe  2 g IntraVENous Q8H    ondansetron (ZOFRAN) injection 4 mg  4 mg IntraVENous Q6H PRN    labetaloL (NORMODYNE;TRANDATE) injection 10 mg  10 mg IntraVENous PRN    0.9% sodium chloride infusion  100 mL/hr IntraVENous CONTINUOUS    dexamethasone (DECADRON) 4 mg/mL injection 4 mg  4 mg IntraVENous Q6H    insulin lispro (HUMALOG) injection   SubCUTAneous AC&HS    sertraline (ZOLOFT) tablet 150 mg  150 mg Oral DAILY    carboxymethylcellulose sodium (REFRESH LIQUIGEL) 1 % ophthalmic solution 1 Drop  1 Drop Both Eyes PRN    white petrolatum-mineral oiL (STYE LUBRICANT) ointment   Both Eyes PRN    losartan (COZAAR) tablet 25 mg  25 mg Oral DAILY    amLODIPine (NORVASC) tablet 10 mg  10 mg Oral DAILY    furosemide (LASIX) tablet 20 mg  20 mg Oral DAILY    levETIRAcetam (KEPPRA) oral solution 1,000 mg  1,000 mg Oral Q12H    0.9% sodium chloride infusion 250 mL  250 mL IntraVENous PRN    sodium chloride (NS) flush 5-40 mL  5-40 mL IntraVENous Q8H    sodium chloride (NS) flush 5-40 mL  5-40 mL IntraVENous PRN    acetaminophen (TYLENOL) tablet 650 mg  650 mg Oral Q6H PRN    Or    acetaminophen (TYLENOL) suppository 650 mg  650 mg Rectal Q6H PRN    polyethylene glycol (MIRALAX) packet 17 g  17 g Oral DAILY PRN    promethazine (PHENERGAN) tablet 12.5 mg  12.5 mg Oral Q6H PRN    Or    ondansetron (ZOFRAN) injection 4 mg  4 mg IntraVENous Q6H PRN       Other Studies:  No results found for this visit on 12/25/20. Mri Brain W Cont    Result Date: 12/31/2020  EXAMINATION: BRAIN MRI 12/31/2020 7:25 AM ACCESSION NUMBER: [id number] INDICATION: Pre-operative and Intraoperative iRule Neuronavigation planning. COMPARISON: Head CT 12/26/2020, brain MRI 12/25/2020 TECHNIQUE: Multiplanar multisequence MRI of the brain after the administration of 20 cc Dotarem intravenous contrast. The Stealth protocol was utilized for preoperative planning.  Stereotactic frame is in place. FINDINGS: A 3.6 x 2.9 x 3.0 cm (AP x transverse x craniocaudal) (axial postcontrast image 204 and coronal postcontrast image 168) peripherally enhancing centrally necrotic mass in the posterior lateral right parietal lobe, involving the right supramarginal gyrus, and extending into the posterior aspect of the right superior temporal gyrus is not significantly changed in size in comparison to the 12/25/2020 exam within limits of differences in measurement plane imaging technique. There is surrounding vasogenic edema. Mass effect upon the body of the right lateral ventricle is unchanged. The basilar cisterns are patent. There is no cerebellar tonsillar ectopia or herniation. There is a cluster of prominent perivascular spaces in the left thalamus. This is unchanged in comparison to prior exams. Multiple mucosal retention cysts in the right maxillary sinus. There are no new enhancing intracranial lesions. There are no suspicious osseous lesions. IMPRESSION: 1. Unchanged right parietal lobe mass, measuring 3.6 x 2.9 x 3.0 cm and as fully detailed above. 2. Unchanged clustered prominent perivascular spaces in the left thalamus. 3. No new enhancing intracranial masses.  VOICE DICTATED BY: Dr. Jayne Caldwell Results     None          SARS-CoV-2 Lab Results  \"Novel Coronavirus\" Test: No results found for: COV2NT   \"Emergent Disease\" Test: No results found for: EDPR  \"SARS-COV-2\" Test: No results found for: XGCOVT  Rapid Test:   Lab Results   Component Value Date/Time    COVR Not detected 12/30/2020 01:59 PM            Assessment and Plan:     Hospital Problems as of 12/31/2020 Date Reviewed: 9/28/2020          Codes Class Noted - Resolved POA    Cerebral edema ([facility] [address] ICD-10-CM: G93.6  ICD-9-CM: 348.5  12/31/2020 - Present Yes        Seizures ([facility] [address] ICD-10-CM: R56.9  ICD-9-CM: 780.39  12/28/2020 - Present Yes        Essential hypertension (Chronic) ICD-10-CM: I10  ICD-9-CM: 401.9  12/26/2020 - Present Yes        * (Principal) Brain tumor [facility]) ICD-10-CM: D49.6  ICD-9-CM: 239.6  12/26/2020 - Present Yes        Encephalopathy ICD-10-CM: G93.40  ICD-9-CM: 348.30  12/26/2020 - Present Yes        ICH (intracerebral hemorrhage) ([facility] [address] ICD-10-CM: I61.9  ICD-9-CM: 162  12/25/2020 - Present Yes        RESOLVED: Acute renal failure [facility]) [facility] ICD-10-CM: N17.9  ICD-9-CM: 584.9  12/26/2020 - 12/28/2020 Yes              Plan:  # Right parietal mass with hemorrhage              - Neurosurgery following. Keppra, Decadron.              - To OR today for craniotomy with resection. Goal SBP per Neurosurgery <150. Can given PO BP meds now.     # HTN              - Con't PO meds.     # Seizures              - 2/2 above. On Keppra. Neuro s/o.     # INGRID              - Resolved.     # Acute encephalopathy              - Resolved. 2/2 seizure/parietal mass.      # MDD              - Zoloft    DC planning/Dispo: Pending. Diet:  DIET NPO  DVT ppx: SCDs    Other listed chronic conditions stable, cont current management.     Signed:  Stu Ventura MD

## 2020-12-31 NOTE — PERIOP NOTES
TRANSFER - OUT REPORT:    Verbal report given to Isaiah Torres RN on [address]  being transferred to ICU overflow 21 for routine post - op       Report consisted of patients Situation, Background, Assessment and   Recommendations(SBAR). Information from the following report(s) OR Summary, Procedure Summary, Intake/Output and MAR was reviewed with the receiving nurse.     Lines:   Peripheral IV 12/25/20 Right Hand (Active)   Site Assessment Clean, dry, & intact 12/31/20 1343   Phlebitis Assessment 0 12/31/20 1343   Infiltration Assessment 0 12/31/20 1343   Dressing Status Clean, dry, & intact 12/31/20 1343   Dressing Type Tape;Transparent 12/31/20 1343   Hub Color/Line Status Patent 12/31/20 1343   Alcohol Cap Used No 12/27/20 0730       Peripheral IV 12/25/20 Right Wrist (Active)   Site Assessment Clean, dry, & intact 12/31/20 1343   Phlebitis Assessment 0 12/31/20 1343   Infiltration Assessment 0 12/31/20 1343   Dressing Status Clean, dry, & intact 12/31/20 1343   Dressing Type Tape;Transparent 12/31/20 1343   Hub Color/Line Status Patent 12/31/20 1343   Alcohol Cap Used No 12/27/20 0730       Peripheral IV 12/31/20 Left Hand (Active)   Site Assessment Clean, dry, & intact 12/31/20 1343   Phlebitis Assessment 0 12/31/20 1343   Infiltration Assessment 0 12/31/20 1343   Dressing Status Clean, dry, & intact 12/31/20 1343   Dressing Type Tape;Transparent 12/31/20 1343   Hub Color/Line Status Patent 12/31/20 1343       Arterial Line 12/31/20 (Active)       Arterial Line 12/31/20 Left Radial artery (Active)   Site Assessment Clean, dry, & intact 12/31/20 1343   Dressing Status Clean, dry, & intact 12/31/20 1343   Dressing Type Tape;Transparent 12/31/20 1343   Line Status Intact and in place 12/31/20 1343   Treatment Zeroed or re-zeroed 12/31/20 1300   Affected Extremity/Extremities Color distal to insertion site pink (or appropriate for race) 12/31/20 1343        Opportunity for questions and clarification was provided. Patient transported with:   Registered Nurse    VTE prophylaxis orders have been written for [facility]. Patient and family given floor number and nurses name. Family updated re: pt status after security code verified.

## 2020-12-31 NOTE — PROGRESS NOTES
Dr. Lucia Dick notified about cuff and artline BP not correlating. Orders to go off the cuff pressure for sbp <150.

## 2020-12-31 NOTE — PERIOP NOTES
TRANSFER - IN REPORT:    Verbal report received from [place] [facility], RN on [address]  being received from [phone] for routine progression of care      Report consisted of patients Situation, Background, Assessment and   Recommendations(SBAR). Information from the following report(s) SBAR was reviewed with the receiving nurse. Opportunity for questions and clarification was provided. Assessment to be completed upon patients arrival to unit and care assumed.

## 2020-12-31 NOTE — PROGRESS NOTES
TRANSFER - OUT REPORT:    Verbal report given to Ankur recinos RN on [address]  being transferred to Pre Op for ordered procedure       Report consisted of patients Situation, Background, Assessment and   Recommendations(SBAR). Information from the following report(s) SBAR, Kardex, [facility] and Recent Results was reviewed with the receiving nurse. Lines:   Peripheral IV 12/25/20 Right Hand (Active)   Site Assessment Clean, dry, & intact 12/30/20 0750   Phlebitis Assessment 0 12/30/20 0750   Infiltration Assessment 0 12/30/20 0750   Dressing Status Clean, dry, & intact 12/30/20 0750   Dressing Type Transparent 12/30/20 0750   Hub Color/Line Status Flushed;Patent 12/30/20 0208   Alcohol Cap Used No 12/27/20 0730       Peripheral IV 12/25/20 Right Wrist (Active)   Site Assessment Clean, dry, & intact 12/30/20 0750   Phlebitis Assessment 0 12/30/20 0750   Infiltration Assessment 0 12/30/20 0750   Dressing Status Clean, dry, & intact 12/30/20 0750   Dressing Type Transparent 12/30/20 0750   Hub Color/Line Status Flushed;Patent 12/30/20 0208   Alcohol Cap Used No 12/27/20 0730        Opportunity for questions and clarification was provided.       Patient transported with:   Registered Nurse

## 2020-12-31 NOTE — ANESTHESIA POSTPROCEDURE EVALUATION
Procedure(s):  RIGHT CRANIOTOMY WITH IMAGE GUIDANCE FOR TUMOR RESECTION MRI @0700. general    Anesthesia Post Evaluation      Multimodal analgesia: multimodal analgesia not used between 6 hours prior to anesthesia start to PACU discharge  Patient location during evaluation: PACU  Patient participation: complete - patient participated  Level of consciousness: awake and alert  Pain score: 0  Pain management: adequate  Airway patency: patent  Anesthetic complications: no  Cardiovascular status: acceptable and hemodynamically stable  Respiratory status: acceptable and spontaneous ventilation  Hydration status: acceptable  Post anesthesia nausea and vomiting:  none  Final Post Anesthesia Temperature Assessment:  Normothermia (36.0-37.5 degrees C)      INITIAL Post-op Vital signs:   Vitals Value Taken Time   /63 12/31/20 1338   Temp 36.5 °C (97.7 °F) 12/31/20 1257   Pulse 84 12/31/20 1342   Resp 16 12/31/20 1329   SpO2 95 % 12/31/20 1342   Vitals shown include unvalidated device data.

## 2020-12-31 NOTE — PROGRESS NOTES
TRANSFER - IN REPORT:    Verbal report received from Estela Little RN (name) on [facility]omo  being received from PACU (unit) for routine post - op      Report consisted of patients Situation, Background, Assessment and   Recommendations(SBAR). Information from the following report(s) SBAR, OR Summary, Procedure Summary, Intake/Output, MAR and Cardiac Rhythm nsr was reviewed with the receiving nurse. Opportunity for questions and clarification was provided. Assessment completed upon patients arrival to unit and care assumed. Dual neuro assessment performed.

## 2020-12-31 NOTE — PROGRESS NOTES
NEUROSURGERY INTERVAL H&P UPDATE:    I have examined the patient within the last thiry days there are no changes in the patient's medical status. The necessity for the procedure/care is still present and the H&P is still current.     Virginie Oconnell MD

## 2021-01-01 ENCOUNTER — HOSPITAL ENCOUNTER (INPATIENT)
Dept: CT IMAGING | Age: 81
Discharge: HOME | DRG: 025 | End: 2021-01-01
Attending: NEUROLOGICAL SURGERY
Payer: MEDICARE

## 2021-01-01 PROBLEM — G93.89: Status: ACTIVE | Noted: 2020-12-26

## 2021-01-01 LAB
ANION GAP SERPL CALC-SCNC: 6 MMOL/L (ref 7–16)
BASOPHILS # BLD: 0.1 K/UL (ref 0–0.2)
BASOPHILS NFR BLD: 0 % (ref 0–2)
BUN SERPL-MCNC: 24 MG/DL (ref 8–23)
CALCIUM SERPL-MCNC: 7.4 MG/DL (ref 8.3–10.4)
CHLORIDE SERPL-SCNC: 112 MMOL/L (ref 98–107)
CO2 SERPL-SCNC: 22 MMOL/L (ref 21–32)
CREAT SERPL-MCNC: 1.16 MG/DL (ref 0.8–1.5)
DIFFERENTIAL METHOD BLD: ABNORMAL
EOSINOPHIL # BLD: 0 K/UL (ref 0–0.8)
EOSINOPHIL NFR BLD: 0 % (ref 0.5–7.8)
ERYTHROCYTE [DISTWIDTH] IN BLOOD BY AUTOMATED COUNT: 12.3 % (ref 11.9–14.6)
GLUCOSE BLD STRIP.AUTO-MCNC: 136 MG/DL (ref 65–100)
GLUCOSE BLD STRIP.AUTO-MCNC: 137 MG/DL (ref 65–100)
GLUCOSE BLD STRIP.AUTO-MCNC: 138 MG/DL (ref 65–100)
GLUCOSE BLD STRIP.AUTO-MCNC: 202 MG/DL (ref 65–100)
GLUCOSE SERPL-MCNC: 130 MG/DL (ref 65–100)
HCT VFR BLD AUTO: 37.5 % (ref 41.1–50.3)
HGB BLD-MCNC: 12.7 G/DL (ref 13.6–17.2)
IMM GRANULOCYTES # BLD AUTO: 0.3 K/UL (ref 0–0.5)
IMM GRANULOCYTES NFR BLD AUTO: 1 % (ref 0–5)
LYMPHOCYTES # BLD: 0.7 K/UL (ref 0.5–4.6)
LYMPHOCYTES NFR BLD: 3 % (ref 13–44)
MCH RBC QN AUTO: 31.6 PG (ref 26.1–32.9)
MCHC RBC AUTO-ENTMCNC: 33.9 G/DL (ref 31.4–35)
MCV RBC AUTO: 93.3 FL (ref 79.6–97.8)
MONOCYTES # BLD: 1.6 K/UL (ref 0.1–1.3)
MONOCYTES NFR BLD: 7 % (ref 4–12)
NEUTS SEG # BLD: 20.5 K/UL (ref 1.7–8.2)
NEUTS SEG NFR BLD: 89 % (ref 43–78)
NRBC # BLD: 0 K/UL (ref 0–0.2)
PLATELET # BLD AUTO: 150 K/UL (ref 150–450)
PMV BLD AUTO: 9.9 FL (ref 9.4–12.3)
POTASSIUM SERPL-SCNC: 4.1 MMOL/L (ref 3.5–5.1)
RBC # BLD AUTO: 4.02 M/UL (ref 4.23–5.6)
SODIUM SERPL-SCNC: 140 MMOL/L (ref 136–145)
WBC # BLD AUTO: 23.1 K/UL (ref 4.3–11.1)

## 2021-01-01 PROCEDURE — 74011636637 HC RX REV CODE- 636/637: Performed by: NEUROLOGICAL SURGERY

## 2021-01-01 PROCEDURE — 74011250637 HC RX REV CODE- 250/637: Performed by: NEUROLOGICAL SURGERY

## 2021-01-01 PROCEDURE — 99231 SBSQ HOSP IP/OBS SF/LOW 25: CPT | Performed by: PHYSICAL MEDICINE & REHABILITATION

## 2021-01-01 PROCEDURE — 74011250636 HC RX REV CODE- 250/636: Performed by: NEUROLOGICAL SURGERY

## 2021-01-01 PROCEDURE — 74011250637 HC RX REV CODE- 250/637: Performed by: INTERNAL MEDICINE

## 2021-01-01 PROCEDURE — 70470 CT HEAD/BRAIN W/O & W/DYE: CPT

## 2021-01-01 PROCEDURE — 85025 COMPLETE CBC W/AUTO DIFF WBC: CPT

## 2021-01-01 PROCEDURE — 74011000250 HC RX REV CODE- 250: Performed by: NEUROLOGICAL SURGERY

## 2021-01-01 PROCEDURE — 2709999900 HC NON-CHARGEABLE SUPPLY

## 2021-01-01 PROCEDURE — 82962 GLUCOSE BLOOD TEST: CPT

## 2021-01-01 PROCEDURE — 74011000636 HC RX REV CODE- 636: Performed by: NEUROLOGICAL SURGERY

## 2021-01-01 PROCEDURE — 74011000258 HC RX REV CODE- 258: Performed by: NEUROLOGICAL SURGERY

## 2021-01-01 PROCEDURE — 80048 BASIC METABOLIC PNL TOTAL CA: CPT

## 2021-01-01 PROCEDURE — 65270000029 HC RM PRIVATE

## 2021-01-01 RX ORDER — SODIUM CHLORIDE 0.9 % (FLUSH) 0.9 %
10 SYRINGE (ML) INJECTION
Status: COMPLETED | OUTPATIENT
Start: 2021-01-01 | End: 2021-01-01

## 2021-01-01 RX ORDER — LOSARTAN POTASSIUM 50 MG/1
50 TABLET ORAL DAILY
Status: DISCONTINUED | OUTPATIENT
Start: 2021-01-01 | End: 2021-01-04 | Stop reason: HOSPADM

## 2021-01-01 RX ADMIN — SODIUM CHLORIDE 100 ML/HR: 900 INJECTION, SOLUTION INTRAVENOUS at 14:20

## 2021-01-01 RX ADMIN — IOPAMIDOL 100 ML: 755 INJECTION, SOLUTION INTRAVENOUS at 05:26

## 2021-01-01 RX ADMIN — DEXAMETHASONE SODIUM PHOSPHATE 4 MG: 4 INJECTION, SOLUTION INTRAMUSCULAR; INTRAVENOUS at 12:41

## 2021-01-01 RX ADMIN — Medication 10 ML: at 06:00

## 2021-01-01 RX ADMIN — AMLODIPINE BESYLATE 10 MG: 5 TABLET ORAL at 09:00

## 2021-01-01 RX ADMIN — SODIUM CHLORIDE 100 ML: 900 INJECTION, SOLUTION INTRAVENOUS at 05:26

## 2021-01-01 RX ADMIN — Medication 10 ML: at 22:16

## 2021-01-01 RX ADMIN — Medication 40 ML: at 13:09

## 2021-01-01 RX ADMIN — FUROSEMIDE 20 MG: 40 TABLET ORAL at 09:11

## 2021-01-01 RX ADMIN — SODIUM CHLORIDE 100 ML/HR: 900 INJECTION, SOLUTION INTRAVENOUS at 02:32

## 2021-01-01 RX ADMIN — Medication 10 ML: at 22:17

## 2021-01-01 RX ADMIN — Medication 10 ML: at 05:26

## 2021-01-01 RX ADMIN — LOSARTAN POTASSIUM 25 MG: 25 TABLET, FILM COATED ORAL at 09:11

## 2021-01-01 RX ADMIN — ACETAMINOPHEN 650 MG: 325 TABLET, FILM COATED ORAL at 09:15

## 2021-01-01 RX ADMIN — LABETALOL HYDROCHLORIDE 10 MG: 5 INJECTION INTRAVENOUS at 01:12

## 2021-01-01 RX ADMIN — SERTRALINE HYDROCHLORIDE 150 MG: 100 TABLET ORAL at 09:11

## 2021-01-01 RX ADMIN — DEXAMETHASONE SODIUM PHOSPHATE 4 MG: 4 INJECTION, SOLUTION INTRAMUSCULAR; INTRAVENOUS at 17:48

## 2021-01-01 RX ADMIN — LEVETIRACETAM 1000 MG: 100 SOLUTION ORAL at 22:06

## 2021-01-01 RX ADMIN — LOSARTAN POTASSIUM 50 MG: 50 TABLET, FILM COATED ORAL at 12:51

## 2021-01-01 RX ADMIN — DEXAMETHASONE SODIUM PHOSPHATE 4 MG: 4 INJECTION, SOLUTION INTRAMUSCULAR; INTRAVENOUS at 05:57

## 2021-01-01 RX ADMIN — LEVETIRACETAM 1000 MG: 100 SOLUTION ORAL at 09:10

## 2021-01-01 RX ADMIN — INSULIN LISPRO 4 UNITS: 100 INJECTION, SOLUTION INTRAVENOUS; SUBCUTANEOUS at 17:20

## 2021-01-01 RX ADMIN — CEFAZOLIN SODIUM 2 G: 100 INJECTION, POWDER, LYOPHILIZED, FOR SOLUTION INTRAVENOUS at 01:15

## 2021-01-01 NOTE — PROGRESS NOTES
Report given to GYPSY Magana on 7th floor. Pt transferred to room 701. Transfer of care completed at bedside.  Spouse in attendance

## 2021-01-01 NOTE — PROGRESS NOTES
Arterial line discontinued as per neurology order. Direct pressure applied to Arterial line site to left wrist for 15 minutes. No hematoma or oozing noted. Pressure dressing applied to site. Spouse in attendance during procedure.  No distress or c/o noted

## 2021-01-01 NOTE — PROGRESS NOTES
Problem: Ventilator Management  Goal: *Adequate oxygenation and ventilation  Outcome: Progressing Towards Goal     Problem: Pressure Injury - Risk of  Goal: *Prevention of pressure injury  Description: Document Marquez Scale and appropriate interventions in the flowsheet.   Outcome: Progressing Towards Goal  Note: Pressure Injury Interventions:  Sensory Interventions: Assess need for specialty bed, Assess changes in LOC, Avoid rigorous massage over bony prominences    Moisture Interventions: Absorbent underpads, Apply protective barrier, creams and emollients, Moisture barrier    Activity Interventions: Pressure redistribution bed/mattress(bed type)    Mobility Interventions: HOB 30 degrees or less, Pressure redistribution bed/mattress (bed type)    Nutrition Interventions: Document food/fluid/supplement intake    Friction and Shear Interventions: Apply protective barrier, creams and emollients, Foam dressings/transparent film/skin sealants

## 2021-01-01 NOTE — PROGRESS NOTES
Bedside and Verbal shift change report given to Javier Kelly rn (oncoming nurse) by Cody Streeter (offgoing nurse). Report included the following information SBAR, Kardex, ED Summary, OR Summary, Procedure Summary, Intake/Output, Med Rec Status, Cardiac Rhythm NSR, Alarm Parameters  and Dual Neuro Assessment.

## 2021-01-01 NOTE — PROGRESS NOTES
01/01/21 0719   NIH Stroke Scale   Interval Other (comment)   LOC 0   LOC Questions 0   LOC Commands 0   Best Gaze 0   Visual 0   Facial Palsy 0   Motor Right Arm 0   Motor Left Arm 0   Motor Right Leg 0   Motor Left Leg 0   Limb Ataxia 0   Sensory 0   Best Language 1  (delayed speech patterns)   Dysarthria 0   Extinction and Inattention 0   Total 1

## 2021-01-01 NOTE — PROGRESS NOTES
01/01/21 1330   Dual Skin Pressure Injury Assessment   Dual Skin Pressure Injury Assessment WDL   Second Care Provider (Based on [address] [place] [address]) Vimal Gupta RN    Skin Integumentary   Skin Integumentary (WDL) X    Pressure  Injury Documentation No Pressure Injury Noted-Pressure Ulcer Prevention Initiated   Skin Color Noman   Skin Condition/Temp Warm;Dry   Skin Integrity Incision (comment)  (head , blanching redness to sacrum )   Turgor Non-tenting   Wound Prevention and Protection Methods   Orientation of Wound Prevention Posterior   Location of Wound Prevention Sacrum/Coccyx   Dressing Present  Yes   Wound Offloading (Prevention Methods) Bed, pressure reduction mattress   Multiple moles to trunk.

## 2021-01-01 NOTE — PROGRESS NOTES
Comprehensive Nutrition Assessment    Type and Reason for Visit: RD nutrition re-screen/LOS    Nutrition Recommendations/Plan:    Resume regular diet when appropriate. Malnutrition Assessment:  Malnutrition Status: No malnutrition    Nutrition Assessment:   Nutrition Background: Yaneth Duran is a 68 y.o. male who presents with seizures and was found to have a peripherally enhancing right  parietal lesion with evidence of internal hemorrhage and necrosis measuring 3.7 x 3.3 x 3.2 cm with associated surrounding vasogenic edema without significant mid-line shift. Daily Update:  Patient is s/p right parietal craniotomy yesterday. He is awake and alert and answered my questions appropriately. He reports adequate oral intake, approximately the same amount that he was eating PTA. No recent weight change reported. Nutrition Related Findings:   He was seen by ST on 12/29 and cleared for a regular diet with thin liquids. Current Nutrition Therapies:  DIET NPO    Current Intake:   Average Meal Intake: Unable to assess: minimal documentation. Anthropometric Measures:  Height: 5' 10\" (177.8 cm)  Current Body Wt: 90.6 kg (199 lb 11.8 oz)(12/30/2020), Weight source: Bed scale  BMI: 28.7,       Ideal Body Wt: 166 lbs (75 kg), 120.3 %          Estimated Daily Nutrient Needs:  Energy (kcal/day): 9535-3122 (20-25 george/kg/day - overweight) (Kcal/kg, Weight Used: Current)  Protein (g/day):  (1-1.2 gm pro/kg/day) Weight Used: (Current)  Fluid (ml/day):   (1 ml/kcal)    Nutrition Diagnosis:   No nutrition diagnosis at this time. Nutrition Interventions:   Food and/or Nutrient Delivery: Continue current diet  Plan of Care discussed with Hilaria Dunne RN    Goals: Active Goal: Meet >75% of estimated daily nutrition needs within 3 days. Nutrition Monitoring and Evaluation:      Food/Nutrient Intake Outcomes: Diet advancement/tolerance       Discharge Planning:    No discharge needs at this time    La Winston LÄNGHEM RD, LD on 1/1/2021 at 9:39 AM  Contact: [phone]        Disaster Mode active

## 2021-01-01 NOTE — PROGRESS NOTES
Daja Sauceda MD  Medical Director  [address] [facility], [address] [facility]  Tel: [phone]       Physical Medicine & Rehab Consult Progress Note      Patient: Anton Perkins  Admit Date: 12/25/2020  Admit Diagnosis: ICH (intracerebral hemorrhage) ([facility] [address] [I61.9]  POD #1   Recommendations:   Hospital Inpatient Rehab, Continue acute rehabilitation program, Coordination of rehab / medical care, Counseling of PM&R care issues management  [facility] Monday 1/4 PENDING MEDICAL CLEARANCE. WILL REQUIRE TRANSPORT TO [facility] IN [place] WHERE [facility] IS LOCATED TEMPORARILY   -re order PT/OT/ST; had s/o in unit. Will f/u Mond  History / Subjective / Complaint:    interim EMR reviewed.     No Known Allergies  Current Facility-Administered Medications   Medication Dose Route Frequency    losartan (COZAAR) tablet 50 mg  50 mg Oral DAILY    sodium chloride (NS) flush 5-40 mL  5-40 mL IntraVENous Q8H    sodium chloride (NS) flush 5-40 mL  5-40 mL IntraVENous PRN    acetaminophen (TYLENOL) tablet 650 mg  650 mg Oral Q4H PRN    HYDROcodone-acetaminophen (NORCO)  mg tablet 1 Tab  1 Tab Oral Q4H PRN    morphine 10 mg/ml injection 4 mg  4 mg IntraVENous Q4H PRN    naloxone (NARCAN) injection 0.4 mg  0.4 mg IntraVENous PRN    ondansetron (ZOFRAN) injection 4 mg  4 mg IntraVENous Q6H PRN    labetaloL (NORMODYNE;TRANDATE) injection 10 mg  10 mg IntraVENous PRN    0.9% sodium chloride infusion  100 mL/hr IntraVENous CONTINUOUS    dexamethasone (DECADRON) 4 mg/mL injection 4 mg  4 mg IntraVENous Q6H    insulin lispro (HUMALOG) injection   SubCUTAneous AC&HS    sertraline (ZOLOFT) tablet 150 mg  150 mg Oral DAILY    carboxymethylcellulose sodium (REFRESH LIQUIGEL) 1 % ophthalmic solution 1 Drop  1 Drop Both Eyes PRN    white petrolatum-mineral oiL (STYE LUBRICANT) ointment   Both Eyes PRN    amLODIPine (NORVASC) tablet 10 mg  10 mg Oral DAILY    furosemide (LASIX) tablet 20 mg  20 mg Oral DAILY    levETIRAcetam (KEPPRA) oral solution 1,000 mg  1,000 mg Oral Q12H    0.9% sodium chloride infusion 250 mL  250 mL IntraVENous PRN    sodium chloride (NS) flush 5-40 mL  5-40 mL IntraVENous Q8H    sodium chloride (NS) flush 5-40 mL  5-40 mL IntraVENous PRN    acetaminophen (TYLENOL) tablet 650 mg  650 mg Oral Q6H PRN    Or    acetaminophen (TYLENOL) suppository 650 mg  650 mg Rectal Q6H PRN    polyethylene glycol (MIRALAX) packet 17 g  17 g Oral DAILY PRN    promethazine (PHENERGAN) tablet 12.5 mg  12.5 mg Oral Q6H PRN    Or    ondansetron (ZOFRAN) injection 4 mg  4 mg IntraVENous Q6H PRN       Objective:     Vitals:  Patient Vitals for the past 8 hrs:   BP Temp Pulse Resp SpO2 Height   01/01/21 1400 120/65 98.9 °F (37.2 °C) 80 18 93 %    01/01/21 1300 (!) 141/68  72  94 %    01/01/21 1230   68  93 %    01/01/21 1200 130/68  68  93 %    01/01/21 1130   67  93 %    01/01/21 1100 134/72 98.2 °F (36.8 °C) 66  93 %    01/01/21 1030 138/65  64  93 %    01/01/21 1000   67  92 %    01/01/21 0930   67  94 %    01/01/21 0920      5' 10\" (1.778 m)   01/01/21 0900   66  93 %    01/01/21 0830   68  93 %    01/01/21 0803   69  93 %    01/01/21 0800   65  93 %    01/01/21 0748   66  93 %    01/01/21 0733 137/67 98.2 °F (36.8 °C) 72  95 %    01/01/21 0730   66  94 %    01/01/21 0718   70  94 %    01/01/21 0703   67  94 %    01/01/21 0700   71  94 %    01/01/21 0648   68  93 %    01/01/21 0633   68  93 %       Intake and Output:  12/30 1901 - 01/01 0700  In: 3946.1 [I.V.:3946.1]  Out: 5462 [Urine:3075]      Incision(s)/Wound(s):   Incision 12/31/20 Head Right (Active)   Dressing Status Clean;Dry; Intact; Old drainage noted 01/01/21 0100   Number of days: 1          Pain 1  Pain Scale 1: Numeric (0 - 10) (01/01/21 0930)  Pain Intensity 1: 0 (01/01/21 0930)  Patient Stated Pain Goal: 0 (01/01/21 0930)  Pain Reassessment 1: Yes (01/01/21 0930)  Pain Onset 1: (acute) (01/01/21 0733)  Pain Location 1: Ear(right) (01/01/21 0733)  Pain Orientation 1: (generalized) (01/01/21 7778)  Pain Description 1: Sore (01/01/21 5305)  Pain Intervention(s) 1: Repositioned (01/01/21 0733)         Labs/Studies:  Recent Results (from the past 72 hour(s))   GLUCOSE, POC    Collection Time: 12/29/20  4:59 PM   Result Value Ref Range    Glucose (POC) 185 (H) 65 - 100 mg/dL   GLUCOSE, POC    Collection Time: 12/29/20  9:07 PM   Result Value Ref Range    Glucose (POC) 158 (H) 65 - 100 mg/dL   CBC WITH AUTOMATED DIFF    Collection Time: 12/30/20  6:34 AM   Result Value Ref Range    WBC 16.2 (H) 4.3 - 11.1 K/uL    RBC 4.57 4.23 - 5.6 M/uL    HGB 14.5 13.6 - 17.2 g/dL    HCT 41.3 41.1 - 50.3 %    MCV 90.4 79.6 - 97.8 FL    MCH 31.7 26.1 - 32.9 PG    MCHC 35.1 (H) 31.4 - 35.0 g/dL    RDW 12.2 11.9 - 14.6 %    PLATELET 859 665 - 426 K/uL    MPV 10.1 9.4 - 12.3 FL    ABSOLUTE NRBC 0.00 0.0 - 0.2 K/uL    DF AUTOMATED      NEUTROPHILS 86 (H) 43 - 78 %    LYMPHOCYTES 6 (L) 13 - 44 %    MONOCYTES 7 4.0 - 12.0 %    EOSINOPHILS 0 (L) 0.5 - 7.8 %    BASOPHILS 0 0.0 - 2.0 %    IMMATURE GRANULOCYTES 2 0.0 - 5.0 %    ABS. NEUTROPHILS 13.8 (H) 1.7 - 8.2 K/UL    ABS. LYMPHOCYTES 0.9 0.5 - 4.6 K/UL    ABS. MONOCYTES 1.1 0.1 - 1.3 K/UL    ABS. EOSINOPHILS 0.0 0.0 - 0.8 K/UL    ABS. BASOPHILS 0.1 0.0 - 0.2 K/UL    ABS. IMM.  GRANS. 0.3 0.0 - 0.5 K/UL   METABOLIC PANEL, BASIC    Collection Time: 12/30/20  6:34 AM   Result Value Ref Range    Sodium 140 138 - 145 mmol/L    Potassium 3.6 3.5 - 5.1 mmol/L    Chloride 105 98 - 107 mmol/L    CO2 28 21 - 32 mmol/L    Anion gap 7 7 - 16 mmol/L    Glucose 134 (H) 65 - 100 mg/dL    BUN 34 (H) 8 - 23 MG/DL    Creatinine 1.14 0.8 - 1.5 MG/DL    GFR est AA >60 >60 ml/min/1.73m2    GFR est non-AA >60 >60 ml/min/1.73m2    Calcium 8.5 8.3 - 10.4 MG/DL   GLUCOSE, POC    Collection Time: 12/30/20  7:41 AM   Result Value Ref Range    Glucose (POC) 148 (H) 65 - 100 mg/dL   GLUCOSE, POC    Collection Time: 12/30/20 11:48 AM   Result Value Ref Range    Glucose (POC) 181 (H) 65 - 100 mg/dL   SARS-COV-2    Collection Time: 12/30/20  1:59 PM   Result Value Ref Range    Specimen source Nasopharyngeal      COVID-19 rapid test Not detected NOTD      SARS CoV-2 Negative Negative     GLUCOSE, POC    Collection Time: 12/30/20  3:57 PM   Result Value Ref Range    Glucose (POC) 155 (H) 65 - 100 mg/dL   GLUCOSE, POC    Collection Time: 12/30/20  8:09 PM   Result Value Ref Range    Glucose (POC) 164 (H) 65 - 100 mg/dL   CBC WITH AUTOMATED DIFF    Collection Time: 12/31/20  5:49 AM   Result Value Ref Range    WBC 16.8 (H) 4.3 - 11.1 K/uL    RBC 4.38 4.23 - 5.6 M/uL    HGB 13.8 13.6 - 17.2 g/dL    HCT 39.7 (L) 41.1 - 50.3 %    MCV 90.6 79.6 - 97.8 FL    MCH 31.5 26.1 - 32.9 PG    MCHC 34.8 31.4 - 35.0 g/dL    RDW 12.2 11.9 - 14.6 %    PLATELET 541 (L) 437 - 450 K/uL    MPV 10.1 9.4 - 12.3 FL    ABSOLUTE NRBC 0.00 0.0 - 0.2 K/uL    DF AUTOMATED      NEUTROPHILS 87 (H) 43 - 78 %    LYMPHOCYTES 5 (L) 13 - 44 %    MONOCYTES 6 4.0 - 12.0 %    EOSINOPHILS 0 (L) 0.5 - 7.8 %    BASOPHILS 0 0.0 - 2.0 %    IMMATURE GRANULOCYTES 2 0.0 - 5.0 %    ABS. NEUTROPHILS 14.6 (H) 1.7 - 8.2 K/UL    ABS. LYMPHOCYTES 0.8 0.5 - 4.6 K/UL    ABS. MONOCYTES 1.0 0.1 - 1.3 K/UL    ABS. EOSINOPHILS 0.0 0.0 - 0.8 K/UL    ABS. BASOPHILS 0.1 0.0 - 0.2 K/UL    ABS. IMM.  GRANS. 0.3 0.0 - 0.5 K/UL   POC CG8I    Collection Time: 12/31/20  9:12 AM   Result Value Ref Range    pH (POC) 7.48 (H) 7.35 - 7.45      pCO2 (POC) 36.3 35 - 45 MMHG    pO2 (POC) 115 (H) 75 - 100 MMHG    HCO3 (POC) 26.7 (H) 22 - 26 MMOL/L    sO2 (POC) 99 (H) 95 - 98 %    Base excess (POC) 3 mmol/L    Sodium,  (L) 136 - 145 MMOL/L    Potassium, POC 4.1 3.5 - 5.1 MMOL/L    Glucose,  (H) 65 - 100 MG/DL    Calcium, ionized (POC) 1.07 (L) 1.12 - 1.32 mmol/L   GLUCOSE, POC    Collection Time: 12/31/20  4:38 PM   Result Value Ref Range Glucose (POC) 132 (H) 65 - 100 mg/dL   GLUCOSE, POC    Collection Time: 12/31/20 10:26 PM   Result Value Ref Range    Glucose (POC) 159 (H) 65 - 707 mg/dL   METABOLIC PANEL, BASIC    Collection Time: 01/01/21  3:50 AM   Result Value Ref Range    Sodium 140 136 - 145 mmol/L    Potassium 4.1 3.5 - 5.1 mmol/L    Chloride 112 (H) 98 - 107 mmol/L    CO2 22 21 - 32 mmol/L    Anion gap 6 (L) 7 - 16 mmol/L    Glucose 130 (H) 65 - 100 mg/dL    BUN 24 (H) 8 - 23 MG/DL    Creatinine 1.16 0.8 - 1.5 MG/DL    GFR est AA >60 >60 ml/min/1.73m2    GFR est non-AA >60 >60 ml/min/1.73m2    Calcium 7.4 (L) 8.3 - 10.4 MG/DL   CBC WITH AUTOMATED DIFF    Collection Time: 01/01/21  3:50 AM   Result Value Ref Range    WBC 23.1 (H) 4.3 - 11.1 K/uL    RBC 4.02 (L) 4.23 - 5.6 M/uL    HGB 12.7 (L) 13.6 - 17.2 g/dL    HCT 37.5 (L) 41.1 - 50.3 %    MCV 93.3 79.6 - 97.8 FL    MCH 31.6 26.1 - 32.9 PG    MCHC 33.9 31.4 - 35.0 g/dL    RDW 12.3 11.9 - 14.6 %    PLATELET 460 633 - 025 K/uL    MPV 9.9 9.4 - 12.3 FL    ABSOLUTE NRBC 0.00 0.0 - 0.2 K/uL    DF AUTOMATED      NEUTROPHILS 89 (H) 43 - 78 %    LYMPHOCYTES 3 (L) 13 - 44 %    MONOCYTES 7 4.0 - 12.0 %    EOSINOPHILS 0 (L) 0.5 - 7.8 %    BASOPHILS 0 0.0 - 2.0 %    IMMATURE GRANULOCYTES 1 0.0 - 5.0 %    ABS. NEUTROPHILS 20.5 (H) 1.7 - 8.2 K/UL    ABS. LYMPHOCYTES 0.7 0.5 - 4.6 K/UL    ABS. MONOCYTES 1.6 (H) 0.1 - 1.3 K/UL    ABS. EOSINOPHILS 0.0 0.0 - 0.8 K/UL    ABS. BASOPHILS 0.1 0.0 - 0.2 K/UL    ABS. IMM. GRANS. 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)    Active Problems:    ICH (intracerebral hemorrhage) ([facility] [address] (12/25/2020)      Essential hypertension (12/26/2020)      Brain tumor ([facility] [address] (12/26/2020)      [facility] (12/28/2020)      Cerebral edema ([facility] (12/31/2020)        Plan / Recommendations / Medical Decision Making:     Recommendations:   Continue acute rehabilitation program  Coordination of rehab / medical care  Counseling of PM&R care issues management  Monitoring and management of medical conditions per plan of care / orders     Reviewed Therapies / Sherrel  / Medications / Records  Nohemy Faye MD, Medical Director  [facility]  Nohemy Faye MD, Medical Director  [address] [place] [address]

## 2021-01-01 NOTE — PROGRESS NOTES
Hospitalist Note     Admit Date:  2020  5:40 AM   Name:  Connor España   Age:  68 y.o.  :  1940   MRN:  [id number]   PCP:  Elena Hook DO  Treatment Team: Attending Provider: Antonieta Car MD; Consulting Provider: Nilam Jones MD; Utilization Review: Gato Garrett; Consulting Provider: Diane Sheppard MD; Consulting Provider: Angela Dejesus MD; Care Manager: Jerson Coburn LMSW    HPI/Subjective:   Mr. Charlie Chapa is a very nice 67 y/o WM with a h/o HTN, obesity, anxiety, HLD who presented on  with encephalopathy and seizure. He was intubated. CT head with right parietal mass with hemorrhage. Neurosurgery and Neurology consulted. CT c/a/p without evidence of primary or metastatic disease. Brain MRI showed the same. He was started on Keppra. EEG abnormal. Extubated and transferred to the floor. Hospitalist consulted to assume care on . To OR on  for resection which was uncomplicated. : C/o some ear pain and rec'd Tylenol. BPs at goal currently.  Repeat CT this AM.    No other complaints  Objective:     Patient Vitals for the past 24 hrs:   Temp Pulse Resp BP SpO2   21 0830  68   93 %   21 0803  69   93 %   21 0800  65   93 %   21 0748  66   93 %   21 0733 98.2 °F (36.8 °C) 72  137/67 95 %   21 0730  66   94 %   21 0718  70   94 %   21 0703  67   94 %   21 0700  71   94 %   21 0648  68   93 %   21 0633  68   93 %   21 0618  71   97 %   21 0600  66 16 (!) 151/53 95 %   21 0545  73   92 %   21 0515  68      21 0500  74 18 (!) 144/67 93 %   21 0458  67  (!) 144/67    21 0445  67      21 0430  71      21 0415  72      21 0414  69  130/63    21 0400  70 18 131/69 93 %   21 0358  70  131/69    21 0344  68  128/60    21 0329  72  (!) 141/66    01/01/21 0314  65  (!) 146/67    01/01/21 0300 98.8 °F (37.1 °C) 67 18 136/68 93 %   01/01/21 0259  73  136/68    01/01/21 0244  66  136/68    01/01/21 0229  68  139/69    01/01/21 0214  70  133/64    01/01/21 0200  67 18 (!) 143/68 93 %   01/01/21 0159  66  (!) 143/68    01/01/21 0145  71  (!) 149/77    01/01/21 0129  69  137/62    01/01/21 0114  66  (!) 142/70    01/01/21 0112  68  (!) 171/66    01/01/21 0100  64 18 (!) 148/67 93 %   01/01/21 0059  66  (!) 148/67    01/01/21 0044  68  (!) 142/72    01/01/21 0029  75  (!) 143/72    01/01/21 0014  66  (!) 145/73    01/01/21 0000  70 18 (!) 142/67 93 %   12/31/20 2359  71  (!) 142/67    12/31/20 2345  77  (!) 142/65    12/31/20 2330  71  (!) 159/77    12/31/20 2314  75  (!) 149/116    12/31/20 2300 98.8 °F (37.1 °C) 76 18 (!) 151/70 93 %   12/31/20 2259  73  (!) 151/70    12/31/20 2245  74  (!) 158/75    12/31/20 2229  71  (!) 153/69    12/31/20 2214  70  (!) 142/72    12/31/20 2200  72 16 (!) 144/68 93 %   12/31/20 2159  66  (!) 144/68    12/31/20 2144  70  (!) 155/70    12/31/20 2130  81  (!) 149/96    12/31/20 2114  73  (!) 144/78    12/31/20 2100  77 16 (!) 171/65 93 %   12/31/20 2044  70  136/75 93 %   12/31/20 2029  73  (!) 146/77 93 %   12/31/20 2014  99  (!) 145/76 94 %   12/31/20 2000  66 (P) 16 (!) (P) 143/70 93 %   12/31/20 1958  66  (!) 143/70 93 %   12/31/20 1944  70  (!) 146/78 92 %   12/31/20 1900 98.6 °F (37 °C) 64 18 (!) 147/71 94 %   12/31/20 1859  65  (!) 147/71 93 %   12/31/20 1854  63  (!) 145/69 93 %   12/31/20 1844  63  (!) 153/69 93 %   12/31/20 1828  66  139/73 95 %   12/31/20 1814  74  (!) 141/72 94 %   12/31/20 1810  67   97 %   12/31/20 1800  74  (!) 140/72 95 %   12/31/20 1759  68  (!) 140/72 95 %   12/31/20 1749  66  (!) 141/72 93 %   12/31/20 1745  67  (!) 152/78    12/31/20 1744  68  (!) 152/78 95 % 12/31/20 1738  68  (!) 153/79 94 %   12/31/20 1730  69  (!) 154/73 95 %   12/31/20 1714  69  (!) 150/77 95 %   12/31/20 1707  67  (!) 154/82    12/31/20 1700  69  (!) 154/82 94 %   12/31/20 1645  79   94 %   12/31/20 1644  69  (!) 147/77 95 %   12/31/20 1629  78  (!) 147/79 95 %   12/31/20 1614  72  (!) 140/74 95 %   12/31/20 1605  67   95 %   12/31/20 1600  70  139/76 96 %   12/31/20 1559  70  139/76 95 %   12/31/20 1548  72  137/71 95 %   12/31/20 1544  73  139/66 94 %   12/31/20 1535  74  (!) 142/67 95 %   12/31/20 1529 98.9 °F (37.2 °C) 70 16 (!) 142/68 94 %   12/31/20 1504  76 16 (!) 141/68 93 %   12/31/20 1448  76 16 (!) 141/69 93 %   12/31/20 1433  79 16 (!) 140/69 93 %   12/31/20 1419  79 16 138/63 95 %   12/31/20 1404  81 16 139/65 95 %   12/31/20 1400  82 16  95 %   12/31/20 1348  80 16 (!) 141/68 95 %   12/31/20 1343 97.8 °F (36.6 °C) 85 16 (!) 141/66 95 %   12/31/20 1338  84 16 136/63 96 %   12/31/20 1333  82 16 136/61 96 %   12/31/20 1329  84 16 133/63 95 %   12/31/20 1324  83 16 (!) 133/55 96 %   12/31/20 1319  82 16 136/62 95 %   12/31/20 1314  82 16 133/61 97 %   12/31/20 1309  84 16 129/60 97 %   12/31/20 1304  86 16 128/60 96 %   12/31/20 1259  84 16 126/60 98 %   12/31/20 1257 97.7 °F (36.5 °C) 87 13 (!) 136/58 98 %     Oxygen Therapy  O2 Sat (%): 93 % (01/01/21 0830)  Pulse via Oximetry: 69 beats per minute (01/01/21 0830)  O2 Device: Room air (12/31/20 2300)  O2 Flow Rate (L/min): 2 l/min (12/31/20 1343)  FIO2 (%): 45 % (12/26/20 0000)    Estimated body mass index is 28.67 kg/m² as calculated from the following:    Height as of this encounter: 5' 10\" (1.778 m). Weight as of this encounter: 90.6 kg (199 lb 12.8 oz).     Intake/Output Summary (Last 24 hours) at 1/1/2021 0923  Last data filed at 1/1/2021 0620  Gross per 24 hour   Intake 3696.08 ml   Output 2750 ml   Net 946.08 ml       *Note that automatically entered I/Os may not be accurate; dependent on patient compliance with collection and accurate  by techs. General:    Well nourished. No overt distress. Poor dentition. CV:   RRR. No m/r/g. No edema. No JVD. Lungs:   CTAB. No wheezing, rhonchi, or rales. Unlabored. Abdomen:   Soft, nontender, nondistended. Extremities: Warm and dry. No cyanosis. Skin:     No rashes. No jaundice. Normal coloration  Neuro:  No gross focal deficits. Alert. Data Reviewed:  I have reviewed all labs, meds, and studies from the last 24 hours:  Recent Results (from the past 24 hour(s))   GLUCOSE, POC    Collection Time: 12/31/20  4:38 PM   Result Value Ref Range    Glucose (POC) 132 (H) 65 - 100 mg/dL   GLUCOSE, POC    Collection Time: 12/31/20 10:26 PM   Result Value Ref Range    Glucose (POC) 159 (H) 65 - 148 mg/dL   METABOLIC PANEL, BASIC    Collection Time: 01/01/21  3:50 AM   Result Value Ref Range    Sodium 140 136 - 145 mmol/L    Potassium 4.1 3.5 - 5.1 mmol/L    Chloride 112 (H) 98 - 107 mmol/L    CO2 22 21 - 32 mmol/L    Anion gap 6 (L) 7 - 16 mmol/L    Glucose 130 (H) 65 - 100 mg/dL    BUN 24 (H) 8 - 23 MG/DL    Creatinine 1.16 0.8 - 1.5 MG/DL    GFR est AA >60 >60 ml/min/1.73m2    GFR est non-AA >60 >60 ml/min/1.73m2    Calcium 7.4 (L) 8.3 - 10.4 MG/DL   CBC WITH AUTOMATED DIFF    Collection Time: 01/01/21  3:50 AM   Result Value Ref Range    WBC 23.1 (H) 4.3 - 11.1 K/uL    RBC 4.02 (L) 4.23 - 5.6 M/uL    HGB 12.7 (L) 13.6 - 17.2 g/dL    HCT 37.5 (L) 41.1 - 50.3 %    MCV 93.3 79.6 - 97.8 FL    MCH 31.6 26.1 - 32.9 PG    MCHC 33.9 31.4 - 35.0 g/dL    RDW 12.3 11.9 - 14.6 %    PLATELET 628 443 - 393 K/uL    MPV 9.9 9.4 - 12.3 FL    ABSOLUTE NRBC 0.00 0.0 - 0.2 K/uL    DF AUTOMATED      NEUTROPHILS 89 (H) 43 - 78 %    LYMPHOCYTES 3 (L) 13 - 44 %    MONOCYTES 7 4.0 - 12.0 %    EOSINOPHILS 0 (L) 0.5 - 7.8 %    BASOPHILS 0 0.0 - 2.0 %    IMMATURE GRANULOCYTES 1 0.0 - 5.0 %    ABS. NEUTROPHILS 20.5 (H) 1.7 - 8.2 K/UL    ABS. LYMPHOCYTES 0.7 0.5 - 4.6 K/UL    ABS. MONOCYTES 1.6 (H) 0.1 - 1.3 K/UL    ABS. EOSINOPHILS 0.0 0.0 - 0.8 K/UL    ABS. BASOPHILS 0.1 0.0 - 0.2 K/UL    ABS. IMM.  GRANS. 0.3 0.0 - 0.5 K/UL   GLUCOSE, POC    Collection Time: 01/01/21  7:37 AM   Result Value Ref Range    Glucose (POC) 136 (H) 65 - 100 mg/dL       Current Meds:  Current Facility-Administered Medications   Medication Dose Route Frequency    sodium chloride (NS) flush 5-40 mL  5-40 mL IntraVENous Q8H    sodium chloride (NS) flush 5-40 mL  5-40 mL IntraVENous PRN    acetaminophen (TYLENOL) tablet 650 mg  650 mg Oral Q4H PRN    HYDROcodone-acetaminophen (NORCO)  mg tablet 1 Tab  1 Tab Oral Q4H PRN    morphine 10 mg/ml injection 4 mg  4 mg IntraVENous Q4H PRN    naloxone (NARCAN) injection 0.4 mg  0.4 mg IntraVENous PRN    ondansetron (ZOFRAN) injection 4 mg  4 mg IntraVENous Q6H PRN    labetaloL (NORMODYNE;TRANDATE) injection 10 mg  10 mg IntraVENous PRN    0.9% sodium chloride infusion  100 mL/hr IntraVENous CONTINUOUS    dexamethasone (DECADRON) 4 mg/mL injection 4 mg  4 mg IntraVENous Q6H    insulin lispro (HUMALOG) injection   SubCUTAneous AC&HS    sertraline (ZOLOFT) tablet 150 mg  150 mg Oral DAILY    carboxymethylcellulose sodium (REFRESH LIQUIGEL) 1 % ophthalmic solution 1 Drop  1 Drop Both Eyes PRN    white petrolatum-mineral oiL (STYE LUBRICANT) ointment   Both Eyes PRN    losartan (COZAAR) tablet 25 mg  25 mg Oral DAILY    amLODIPine (NORVASC) tablet 10 mg  10 mg Oral DAILY    furosemide (LASIX) tablet 20 mg  20 mg Oral DAILY    levETIRAcetam (KEPPRA) oral solution 1,000 mg  1,000 mg Oral Q12H    0.9% sodium chloride infusion 250 mL  250 mL IntraVENous PRN    sodium chloride (NS) flush 5-40 mL  5-40 mL IntraVENous Q8H    sodium chloride (NS) flush 5-40 mL  5-40 mL IntraVENous PRN    acetaminophen (TYLENOL) tablet 650 mg  650 mg Oral Q6H PRN    Or    acetaminophen (TYLENOL) suppository 650 mg  650 mg Rectal Q6H PRN    polyethylene glycol (MIRALAX) packet 17 g  17 g Oral DAILY PRN    promethazine (PHENERGAN) tablet 12.5 mg  12.5 mg Oral Q6H PRN    Or    ondansetron (ZOFRAN) injection 4 mg  4 mg IntraVENous Q6H PRN       Other Studies:  No results found for this visit on 12/25/20. Ct Head W Wo Cont    Result Date: 1/1/2021  EXAM: CT head with and without IV contrast. INDICATION: Postoperative evaluation. COMPARISON: Yesterday's MRI brain. TECHNIQUE: Axial CT images of the head were obtained before and after the intravenous injection of 100 cc Isovue-370 CT contrast.  Radiation dose reduction techniques were used for this study. Our CT scanners use one or all of the following:  Automated exposure control, adjustment of the mA and/or kV according to patient size, iterative reconstruction. FINDINGS: There has been an interval right parietal craniotomy and resection of the previous right parietal mass. No significant postoperative hematoma is visualized. Along the anterior margin of the resection cavity there is mild contrast enhancement, which could relate to residual tumor or postoperative granulation tissue. There is persistent surrounding vasogenic edema, with mass effect on the right lateral ventricle but no midline shift. Cystic changes in the left thalamus are unchanged. There is postoperative pneumocephalus. IMPRESSION: Interval right parietal craniotomy and right parietal mass resection. Mild enhancement along the anterior margin of the resection cavity may relate to residual tumor or postoperative granulation tissue.        All Micro Results     None          SARS-CoV-2 Lab Results  \"Novel Coronavirus\" Test: No results found for: COV2NT   \"Emergent Disease\" Test: No results found for: EDPR  \"SARS-COV-2\" Test: No results found for: XGCOVT  Rapid Test:   Lab Results   Component Value Date/Time    COVR Not detected 12/30/2020 01:59 PM            Assessment and Plan:     Hospital Problems as of 1/1/2021 Date Reviewed: 9/28/2020          Codes Class Noted - Resolved POA    Cerebral edema ([facility] ICD-10-CM: G93.6  ICD-9-CM: 348.5  12/31/2020 - Present Yes        Seizures ([facility] ICD-10-CM: R56.9  ICD-9-CM: 780.39  12/28/2020 - Present Yes        Essential hypertension (Chronic) ICD-10-CM: I10  ICD-9-CM: 401.9  12/26/2020 - Present Yes        Brain tumor ([facility] ICD-10-CM: D49.6  ICD-9-CM: 239.6  12/26/2020 - Present Yes        * (Principal) 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  ICD-9-CM: 348.89  12/26/2020 - Present Yes        ICH (intracerebral hemorrhage) ([facility] ICD-10-CM: I61.9  ICD-9-CM: 965  12/25/2020 - Present Yes        RESOLVED: Acute renal failure (ARF) ([facility] ICD-10-CM: N17.9  ICD-9-CM: 584.9  12/26/2020 - 12/28/2020 Yes              Plan:  # Right parietal mass with hemorrhage              - Neurosurgery following. Keppra, Decadron.              - Craniotomy with resection on 12/31. 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 Goal SBP per Neurosurgery now <160. Con't PO BP meds. Repeat CT ok.     # HTN              - Con't PO meds.     # Seizures              - 2/2 above. On Keppra. Neuro s/o.     # INGRID              - Resolved.     # Acute encephalopathy              - Resolved. 2/2 seizure/parietal mass.      # MDD              - Zoloft     DC planning/Dispo: Pending. Therapy evals. D/w Neurosurgery and can transfer to the floor. Diet:  DIET NPO  DVT ppx: SCDs     Other listed chronic conditions stable, cont current management.     Signed:  Ara Avelar MD

## 2021-01-01 NOTE — PROGRESS NOTES
NEUROSURGERY PROGRESS NOTE:   Admit Date: 12/25/2020  Subjective:   No acute overnight events. Patient did well in the ICU overnight. He is awake, alert and cooperative this am.    Objective:  Visit Vitals  /67   Pulse 68   Temp 98.2 °F (36.8 °C)   Resp 16   Ht 5' 10\" (1.778 m)   Wt 199 lb 12.8 oz (90.6 kg)   SpO2 93%   BMI 28.67 kg/m²     General: No acute distress  CV: regular rate  Resp: No increased work of breathing  Awake, alert, and oriented to person, place and year with cognitive slowing Extremely hard of hearing   Eyes open spontaneously   PERRL, EOMI  Hearing grossly intact   Patient with 5/5 strength in the bilateral upper and bilateral lower extremities   Mild left drift present   Incision: dressing in place with some dried blood stains or the dressing     Assessment and Plan:   Cameron Jolly 68 y.o. 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 He is now 1 Day Post-Op from a right parietal craniotomy for resection of the aforementioned tumor with placement of Gliadel Wafers.   - Continue decadron 4 mg q6H for now   - Okay to transfer to the floor from a neurosurgical perspective with q4H neuro checks   - Patient will need PT, OT and ST   - Continue supportive care  - Appreciate medicine and neurology recommendations  - Appreciate Medicine Management   - Recommend SBP < 160 mmHg at this point   - CT Demonstrate interval resection and placement of Gliadel Wafers, with some residual surrounding vasogenic cerebral edema     Zeb Rivas, [address] [place] [address]

## 2021-01-01 NOTE — PROGRESS NOTES
Reviewed notes prior to visit  Spoke with staff  Noted:  Events leading up to admission      Will continue to follow closely during this admission    STAFF PROVIDING VERY COMPASSIONATE CARE!

## 2021-01-01 NOTE — PROGRESS NOTES
PT Note:  Therapist is discontinuing physical therapy at this time due to transfer to unit. Mr. Jolly's physical therapy goals were not met. Please reorder PT when our services are again appropriate. Thank you.   Radhika Gandhi, PT, DPT  1/1/2021

## 2021-01-02 LAB
GLUCOSE BLD STRIP.AUTO-MCNC: 123 MG/DL (ref 65–100)
GLUCOSE BLD STRIP.AUTO-MCNC: 149 MG/DL (ref 65–100)
GLUCOSE BLD STRIP.AUTO-MCNC: 149 MG/DL (ref 65–100)
GLUCOSE BLD STRIP.AUTO-MCNC: 150 MG/DL (ref 65–100)

## 2021-01-02 PROCEDURE — 74011250637 HC RX REV CODE- 250/637: Performed by: NEUROLOGICAL SURGERY

## 2021-01-02 PROCEDURE — 74011250637 HC RX REV CODE- 250/637: Performed by: INTERNAL MEDICINE

## 2021-01-02 PROCEDURE — 97165 OT EVAL LOW COMPLEX 30 MIN: CPT

## 2021-01-02 PROCEDURE — 74011636637 HC RX REV CODE- 636/637: Performed by: NEUROLOGICAL SURGERY

## 2021-01-02 PROCEDURE — 97535 SELF CARE MNGMENT TRAINING: CPT

## 2021-01-02 PROCEDURE — 65270000029 HC RM PRIVATE

## 2021-01-02 PROCEDURE — 97530 THERAPEUTIC ACTIVITIES: CPT

## 2021-01-02 PROCEDURE — 74011250636 HC RX REV CODE- 250/636: Performed by: NEUROLOGICAL SURGERY

## 2021-01-02 PROCEDURE — 97112 NEUROMUSCULAR REEDUCATION: CPT

## 2021-01-02 PROCEDURE — 82962 GLUCOSE BLOOD TEST: CPT

## 2021-01-02 PROCEDURE — 2709999900 HC NON-CHARGEABLE SUPPLY

## 2021-01-02 PROCEDURE — 97164 PT RE-EVAL EST PLAN CARE: CPT

## 2021-01-02 RX ADMIN — DEXAMETHASONE SODIUM PHOSPHATE 4 MG: 4 INJECTION, SOLUTION INTRAMUSCULAR; INTRAVENOUS at 06:03

## 2021-01-02 RX ADMIN — Medication 10 ML: at 21:41

## 2021-01-02 RX ADMIN — DEXAMETHASONE SODIUM PHOSPHATE 4 MG: 4 INJECTION, SOLUTION INTRAMUSCULAR; INTRAVENOUS at 00:24

## 2021-01-02 RX ADMIN — DEXAMETHASONE SODIUM PHOSPHATE 4 MG: 4 INJECTION, SOLUTION INTRAMUSCULAR; INTRAVENOUS at 18:05

## 2021-01-02 RX ADMIN — AMLODIPINE BESYLATE 10 MG: 5 TABLET ORAL at 08:30

## 2021-01-02 RX ADMIN — LEVETIRACETAM 1000 MG: 100 SOLUTION ORAL at 21:36

## 2021-01-02 RX ADMIN — LOSARTAN POTASSIUM 50 MG: 50 TABLET, FILM COATED ORAL at 08:30

## 2021-01-02 RX ADMIN — ACETAMINOPHEN 650 MG: 325 TABLET, FILM COATED ORAL at 10:22

## 2021-01-02 RX ADMIN — Medication 10 ML: at 00:26

## 2021-01-02 RX ADMIN — Medication 10 ML: at 06:03

## 2021-01-02 RX ADMIN — Medication 10 ML: at 15:43

## 2021-01-02 RX ADMIN — FUROSEMIDE 20 MG: 40 TABLET ORAL at 08:30

## 2021-01-02 RX ADMIN — DEXAMETHASONE SODIUM PHOSPHATE 4 MG: 4 INJECTION, SOLUTION INTRAMUSCULAR; INTRAVENOUS at 13:31

## 2021-01-02 RX ADMIN — INSULIN LISPRO 2 UNITS: 100 INJECTION, SOLUTION INTRAVENOUS; SUBCUTANEOUS at 21:36

## 2021-01-02 RX ADMIN — Medication 10 ML: at 13:32

## 2021-01-02 RX ADMIN — LEVETIRACETAM 1000 MG: 100 SOLUTION ORAL at 08:30

## 2021-01-02 RX ADMIN — SERTRALINE HYDROCHLORIDE 150 MG: 100 TABLET ORAL at 08:30

## 2021-01-02 NOTE — PROGRESS NOTES
Problem: Ventilator Management  Goal: *Adequate oxygenation and ventilation  Outcome: Progressing Towards Goal  Goal: *Absence of infection signs and symptoms  Outcome: Progressing Towards Goal  Goal: *Normal spontaneous ventilation  Outcome: Progressing Towards Goal     Problem: Patient Education: Go to Patient Education Activity  Goal: Patient/Family Education  Outcome: Progressing Towards Goal     Problem: Patient Education: Go to Patient Education Activity  Goal: Patient/Family Education  Outcome: Progressing Towards Goal     Problem: Hemorrhagic Stroke: Admission Day (0-3 hours)  Goal: Off Pathway (Use only if patient is Off Pathway)  Outcome: Progressing Towards Goal  Goal: Activity/Safety  Outcome: Progressing Towards Goal  Goal: Consults, if ordered  Outcome: Progressing Towards Goal  Goal: Diagnostic Test/Procedures  Outcome: Progressing Towards Goal  Goal: Nutrition/Diet  Outcome: Progressing Towards Goal  Goal: Medications  Outcome: Progressing Towards Goal  Goal: Respiratory  Outcome: Progressing Towards Goal     Problem: Falls - Risk of  Goal: *Absence of Falls  Description: Document Kalpana Fall Risk and appropriate interventions in the flowsheet. Outcome: Progressing Towards Goal  Note: Fall Risk Interventions:  Mobility Interventions: Bed/chair exit alarm, Patient to call before getting OOB, PT Consult for mobility concerns    Mentation Interventions: Adequate sleep, hydration, pain control, Bed/chair exit alarm, Door open when patient unattended    Medication Interventions: Bed/chair exit alarm, Patient to call before getting OOB, Teach patient to arise slowly    Elimination Interventions: Bed/chair exit alarm, Call light in reach    History of Falls Interventions: Bed/chair exit alarm, Door open when patient unattended         Problem: Pressure Injury - Risk of  Goal: *Prevention of pressure injury  Description: Document Marquez Scale and appropriate interventions in the flowsheet.   Outcome: Progressing Towards Goal  Note: Pressure Injury Interventions:  Sensory Interventions: Assess changes in LOC, Float heels, Minimize linen layers, Pad between skin to skin, Pressure redistribution bed/mattress (bed type)    Moisture Interventions: Absorbent underpads, Maintain skin hydration (lotion/cream), Minimize layers    Activity Interventions: Increase time out of bed, Pressure redistribution bed/mattress(bed type), PT/OT evaluation    Mobility Interventions: HOB 30 degrees or less, Pressure redistribution bed/mattress (bed type), PT/OT evaluation    Nutrition Interventions: Document food/fluid/supplement intake    Friction and Shear Interventions: Apply protective barrier, creams and emollients, HOB 30 degrees or less, Minimize layers                Problem: Dysphagia (Adult)  Goal: *Speech Goal: (INSERT TEXT)  Description: LTG: Patient will tolerate least restrictive diet without overt signs or symptoms of airway compromise by discharge. STG: Patient will tolerate chopped mechanical soft diet and nectar liquids without overt signs or symptoms of aspiration. STG: Patient will perform laryngeal strengthening exercises x10 each with 70% accuracy to improve strength and coordination of swallowing function. STG: Patient will participate in modified barium swallow study as clinically indicated. STG: Patient will participate in cognitive/linguistic assessment x1.       Outcome: Progressing Towards Goal

## 2021-01-02 NOTE — PROGRESS NOTES
NEUROSURGERY PROGRESS NOTE:   Admit Date: 12/25/2020  Subjective:   No acute overnight events. Patient did well on the floor overnight. He is up in a chair working with therapy this am.     Objective:  Visit Vitals  BP (!) 139/57 (BP 1 Location: Right arm, BP Patient Position: At rest)   Pulse 72   Temp 98.5 °F (36.9 °C)   Resp 19   Ht 5' 10\" (1.778 m)   Wt 199 lb 12.8 oz (90.6 kg)   SpO2 96%   BMI 28.67 kg/m²     General: No acute distress  Awake, alert, and oriented to person, place and not year with cognitive slowing   Extremely hard of hearing   Eyes open spontaneously   PERRL, EOMI  Hearing grossly intact   Patient with 5/5 strength in the bilateral upper and bilateral lower extremities   Mild left drift present   Incision: dressing in place with some dried blood stains or the dressing     Assessment and Plan:   Cici Jolly 68 y.o. 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 He is now 2 Days Post-Op from a right parietal craniotomy for resection of the aforementioned tumor with placement of Gliadel Wafers.   - Continue decadron 4 mg q6H for now for total of four days post-op then will begin decadron taper. - Appreciate therapy and rehab medicine recommendations   - Continue supportive care  - Appreciate medicine and neurology recommendations  - Appreciate Medicine Management   - Recommend SBP < 160 mmHg at this point   - Anticipate discharge to rehab when bed becomes available and insurance approves discharge to acute inpatient rehab. Gisel Ibarra.  Elroy Lam, [address] [place] [address]

## 2021-01-02 NOTE — PROGRESS NOTES
ACUTE OT GOALS:  (Developed with and agreed upon by patient and/or caregiver.)  1. Patient will complete lower body bathing and dressing with SBA and adaptive equipment as needed. 2. Patient will complete toileting with SBA. 3. Patient will tolerate 25 minutes of OT treatment with 1-2 rest breaks to increase activity tolerance for ADLs. 4. Patient will complete functional transfers with SBA and adaptive equipment as needed. 5. Patient will complete functional activity while seated edge of bed with MOD I and adaptive equipment as needed. 6. Patient will complete functional mobility for household distances with SBA and adaptive equipment as needed. 7. Patient will complete self-grooming tasks while standing edge of sink with MOD I and adaptive equipment as needed.     Timeframe: 7 visits         OCCUPATIONAL THERAPY ASSESSMENT: Initial Assessment and Daily Note OT Treatment Day # 1    Allegra Holder is a 68 y.o. male   PRIMARY DIAGNOSIS: Mass of right parietal lobe  ICH (intracerebral hemorrhage) ([facility] [I61.9]  Procedure(s) (LRB):  RIGHT CRANIOTOMY WITH IMAGE GUIDANCE FOR TUMOR RESECTION MRI @0700 (Right)  2 Days Post-Op  Reason for Referral:    ICD-10: Treatment Diagnosis: Generalized Muscle Weakness (M62.81)  Other lack of cordination (R27.8)  Difficulty in walking, Not elsewhere classified (R26.2)  [place] / [place] / Product Type: Medicare /   ASSESSMENT:     REHAB RECOMMENDATIONS:   Recommendation to date pending progress:  [address] [place] [address] vs. Short-term Rehab  Equipment:    To Be Determined     PRIOR LEVEL OF FUNCTION:  (Prior to Hospitalization)  INITIAL/CURRENT LEVEL OF FUNCTION:  (Most Recently Demonstrated)   Bathing:   Independent  Dressing:   Independent  Feeding/Grooming:   Independent  Toileting:   Independent  Functional Mobility:   Independent Bathing:   Moderate Assistance  Dressing:   Moderate Assistance  Feeding/Grooming:   Standby Assistance  Toileting:   Moderate Assistance  Functional Mobility:   Minimal Assistance x 2     ASSESSMENT:  Mr. Chance Hyatt presents s/p R craniotomy. Today, pt presents with deficits in overall strength, activity tolerance, ADL performance and functional mobility. RUE WFL; LUE AROM and strength (3+/5) are generally decreased but WFL. L visual field neglect. Min A x 2  for functional mobility/transfers. SBA for self-grooming tasks while standing edge of sink. Mild cognitive deficits most notable with executive functioning (organization and planning). At this time, Binh Olivares is functioning below baseline for ADLs and functional mobility. Pt would benefit from skilled OT services to address OT goals and and plan of care. SUBJECTIVE:   Mr. Chance Hyatt states, \"I feel like I could move some. \"    SOCIAL HISTORY/LIVING ENVIRONMENT:   Home Environment: Private residence  # Steps to Enter: 15  One/Two Story Residence: One story  Living Alone: No  Support Systems: Spouse/Significant Other/Partner    OBJECTIVE:     PAIN: VITAL SIGNS: LINES/DRAINS:   Pre Treatment: Pain Screen  Pain Scale 1: Numeric (0 - 10)  Pain Intensity 1: 0  Post Treatment:    Hernandez Catheter and IV  O2 Device: Room air     GROSS EVALUATION:  BUEs Within Functional Limits Abnormal/ Functional Abnormal/ Non-Functional (see comments) Not Tested Comments:   AROM [] [x] [] []    PROM [] [x] [] []    Strength [] [x] [] []    Balance [] [x] [] []    Posture [] [x] [] []    Sensation [] [x] [] []    Coordination [] [x] [] []    Tone [x] [] [] []    Edema [x] [] [] []    Activity Tolerance [] [x] [] []     [] [] [] []      COGNITION/  PERCEPTION: Intact Impaired   (see comments) Comments:   Orientation [x] []    Vision [x] []    Hearing [x] []    Judgment/ Insight [] [x]    Attention [] [x]    Memory [x] []    Command Following [x] []    Emotional Regulation [x] []     [] []      ACTIVITIES OF DAILY LIVING: I Mod I S SBA CGA Min Mod Max Total NT Comments   BASIC ADLs:              Bathing/ Showering [] [] [] [] [] [] [x] [] [] []    Toileting [] [] [] [] [] [] [x] [] [] []    Dressing [] [] [] [] [] [] [x] [] [] []    Feeding [] [] [x] [] [] [] [] [] [] []    Grooming [] [] [x] [] [] [] [] [] [] []    Personal Device Care [] [] [] [] [] [x] [] [] [] []    Functional Mobility [] [] [] [] [] [x] [] [] [] [] x2 x RW   INSTRUMENTAL ADLs:              Care of Others [] [] [] [] [] [] [] [] [] [x]    Community Mobility [] [] [] [] [] [] [] [] [] [x]    Financial Management [] [] [] [] [] [] [] [] [] [x]    Home Management [] [] [] [] [] [] [] [] [] [x]    Meal Preparation [] [] [] [] [] [] [] [] [] [x]    Health Management [] [] [] [] [] [] [] [] [] [x]    Work/Leisure [] [] [] [] [] [] [] [] [] [x]    I=Independent, Mod I=Modified Independent, S=Supervision, SBA=Standby Assistance, CGA=Contact Guard Assistance,   Min=Minimal Assistance, Mod=Moderate Assistance, Max=Maximal Assistance, Total=Total Assistance, NT=Not Tested    MOBILITY: I Mod I S SBA CGA Min Mod Max Total  NT x2 Comments:   Supine to sit [] [] [] [] [] [] [] [] [] [] []    Sit to supine [] [] [] [] [] [] [] [] [] [] []    Sit to stand [] [] [] [] [] [] [] [] [] [] []    Bed to chair [] [] [] [] [] [] [] [] [] [] []    I=Independent, Mod I=Modified Independent, S=Supervision, SBA=Standby Assistance, CGA=Contact Guard Assistance,   Min=Minimal Assistance, Mod=Moderate Assistance, Max=Maximal Assistance, Total=Total Assistance, NT=Not Tested    [address] [facility] [address] [address] AM-PAC 6 Clicks   Daily Activity Inpatient Short Form        How much help from another person does the patient currently need. .. Total A Lot A Little None   1. Putting on and taking off regular lower body clothing? [] 1   [] 2   [] 3   [] 4   2. Bathing (including washing, rinsing, drying)? [] 1   [] 2   [] 3   [] 4   3.   Toileting, which includes using toilet, bedpan or urinal?   [] 1   [] 2 [] 3   [] 4   4. Putting on and taking off regular upper body clothing? [] 1   [] 2   [] 3   [] 4   5. Taking care of personal grooming such as brushing teeth? [] 1   [] 2   [] 3   [] 4   6. Eating meals? [] 1   [] 2   [] 3   [] 4   © 2007, Trustees of [address] [place] [address] [address] [id number], under license to [employer]. All rights reserved     Score:  Initial: 15 Most Recent: X (Date: -- )   Interpretation of Tool:  Represents activities that are increasingly more difficult (i.e. Bed mobility, Transfers, Gait). PLAN:   FREQUENCY/DURATION: OT Plan of Care: 3 times/week for duration of hospital stay or until stated goals are met, whichever comes first.    PROBLEM LIST:   (Skilled intervention is medically necessary to address:)  1. Decreased ADL/Functional Activities  2. Decreased Activity Tolerance  3. Decreased AROM/PROM  4. Decreased Balance  5. Decreased Cognition  6. Decreased Coordination  7. Decreased Gait Ability  8. Decreased Strength  9. Decreased Transfer Abilities   INTERVENTIONS PLANNED:   (Benefits and precautions of occupational therapy have been discussed with the patient.)  1. Self Care Training  2. Therapeutic Activity  3. Therapeutic Exercise/HEP  4. Neuromuscular Re-education  5. Education     TREATMENT:     EVALUATION: Low Complexity : (Untimed Charge)    TREATMENT:   ($$ Self Care/Home Management: 8-22 mins$$ Neuromuscular Re-Education: 23-37 mins   )  Self Care (13 Minutes): Self care including Lower Body Dressing and Grooming to increase independence and decrease level of assistance required. Neuromuscular Re-education (25 Minutes): Neuromuscular Re-education included Balance Training, Coordination training, Postural training, Sitting balance training, Standing balance training and Visual field awareness training to improve Balance, Coordination and Postural Control.     AFTER TREATMENT POSITION/PRECAUTIONS:  Alarm Activated, Chair, Needs within reach and RN notified    INTERDISCIPLINARY COLLABORATION:  RN/PCT, PT/PTA and OT/CROWE    TOTAL TREATMENT DURATION:  OT Patient Time In/Time Out  Time In: 0910  Time Out: [facility], OT

## 2021-01-02 NOTE — PROGRESS NOTES
Hospitalist Note     Admit Date:  2020  5:40 AM   Name:  La Gudino   Age:  68 y.o.  :  1940   MRN:  [id number]   PCP:  Kasandra Clay DO  Treatment Team: Attending Provider: Juanita Chiu MD; Consulting Provider: Lisandra Koenig MD; Utilization Review: Cleavon Ormond; Consulting Provider: Reta Stuart MD; Consulting Provider: Monique Francis MD; Care Manager: Robert Lemons [facility]; Occupational Therapist: Imelda Traylor, OT; Physical Therapist: Prudence Lozano, PT, DPT    HPI/Subjective:   Mr. Isaías Ramirez is a very nice 69 y/o WM with a h/o HTN, obesity, anxiety, HLD who presented on  with encephalopathy and seizure. He was intubated. CT head with right parietal mass with hemorrhage. Neurosurgery and Neurology consulted. CT c/a/p without evidence of primary or metastatic disease. Brain MRI showed the same. He was started on Keppra. EEG abnormal. Extubated and transferred to the floor. Hospitalist consulted to assume care on .  To OR on  for resection which was uncomplicated. In ICU post-op, back to 7th floor on .    : Wife present. Patient up to chair resting. Feels well. C/o headache. BPs good.      No other complaints  Objective:     Patient Vitals for the past 24 hrs:   Temp Pulse Resp BP SpO2   21 1056 98.7 °F (37.1 °C) 70 19 125/67 95 %   21 0735 98.5 °F (36.9 °C) 72 19 (!) 139/57 96 %   21 0400    (!) 148/72    21 0218 98.6 °F (37 °C) 71 19 (!) 166/72 100 %   21 0015 98.3 °F (36.8 °C) 81 17 (!) 149/73 94 %   21 1910 97.8 °F (36.6 °C) 77 18 (!) 154/70 94 %   21 1500  74  129/60    21 1445  90  134/72    21 1430  83  126/60    21 1415  85  126/69    21 1400 98.9 °F (37.2 °C) 80 18 120/65 93 %   21 1300  72  (!) 141/68 94 %   21 1230  68   93 %   21 1200  68  130/68 93 %   21 1130  67   93 %     Oxygen Therapy  O2 Sat (%): 95 % (01/02/21 1056)  Pulse via Oximetry: 73 beats per minute (01/01/21 1300)  O2 Device: Room air (12/31/20 2300)  O2 Flow Rate (L/min): 2 l/min (12/31/20 1343)  FIO2 (%): 45 % (12/26/20 0000)    Estimated body mass index is 28.67 kg/m² as calculated from the following:    Height as of this encounter: 5' 10\" (1.778 m). Weight as of this encounter: 90.6 kg (199 lb 12.8 oz). Intake/Output Summary (Last 24 hours) at 1/2/2021 1112  Last data filed at 1/2/2021 0219  Gross per 24 hour   Intake 1060 ml   Output 3700 ml   Net -2640 ml       *Note that automatically entered I/Os may not be accurate; dependent on patient compliance with collection and accurate  by techs. General:    Well nourished. No overt distress. CV:   RRR. No m/r/g. No edema. No JVD  Lungs:   CTAB. No wheezing, rhonchi, or rales. Unlabored  Abdomen:   Soft, nontender, nondistended. :  Hernandez. Extremities: Warm and dry. No cyanosis   Skin:     No rashes. No jaundice. Normal coloration  Neuro:  No gross focal deficits. Alert.  strength normal and equal. No LE weakness.      Data Reviewed:  I have reviewed all labs, meds, and studies from the last 24 hours:  Recent Results (from the past 24 hour(s))   GLUCOSE, POC    Collection Time: 01/01/21 12:40 PM   Result Value Ref Range    Glucose (POC) 137 (H) 65 - 100 mg/dL   GLUCOSE, POC    Collection Time: 01/01/21  4:15 PM   Result Value Ref Range    Glucose (POC) 202 (H) 65 - 100 mg/dL   GLUCOSE, POC    Collection Time: 01/01/21 10:06 PM   Result Value Ref Range    Glucose (POC) 138 (H) 65 - 100 mg/dL   GLUCOSE, POC    Collection Time: 01/02/21  6:58 AM   Result Value Ref Range    Glucose (POC) 123 (H) 65 - 100 mg/dL       Current Meds:  Current Facility-Administered Medications   Medication Dose Route Frequency    losartan (COZAAR) tablet 50 mg  50 mg Oral DAILY    sodium chloride (NS) flush 5-40 mL  5-40 mL IntraVENous Q8H    sodium chloride (NS) flush 5-40 mL 5-40 mL IntraVENous PRN    acetaminophen (TYLENOL) tablet 650 mg  650 mg Oral Q4H PRN    HYDROcodone-acetaminophen (NORCO)  mg tablet 1 Tab  1 Tab Oral Q4H PRN    morphine 10 mg/ml injection 4 mg  4 mg IntraVENous Q4H PRN    naloxone (NARCAN) injection 0.4 mg  0.4 mg IntraVENous PRN    ondansetron (ZOFRAN) injection 4 mg  4 mg IntraVENous Q6H PRN    labetaloL (NORMODYNE;TRANDATE) injection 10 mg  10 mg IntraVENous PRN    dexamethasone (DECADRON) 4 mg/mL injection 4 mg  4 mg IntraVENous Q6H    insulin lispro (HUMALOG) injection   SubCUTAneous AC&HS    sertraline (ZOLOFT) tablet 150 mg  150 mg Oral DAILY    carboxymethylcellulose sodium (REFRESH LIQUIGEL) 1 % ophthalmic solution 1 Drop  1 Drop Both Eyes PRN    white petrolatum-mineral oiL (STYE LUBRICANT) ointment   Both Eyes PRN    amLODIPine (NORVASC) tablet 10 mg  10 mg Oral DAILY    furosemide (LASIX) tablet 20 mg  20 mg Oral DAILY    levETIRAcetam (KEPPRA) oral solution 1,000 mg  1,000 mg Oral Q12H    0.9% sodium chloride infusion 250 mL  250 mL IntraVENous PRN    sodium chloride (NS) flush 5-40 mL  5-40 mL IntraVENous Q8H    sodium chloride (NS) flush 5-40 mL  5-40 mL IntraVENous PRN    acetaminophen (TYLENOL) tablet 650 mg  650 mg Oral Q6H PRN    Or    acetaminophen (TYLENOL) suppository 650 mg  650 mg Rectal Q6H PRN    polyethylene glycol (MIRALAX) packet 17 g  17 g Oral DAILY PRN    promethazine (PHENERGAN) tablet 12.5 mg  12.5 mg Oral Q6H PRN    Or    ondansetron (ZOFRAN) injection 4 mg  4 mg IntraVENous Q6H PRN       Other Studies:  No results found for this visit on 12/25/20. No results found.     All Micro Results     None          SARS-CoV-2 Lab Results  \"Novel Coronavirus\" Test: No results found for: COV2NT   \"Emergent Disease\" Test: No results found for: EDPR  \"SARS-COV-2\" Test: No results found for: XGCOVT  Rapid Test:   Lab Results   Component Value Date/Time    COVR Not detected 12/30/2020 01:59 PM Assessment and Plan:     Hospital Problems as of 1/2/2021 Date Reviewed: 9/28/2020          Codes Class Noted - Resolved POA    Cerebral edema ([facility] ICD-10-CM: G93.6  ICD-9-CM: 348.5  12/31/2020 - Present Yes        Seizures ([facility] ICD-10-CM: R56.9  ICD-9-CM: 780.39  12/28/2020 - Present Yes        Essential hypertension (Chronic) ICD-10-CM: I10  ICD-9-CM: 401.9  12/26/2020 - Present Yes        Brain tumor ([facility] ICD-10-CM: D49.6  ICD-9-CM: 239.6  12/26/2020 - Present Yes        * (Principal) 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  ICD-9-CM: 348.89  12/26/2020 - Present Yes        ICH (intracerebral hemorrhage) ([facility] ICD-10-CM: I61.9  ICD-9-CM: 682  12/25/2020 - Present Yes        RESOLVED: Acute renal failure (ARF) ([facility] ICD-10-CM: N17.9  ICD-9-CM: 584.9  12/26/2020 - 12/28/2020 Yes              Plan:  # Right parietal mass with hemorrhage              - Keppra. Decadron taper per Neuro.               - Craniotomy with resection on 12/31. 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 Full path pending. Goal SBP now <160. Controlled.     # HTN              - Con't PO meds.     # Seizures              - 2/2 above. On Keppra. Neuro s/o.     # INGRID              - Resolved.     # Acute encephalopathy              - Resolved. 2/2 seizure/parietal mass.      # MDD              - Zoloft    DC planning/Dispo: Rehab pending, Monday? Diet:  DIET MECHANICAL SOFT  DVT ppx: SCDs    Other listed chronic conditions stable, cont current management.     Signed:  Ara Avelar MD

## 2021-01-02 NOTE — PROGRESS NOTES
SPEECH PATHOLOGY NOTE    Consult appreciated and chart reviewed. Pt already on SLP caseload and on [facility] soft diet with plan of cog/ling eval on Monday and/or once transferred to [facility]. SLP team will continue to follow Monday per plan of care.     Thank you,  Kwame Guido MA, CCC-SLP

## 2021-01-02 NOTE — PROGRESS NOTES
ACUTE PHYSICAL THERAPY GOALS:  (Developed with and agreed upon by patient and/or caregiver. )  LTG:  (1.)Mr. Titus Messer will move from supine to sit and sit to supine , scoot up and down and roll side to side in bed with MODIFIED INDEPENDENCE within 7 treatment day(s). (2.)Mr. Titus Messer will transfer from bed to chair and chair to bed with MODIFIED INDEPENDENCE using the least restrictive device within 7 treatment day(s). (3.)Mr. Jolly will ambulate with STAND BY ASSIST for 100 feet with the least restrictive device within 7 treatment day(s). (4.Mr. Titus Messer will perform standing static and dynamic balance activities x 15 minutes with SUPERVISION to improve safety within 7 day(s). (5.)Mr. Titus Messer will participate in therapeutic activity/exercises x 25 minutes for increased strength within 7 treatment days.     ________________________________________________________________________________________________      PHYSICAL THERAPY ASSESSMENT: Re-evaluation and AM PT Treatment Day # (P) 1      Erickson Salazar is a 68 y.o. male   PRIMARY DIAGNOSIS: Mass of right parietal lobe  ICH (intracerebral hemorrhage) ([facility] [I61.9]  Procedure(s) (LRB):  RIGHT CRANIOTOMY WITH IMAGE GUIDANCE FOR TUMOR RESECTION MRI @0700 (Right)  2 Days Post-Op  Reason for Referral:    ICD-10: Treatment Diagnosis: Generalized Muscle Weakness (M62.81)  [place] / Product Type: Medicare /     ASSESSMENT:     REHAB RECOMMENDATIONS:   Recommendation to date pending progress:  [address] [place] [address] vs. Short-term Rehab  Equipment:    To Be Determined     PRIOR LEVEL OF FUNCTION:  (Prior to Hospitalization) INITIAL/CURRENT LEVEL OF FUNCTION:  (Most Recently Demonstrated)   Bed Mobility:   Independent  Sit to Stand:   Independent  Transfers:   Independent  Gait/Mobility:   Independent Bed Mobility:   Standby Assistance  Sit to Stand:   Minimal Assistance  Transfers:   Minimal Assistance x 2  Gait/Mobility:   Minimal Assistance x 2     ASSESSMENT:  Mr. Abbe Castaneda was re-evaluated today following recent craniotomy. He presents to PT with decreased AROM and strength in R LE. Pt also demonstrated standing balance deficits and decreased activity tolerance. He performed STS transfers with Chantal x 2 along with ambulation using RW.  OT addressed ADLs while PT focused on transfers/ambulation. Mr. Abbe Castaneda could benefit from skilled PT as he is currently functioning below his baseline. SUBJECTIVE:   Mr. Abbe Castaneda states, Yumiko. \"    SOCIAL HISTORY/LIVING ENVIRONMENT:   Home Environment: Private residence  # Steps to Enter: 15  One/Two Story Residence: One story  Living Alone: No  Support Systems: Spouse/Significant Other/Partner  OBJECTIVE:     PAIN: VITAL SIGNS: LINES/DRAINS:   Pre Treatment: Pain Screen  Pain Scale 1: Numeric (0 - 10)  Pain Intensity 1: 0  Post Treatment: 0   Hernandez Catheter and IV  O2 Device: Room air     GROSS EVALUATION:  B LEs Within Functional Limits Abnormal/ Functional Abnormal/ Non-Functional (see comments) Not Tested Comments:   AROM [] [] [x] [] R LE   PROM [] [] [] []    Strength [] [] [x] [] R LE   Balance [] [x] [] [] FAIR standing balance   Posture [] [] [] []    Sensation [x] [] [] []    Coordination [] [] [] []    Tone [] [] [] []    Edema [] [] [] []    Activity Tolerance [] [x] [] []     [] [] [] []      COGNITION/  PERCEPTION: Intact Impaired   (see comments) Comments:   Orientation [x] []    Vision [] []    Hearing [] []    Command Following [x] []    Safety Awareness [] [x]     [] []      MOBILITY: I Mod I S SBA CGA Min Mod Max Total  NT x2 Comments:   Bed Mobility    Rolling [] [] [] [x] [] [] [] [] [] [] []    Supine to Sit [] [] [] [x] [] [] [] [] [] [] []    Scooting [] [] [] [x] [] [] [] [] [] [] []    Sit to Supine [] [] [] [] [] [] [] [] [] [x] []    Transfers    Sit to Stand [] [] [] [] [] [x] [] [] [] [] [x]    Bed to Chair [] [] [] [] [] [x] [] [] [] [] [x]    Stand to Sit [] [] [] [] [] [x] [] [] [] [] [x]    I=Independent, Mod I=Modified Independent, S=Supervision, SBA=Standby Assistance, CGA=Contact Guard Assistance,   Min=Minimal Assistance, Mod=Moderate Assistance, Max=Maximal Assistance, Total=Total Assistance, NT=Not Tested  GAIT: I Mod I S SBA CGA Min Mod Max Total  NT x2 Comments:   Level of Assistance [] [] [] [] [] [x] [] [] [] [] [x]    Distance 60 ft    DME Rolling Walker and Gait Belt    Gait Quality Decreased B step length    I=Independent, Mod I=Modified Independent, S=Supervision, SBA=Standby Assistance, CGA=Contact Guard Assistance,   Min=Minimal Assistance, Mod=Moderate Assistance, Max=Maximal Assistance, Total=Total Assistance, NT=Not Tested    [address] [place] [id number] AM-[facility] Form       How much difficulty does the patient currently have. .. Unable A Lot A Little None   1. Turning over in bed (including adjusting bedclothes, sheets and blankets)? [] 1   [] 2   [] 3   [x] 4   2. Sitting down on and standing up from a chair with arms ( e.g., wheelchair, bedside commode, etc.)   [] 1   [] 2   [x] 3   [] 4   3. Moving from lying on back to sitting on the side of the bed? [] 1   [] 2   [x] 3   [] 4   How much help from another person does the patient currently need. .. Total A Lot A Little None   4. Moving to and from a bed to a chair (including a wheelchair)? [] 1   [] 2   [x] 3   [] 4   5. Need to walk in hospital room? [] 1   [] 2   [x] 3   [] 4   6. Climbing 3-5 steps with a railing? [] 1   [] 2   [x] 3   [] 4   © 2007, Trustees of [address] [place] [id number], under license to [employer]. All rights reserved     Score:  Initial: 19 Most Recent: X (Date: -- )    Interpretation of Tool:  Represents activities that are increasingly more difficult (i.e. Bed mobility, Transfers, Gait).     PLAN:   FREQUENCY/DURATION: PT Plan of Care: (P) 3 times/week for duration of hospital stay or until stated goals are met, whichever comes first.    PROBLEM LIST:   (Skilled intervention is medically necessary to address:)  1. Decreased ADL/Functional Activities  2. Decreased Activity Tolerance  3. Decreased AROM/PROM  4. Decreased Balance  5. Decreased Cognition  6. Decreased Coordination  7. Decreased Gait Ability  8. Decreased Strength  9. Decreased Transfer Abilities   INTERVENTIONS PLANNED:   (Benefits and precautions of physical therapy have been discussed with the patient.)  1. Therapeutic Activity  2. Therapeutic Exercise/HEP  3. Neuromuscular Re-education  4. Gait Training  5. Manual Therapy  6. Education     TREATMENT:     EVALUATION: Low Complexity : (Untimed Charge)    TREATMENT:   ($$ Therapeutic Activity: 23-37 mins    )  Therapeutic Activity (23 Minutes): Therapeutic activity included Rolling, Supine to Sit, Transfer Training, Ambulation on level ground, Sitting balance  and Standing balance to improve functional Mobility, Strength and Activity tolerance.   Co-Treatment PT/OT necessary due to patient's decreased overall endurance/tolerance levels, as well as need for high level skilled assistance to complete functional transfers/mobility and functional tasks    AFTER TREATMENT POSITION/PRECAUTIONS:  Alarm Activated and Chair    INTERDISCIPLINARY COLLABORATION:  RN/PCT and PT/PTA    TOTAL TREATMENT DURATION:  PT Patient Time In/Time Out  Time In: 0910  Time Out: [address], PT, DPT

## 2021-01-03 LAB
ANION GAP SERPL CALC-SCNC: 7 MMOL/L (ref 7–16)
BUN SERPL-MCNC: 25 MG/DL (ref 8–23)
CALCIUM SERPL-MCNC: 8.7 MG/DL (ref 8.3–10.4)
CHLORIDE SERPL-SCNC: 105 MMOL/L (ref 98–107)
CO2 SERPL-SCNC: 25 MMOL/L (ref 21–32)
CREAT SERPL-MCNC: 1.09 MG/DL (ref 0.8–1.5)
ERYTHROCYTE [DISTWIDTH] IN BLOOD BY AUTOMATED COUNT: 12.3 % (ref 11.9–14.6)
GLUCOSE BLD STRIP.AUTO-MCNC: 140 MG/DL (ref 65–100)
GLUCOSE BLD STRIP.AUTO-MCNC: 150 MG/DL (ref 65–100)
GLUCOSE BLD STRIP.AUTO-MCNC: 173 MG/DL (ref 65–100)
GLUCOSE BLD STRIP.AUTO-MCNC: 92 MG/DL (ref 65–100)
GLUCOSE SERPL-MCNC: 156 MG/DL (ref 65–100)
HCT VFR BLD AUTO: 39.6 % (ref 41.1–50.3)
HGB BLD-MCNC: 13.5 G/DL (ref 13.6–17.2)
MCH RBC QN AUTO: 31.3 PG (ref 26.1–32.9)
MCHC RBC AUTO-ENTMCNC: 34.1 G/DL (ref 31.4–35)
MCV RBC AUTO: 91.7 FL (ref 79.6–97.8)
NRBC # BLD: 0 K/UL (ref 0–0.2)
PLATELET # BLD AUTO: 154 K/UL (ref 150–450)
PMV BLD AUTO: 10.8 FL (ref 9.4–12.3)
POTASSIUM SERPL-SCNC: 3.9 MMOL/L (ref 3.5–5.1)
RBC # BLD AUTO: 4.32 M/UL (ref 4.23–5.6)
SODIUM SERPL-SCNC: 137 MMOL/L (ref 138–145)
WBC # BLD AUTO: 19.2 K/UL (ref 4.3–11.1)

## 2021-01-03 PROCEDURE — 82962 GLUCOSE BLOOD TEST: CPT

## 2021-01-03 PROCEDURE — 74011250636 HC RX REV CODE- 250/636: Performed by: NEUROLOGICAL SURGERY

## 2021-01-03 PROCEDURE — 85027 COMPLETE CBC AUTOMATED: CPT

## 2021-01-03 PROCEDURE — 92526 ORAL FUNCTION THERAPY: CPT

## 2021-01-03 PROCEDURE — 74011250637 HC RX REV CODE- 250/637: Performed by: NEUROLOGICAL SURGERY

## 2021-01-03 PROCEDURE — 36415 COLL VENOUS BLD VENIPUNCTURE: CPT

## 2021-01-03 PROCEDURE — 65270000029 HC RM PRIVATE

## 2021-01-03 PROCEDURE — 80048 BASIC METABOLIC PNL TOTAL CA: CPT

## 2021-01-03 PROCEDURE — 74011636637 HC RX REV CODE- 636/637: Performed by: NEUROLOGICAL SURGERY

## 2021-01-03 PROCEDURE — 74011250637 HC RX REV CODE- 250/637: Performed by: INTERNAL MEDICINE

## 2021-01-03 RX ADMIN — FUROSEMIDE 20 MG: 40 TABLET ORAL at 09:31

## 2021-01-03 RX ADMIN — AMLODIPINE BESYLATE 10 MG: 5 TABLET ORAL at 09:31

## 2021-01-03 RX ADMIN — LOSARTAN POTASSIUM 50 MG: 50 TABLET, FILM COATED ORAL at 09:31

## 2021-01-03 RX ADMIN — Medication 10 ML: at 14:08

## 2021-01-03 RX ADMIN — LEVETIRACETAM 1000 MG: 100 SOLUTION ORAL at 21:24

## 2021-01-03 RX ADMIN — LEVETIRACETAM 1000 MG: 100 SOLUTION ORAL at 08:33

## 2021-01-03 RX ADMIN — Medication 10 ML: at 21:24

## 2021-01-03 RX ADMIN — INSULIN LISPRO 2 UNITS: 100 INJECTION, SOLUTION INTRAVENOUS; SUBCUTANEOUS at 21:37

## 2021-01-03 RX ADMIN — DEXAMETHASONE SODIUM PHOSPHATE 4 MG: 4 INJECTION, SOLUTION INTRAMUSCULAR; INTRAVENOUS at 17:08

## 2021-01-03 RX ADMIN — Medication 10 ML: at 06:32

## 2021-01-03 RX ADMIN — DEXAMETHASONE SODIUM PHOSPHATE 4 MG: 4 INJECTION, SOLUTION INTRAMUSCULAR; INTRAVENOUS at 00:38

## 2021-01-03 RX ADMIN — DEXAMETHASONE SODIUM PHOSPHATE 4 MG: 4 INJECTION, SOLUTION INTRAMUSCULAR; INTRAVENOUS at 23:41

## 2021-01-03 RX ADMIN — INSULIN LISPRO 2 UNITS: 100 INJECTION, SOLUTION INTRAVENOUS; SUBCUTANEOUS at 12:39

## 2021-01-03 RX ADMIN — DEXAMETHASONE SODIUM PHOSPHATE 4 MG: 4 INJECTION, SOLUTION INTRAMUSCULAR; INTRAVENOUS at 12:57

## 2021-01-03 RX ADMIN — SERTRALINE HYDROCHLORIDE 150 MG: 100 TABLET ORAL at 09:31

## 2021-01-03 RX ADMIN — DEXAMETHASONE SODIUM PHOSPHATE 4 MG: 4 INJECTION, SOLUTION INTRAMUSCULAR; INTRAVENOUS at 06:32

## 2021-01-03 NOTE — PROGRESS NOTES
Problem: Patient Education: Go to Patient Education Activity  Goal: Patient/Family Education  Outcome: Progressing Towards Goal     Problem: Falls - Risk of  Goal: *Absence of Falls  Description: Document Delonte Xies Fall Risk and appropriate interventions in the flowsheet. Outcome: Progressing Towards Goal  Note: Fall Risk Interventions:  Mobility Interventions: Bed/chair exit alarm, Communicate number of staff needed for ambulation/transfer    Mentation Interventions: Adequate sleep, hydration, pain control, Bed/chair exit alarm, Door open when patient unattended    Medication Interventions: Bed/chair exit alarm, Evaluate medications/consider consulting pharmacy, Teach patient to arise slowly    Elimination Interventions: Elevated toilet seat, Call light in reach, Bed/chair exit alarm    History of Falls Interventions: Consult care management for discharge planning, Door open when patient unattended         Problem: Patient Education: Go to Patient Education Activity  Goal: Patient/Family Education  Outcome: Progressing Towards Goal     Problem: Pressure Injury - Risk of  Goal: *Prevention of pressure injury  Description: Document Marquez Scale and appropriate interventions in the flowsheet.   Outcome: Progressing Towards Goal  Note: Pressure Injury Interventions:  Sensory Interventions: Assess need for specialty bed, Assess changes in LOC    Moisture Interventions: Absorbent underpads, Apply protective barrier, creams and emollients, Assess need for specialty bed    Activity Interventions: Assess need for specialty bed, Chair cushion, Pressure redistribution bed/mattress(bed type), PT/OT evaluation    Mobility Interventions: Chair cushion, PT/OT evaluation    Nutrition Interventions: Offer support with meals,snacks and hydration    Friction and Shear Interventions: Apply protective barrier, creams and emollients

## 2021-01-03 NOTE — PROGRESS NOTES
Problem: Dysphagia (Adult)  Goal: *Speech Goal: (INSERT TEXT)  Description: LTG: Patient will tolerate least restrictive diet without overt signs or symptoms of airway compromise by discharge. STG: Patient will tolerate chopped mechanical soft diet and nectar liquids without overt signs or symptoms of aspiration. Goal edited 1/3  STG: Patient will perform laryngeal strengthening exercises x10 each with 70% accuracy to improve strength and coordination of swallowing function. STG: Patient will participate in modified barium swallow study as clinically indicated. STG: Patient will tolerate regular diet/thin liquid without s/sx of airway compromise. ADDED 12/29/30 MET 12/30/20 d/c goal 1/3  STG: Patient will participate in cognitive/linguistic assessment x1. Outcome: Progressing Towards Goal     SPEECH LANGUAGE PATHOLOGY: DYSPHAGIA- Daily Note 2    NAME/AGE/GENDER: Angela Rivera is a 68 y.o. male  DATE: 1/3/2021  PRIMARY DIAGNOSIS: ICH (intracerebral hemorrhage) ([facility] [place] [address] [I61.9]   3 Days Post-Op  ICD-10: Treatment Diagnosis: R13.12 Dysphagia, Oropharyngeal Phase    RECOMMENDATIONS   DIET:    PO:  Mechanical soft   Liquids:  nectar     MEDICATIONS: Whole in puree     COMPENSATORY STRATEGIES/MODIFICATIONS INCLUDING:  · Slow rate of intake. OTHER RECOMMENDATIONS (including follow up treatment recommendations):   · Possible re-assessment of swallow function if indicated following neurosurgery      RECOMMENDATIONS for CONTINUED SPEECH THERAPY:   YES: Anticipate need for ongoing speech therapy during this hospitalization and at next level of care. ASSESSMENT   Pt coughing with thin liquid. Recommend mechanical soft textures with nectar thick liquids with medication whole in pureed. Recommend a Modified barium swallow study.     CONTINUATION OF SKILLED SERVICES/MEDICAL NECESSITY:   Patient is expected to demonstrate progress in  swallow strength, swallow timeliness, swallow function, diet tolerance and swallow safety in order to  improve swallow safety, work toward diet advancement and decrease aspiration risk.  Patient continues to require skilled intervention due to dysphagia. EDUCATION:  · Recommendations discussed with Patient and RN  REHABILITATION POTENTIAL FOR STATED GOALS: Good    PLAN    FREQUENCY/DURATION: Continue to follow patient 3 times a week for duration of hospital stay to address above goals. - Recommendations for next treatment session: Next treatment will address modified barium swallow study    SUBJECTIVE   Improved cognition today. Remains slow to respond, but able to recall details from modified barium swallow study that occurred yesterday. Oxygen Device:Room air  Pain: Pain Scale 1: Numeric (0 - 10)  Pain Intensity 1: 0      Problem List:  (Impairments causing functional limitations):  1. Dysphagia   Orientation:  Person  Place -   OBJECTIVE     Swallow treatment  RN reports pt coughing with thin liquids. Patient sitting up eating lunch. Pt tolerating mechanical soft textures with no overt signs/sx of aspiration. Pt with positive signs/sx of aspiration with thin liquids; throat clear. Pt tolerated nectar thick with no overt signs/sx of aspiration. Recommend mechanical soft textures with no mixed with nectar thick liquid. Medication whole in pureed. Recommend a Modified barium swallow study to rule out aspiration and full assess pharyngeal swallow.      Tool Used: Dysphagia Outcome and Severity Scale (YUNIOR)    Score Comments   Normal Diet  [] 7 With no strategies or extra time needed   Functional Swallow  [] 6 May have mild oral or pharyngeal delay   Mild Dysphagia  [] 5 Which may require one diet consistency restricted    Mild-Moderate Dysphagia  [] 4 With 1-2 diet consistencies restricted   Moderate Dysphagia  [] 3 With 2 or more diet consistencies restricted   Moderate-Severe Dysphagia  [] 2 With partial PO strategies (trials with ST only)   Severe Dysphagia  [] 1 With inability to tolerate any PO safely      Score:  Initial: 4 Most Recent: 4 (Date 01/03/21 )   Interpretation of Tool: The Dysphagia Outcome and Severity Scale (YUNIOR) is a simple, easy-to-use, 7-point scale developed to systematically rate the functional severity of dysphagia based on objective assessment and make recommendations for diet level, independence level, and type of nutrition.        INTERDISCIPLINARY COLLABORATION: RN    After treatment position/precautions:  · Upright in bed  · Wife at bedside  · RN notified    Total Treatment Duration:   Time In: 1200  Time Out: [address] R[address] ORTIZ/TAMARA/SLP'

## 2021-01-03 NOTE — PROGRESS NOTES
NEUROSURGERY PROGRESS NOTE:   Admit Date: 12/25/2020  Subjective:   No acute overnight events. Objective:  Visit Vitals  BP (!) 146/71 (BP 1 Location: Right arm, BP Patient Position: At rest)   Pulse 80   Temp 98.1 °F (36.7 °C)   Resp 18   Ht 5' 10\" (1.778 m)   Wt 199 lb 12.8 oz (90.6 kg)   SpO2 96%   BMI 28.67 kg/m²     General: No acute distress  Awake, alert, and oriented to person, place and not year with cognitive slowing   Extremely hard of hearing   Eyes open spontaneously   PERRL, EOMI  Hearing grossly intact   Patient with 5/5 strength in the bilateral upper and bilateral lower extremities   Incision: clean, dry and intact with sutures on the skin    Assessment and Plan:   Ella Fordeon 68 y.o. 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 He is now 3 Days Post-Op from a right parietal craniotomy for resection of the aforementioned tumor with placement of Gliadel Wafers.   - Continue decadron 4 mg q6H for now for total of four days post-op then will begin decadron taper. - Appreciate therapy and rehab medicine recommendations   - Continue supportive care  - Appreciate medicine and neurology recommendations  - Appreciate Medicine Management   - Recommend SBP < 160 mmHg at this point   - Anticipate discharge to rehab when bed becomes available and insurance approves discharge to acute inpatient rehab. Chris Doan.  Keila Saldana, [address] [place] [address]

## 2021-01-03 NOTE — PROGRESS NOTES
Hospitalist Note     Admit Date:  2020  5:40 AM   Name:  Haile Covert   Age:  68 y.o.  :  1940   MRN:  [id number]   PCP:  Chele Corona DO  Treatment Team: Attending Provider: Marsha Bush MD; Consulting Provider: Arianna Seymour MD; Utilization Review: Reji Esquivel; Consulting Provider: Raven Narvaez MD; Consulting Provider: Romayne Mylar, MD; Care Manager: Bryan Mcgee [facility]; Charge Nurse: Brenda Helms    HPI/Subjective:   Mr. Shanon Hale is a very nice 69 y/o WM with a h/o HTN, obesity, anxiety, HLD who presented on  with encephalopathy and seizure. He was intubated. CT head with right parietal mass with hemorrhage. Neurosurgery and Neurology consulted. CT c/a/p without evidence of primary or metastatic disease. Brain MRI showed the same. He was started on Keppra. EEG abnormal. Extubated and transferred to the floor. Hospitalist consulted to assume care on .  To OR on  for resection which was uncomplicated. In ICU post-op, back to 7th floor on .    1/3: Up in bed eating breakfast, no complaints, feeling well. No other complaints  Objective:     Patient Vitals for the past 24 hrs:   Temp Pulse Resp BP SpO2   21 0728 98.1 °F (36.7 °C) 80 18 (!) 146/71 96 %   21 0355 98 °F (36.7 °C) 65 18 (!) 144/74 97 %   21 2359 98.1 °F (36.7 °C) 68 18 136/70 96 %   21 1919 98.3 °F (36.8 °C) 66 18 137/75 96 %   21 1534 99 °F (37.2 °C) 69 19 109/68 94 %   21 1056 98.7 °F (37.1 °C) 70 19 125/67 95 %   21 0735 98.5 °F (36.9 °C) 72 19 (!) 139/57 96 %     Oxygen Therapy  O2 Sat (%): 96 % (21)  Pulse via Oximetry: 73 beats per minute (21 1300)  O2 Device: Room air (20 2300)  O2 Flow Rate (L/min): 2 l/min (20 1343)  FIO2 (%): 45 % (20 0000)    Estimated body mass index is 28.67 kg/m² as calculated from the following:    Height as of this encounter: 5' 10\" (1.778 m). Weight as of this encounter: 90.6 kg (199 lb 12.8 oz). Intake/Output Summary (Last 24 hours) at 1/3/2021 0730  Last data filed at 1/2/2021 2245  Gross per 24 hour   Intake    Output 1900 ml   Net -1900 ml       *Note that automatically entered I/Os may not be accurate; dependent on patient compliance with collection and accurate  by techs. General:    Well nourished. No overt distress  Head:  Right sided surgical incision, mild dried blood, dressing present. CV:   RRR. No m/r/g. No edema. No JVD  Lungs:   CTAB. No wheezing, rhonchi, or rales. Unlabored. Abdomen:   Soft, nontender, nondistended. Extremities: Warm and dry. No cyanosis   Skin:     No rashes. No jaundice. Normal coloration  Neuro:  No gross focal deficits. Alert.  Moving all extremities, strength normal and equal.    Data Reviewed:  I have reviewed all labs, meds, and studies from the last 24 hours:  Recent Results (from the past 24 hour(s))   GLUCOSE, POC    Collection Time: 01/02/21 11:21 AM   Result Value Ref Range    Glucose (POC) 149 (H) 65 - 100 mg/dL   GLUCOSE, POC    Collection Time: 01/02/21  4:36 PM   Result Value Ref Range    Glucose (POC) 149 (H) 65 - 100 mg/dL   GLUCOSE, POC    Collection Time: 01/02/21  9:09 PM   Result Value Ref Range    Glucose (POC) 150 (H) 65 - 100 mg/dL       Current Meds:  Current Facility-Administered Medications   Medication Dose Route Frequency    losartan (COZAAR) tablet 50 mg  50 mg Oral DAILY    sodium chloride (NS) flush 5-40 mL  5-40 mL IntraVENous Q8H    sodium chloride (NS) flush 5-40 mL  5-40 mL IntraVENous PRN    acetaminophen (TYLENOL) tablet 650 mg  650 mg Oral Q4H PRN    HYDROcodone-acetaminophen (NORCO)  mg tablet 1 Tab  1 Tab Oral Q4H PRN    morphine 10 mg/ml injection 4 mg  4 mg IntraVENous Q4H PRN    naloxone (NARCAN) injection 0.4 mg  0.4 mg IntraVENous PRN    ondansetron (ZOFRAN) injection 4 mg  4 mg IntraVENous Q6H PRN    labetaloL (NORMODYNE;TRANDATE) injection 10 mg  10 mg IntraVENous PRN    dexamethasone (DECADRON) 4 mg/mL injection 4 mg  4 mg IntraVENous Q6H    insulin lispro (HUMALOG) injection   SubCUTAneous AC&HS    sertraline (ZOLOFT) tablet 150 mg  150 mg Oral DAILY    carboxymethylcellulose sodium (REFRESH LIQUIGEL) 1 % ophthalmic solution 1 Drop  1 Drop Both Eyes PRN    white petrolatum-mineral oiL (STYE LUBRICANT) ointment   Both Eyes PRN    amLODIPine (NORVASC) tablet 10 mg  10 mg Oral DAILY    furosemide (LASIX) tablet 20 mg  20 mg Oral DAILY    levETIRAcetam (KEPPRA) oral solution 1,000 mg  1,000 mg Oral Q12H    0.9% sodium chloride infusion 250 mL  250 mL IntraVENous PRN    sodium chloride (NS) flush 5-40 mL  5-40 mL IntraVENous Q8H    sodium chloride (NS) flush 5-40 mL  5-40 mL IntraVENous PRN    acetaminophen (TYLENOL) tablet 650 mg  650 mg Oral Q6H PRN    Or    acetaminophen (TYLENOL) suppository 650 mg  650 mg Rectal Q6H PRN    polyethylene glycol (MIRALAX) packet 17 g  17 g Oral DAILY PRN    promethazine (PHENERGAN) tablet 12.5 mg  12.5 mg Oral Q6H PRN    Or    ondansetron (ZOFRAN) injection 4 mg  4 mg IntraVENous Q6H PRN       Other Studies:  No results found for this visit on 12/25/20. No results found.     All Micro Results     None          SARS-CoV-2 Lab Results  \"Novel Coronavirus\" Test: No results found for: COV2NT   \"Emergent Disease\" Test: No results found for: EDPR  \"SARS-COV-2\" Test: No results found for: XGCOVT  Rapid Test:   Lab Results   Component Value Date/Time    COVR Not detected 12/30/2020 01:59 PM            Assessment and Plan:     Hospital Problems as of 1/3/2021 Date Reviewed: 9/28/2020          Codes Class Noted - Resolved POA    Cerebral edema ([facility] ICD-10-CM: G93.6  ICD-9-CM: 348.5  12/31/2020 - Present Yes        Seizures ([facility] ICD-10-CM: R56.9  ICD-9-CM: 780.39  12/28/2020 - Present Yes        Essential hypertension (Chronic) ICD-10-CM: I10  ICD-9-CM: 401.9  12/26/2020 - Present Yes        Brain tumor [facility]) ICD-10-CM: D49.6  ICD-9-CM: 239.6  12/26/2020 - Present Yes        * (Principal) 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  ICD-9-CM: 348.89  12/26/2020 - Present Yes        ICH (intracerebral hemorrhage) ([facility] [address] ICD-10-CM: I61.9  ICD-9-CM: 833  12/25/2020 - Present Yes        RESOLVED: Acute renal failure (ARF) ([facility] [address] ICD-10-CM: N17.9  ICD-9-CM: 584.9  12/26/2020 - 12/28/2020 Yes              Plan:  # Right parietal mass with hemorrhage              - Keppra. Decadron taper per Neuro.               - Craniotomy with resection on 12/31. 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 Full path pending. SBP at goal of <160.     # HTN              - Con't PO meds.     # Seizures              - 2/2 above. On Keppra. Neuro s/o.     # INGRID              - Resolved.     # Acute encephalopathy              - Resolved. 2/2 seizure/parietal mass.      # MDD              - Zoloft    DC planning/Dispo: Rehab tomorrow? Diet:  DIET MECHANICAL SOFT  DVT ppx: SCDs    Other listed chronic conditions stable, cont current management.     Signed:  Tom Alamo MD

## 2021-01-03 NOTE — PROGRESS NOTES
Problem: Patient Education: Go to Patient Education Activity  Goal: Patient/Family Education  1/3/2021 0331 by Rebeca Nissen, RN  Outcome: Progressing Towards Goal  1/3/2021 0330 by Rebeca Nissen, RN  Outcome: Progressing Towards Goal     Problem: Falls - Risk of  Goal: *Absence of Falls  Description: Document Green Salvia Fall Risk and appropriate interventions in the flowsheet.   1/3/2021 0331 by Rebeca Nissen, RN  Outcome: Progressing Towards Goal  Note: Fall Risk Interventions:  Mobility Interventions: Bed/chair exit alarm, Communicate number of staff needed for ambulation/transfer, OT consult for ADLs, Patient to call before getting OOB, PT Consult for mobility concerns    Mentation Interventions: Adequate sleep, hydration, pain control, Bed/chair exit alarm, Door open when patient unattended, Increase mobility, More frequent rounding, Reorient patient    Medication Interventions: Bed/chair exit alarm, Patient to call before getting OOB, Teach patient to arise slowly    Elimination Interventions: Call light in reach, Patient to call for help with toileting needs, Stay With Me (per policy), Toilet paper/wipes in reach, Toileting schedule/hourly rounds    History of Falls Interventions: Bed/chair exit alarm, Door open when patient unattended, Investigate reason for fall      1/3/2021 0330 by Rebeca Nissen, RN  Outcome: Progressing Towards Goal  Note: Fall Risk Interventions:  Mobility Interventions: Bed/chair exit alarm, Communicate number of staff needed for ambulation/transfer, OT consult for ADLs, Patient to call before getting OOB, PT Consult for mobility concerns    Mentation Interventions: Adequate sleep, hydration, pain control, Bed/chair exit alarm, Door open when patient unattended, Increase mobility, More frequent rounding, Reorient patient    Medication Interventions: Bed/chair exit alarm, Patient to call before getting OOB, Teach patient to arise slowly    Elimination Interventions: Call light in reach, Patient to call for help with toileting needs, Stay With Me (per policy), Toilet paper/wipes in reach, Toileting schedule/hourly rounds    History of Falls Interventions: Bed/chair exit alarm, Door open when patient unattended, Investigate reason for fall         Problem: Patient Education: Go to Patient Education Activity  Goal: Patient/Family Education  1/3/2021 0331 by Ky Dumas RN  Outcome: Progressing Towards Goal  1/3/2021 0330 by Ky Dumas RN  Outcome: Progressing Towards Goal     Problem: Pressure Injury - Risk of  Goal: *Prevention of pressure injury  Description: Document Marquez Scale and appropriate interventions in the flowsheet.   1/3/2021 0331 by Ky Dumas RN  Outcome: Progressing Towards Goal  Note: Pressure Injury Interventions:  Sensory Interventions: Assess changes in LOC, Check visual cues for pain, Discuss PT/OT consult with provider, Float heels, Keep linens dry and wrinkle-free, Maintain/enhance activity level, Minimize linen layers, Pressure redistribution bed/mattress (bed type)    Moisture Interventions: Absorbent underpads    Activity Interventions: Increase time out of bed, Pressure redistribution bed/mattress(bed type), PT/OT evaluation    Mobility Interventions: HOB 30 degrees or less, Pressure redistribution bed/mattress (bed type), PT/OT evaluation    Nutrition Interventions: Document food/fluid/supplement intake    Friction and Shear Interventions: Apply protective barrier, creams and emollients             1/3/2021 0330 by Ky Dumas RN  Outcome: Progressing Towards Goal  Note: Pressure Injury Interventions:  Sensory Interventions: Assess changes in LOC, Check visual cues for pain, Discuss PT/OT consult with provider, Float heels, Keep linens dry and wrinkle-free, Maintain/enhance activity level, Minimize linen layers, Pressure redistribution bed/mattress (bed type)    Moisture Interventions: Absorbent underpads    Activity Interventions: Increase time out of bed, Pressure redistribution bed/mattress(bed type), PT/OT evaluation    Mobility Interventions: HOB 30 degrees or less, Pressure redistribution bed/mattress (bed type), PT/OT evaluation    Nutrition Interventions: Document food/fluid/supplement intake    Friction and Shear Interventions: Apply protective barrier, creams and emollients                Problem: Patient Education: Go to Patient Education Activity  Goal: Patient/Family Education  1/3/2021 0331 by Tunde Almonte RN  Outcome: Progressing Towards Goal  1/3/2021 0330 by Tunde Almonte RN  Outcome: Progressing Towards Goal     Problem: Dysphagia (Adult)  Goal: *Speech Goal: (INSERT TEXT)  Description: LTG: Patient will tolerate least restrictive diet without overt signs or symptoms of airway compromise by discharge. STG: Patient will tolerate chopped mechanical soft diet and nectar liquids without overt signs or symptoms of aspiration. STG: Patient will perform laryngeal strengthening exercises x10 each with 70% accuracy to improve strength and coordination of swallowing function. STG: Patient will participate in modified barium swallow study as clinically indicated.    STG: Patient will participate in cognitive/linguistic assessment x1.      1/3/2021 0331 by Tunde Almonte RN  Outcome: Progressing Towards Goal  1/3/2021 0330 by Tunde Almonte RN  Outcome: Progressing Towards Goal     Problem: Patient Education: Go to Patient Education Activity  Goal: Patient/Family Education  1/3/2021 0331 by Tunde Almonte RN  Outcome: Progressing Towards Goal  1/3/2021 0330 by Tunde Almonte RN  Outcome: Progressing Towards Goal     Problem: Patient Education: Go to Patient Education Activity  Goal: Patient/Family Education  Outcome: Progressing Towards Goal     Problem: Patient Education: Go to Patient Education Activity  Goal: Patient/Family Education  Outcome: Progressing Towards Goal

## 2021-01-04 ENCOUNTER — HOSPITAL ENCOUNTER (INPATIENT)
Age: 81
LOS: 10 days | Discharge: HOME HEALTH | DRG: 054 | End: 2021-01-14
Attending: INTERNAL MEDICINE | Admitting: PHYSICAL MEDICINE & REHABILITATION
Payer: MEDICARE

## 2021-01-04 ENCOUNTER — APPOINTMENT (OUTPATIENT)
Dept: GENERAL RADIOLOGY | Age: 81
DRG: 025 | End: 2021-01-04
Attending: INTERNAL MEDICINE
Payer: MEDICARE

## 2021-01-04 VITALS
SYSTOLIC BLOOD PRESSURE: 118 MMHG | HEIGHT: 70 IN | OXYGEN SATURATION: 94 % | TEMPERATURE: 97.7 F | WEIGHT: 199.8 LBS | RESPIRATION RATE: 17 BRPM | HEART RATE: 69 BPM | BODY MASS INDEX: 28.6 KG/M2 | DIASTOLIC BLOOD PRESSURE: 70 MMHG

## 2021-01-04 DIAGNOSIS — F41.0 PANIC DISORDER: ICD-10-CM

## 2021-01-04 DIAGNOSIS — R90.0 INTRACRANIAL MASS: ICD-10-CM

## 2021-01-04 DIAGNOSIS — I61.0 NONTRAUMATIC SUBCORTICAL HEMORRHAGE OF RIGHT CEREBRAL HEMISPHERE (HCC): ICD-10-CM

## 2021-01-04 DIAGNOSIS — E78.2 MIXED HYPERLIPIDEMIA: ICD-10-CM

## 2021-01-04 DIAGNOSIS — Z98.890 HISTORY OF LUMBAR LAMINECTOMY FOR SPINAL CORD DECOMPRESSION: ICD-10-CM

## 2021-01-04 DIAGNOSIS — F51.01 PRIMARY INSOMNIA: ICD-10-CM

## 2021-01-04 DIAGNOSIS — R56.9 SEIZURES (HCC): ICD-10-CM

## 2021-01-04 DIAGNOSIS — D49.6 BRAIN TUMOR (HCC): ICD-10-CM

## 2021-01-04 DIAGNOSIS — N17.9 ACUTE RENAL FAILURE, UNSPECIFIED ACUTE RENAL FAILURE TYPE (HCC): ICD-10-CM

## 2021-01-04 DIAGNOSIS — R41.89 UNRESPONSIVE STATE: ICD-10-CM

## 2021-01-04 DIAGNOSIS — I10 HTN (HYPERTENSION), BENIGN: ICD-10-CM

## 2021-01-04 DIAGNOSIS — Z98.890 S/P CRANIOTOMY: Primary | ICD-10-CM

## 2021-01-04 DIAGNOSIS — I61.9 HEMORRHAGIC STROKE (HCC): ICD-10-CM

## 2021-01-04 DIAGNOSIS — G93.40 ENCEPHALOPATHY: ICD-10-CM

## 2021-01-04 DIAGNOSIS — M19.90 ARTHRITIS: ICD-10-CM

## 2021-01-04 DIAGNOSIS — I10 ESSENTIAL HYPERTENSION: Chronic | ICD-10-CM

## 2021-01-04 DIAGNOSIS — F41.1 GAD (GENERALIZED ANXIETY DISORDER): ICD-10-CM

## 2021-01-04 PROBLEM — G93.89 BRAIN MASS: Status: ACTIVE | Noted: 2021-01-04

## 2021-01-04 LAB
GLUCOSE BLD STRIP.AUTO-MCNC: 134 MG/DL (ref 65–100)
GLUCOSE BLD STRIP.AUTO-MCNC: 142 MG/DL (ref 65–100)

## 2021-01-04 PROCEDURE — 74011250637 HC RX REV CODE- 250/637: Performed by: NEUROLOGICAL SURGERY

## 2021-01-04 PROCEDURE — 65310000000 HC RM PRIVATE REHAB

## 2021-01-04 PROCEDURE — 97116 GAIT TRAINING THERAPY: CPT

## 2021-01-04 PROCEDURE — 74011250636 HC RX REV CODE- 250/636: Performed by: NEUROLOGICAL SURGERY

## 2021-01-04 PROCEDURE — 97166 OT EVAL MOD COMPLEX 45 MIN: CPT

## 2021-01-04 PROCEDURE — 74011250636 HC RX REV CODE- 250/636: Performed by: PHYSICAL MEDICINE & REHABILITATION

## 2021-01-04 PROCEDURE — 74011250637 HC RX REV CODE- 250/637: Performed by: PHYSICAL MEDICINE & REHABILITATION

## 2021-01-04 PROCEDURE — 74230 X-RAY XM SWLNG FUNCJ C+: CPT

## 2021-01-04 PROCEDURE — 92611 MOTION FLUOROSCOPY/SWALLOW: CPT

## 2021-01-04 PROCEDURE — 97162 PT EVAL MOD COMPLEX 30 MIN: CPT

## 2021-01-04 PROCEDURE — 82962 GLUCOSE BLOOD TEST: CPT

## 2021-01-04 PROCEDURE — 74011000255 HC RX REV CODE- 255: Performed by: INTERNAL MEDICINE

## 2021-01-04 PROCEDURE — 99223 1ST HOSP IP/OBS HIGH 75: CPT | Performed by: PHYSICAL MEDICINE & REHABILITATION

## 2021-01-04 PROCEDURE — 74011250637 HC RX REV CODE- 250/637: Performed by: INTERNAL MEDICINE

## 2021-01-04 PROCEDURE — 97535 SELF CARE MNGMENT TRAINING: CPT

## 2021-01-04 PROCEDURE — 97110 THERAPEUTIC EXERCISES: CPT

## 2021-01-04 RX ORDER — INSULIN LISPRO 100 [IU]/ML
INJECTION, SOLUTION INTRAVENOUS; SUBCUTANEOUS
Status: CANCELLED | OUTPATIENT
Start: 2021-01-04

## 2021-01-04 RX ORDER — LOSARTAN POTASSIUM 50 MG/1
50 TABLET ORAL DAILY
Status: DISCONTINUED | OUTPATIENT
Start: 2021-01-05 | End: 2021-01-14 | Stop reason: HOSPADM

## 2021-01-04 RX ORDER — DEXAMETHASONE 2 MG/1
1 TABLET ORAL SEE ADMIN INSTRUCTIONS
Qty: 30 TAB | Refills: 0 | Status: SHIPPED | OUTPATIENT
Start: 2021-01-04 | End: 2021-01-14

## 2021-01-04 RX ORDER — ONDANSETRON 4 MG/1
4 TABLET, ORALLY DISINTEGRATING ORAL
Status: CANCELLED | OUTPATIENT
Start: 2021-01-04

## 2021-01-04 RX ORDER — SODIUM CHLORIDE 0.9 % (FLUSH) 0.9 %
5-40 SYRINGE (ML) INJECTION AS NEEDED
Status: DISCONTINUED | OUTPATIENT
Start: 2021-01-04 | End: 2021-01-14 | Stop reason: HOSPADM

## 2021-01-04 RX ORDER — SERTRALINE HYDROCHLORIDE 50 MG/1
150 TABLET, FILM COATED ORAL DAILY
Status: CANCELLED | OUTPATIENT
Start: 2021-01-05

## 2021-01-04 RX ORDER — HYDROCODONE BITARTRATE AND ACETAMINOPHEN 10; 325 MG/1; MG/1
1 TABLET ORAL
Status: CANCELLED | OUTPATIENT
Start: 2021-01-04

## 2021-01-04 RX ORDER — POLYETHYLENE GLYCOL 3350 17 G/17G
17 POWDER, FOR SOLUTION ORAL DAILY PRN
Status: CANCELLED | OUTPATIENT
Start: 2021-01-04

## 2021-01-04 RX ORDER — NALOXONE HYDROCHLORIDE 0.4 MG/ML
0.4 INJECTION, SOLUTION INTRAMUSCULAR; INTRAVENOUS; SUBCUTANEOUS AS NEEDED
Status: DISCONTINUED | OUTPATIENT
Start: 2021-01-04 | End: 2021-01-14 | Stop reason: HOSPADM

## 2021-01-04 RX ORDER — ACETAMINOPHEN 325 MG/1
650 TABLET ORAL
Status: CANCELLED | OUTPATIENT
Start: 2021-01-04

## 2021-01-04 RX ORDER — HYDROCODONE BITARTRATE AND ACETAMINOPHEN 10; 325 MG/1; MG/1
1 TABLET ORAL
Status: DISCONTINUED | OUTPATIENT
Start: 2021-01-04 | End: 2021-01-04

## 2021-01-04 RX ORDER — ADHESIVE BANDAGE
30 BANDAGE TOPICAL DAILY PRN
Status: CANCELLED | OUTPATIENT
Start: 2021-01-04

## 2021-01-04 RX ORDER — MAG HYDROX/ALUMINUM HYD/SIMETH 200-200-20
30 SUSPENSION, ORAL (FINAL DOSE FORM) ORAL
Status: DISCONTINUED | OUTPATIENT
Start: 2021-01-04 | End: 2021-01-14 | Stop reason: HOSPADM

## 2021-01-04 RX ORDER — HYDROCODONE BITARTRATE AND ACETAMINOPHEN 5; 325 MG/1; MG/1
1 TABLET ORAL
Status: DISCONTINUED | OUTPATIENT
Start: 2021-01-04 | End: 2021-01-14 | Stop reason: HOSPADM

## 2021-01-04 RX ORDER — INSULIN LISPRO 100 [IU]/ML
INJECTION, SOLUTION INTRAVENOUS; SUBCUTANEOUS
Status: DISCONTINUED | OUTPATIENT
Start: 2021-01-04 | End: 2021-01-14 | Stop reason: HOSPADM

## 2021-01-04 RX ORDER — PANTOPRAZOLE SODIUM 40 MG/1
40 TABLET, DELAYED RELEASE ORAL
Status: DISCONTINUED | OUTPATIENT
Start: 2021-01-05 | End: 2021-01-14 | Stop reason: HOSPADM

## 2021-01-04 RX ORDER — DEXAMETHASONE 4 MG/1
4 TABLET ORAL EVERY 6 HOURS
Status: CANCELLED | OUTPATIENT
Start: 2021-01-04

## 2021-01-04 RX ORDER — FUROSEMIDE 20 MG/1
20 TABLET ORAL DAILY
Status: CANCELLED | OUTPATIENT
Start: 2021-01-05

## 2021-01-04 RX ORDER — ADHESIVE BANDAGE
30 BANDAGE TOPICAL DAILY PRN
Status: DISCONTINUED | OUTPATIENT
Start: 2021-01-04 | End: 2021-01-14 | Stop reason: HOSPADM

## 2021-01-04 RX ORDER — TRAZODONE HYDROCHLORIDE 50 MG/1
25 TABLET ORAL
Status: CANCELLED | OUTPATIENT
Start: 2021-01-04

## 2021-01-04 RX ORDER — ONDANSETRON 4 MG/1
4 TABLET, ORALLY DISINTEGRATING ORAL
Status: DISCONTINUED | OUTPATIENT
Start: 2021-01-04 | End: 2021-01-14 | Stop reason: HOSPADM

## 2021-01-04 RX ORDER — TRAZODONE HYDROCHLORIDE 50 MG/1
25 TABLET ORAL
Status: DISCONTINUED | OUTPATIENT
Start: 2021-01-04 | End: 2021-01-14 | Stop reason: HOSPADM

## 2021-01-04 RX ORDER — AMLODIPINE BESYLATE 5 MG/1
10 TABLET ORAL DAILY
Status: DISCONTINUED | OUTPATIENT
Start: 2021-01-05 | End: 2021-01-14 | Stop reason: HOSPADM

## 2021-01-04 RX ORDER — SODIUM CHLORIDE 0.9 % (FLUSH) 0.9 %
5-40 SYRINGE (ML) INJECTION EVERY 8 HOURS
Status: CANCELLED | OUTPATIENT
Start: 2021-01-04

## 2021-01-04 RX ORDER — NALOXONE HYDROCHLORIDE 0.4 MG/ML
0.4 INJECTION, SOLUTION INTRAMUSCULAR; INTRAVENOUS; SUBCUTANEOUS AS NEEDED
Status: CANCELLED | OUTPATIENT
Start: 2021-01-04

## 2021-01-04 RX ORDER — AMLODIPINE BESYLATE 10 MG/1
10 TABLET ORAL DAILY
Status: CANCELLED | OUTPATIENT
Start: 2021-01-05

## 2021-01-04 RX ORDER — SODIUM CHLORIDE 0.9 % (FLUSH) 0.9 %
5-40 SYRINGE (ML) INJECTION AS NEEDED
Status: CANCELLED | OUTPATIENT
Start: 2021-01-04

## 2021-01-04 RX ORDER — SODIUM CHLORIDE 0.9 % (FLUSH) 0.9 %
5-40 SYRINGE (ML) INJECTION EVERY 8 HOURS
Status: DISCONTINUED | OUTPATIENT
Start: 2021-01-04 | End: 2021-01-04 | Stop reason: ALTCHOICE

## 2021-01-04 RX ORDER — CARBOXYMETHYLCELLULOSE SODIUM 10 MG/ML
1 GEL OPHTHALMIC AS NEEDED
Status: CANCELLED | OUTPATIENT
Start: 2021-01-04

## 2021-01-04 RX ORDER — DEXAMETHASONE 4 MG/1
4 TABLET ORAL EVERY 6 HOURS
Status: DISCONTINUED | OUTPATIENT
Start: 2021-01-04 | End: 2021-01-07

## 2021-01-04 RX ORDER — LEVETIRACETAM 1000 MG/1
1000 TABLET ORAL 2 TIMES DAILY
Qty: 60 TAB | Refills: 1 | Status: SHIPPED | OUTPATIENT
Start: 2021-01-04 | End: 2021-01-14

## 2021-01-04 RX ORDER — ACETAMINOPHEN 650 MG/1
650 SUPPOSITORY RECTAL
Status: DISCONTINUED | OUTPATIENT
Start: 2021-01-04 | End: 2021-01-14 | Stop reason: HOSPADM

## 2021-01-04 RX ORDER — MAG HYDROX/ALUMINUM HYD/SIMETH 200-200-20
30 SUSPENSION, ORAL (FINAL DOSE FORM) ORAL
Status: CANCELLED | OUTPATIENT
Start: 2021-01-04

## 2021-01-04 RX ORDER — SERTRALINE HYDROCHLORIDE 50 MG/1
150 TABLET, FILM COATED ORAL DAILY
Status: DISCONTINUED | OUTPATIENT
Start: 2021-01-05 | End: 2021-01-14 | Stop reason: HOSPADM

## 2021-01-04 RX ORDER — ACETAMINOPHEN 325 MG/1
650 TABLET ORAL
Status: DISCONTINUED | OUTPATIENT
Start: 2021-01-04 | End: 2021-01-14 | Stop reason: HOSPADM

## 2021-01-04 RX ORDER — LOSARTAN POTASSIUM 50 MG/1
50 TABLET ORAL DAILY
Status: CANCELLED | OUTPATIENT
Start: 2021-01-05

## 2021-01-04 RX ORDER — PANTOPRAZOLE SODIUM 40 MG/1
40 TABLET, DELAYED RELEASE ORAL
Status: CANCELLED | OUTPATIENT
Start: 2021-01-05

## 2021-01-04 RX ORDER — ACETAMINOPHEN 650 MG/1
650 SUPPOSITORY RECTAL
Status: CANCELLED | OUTPATIENT
Start: 2021-01-04

## 2021-01-04 RX ORDER — AMOXICILLIN 250 MG
1 CAPSULE ORAL DAILY
Status: DISCONTINUED | OUTPATIENT
Start: 2021-01-05 | End: 2021-01-10

## 2021-01-04 RX ORDER — AMOXICILLIN 250 MG
1 CAPSULE ORAL DAILY
Status: CANCELLED | OUTPATIENT
Start: 2021-01-04

## 2021-01-04 RX ORDER — CARBOXYMETHYLCELLULOSE SODIUM 10 MG/ML
1 GEL OPHTHALMIC
Status: DISCONTINUED | OUTPATIENT
Start: 2021-01-04 | End: 2021-01-14 | Stop reason: HOSPADM

## 2021-01-04 RX ORDER — POLYETHYLENE GLYCOL 3350 17 G/17G
17 POWDER, FOR SOLUTION ORAL DAILY PRN
Status: DISCONTINUED | OUTPATIENT
Start: 2021-01-04 | End: 2021-01-14 | Stop reason: HOSPADM

## 2021-01-04 RX ORDER — FUROSEMIDE 20 MG/1
20 TABLET ORAL DAILY
Status: DISCONTINUED | OUTPATIENT
Start: 2021-01-05 | End: 2021-01-14 | Stop reason: HOSPADM

## 2021-01-04 RX ADMIN — ACETAMINOPHEN 650 MG: 325 TABLET ORAL at 22:09

## 2021-01-04 RX ADMIN — DEXAMETHASONE SODIUM PHOSPHATE 4 MG: 4 INJECTION, SOLUTION INTRAMUSCULAR; INTRAVENOUS at 11:38

## 2021-01-04 RX ADMIN — Medication 10 ML: at 05:15

## 2021-01-04 RX ADMIN — DEXAMETHASONE 4 MG: 4 TABLET ORAL at 17:12

## 2021-01-04 RX ADMIN — DEXAMETHASONE SODIUM PHOSPHATE 4 MG: 4 INJECTION, SOLUTION INTRAMUSCULAR; INTRAVENOUS at 05:15

## 2021-01-04 RX ADMIN — BARIUM SULFATE 45 ML: 980 POWDER, FOR SUSPENSION ORAL at 10:07

## 2021-01-04 RX ADMIN — BARIUM SULFATE 15 ML: 400 PASTE ORAL at 10:07

## 2021-01-04 RX ADMIN — DEXAMETHASONE 4 MG: 4 TABLET ORAL at 23:07

## 2021-01-04 RX ADMIN — LOSARTAN POTASSIUM 50 MG: 50 TABLET, FILM COATED ORAL at 08:50

## 2021-01-04 RX ADMIN — Medication 10 ML: at 05:16

## 2021-01-04 RX ADMIN — LEVETIRACETAM 1000 MG: 100 SOLUTION ORAL at 08:50

## 2021-01-04 RX ADMIN — LEVETIRACETAM 1000 MG: 500 SOLUTION ORAL at 22:08

## 2021-01-04 RX ADMIN — FUROSEMIDE 20 MG: 40 TABLET ORAL at 08:50

## 2021-01-04 RX ADMIN — AMLODIPINE BESYLATE 10 MG: 5 TABLET ORAL at 08:50

## 2021-01-04 RX ADMIN — SERTRALINE HYDROCHLORIDE 150 MG: 100 TABLET ORAL at 08:49

## 2021-01-04 NOTE — PROGRESS NOTES
Problem: Dysphagia (Adult)  Goal: *Speech Goal: (INSERT TEXT)  Description: LTG: Patient will tolerate least restrictive diet without overt signs or symptoms of airway compromise by discharge. STG: Patient will tolerate  regular texture diet and thin liquids without overt signs or symptoms of aspiration. Goal updated 1/4/21  STG: Patient will perform laryngeal strengthening exercises x10 each with 70% accuracy to improve strength and coordination of swallowing function. STG: Patient will participate in modified barium swallow study as clinically indicated. Goal met 1/4/21  STG: Patient will participate in cognitive/linguistic assessment x1. Outcome: Progressing Towards Goal     SPEECH LANGUAGE PATHOLOGY: MODIFIED BARIUM SWALLOW STUDY  Re-evaluation    NAME/AGE/GENDER: Garrison Terrazas is a 68 y.o. male  DATE: 1/4/2021  PRIMARY DIAGNOSIS: ICH (intracerebral hemorrhage) ([facility] [address] [I61.9]  Procedure(s) (LRB):  RIGHT CRANIOTOMY WITH IMAGE GUIDANCE FOR TUMOR RESECTION MRI @0700 (Right) 4 Days Post-Op  ICD-10: Treatment Diagnosis: Oropharyngeal dysphagia (R13.12)  INTERDISCIPLINARY COLLABORATION: Radiologist, Dr. Nelly Daniels  PRECAUTIONS/ALLERGIES: Patient has no known allergies. RECOMMENDATIONS/PLAN   DIET:    PO diet texture:  Regular   Liquids:  regular thin    MEDICATIONS: Whole with liquid or floated in puree as needed     COMPENSATORY STRATEGIES/MODIFICATIONS INCLUDING:  · Fully awake/alert  · Upright for all PO  · Small bites and sips  · Remain upright for 20-30 min after any PO  · Slow rate of PO intake     OTHER RECOMMENDATIONS (including follow up treatment recommendations):   · Training in use of compensatory safe swallowing strategies/feeding guidelines  · Patient/family education     RECOMMENDATIONS for CONTINUED SPEECH THERAPY:   YES: Anticipate need for ongoing speech therapy during this hospitalization and at next level of care.     FREQUENCY/DURATION: Speech therapy to follow up for assessment of cognitive-linguistic function. Recommendations for next treatment session: Next treatment will address cognitive assessment       ASSESSMENT   Mr. Alyson Bro presents with swallows of all textures within functional limits with no laryngeal penetration or aspiration observed with any swallow. Recommend regular diet and thin liquids. Give meds whole with liquid or floated in puree as needed. SUBJECTIVE   History of Present Injury/Illness: Mr. 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 Pita Oleary He also  has a past surgical history that includes hx other surgical; hx cyst removal; hx cyst removal; hx hernia repair (Bilateral); hx ankle fracture tx (Left, 6/1974); hx wrist fracture tx (Bilateral, 6/1993); hx colonoscopy (2007); and hx cyst removal.   Pain:  Pain Intensity 1: 0  Pain Location 1: Ear(right)  Pain Orientation 1: (generalized)  Pain Intervention(s) 1: Repositioned    Results of most recent clinical bedside swallow assessment: cough with thin liquids    Current dietary status prior to evaluation today:  Mechanical soft, nectar thick liquids    Previous Modified Barium Swallow studies: 12/29/20 \"Mr. Alyson Bro presents with oropharyngeal swallow function that is essentially within normal limits. Study essentially unremarkable aside from single incident of trace, transient penetration on tsp sips of thin. Penetration event related to delayed epiglottic inversion with small bolus size.  Hyolaryngeal elevation and excursion is within normal limits, and appropriate epiglottic inversion on all remaining trials. No additional penetration or aspiration. Oral prep was mildly delayed with solid textures, but cleared with single swallow. No residual in pharynx after liquids or solids. Occasional dry swallow was observed after liquid and solids which is believed to be habitual.  Barium tablet attempted as patient report difficulty with large pills. Pill appropriately cleared from pharynx with taken with sip of thin liquids. Recommend regular diet/thin liquids. Medications whole with liquid wash or whole in puree- per patient preference. Will follow up for diet tolerance x1.\"    Current Medications:   No current facility-administered medications on file prior to encounter. Current Outpatient Medications on File Prior to Encounter   Medication Sig Dispense Refill    sertraline (ZOLOFT) 100 mg tablet Take 1.5 Tabs by mouth daily. One and half tab every day 135 Tab 1    allopurinoL (ZYLOPRIM) 100 mg tablet Take 2 Tabs by mouth daily. 180 Tab 1    amLODIPine-Olmesartan (Cuco) 10-40 mg tab Take 1 Tab by mouth daily. 90 Tab 1    atorvastatin (LIPITOR) 40 mg tablet TAKE 1 TABLET BY MOUTH EVERY DAY 90 Tab 1    zolpidem (AMBIEN) 10 mg tablet TAKE 1 TABLET BY MOUTH AT BEDTIME AS NEEDED FOR SLEEP  Indications: difficulty falling asleep 30 Tab 5    potassium chloride SR (Klor-Con 10) 10 mEq tablet Take 1 Tab by mouth daily. 90 Tab 3    multivitamin (ONE A DAY) tablet Take 1 Tab by mouth daily.  cholecalciferol, vitamin D3, (VITAMIN D3) 2,000 unit tab Take  by mouth.  metroNIDAZOLE (METROGEL) 1 % topical gel Apply  to affected area daily. Use a thin layer to affected areas after washing 45 g 3    furosemide (LASIX) 20 mg tablet Take 1 Tab by mouth daily. 90 Tab 1    diclofenac sodium (PENNSAID) 1.5 % drop by Apply Externally route.  diclofenac (VOLTAREN) 1 % gel Apply 4 g to affected area four (4) times daily.  2 Each 3       OBJECTIVE   Orientation:  Person   Place    Oral Assessment:  Labial: No impairment  Dentition: Upper partials and lower dentures  Oral Hygiene: Adequate  Lingual: No impairment    Modified barium swallow study was performed in the radiology suite with Mr. Loraine Duarte in the lateral plane upright 90° in a stretcher and using C-arm. To evaluate his swallow function, barium coated liquid and food was administered in the form of thin liquids (by spoon, cup sip, cup gulp, straw sip and serial swallows), pudding, mixed consistency and cracker. Oral phase of swallow:    no significant oral issues observed    Pharyngeal phase of swallow:    Swallows of all textures were timely. No laryngeal penetration or aspiration was observed. No pharyngeal residue after swallow. Pharyngeal characteristics:   functional pharyngeal swallow   adequate retraction of base of tongue   adequate hyolaryngeal elevation/excursion   adequate epiglottic inversion   adequate constriction of posterior pharyngeal wall   adequate laryngeal closure  Attempted strategies:    none  Effective strategies:    not applicable  Aspiration/Penetration Scale: 1 (No penetration/aspiration. Contrast does not enter the airway.)    Cervical esophageal phase of swallow:    adequate and timely clearance of all boluses through cervical esophagus  **Distal esophagus not assessed due to limitations of MBS study. Assessment/Reassessment only, no treatment provided today.       Tool Used: Dysphagia Outcome and Severity Scale (YUNIOR)    Comments   Normal Diet With no strategies or extra time needed   Functional Swallow May have mild oral or pharyngeal delay   Mild Dysphagia Which may require one diet consistency restricted    Mild-Moderate Dysphagia With 1-2 diet consistencies restricted   Moderate Dysphagia With 2 or more diet consistencies restricted   Moderately Severe Dysphagia With partial PO strategies (trials with ST only)   Severe Dysphagia With inability to tolerate any PO safely      Score:  Initial: 4 Most Recent: 7 (Date:1/4/2021)   Interpretation of Tool: The Dysphagia Outcome and Severity Scale (YUNIOR) is a simple, easy-to-use, 7-point scale developed to systematically rate the functional severity of dysphagia based on objective assessment and make recommendations for diet level, independence level, and type of nutrition. [place] / [place] / Product Type: Medicare /     Education:  · Recommendations discussed with patient. Safety:   After treatment position/precautions:  · Patient waiting in radiology holding bay for transport back to room.     Total Treatment Duration:  Time In: 0940   Time Out: [address], [place], CCC-SLP

## 2021-01-04 NOTE — PROGRESS NOTES
PT Daily Note:  Attempted to see patient for physical therapy this morning but patient was off the floor for a MBS. Will check back on patient at a later date/time if schedule permits.   Thank you,  Gisella Bland, PTA

## 2021-01-04 NOTE — H&P
Braulio Padilla MD  Medical Director  [address] [facility], [address] [facility]  Tel: [phone]       Admission History and Physical Exam  Sole Pryor Date: 1/4/2021  Surgeon: Dr Dalila Coleman (Neurosurgery)  Primary Care Provider: Gloria Castillo DO  Specialty Group / Referring Service: Neurosurgery, Hospitalist Service    Chief Complaint : \" I want to get better and go home\"  Admitting Diagnosis:   Brain mass [G93.89]    Active Problems:    Brain mass (1/4/2021)    Encephalopathy  Hyperglycemia  Leukocytosis  Seizure risk  HTN  Anx/depression  Dysphagia;resolved      Acute Rehab Dx:  Gait impairment / gait dysfunction  Debility  Deconditioning  Mobility and ambulation deficits  Self care / ADL deficits    Medical Dx:  Past Medical History:   Diagnosis Date    Anxiety disorder 12/9/2014    Arthritis 12/9/2014    Depression 12/9/2014    Gout 12/9/2014    History of lumbar laminectomy for spinal cord decompression 2/18/2019    HTN (hypertension), benign 2/15/2017    Hyperlipemia 12/9/2014    Hypertension     Idiopathic chronic gout of right ankle 12/9/2014    Insomnia 12/9/2014    Kidney stone     Mixed hyperlipidemia 12/9/2014    Panic disorder 12/9/2014    Primary insomnia 12/9/2014    Rosacea 2/15/2017       Date of Evaluation: January 4, 2021    Panda Andres is a 68 y.o. male patient at [facility] who was admitted to [address] [place] [address] on 1/4/2021 by Braulio Padilla MD of Physical Medicine and Rehabilitation service with below-mentioned medical history. HPI: The patient is a right hand dominant, previously functionally independent 70yo male with a PMH of HTN, HLD, nephrolithiasis, lumbar stenosis, gouty arthritis, depression and anxiety who presented to [facility] on 12/25 with encephalopathy and sz. He was intubated for airway protection.  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 CT chest abdomen pelvis with no evidence of metastatic source. Szs controlled with Keppra. 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 (peripherally enhancing right  parietal lesion with evidence of internal hemorrhage and necrosis measuring 3.7 x 3.3 x 3.2 cm ). This was associated with surrounding vasogenic edema but no significant midline shift. Pt successfully extubated and was on the neuro floor when I saw him in consultation 12/29  NS, Dr Elroy Lam,  consulted and the pt and family elected to undergo crani with bx and resection of the tumor. Passed swallow eval ( MBS) and was on a reg diet with thin liquids. Continues on Decadron 4mg q 6hrs  He subsequently underwent a right parietal crani for resection of the right parietal tumor with internal hemorrhage and surrounding vasogenic edema. He also had placement of Intraoperative Gliadel (BCNU) chemotherapeutic wafers in the operative resection bed on 12/31/2020  He tolerated the procedure well and spent one day in the ICU before returning to prior room. He will be on a po decadron taper and will need f/u with Dr Elryo Lam 1/14, unless still hospitalized, to have sutures removed. Post op he was on a DIET MECHANICAL SOFT 2 [place]/2 Mildly Thick; No Conc. Sweets HOWEVER, had MBS prior to [facility] transfer today 1/4 and is now on a reg diet/thin liquids. Physical Medicine and Rehabilitation service was consulted, and patient was initially evaluated by Dr. Starla Keys on 12/29/2020     During reevaluation earlier today, patient shows improvement from initial consult but still shows significant functional deficits. We recommend inpatient hospital rehabilitation as reasonable and necessary due to ongoing acute medical issues which have not changed since initial pre-admission evaluation.  We reviewed the preadmission screening and have approved the patient for admission to the [facility]. Attending service cleared patient for transfer to rehab today. Patient continues to have ongoing medical issues outlined above requiring physician / PA medical supervision and has functional deficits which would benefit from continued intensive therapies. Current Level of Function: (evaluated by acute therapy staff, with bed mobility, transfers, balance personally observed post-admission in the IRF setting minutes prior to submission of document)   SBA bed mobility, min assist STS, transfers min assist of 2, gait 60 ft RW and gait belts with min assist of 2. Moderate assist projected per OT with bathing, toileting, dressing; supervision with grooming and feeding and min assist with RW with 2 people for functional mobility.      Prior Home Situation:   Home Environment: Private residence  # Steps to Enter: 15  One/Two Story Residence: One story  Living Alone: No  Support Systems: Spouse/Significant Other/Partner  Retired / [employer]       Past Medical History:   Diagnosis Date    Anxiety disorder 12/9/2014    Arthritis 12/9/2014    Depression 12/9/2014    Gout 12/9/2014    History of lumbar laminectomy for spinal cord decompression 2/18/2019    HTN (hypertension), benign 2/15/2017    Hyperlipemia 12/9/2014    Hypertension     Idiopathic chronic gout of right ankle 12/9/2014    Insomnia 12/9/2014    Kidney stone     Mixed hyperlipidemia 12/9/2014    Panic disorder 12/9/2014    Primary insomnia 12/9/2014    Rosacea 2/15/2017      Past Surgical History:   Procedure Laterality Date    HX ANKLE FRACTURE TX Left 6/1974    HX COLONOSCOPY  2007    HX CYST REMOVAL      HX CYST REMOVAL      pylonidal cyst    HX CYST REMOVAL      HX HERNIA REPAIR Bilateral     HX OTHER SURGICAL      wrist    HX WRIST FRACTURE TX Bilateral 6/1993    wrist plate/pin     No Known Allergies   Family History   Problem Relation Age of Onset    Cancer Mother lung ca    Hypertension Mother     Heart Disease Mother     Arthritis-osteo Father     Cancer Brother         stomach      Social History     Tobacco Use    Smoking status: Former Smoker     Types: Cigarettes     Quit date: 1970     Years since quittin.0    Smokeless tobacco: Never Used   Substance Use Topics    Alcohol use: Yes     Alcohol/week: 0.0 standard drinks     Comment: occ      Prior to Admission Medications   Prescriptions Last Dose Informant Patient Reported? Taking?   allopurinoL (ZYLOPRIM) 100 mg tablet Unknown at Unknown time  No No   Sig: Take 2 Tabs by mouth daily. amLODIPine-Olmesartan (Cuco) 10-40 mg tab 2021 at Unknown time  No Yes   Sig: Take 1 Tab by mouth daily. atorvastatin (LIPITOR) 40 mg tablet Unknown at Unknown time  No No   Sig: TAKE 1 TABLET BY MOUTH EVERY DAY   cholecalciferol, vitamin D3, (VITAMIN D3) 2,000 unit tab Unknown at Unknown time  Yes No   Sig: Take  by mouth. dexAMETHasone (DECADRON) 2 mg tablet 1/3/2021 at Unknown time  No Yes   Sig: Take 0.5 Tabs by mouth See Admin Instructions. Take 3mg TID x3 days, 2mg BID x2 days, 1mg daily x1 day then stop   diclofenac (VOLTAREN) 1 % gel Unknown at Unknown time  No No   Sig: Apply 4 g to affected area four (4) times daily. diclofenac sodium (PENNSAID) 1.5 % drop Unknown at Unknown time  Yes No   Sig: by Apply Externally route. furosemide (LASIX) 20 mg tablet 2021 at Unknown time  No Yes   Sig: Take 1 Tab by mouth daily. levETIRAcetam 1,000 mg tablet 2021 at Unknown time  No Yes   Sig: Take 1 Tab by mouth two (2) times a day. metroNIDAZOLE (METROGEL) 1 % topical gel Unknown at Unknown time  No No   Sig: Apply  to affected area daily. Use a thin layer to affected areas after washing   multivitamin (ONE A DAY) tablet Unknown at Unknown time  Yes No   Sig: Take 1 Tab by mouth daily.    potassium chloride SR (Klor-Con 10) 10 mEq tablet Unknown at Unknown time  No No   Sig: Take 1 Tab by mouth daily.   sertraline (ZOLOFT) 100 mg tablet 1/4/2021 at Unknown time  No Yes   Sig: Take 1.5 Tabs by mouth daily.  One and half tab every day   zolpidem (AMBIEN) 10 mg tablet Unknown at Unknown time  No No   Sig: TAKE 1 TABLET BY MOUTH AT BEDTIME AS NEEDED FOR SLEEP  Indications: difficulty falling asleep      Facility-Administered Medications: None     Current Facility-Administered Medications   Medication Dose Route Frequency    alum-mag hydroxide-simeth (MYLANTA) oral suspension 30 mL  30 mL Oral Q4H PRN    [START ON 1/5/2021] amLODIPine (NORVASC) tablet 10 mg  10 mg Oral DAILY    carboxymethylcellulose sodium (REFRESH LIQUIGEL) 1 % ophthalmic solution 1 Drop  1 Drop Both Eyes QID PRN    dexAMETHasone (DECADRON) tablet 4 mg  4 mg Oral Q6H    [START ON 1/5/2021] furosemide (LASIX) tablet 20 mg  20 mg Oral DAILY    insulin lispro (HUMALOG) injection   SubCUTAneous AC&HS    levETIRAcetam (KEPPRA) oral solution 1,000 mg  1,000 mg Oral Q12H    [START ON 1/5/2021] losartan (COZAAR) tablet 50 mg  50 mg Oral DAILY    magnesium hydroxide (MILK OF MAGNESIA) 400 mg/5 mL oral suspension 30 mL  30 mL Oral DAILY PRN    ondansetron (ZOFRAN ODT) tablet 4 mg  4 mg Oral Q6H PRN    [START ON 1/5/2021] pantoprazole (PROTONIX) tablet 40 mg  40 mg Oral ACB    [START ON 1/5/2021] senna-docusate (PERICOLACE) 8.6-50 mg per tablet 1 Tab  1 Tab Oral DAILY    [START ON 1/5/2021] sertraline (ZOLOFT) tablet 150 mg  150 mg Oral DAILY    traZODone (DESYREL) tablet 25 mg  25 mg Oral QHS PRN    acetaminophen (TYLENOL) tablet 650 mg  650 mg Oral Q6H PRN    Or    acetaminophen (TYLENOL) suppository 650 mg  650 mg Rectal Q6H PRN    polyethylene glycol (MIRALAX) packet 17 g  17 g Oral DAILY PRN    sodium chloride (NS) flush 5-40 mL  5-40 mL IntraVENous PRN    HYDROcodone-acetaminophen (NORCO)  mg tablet 1 Tab  1 Tab Oral Q4H PRN    naloxone (NARCAN) injection 0.4 mg  0.4 mg IntraVENous PRN    sodium chloride (NS) flush 5-40 mL  5-40 mL IntraVENous Q8H       Review of Systems:  General: Denies: fevers, chills, sweats, fatigue, malaise, anorexia, weight loss   Eyes:  Denies: loss of vision, eye pain, photophobia + blurry vision  HENT:  Denies: hearing loss, tinnitus, earache, epistaxis, sore throat, hoarseness  Lungs: Denies: cough, wheeze, asthma, hemoptysis, dyspnea  CV:  Denies: chest pain, palpitations, syncope, orthopnea, paroxysmal nocturnal dyspnea, claudication   GI:  Denies: dysphagia, odynophagia, nausea, vomiting, diarrhea, constipation, abdominal pain, jaundice, melena   :  Denies: frequency, dysuria, nocturia, urinary incontinence, stones, hematuria   Endocrine: Denies: polydipsia/polyuria, skin changes, temperature intolerance, unexpected weight gain or loss  MSK:  Denies: joint pain, joint swelling, muscle pain, muscle weakness + chronic LBP  Heme:  Denies: bleeding problems, blood transfusions, bruising, pallor, swollen lymph nodes   Neuro:  Denies: headache, dysarthria,  diplopia, seizure, focal deficits. + balance/gait difficulties, altered mental status, hallucinations (visual) . Generalized weakness      Objective:     Visit Vitals  /61   Pulse 68   Temp 98 °F (36.7 °C)   Resp 16   SpO2 96%      Intake and Output:  No intake/output data recorded. Physical Exam:   General:  Alert, appears stated age. O x 3  No acute distress. HEENT:  Normocephalic, EOM intact, facial symmetry noted. Nares patent, oral mucosa moist without lesions. Right posterior vertical crani incision with sutures in place, dry, no erythema, no swelling   Lungs:  Clear to auscultation bilaterally. Respirations even and unlabored. Heart:  Regular rate and underlying rhythm, S1, S2. No obvious murmur, click, rub or gallop. Genitourinary: Deferred. Abdomen:  Soft, non-tender, not distended. Bowel sounds normoactive. No obvious masses or organomegaly palpated. Neuromuscular:  PERRLA, EOMI  Speech clear but slow.  Responses delayed  Recall 3/3 after 2 min  Able to fcs, name objects. Conversation appropriate but would perseverate at times  Dec conc and attn  Strength symm x trace prox left shoulder weakness  Small drift LUE, FTN without dysmetria  Sensation intact  Plantar reflexes down     Skin:  Intact, dry, good skin turgor, age related changes present   Edema: none. No calf tenderness         Psych: Patient was oriented to person, place, and time. Without hallucinations, abnormal affect or abnormal behaviors during the examination.  Affect somewhat blunted     Recent Results (from the past 72 hour(s))   GLUCOSE, POC    Collection Time: 01/01/21  4:15 PM   Result Value Ref Range    Glucose (POC) 202 (H) 65 - 100 mg/dL   GLUCOSE, POC    Collection Time: 01/01/21 10:06 PM   Result Value Ref Range    Glucose (POC) 138 (H) 65 - 100 mg/dL   GLUCOSE, POC    Collection Time: 01/02/21  6:58 AM   Result Value Ref Range    Glucose (POC) 123 (H) 65 - 100 mg/dL   GLUCOSE, POC    Collection Time: 01/02/21 11:21 AM   Result Value Ref Range    Glucose (POC) 149 (H) 65 - 100 mg/dL   GLUCOSE, POC    Collection Time: 01/02/21  4:36 PM   Result Value Ref Range    Glucose (POC) 149 (H) 65 - 100 mg/dL   GLUCOSE, POC    Collection Time: 01/02/21  9:09 PM   Result Value Ref Range    Glucose (POC) 150 (H) 65 - 100 mg/dL   CBC W/O DIFF    Collection Time: 01/03/21  7:11 AM   Result Value Ref Range    WBC 19.2 (H) 4.3 - 11.1 K/uL    RBC 4.32 4.23 - 5.6 M/uL    HGB 13.5 (L) 13.6 - 17.2 g/dL    HCT 39.6 (L) 41.1 - 50.3 %    MCV 91.7 79.6 - 97.8 FL    MCH 31.3 26.1 - 32.9 PG    MCHC 34.1 31.4 - 35.0 g/dL    RDW 12.3 11.9 - 14.6 %    PLATELET 718 057 - 863 K/uL    MPV 10.8 9.4 - 12.3 FL    ABSOLUTE NRBC 0.00 0.0 - 0.2 K/uL   METABOLIC PANEL, BASIC    Collection Time: 01/03/21  7:11 AM   Result Value Ref Range    Sodium 137 (L) 138 - 145 mmol/L    Potassium 3.9 3.5 - 5.1 mmol/L    Chloride 105 98 - 107 mmol/L    CO2 25 21 - 32 mmol/L    Anion gap 7 7 - 16 mmol/L Glucose 156 (H) 65 - 100 mg/dL    BUN 25 (H) 8 - 23 MG/DL    Creatinine 1.09 0.8 - 1.5 MG/DL    GFR est AA >60 >60 ml/min/1.73m2    GFR est non-AA >60 >60 ml/min/1.73m2    Calcium 8.7 8.3 - 10.4 MG/DL   GLUCOSE, POC    Collection Time: 01/03/21  7:25 AM   Result Value Ref Range    Glucose (POC) 140 (H) 65 - 100 mg/dL   GLUCOSE, POC    Collection Time: 01/03/21 11:14 AM   Result Value Ref Range    Glucose (POC) 173 (H) 65 - 100 mg/dL   GLUCOSE, POC    Collection Time: 01/03/21  3:58 PM   Result Value Ref Range    Glucose (POC) 92 65 - 100 mg/dL   GLUCOSE, POC    Collection Time: 01/03/21  9:27 PM   Result Value Ref Range    Glucose (POC) 150 (H) 65 - 100 mg/dL   GLUCOSE, POC    Collection Time: 01/04/21  7:17 AM   Result Value Ref Range    Glucose (POC) 134 (H) 65 - 100 mg/dL   GLUCOSE, POC    Collection Time: 01/04/21 10:57 AM   Result Value Ref Range    Glucose (POC) 142 (H) 65 - 100 mg/dL         Condition on Admission: Good      Parietal Mass with Vasogenic Edema, ICH s/p presentation due to seizures  Assessment:     The Post Assessment Physician Evaluation (MASON) found the current functional status to be comparable with the pre-admission screening. The patient is a good candidate for acute inpatient rehabilitation. Nothing since the pre-admission screen has changed that determination. Rehabilitation Plan  The patient has shown the ability to tolerate and benefit from 3 hours of therapy daily and is being admitted to a comprehensive acute inpatient rehabilitation program consisting of at least 3 hours of combined physical and occupational therapies. - Begin intensive Physical Therapy for a minimum of 1.5 hours a day, at least 5 out of 7 days per week to address bed mobility, transfers, ambulation, strengthening, balance, and endurance.    - Begin intensive Occupational Therapy for a minimum of 1.5 hours a day, at least 5 out of 7 days per week to address ADLs (bathing, LE dressing, toileting) and adaptive equipment as needed. - Continue Speech Therapy for: impaired cognition    The patient will also require 24-hour skilled rehabilitation nursing for bowel and bladder management, skin care for decubitus ulcer prevention, pain management and ongoing medication administration. S/p Right Crani for right parietal lobe tumor resection, s/p intraoperative Gliadel (BCNU) chemotherapeutic wafers placement   Plan / Recommendations / Medical Decision Making:     Daily physician / PA medical management:    Seizures; mgt with keppra; tolerating current dose ; no post op szs    Hypertension - BP controlled, fluctuating, managed medically. Cont norvasc 10mg daily, Lasix 20mg daily, Cozaar 50mg daily    Steroid induced hyperglycemia- uncontrolled / poor glycemic control. Will require daily, close FSG monitoring and medication adjustment to optimize glycemic control in setting of acute illness and hospitalization. Currently on SSI. Will check hgbA1c, no reported hx of diabetes    Cerebral edema; s/p tumor resection. Cont Decadron 4mg po qid; slow taper    Encephalopathy; multifactorial . Improving    Right parietal mass; suspicious for a glioma. Pathology pending. Did undergo intraop placement of Gliadel (BCNU) chemotherapeutic wafers  -await pathology. Will likely need Onc/ Rad Onc Referral    Pain control - stable, mild-to-moderate joint symptoms intermittently, reasonably well controlled by PRN meds. Will require regular pain assessment and comprenhensive pain management, cont tylenol and prn norco; long standing hx of chronic low back pain due to spinal stenosis    Pneumonia prophylaxis - incentive spirometer every hour while awake. DVT risk / DVT prophylaxis - will require daily physician / PA exam to assess for signs and symptoms as patient is at increased risk for of thromboembolism. Mobilize as tolerated.  Sequential pneumatic compression devices (SCDs) when in bed; thigh-high or knee-high thromboembolic deterrent hose when out of bed. No OAC or Sub cut lovenox/heparin due to Crani and hemorrhage    Leukocytosis; likely steroid induced. No active fever or s/s of infxn; will monitor. Hx of depression - previously on Zoloft. At higher risk of  Depression and further anxiety due to diagnosis and hospitalization. Prn klonopin if needed. Coping well. Wife supportive    General skin care / wound prevention - monitor incision and general skin wound status daily per staff and physician / PA. At risk for failure. Will require 24/7 rehab nursing. Keep wound clean and dry    Urinary retention / neurogenic bladder - schedule voids q 6-8 hrs. Check post-void residual as needed; in and out catheter if post-void residual is more than 400ml. Bowel program - at risk for constipation as a side effect of opioids, iron and calcium supplements and other meds. MiraLAX daily for regularity, Brigida-Colace for stool softener. PRN MOM, bisacodyl suppository or tablets for constipation. GERD - resume PPI. At times may need additional antacids, Maalox prn. The patient's prognosis for significant practical improvement within a reasonable period of time appears good and the estimated length of stay is 14 days; patient is expected to return home with spouse / family supervision and continued rehabilitation with home health therapy. Given the patient's complex neurologic / medical condition, risks of further medical complications include but are not limited to: thromboembolism / pulmonary embolism, skin breakdown, pneumonia due to decreased mobility, CVA, MI, cardiac arrhythmias due to HTN, postural hypotension. For these ongoing medical issues, rehabilitation services could not be safely provided at a lower level of care such as a skilled nursing facility or nursing home.         Signed By: Ching Car MD     January 4, 2021

## 2021-01-04 NOTE — PROGRESS NOTES
TRANSFER - OUT REPORT:    Verbal report given to Martinez Wheeler RN on [address]  being transferred to [facility] (for rehab) for routine progression of care       Report consisted of patients Situation, Background, Assessment and   Recommendations(SBAR). Information from the following report(s) SBAR was reviewed with the receiving nurse. Lines:   Peripheral IV 12/25/20 Right Hand (Active)   Site Assessment Clean, dry, & intact 01/04/21 0737   Phlebitis Assessment 0 01/04/21 0737   Infiltration Assessment 0 01/04/21 0737   Dressing Status Clean, dry, & intact 01/04/21 0737   Dressing Type Tape;Transparent 01/04/21 0737   Hub Color/Line Status Patent 01/04/21 0737   Alcohol Cap Used No 01/01/21 0730       Peripheral IV 12/31/20 Left Hand (Active)   Site Assessment Clean, dry, & intact 01/04/21 0737   Phlebitis Assessment 0 01/04/21 0737   Infiltration Assessment 0 01/04/21 0737   Dressing Status Clean, dry, & intact 01/04/21 0737   Dressing Type Tape;Transparent 01/04/21 0737   Hub Color/Line Status Patent 01/04/21 0737   Alcohol Cap Used No 01/01/21 0730        Opportunity for questions and clarification was provided.

## 2021-01-04 NOTE — PROGRESS NOTES
TRANSFER - IN REPORT:    Verbal report received from [address] [facility] on [facility]  being received from [address] [place] [address] for routine progression of care      Report consisted of patients Situation, Background, Assessment and   Recommendations(SBAR). Information from the following report(s) SBAR was reviewed with the receiving nurse. Opportunity for questions and clarification was provided. Assessment completed upon patients arrival to unit and care assumed.

## 2021-01-04 NOTE — PROGRESS NOTES
PHYSICAL THERAPY EXAMINATION  [id number]    Patient Name: Jhony Ramirez  Patient Age: 68 y.o. Past Medical History:   Past Medical History:   Diagnosis Date    Anxiety disorder 12/9/2014    Arthritis 12/9/2014    Depression 12/9/2014    Gout 12/9/2014    History of lumbar laminectomy for spinal cord decompression 2/18/2019    HTN (hypertension), benign 2/15/2017    Hyperlipemia 12/9/2014    Hypertension     Idiopathic chronic gout of right ankle 12/9/2014    Insomnia 12/9/2014    Kidney stone     Mixed hyperlipidemia 12/9/2014    Panic disorder 12/9/2014    Primary insomnia 12/9/2014    Rosacea 2/15/2017       Medical Diagnosis:  Brain mass [G93.89] <principal problem not specified>    Precautions at Admission: Other (comment)(falls)    Therapy Diagnosis:   Difficulty with bed mobility  [x]     Difficulty with functional transfers  [x]     Difficulty with ambulation  [x]     Difficulty with stair negotiations  [x]       Problem List:    Decreased strength B LE  [x]     Decreased strength trunk/core  [x]     Decreased AROM   [x]     Decreased PROM  []    Decreased endurance  [x]     Decreased balance sitting  []     Decreased balance standing  [x]     Pain   [x]     Slow ambulation velocity  [x]    Decreased coordination  [x]    Decreased safety awareness  [x]      Functional Limitations:   Decreased independence with bed mobility  [x]     Decreased independence with functional transfers  [x]     Decreased independence with ambulation  [x]     Decreased independence with stair negotiation  [x]       Previous Functional Level: Prior to admission, Pt was I with ADL and IADL tasks. Pt completed lite yard work, management medications, and driving. Pt. Amb. Independently w/o A/D.     Home Environment: Home Environment: Private residence  # Steps to Enter: 4  Rails to Enter: Yes  Hand Rails : Left  Wheelchair Ramp: No  One/Two Story Residence: Two story, live on 1st floor(Garage and first floor living)  # of Interior Steps: 15  Interior Rails: Left  Living Alone: Yes  Support Systems: Spouse/Significant Other/Partner  Patient Expects to be Discharged to[de-identified] Private residence  Current DME Used/Available at Home: Blood pressure cuff, Cane, straight, Shower chair  Tub or Shower Type: Shower         Outcome Measures: Vital Signs: /61   Pulse 68   Temp 98 °F (36.7 °C)   Resp 16   SpO2 96%     Pain level: no rated  Pain location: R thigh w/ AROM  Pain interventions: avoided exacerbating activities    Patient education: role of OT, orientation to [facility], gait w/ RW    Interdisciplinary Communication: discussed POC w/ OT      Cognition: Pt. Alert, following one step commands consistently,ut having increased difficulty w/ multiple step commands. [employer] [place] [employer] [place]. Pt. Also w/ poor insight into deficits & decreased short term recall. Impulsive @ times.      MMT Initial Asssessment   Right Lower Extremity Left Lower Extremity   Hip Flexion 3+ 4   Knee Extension 4+ 5   Knee Flexion 4+ 5   Ankle Dorsiflexion 5 5   0/5 No palpable muscle contraction  1/5 Palpable muscle contraction, no joint movement  2-/5 Less than full range of motion in gravity eliminated position  2/5 Able to complete full range of motion in gravity eliminated position  2+/5 Able to initiate movement against gravity  3-/5 More than half but not full range of motion against gravity  3/5 Able to complete full range of motion against gravity  3+/5 Completes full range of motion against gravity with minimal resistance  4-/5 Completes full range of motion against gravity with minimal-moderate resistance  4/5 Completes full range of motion against gravity with moderate resistance  4+/5 Completes full range of motion against gravity with moderate-maximum resistance  5/5 Completes full range of motion against gravity with maximum resistance     AROM: WFL    PRIMARY MODE OF LOCOMOTION: ambulation      BED/CHAIR/WHEELCHAIR TRANSFERS Initial Assessment   Rolling Right 5 (Supervision)   Rolling Left 5 (Supervision)   Supine to Sit 5 (Supervision)   Sit to Stand Contact guard assistance   Sit to Supine 5 (Supervision)   Transfer Type SPT without device   Comments min A for safety w/ balance w/ short, shuffling steps observed   Car Transfer Not tested   Car Type         WHEELCHAIR MOBILITY/MANAGEMENT Initial Assessment   Able to Propel 0 feet   W/C Assistance NT   Curbs/ramps assistance required 0 (Not tested)   Wheelchair set up assistance required 0 (Not tested)   Wheelchair management NT       WALKING INDEPENDENCE Initial Assessment   Assistive device Other (comment)(no A/D)   Ambulation assistance - level surface 3 (Moderate assistance)   Distance 50 Feet (ft)   Comments Pt. w/ flexed posture, shuffle step gait pattern, increased B stance time & 1 posterior LOB reuqiring mod A to correct   Ambulation assistance - unlevel surface 0 (Not tested)       STEPS/STAIRS Initial Assessment   Steps/Stairs ambulated 0   Stairs Assistance NT   Rail Use NT   Comments NT   Curbs/Ramps 0 (Not tested)       QUALITY INDICATOR ASSIST COMMENTS   Roll right (&return to back) 4: Supervision or touching A    Roll left (& return to back) 4: Supervision or touching A    Supine to sit 4: Supervision or touching A    Sit to stand 4: Supervision or touching A    Chair/bed-to-chair transfer 3: Partial/Moderate A    Walk 10 feet 3: Partial/Moderate A    Walk 50 feet with 2 turns 3: Partial/Moderate A    Walk 150 feet Not Tested: Not attempted due to safety concerns    Walk 10 feet on uneven  Not Tested: Not attempted due to safety concerns    1 step/curb Not Tested: Not attempted due to safety concerns    4 steps Not Tested: Not attempted due to safety concerns    12 steps Not Tested: Not attempted due to safety concerns     object Not Tested: Not attempted due to safety concerns    Wheel 48' w/2 turns Not Tested: Not applicable secondary to pt not completing activity previously    Wheel 150' Not Tested: Not applicable secondary to pt not completing activity previously    Car Transfer Not Tested: Not attempted due to environmental concerns: Equipment      Gait Training:  Pt. Amb. x120' w/ RW min A for balance as well as to steer RW due to tendency to run close to objects on L side. Therapeutic Exercise:  Pt. Performed supine LE exercises x10 each as follows:  SAQ, heel slides, SLR, & bridging. Pt. Left supine in NAD, call bell in reach, Mega activated, wife @ bedside. PHYSICAL THERAPY PLAN OF CARE    Therapy Diagnosis:   Please see table above    Order received from MD for physical therapy services and chart reviewed. Pt to be seen at least 5 times per week for at least 1.5 hours of physical therapy per day for 10 days. Thank you for the referral.    LTGs:  LTG 1. Patient transfer supine<>sit with mod I in 10 days  LTG 2. Patient transfer sit<>stand and perform stand pivot transfer with LRAD and supervision in 10 days  LTG 3. Patient ambulate 150 feet with LRAD and supervision in 10 days  LTG 4. Patient ambulate up/down 4 steps with single handrail per home environment and CGA in 10 days  LTG 5. Patient will perform HEP w/ supervision in 10 days    Pt would benefit from skilled physical therapy in order to improve independent functional mobility within the home. Interventions may include range of motion (AROM, PROM B LE/trunk), motor function (B LE/trunk strengthening/coordination), activity tolerance (vitals, oxygen saturation levels), bed mobility training, balance activities, gait training (progressive ambulation program), and functional transfer training. Please see IRC; Interdisciplinary Eval, Care Plan, and Patient Education for further information regarding physical therapy examination and plan of care.      Duke Mills, PT  1/4/2021

## 2021-01-04 NOTE — DISCHARGE SUMMARY
Hospitalist Discharge Summary     Admit Date:  2020  5:40 AM   DC note date: 2021  Name:  Chandrakant Marquez   Age:  68 y.o.  :  1940   MRN:  [id number]   PCP:  Steffanie Rich DO  Treatment Team: Attending Provider: Riddhi Cleary MD; Consulting Provider: Jazmine Melendez MD; Utilization Review: Sheree Flowers; Consulting Provider: Brandon Alicea MD; Consulting Provider: Baldev Lundy MD; Care Manager: Cindy Escamilla LMSW; Charge Nurse: Vicki De Leon Speech Language Pathologist: Bianca Segal, ZONIA; Physical Therapy Assistant: Dereck Leonard PTA; Occupational Therapist: Mila Fuller    Problem List for this Hospitalization:  Hospital Problems as of 2021 Date Reviewed: 2020          Codes Class Noted - Resolved POA    Cerebral edema ([facility] ICD-10-CM: G93.6  ICD-9-CM: 348.5  2020 - Present Yes        Seizures ([facility] ICD-10-CM: R56.9  ICD-9-CM: 780.39  2020 - Present Yes        Essential hypertension (Chronic) ICD-10-CM: I10  ICD-9-CM: 401.9  2020 - Present Yes        Brain tumor ([facility] ICD-10-CM: D49.6  ICD-9-CM: 239.6  2020 - Present Yes        * (Principal) 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  ICD-9-CM: 348.89  2020 - Present Yes        ICH (intracerebral hemorrhage) ([facility] ICD-10-CM: I61.9  ICD-9-CM: 595  2020 - Present Yes        RESOLVED: Acute renal failure (ARF) ([facility] ICD-10-CM: N17.9  ICD-9-CM: 584.9  2020 - 2020 Yes            Hospital Course:  Mr. Zachary Harrison is a very nice 67 y/o WM with a h/o HTN, obesity, anxiety, HLD who presented on  with encephalopathy and seizure. He was intubated. CT head showed a right parietal mass with hemorrhage. Neurosurgery and Neurology were consulted. CT c/a/p without evidence of primary or metastatic disease. Brain MRI showed the same. He was started on Keppra and dexamethasone.  EEG abnormal. He was extubated and transferred to the floor. Hospitalist consulted to assume care on 12/28.  He went to the OR on 12/31 for craniotomy and right parietal lobe mass resection which was uncomplicated. He was monitored in the ICU, BP was controlled and he was transferred back to the floor. He continued to do well. He has been accepted to [facility]. D/w Dr. Andrew Epley today regarding PO Decadron taper; will also need f/u with him in 10 days. His hospital course was otherwise unremarkable and he is medically stable for discharge. Disposition: Rehab Facility  Activity: Activity as tolerated  Diet: DIET MECHANICAL SOFT 2 Opp/2 Mildly Thick; No Conc. Sweets  Code Status: Full Code    Follow Up Orders:  No orders of the defined types were placed in this encounter. Follow-up Information     Follow up With Specialties Details Why Contact Info    Marcelle Lawson DO Family Medicine   [facility] Inocente Olivares MD Neurosurgery Schedule an appointment as soon as possible for a visit Hospital follow up. Right parietal lobe mass s/p resection. [address] [place] [id number]  [phone]            Discharge meds at bottom of this note. Plan was discussed with patient, nursing, CM. All questions answered. Patient was stable at time of discharge. Given instructions to call a physician or return if any concerns. Discharge summary and encounter summary was sent to PCP electronically via \"Comm Mgt\" link in [facility], if possible. Diagnostic Imaging/Tests:   Xr Abd (kub)    Result Date: 12/25/2020  KUB INDICATION: Nasogastric tube placement A supine view of the abdomen was obtained. The tip of the nasogastric tube is in the stomach.   The bowel gas pattern is normal.     IMPRESSION: Tip of the NG tube is in the stomach     Xr Swallow Func Video    Result Date: 12/29/2020  HISTORY: Dysphasia, brain mass EXAM: Modified barium swallow TECHNIQUE: The patient ingested various consistencies of barium under direct fluoroscopic evaluation. The exam is performed in conjunction with speech pathology. 1.45 minutes fluoroscopy time utilized for this procedure. FINDINGS: No laryngeal penetration or aspiration with any consistency of barium. IMPRESSION: No laryngeal penetration or aspiration. Please see detailed report from speech pathology for further description. Mri Brain W Cont    Result Date: 12/31/2020  EXAMINATION: BRAIN MRI 12/31/2020 7:25 AM ACCESSION NUMBER: [id number] INDICATION: Pre-operative and Intraoperative Ryonettronic Stealth Neuronavigation planning. COMPARISON: Head CT 12/26/2020, brain MRI 12/25/2020 TECHNIQUE: Multiplanar multisequence MRI of the brain after the administration of 20 cc Dotarem intravenous contrast. The Stealth protocol was utilized for preoperative planning. Stereotactic frame is in place. FINDINGS: A 3.6 x 2.9 x 3.0 cm (AP x transverse x craniocaudal) (axial postcontrast image 204 and coronal postcontrast image 168) peripherally enhancing centrally necrotic mass in the posterior lateral right parietal lobe, involving the right supramarginal gyrus, and extending into the posterior aspect of the right superior temporal gyrus is not significantly changed in size in comparison to the 12/25/2020 exam within limits of differences in measurement plane imaging technique. There is surrounding vasogenic edema. Mass effect upon the body of the right lateral ventricle is unchanged. The basilar cisterns are patent. There is no cerebellar tonsillar ectopia or herniation. There is a cluster of prominent perivascular spaces in the left thalamus. This is unchanged in comparison to prior exams. Multiple mucosal retention cysts in the right maxillary sinus. There are no new enhancing intracranial lesions. There are no suspicious osseous lesions. IMPRESSION: 1. Unchanged right parietal lobe mass, measuring 3.6 x 2.9 x 3.0 cm and as fully detailed above.  2. Unchanged clustered prominent perivascular spaces in the left thalamus. 3. No new enhancing intracranial masses. VOICE DICTATED BY: Dr. Timothy Dietrich    Result Date: 12/25/2020  MRI brain with and without contrast History: Intracranial hemorrhage. Abnormal CT scan. Patient found on floor. Imaging sequences: Sagittal short TR/short TE, axial short TR/short TE, long TR/long TE, FLAIR, gradient recall, diffusion weighted images and ADC mapping. Axial and coronal short TR/short TE postcontrast images. Imaging was performed on a 1.5 Claudia magnet, utilizing the uneventful administration of 20 mL of intravenous Dotarem and order to better evaluate for intracranial pathology. Comparison: None. Correlation is made to the CT scan of the brain 12/25/2020. Findings: There is a heterogeneously enhancing mass within the right temporoparietal region measuring approximately 3.7 x 3.3 x 3.2 cm in AP, transverse and craniocaudal dimensions, respectively. There are components of intrinsic T1 hyperintensity with blooming artifact on the gradient recall sequence suggesting the presence of calcification and/or hemorrhage. There is significant surrounding edema. There is mass effect upon the atria of the right lateral ventricle. There is no significant midline shift. The ventricles are otherwise normal in size. There are no extra-axial fluid collections. Normal flow voids are present within all of the major intracranial vessels. There are no areas of restricted diffusion to suggest an acute or subacute infarction. There is cystic CSF signal intensity within the left thalamus measuring approximately 2.0 x 1.9 cm, most likely representing remote infarction. Few scattered punctate foci of T2 hyperintensity within the supratentorial white matter suggest chronic small vessel ischemic change, unremarkable for age. There are small retention cysts within the right maxillary sinus. Impression: 1.  Enhancing mass within the right temporoparietal region with associated hemorrhage and/or calcification. The findings would be concerning for neoplasm which would include a solitary metastatic lesion or primary glial tumor. 2. Cystic changes involving the left thalamus suggesting remote infarction. Ct Head Wo Cont    Result Date: 12/26/2020  NONCONTRAST CT OF THE BRAIN 12/26/2020 COMPARISON: MR brain 12/25/2020 INDICATION: Follow-up intracranial hematoma TECHNIQUE: Contiguous axial images were obtained from the skull base through the vertex without IV contrast. Radiation dose reduction techniques were used for this study:  Our CT scanners use one or all of the following: Automated exposure control, adjustment of the mA and/or kVp according to patient's size, iterative reconstruction. FINDINGS: Right temporoparietal mass and adjacent white matter edema again identified with similar elevated density along the inferior margin. No new sites of density. No midline shift or herniation. No interval evidence for acute cortical based infarction. Multicystic process left basal ganglia stable. IMPRESSION: 1. Stable appearance right temporoparietal mass with adjacent cerebral edema. Inferior region hematoma versus calcification stable. No new bleed or herniation. Ct Head W Wo Cont    Result Date: 1/1/2021  EXAM: CT head with and without IV contrast. INDICATION: Postoperative evaluation. COMPARISON: Yesterday's MRI brain. TECHNIQUE: Axial CT images of the head were obtained before and after the intravenous injection of 100 cc Isovue-370 CT contrast.  Radiation dose reduction techniques were used for this study. Our CT scanners use one or all of the following:  Automated exposure control, adjustment of the mA and/or kV according to patient size, iterative reconstruction. FINDINGS: There has been an interval right parietal craniotomy and resection of the previous right parietal mass. No significant postoperative hematoma is visualized.  Along the anterior margin of the resection cavity there is mild contrast enhancement, which could relate to residual tumor or postoperative granulation tissue. There is persistent surrounding vasogenic edema, with mass effect on the right lateral ventricle but no midline shift. Cystic changes in the left thalamus are unchanged. There is postoperative pneumocephalus. IMPRESSION: Interval right parietal craniotomy and right parietal mass resection. Mild enhancement along the anterior margin of the resection cavity may relate to residual tumor or postoperative granulation tissue. Ct Chest Abd Pelv W Cont    Result Date: 12/27/2020  CT CHEST ABD PELV W CONT 12/27/2020 0756 hours. INDICATION: Patient found unresponsive. Brain mass with hemorrhage. Evaluation for primary malignancy. COMPARISON: CT abdomen/pelvis 5/20/2018. TECHNIQUE: ET examination of the chest, abdomen and pelvis was performed following the administration of 100 cc of Isovue-370. Intravenous contrast was used for better evaluation of solid organs and vascular structures. Oral contrast was used for bowel opacification. Coronal reconstructions obtained. Radiation dose reduction techniques were used for this study. Our CT scanners use one or all of the following: Automated exposure control, adjustment of the mA and/or kV according to patient size, iterative reconstruction. FINDINGS: Chest: Life Support: Gastric tube courses below the diaphragm, tip terminating in the distal gastric body. Thyroid: Unremarkable. Lymph Nodes: No enlarged mediastinal, hilar or morphologically abnormal axillary lymph nodes. Mediastinum: Heart is normal in size with no pericardial effusion. Scattered coronary artery calcifications. The thoracic aorta, great vessels of the aortic arch and main pulmonary artery are normal in caliber and patent. No acute mediastinal abnormality. Esophagus: Unremarkable. Lungs and pleura:  The trachea and mainstem bronchi are patent and clear retained secretions. Lungs are adequately expanded with dependent consolidative airspace opacities in the bases favoring atelectasis. No pleural effusion or pneumothorax. No suspicious pulmonary nodules or mass. Chest wall: Superficial soft tissues of the thorax intact. Bilateral gynecomastia. Bones are osteopenic with bridging marginal osteophytes of the thoracic spine. Abdomen: Evaluation of the abdomen is degraded by motion artifact. Liver is mildly enlarged and diffusely low in attenuation relative to the spleen. No conspicuous focal abnormality within limitations. The gallbladder is mildly distended with high density material consistent with vicarious excretion of contrast. No biliary ductal dilatation. The hepatic, portal, superior mesenteric and splenic veins are patent. The spleen is upper limits in normal size. Pancreas is atrophic without focal abnormality. Adrenal glands normal in appearance. Kidneys normal in location and size with no hydronephrosis, calculi or suspicious enhancing renal lesion. Mild bilateral perinephric stranding. Incidental note of bilateral perinephric renal cyst. Additional simple exophytic cyst arising from the lower pole of the right kidney. Ureters are normal in course and caliber. Delayed images through the abdomen and pelvis obtained demonstrating opacification of the renal collecting system with no conspicuous filling defect. The ureters are not opacified along their course. The stomach is decompressed. Duodenal sweep is unremarkable. No bowel obstruction, ileus or focal bowel wall thickening. Oral contrast seen to the level of the rectum. Diverticuli in the sigmoid colon. No pericolonic inflammatory changes. The abdominal aorta and major branch vessels are normal in caliber and patent. Scattered atherosclerotic calcifications. No free intraperitoneal air, fluid or adenopathy. Superficial soft tissues of the abdomen intact. Laxity of the right lateral lower abdominal wall.  Scar versus dependent body wall edema, lower midline back. Bones are osteopenic. Right curvature of the lumbar spine with multilevel disc degenerative changes most pronounced at L2-L4. No aggressive osseous lesion. Pelvis: Bladder is decompressed with a Hernandez catheter in place. Seminal vesicles unremarkable. Prostate enlarged. No free pelvic fluid or adenopathy. Rectum unremarkable. Overlying soft tissues intact. IMPRESSION: 1. No evidence of metastatic disease in the chest, abdomen or pelvis. 2.  Dependent consolidative opacities in the lower lobes favor atelectasis. 3.  Hepatomegaly and hepatic steatosis. 4.  Simple bilateral renal cysts as described. 5.  Diverticulosis coli. No acute diverticulitis. 6.  Bilateral gynecomastia. 7.  Hernandez catheter within a decompressed bladder. 8.  Prostatic hypertrophy. Xr Chest Port    Result Date: 12/25/2020  EXAM: XR CHEST PORT INDICATION: Respiratory failure COMPARISON: None. FINDINGS: A portable AP radiograph of the chest was obtained at 0605 hours. The patient is on a cardiac monitor. Bibasilar airspace disease. Endotracheal tube in adequate position. The cardiac and mediastinal contours and pulmonary vascularity are normal.  The bones and soft tissues are grossly within normal limits. IMPRESSION: Bibasilar atelectasis or pneumonia. Cta Code Neuro Head And Neck W Cont    Result Date: 12/25/2020  Title:  CT arteriogram of the neck and head. Indication: Cerebrovascular accident (CVA). . Technique: Axial images of the neck and head were obtained after the uneventful administration of intravenous iodinated contrast media. Contrast was used to best identify the arterial structures.   Images were reviewed on a separate, free standing, three-dimensional workstation as per the referring physicians request.  All stenosis percentages derived by comparing the narrowest segment with the distal Internal Carotid Artery luminal diameter, as described in the Jim American Symptomatic Carotid Endarterectomy Trial (NASCET) criteria. All CT scans at this facility are performed using dose reduction/dose modulation techniques, as appropriate the performed exam, including the following: Automated Exposure Control; Adjustment of the mA and/or kV according to patient size (this includes techniques or standardized protocols for targeted exams where dose is matched to indication/reason for exam); and Use of Iterative Reconstruction Technique. The study was analyzed by the [address] ai algorithm. Comparison: None. Findings: Lungs:  Atelectatic changes in the dependent portions of the lungs. No focal consolidation or pleural effusions. Endotracheal tube in appropriate positioning. No suspicious pulmonary nodules. .  Bones:  No osseous destruction. . Moderate to advanced degenerative changes in the cervical spine. This is most focal at C5-C6 where there is spinal canal stenosis secondary to a posterior osteophyte complex. Paranasal sinuses:  . Polypoid mucosal thickening in the right maxillary sinus. Brain:  Low-density focus in the right parietal lobe with a small focus of high density suspicious for intraparenchymal hemorrhage measuring 1.3 x 0.8 cm. Minimal midline shift. No definite enhancing mass lesions. Remote infarct in the left basal ganglia. No hydrocephalus. Soft tissues:  Within normal limits. . Dural venous sinuses:  Patent. Aortic arch:  Nonaneurysmal. Moderate calcific atherosclerosis. .  Right brachiocephalic artery:  No significant stenosis or occlusion. .  . Right subclavian artery:  No significant stenosis or occlusion. .  . Left subclavian artery:  No significant stenosis or occlusion. .  . Right common carotid artery:  No significant stenosis or occlusion. .  . Right external carotid artery:  No significant stenosis or occlusion. .  . Right internal carotid artery:  No significant stenosis or occlusion. .  . Vascular calcifications are present along the carotid siphon.  Left common carotid artery: No significant stenosis or occlusion. .  . Left external carotid artery:  No significant stenosis or occlusion. .  . Left internal carotid artery: Moderate calcific atherosclerosis at the left carotid bulb. Vascular calcifications are present along the carotid siphon. .  Right vertebral artery:  No significant stenosis or occlusion. .. Left vertebral artery:  No significant stenosis or occlusion. . Basilar artery:  No significant stenosis, occlusion, or aneurysm. .  Right middle cerebral artery:  No significant stenosis, occlusion, or aneurysm. Right anterior cerebral artery:  No significant stenosis, occlusion, or aneurysm. María Elena Salen Anterior communicating artery: No significant stenosis, occlusion, or aneurysm. María Elena Salen Left middle cerebral artery:  No significant stenosis, occlusion, or aneurysm. María Elena Salen Left anterior cerebral artery:  No significant stenosis, occlusion, or aneurysm. .  Right posterior communicating artery: No significant stenosis, occlusion, or aneurysm. María Elena Salen Left posterior communicating artery:  No significant stenosis, occlusion, or aneurysm. .  Right posterior cerebral artery:  No significant stenosis, occlusion, or aneurysm. María Elena Salen Left posterior cerebral artery:  No significant stenosis, occlusion, or aneurysm. .      Impression: 1. No large vessel occlusion is identified. Moderate atherosclerotic changes without high-grade stenosis. 2.  Low-density focus in the right parietal lobe, concerning for infarct. There is an associated small focus of high density suspicious for intraparenchymal hemorrhage measuring 1.3 x 0.8 cm. MRI is recommended for further evaluation. 3.  Atelectatic changes in the dependent portions of the lungs. Ct Code Neuro Head Wo Contrast    Result Date: 12/25/2020  CT HEAD WITHOUT CONTRAST HISTORY:  CVA. COMPARISON: None TECHNIQUE: Axial imaging was performed without intravenous contrast utilizing 5mm slice thickness. Sagittal and coronal reformats were performed.  Radiation dose reduction techniques were used for this study. Our CT scanner uses one or all of the following: Automated exposure control, adjustment of the MAS or KUB according to patient's size and iterative reconstruction. FINDINGS:    *BRAIN:    -  There are no early signs of territorial or lacunar infarction by CT.    -  Right parietal hemorrhage with surrounding vasogenic edema.    -  No gross white matter abnormality by CT. *VISUALIZED PARANASAL SINUSES: Well aerated. *MASTOIDS:  Clear. *CALVARIUM AND SCALP: Unremarkable. IMPRESSION: Right MCA hemorrhagic infarct versus mass. Recommend MRI of the brain with and without contrast. Date of Dictation: 12/25/2020 6:00 AM       Echocardiogram/EKG results:  No results found for this visit on 12/25/20.     Results for orders placed or performed during the hospital encounter of 12/25/20   EKG, 12 LEAD, INITIAL   Result Value Ref Range    Ventricular Rate 113 BPM    Atrial Rate 227 BPM    QRS Duration 82 ms    Q-T Interval 322 ms    QTC Calculation (Bezet) 441 ms    Calculated R Axis 41 degrees    Calculated T Axis -5 degrees    Diagnosis       Normal sinus rhythm  !!! Poor data quality, interpretation may be adversely affected  Nonspecific T wave abnormality  Nonspecific ST abnormality  Abnormal ECG  No previous ECGs available  Confirmed by Roberta Anne ([id number]) on 12/25/2020 9:36:55 AM         Procedures done this admission:  Procedure(s):  RIGHT CRANIOTOMY WITH IMAGE GUIDANCE FOR TUMOR RESECTION MRI @0700    All Micro Results     None          SARS-CoV-2 Lab Results  \"Novel Coronavirus\" Test: No results found for: COV2NT   \"Emergent Disease\" Test: No results found for: EDPR  \"SARS-COV-2\" Test: No results found for: XGCOVT  Rapid Test:   Lab Results   Component Value Date/Time    COVR Not detected 12/30/2020 01:59 PM            Labs: Results:       BMP, Mg, Phos Recent Labs     01/03/21  0711   *   K 3.9      CO2 25   AGAP 7   BUN 25*   CREA 1.09   CA 8.7   *      CBC Recent Labs     01/03/21  0711   WBC 19.2*   RBC 4.32   HGB 13.5*   HCT 39.6*         LFT No results for input(s): ALT, TBIL, AP, TP, ALB, GLOB, AGRAT in the last 72 hours.     No lab exists for component: SGOT, GPT   Cardiac Testing No results found for: BNPP, BNP, CPK, RCK1, RCK2, RCK3, RCK4, CKMB, CKNDX, CKND1, TROPT, TROIQ   Coagulation Tests Lab Results   Component Value Date/Time    Prothrombin time 14.2 12/25/2020 06:27 AM    INR 1.1 12/25/2020 06:27 AM    INR (POC) 1.9 (H) 12/25/2020 06:27 AM      A1c Lab Results   Component Value Date/Time    Hemoglobin A1c 6.0 12/25/2020 07:25 PM    Hemoglobin A1c, External 5.1 05/06/2014      Lipid Panel Lab Results   Component Value Date/Time    Cholesterol, total 110 03/02/2020 12:25 PM    HDL Cholesterol 30 (L) 03/02/2020 12:25 PM    LDL, calculated 37 03/02/2020 12:25 PM    VLDL, calculated 43 (H) 03/02/2020 12:25 PM    Triglyceride 213 (H) 03/02/2020 12:25 PM      Thyroid Panel Lab Results   Component Value Date/Time    TSH 4.310 02/25/2016 09:11 AM        Most Recent UA No results found for: COLOR, APPRN, REFSG, CRICKET, PROTU, GLUCU, KETU, BILU, BLDU, UROU, NIC, LEUKU, WBCU, RBCU, UEPI, BACTU, CASTS, UCRY, MUCUS, UCOM     No Known Allergies  Immunization History   Administered Date(s) Administered    Influenza High Dose Vaccine PF 02/10/2016, 10/02/2017, 11/19/2018, 10/09/2019    Influenza Vaccine 01/24/2013, 11/24/2014, 10/09/2019    Influenza, Quadrivalent, Adjuvanted (>65 Yrs FLUAD QUAD [id number]) 09/28/2020    Pneumococcal Conjugate (PCV-13) 02/10/2016    Pneumococcal Vaccine (Unspecified Type) 10/19/1999, 01/24/2013    Td 01/13/2011    Zoster Recombinant 12/12/2019, 07/27/2020    Zoster Vaccine, Live 01/01/2011       All Labs from Last 24 Hrs:  Recent Results (from the past 24 hour(s))   GLUCOSE, POC    Collection Time: 01/03/21 11:14 AM   Result Value Ref Range    Glucose (POC) 173 (H) 65 - 100 mg/dL   GLUCOSE, POC    Collection Time: 01/03/21  3:58 PM Result Value Ref Range    Glucose (POC) 92 65 - 100 mg/dL   GLUCOSE, POC    Collection Time: 01/03/21  9:27 PM   Result Value Ref Range    Glucose (POC) 150 (H) 65 - 100 mg/dL   GLUCOSE, POC    Collection Time: 01/04/21  7:17 AM   Result Value Ref Range    Glucose (POC) 134 (H) 65 - 100 mg/dL       Discharge Exam:  Patient Vitals for the past 24 hrs:   Temp Pulse Resp BP SpO2   01/04/21 0715 98.4 °F (36.9 °C) 61 18 138/74 94 %   01/04/21 0359 97.9 °F (36.6 °C) 63 18 125/70 96 %   01/03/21 2311 97.7 °F (36.5 °C) 62 18 120/67 99 %   01/03/21 1904 97.9 °F (36.6 °C) 73 18 121/77 97 %   01/03/21 1601 97.4 °F (36.3 °C) 77 18 122/70 97 %   01/03/21 1115 98 °F (36.7 °C) 73 18 127/63 93 %     Oxygen Therapy  O2 Sat (%): 94 % (01/04/21 0715)  Pulse via Oximetry: 73 beats per minute (01/01/21 1300)  O2 Device: Room air (12/31/20 2300)  O2 Flow Rate (L/min): 2 l/min (12/31/20 1343)  FIO2 (%): 45 % (12/26/20 0000)    Estimated body mass index is 28.67 kg/m² as calculated from the following:    Height as of this encounter: 5' 10\" (1.778 m). Weight as of this encounter: 90.6 kg (199 lb 12.8 oz). Intake/Output Summary (Last 24 hours) at 1/4/2021 0908  Last data filed at 1/4/2021 0121  Gross per 24 hour   Intake    Output 200 ml   Net -200 ml       *Note that automatically entered I/Os may not be accurate; dependent on patient compliance with collection and accurate  by assistants. General:    Well nourished. No overt distress  Eyes:   Normal sclerae. Extraocular movements intact. HEENT:  Right sided surgical scar on scalp, sutures in place, healing well. Moist mucous membranes. CV:   Regular rate and rhythm. No m/r/g. No edema. Lungs:  CTAB. No wheezing, rhonchi, or rales. Unlabored  Abdomen: Soft, nontender, nondistended. Extremities: Warm and dry. No cyanosis or clubbing  Neurologic: CN II-XII grossly intact. No gross focal deficits. Alert. Strength normal and equal in b/l UEs.  Mild slurring of speech. Skin:     No rashes. No jaundice. Psych:  Normal mood and affect.     Current Med List in Hospital:   Current Facility-Administered Medications   Medication Dose Route Frequency    losartan (COZAAR) tablet 50 mg  50 mg Oral DAILY    sodium chloride (NS) flush 5-40 mL  5-40 mL IntraVENous Q8H    sodium chloride (NS) flush 5-40 mL  5-40 mL IntraVENous PRN    acetaminophen (TYLENOL) tablet 650 mg  650 mg Oral Q4H PRN    HYDROcodone-acetaminophen (NORCO)  mg tablet 1 Tab  1 Tab Oral Q4H PRN    morphine 10 mg/ml injection 4 mg  4 mg IntraVENous Q4H PRN    naloxone (NARCAN) injection 0.4 mg  0.4 mg IntraVENous PRN    ondansetron (ZOFRAN) injection 4 mg  4 mg IntraVENous Q6H PRN    labetaloL (NORMODYNE;TRANDATE) injection 10 mg  10 mg IntraVENous PRN    dexamethasone (DECADRON) 4 mg/mL injection 4 mg  4 mg IntraVENous Q6H    insulin lispro (HUMALOG) injection   SubCUTAneous AC&HS    sertraline (ZOLOFT) tablet 150 mg  150 mg Oral DAILY    carboxymethylcellulose sodium (REFRESH LIQUIGEL) 1 % ophthalmic solution 1 Drop  1 Drop Both Eyes PRN    white petrolatum-mineral oiL (STYE LUBRICANT) ointment   Both Eyes PRN    amLODIPine (NORVASC) tablet 10 mg  10 mg Oral DAILY    furosemide (LASIX) tablet 20 mg  20 mg Oral DAILY    levETIRAcetam (KEPPRA) oral solution 1,000 mg  1,000 mg Oral Q12H    0.9% sodium chloride infusion 250 mL  250 mL IntraVENous PRN    sodium chloride (NS) flush 5-40 mL  5-40 mL IntraVENous Q8H    sodium chloride (NS) flush 5-40 mL  5-40 mL IntraVENous PRN    acetaminophen (TYLENOL) tablet 650 mg  650 mg Oral Q6H PRN    Or    acetaminophen (TYLENOL) suppository 650 mg  650 mg Rectal Q6H PRN    polyethylene glycol (MIRALAX) packet 17 g  17 g Oral DAILY PRN    promethazine (PHENERGAN) tablet 12.5 mg  12.5 mg Oral Q6H PRN    Or    ondansetron (ZOFRAN) injection 4 mg  4 mg IntraVENous Q6H PRN       Discharge Info:   Current Discharge Medication List      START taking these medications    Details   levETIRAcetam 1,000 mg tablet Take 1 Tab by mouth two (2) times a day. Qty: 60 Tab, Refills: 1      dexAMETHasone (DECADRON) 2 mg tablet Take 0.5 Tabs by mouth See Admin Instructions. Take 3mg TID x3 days, 2mg BID x2 days, 1mg daily x1 day then stop  Qty: 30 Tab, Refills: 0         CONTINUE these medications which have NOT CHANGED    Details   sertraline (ZOLOFT) 100 mg tablet Take 1.5 Tabs by mouth daily. One and half tab every day  Qty: 135 Tab, Refills: 1    Associated Diagnoses: MEGGAN (generalized anxiety disorder)      allopurinoL (ZYLOPRIM) 100 mg tablet Take 2 Tabs by mouth daily. Qty: 180 Tab, Refills: 1    Associated Diagnoses: Idiopathic chronic gout of right ankle without tophus      amLODIPine-Olmesartan (Cuco) 10-40 mg tab Take 1 Tab by mouth daily. Qty: 90 Tab, Refills: 1    Associated Diagnoses: HTN (hypertension), benign      atorvastatin (LIPITOR) 40 mg tablet TAKE 1 TABLET BY MOUTH EVERY DAY  Qty: 90 Tab, Refills: 1    Associated Diagnoses: Mixed hyperlipidemia      zolpidem (AMBIEN) 10 mg tablet TAKE 1 TABLET BY MOUTH AT BEDTIME AS NEEDED FOR SLEEP  Indications: difficulty falling asleep  Qty: 30 Tab, Refills: 5    Comments: This request is for a new prescription for a controlled substance as required by Federal/State law. Associated Diagnoses: Primary insomnia      potassium chloride SR (Klor-Con 10) 10 mEq tablet Take 1 Tab by mouth daily. Qty: 90 Tab, Refills: 3    Associated Diagnoses: HTN (hypertension), benign      multivitamin (ONE A DAY) tablet Take 1 Tab by mouth daily. cholecalciferol, vitamin D3, (VITAMIN D3) 2,000 unit tab Take  by mouth.      metroNIDAZOLE (METROGEL) 1 % topical gel Apply  to affected area daily. Use a thin layer to affected areas after washing  Qty: 45 g, Refills: 3    Associated Diagnoses: Rosacea      furosemide (LASIX) 20 mg tablet Take 1 Tab by mouth daily.   Qty: 90 Tab, Refills: 1    Associated Diagnoses: Bilateral leg edema      diclofenac sodium (PENNSAID) 1.5 % drop by Apply Externally route. diclofenac (VOLTAREN) 1 % gel Apply 4 g to affected area four (4) times daily. Qty: 2 Each, Refills: 3    Associated Diagnoses: Arthritis               Time spent in patient discharge planning and coordination 35 minutes.     Signed:  Tami Sanches MD

## 2021-01-04 NOTE — PROGRESS NOTES
NEUROSURGERY PROGRESS NOTE:   Admit Date: 12/25/2020  Subjective:   No acute overnight events. Patient doing well this am and eager for discharge to rehab today     Objective:  Visit Vitals  /74 (BP 1 Location: Right arm, BP Patient Position: At rest)   Pulse 61   Temp 98.4 °F (36.9 °C)   Resp 18   Ht 5' 10\" (1.778 m)   Wt 199 lb 12.8 oz (90.6 kg)   SpO2 94%   BMI 28.67 kg/m²     General: No acute distress  Awake, alert, and oriented to person, place and not year with cognitive slowing   Extremely hard of hearing   Eyes open spontaneously   PERRL, EOMI  Hearing grossly intact   Patient with 5/5 strength in the bilateral upper and bilateral lower extremities   No pronation or drift on assessment  Incision: clean, dry and intact with sutures on the skin    Assessment and Plan:   Janell Fordeon 68 y.o. 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 He is now 4 Days Post-Op from a right parietal craniotomy for resection of the aforementioned tumor with placement of Gliadel Wafers.   - Decadron taper to begin today with decadron 3 mg TID for three days followed by decadron 2 mg BID for two days, followed by decadron 1 mg for day then off. .   - Appreciate therapy and rehab medicine recommendations   - Patient medically stable for discharge to acute inpatient rehabilitation from a neurosurgical perspective   - Appreciate medicine and neurology recommendations  - Appreciate Hospitalist Medicine (Dr. Colton Jeffries)   - Continue Keppra 1000 mg BID since patient presented with seizures  - Disposition: patient to be discharged to acute inpatient rehab today. Kalpana Villegas Degree, [facility]

## 2021-01-04 NOTE — PROGRESS NOTES
Pt has been accepted for admission to [facility] and can transfer there today if he is medically cleared for dc. MD notified via [employer]. 1000:  Pt is medically cleared for dc today and will transfer to [facility] for acute inpatient rehab services. Transport scheduled for 1045 through [employer] (they have called and changed it to 1130). Pt's spouse is in the room and aware of dc and transport time. RN to call report to #[phone]. SW remains available to assist as needed. Care Management Interventions  PCP Verified by CM: Yes  Last Visit to PCP: 09/28/20  Mode of Transport at Discharge: BLS([facility])  Hospital Transport Time of Discharge: [place] (CM Consult): (NO CONSULT RECEIVED)  Discharge Durable Medical Equipment: No  Physical Therapy Consult: Yes  Occupational Therapy Consult: No  Speech Therapy Consult: Yes  Current Support Network: Own Home, Lives with Spouse  Confirm Follow Up Transport: Family  The Plan for Transition of Care is Related to the Following Treatment Goals : Acute rehab to improve pt's functional abilities for a safe transition to home.   The Patient and/or Patient Representative was Provided with a Choice of Provider and Agrees with the Discharge Plan?: Yes  Freedom of Choice List was Provided with Basic Dialogue that Supports the Patient's Individualized Plan of Care/Goals, Treatment Preferences and Shares the Quality Data Associated with the Providers?: Yes  Discharge Location  Discharge Placement: Rehab hospital/unit acute([facility] IR)

## 2021-01-04 NOTE — PROGRESS NOTES
SPEECH PATHOLOGY NOTE:  Modified Barium Swallow study complete. Swallows of all textures within functional limits with no laryngeal penetration or aspiration observed with any swallow. Recommend regular diet and thin liquids. Give meds whole with liquid or floated in puree as needed. Full report to follow.   Umesh Carbajal MA, CCC-SLP

## 2021-01-04 NOTE — PROGRESS NOTES
[facility] OCCUPATIONAL THERAPY INITIAL EVALUATION    Time In: 1330  Time Out: 1420    Precautions: Falls, Poor Safety Awareness, Confused and Impulsive     Pain: Patient had no complaint of pain. History of Presenting Illness (per previous reports): Binh Olivares is a 68 y.o. male patient at [facility] who was admitted to [address] [place] [address] on 1/4/2021 by Theadora Harada, MD of Physical Medicine and Rehabilitation service with below-mentioned medical history.     HPI: The patient is a right hand dominant, previously functionally independent 70yo male with a PMH of HTN, HLD, nephrolithiasis, lumbar stenosis, gouty arthritis, depression and anxiety who presented to [facility] on 12/25 with encephalopathy and sz. He was intubated for airway protection. 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 CT chest abdomen pelvis with no evidence of metastatic source. Szs controlled with Keppra. 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 (peripherally enhancing right  parietal lesion with evidence of internal hemorrhage and necrosis measuring 3.7 x 3.3 x 3.2 cm ). This was associated with surrounding vasogenic edema but no significant midline shift. Pt successfully extubated and was on the neuro floor when I saw him in consultation 12/29  NS, Dr Kemal Fuller, [facility] and the pt and family elected to undergo crani with bx and resection of the tumor. Passed swallow eval ( MBS) and was on a reg diet with thin liquids. Continues on Decadron 4mg q 6hrs  He subsequently underwent a right parietal crani for resection of the right parietal tumor with internal hemorrhage and surrounding vasogenic edema.  He also had placement of Intraoperative Gliadel (BCNU) chemotherapeutic wafers in the operative resection bed on 12/31/2020  He tolerated the procedure well and spent one day in the ICU before returning to prior room. He will be on a po decadron taper and will need f/u with Dr Mari Jacob 1/14, unless still hospitalized, to have sutures removed. Post op he was on a DIET MECHANICAL SOFT 2 [place]/2 Mildly Thick; No Conc. Sweets HOWEVER, had MBS prior to [facility] transfer today 1/4 and is now on a reg diet/thin liquids. Physical Medicine and Rehabilitation service was consulted, and patient was initially evaluated by Dr. Clint Soto on 12/29/2020     Past Medical History/ Co-morbidities:   Past Medical History:   Diagnosis Date    Anxiety disorder 12/9/2014    Arthritis 12/9/2014    Depression 12/9/2014    Gout 12/9/2014    History of lumbar laminectomy for spinal cord decompression 2/18/2019    HTN (hypertension), benign 2/15/2017    Hyperlipemia 12/9/2014    Hypertension     Idiopathic chronic gout of right ankle 12/9/2014    Insomnia 12/9/2014    Kidney stone     Mixed hyperlipidemia 12/9/2014    Panic disorder 12/9/2014    Primary insomnia 12/9/2014    Rosacea 2/15/2017       Patient's Goal: \"To ride my motorcycles again. \"     Previous Level of Function: Prior to admission, Pt was I with ADL and IADL tasks. Pt completed lite yard work, management medications, and driving.      [address] Situation Private residence   Lives Alone No   Support Systems Spouse/Significant Other/Partner   Shower Situation Shower   Current DME Blood pressure cuff, Cane, straight, Shower chair   Lift Chair     Stairs to Badge 4      Rails Left      W/C Ramp No   Interior Steps 15     Upper Extremity Function   KAREN RICHMOND   [facility] Decreased, but functional Decreased, but functional   [facility] Decreased, but functional Decreased, but functional       Functional Mobility   Score Comments   Rolling 4: Supervision or touching A Side: Left   Supine to Sit 4: Supervision or touching A    Sit to Stand 4: Supervision or touching A CGA   Transfer Assist 4: Supervision or touching A Transfer Type: SPT   Equipment: N/A   Comments: CGA     Activities of Daily Living    Score Comments   Upper Body  Dressing 5: S/U or clean-up assist Items Applied: Pullover  Position: Supported Sitting   Lower Body Dressing 4: Supervision or touching A Items Applied: Underwear and Elastic pants  Position: Supported sitting and Standing PRN  Adaptive Equipment: W/C and Grab Bars  Comments: CGA in standing   Toilet Transfer 4: Supervision or touching A Transfer Type: SPT   Equipment: Grab bars   Comments: MIN A, simple verbal directions for sequencing for safety   Toileting Hygiene 6: Independent Output: urine     Cognition: Pt is alert and oriented to person and place. Pt scored a 12/15 on the IRF-KERRI. Pt's wife reports a decline in cognitive status and reports that he was cognitively intact prior to admission. Pt would benefit from further cognitive testing. Pt would benefit from speech therapy intervention to address cognitive concerns. Vision/Perception: Formal testing needs to be completed. Session: Pt demonstrated good participation in OT session. Pt presents with impulsive tendencies and benefits from simple clear directions. Pt was supine in bed with wife present in room upon therapist's arrival. Pt's performance with ADL is reflected in above chart. Pt presents with decreased cognition, decreased activity tolerance and strength. Pt would benefit from skilled occupational therapy services. Pt was left seated upright in w/c with wife present and call bell within reach. Interdisciplinary Communication: Communicated with PT on POC. Patient/Family Education: Patient and Family was/were educated On the role of OT, On POC and On IRC expectations.      Problem List: [facility], [address], Activity Tolerance, Visual Perceptual , Safety Awareness, Strength, Standing Balance and Cognition    Functional Limitations: ADL, IADL, Functional Transfers and Functional Mobility    Goals: Please see Care Plan    OT order received and chart reviewed. OT orders have been acknowledged. Patient will benefit from skilled OT services to address ADL, functional transfers, UE strength, UE coordination, balance, cognition and activity tolerance to maximize functional performance with daily self-care tasks and functional mobility. Treatment is likely to include ADL, Balance, Strength, Activity tolerance, Visual perceptual, Cognitive, DME, AE, Family  and Safety awareness training to increase independence with self-care. Patient will be seen for 1.5-2 hours of skilled OT services 5-6 days a week as appropriate. Initate POC.      Johnna Peng MS OTR/L  1/4/2021

## 2021-01-05 LAB
ALBUMIN SERPL-MCNC: 2.5 G/DL (ref 3.2–4.6)
ALBUMIN/GLOB SERPL: 0.7 {RATIO} (ref 1.2–3.5)
ALP SERPL-CCNC: 72 U/L (ref 50–136)
ALT SERPL-CCNC: 151 U/L (ref 12–65)
ANION GAP SERPL CALC-SCNC: 10 MMOL/L (ref 7–16)
AST SERPL-CCNC: 53 U/L (ref 15–37)
BILIRUB SERPL-MCNC: 0.5 MG/DL (ref 0.2–1.1)
BUN SERPL-MCNC: 35 MG/DL (ref 8–23)
CALCIUM SERPL-MCNC: 8.2 MG/DL (ref 8.3–10.4)
CHLORIDE SERPL-SCNC: 108 MMOL/L (ref 98–107)
CO2 SERPL-SCNC: 23 MMOL/L (ref 21–32)
CREAT SERPL-MCNC: 1.12 MG/DL (ref 0.8–1.5)
ERYTHROCYTE [DISTWIDTH] IN BLOOD BY AUTOMATED COUNT: 12.4 % (ref 11.9–14.6)
GLOBULIN SER CALC-MCNC: 3.5 G/DL (ref 2.3–3.5)
GLUCOSE SERPL-MCNC: 130 MG/DL (ref 65–100)
HCT VFR BLD AUTO: 38.3 % (ref 41.1–50.3)
HGB BLD-MCNC: 13.1 G/DL (ref 13.6–17.2)
MAGNESIUM SERPL-MCNC: 2.5 MG/DL (ref 1.8–2.4)
MCH RBC QN AUTO: 31.2 PG (ref 26.1–32.9)
MCHC RBC AUTO-ENTMCNC: 34.2 G/DL (ref 31.4–35)
MCV RBC AUTO: 91.2 FL (ref 79.6–97.8)
NRBC # BLD: 0 K/UL (ref 0–0.2)
PLATELET # BLD AUTO: 187 K/UL (ref 150–450)
PMV BLD AUTO: 11 FL (ref 9.4–12.3)
POTASSIUM SERPL-SCNC: 4.1 MMOL/L (ref 3.5–5.1)
PROT SERPL-MCNC: 6 G/DL (ref 6.3–8.2)
RBC # BLD AUTO: 4.2 M/UL (ref 4.23–5.6)
SODIUM SERPL-SCNC: 141 MMOL/L (ref 136–145)
WBC # BLD AUTO: 15.3 K/UL (ref 4.3–11.1)

## 2021-01-05 PROCEDURE — 92610 EVALUATE SWALLOWING FUNCTION: CPT

## 2021-01-05 PROCEDURE — 97116 GAIT TRAINING THERAPY: CPT

## 2021-01-05 PROCEDURE — 85027 COMPLETE CBC AUTOMATED: CPT

## 2021-01-05 PROCEDURE — 97530 THERAPEUTIC ACTIVITIES: CPT

## 2021-01-05 PROCEDURE — 92523 SPEECH SOUND LANG COMPREHEN: CPT

## 2021-01-05 PROCEDURE — 97535 SELF CARE MNGMENT TRAINING: CPT

## 2021-01-05 PROCEDURE — 65310000000 HC RM PRIVATE REHAB

## 2021-01-05 PROCEDURE — 74011636637 HC RX REV CODE- 636/637: Performed by: PHYSICAL MEDICINE & REHABILITATION

## 2021-01-05 PROCEDURE — 80053 COMPREHEN METABOLIC PANEL: CPT

## 2021-01-05 PROCEDURE — 99232 SBSQ HOSP IP/OBS MODERATE 35: CPT | Performed by: PHYSICAL MEDICINE & REHABILITATION

## 2021-01-05 PROCEDURE — 74011250636 HC RX REV CODE- 250/636: Performed by: PHYSICAL MEDICINE & REHABILITATION

## 2021-01-05 PROCEDURE — 97110 THERAPEUTIC EXERCISES: CPT

## 2021-01-05 PROCEDURE — 74011250637 HC RX REV CODE- 250/637: Performed by: PHYSICAL MEDICINE & REHABILITATION

## 2021-01-05 PROCEDURE — 36415 COLL VENOUS BLD VENIPUNCTURE: CPT

## 2021-01-05 PROCEDURE — 83735 ASSAY OF MAGNESIUM: CPT

## 2021-01-05 RX ADMIN — LOSARTAN POTASSIUM 50 MG: 50 TABLET, FILM COATED ORAL at 08:43

## 2021-01-05 RX ADMIN — INSULIN LISPRO 2 UNITS: 100 INJECTION, SOLUTION INTRAVENOUS; SUBCUTANEOUS at 11:53

## 2021-01-05 RX ADMIN — DOCUSATE SODIUM 50MG AND SENNOSIDES 8.6MG 1 TABLET: 8.6; 5 TABLET, FILM COATED ORAL at 08:42

## 2021-01-05 RX ADMIN — DEXAMETHASONE 4 MG: 4 TABLET ORAL at 13:16

## 2021-01-05 RX ADMIN — INSULIN LISPRO 2 UNITS: 100 INJECTION, SOLUTION INTRAVENOUS; SUBCUTANEOUS at 21:37

## 2021-01-05 RX ADMIN — SERTRALINE HYDROCHLORIDE 150 MG: 50 TABLET ORAL at 08:41

## 2021-01-05 RX ADMIN — AMLODIPINE BESYLATE 10 MG: 5 TABLET ORAL at 08:41

## 2021-01-05 RX ADMIN — INSULIN LISPRO 6 UNITS: 100 INJECTION, SOLUTION INTRAVENOUS; SUBCUTANEOUS at 16:10

## 2021-01-05 RX ADMIN — LEVETIRACETAM 1000 MG: 500 SOLUTION ORAL at 21:25

## 2021-01-05 RX ADMIN — LEVETIRACETAM 1000 MG: 500 SOLUTION ORAL at 08:44

## 2021-01-05 RX ADMIN — PANTOPRAZOLE SODIUM 40 MG: 40 TABLET, DELAYED RELEASE ORAL at 04:13

## 2021-01-05 RX ADMIN — FUROSEMIDE 20 MG: 20 TABLET ORAL at 08:43

## 2021-01-05 RX ADMIN — DEXAMETHASONE 4 MG: 4 TABLET ORAL at 17:14

## 2021-01-05 RX ADMIN — DEXAMETHASONE 4 MG: 4 TABLET ORAL at 23:03

## 2021-01-05 RX ADMIN — DEXAMETHASONE 4 MG: 4 TABLET ORAL at 04:13

## 2021-01-05 RX ADMIN — ACETAMINOPHEN 650 MG: 325 TABLET ORAL at 21:24

## 2021-01-05 NOTE — PROGRESS NOTES
OT Daily Note    Time In 0745   Time Out 0831     Subjective: \"I slept pretty good last night. \"   Pain: Pt reported no pain. Mobility   Score Comments   Sit to Stand 4: Supervision or touching A SBA   Transfer Assist 4: Supervision or touching A Transfer Type: SPT   Equipment: N/A   Comments: CGA     Activities of Daily Living    Score Comments   Eating 6: Independent    Oral Hyigene Not Tested: Not attempted due to environmental concerns: Time    Bathing 4: Supervision or touching A Type of Shower: Shower  Position: Supported sitting and Standing PRN   Adaptive  Equipment: Tub Transfer Bench, Grab Bars and W/C  Comments: S, Therapist washed incision on scalp with baby shampoo and water. Upper Body  Dressing 5: S/U or clean-up assist Items Applied: Pullover  Position: Supported Sitting   Lower Body Dressing 4: Supervision or touching A Items Applied: Underwear and Elastic pants  Position: Supported sitting and Standing PRN  Adaptive Equipment: W/C  Comments: A to don pull up with correct orientation. Donning/[place] Footwear 5: S/U or clean-up assist Items Applied: Socks  Adaptive Equipment: N/A   Toilet Transfer 4: Supervision or touching A Transfer Type: SPT   Equipment: Grab Bars   Comments: SBA   Toileting Hygiene 4: Supervision or touching A Output: Urine  Comments: SBA   Education  Benefits of OT, Sequencing tasks. Interdisciplinary Communication: Communicated to RN about therapist washing incision. Summary of Session: Pt demonstrated good participation in OT tasks during this session. Pt's performance with ADL is reflected in above chart. Pt was left in w/c with call bell within reach and all needs met, and set up for breakfast.    Plan: Continue per OT JOAO Avalos Going MS OTR/L  1/5/2021

## 2021-01-05 NOTE — PROGRESS NOTES
Problem: Falls - Risk of  Goal: *Absence of Falls  Description: Document Ivana Krishna Fall Risk and appropriate interventions in the flowsheet. Outcome: Progressing Towards Goal  Note: Fall Risk Interventions:  Mobility Interventions: Bed/chair exit alarm, Communicate number of staff needed for ambulation/transfer, OT consult for ADLs, Patient to call before getting OOB, PT Consult for mobility concerns    Mentation Interventions: Adequate sleep, hydration, pain control, Bed/chair exit alarm, Door open when patient unattended, Evaluate medications/consider consulting pharmacy, Eyeglasses and hearing aids, Increase mobility, Reorient patient, Toileting rounds, Update white board    Medication Interventions: Bed/chair exit alarm, Evaluate medications/consider consulting pharmacy, Patient to call before getting OOB, Teach patient to arise slowly    Elimination Interventions: Call light in reach, Patient to call for help with toileting needs    History of Falls Interventions: Bed/chair exit alarm, Consult care management for discharge planning, Door open when patient unattended, Evaluate medications/consider consulting pharmacy         Problem: Patient Education: Go to Patient Education Activity  Goal: Patient/Family Education  Outcome: Progressing Towards Goal     Problem: Pressure Injury - Risk of  Goal: *Prevention of pressure injury  Description: Document Marquez Scale and appropriate interventions in the flowsheet.   Outcome: Progressing Towards Goal  Note: Pressure Injury Interventions:  Sensory Interventions: Assess changes in LOC, Discuss PT/OT consult with provider, Keep linens dry and wrinkle-free    Moisture Interventions: Absorbent underpads, Apply protective barrier, creams and emollients    Activity Interventions: Increase time out of bed, Pressure redistribution bed/mattress(bed type), PT/OT evaluation    Mobility Interventions: PT/OT evaluation    Nutrition Interventions: Document food/fluid/supplement intake                     Problem: Patient Education: Go to Patient Education Activity  Goal: Patient/Family Education  Outcome: Progressing Towards Goal     Problem: Falls - Risk of  Goal: *Absence of Falls  Description: Document Kalpana Fall Risk and appropriate interventions in the flowsheet.  Outcome: Progressing Towards Goal  Note: Fall Risk Interventions:  Mobility Interventions: Bed/chair exit alarm, Communicate number of staff needed for ambulation/transfer, OT consult for ADLs, Patient to call before getting OOB, PT Consult for mobility concerns    Mentation Interventions: Adequate sleep, hydration, pain control, Bed/chair exit alarm, Door open when patient unattended, Evaluate medications/consider consulting pharmacy, Eyeglasses and hearing aids, Increase mobility, Reorient patient, Toileting rounds, Update white board    Medication Interventions: Bed/chair exit alarm, Evaluate medications/consider consulting pharmacy, Patient to call before getting OOB, Teach patient to arise slowly    Elimination Interventions: Call light in reach, Patient to call for help with toileting needs    History of Falls Interventions: Bed/chair exit alarm, Consult care management for discharge planning, Door open when patient unattended, Evaluate medications/consider consulting pharmacy         Problem: Patient Education: Go to Patient Education Activity  Goal: Patient/Family Education  Outcome: Progressing Towards Goal     Problem: Inpatient Rehab (Adult)  Goal: *LTG: Avoids injury/falls 100% of time related to deficits  Outcome: Progressing Towards Goal  Goal: *LTG: Avoids infection 100% of time related to deficits  Outcome: Progressing Towards Goal  Goal: *LTG: Verbalize understanding of diagnosis and risk factors for recurring stroke  Outcome: Progressing Towards Goal  Goal: *LTG: Absence of DVT during hospitalization  Outcome: Progressing Towards Goal  Goal: *LTG: Maintains Skin Integrity With No Evidence of Pressure  Injury 100% of Time  Outcome: Progressing Towards Goal  Goal: *Nursing Goal (Insert Text)  Outcome: Progressing Towards Goal  Goal: *Nursing Goal (Insert Text)  Outcome: Progressing Towards Goal  Goal: Interventions  Outcome: Progressing Towards Goal     Problem: Patient Education: Go to Patient Education Activity  Goal: Patient/Family Education  Outcome: Progressing Towards Goal     Problem: Patient Education: Go to Patient Education Activity  Goal: Patient/Family Education  Description: Pt/caregiver will verbalize  understanding of OT recommendations regarding ADL status, functional transfer status, home safety, DME, AE, energy conservation techniques, safety awareness, activity tolerance, and/or follow-up therapy to increase safety with functional tasks upon discharge. Outcome: Progressing Towards Goal     Problem: Patient Education: Go to Patient Education Activity  Goal: Patient/Family Education  Description: Patient / Patient's family will verbalize understanding of PT safety recommendations, demonstrate appropriate assist for current functional mobility status, safety, and home exercise program by time of discharge.    Outcome: Progressing Towards Goal

## 2021-01-05 NOTE — PROGRESS NOTES
OT Daily Note  Time In 1008   Time Out 1109     Subjective: \"I like to play solitaire on my iPad. \" Pt was agreeable to tx. Pain: Patient had no complaint of pain. Visual-Perceptual   Pt completed a 25 piece large jigsaw puzzle to increase UE strength, visual perceptual skills, coordination, and problem solving. Pt completed puzzle with MAX assistance. Unable to independently initiate assembling puzzle to complete task. Pt completed a 9 piece visual perceptual shape puzzle to increase UE strength, visual perceptual skills, coordination, and problem solving. Pt completed puzzle with min assistance. Pt required verbal cues to initiate task. Cognition   Using Pt's personal iPad, Pt required assistance to navigate to locate the [employer]. Pt was I to engage in x4 games with good game play. Pt was given verbal directions on how to locate the game after exiting out. Education   Benefits of OT, Technology Education (iPad). Interdisciplinary Communication: Communicated with PT on handoff. Summary of session: Pt demonstrated good participation in session. Pt has significant deficits in visual perceptual skills. Pt would benefit from continued work to address visual perceptual skills and cognition. Pt was left in w/c with PT for session in the gym. Plan: Continue with [facility].      Nova Parr MS OTR/L  1/5/2021

## 2021-01-05 NOTE — PROGRESS NOTES
PHYSICAL THERAPY DAILY NOTE  Time In: (P) 1359  Time Out: (P) 1446  Patient Seen For: (P) PM;Transfer training;Gait training; Therapeutic exercise; Other (see progress notes)    Subjective: Patient had no complaints. Objective: No pain noted. Asked patient s wife to bring his shoes. Seizure, Other (comment)(falls)  GROSS ASSESSMENT Daily Assessment            COGNITION Daily Assessment           BED/MAT MOBILITY Daily Assessment    Supine to Sit : (P) 4 (Minimal assistance)  Sit to Supine : (P) 4 (Minimal assistance)       TRANSFERS Daily Assessment    Transfer Type: (P) SPT without device  Transfer Assistance : (P) 4 (Minimal assistance)  Sit to Stand Assistance: (P) Contact guard assistance  Car Transfers: (P) Not tested       GAIT Daily Assessment    Amount of Assistance: (P) 3 (Moderate assistance)  Distance (ft): (P) 50 Feet (ft)  Assistive Device: (P) Walker, rolling       STEPS or STAIRS Daily Assessment    Level of Assist : (P) 0 (Not tested)       BALANCE Daily Assessment            WHEELCHAIR MOBILITY Daily Assessment            LOWER EXTREMITY EXERCISES Daily Assessment    Extremity: (P) Both  Exercise Type #1: (P) Other (comment)(standing exs)  Sets Performed: (P) 2  Reps Performed: (P) 5  Level of Assist: (P) Moderate assistance  Exercise Type #2: (P) Other (comment)(nustep x 10 minutes)  Sets Performed: (P) 1  Reps Performed: (P) 10  Level of Assist: (P) Stand-by assistance          Assessment: Patient making progress with mobility. Plan of Care: Continue with plan of care.     Toan Steel, PTA  1/5/2021

## 2021-01-05 NOTE — PROGRESS NOTES
PHYSICAL THERAPY DAILY NOTE  Time In: (P) 0955  Time Out: (P) 1006  Patient Seen For: (P) AM;Transfer training;Gait training; Therapeutic exercise; Other (see progress notes)    Subjective: Patient had no complaints. Objective: No pain noted. Seizure, Other (comment)(falls)  GROSS ASSESSMENT Daily Assessment            COGNITION Daily Assessment           BED/MAT MOBILITY Daily Assessment    Supine to Sit : (P) 5 (Stand-by assistance)  Sit to Supine : (P) 5 (Supervision)       TRANSFERS Daily Assessment    Transfer Type: (P) SPT without device  Transfer Assistance : (P) 3 (Moderate assistance )  Sit to Stand Assistance: (P) Contact guard assistance  Car Transfers: (P) Not tested       GAIT Daily Assessment    Amount of Assistance: (P) 4 (Minimal assistance)  Distance (ft): (P) 50 Feet (ft)  Assistive Device: (P) Walker, rolling       STEPS or STAIRS Daily Assessment    Level of Assist : (P) 0 (Not tested)       BALANCE Daily Assessment            WHEELCHAIR MOBILITY Daily Assessment            LOWER EXTREMITY EXERCISES Daily Assessment    Extremity: (P) Both  Exercise Type #1: (P) Supine lower extremity strengthening  Sets Performed: (P) 2  Reps Performed: (P) 10  Level of Assist: (P) Minimal assistance  Exercise Type #2: (P) Other (comment)(balance exs)  Sets Performed: (P) 1  Reps Performed: (P) 5  Level of Assist: (P) Moderate assistance          Assessment: Patient making good progress . He doesn't realize his deficits. Plan of Care: Continue with plan of care.     Castillo Corral, PTA  1/5/2021

## 2021-01-05 NOTE — PROGRESS NOTES
Jennifer Escoto MD  Medical Director  [address] [facility], [address] [facility]  Tel: [phone]       [facility] PROGRESS NOTE    Tamara Claros [facility]  Admit Date: 1/4/2021  Admit Diagnosis:   Brain mass [G93.89]    Subjective     Patient seen and examined. No headache, cp, sob,n.  Slept fairly well    Objective:     Current Facility-Administered Medications   Medication Dose Route Frequency    alum-mag hydroxide-simeth (MYLANTA) oral suspension 30 mL  30 mL Oral Q4H PRN    amLODIPine (NORVASC) tablet 10 mg  10 mg Oral DAILY    carboxymethylcellulose sodium (REFRESH LIQUIGEL) 1 % ophthalmic solution 1 Drop  1 Drop Both Eyes QID PRN    dexAMETHasone (DECADRON) tablet 4 mg  4 mg Oral Q6H    furosemide (LASIX) tablet 20 mg  20 mg Oral DAILY    insulin lispro (HUMALOG) injection   SubCUTAneous AC&HS    levETIRAcetam (KEPPRA) oral solution 1,000 mg  1,000 mg Oral Q12H    losartan (COZAAR) tablet 50 mg  50 mg Oral DAILY    magnesium hydroxide (MILK OF MAGNESIA) 400 mg/5 mL oral suspension 30 mL  30 mL Oral DAILY PRN    ondansetron (ZOFRAN ODT) tablet 4 mg  4 mg Oral Q6H PRN    pantoprazole (PROTONIX) tablet 40 mg  40 mg Oral ACB    senna-docusate (PERICOLACE) 8.6-50 mg per tablet 1 Tab  1 Tab Oral DAILY    traZODone (DESYREL) tablet 25 mg  25 mg Oral QHS PRN    acetaminophen (TYLENOL) tablet 650 mg  650 mg Oral Q6H PRN    Or    acetaminophen (TYLENOL) suppository 650 mg  650 mg Rectal Q6H PRN    polyethylene glycol (MIRALAX) packet 17 g  17 g Oral DAILY PRN    sodium chloride (NS) flush 5-40 mL  5-40 mL IntraVENous PRN    naloxone (NARCAN) injection 0.4 mg  0.4 mg IntraVENous PRN    sertraline (ZOLOFT) tablet 150 mg  150 mg Oral DAILY    HYDROcodone-acetaminophen (NORCO) 5-325 mg per tablet 1 Tab  1 Tab Oral Q4H PRN       Review of Systems:   Denies chest pain, shortness of breath, cough, headache, visual problems, abdominal pain, dysuria, calf pain. Pertinent positives are as noted in the HPI, ROS unremarkable otherwise. Visit Vitals  /63   Pulse 63   Temp 97.7 °F (36.5 °C)   Resp 16   SpO2 97%        Physical Exam:   General: Alert and age appropriately oriented. Affect blunted  No acute cardiorespiratory distress. HEENT: Normocephalic, no scleral icterus. Oral mucosa moist without cyanosis. Scalp inc c/d/i   Lungs: Clear to auscultation bilaterally. Respiration even and unlabored. Heart: Regular rate and rhythm, S1, S2. No murmurs, clicks, rub or gallops. Abdomen: Soft, non-tender, not distended. Bowel sounds normoactive. No organomegaly. Genitourinary: Deferred. Neuromuscular:      Non focal motor  Able to fcs, name objects. Conversation appropriate but would perseverate ; slow processing, speech delayed  Sensation intact  PERRLA,EOMI     Skin/extremity: No rashes, no erythema. No calf tenderness B LE. Scalp inc c/d/i; sutures intact, no swelling                                                                              Functional Assessment:          Balance  Sitting - Static: Good (unsupported) (01/04/21 1500)  Sitting - Dynamic: Good (unsupported) (01/04/21 1500)  Standing - Static: Fair (01/04/21 1500)                     [place] Flow Fall Risk Assessment:  [place] Flow Fall Risk  Mobility: Ambulates or transfers with assist devices or assistance (01/04/21 1333)  Mobility Interventions: Bed/chair exit alarm; Patient to call before getting OOB;Utilize walker, cane, or other assistive device (01/04/21 1333)  Mentation: Periodic confusion (01/04/21 1333)  Mentation Interventions: Bed/chair exit alarm; Door open when patient unattended;Family/sitter at bedside; More frequent rounding; Toileting rounds (01/04/21 1333)  Medication: Patient receiving anticonvulsants, sedatives(tranquilizers), psychotropics or hypnotics, hypoglycemics, narcotics, sleep aids, antihypertensives, laxatives, or diuretics (01/04/21 1333)  Medication Interventions: Bed/chair exit alarm; Patient to call before getting OOB (01/04/21 1333)  Elimination: Needs assistance with toileting (01/04/21 1333)  Elimination Interventions: Call light in reach; Patient to call for help with toileting needs; Toileting schedule/hourly rounds (01/04/21 1333)  Prior Fall History: Before admission in past 12 months _home or previous inpatient care) (01/04/21 1333)  History of Falls Interventions: Bed/chair exit alarm; Door open when patient unattended (01/04/21 1333)  Total Score: 5 (01/04/21 1333)  High Fall Risk: Yes (01/04/21 1333)     Speech Assessment:         Ambulation:  Gait  Distance (ft): 50 Feet (ft) (01/04/21 1500)  Assistive Device: Other (comment)(no A/D) (01/04/21 1500)     Labs/Studies:  Recent Results (from the past 72 hour(s))   GLUCOSE, POC    Collection Time: 01/02/21  6:58 AM   Result Value Ref Range    Glucose (POC) 123 (H) 65 - 100 mg/dL   GLUCOSE, POC    Collection Time: 01/02/21 11:21 AM   Result Value Ref Range    Glucose (POC) 149 (H) 65 - 100 mg/dL   GLUCOSE, POC    Collection Time: 01/02/21  4:36 PM   Result Value Ref Range    Glucose (POC) 149 (H) 65 - 100 mg/dL   GLUCOSE, POC    Collection Time: 01/02/21  9:09 PM   Result Value Ref Range    Glucose (POC) 150 (H) 65 - 100 mg/dL   CBC W/O DIFF    Collection Time: 01/03/21  7:11 AM   Result Value Ref Range    WBC 19.2 (H) 4.3 - 11.1 K/uL    RBC 4.32 4.23 - 5.6 M/uL    HGB 13.5 (L) 13.6 - 17.2 g/dL    HCT 39.6 (L) 41.1 - 50.3 %    MCV 91.7 79.6 - 97.8 FL    MCH 31.3 26.1 - 32.9 PG    MCHC 34.1 31.4 - 35.0 g/dL    RDW 12.3 11.9 - 14.6 %    PLATELET 896 811 - 208 K/uL    MPV 10.8 9.4 - 12.3 FL    ABSOLUTE NRBC 0.00 0.0 - 0.2 K/uL   METABOLIC PANEL, BASIC    Collection Time: 01/03/21  7:11 AM   Result Value Ref Range    Sodium 137 (L) 138 - 145 mmol/L    Potassium 3.9 3.5 - 5.1 mmol/L    Chloride 105 98 - 107 mmol/L    CO2 25 21 - 32 mmol/L    Anion gap 7 7 - 16 mmol/L    Glucose 156 (H) 65 - 100 mg/dL    BUN 25 (H) 8 - 23 MG/DL    Creatinine 1.09 0.8 - 1.5 MG/DL    GFR est AA >60 >60 ml/min/1.73m2    GFR est non-AA >60 >60 ml/min/1.73m2    Calcium 8.7 8.3 - 10.4 MG/DL   GLUCOSE, POC    Collection Time: 01/03/21  7:25 AM   Result Value Ref Range    Glucose (POC) 140 (H) 65 - 100 mg/dL   GLUCOSE, POC    Collection Time: 01/03/21 11:14 AM   Result Value Ref Range    Glucose (POC) 173 (H) 65 - 100 mg/dL   GLUCOSE, POC    Collection Time: 01/03/21  3:58 PM   Result Value Ref Range    Glucose (POC) 92 65 - 100 mg/dL   GLUCOSE, POC    Collection Time: 01/03/21  9:27 PM   Result Value Ref Range    Glucose (POC) 150 (H) 65 - 100 mg/dL   GLUCOSE, POC    Collection Time: 01/04/21  7:17 AM   Result Value Ref Range    Glucose (POC) 134 (H) 65 - 100 mg/dL   GLUCOSE, POC    Collection Time: 01/04/21 10:57 AM   Result Value Ref Range    Glucose (POC) 142 (H) 65 - 100 mg/dL   CBC W/O DIFF    Collection Time: 01/05/21  4:19 AM   Result Value Ref Range    WBC 15.3 (H) 4.3 - 11.1 K/uL    RBC 4.20 (L) 4.23 - 5.6 M/uL    HGB 13.1 (L) 13.6 - 17.2 g/dL    HCT 38.3 (L) 41.1 - 50.3 %    MCV 91.2 79.6 - 97.8 FL    MCH 31.2 26.1 - 32.9 PG    MCHC 34.2 31.4 - 35.0 g/dL    RDW 12.4 11.9 - 14.6 %    PLATELET 488 926 - 677 K/uL    MPV 11.0 9.4 - 12.3 FL    ABSOLUTE NRBC 0.00 0.0 - 0.2 K/uL       Assessment:     Problem List as of 1/5/2021 Date Reviewed: 9/28/2020          Codes Class Noted - Resolved    Brain mass ICD-10-CM: G93.89  ICD-9-CM: 348.89  1/4/2021 - Present        Cerebral edema ([address] ICD-10-CM: G93.6  ICD-9-CM: 348.5  12/31/2020 - Present        Seizures ([facility] ICD-10-CM: R56.9  ICD-9-CM: 780.39  12/28/2020 - Present        Essential hypertension (Chronic) ICD-10-CM: I10  ICD-9-CM: 401.9  12/26/2020 - Present        Brain tumor ([facility] ICD-10-CM: D49.6  ICD-9-CM: 239.6  12/26/2020 - Present        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  ICD-9-CM: 348.89  12/26/2020 - Present        ICH (intracerebral hemorrhage) ([facility] ICD-10-CM: I61.9  ICD-9-CM: 278  12/25/2020 - Present        History of lumbar laminectomy for spinal cord decompression ICD-10-CM: Z98.890  ICD-9-CM: V45.89  2/18/2019 - Present        Idiopathic scoliosis ICD-10-CM: M41.20  ICD-9-CM: 737.30  6/7/2018 - Present        HTN (hypertension), benign ICD-10-CM: I10  ICD-9-CM: 401.1  2/15/2017 - Present        Rosacea ICD-10-CM: L71.9  ICD-9-CM: 695.3  2/15/2017 - Present        MEGGAN (generalized anxiety disorder) ICD-10-CM: F41.1  ICD-9-CM: 300.02  12/9/2014 - Present        Arthritis ICD-10-CM: M19.90  ICD-9-CM: 716.90  12/9/2014 - Present        Idiopathic chronic gout of right ankle ICD-10-CM: M1A.0710  ICD-9-CM: 274.02  12/9/2014 - Present        Mixed hyperlipidemia ICD-10-CM: E78.2  ICD-9-CM: 272.2  12/9/2014 - Present        Primary insomnia ICD-10-CM: F51.01  ICD-9-CM: 307.42  12/9/2014 - Present        Panic disorder ICD-10-CM: F41.0  ICD-9-CM: 300.01  12/9/2014 - Present        RESOLVED: Acute renal failure (ARF) ([facility] ICD-10-CM: N17.9  ICD-9-CM: 584.9  12/26/2020 - 12/28/2020        RESOLVED: Recurrent depression ([facility] ICD-10-CM: F33.9  ICD-9-CM: 296.30  8/16/2018 - 8/19/2019        RESOLVED: Lumbar stenosis with neurogenic claudication ICD-10-CM: M48.062  ICD-9-CM: 724.03  6/7/2018 - 9/28/2020        RESOLVED: Depression ICD-10-CM: F32.9  ICD-9-CM: 396  12/9/2014 - 2/15/2017               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 Cont norvasc 10mg daily, Lasix 20mg daily, Cozaar 50mg daily  -1/5; bp 112-138/63-74; controlled    Prerenal azotemia; BUN 35; push po fluids and monitor.  Creatinine nl at 1.12     Steroid induced hyperglycemia- uncontrolled / poor glycemic control. Will require daily, close FSG monitoring and medication adjustment to optimize glycemic control in setting of acute illness and hospitalization. Currently on SSI. Will check hgbA1c, no reported hx of diabetes  -hgbA1c 6.0     Cerebral edema; s/p tumor resection. Cont Decadron 4mg po qid; slow taper     Encephalopathy; multifactorial . Improving     Right parietal mass; suspicious for a glioma. Pathology pending. Did undergo intraop placement of Gliadel (BCNU) chemotherapeutic wafers  -await pathology. Will likely need Onc/ Rad Onc Referral     Pain control - stable, mild-to-moderate joint symptoms intermittently, reasonably well controlled by PRN meds. Will require regular pain assessment and comprenhensive pain management, cont tylenol and prn norco; long standing hx of chronic low back pain due to spinal stenosis     Pneumonia prophylaxis - incentive spirometer every hour while awake.     DVT risk / DVT prophylaxis - will require daily physician / PA exam to assess for signs and symptoms as patient is at increased risk for of thromboembolism. Mobilize as tolerated. Sequential pneumatic compression devices (SCDs) when in bed; thigh-high or knee-high thromboembolic deterrent hose when out of bed. No OAC or Sub cut lovenox/heparin due to Crani and hemorrhage     Leukocytosis; likely steroid induced. No active fever or s/s of infxn; will monitor.      Hx of depression - previously on Zoloft. At higher risk of  Depression and further anxiety due to diagnosis and hospitalization. Prn klonopin if needed. Coping well. Wife supportive     General skin care / wound prevention - monitor incision and general skin wound status daily per staff and physician / PA. At risk for failure. Will require 24/7 rehab nursing. Keep wound clean and dry;scalp incision looks great/sutures/dry/no swelling or erythema     Urinary retention / neurogenic bladder - schedule voids q 6-8 hrs.  Check post-void residual as needed; in and out catheter if post-void residual is more than 400ml.     Bowel program - at risk for constipation as a side effect of opioids, iron and calcium supplements and other meds. MiraLAX daily for regularity, Brigida-Colace for stool softener. PRN MOM, bisacodyl suppository or tablets for constipation.     GERD - resume PPI. At times may need additional antacids, Maalox prn.       Time spent was 25 minutes with over 1/2 in direct patient care/examination, consultation and coordination of care.      Signed By: Namrata Covarrubias MD     January 5, 2021

## 2021-01-05 NOTE — PROGRESS NOTES
STG: Patient will answer orientation questions with use of visual aids with 90% accuracy  STG: Patient will answer moderately complex before/after yes/no questions with 90% accuracy  STG: Patient will complete basic level visual/verbal reasoning tasks with 80% accuracy and min-mod cues  STG: Patient will effectively use compensatory memory strategies to recall pertinent information with 80% accuracy and min-mod cues  STG: Patient will follow multi-step directions with 80% accuracy and min-mod cues  LTG: Pt will reach highest cognitive function for maximum independence at discharge    [address]     01/05/21 1016   Time Spent With Patient   Time In 0836   Time Out 0915   Mental Status   Neurologic State Alert   Orientation Level Oriented to person;Disoriented to place; Disoriented to time;Oriented to situation   Cognition Memory loss; Impaired decision making;Decreased command following;Decreased attention/concentration   Perseveration Perseverates during conversation        01/05/21 1017   Verbal Expression   Primary Mode of Expression Verbal   Initiation Impaired (%)   Automatic Speech Task No impairment   Repetition Impaired   Naming No impairment   FIM Score (Verbal Expression) 5   Oral-Motor Structure/Motor Speech   Labial No impairment   Dentition Upper & lower dentures   Lingual No impairment   Apraxic Characteristics None   Neuro-Linguistics/Cognitive Function   Reasoning  [place] reasoning;Mental flexibility; Executive function;Deductive reasoning   Problem Solving Simple; Functional   FIM Score (Problem Solving) 2   Mathematics Subtraction, simple   Memory  Short-term   FIM Score (Memory) 2   Pragmatics   FIM Score (Pragmatics/Social Interaction) 4        01/05/21 1020   PO Trials   Assessment Method(s) Observation   Patient Position upright in chair   Consistency Presented Solid; Thin liquid;Puree   How Presented Cup/sip; Self-fed/presented   Bolus Acceptance No impairment   Bolus Formation/Control Impaired   Type of Impairment Delayed;Mastication  (minimal)   Propulsion No impairment   Oral Residue None   Initiation of Swallow No impairment   Aspiration Signs/Symptoms None   Pharyngeal Phase Characteristics No impairment, issues, or problems    Findings and Recommendations   Oral Phase Severity Minimal   Pharyngeal Phase Severity  No impairment   Treatment Diagnosis   Treatment Diagnosis dysphagia; oropharyngeal R13.12     Patient participated with a bedside swallowing assessment and cognitive assessment with the SLUMS examination. Patient s/p two modified barium swallow study (MBS) while in acute setting on 12/29 and 1/4 both of which were within functional limits and regular textures/thin liquids was recommended. Study on 1/4 as follows: Mr. Michelle Rod presents with swallows of all textures within functional limits with no laryngeal penetration or aspiration observed with any swallow. Recommend regular diet and thin liquids. Give meds whole with liquid or floated in puree as needed. Patient observed with his breakfast tray. Intermittent throat clearing observed which was also present during the cognitive assessment and did not appear attributable to swallowing function. Mild increased mastication time with solid trials but with complete oral clearance. Patient declined use of the straw simply stating he preferred not to use one. No overt signs/sx of aspiration with cup sips of thin liquids or meds whole in puree. Patient presented with hearing loss but also delayed processing throughout the session. Overwhelmed when kitchen staff came into the room to take his order. ST presented choices two at a time slowly and encouraged the patient to let staff know if he needs information repeated or presented more slowly.   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 Oriented to month but decreased recall of year, his age, or name of hospital.  Recalls \"they did surgery on my brain\". Overall score of 12/30 on SLUMS examination with a norm score of greater than 26. Decreased immediate and short-term recall, impaired basic reasoning for clock drawing writing numbers ,4,8, 9, and 12 only. Decreased mental manipulation to repeat digits in reverse order or complete functional basic calculations. Recommend continue regular textures/thin liquids. No swallowing tx indicated based on MBS. Continued ST to address cognitive function indicated.     Fabiola Lee MS, CCC-SLP

## 2021-01-05 NOTE — PROGRESS NOTES
BSN, CM attempted to call pt but pt prefers CM to speak with wife to go over admission as he still feels \"slow\" per nursing staff. Contacted wife Rylie Riley, via phone. Wife verified demographic information. PCP verified as Dr. Lion Thornton and pt was last seen by PCP in August. Pt lives in 1 story home with wife, 4 steps to enter into the home. Pt was independent with ADLs and driving prior to admission. Pt  has DMEs such as BP cuff, can, and shower bench. Pt family able transport home, to doctor apt,  medications, and get groceries. Pt primary pharmacy is [facility] on [address] [facility]. Pt  able to afford medications. Pt has availability for family to be at home with pt 24 hours as needed. Pt plans to discharge home. Pt has never used HH or been to SNF. Pt has HCPOA and it is up to date. Wife aware of Wednesday Team meetings and would like to be updated after meetings. CM to continue to follow and monitor for any needs that may occur. Care Management Interventions  PCP Verified by CM: Yes(Dr. Starks)  Mode of Transport at Discharge:  Other (see comment)(family )  Transition of Care Consult (CM Consult): Discharge Planning  Discharge Durable Medical Equipment: No  Physical Therapy Consult: Yes  Occupational Therapy Consult: Yes  Speech Therapy Consult: Yes  Current Support Network: Own Home, Lives with Spouse  Confirm Follow Up Transport: Family  The Plan for Transition of Care is Related to the Following Treatment Goals : Return to baseline   [employer] Provided?: No  Discharge Location  Discharge Placement: Home with home health(anticipated plan )

## 2021-01-06 PROCEDURE — 74011636637 HC RX REV CODE- 636/637: Performed by: PHYSICAL MEDICINE & REHABILITATION

## 2021-01-06 PROCEDURE — 74011250637 HC RX REV CODE- 250/637: Performed by: PHYSICAL MEDICINE & REHABILITATION

## 2021-01-06 PROCEDURE — 92507 TX SP LANG VOICE COMM INDIV: CPT

## 2021-01-06 PROCEDURE — 99232 SBSQ HOSP IP/OBS MODERATE 35: CPT | Performed by: PHYSICIAN ASSISTANT

## 2021-01-06 PROCEDURE — 97116 GAIT TRAINING THERAPY: CPT

## 2021-01-06 PROCEDURE — 65310000000 HC RM PRIVATE REHAB

## 2021-01-06 PROCEDURE — 51798 US URINE CAPACITY MEASURE: CPT

## 2021-01-06 PROCEDURE — 97110 THERAPEUTIC EXERCISES: CPT

## 2021-01-06 PROCEDURE — 74011250636 HC RX REV CODE- 250/636: Performed by: PHYSICAL MEDICINE & REHABILITATION

## 2021-01-06 PROCEDURE — 97535 SELF CARE MNGMENT TRAINING: CPT

## 2021-01-06 PROCEDURE — 97530 THERAPEUTIC ACTIVITIES: CPT

## 2021-01-06 RX ADMIN — INSULIN LISPRO 2 UNITS: 100 INJECTION, SOLUTION INTRAVENOUS; SUBCUTANEOUS at 12:09

## 2021-01-06 RX ADMIN — SERTRALINE HYDROCHLORIDE 150 MG: 50 TABLET ORAL at 09:20

## 2021-01-06 RX ADMIN — DEXAMETHASONE 4 MG: 4 TABLET ORAL at 18:22

## 2021-01-06 RX ADMIN — PANTOPRAZOLE SODIUM 40 MG: 40 TABLET, DELAYED RELEASE ORAL at 04:37

## 2021-01-06 RX ADMIN — LOSARTAN POTASSIUM 50 MG: 50 TABLET, FILM COATED ORAL at 09:21

## 2021-01-06 RX ADMIN — DEXAMETHASONE 4 MG: 4 TABLET ORAL at 23:05

## 2021-01-06 RX ADMIN — LEVETIRACETAM 1000 MG: 500 SOLUTION ORAL at 20:51

## 2021-01-06 RX ADMIN — FUROSEMIDE 20 MG: 20 TABLET ORAL at 09:20

## 2021-01-06 RX ADMIN — DOCUSATE SODIUM 50MG AND SENNOSIDES 8.6MG 1 TABLET: 8.6; 5 TABLET, FILM COATED ORAL at 09:20

## 2021-01-06 RX ADMIN — AMLODIPINE BESYLATE 10 MG: 5 TABLET ORAL at 09:21

## 2021-01-06 RX ADMIN — TRAZODONE HYDROCHLORIDE 25 MG: 50 TABLET ORAL at 01:31

## 2021-01-06 RX ADMIN — INSULIN LISPRO 4 UNITS: 100 INJECTION, SOLUTION INTRAVENOUS; SUBCUTANEOUS at 18:19

## 2021-01-06 RX ADMIN — DEXAMETHASONE 4 MG: 4 TABLET ORAL at 12:09

## 2021-01-06 RX ADMIN — LEVETIRACETAM 1000 MG: 500 SOLUTION ORAL at 09:19

## 2021-01-06 RX ADMIN — DEXAMETHASONE 4 MG: 4 TABLET ORAL at 05:00

## 2021-01-06 NOTE — PROGRESS NOTES
PHYSICAL THERAPY DAILY NOTE  Time In: (P) 1335  Time Out: (P) 1422  Patient Seen For: (P) PM;Transfer training;Gait training; Therapeutic exercise; Other (see progress notes)    Subjective: Patient had no complaints. Objective: No pain noted. Seizure, Other (comment)(falls)  GROSS ASSESSMENT Daily Assessment            COGNITION Daily Assessment           BED/MAT MOBILITY Daily Assessment    Supine to Sit : (P) 4 (Minimal assistance)  Sit to Supine : (P) 4 (Minimal assistance)       TRANSFERS Daily Assessment    Transfer Type: (P) SPT with walker  Transfer Assistance : (P) 4 (Minimal assistance)  Sit to Stand Assistance: (P) Contact guard assistance  Car Transfers: (P) Not tested       GAIT Daily Assessment    Amount of Assistance: (P) 3 (Moderate assistance)  Distance (ft): (P) 60 Feet (ft)  Assistive Device: (P) Walker, rolling;Gait belt       STEPS or STAIRS Daily Assessment    Level of Assist : (P) 0 (Not tested)       BALANCE Daily Assessment            WHEELCHAIR MOBILITY Daily Assessment            LOWER EXTREMITY EXERCISES Daily Assessment    Extremity: (P) Both  Exercise Type #1: (P) Supine lower extremity strengthening  Sets Performed: (P) 2  Reps Performed: (P) 10  Level of Assist: (P) Minimal assistance          Assessment: Patient making good progress. Wife observes afternoon therapies. Plan of Care: Continue with plan of care.     Kinsey Sarmiento, PTA  1/6/2021

## 2021-01-06 NOTE — PROGRESS NOTES
OT Daily Note    Time In 0703   Time Out 0745     Subjective: \"I think I slept pretty good. \" Pt was agreeable to tx. Pain: Pt reported no pain. Mobility   Score Comments   Rolling 6: Independent Side: Bilateral   Supine to Sit 5: S/U or clean-up assist    Sit to Stand 4: Supervision or touching A S   Transfer Assist 4: Supervision or touching A Transfer Type: SPT   Equipment: NA      Activities of Daily Living    Score Comments   Bathing 4: Supervision or touching A Type of Shower: Shower  Position: Supported sitting   Adaptive  Equipment: Tub Transfer Bench, Grab Bars and W/C  Comments: S in seated, Therapist cleaned incision with soap and water and dried. Upper Body  Dressing 5: S/U or clean-up assist Items Applied: Pullover  Position: Supported Sitting   Lower Body Dressing 4: Supervision or touching A Items Applied: Underwear and Elastic pants  Position: Supported sitting and Standing PRN  Adaptive Equipment: RW  Comments: A to don underwear with correct orientation. Donning/[place] Footwear 5: S/U or clean-up assist Items Applied: Socks  Adaptive Equipment: N/A   Toilet Transfer 4: Supervision or touching A Transfer Type: SPT   Equipment: Grab Bars   Comments: [address] [facility] Hygiene 4: Supervision or touching A Output: Urine  Comments: S in standing   Education  Benefits of OT, Incision care. Interdisciplinary Communication: Team Conference  Summary of Session: Pt demonstrated good participation in OT tasks during this session. Pt's performance with ADL is reflected in above chart. Pt was left in w/c with call bell within reach and all needs met. Plan: Continue per OT POC.        Negin Avery MS OTR/L  1/6/2021

## 2021-01-06 NOTE — PROGRESS NOTES
01/06/21 1253   Time Spent With Patient   Time In 61 23 68   Time Out 0915   Patient Seen For: AM   Mental Status   Neurologic State Alert   Orientation Level Disoriented X4   Cognition Decreased command following;Decreased attention/concentration; Impaired decision making   Safety/Judgement Home safety;Decreased awareness of need for safety        01/06/21 1255   Auditory Comprehension    Auditory Impairment Yes   Two-Step Basic Commands (%) 50 %  (basic writing task)   Interfering Components Attention - sustained; Attention to detail;Processing speed; Working memory   Effective Techniques Extra processing time; Increased volume; Slowed speech   Exercise/Activities Tolerance   Exercise Tolerance/Response Cueing required         01/06/21 1256   Neuro-Linguistic Exercises   Verbal Problem Solving Impaired; simple cause/effect completed with 60% accuracy  Decreased reasoning to request blanket when cold initially stating he needed to get back in the bed to warm up   Memory Impaired  (visual cues to recall orientation questions)  Answered with 85% accuracy with use of visual aids     Patient participated with basic cognitive treatment. Following simple 2 step directions with basic writing task completed with 50% accuracy. Decreased processing and working memory are primary barriers. Patient provided with visual aid to assist with answering orientation questions. Answered with 85% accuracy with use of visual aid. Simple cause/effect completed with 60% accuracy. Decreased reasoning to request blanket when cold initially stating he needed to get back in the bed to warm up. Re-iterated need to initiate with staff when there is a need for clarification, repetition, increased volume/reduced speech. Recommend continued ST to address basic auditory processing, attention, reasoning, and short-term memory.     Luis Lux MS, CCC-SLP

## 2021-01-06 NOTE — PROGRESS NOTES
PHYSICAL THERAPY DAILY NOTE  Time In: (P) 1058  Time Out: (P) 1202  Patient Seen For: (P) AM;Transfer training;Gait training; Therapeutic exercise; Other (see progress notes)    Subjective: Patient had no complaints. Objective: NO pain noted. Seizure, Other (comment)(falls)  GROSS ASSESSMENT Daily Assessment            COGNITION Daily Assessment           BED/MAT MOBILITY Daily Assessment    Supine to Sit : (P) 4 (Minimal assistance)  Sit to Supine : (P) 4 (Minimal assistance)       TRANSFERS Daily Assessment    Transfer Type: (P) SPT with walker  Transfer Assistance : (P) 4 (Minimal assistance)  Sit to Stand Assistance: (P) Minimal assistance  Car Transfers: (P) Not tested       GAIT Daily Assessment    Amount of Assistance: (P) 3 (Moderate assistance)  Distance (ft): (P) 50 Feet (ft)  Assistive Device: (P) Walker, rolling;Gait belt       STEPS or STAIRS Daily Assessment    Level of Assist : (P) 0 (Not tested)       BALANCE Daily Assessment            WHEELCHAIR MOBILITY Daily Assessment            LOWER EXTREMITY EXERCISES Daily Assessment    Extremity: (P) Both  Exercise Type #1: (P) Other (comment)(motomed x 10 minutes)  Sets Performed: (P) 1  Reps Performed: (P) 10  Level of Assist: (P) Supervision          Assessment: Patient making good progress. Plan of Care: Continue with plan of care.     Kulwinder Kate, ADONIS  1/6/2021

## 2021-01-06 NOTE — PROGRESS NOTES
OT Daily Note  Time In 1303   Time Out 1332     Subjective: Pt was agreeable to tx. Pain: Patient had no complaint of pain. Visual-Perceptual   Pt engaged with the C/ [place] 29 Test-C/D to improve visual perceptual skills and cognitive skills to utilize during ADLs and transfers. Pt demonstrated good ability to initiate task after verbal directions were given. Education   Benefits of OT     Interdisciplinary Communication: Team Conference  Summary of session: Pt demonstrated good participation in session. Pt demonstrated improvement with ability to complete the [employer]. Pt was left in w/c with PT for session in the gym. Plan: Continue with MAHSA.      Johnna Peng MS OTR/L  1/6/2021

## 2021-01-06 NOTE — PROGRESS NOTES
Team conference today with discharge deferred. BSN, SILVANA met with pt and wife at bedside and updated that discharge is deferred at this time. Pt slow to respond but pleasant. CM to continue to follow and monitor for any further needs.

## 2021-01-06 NOTE — PROGRESS NOTES
OT Daily Note  Time In 0926   Time Out 1012     Subjective: \"Did my wife bring these clothes? \" Pt was agreeable to tx. Pain: Patient had no complaint of pain. Score Comments   Toilet Transfer 4: Supervision or touching A Transfer Type: SPT   Equipment: Grab Bars   Comments: [place] [address] 4: Supervision or touching A Output: Urine  Comments: SBA in standing. Self-Care   While seated in w/c at sink, Pt brushed teeth and dentures. He required a verbal cue to don dentures after completing mouth hygiene. Strengthening/Visual Perceptual Skills   Pt completed a 24 piece jigsaw puzzle to increase UE strength, visual perceptual skills, coordination, and problem solving. Verbal cue to initiate task. Pt completed puzzle with MIN assistance. Pt completed the following exercises with 5 lb veronica to promote UB strength, activity tolerance, and shoulder stabilization for integration into ADLs and Transfers:  Exercise Reps Comments   Protraction/Retraction 20    Circles 10 CW & CCW   Vs 10    Pt demonstrated good quality during all exercises. Education   Benefits of OT     Interdisciplinary Communication: Team Conference  Summary of session: Pt demonstrated good participation in session. Pt continues to benefit from visual perceptual skill intervention. Pt continues to require verbal cues to intiate tasks. Pt was left in w/c with call bell within reach and all needs met. Plan: Continue with MAHSA.      Nora Lord MS OTR/L  1/6/2021

## 2021-01-06 NOTE — PROGRESS NOTES
Janny Olguin MD  Medical Director  [address] [facility], [address] [facility]  Tel: [phone]       [facility] PROGRESS NOTE    [facility]  Admit Date: 1/4/2021  Admit Diagnosis:   Brain mass [G93.89]    Subjective     Patient seen and examined in AM briefly in PT, later in presence of wife. Admits to intermittent HA, reports anterior R LE pain immediately post-op but none since. Has no complaints, sleeping well, appetite good, no voiding / BM issues. Participating well in therapy.      Objective:     Current Facility-Administered Medications   Medication Dose Route Frequency    alum-mag hydroxide-simeth (MYLANTA) oral suspension 30 mL  30 mL Oral Q4H PRN    amLODIPine (NORVASC) tablet 10 mg  10 mg Oral DAILY    carboxymethylcellulose sodium (REFRESH LIQUIGEL) 1 % ophthalmic solution 1 Drop  1 Drop Both Eyes QID PRN    dexAMETHasone (DECADRON) tablet 4 mg  4 mg Oral Q6H    furosemide (LASIX) tablet 20 mg  20 mg Oral DAILY    insulin lispro (HUMALOG) injection   SubCUTAneous AC&HS    levETIRAcetam (KEPPRA) oral solution 1,000 mg  1,000 mg Oral Q12H    losartan (COZAAR) tablet 50 mg  50 mg Oral DAILY    magnesium hydroxide (MILK OF MAGNESIA) 400 mg/5 mL oral suspension 30 mL  30 mL Oral DAILY PRN    ondansetron (ZOFRAN ODT) tablet 4 mg  4 mg Oral Q6H PRN    pantoprazole (PROTONIX) tablet 40 mg  40 mg Oral ACB    senna-docusate (PERICOLACE) 8.6-50 mg per tablet 1 Tab  1 Tab Oral DAILY    traZODone (DESYREL) tablet 25 mg  25 mg Oral QHS PRN    acetaminophen (TYLENOL) tablet 650 mg  650 mg Oral Q6H PRN    Or    acetaminophen (TYLENOL) suppository 650 mg  650 mg Rectal Q6H PRN    polyethylene glycol (MIRALAX) packet 17 g  17 g Oral DAILY PRN    sodium chloride (NS) flush 5-40 mL  5-40 mL IntraVENous PRN    naloxone (NARCAN) injection 0.4 mg  0.4 mg IntraVENous PRN    sertraline (ZOLOFT) tablet 150 mg  150 mg Oral DAILY    HYDROcodone-acetaminophen (NORCO) 5-325 mg per tablet 1 Tab  1 Tab Oral Q4H PRN       Review of Systems:   Denies chest pain, shortness of breath, cough, headache, visual problems, abdominal pain, dysuria, calf pain. Pertinent positives are as noted in the HPI, ROS unremarkable otherwise. Visit Vitals  /61   Pulse 68   Temp 97.6 °F (36.4 °C)   Resp 18   Wt 189 lb 12.8 oz (86.1 kg)   SpO2 100%   BMI 27.23 kg/m²        Physical Exam:   General: Alert, appropriately oriented. No acute cardiorespiratory distress. HEENT: Normocephalic, EOM intact. Oral mucosa moist.   Lungs: Clear to auscultation bilaterally. Respiration even and unlabored. Heart: Regular rate and underlying rhythm. No murmurs, clicks, rub or gallops. Abdomen: Soft, non-tender, protuberant but not distended. Bowel sounds normoactive. Genitourinary: Deferred. Neuromuscular:      No gross focal motor deficits noted. B UE strength at biceps 4+/5, triceps 4/5, finger flexion 5/5. B LE strength at HF 4/5, KE/KF 4+/5, DF/PF 5/5. No sensory deficits distally. Skin/extremity: No rashes, no erythema. Calves soft, non-tender B LE. Posterior scalp incision intact with sutures, clean and dry without edema, erythema.                                                                               Functional Assessment:  Balance  Sitting - Static: Good (unsupported) (01/04/21 1500)  Sitting - Dynamic: Good (unsupported) (01/04/21 1500)  Standing - Static: Fair (01/04/21 1500)       Green [place] Fall Risk Assessment:  Green Salvia Fall Risk  Mobility: Ambulates or transfers with assist devices or assistance (01/06/21 0733)  Mobility Interventions: Bed/chair exit alarm;Utilize walker, cane, or other assistive device (01/06/21 0733)  Mentation: Periodic confusion (01/06/21 0733)  Mentation Interventions: Adequate sleep, hydration, pain control;Bed/chair exit alarm;Room close to nurse's station (01/06/21 2234)  Medication: Patient receiving anticonvulsants, sedatives(tranquilizers), psychotropics or hypnotics, hypoglycemics, narcotics, sleep aids, antihypertensives, laxatives, or diuretics (01/06/21 0733)  Medication Interventions: Patient to call before getting OOB; Teach patient to arise slowly (01/06/21 7022)  Elimination: Needs assistance with toileting (01/06/21 5276)  Elimination Interventions: Call light in reach; Patient to call for help with toileting needs; Toileting schedule/hourly rounds (01/06/21 0667)  Prior Fall History: Before admission in past 12 months _home or previous inpatient care) (01/06/21 7926)  History of Falls Interventions: Bed/chair exit alarm; Consult care management for discharge planning (01/06/21 0733)  Total Score: 5 (01/06/21 0733)  Standard Fall Precautions: Yes (01/05/21 0723)  High Fall Risk: Yes (01/06/21 0733)     Speech Assessment:  Aspiration Signs/Symptoms: None (01/05/21 1020)      Ambulation:  Gait  Distance (ft): 60 Feet (ft) (01/06/21 1657)  Assistive Device: Zoey Neelam, rolling;Gait belt (01/06/21 1657)     Labs/Studies:  Recent Results (from the past 72 hour(s))   GLUCOSE, POC    Collection Time: 01/03/21  9:27 PM   Result Value Ref Range    Glucose (POC) 150 (H) 65 - 100 mg/dL   GLUCOSE, POC    Collection Time: 01/04/21  7:17 AM   Result Value Ref Range    Glucose (POC) 134 (H) 65 - 100 mg/dL   GLUCOSE, POC    Collection Time: 01/04/21 10:57 AM   Result Value Ref Range    Glucose (POC) 142 (H) 65 - 100 mg/dL   CBC W/O DIFF    Collection Time: 01/05/21  4:19 AM   Result Value Ref Range    WBC 15.3 (H) 4.3 - 11.1 K/uL    RBC 4.20 (L) 4.23 - 5.6 M/uL    HGB 13.1 (L) 13.6 - 17.2 g/dL    HCT 38.3 (L) 41.1 - 50.3 %    MCV 91.2 79.6 - 97.8 FL    MCH 31.2 26.1 - 32.9 PG    MCHC 34.2 31.4 - 35.0 g/dL    RDW 12.4 11.9 - 14.6 %    PLATELET 958 100 - 965 K/uL    MPV 11.0 9.4 - 12.3 FL    ABSOLUTE NRBC 0.00 0.0 - 0.2 K/uL   MAGNESIUM    Collection Time: 01/05/21  4:19 AM   Result Value Ref Range    Magnesium 2.5 (H) 1.8 - 2.4 mg/dL   METABOLIC PANEL, COMPREHENSIVE    Collection Time: 01/05/21  4:19 AM   Result Value Ref Range    Sodium 141 136 - 145 mmol/L    Potassium 4.1 3.5 - 5.1 mmol/L    Chloride 108 (H) 98 - 107 mmol/L    CO2 23 21 - 32 mmol/L    Anion gap 10 7 - 16 mmol/L    Glucose 130 (H) 65 - 100 mg/dL    BUN 35 (H) 8 - 23 MG/DL    Creatinine 1.12 0.8 - 1.5 MG/DL    GFR est AA >60 >60 ml/min/1.73m2    GFR est non-AA >60 >60 ml/min/1.73m2    Calcium 8.2 (L) 8.3 - 10.4 MG/DL    Bilirubin, total 0.5 0.2 - 1.1 MG/DL    ALT (SGPT) 151 (H) 12 - 65 U/L    AST (SGOT) 53 (H) 15 - 37 U/L    Alk.  phosphatase 72 50 - 136 U/L    Protein, total 6.0 (L) 6.3 - 8.2 g/dL    Albumin 2.5 (L) 3.2 - 4.6 g/dL    Globulin 3.5 2.3 - 3.5 g/dL    A-G Ratio 0.7 (L) 1.2 - 3.5         Assessment:     Problem List as of 1/6/2021 Date Reviewed: 9/28/2020          Codes Class Noted - Resolved    Brain mass ICD-10-CM: G93.89  ICD-9-CM: 348.89  1/4/2021 - Present        Cerebral edema ([facility] ICD-10-CM: G93.6  ICD-9-CM: 348.5  12/31/2020 - Present        Seizures ([facility] ICD-10-CM: R56.9  ICD-9-CM: 780.39  12/28/2020 - Present        Essential hypertension (Chronic) ICD-10-CM: I10  ICD-9-CM: 401.9  12/26/2020 - Present        Brain tumor ([facility] ICD-10-CM: D49.6  ICD-9-CM: 239.6  12/26/2020 - Present        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  ICD-9-CM: 348.89  12/26/2020 - Present        ICH (intracerebral hemorrhage) ([facility] ICD-10-CM: I61.9  ICD-9-CM: 595  12/25/2020 - Present        History of lumbar laminectomy for spinal cord decompression ICD-10-CM: Z98.890  ICD-9-CM: V45.89  2/18/2019 - Present        Idiopathic scoliosis ICD-10-CM: M41.20  ICD-9-CM: 737.30  6/7/2018 - Present        HTN (hypertension), benign ICD-10-CM: I10  ICD-9-CM: 401.1  2/15/2017 - Present        Rosacea ICD-10-CM: L71.9  ICD-9-CM: 695.3  2/15/2017 - Present        MEGGAN (generalized anxiety disorder) ICD-10-CM: F41.1  ICD-9-CM: 300.02  12/9/2014 - Present        Arthritis ICD-10-CM: M19.90  ICD-9-CM: 716.90  12/9/2014 - Present        Idiopathic chronic gout of right ankle ICD-10-CM: M1A.0710  ICD-9-CM: 274.02  12/9/2014 - Present        Mixed hyperlipidemia ICD-10-CM: E78.2  ICD-9-CM: 272.2  12/9/2014 - Present        Primary insomnia ICD-10-CM: F51.01  ICD-9-CM: 307.42  12/9/2014 - Present        Panic disorder ICD-10-CM: F41.0  ICD-9-CM: 300.01  12/9/2014 - Present        RESOLVED: Acute renal failure (ARF) ([facility] ICD-10-CM: N17.9  ICD-9-CM: 584.9  12/26/2020 - 12/28/2020        RESOLVED: Recurrent depression ([facility] ICD-10-CM: F33.9  ICD-9-CM: 296.30  8/16/2018 - 8/19/2019        RESOLVED: Lumbar stenosis with neurogenic claudication ICD-10-CM: M48.062  ICD-9-CM: 724.03  6/7/2018 - 9/28/2020        RESOLVED: Depression ICD-10-CM: F32.9  ICD-9-CM: 851  12/9/2014 - 2/15/2017          S/p right craniotomy for right parietal lobe tumor resection, s/p intraoperative Gliadel (BCNU) chemotherapeutic wafer placement     Plan / Recommendations / Medical Decision Making:     Continue daily physician / PA medical management:    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 Did undergo intraoperative placement of Gliadel (BCNU) chemotherapeutic wafers  -await pathology. Will likely need Onc / Rad Onc referral     Seizures - management with Keppra, tolerating current dose. No post-op seizures     Cerebral edema - s/p tumor resection. Continue Decadron 4mg po QID; slow taper.     Encephalopathy - multifactorial, improving. Dysphagia - post-op feeding difficulties. Modified barium swallow 1/4 unremarkable.     Hypertension - BP controlled, fluctuating, managed medically. Continue Norvasc 10mg daily, Lasix 20mg daily, Cozaar 50mg daily. -1/5 -138/63-74; controlled  -1/6 VSS     Prerenal azotemia - BUN 35; push PO fluids and monitor. -1/5 creatinine normal at 1.12     Steroid-induced hyperglycemia - uncontrolled / poor glycemic control.  Will require daily, close FSG monitoring and medication adjustment to optimize glycemic control in setting of acute illness and hospitalization. Currently on SSI. Will check HgbA1c, no reported history of diabetes. -1/5 HgbA1c 6.0% (12/25/2020)     Leukocytosis - likely steroid-induced. No active fever or s/sx of infection; will monitor.   -1/5 WBC 15.3     Pain control - stable, mild-to-moderate joint symptoms intermittently, reasonably well controlled by PRN meds. Will require regular pain assessment and comprenhensive pain management, continue APAP and PRN Norco; long standing hx of chronic low back pain due to spinal stenosis.     Pneumonia prophylaxis - incentive spirometer every hour while awake.     DVT risk / DVT prophylaxis - will require daily physician / PA exam to assess for signs and symptoms as patient is at increased risk for of thromboembolism. Mobilize as tolerated. Sequential pneumatic compression devices (SCDs) when in bed; thigh-high or knee-high thromboembolic deterrent hose when out of bed. No OAC or SQ Lovenox / heparin due to craniotomy and hemorrhage.     Hx of depression - previously on Zoloft. At higher risk of depression and further anxiety due to diagnosis and hospitalization. PRN Klonopin if needed. Coping well. Wife supportive.     General skin care / wound prevention - monitor incision and general skin wound status daily per staff and physician / PA. At risk for failure. Will require 24/7 rehab nursing. Keep wound clean and dry; scalp incision looks great / sutures / dry / no swelling or erythema.  -1/6 posterior scalp incision intact with sutures, clean and dry without edema, erythema.     Urinary retention / neurogenic bladder - schedule voids q 6-8 hrs. Check post-void residual as needed; in and out catheter if post-void residual is more than 400ml.     Bowel program - at risk for constipation as a side effect of opioids, iron and calcium supplements and other meds.  MiraLAX daily for regularity, Brigida-Colace for stool softener. PRN MOM, bisacodyl suppository or tablets for constipation.     GERD - resume PPI. At times may need additional antacids, Maalox prn. Time spent was 25 minutes with over 1/2 in direct patient care / examination, consultation and coordination of care. Signed By: Patricia Clements PA-C    January 6, 2021      Physician Assistant with [facility]  Nohemy Rascon MD, Medical Director

## 2021-01-07 PROCEDURE — 97530 THERAPEUTIC ACTIVITIES: CPT

## 2021-01-07 PROCEDURE — 65310000000 HC RM PRIVATE REHAB

## 2021-01-07 PROCEDURE — 97535 SELF CARE MNGMENT TRAINING: CPT

## 2021-01-07 PROCEDURE — 97116 GAIT TRAINING THERAPY: CPT

## 2021-01-07 PROCEDURE — 99232 SBSQ HOSP IP/OBS MODERATE 35: CPT | Performed by: PHYSICAL MEDICINE & REHABILITATION

## 2021-01-07 PROCEDURE — 82962 GLUCOSE BLOOD TEST: CPT

## 2021-01-07 PROCEDURE — 74011636637 HC RX REV CODE- 636/637: Performed by: PHYSICAL MEDICINE & REHABILITATION

## 2021-01-07 PROCEDURE — 92610 EVALUATE SWALLOWING FUNCTION: CPT

## 2021-01-07 PROCEDURE — 74011250636 HC RX REV CODE- 250/636: Performed by: PHYSICAL MEDICINE & REHABILITATION

## 2021-01-07 PROCEDURE — 97110 THERAPEUTIC EXERCISES: CPT

## 2021-01-07 PROCEDURE — 74011250637 HC RX REV CODE- 250/637: Performed by: PHYSICAL MEDICINE & REHABILITATION

## 2021-01-07 RX ORDER — DEXAMETHASONE 4 MG/1
4 TABLET ORAL EVERY 8 HOURS
Status: DISCONTINUED | OUTPATIENT
Start: 2021-01-07 | End: 2021-01-14 | Stop reason: HOSPADM

## 2021-01-07 RX ADMIN — TRAZODONE HYDROCHLORIDE 25 MG: 50 TABLET ORAL at 21:48

## 2021-01-07 RX ADMIN — DEXAMETHASONE 4 MG: 4 TABLET ORAL at 21:48

## 2021-01-07 RX ADMIN — INSULIN LISPRO 2 UNITS: 100 INJECTION, SOLUTION INTRAVENOUS; SUBCUTANEOUS at 22:02

## 2021-01-07 RX ADMIN — FUROSEMIDE 20 MG: 20 TABLET ORAL at 08:30

## 2021-01-07 RX ADMIN — DEXAMETHASONE 4 MG: 4 TABLET ORAL at 04:36

## 2021-01-07 RX ADMIN — MAGNESIUM HYDROXIDE 30 ML: 2400 SUSPENSION ORAL at 08:29

## 2021-01-07 RX ADMIN — LEVETIRACETAM 1000 MG: 500 SOLUTION ORAL at 21:48

## 2021-01-07 RX ADMIN — DOCUSATE SODIUM 50MG AND SENNOSIDES 8.6MG 1 TABLET: 8.6; 5 TABLET, FILM COATED ORAL at 08:40

## 2021-01-07 RX ADMIN — LOSARTAN POTASSIUM 50 MG: 50 TABLET, FILM COATED ORAL at 08:29

## 2021-01-07 RX ADMIN — AMLODIPINE BESYLATE 10 MG: 5 TABLET ORAL at 08:29

## 2021-01-07 RX ADMIN — LEVETIRACETAM 1000 MG: 500 SOLUTION ORAL at 08:29

## 2021-01-07 RX ADMIN — DEXAMETHASONE 4 MG: 4 TABLET ORAL at 16:43

## 2021-01-07 RX ADMIN — PANTOPRAZOLE SODIUM 40 MG: 40 TABLET, DELAYED RELEASE ORAL at 04:36

## 2021-01-07 RX ADMIN — SERTRALINE HYDROCHLORIDE 150 MG: 50 TABLET ORAL at 08:29

## 2021-01-07 NOTE — PROGRESS NOTES
01/07/21 1058   Time Spent With Patient   Time In 0839   Time Out 0914   Patient Seen For: AM;Neuro-linguistics   Mental Status   Neurologic State Alert;Confused   Orientation Level Oriented to person;Oriented to place; Disoriented to time;Disoriented to situation   Cognition Appropriate decision making;Decreased attention/concentration; Follows commands; Impaired decision making;Memory loss   Perception Appears intact   Perseveration No perseveration noted   Safety/Judgement Fall prevention   Pt seen in room. Pt alert, cooperative, and amiable. Pt answered safety yes/no questions 11/12 with S and why/why not with 11/12 acc and min cues. Pt answered questions about orientation sheet with 75% acc and min-mod cues. Pt identified object based on description with 12/15 acc and min cues. Pt would benefit from continued ST to address his cognitive-linguistic deficits.   Steve Funes, CCC-SLP

## 2021-01-07 NOTE — PROGRESS NOTES
OT Daily Note  Time In 1032   Time Out 1120     Subjective: \"I'm not ready yet. \"  Pain: not expressed, fatigue indicated   Education: toileting self care, visual perceptual skills   Interdisciplinary Communication: with RN and PT regarding large BMs  Precautions: fall risk   Patient Vitals for the past 12 hrs:   Temp Pulse Resp BP SpO2   01/07/21 0710 97.6 °F (36.4 °C) 62 16 123/64 96 %         Mobility   Score Comments   Sit to Stand 4: Supervision or touching A Min assist   Transfer Assist 4: Supervision or touching A Transfer Type: SPT   Equipment: grab bars   Comments: CGA     Self-Care Daily Assessment    Score Comments   Toilet Transfer 4: Supervision or touching A Transfer Type: SPT   Equipment: grab bar   Comments: CGA   Toileting Hygiene 3: Partial/Moderate A Output: 2 large BMs, one at beginning of session and one at end of session   Comments: assist with first cleanup              Visual-Perceptual Daily Assessment   Pt used large pegboard to copy a design from a simple visual model to promote improvement with coordination skills and visual perceptual skills. Pt completed task in 10 minutes with x3 errors. Pt was unable to self correct errors independently. Unable to finish design due to transition back to the bathroom. Assessment: Pt able to empty bowels, notified RN, pt requiring increased time to complete toileting. Will continue to benefit from BUE coordination and visual perceptual activities for integration into daily functional activities. Plan: Continue OT POC with focus on ADL/IADL skills, functional transfers, functional mobility, coordination, strength, static and dynamic balance, and activity tolerance to maximize safety and independence with ADLs and functional transfers.      Elvin Chavez OTR/L  1/7/2021

## 2021-01-07 NOTE — PROGRESS NOTES
PHYSICAL THERAPY DAILY NOTE  Time In: (P) 0916  Time Out: (P) 1032  Patient Seen For: (P) AM;Transfer training;Gait training; Therapeutic exercise; Other (see progress notes)    Subjective: Patient smiling today. No complaints. Objective: No pain noted. Seizure, Other (comment)(falls)  GROSS ASSESSMENT Daily Assessment            COGNITION Daily Assessment           BED/MAT MOBILITY Daily Assessment    Supine to Sit : (P) 4 (Minimal assistance)  Sit to Supine : (P) 4 (Minimal assistance)       TRANSFERS Daily Assessment    Transfer Type: (P) SPT without device  Transfer Assistance : (P) 4 (Minimal assistance)  Sit to Stand Assistance: (P) Minimal assistance  Car Transfers: (P) Not tested       GAIT Daily Assessment    Amount of Assistance: (P) 4 (Minimal assistance)  Distance (ft): (P) 60 Feet (ft)  Assistive Device: (P) Gait belt       STEPS or STAIRS Daily Assessment    Level of Assist : (P) 0 (Not tested)       BALANCE Daily Assessment            WHEELCHAIR MOBILITY Daily Assessment            LOWER EXTREMITY EXERCISES Daily Assessment    Extremity: (P) Both  Exercise Type #1: (P) Supine lower extremity strengthening  Sets Performed: (P) 2  Reps Performed: (P) 10  Level of Assist: (P) Minimal assistance  Exercise Type #2: (P) Other (comment)(standing balance exs using mirror for balance and midline)  Sets Performed: (P) 2  Reps Performed: (P) 0  Level of Assist: (P) Minimal assistance          Assessment: Patient making good progress. All mobility is slow pace secondary for processing. Plan of Care: Continue with plan of care.     Sathish Heart, PTA  1/7/2021

## 2021-01-07 NOTE — PROGRESS NOTES
PHYSICAL THERAPY DAILY NOTE  Time In: (P) 1300  Time Out: (P) 1349  Patient Seen For: (P) PM;Transfer training;Gait training; Therapeutic exercise; Other (see progress notes)    Subjective: Patient had no complaints. Objective: No pain noted. Daughter and wife present . Seizure, Other (comment)(falls)  GROSS ASSESSMENT Daily Assessment            COGNITION Daily Assessment           BED/MAT MOBILITY Daily Assessment    Supine to Sit : (P) 5 (Supervision)  Sit to Supine : (P) 5 (Supervision)       TRANSFERS Daily Assessment    Transfer Type: (P) SPT without device  Transfer Assistance : (P) 4 (Minimal assistance)  Sit to Stand Assistance: (P) Contact guard assistance  Car Transfers: (P) Not tested       GAIT Daily Assessment    Amount of Assistance: (P) 4 (Minimal assistance)  Distance (ft): (P) 70 Feet (ft)  Assistive Device: (P) Gait belt       STEPS or STAIRS Daily Assessment    Level of Assist : (P) 0 (Not tested)       BALANCE Daily Assessment            WHEELCHAIR MOBILITY Daily Assessment            LOWER EXTREMITY EXERCISES Daily Assessment    Extremity: (P) Both  Exercise Type #1: (P) Other (comment)(standing exs for balance with side stepping, stepping backwards)  Sets Performed: (P) 2  Reps Performed: (P) 0  Level of Assist: (P) Moderate assistance  Exercise Type #2: Other (comment)(standing balance exs using mirror for balance and midline)  Sets Performed: 2  Reps Performed: 0  Level of Assist: Minimal assistance          Assessment: Patient making good progress. Plan of Care: Continue with plan of care.     Gokul Born, PTA  1/7/2021

## 2021-01-07 NOTE — PROGRESS NOTES
Time In 0750   Time Out 0824     Mobility   Score Comments   Supine to Sit 4: Supervision or touching A S for safety   Sit to Stand 4: Supervision or touching A CGA   Transfer Assist 4: Supervision or touching A Transfer Type: SPT   Equipment: Rolling Walker   Comments: CGA     Activities of Daily Living    Score Comments   Eating 6: Independent I   Bathing 4: Supervision or touching A Type of Shower: Shower  Position: Supported sitting   Adaptive  Equipment: Tub Transfer Bench, Grab Bars and Walker  Comments: S for safety    Upper Body  Dressing 5: S/U or clean-up assist Items Applied: Pullover  Position: Unsupported Sitting  Comments: S/U   Lower Body Dressing 4: Supervision or touching A Items Applied: Underwear and Elastic pants  Position: Standing PRN and Unsupported Sitting  Adaptive Equipment: N/A  Comments: S for safety; pt could not manage belt, so it was just pulled out   Donning/[employer] Footwear 4: Supervision or touching A Items Applied: Socks and Shoes with fasteners   Adaptive Equipment: N/A  Comments: Pt put shoes on the wrong feet   Toilet Transfer 4: Supervision or touching A Transfer Type: SPT   Equipment: Rolling Walker   Comments: [place] [address] 4: Supervision or touching A Output: Urine  Comments: CGA in standing   Education  Hand placement with transfers     Pt was in bed and agreeable to tx. Pt's performance with ADL is reflected in above chart.  Pt was left at EOB eating breakfast with all needs within reach and alarm on.     Yana Ball, OT   1/7/2021

## 2021-01-07 NOTE — PROGRESS NOTES
OT Daily Note  Time In 1121   Time Out 1200     Pain: Patient had no complaint of pain. Cognition   Pt finished simple pattern replication activity from previous session. Pt required max cueing to find mistakes and execute solutions. Pt had delayed processing and required increased time. Pt was able to remove them correctly by color one at a time with reassurance. Pt demonstrated R neglect in the activity. Education   Problem solving     Plan: Continue with POC. Pt was left in room with alarm on with all needs within reach.      Jose Brooke OTR/L  1/7/2021

## 2021-01-07 NOTE — PROGRESS NOTES
Purposeful hourly rounds completed this shift, needs met. Participated with scheduled therapies. No complaints of pain. Continent voids and bm on toilet. Bed alarm activated. No attempts to get up unassisted.

## 2021-01-08 LAB
ANION GAP SERPL CALC-SCNC: 5 MMOL/L (ref 7–16)
BUN SERPL-MCNC: 28 MG/DL (ref 8–23)
CALCIUM SERPL-MCNC: 8.2 MG/DL (ref 8.3–10.4)
CHLORIDE SERPL-SCNC: 108 MMOL/L (ref 98–107)
CO2 SERPL-SCNC: 28 MMOL/L (ref 21–32)
CREAT SERPL-MCNC: 1.12 MG/DL (ref 0.8–1.5)
GLUCOSE SERPL-MCNC: 103 MG/DL (ref 65–100)
POTASSIUM SERPL-SCNC: 4.2 MMOL/L (ref 3.5–5.1)
SODIUM SERPL-SCNC: 141 MMOL/L (ref 136–145)

## 2021-01-08 PROCEDURE — 74011636637 HC RX REV CODE- 636/637: Performed by: PHYSICAL MEDICINE & REHABILITATION

## 2021-01-08 PROCEDURE — 97535 SELF CARE MNGMENT TRAINING: CPT

## 2021-01-08 PROCEDURE — 97112 NEUROMUSCULAR REEDUCATION: CPT

## 2021-01-08 PROCEDURE — 97116 GAIT TRAINING THERAPY: CPT

## 2021-01-08 PROCEDURE — 65310000000 HC RM PRIVATE REHAB

## 2021-01-08 PROCEDURE — 74011250636 HC RX REV CODE- 250/636: Performed by: PHYSICAL MEDICINE & REHABILITATION

## 2021-01-08 PROCEDURE — 36415 COLL VENOUS BLD VENIPUNCTURE: CPT

## 2021-01-08 PROCEDURE — 97530 THERAPEUTIC ACTIVITIES: CPT

## 2021-01-08 PROCEDURE — 80048 BASIC METABOLIC PNL TOTAL CA: CPT

## 2021-01-08 PROCEDURE — 74011250637 HC RX REV CODE- 250/637: Performed by: PHYSICAL MEDICINE & REHABILITATION

## 2021-01-08 PROCEDURE — 97110 THERAPEUTIC EXERCISES: CPT

## 2021-01-08 PROCEDURE — 99232 SBSQ HOSP IP/OBS MODERATE 35: CPT | Performed by: PHYSICAL MEDICINE & REHABILITATION

## 2021-01-08 RX ADMIN — FUROSEMIDE 20 MG: 20 TABLET ORAL at 08:38

## 2021-01-08 RX ADMIN — SERTRALINE HYDROCHLORIDE 150 MG: 50 TABLET ORAL at 08:38

## 2021-01-08 RX ADMIN — TRAZODONE HYDROCHLORIDE 25 MG: 50 TABLET ORAL at 20:46

## 2021-01-08 RX ADMIN — DEXAMETHASONE 4 MG: 4 TABLET ORAL at 05:08

## 2021-01-08 RX ADMIN — PANTOPRAZOLE SODIUM 40 MG: 40 TABLET, DELAYED RELEASE ORAL at 04:49

## 2021-01-08 RX ADMIN — AMLODIPINE BESYLATE 10 MG: 5 TABLET ORAL at 08:38

## 2021-01-08 RX ADMIN — LEVETIRACETAM 1000 MG: 500 SOLUTION ORAL at 08:36

## 2021-01-08 RX ADMIN — INSULIN LISPRO 2 UNITS: 100 INJECTION, SOLUTION INTRAVENOUS; SUBCUTANEOUS at 20:59

## 2021-01-08 RX ADMIN — LOSARTAN POTASSIUM 50 MG: 50 TABLET, FILM COATED ORAL at 08:38

## 2021-01-08 RX ADMIN — DOCUSATE SODIUM 50MG AND SENNOSIDES 8.6MG 1 TABLET: 8.6; 5 TABLET, FILM COATED ORAL at 08:38

## 2021-01-08 RX ADMIN — DEXAMETHASONE 4 MG: 4 TABLET ORAL at 15:16

## 2021-01-08 RX ADMIN — LEVETIRACETAM 1000 MG: 500 SOLUTION ORAL at 20:49

## 2021-01-08 RX ADMIN — HYDROCODONE BITARTRATE AND ACETAMINOPHEN 1 TABLET: 5; 325 TABLET ORAL at 20:47

## 2021-01-08 RX ADMIN — DEXAMETHASONE 4 MG: 4 TABLET ORAL at 20:47

## 2021-01-08 NOTE — PROGRESS NOTES
Pavel Mcmullen MD  Medical Director  [address] [facility], [address]  Tel: [phone]       [facility] PROGRESS NOTE    [facility]  Admit Date: 1/4/2021  Admit Diagnosis:   Brain mass [G93.89]    Subjective     Patient seen and examined. Intermittent headaches.  No visual changes, no n/v. Sleeping well    Objective:     Current Facility-Administered Medications   Medication Dose Route Frequency    dexAMETHasone (DECADRON) tablet 4 mg  4 mg Oral Q8H    alum-mag hydroxide-simeth (MYLANTA) oral suspension 30 mL  30 mL Oral Q4H PRN    amLODIPine (NORVASC) tablet 10 mg  10 mg Oral DAILY    carboxymethylcellulose sodium (REFRESH LIQUIGEL) 1 % ophthalmic solution 1 Drop  1 Drop Both Eyes QID PRN    furosemide (LASIX) tablet 20 mg  20 mg Oral DAILY    insulin lispro (HUMALOG) injection   SubCUTAneous AC&HS    levETIRAcetam (KEPPRA) oral solution 1,000 mg  1,000 mg Oral Q12H    losartan (COZAAR) tablet 50 mg  50 mg Oral DAILY    magnesium hydroxide (MILK OF MAGNESIA) 400 mg/5 mL oral suspension 30 mL  30 mL Oral DAILY PRN    ondansetron (ZOFRAN ODT) tablet 4 mg  4 mg Oral Q6H PRN    pantoprazole (PROTONIX) tablet 40 mg  40 mg Oral ACB    senna-docusate (PERICOLACE) 8.6-50 mg per tablet 1 Tab  1 Tab Oral DAILY    traZODone (DESYREL) tablet 25 mg  25 mg Oral QHS PRN    acetaminophen (TYLENOL) tablet 650 mg  650 mg Oral Q6H PRN    Or    acetaminophen (TYLENOL) suppository 650 mg  650 mg Rectal Q6H PRN    polyethylene glycol (MIRALAX) packet 17 g  17 g Oral DAILY PRN    sodium chloride (NS) flush 5-40 mL  5-40 mL IntraVENous PRN    naloxone (NARCAN) injection 0.4 mg  0.4 mg IntraVENous PRN    sertraline (ZOLOFT) tablet 150 mg  150 mg Oral DAILY    HYDROcodone-acetaminophen (NORCO) 5-325 mg per tablet 1 Tab  1 Tab Oral Q4H PRN       Review of Systems:   Denies chest pain, shortness of breath, cough, visual problems, abdominal pain, dysuria, calf pain. Pertinent positives are as noted in the HPI, ROS unremarkable otherwise. Visit Vitals  /70 (BP 1 Location: Right arm, BP Patient Position: At rest;Supine)   Pulse 71   Temp 97.6 °F (36.4 °C)   Resp 16   Wt 189 lb (85.7 kg)   SpO2 97%   BMI 27.12 kg/m²        Physical Exam:   General: Alert and age appropriately oriented. No acute cardiorespiratory distress. HEENT: Normocephalic, no scleral icterus. Oral mucosa moist without cyanosis. Lungs: Clear to auscultation bilaterally. Respiration even and unlabored. Heart: Regular rate and rhythm, S1, S2. No murmurs, clicks, rub or gallops. Abdomen: Soft, non-tender, not distended. Bowel sounds normoactive. No organomegaly. Genitourinary: Deferred. Neuromuscular:      No gross focal motor deficits noted. Generalized weakness, prox>distal  Processing speed /concentration/attn impaired  Speech hoarse  Impaired decision making   Skin/extremity: No rashes, no erythema. No calf tenderness B LE. Right crani inc c/d/i sutures intact  No edema                                                                              Functional Assessment:          Balance  Sitting - Static: Good (unsupported) (01/04/21 1500)  Sitting - Dynamic: Good (unsupported) (01/04/21 1500)  Standing - Static: Fair (01/04/21 1500)                     Taco Patel Fall Risk Assessment:  Taco Patel Fall Risk  Mobility: Ambulates or transfers with assist devices or assistance (01/07/21 2022)  Mobility Interventions: Bed/chair exit alarm; Patient to call before getting OOB; Strengthening exercises (ROM-active/passive); Utilize walker, cane, or other assistive device (01/07/21 2022)  Mentation: Periodic confusion (01/07/21 2022)  Mentation Interventions: Adequate sleep, hydration, pain control;Bed/chair exit alarm; Door open when patient unattended; Increase mobility; Room close to nurse's station;Eyeglasses and hearing aids; Familiar objects from home (01/07/21 2022)  Medication: Patient receiving anticonvulsants, sedatives(tranquilizers), psychotropics or hypnotics, hypoglycemics, narcotics, sleep aids, antihypertensives, laxatives, or diuretics (01/07/21 2022)  Medication Interventions: Patient to call before getting OOB; Teach patient to arise slowly (01/07/21 2022)  Elimination: Needs assistance with toileting (01/07/21 2022)  Elimination Interventions: Call light in reach; Patient to call for help with toileting needs;Stay With Me (per policy); Toileting schedule/hourly rounds;Urinal in reach (01/07/21 2022)  Prior Fall History: Before admission in past 12 months _home or previous inpatient care) (01/07/21 2022)  History of Falls Interventions: Bed/chair exit alarm; Door open when patient unattended;Room close to nurse's station;Consult care management for discharge planning (01/07/21 2022)  Total Score: 5 (01/07/21 2022)  Standard Fall Precautions: Yes (01/05/21 0723)  High Fall Risk: Yes (01/07/21 2022)     Speech Assessment:  Aspiration Signs/Symptoms: None (01/05/21 1020)      Ambulation:  Gait  Distance (ft): 70 Feet (ft) (01/07/21 1513)  Assistive Device: Gait belt (01/07/21 1513)     Labs/Studies:  No results found for this or any previous visit (from the past 72 hour(s)).     Assessment:     Problem List as of 1/8/2021 Date Reviewed: 9/28/2020          Codes Class Noted - Resolved    Brain mass ICD-10-CM: G93.89  ICD-9-CM: 348.89  1/4/2021 - Present        Cerebral edema ([facility] ICD-10-CM: G93.6  ICD-9-CM: 348.5  12/31/2020 - Present        Seizures (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  ICD-9-CM: 348.89  12/26/2020 - Present        ICH (intracerebral hemorrhage) ([facility] ICD-10-CM: I61.9  ICD-9-CM: 863  12/25/2020 - Present        History of lumbar laminectomy for spinal cord decompression ICD-10-CM: Z98.890  ICD-9-CM: V45.89  2/18/2019 - Present        Idiopathic scoliosis ICD-10-CM: M41.20  ICD-9-CM: 737.30  6/7/2018 - Present        HTN (hypertension), benign ICD-10-CM: I10  ICD-9-CM: 401.1  2/15/2017 - Present        Rosacea ICD-10-CM: L71.9  ICD-9-CM: 695.3  2/15/2017 - Present        MEGGAN (generalized anxiety disorder) ICD-10-CM: F41.1  ICD-9-CM: 300.02  12/9/2014 - Present        Arthritis ICD-10-CM: M19.90  ICD-9-CM: 716.90  12/9/2014 - Present        Idiopathic chronic gout of right ankle ICD-10-CM: M1A.0710  ICD-9-CM: 274.02  12/9/2014 - Present        Mixed hyperlipidemia ICD-10-CM: E78.2  ICD-9-CM: 272.2  12/9/2014 - Present        Primary insomnia ICD-10-CM: F51.01  ICD-9-CM: 307.42  12/9/2014 - Present        Panic disorder ICD-10-CM: F41.0  ICD-9-CM: 300.01  12/9/2014 - Present        RESOLVED: Acute renal failure (ARF) ([facility] ICD-10-CM: N17.9  ICD-9-CM: 584.9  12/26/2020 - 12/28/2020        RESOLVED: Recurrent depression ([facility] ICD-10-CM: F33.9  ICD-9-CM: 296.30  8/16/2018 - 8/19/2019        RESOLVED: Lumbar stenosis with neurogenic claudication ICD-10-CM: M48.062  ICD-9-CM: 724.03  6/7/2018 - 9/28/2020        RESOLVED: Depression ICD-10-CM: F32.9  ICD-9-CM: 325  12/9/2014 - 2/15/2017                   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 Did undergo intraoperative placement of Gliadel (BCNU) chemotherapeutic wafers  -await pathology. Will likely need Onc / Rad Onc referral; 1/8 pathology still pending  -Na 141     Seizures - management with Keppra, tolerating current dose. No post-op seizures  -1/5 LFTs sltly increased; recheck 1/8; not done.  Only bmp, add LFTs on     Cerebral edema - s/p tumor resection. Continue Decadron 4mg po QID; slow taper. -1/7 will dec Decadron to 4mg q 8hrs     Encephalopathy - multifactorial, improving.     Dysphagia - post-op feeding difficulties. Modified barium swallow 1/4 unremarkable.     Hypertension - BP controlled, fluctuating, managed medically. Continue Norvasc 10mg daily, Lasix 20mg daily, Cozaar 50mg daily. -1/5 BP 112-138/63-74; controlled  -1/8 VSS     Prerenal azotemia - BUN 35; push PO fluids and monitor. -1/5 creatinine normal at 1.12  -1/7 recheck in a.m.  -1/8 BUN 28 (35), creat stable , unchanged, at 1.12; cont to push po fluids     Steroid-induced hyperglycemia - uncontrolled / poor glycemic control. Will require daily, close FSG monitoring and medication adjustment to optimize glycemic control in setting of acute illness and hospitalization. Currently on SSI. Will check HgbA1c, no reported history of diabetes. -1/5 HgbA1c 6.0% (12/25/2020)     Leukocytosis - likely steroid-induced. No active fever or s/sx of infection; will monitor.   -1/5 WBC 15.3; recheck 1/8; Pending     Pain control - stable, mild-to-moderate joint symptoms intermittently, reasonably well controlled by PRN meds. Will require regular pain assessment and comprenhensive pain management, continue APAP and PRN Norco; long standing hx of chronic low back pain due to spinal stenosis. -1/7 no specific complaints. Intermittent headache, stiff lower back. No radiating pain     Pneumonia prophylaxis - incentive spirometer every hour while awake.     DVT risk / DVT prophylaxis - will require daily physician / PA exam to assess for signs and symptoms as patient is at increased risk for of thromboembolism. Mobilize as tolerated.  Sequential pneumatic compression devices (SCDs) when in bed; thigh-high or knee-high thromboembolic deterrent hose when out of bed. No OAC or SQ Lovenox / heparin due to craniotomy and hemorrhage.     Hx of depression - previously on Zoloft. At higher risk of depression and further anxiety due to diagnosis and hospitalization. PRN Klonopin if needed. Coping well. Wife supportive.  -cont Zoloft 150mg daily. Coping well     General skin care / wound prevention - monitor incision and general skin wound status daily per staff and physician / PA. At risk for failure. Will require 24/7 rehab nursing. Keep wound clean and dry; scalp incision looks great / sutures / dry / no swelling or erythema.  -1/6 posterior scalp incision intact with sutures, clean and dry without edema, erythema. DC sutures 1/12     Urinary retention / neurogenic bladder - schedule voids q 6-8 hrs. Check post-void residual as needed; in and out catheter if post-void residual is more than 400ml.     Bowel program - at risk for constipation as a side effect of opioids, iron and calcium supplements and other meds. MiraLAX daily for regularity, Brigida-Colace for stool softener. PRN MOM, bisacodyl suppository or tablets for constipation.  -continent     GERD - resume PPI. At times may need additional antacids, Maalox prn.        Time spent was 25 minutes with over 1/2 in direct patient care/examination, consultation and coordination of care.      Signed By: Kristi Phillips MD     January 8, 2021

## 2021-01-08 NOTE — PROGRESS NOTES
OT Daily Note  Time In 1316   Time Out 1402     Subjective: \"I'm pretty tired. \" Pt was agreeable to tx. Pain: Patient had no complaint of pain. Functional Mobility      Score Comments   Sit to Supine 6: Independent    Sit to Stand 4: Supervision or touching A S   Transfer Assist 4: Supervision or touching A Transfer Type: SPT   Equipment: N/A         Self-Care   Pt was I to doff shoes. A to unbuckle belt and I to doff pants. Visual-Perceptual/Coordination   Pt used large foam pegboard to copy a design from a model to promote improvement with coordination skills and visual perceptual skills. Pt completed task with >15 errors. Pt was unable to self correct errors independently. Pt engaged with medium (red) putty with x20 items to find and remove to increase hand strength. Pt demonstrated good quality during activity. Education   Benefits of OT     Interdisciplinary Communication: Communicated to RN about Pt being in bed resting. Summary of session: Pt demonstrated good participation in session. Pt demonstrated good hand strength and attention to putty task during the session. Pt was left in bed with call bell within reach, POSEY alarm on, and all needs met. Plan: Continue with POC.      Beverly Evans MS OTR/L  1/8/2021

## 2021-01-08 NOTE — PROGRESS NOTES
PHYSICAL THERAPY DAILY NOTE  Time In: 9694  Time Out: 8037  Patient Seen For: AM;Balance activities;Gait training;Patient education; Therapeutic exercise;Transfer training; Other (see progress notes)    Subjective: Patient reporting he feels OK. Reports no dizziness and no headache. Reports several steps with single hand rail at garage entrance         [facility] /63 at beginning of treatment  Patient Vitals for the past 12 hrs:   Temp Pulse Resp BP SpO2   01/08/21 0745 98.1 °F (36.7 °C) 71 16 127/70 97 %     Pain level:No c/o pain during treatment  Pain location:NA  Pain interventions:NA    Patient education:,transfer training, gait training, balance training,fall precautions, body mechanics,activity pacing, Patient verbalizing understanding and demonstrating partial understanding of patient education. Recommend follow up education. Interdisciplinary Communication:spoke with OT regarding functional status. Seizure, Other (comment)(falls)  GROSS ASSESSMENT Daily Assessment     NA       COGNITION Daily Assessment    Orientation:A+O x 2  Communication:able to express needs verbally, able to follow single step commands with multiple and frequent cues  Social Interaction:cooperating, appropriate with PT, participating, motivated to improve  Problem Solving:difficulty with managing body mechanics during functional mobility with delayed processing and motor planning deficits  Memory:multiple cues to recall safe body mechanics during functional mobility           BED/MAT MOBILITY Daily Assessment    Rolling Right : 0 (Not tested)  Rolling Left : 0 (Not tested)  Supine to Sit : 0 (Not tested)  Sit to Supine : 0 (Not tested)       TRANSFERS Daily Assessment   Increased time and effort to complete with cues for body mechanics   Transfer Type: SPT without device  Other: Min assist with SPT w/c<>commode using grab bar.  Able to manage lower body clothing and min assist and multiple cues  Transfer Assistance : 4 (Minimal assistance)  Sit to Stand Assistance: Contact guard assistance  Car Transfers: Not tested       GAIT Daily Assessment   Gait training with cues for improved gait speed step clearance and step length. Amount of Assistance: 4 (Minimal assistance)  Distance (ft): 80 Feet (ft)(80 ft x 2 with gait belt, 80 ft x 2 with hand rail)  Assistive Device: Gait belt(hand rail)   Gait training x 20 ft x 2 in figure 8 pattern around bolsters with min assist and multiple cues to make multiple turns    Gait training x 20 ft x 2 making 180 degree turn after 10 ft with min assist and multiple cues to complet turn    STEPS or STAIRS Daily Assessment   Slow reciprocating pattern going up steps and single step at a time going down steps  with increased time and effort to complete. Cues for safe foot placement and improved step clearance   Steps/Stairs Ambulated (#): 8(4 steps x 1 with DARYL rails, 4 steps x 1 with single rail)  Level of Assist : 4 (Minimal assistance)  Rail Use: Both(on one attempt, one on second attempt)     Gait training up/down 3 inch step  X 2 with left hand held assist with min assist with multiple cues for gait sequence. BALANCE Daily Assessment   Static standing balance activities during toileting with SBA to CGA and cues for balance control while managing lower body clothing    Dynamic standing balance activities during gait training with min to CGA with cues to control Sitting - Static: Good (unsupported)  Sitting - Dynamic: Fair (occasional)  Standing - Static: Fair  Standing - Dynamic : Impaired       WHEELCHAIR MOBILITY Daily Assessment    Curbs/Ramps Assist Required (FIM Score): 0 (Not tested)  Wheelchair Setup Assist Required : 0 (Not tested)       LOWER EXTREMITY EXERCISES Daily Assessment   Increased time and effort to complete with multiple and frequent rest breaks.  Cues for correct form   Extremity: Both  Exercise Type #1: Standing lower extremity strengthening(heel raises,hip ABD, marching,mini squats)  Sets Performed: 1  Reps Performed: 10  Level of Assist: Minimal assistance(with Single UE support on railing)          Assessment: Progressing towards goals. Gait balance and distance slowly improving. Patient demonstrating delayed processing with motor planning deficits requiring  Multiple and frequent cues to complete functional activity. Patient to OT at end of treatment    Plan of Care: Continue with POC and progress as tolerated.      Po Ivory, PT  1/8/2021

## 2021-01-08 NOTE — PROGRESS NOTES
OT Daily Note  Time In 1001   Time Out 1054     Subjective: Pt was agreeable to tx. Pain: Patient had no complaint of pain. Functional Mobility      Score Comments   Sit to Stand 4: Supervision or touching A S   Transfer Assist 4: Supervision or touching A Transfer Type: SPT   Equipment: N/A   Comments: SBA         Score Comments   Toilet Transfer 4: Supervision or touching A Transfer Type: SPT   Equipment: N/A   Comments: SBA   Toileting Hygiene 4: Supervision or touching A Output: Urine  Comments: S       Strengthening   Pt completed reaching, strengthening, activity tolerance, and bilateral hand coordination task utilizing fishes while moving fish. Pt with good coordination skills noted while moving fish. Pt with good reaching skills as well. Pt completed activity in seated position at table top. Verbal cues to continue to engage in task. Pt completed the following exercises with 8 lb veronica to promote UB strength, activity tolerance, and shoulder stabilization for integration into ADLs and Transfers:  Exercise Reps Comments   Protraction/Retraction 12    Circles 12 CW, CCW   Pt demonstrated good quality during all exercises. Coordination   Pt engaged in game of TableNOW with playing cards while seated at the table. Pt demonstrated good game play and ability to play game. Pt required occasional cue to move piles to complete game. Education   Benefits of OT     Interdisciplinary Communication: Communicated with RN about posey alarm  Summary of session: Pt demonstrated good participation in session. Pt demonstrated good strength in UE during the session. Pt was left in recliner with call bell within reach, POSEY alarm on, and all needs met. Plan: Continue with POC.      Ugo Mancera MS OTR/L  1/8/2021

## 2021-01-09 LAB
ALBUMIN SERPL-MCNC: 2.6 G/DL (ref 3.2–4.6)
ALBUMIN/GLOB SERPL: 0.8 {RATIO} (ref 1.2–3.5)
ALP SERPL-CCNC: 107 U/L (ref 50–136)
ALT SERPL-CCNC: 69 U/L (ref 12–65)
AST SERPL-CCNC: 15 U/L (ref 15–37)
BILIRUB DIRECT SERPL-MCNC: 0.1 MG/DL
BILIRUB SERPL-MCNC: 0.4 MG/DL (ref 0.2–1.1)
ERYTHROCYTE [DISTWIDTH] IN BLOOD BY AUTOMATED COUNT: 12.1 % (ref 11.9–14.6)
GLOBULIN SER CALC-MCNC: 3.1 G/DL (ref 2.3–3.5)
HCT VFR BLD AUTO: 35.9 % (ref 41.1–50.3)
HGB BLD-MCNC: 12.2 G/DL (ref 13.6–17.2)
MCH RBC QN AUTO: 30.9 PG (ref 26.1–32.9)
MCHC RBC AUTO-ENTMCNC: 34 G/DL (ref 31.4–35)
MCV RBC AUTO: 90.9 FL (ref 79.6–97.8)
NRBC # BLD: 0 K/UL (ref 0–0.2)
PLATELET # BLD AUTO: 190 K/UL (ref 150–450)
PMV BLD AUTO: 10.4 FL (ref 9.4–12.3)
PROT SERPL-MCNC: 5.7 G/DL (ref 6.3–8.2)
RBC # BLD AUTO: 3.95 M/UL (ref 4.23–5.6)
WBC # BLD AUTO: 16.3 K/UL (ref 4.3–11.1)

## 2021-01-09 PROCEDURE — 97110 THERAPEUTIC EXERCISES: CPT

## 2021-01-09 PROCEDURE — 74011250637 HC RX REV CODE- 250/637: Performed by: PHYSICAL MEDICINE & REHABILITATION

## 2021-01-09 PROCEDURE — 85027 COMPLETE CBC AUTOMATED: CPT

## 2021-01-09 PROCEDURE — 99232 SBSQ HOSP IP/OBS MODERATE 35: CPT | Performed by: PHYSICAL MEDICINE & REHABILITATION

## 2021-01-09 PROCEDURE — 82962 GLUCOSE BLOOD TEST: CPT

## 2021-01-09 PROCEDURE — 97116 GAIT TRAINING THERAPY: CPT

## 2021-01-09 PROCEDURE — 36415 COLL VENOUS BLD VENIPUNCTURE: CPT

## 2021-01-09 PROCEDURE — 74011250636 HC RX REV CODE- 250/636: Performed by: PHYSICAL MEDICINE & REHABILITATION

## 2021-01-09 PROCEDURE — 74011636637 HC RX REV CODE- 636/637: Performed by: PHYSICAL MEDICINE & REHABILITATION

## 2021-01-09 PROCEDURE — 97530 THERAPEUTIC ACTIVITIES: CPT

## 2021-01-09 PROCEDURE — 65310000000 HC RM PRIVATE REHAB

## 2021-01-09 PROCEDURE — 80076 HEPATIC FUNCTION PANEL: CPT

## 2021-01-09 RX ADMIN — TRAZODONE HYDROCHLORIDE 25 MG: 50 TABLET ORAL at 21:11

## 2021-01-09 RX ADMIN — DOCUSATE SODIUM 50MG AND SENNOSIDES 8.6MG 1 TABLET: 8.6; 5 TABLET, FILM COATED ORAL at 08:49

## 2021-01-09 RX ADMIN — HYDROCODONE BITARTRATE AND ACETAMINOPHEN 1 TABLET: 5; 325 TABLET ORAL at 21:11

## 2021-01-09 RX ADMIN — DEXAMETHASONE 4 MG: 4 TABLET ORAL at 21:11

## 2021-01-09 RX ADMIN — LOSARTAN POTASSIUM 50 MG: 50 TABLET, FILM COATED ORAL at 08:50

## 2021-01-09 RX ADMIN — LEVETIRACETAM 1000 MG: 500 SOLUTION ORAL at 21:12

## 2021-01-09 RX ADMIN — DEXAMETHASONE 4 MG: 4 TABLET ORAL at 04:52

## 2021-01-09 RX ADMIN — PANTOPRAZOLE SODIUM 40 MG: 40 TABLET, DELAYED RELEASE ORAL at 04:52

## 2021-01-09 RX ADMIN — SERTRALINE HYDROCHLORIDE 150 MG: 50 TABLET ORAL at 08:52

## 2021-01-09 RX ADMIN — AMLODIPINE BESYLATE 10 MG: 5 TABLET ORAL at 08:49

## 2021-01-09 RX ADMIN — INSULIN LISPRO 2 UNITS: 100 INJECTION, SOLUTION INTRAVENOUS; SUBCUTANEOUS at 21:15

## 2021-01-09 RX ADMIN — DEXAMETHASONE 4 MG: 4 TABLET ORAL at 15:25

## 2021-01-09 RX ADMIN — FUROSEMIDE 20 MG: 20 TABLET ORAL at 08:50

## 2021-01-09 RX ADMIN — LEVETIRACETAM 1000 MG: 500 SOLUTION ORAL at 08:51

## 2021-01-09 NOTE — PROGRESS NOTES
Belle Paz MD  Medical Director  [address] [facility], [address] [facility]  Tel: [phone]       [facility] PROGRESS NOTE    Manjeet Velazquez [facility]  Admit Date: 1/4/2021  Admit Diagnosis:   Brain mass [G93.89]    Subjective     Patient seen and examined. Intermittent headaches. No visual changes, no n/v. Sleeping well. Patient seen and examined. Improved balance with gait during therapy. Ambulating without AD. Denies HA, pain, lightheadedness, SOB or nausea. Participating in therapy.     Objective:     Current Facility-Administered Medications   Medication Dose Route Frequency    dexAMETHasone (DECADRON) tablet 4 mg  4 mg Oral Q8H    alum-mag hydroxide-simeth (MYLANTA) oral suspension 30 mL  30 mL Oral Q4H PRN    amLODIPine (NORVASC) tablet 10 mg  10 mg Oral DAILY    carboxymethylcellulose sodium (REFRESH LIQUIGEL) 1 % ophthalmic solution 1 Drop  1 Drop Both Eyes QID PRN    furosemide (LASIX) tablet 20 mg  20 mg Oral DAILY    insulin lispro (HUMALOG) injection   SubCUTAneous AC&HS    levETIRAcetam (KEPPRA) oral solution 1,000 mg  1,000 mg Oral Q12H    losartan (COZAAR) tablet 50 mg  50 mg Oral DAILY    magnesium hydroxide (MILK OF MAGNESIA) 400 mg/5 mL oral suspension 30 mL  30 mL Oral DAILY PRN    ondansetron (ZOFRAN ODT) tablet 4 mg  4 mg Oral Q6H PRN    pantoprazole (PROTONIX) tablet 40 mg  40 mg Oral ACB    senna-docusate (PERICOLACE) 8.6-50 mg per tablet 1 Tab  1 Tab Oral DAILY    traZODone (DESYREL) tablet 25 mg  25 mg Oral QHS PRN    acetaminophen (TYLENOL) tablet 650 mg  650 mg Oral Q6H PRN    Or    acetaminophen (TYLENOL) suppository 650 mg  650 mg Rectal Q6H PRN    polyethylene glycol (MIRALAX) packet 17 g  17 g Oral DAILY PRN    sodium chloride (NS) flush 5-40 mL  5-40 mL IntraVENous PRN    naloxone (NARCAN) injection 0.4 mg  0.4 mg IntraVENous PRN    sertraline (ZOLOFT) tablet 150 mg  150 mg Oral DAILY    HYDROcodone-acetaminophen (NORCO) 5-325 mg per tablet 1 Tab  1 Tab Oral Q4H PRN       Review of Systems:   Denies chest pain, shortness of breath, cough, visual problems, abdominal pain, dysuria, calf pain. Pertinent positives are as noted in the HPI, ROS unremarkable otherwise. Visit Vitals  /66 (BP 1 Location: Left arm)   Pulse 66   Temp 98.1 °F (36.7 °C)   Resp 16   Wt 85.7 kg (189 lb)   SpO2 98%   BMI 27.12 kg/m²        Physical Exam:   General: Alert and age appropriately oriented. No acute cardiorespiratory distress. HEENT: Normocephalic, no scleral icterus. Oral mucosa moist without cyanosis. Lungs: Clear to auscultation bilaterally. Respiration even and unlabored. Heart: Regular rate and rhythm, S1, S2. No murmurs, clicks, rub or gallops. Abdomen: Soft, non-tender, not distended. Bowel sounds normoactive. No organomegaly. Genitourinary: Deferred. Neuromuscular:      No gross focal motor deficits noted. Generalized weakness, prox>distal  Processing speed /concentration/attn impaired  Speech hoarse  Impaired decision making   Skin/extremity: No rashes, no erythema. No calf tenderness B LE.   Right crani inc c/d/i sutures intact  No edema                                                                              Functional Assessment:          Balance  Sitting - Static: Good (unsupported) (01/08/21 1000)  Sitting - Dynamic: Fair (occasional) (01/08/21 1000)  Standing - Static: Fair (01/08/21 1000)  Standing - Dynamic : Impaired (01/08/21 1000)         Allan Plascencia Fall Risk Assessment:  Allan Plascencia Fall Risk  Mobility: Ambulates or transfers with assist devices or assistance (01/09/21 0062)  Mobility Interventions: Bed/chair exit alarm (01/09/21 2302)  Mentation: Periodic confusion (01/09/21 0828)  Mentation Interventions: Adequate sleep, hydration, pain control (01/09/21 0828)  Medication: Patient receiving anticonvulsants, sedatives(tranquilizers), psychotropics or hypnotics, hypoglycemics, narcotics, sleep aids, antihypertensives, laxatives, or diuretics (01/09/21 1545)  Medication Interventions: Bed/chair exit alarm;Evaluate medications/consider consulting pharmacy (01/09/21 1831)  Elimination: Needs assistance with toileting (01/09/21 5094)  Elimination Interventions: Bed/chair exit alarm;Call light in reach (01/09/21 0828)  Prior Fall History: Before admission in past 12 months _home or previous inpatient care) (01/09/21 9905)  History of Falls Interventions: Bed/chair exit alarm (01/09/21 0828)  Total Score: 5 (01/09/21 0828)  Standard Fall Precautions: Yes (01/05/21 0723)  High Fall Risk: Yes (01/09/21 9900)     Speech Assessment:  Aspiration Signs/Symptoms: None (01/05/21 1020)      Ambulation:  Gait  Distance (ft): 80 Feet (ft)(80 ft x 2 with gait belt, 80 ft x 2 with hand rail) (01/08/21 1000)  Assistive Device: Gait belt(hand rail) (01/08/21 1000)  Rail Use: Both(on one attempt, one on second attempt) (01/08/21 1000)     Labs/Studies:  Recent Results (from the past 72 hour(s))   METABOLIC PANEL, BASIC    Collection Time: 01/08/21  4:45 AM   Result Value Ref Range    Sodium 141 136 - 145 mmol/L    Potassium 4.2 3.5 - 5.1 mmol/L    Chloride 108 (H) 98 - 107 mmol/L    CO2 28 21 - 32 mmol/L    Anion gap 5 (L) 7 - 16 mmol/L    Glucose 103 (H) 65 - 100 mg/dL    BUN 28 (H) 8 - 23 MG/DL    Creatinine 1.12 0.8 - 1.5 MG/DL    GFR est AA >60 >60 ml/min/1.73m2    GFR est non-AA >60 >60 ml/min/1.73m2    Calcium 8.2 (L) 8.3 - 10.4 MG/DL   HEPATIC FUNCTION PANEL    Collection Time: 01/09/21  4:46 AM   Result Value Ref Range    Protein, total 5.7 (L) 6.3 - 8.2 g/dL    Albumin 2.6 (L) 3.2 - 4.6 g/dL    Globulin 3.1 2.3 - 3.5 g/dL    A-G Ratio 0.8 (L) 1.2 - 3.5      Bilirubin, total 0.4 0.2 - 1.1 MG/DL    Bilirubin, direct 0.1 <0.4 MG/DL    Alk.  phosphatase 107 50 - 136 U/L    AST (SGOT) 15 15 - 37 U/L    ALT (SGPT) 69 (H) 12 - 65 U/L   CBC W/O DIFF    Collection Time: 01/09/21  4:46 AM   Result Value Ref Range    WBC 16.3 (H) 4.3 - 11.1 K/uL    RBC 3.95 (L) 4.23 - 5.6 M/uL    HGB 12.2 (L) 13.6 - 17.2 g/dL    HCT 35.9 (L) 41.1 - 50.3 %    MCV 90.9 79.6 - 97.8 FL    MCH 30.9 26.1 - 32.9 PG    MCHC 34.0 31.4 - 35.0 g/dL    RDW 12.1 11.9 - 14.6 %    PLATELET 045 119 - 478 K/uL    MPV 10.4 9.4 - 12.3 FL    ABSOLUTE NRBC 0.00 0.0 - 0.2 K/uL       Assessment:     Problem List as of 1/9/2021 Date Reviewed: 9/28/2020          Codes Class Noted - Resolved    Brain mass ICD-10-CM: G93.89  ICD-9-CM: 348.89  1/4/2021 - Present        Cerebral edema (HCC) ICD-10-CM: G93.6  ICD-9-CM: 348.5  12/31/2020 - Present        Seizures ([facility] ICD-10-CM: R56.9  ICD-9-CM: 780.39  12/28/2020 - Present        Essential hypertension (Chronic) ICD-10-CM: I10  ICD-9-CM: 401.9  12/26/2020 - Present        Brain tumor ([facility] ICD-10-CM: D49.6  ICD-9-CM: 239.6  12/26/2020 - Present        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  ICD-9-CM: 348.89  12/26/2020 - Present        ICH (intracerebral hemorrhage) ([facility] ICD-10-CM: I61.9  ICD-9-CM: 528  12/25/2020 - Present        History of lumbar laminectomy for spinal cord decompression ICD-10-CM: Z98.890  ICD-9-CM: V45.89  2/18/2019 - Present        Idiopathic scoliosis ICD-10-CM: M41.20  ICD-9-CM: 737.30  6/7/2018 - Present        HTN (hypertension), benign ICD-10-CM: I10  ICD-9-CM: 401.1  2/15/2017 - Present        Rosacea ICD-10-CM: L71.9  ICD-9-CM: 695.3  2/15/2017 - Present        MEGGAN (generalized anxiety disorder) ICD-10-CM: F41.1  ICD-9-CM: 300.02  12/9/2014 - Present        Arthritis ICD-10-CM: M19.90  ICD-9-CM: 716.90  12/9/2014 - Present        Idiopathic chronic gout of right ankle ICD-10-CM: M1A.0710  ICD-9-CM: 274.02  12/9/2014 - Present        Mixed hyperlipidemia ICD-10-CM: E78.2  ICD-9-CM: 272.2  12/9/2014 - Present        Primary insomnia ICD-10-CM: F51.01  ICD-9-CM: 307.42  12/9/2014 - Present        Panic disorder ICD-10-CM: F41.0  ICD-9-CM: 300.01  12/9/2014 - Present RESOLVED: Acute renal failure (ARF) (HCC) ICD-10-CM: N17.9  ICD-9-CM: 584.9  12/26/2020 - 12/28/2020        RESOLVED: Recurrent depression ([facility] ICD-10-CM: F33.9  ICD-9-CM: 296.30  8/16/2018 - 8/19/2019        RESOLVED: Lumbar stenosis with neurogenic claudication ICD-10-CM: M48.062  ICD-9-CM: 724.03  6/7/2018 - 9/28/2020        RESOLVED: Depression ICD-10-CM: F32.9  ICD-9-CM: 824  12/9/2014 - 2/15/2017                   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 Did undergo intraoperative placement of Gliadel (BCNU) chemotherapeutic wafers  -await pathology. Will likely need Onc / Rad Onc referral; 1/8 pathology still pending  -Na 141     Seizures - management with Keppra, tolerating current dose. No post-op seizures  -1/5 LFTs sltly increased; recheck 1/8; not done. Only bmp, add LFTs on  - 1/9  > 69, trending down, AST - 15 now wnml     Cerebral edema - s/p tumor resection. Continue Decadron 4mg po QID; slow taper. -1/7 will dec Decadron to 4mg q 8hrs     Encephalopathy - multifactorial, improving.     Dysphagia - post-op feeding difficulties. Modified barium swallow 1/4 unremarkable.     Hypertension - BP controlled, fluctuating, managed medically. Continue Norvasc 10mg daily, Lasix 20mg daily, Cozaar 50mg daily. -1/5 BP 112-138/63-74; controlled  -1/8 VSS  - 1/9 HR and BP acceptable     Prerenal azotemia - BUN 35; push PO fluids and monitor. -1/5 creatinine normal at 1.12  -1/7 recheck in a.m.  -1/8 BUN 28 (35), creat stable , unchanged, at 1.12; cont to push po fluids     Steroid-induced hyperglycemia - uncontrolled / poor glycemic control.  Will require daily, close FSG monitoring and medication adjustment to optimize glycemic control in setting of acute illness and hospitalization. Currently on SSI. Will check HgbA1c, no reported history of diabetes. -1/5 HgbA1c 6.0% (12/25/2020)     Leukocytosis - likely steroid-induced. No active fever or s/sx of infection; will monitor.   -1/5 WBC 15.3; recheck 1/8; Pending  - 1/9 wbc - 16.3, afebrile     Pain control - stable, mild-to-moderate joint symptoms intermittently, reasonably well controlled by PRN meds. Will require regular pain assessment and comprenhensive pain management, continue APAP and PRN Norco; long standing hx of chronic low back pain due to spinal stenosis. -1/7 no specific complaints. Intermittent headache, stiff lower back. No radiating pain     Pneumonia prophylaxis - incentive spirometer every hour while awake.     DVT risk / DVT prophylaxis - will require daily physician / PA exam to assess for signs and symptoms as patient is at increased risk for of thromboembolism. Mobilize as tolerated. Sequential pneumatic compression devices (SCDs) when in bed; thigh-high or knee-high thromboembolic deterrent hose when out of bed. No OAC or SQ Lovenox / heparin due to craniotomy and hemorrhage.     Hx of depression - previously on Zoloft. At higher risk of depression and further anxiety due to diagnosis and hospitalization. PRN Klonopin if needed. Coping well. Wife supportive.  -cont Zoloft 150mg daily. Coping well     General skin care / wound prevention - monitor incision and general skin wound status daily per staff and physician / PA. At risk for failure. Will require 24/7 rehab nursing. Keep wound clean and dry; scalp incision looks great / sutures / dry / no swelling or erythema.  -1/6 posterior scalp incision intact with sutures, clean and dry without edema, erythema. DC sutures 1/12     Urinary retention / neurogenic bladder - schedule voids q 6-8 hrs.  Check post-void residual as needed; in and out catheter if post-void residual is more than 400ml.     Bowel program - at risk for constipation as a side effect of opioids, iron and calcium supplements and other meds. MiraLAX daily for regularity, Brigida-Colace for stool softener. PRN MOM, bisacodyl suppository or tablets for constipation.  -continent     GERD - resume PPI. At times may need additional antacids, Maalox prn.      Time spent was 25 minutes with over 1/2 in direct patient care/examination, consultation and coordination of care.      Signed By: Mik Grimm MD     January 9, 2021

## 2021-01-09 NOTE — PROGRESS NOTES
PHYSICAL THERAPY DAILY NOTE  Time In: 6725  Time Out: 4668  Patient Seen For: AM;Balance activities;Gait training;Patient education; Therapeutic exercise;Transfer training; Other (see progress notes)    Subjective: Patient reporting no dizziness and no headache. Reports he is ready to walk         Objective:Vital Signs:sitting /68 at beginning of treatment  Patient Vitals for the past 12 hrs:   Temp Pulse Resp BP SpO2   01/09/21 0724 98 °F (36.7 °C) 66 16 128/66 98 %     Pain level:No c/o pain during treatment  Pain location:NA  Pain interventions:NA    Patient education:bed mobility training,transfer training, gait training, balance training,fall precautions, body mechanics,activity pacing, Patient verbalizing understanding and demonstrating partial understanding of patient education. Recommend follow up education. Interdisciplinary Communication:spoke with OT regarding functional status. Seizure, Other (comment)(falls)  GROSS ASSESSMENT Daily Assessment     NA       COGNITION Daily Assessment    Orientation:A+O x 2  Communication:able to express needs verbally, able to follow single step commands with multiple and frequent cues  Social Interaction:cooperating, appropriate with PT, participating, motivated to improve  Problem Solving:difficulty with managing body mechanics during functional mobility with delayed processing and motor planning deficits  Memory:multiple cues to recall safe body mechanics during functional mobility           BED/MAT MOBILITY Daily Assessment    Rolling Right : 0 (Not tested)  Rolling Left : 0 (Not tested)  Supine to Sit : 5 (Supervision)  Sit to Supine : 0 (Not tested)       TRANSFERS Daily Assessment   Increased time and effort to complete with cues for body mechanics   Transfer Type: SPT without device  Other: CGA with SPT w/c<>commode using grab bar with verbal cues for body mechanics.  SBA to CGA for balance while patient managing lower body clothing during toileting  Transfer Assistance : 4 (Contact guard assistance)  Sit to Stand Assistance: Stand-by assistance  Car Transfers: Not tested       GAIT Daily Assessment   Gait training with cues for improved gait speed step clearance and step length. Amount of Assistance: 4 (Minimal assistance)  Distance (ft): 200 Feet (ft)  Assistive Device: Gait belt   Gait training x 20 ft x 2 in figure 8 pattern around bolsters with min assist and multiple cues to make multiple turns    Gait training x 20 ft x 2 making 180 degree turn after 10 ft with min assist and multiple cues to complet turn    STEPS or STAIRS Daily Assessment   Slow reciprocating pattern going up steps and single step at a time going down steps  with increased time and effort to complete. Cues for safe foot placement and improved step clearance   Steps/Stairs Ambulated (#): 8(4 steps x 1 with DARYL rails, 4 steps x 1 with single rail)  Level of Assist : 4 (Contact guard assistance)  Rail Use: Both(on one attempt, one on second attempt)     Gait training up/down 3 inch step  X 2 with left hand held assist with min assist with multiple cues for gait sequence. BALANCE Daily Assessment   Static standing balance activities during toileting with SBA to CGA and cues for balance control while managing lower body clothing    Dynamic standing balance activities during gait training with min to CGA with cues to control Sitting - Static: Good (unsupported)  Sitting - Dynamic: Fair (occasional)  Standing - Static: Fair  Standing - Dynamic : Impaired       WHEELCHAIR MOBILITY Daily Assessment    Curbs/Ramps Assist Required (FIM Score): 0 (Not tested)  Wheelchair Setup Assist Required : 0 (Not tested)       LOWER EXTREMITY EXERCISES Daily Assessment   Increased time and effort to complete with multiple and frequent rest breaks.  Cues for correct form   Extremity: Both  Exercise Type #1: Other (comment)(LE motomed x 10 mins at level 2)  Level of Assist: Supervision Assessment: Progressing towards goals. Gait balance and distance slowly improving. Improved processing speed during functional mobility today         Patient to OT at end of treatment    Plan of Care: Continue with POC and progress as tolerated.      Chicho Sinha, PT  1/9/2021

## 2021-01-09 NOTE — PROGRESS NOTES
01/09/21 1200   Time Spent With Patient   Time In 1035   Time Out 1128   Patient Seen For: AM;Other (see progress notes)   OT Daily Note    Subjective: I feel good   Pain: none reported  Education: discussed with patient about not getting up without assistance   Precautions: Other (comment)(falls)  Location on arrival: received from PT    Activity Tolerance   Patient completed UBE for 5 minutes forward and 3 minutes backwards at min  resistance to increase UB strength and endurance to improve independence with ADLs. Needed rest break while continuing forward      Visual-Perceptual   Patient completed the pipe tree with max assist for following chart and picking correct pieces throughtout complete puzzle. Cues to use both hands and to look at directions. Patient tolerated session well. Was ready to get done and go back to rest in room . Needed continual cues to use both hands and to look at directions for sequencing of next step. Plan: Continue OT POC with focus on ADL/IADL skills, functional transfers, functional mobility, coordination, strength, static and dynamic balance, and activity tolerance to maximize safety and independence with ADLs and functional transfers. Patient was left in bed with call light/posey set all needs in reach and in no distress.      Arlin Leung  1/9/2021

## 2021-01-09 NOTE — PROGRESS NOTES
States\"I think I'm ready to go home, I think it's time. \" reminded of meeting on Wed to discuss and that no date has been set, unmet goals remain. Pt has cont to mention this two more times this shift.

## 2021-01-10 PROCEDURE — 99231 SBSQ HOSP IP/OBS SF/LOW 25: CPT | Performed by: PHYSICAL MEDICINE & REHABILITATION

## 2021-01-10 PROCEDURE — 74011250636 HC RX REV CODE- 250/636: Performed by: PHYSICAL MEDICINE & REHABILITATION

## 2021-01-10 PROCEDURE — 65310000000 HC RM PRIVATE REHAB

## 2021-01-10 PROCEDURE — 74011636637 HC RX REV CODE- 636/637: Performed by: PHYSICAL MEDICINE & REHABILITATION

## 2021-01-10 PROCEDURE — 74011250637 HC RX REV CODE- 250/637: Performed by: PHYSICAL MEDICINE & REHABILITATION

## 2021-01-10 RX ORDER — AMOXICILLIN 250 MG
1 CAPSULE ORAL 2 TIMES DAILY
Status: DISCONTINUED | OUTPATIENT
Start: 2021-01-10 | End: 2021-01-14 | Stop reason: HOSPADM

## 2021-01-10 RX ADMIN — DEXAMETHASONE 4 MG: 4 TABLET ORAL at 05:46

## 2021-01-10 RX ADMIN — TRAZODONE HYDROCHLORIDE 25 MG: 50 TABLET ORAL at 20:29

## 2021-01-10 RX ADMIN — DEXAMETHASONE 4 MG: 4 TABLET ORAL at 13:02

## 2021-01-10 RX ADMIN — LOSARTAN POTASSIUM 50 MG: 50 TABLET, FILM COATED ORAL at 08:05

## 2021-01-10 RX ADMIN — LEVETIRACETAM 1000 MG: 500 SOLUTION ORAL at 08:05

## 2021-01-10 RX ADMIN — DOCUSATE SODIUM 50MG AND SENNOSIDES 8.6MG 1 TABLET: 8.6; 5 TABLET, FILM COATED ORAL at 17:07

## 2021-01-10 RX ADMIN — DEXAMETHASONE 4 MG: 4 TABLET ORAL at 20:32

## 2021-01-10 RX ADMIN — INSULIN LISPRO 2 UNITS: 100 INJECTION, SOLUTION INTRAVENOUS; SUBCUTANEOUS at 11:59

## 2021-01-10 RX ADMIN — INSULIN LISPRO 2 UNITS: 100 INJECTION, SOLUTION INTRAVENOUS; SUBCUTANEOUS at 20:32

## 2021-01-10 RX ADMIN — AMLODIPINE BESYLATE 10 MG: 5 TABLET ORAL at 08:05

## 2021-01-10 RX ADMIN — PANTOPRAZOLE SODIUM 40 MG: 40 TABLET, DELAYED RELEASE ORAL at 05:46

## 2021-01-10 RX ADMIN — LEVETIRACETAM 1000 MG: 500 SOLUTION ORAL at 20:29

## 2021-01-10 RX ADMIN — DOCUSATE SODIUM 50MG AND SENNOSIDES 8.6MG 1 TABLET: 8.6; 5 TABLET, FILM COATED ORAL at 08:05

## 2021-01-10 RX ADMIN — SERTRALINE HYDROCHLORIDE 150 MG: 50 TABLET ORAL at 08:05

## 2021-01-10 RX ADMIN — FUROSEMIDE 20 MG: 20 TABLET ORAL at 08:05

## 2021-01-10 NOTE — PROGRESS NOTES
Problem: Falls - Risk of  Goal: *Absence of Falls  Description: Document Becca Sheldon Fall Risk and appropriate interventions in the flowsheet.   Outcome: Progressing Towards Goal  Note: Fall Risk Interventions:  Mobility Interventions: Patient to call before getting OOB, Utilize walker, cane, or other assistive device    Mentation Interventions: Door open when patient unattended, More frequent rounding, Toileting rounds    Medication Interventions: Bed/chair exit alarm, Patient to call before getting OOB    Elimination Interventions: Bed/chair exit alarm, Call light in reach, Patient to call for help with toileting needs, Toileting schedule/hourly rounds, Urinal in reach    History of Falls Interventions: Bed/chair exit alarm, Door open when patient unattended         Problem: Patient Education: Go to Patient Education Activity  Goal: Patient/Family Education  Outcome: Progressing Towards Goal

## 2021-01-10 NOTE — PROGRESS NOTES
Carlo Sylvester MD  Medical Director  [address] [facility], [address] [facility]  Tel: [phone]       [facility] PROGRESS NOTE    [facility]auly [facility]  Admit Date: 1/4/2021  Admit Diagnosis:   Brain mass [G93.89]    Subjective     Patient seen and examined. Intermittent headaches. No visual changes, no n/v. Sleeping well. Patient seen and examined. C/o constipation. Spouse reports intermintent memory impairment. No pain complaints. Denies HA, lightheadedness, SOB or nausea. Participating in therapy.     Objective:     Current Facility-Administered Medications   Medication Dose Route Frequency    dexAMETHasone (DECADRON) tablet 4 mg  4 mg Oral Q8H    alum-mag hydroxide-simeth (MYLANTA) oral suspension 30 mL  30 mL Oral Q4H PRN    amLODIPine (NORVASC) tablet 10 mg  10 mg Oral DAILY    carboxymethylcellulose sodium (REFRESH LIQUIGEL) 1 % ophthalmic solution 1 Drop  1 Drop Both Eyes QID PRN    furosemide (LASIX) tablet 20 mg  20 mg Oral DAILY    insulin lispro (HUMALOG) injection   SubCUTAneous AC&HS    levETIRAcetam (KEPPRA) oral solution 1,000 mg  1,000 mg Oral Q12H    losartan (COZAAR) tablet 50 mg  50 mg Oral DAILY    magnesium hydroxide (MILK OF MAGNESIA) 400 mg/5 mL oral suspension 30 mL  30 mL Oral DAILY PRN    ondansetron (ZOFRAN ODT) tablet 4 mg  4 mg Oral Q6H PRN    pantoprazole (PROTONIX) tablet 40 mg  40 mg Oral ACB    senna-docusate (PERICOLACE) 8.6-50 mg per tablet 1 Tab  1 Tab Oral DAILY    traZODone (DESYREL) tablet 25 mg  25 mg Oral QHS PRN    acetaminophen (TYLENOL) tablet 650 mg  650 mg Oral Q6H PRN    Or    acetaminophen (TYLENOL) suppository 650 mg  650 mg Rectal Q6H PRN    polyethylene glycol (MIRALAX) packet 17 g  17 g Oral DAILY PRN    sodium chloride (NS) flush 5-40 mL  5-40 mL IntraVENous PRN    naloxone (NARCAN) injection 0.4 mg  0.4 mg IntraVENous PRN    sertraline (ZOLOFT) tablet 150 mg  150 mg Oral DAILY    HYDROcodone-acetaminophen (NORCO) 5-325 mg per tablet 1 Tab  1 Tab Oral Q4H PRN       Review of Systems:   Denies chest pain, shortness of breath, cough, visual problems, abdominal pain, dysuria, calf pain. Pertinent positives are as noted in the HPI, ROS unremarkable otherwise. Visit Vitals  /68   Pulse 62   Temp 98.1 °F (36.7 °C)   Resp 16   Wt 85.7 kg (189 lb)   SpO2 96%   BMI 27.12 kg/m²        Physical Exam:   General: Alert and age appropriately oriented. No acute cardiorespiratory distress. HEENT: Normocephalic, no scleral icterus. Oral mucosa moist without cyanosis. Lungs: Clear to auscultation bilaterally. Respiration even and unlabored. Heart: Regular rate and rhythm, S1, S2. No murmurs, clicks, rub or gallops. Abdomen: Soft, non-tender, not distended. Hypoactive bowel sounds    Genitourinary: Deferred. Neuromuscular:      No gross focal motor deficits noted. Generalized weakness, prox>distal  Processing speed /concentration/attn impaired  Speech hoarse  Impaired decision making   Skin/extremity: No rashes, no erythema. No calf tenderness B LE. Right crani inc c/d/i sutures intact  No edema                                                                              Functional Assessment:          Balance  Sitting - Static: Good (unsupported) (01/09/21 1100)  Sitting - Dynamic: Fair (occasional) (01/09/21 1100)  Standing - Static: Fair (01/09/21 1100)  Standing - Dynamic : Impaired (01/09/21 1100)         [facility] Fall [facility]:  [facility] Fall Risk  Mobility: Ambulates or transfers with assist devices or assistance (01/10/21 0720)  Mobility Interventions: Patient to call before getting OOB;Utilize walker, cane, or other assistive device (01/10/21 0720)  Mentation: Periodic confusion (01/10/21 0720)  Mentation Interventions: Door open when patient unattended;More frequent rounding; Toileting rounds (01/10/21 0720)  Medication: Patient receiving anticonvulsants, sedatives(tranquilizers), psychotropics or hypnotics, hypoglycemics, narcotics, sleep aids, antihypertensives, laxatives, or diuretics (01/10/21 0720)  Medication Interventions: Bed/chair exit alarm; Patient to call before getting OOB (01/10/21 0720)  Elimination: Needs assistance with toileting (01/10/21 0720)  Elimination Interventions: Bed/chair exit alarm;Call light in reach; Patient to call for help with toileting needs; Toileting schedule/hourly rounds;Urinal in reach (01/10/21 0720)  Prior Fall History: Before admission in past 12 months _home or previous inpatient care) (01/10/21 0720)  History of Falls Interventions: Bed/chair exit alarm; Door open when patient unattended (01/10/21 0720)  Total Score: 5 (01/10/21 0720)  Standard Fall Precautions: Yes (01/05/21 0723)  High Fall Risk: Yes (01/10/21 0720)     Speech Assessment:  Aspiration Signs/Symptoms: None (01/05/21 1020)      Ambulation:  Gait  Distance (ft): 200 Feet (ft) (01/09/21 1100)  Assistive Device: Gait belt (01/09/21 1100)  Rail Use: Both(on one attempt, one on second attempt) (01/09/21 1100)     Labs/Studies:  Recent Results (from the past 72 hour(s))   METABOLIC PANEL, BASIC    Collection Time: 01/08/21  4:45 AM   Result Value Ref Range    Sodium 141 136 - 145 mmol/L    Potassium 4.2 3.5 - 5.1 mmol/L    Chloride 108 (H) 98 - 107 mmol/L    CO2 28 21 - 32 mmol/L    Anion gap 5 (L) 7 - 16 mmol/L    Glucose 103 (H) 65 - 100 mg/dL    BUN 28 (H) 8 - 23 MG/DL    Creatinine 1.12 0.8 - 1.5 MG/DL    GFR est AA >60 >60 ml/min/1.73m2    GFR est non-AA >60 >60 ml/min/1.73m2    Calcium 8.2 (L) 8.3 - 10.4 MG/DL   HEPATIC FUNCTION PANEL    Collection Time: 01/09/21  4:46 AM   Result Value Ref Range    Protein, total 5.7 (L) 6.3 - 8.2 g/dL    Albumin 2.6 (L) 3.2 - 4.6 g/dL    Globulin 3.1 2.3 - 3.5 g/dL    A-G Ratio 0.8 (L) 1.2 - 3.5      Bilirubin, total 0.4 0.2 - 1.1 MG/DL    Bilirubin, direct 0.1 <0.4 MG/DL    Alk.  phosphatase 107 50 - 136 U/L    AST (SGOT) 15 15 - 37 U/L    ALT (SGPT) 69 (H) 12 - 65 U/L   CBC W/O DIFF    Collection Time: 01/09/21  4:46 AM   Result Value Ref Range    WBC 16.3 (H) 4.3 - 11.1 K/uL    RBC 3.95 (L) 4.23 - 5.6 M/uL    HGB 12.2 (L) 13.6 - 17.2 g/dL    HCT 35.9 (L) 41.1 - 50.3 %    MCV 90.9 79.6 - 97.8 FL    MCH 30.9 26.1 - 32.9 PG    MCHC 34.0 31.4 - 35.0 g/dL    RDW 12.1 11.9 - 14.6 %    PLATELET 203 526 - 114 K/uL    MPV 10.4 9.4 - 12.3 FL    ABSOLUTE NRBC 0.00 0.0 - 0.2 K/uL       Assessment:     Problem List as of 1/10/2021 Date Reviewed: 9/28/2020          Codes Class Noted - Resolved    Brain mass ICD-10-CM: G93.89  ICD-9-CM: 348.89  1/4/2021 - Present        Cerebral edema ([facility] ICD-10-CM: G93.6  ICD-9-CM: 348.5  12/31/2020 - Present        Seizures ([facility] ICD-10-CM: R56.9  ICD-9-CM: 780.39  12/28/2020 - Present        Essential hypertension (Chronic) ICD-10-CM: I10  ICD-9-CM: 401.9  12/26/2020 - Present        Brain tumor ([facility] ICD-10-CM: D49.6  ICD-9-CM: 239.6  12/26/2020 - Present        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  ICD-9-CM: 348.89  12/26/2020 - Present        ICH (intracerebral hemorrhage) ([facility] ICD-10-CM: I61.9  ICD-9-CM: 015  12/25/2020 - Present        History of lumbar laminectomy for spinal cord decompression ICD-10-CM: Z98.890  ICD-9-CM: V45.89  2/18/2019 - Present        Idiopathic scoliosis ICD-10-CM: M41.20  ICD-9-CM: 737.30  6/7/2018 - Present        HTN (hypertension), benign ICD-10-CM: I10  ICD-9-CM: 401.1  2/15/2017 - Present        Rosacea ICD-10-CM: L71.9  ICD-9-CM: 695.3  2/15/2017 - Present        MEGGAN (generalized anxiety disorder) ICD-10-CM: F41.1  ICD-9-CM: 300.02  12/9/2014 - Present        Arthritis ICD-10-CM: M19.90  ICD-9-CM: 716.90  12/9/2014 - Present        Idiopathic chronic gout of right ankle ICD-10-CM: M1A.0710  ICD-9-CM: 274.02  12/9/2014 - Present        Mixed hyperlipidemia ICD-10-CM: E78.2  ICD-9-CM: 272.2  12/9/2014 - Present        Primary insomnia ICD-10-CM: F51.01  ICD-9-CM: 307.42  12/9/2014 - Present        Panic disorder ICD-10-CM: F41.0  ICD-9-CM: 300.01  12/9/2014 - Present        RESOLVED: Acute renal failure (ARF) ([facility] ICD-10-CM: N17.9  ICD-9-CM: 584.9  12/26/2020 - 12/28/2020        RESOLVED: Recurrent depression ([facility] ICD-10-CM: F33.9  ICD-9-CM: 296.30  8/16/2018 - 8/19/2019        RESOLVED: Lumbar stenosis with neurogenic claudication ICD-10-CM: M48.062  ICD-9-CM: 724.03  6/7/2018 - 9/28/2020        RESOLVED: Depression ICD-10-CM: F32.9  ICD-9-CM: 646  12/9/2014 - 2/15/2017                   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 Did undergo intraoperative placement of Gliadel (BCNU) chemotherapeutic wafers  -await pathology. Will likely need Onc / Rad Onc referral; 1/8 pathology still pending  -Na 141     Seizures - management with Keppra, tolerating current dose. No post-op seizures  -1/5 LFTs sltly increased; recheck 1/8; not done. Only bmp, add LFTs on  - 1/9  > 69, trending down, AST - 15 now wnml     Cerebral edema - s/p tumor resection. Continue Decadron 4mg po QID; slow taper. -1/7 will dec Decadron to 4mg q 8hrs     Encephalopathy - multifactorial, improving.     Dysphagia - post-op feeding difficulties. Modified barium swallow 1/4 unremarkable.     Hypertension - BP controlled, fluctuating, managed medically. Continue Norvasc 10mg daily, Lasix 20mg daily, Cozaar 50mg daily. -1/5 BP 112-138/63-74; controlled  -1/8 VSS  - 1/10 HR and BP acceptable     Prerenal azotemia - BUN 35; push PO fluids and monitor.   -1/5 creatinine normal at 1.12  -1/7 recheck in a.m.  -1/8 BUN 28 (35), creat stable , unchanged, at 1.12; cont to push po fluids     Steroid-induced hyperglycemia - uncontrolled / poor glycemic control. Will require daily, close FSG monitoring and medication adjustment to optimize glycemic control in setting of acute illness and hospitalization. Currently on SSI. Will check HgbA1c, no reported history of diabetes. -1/5 HgbA1c 6.0% (12/25/2020)     Leukocytosis - likely steroid-induced. No active fever or s/sx of infection; will monitor.   -1/5 WBC 15.3; recheck 1/8; Pending  - 1/9 wbc - 16.3, afebrile     Pain control - stable, mild-to-moderate joint symptoms intermittently, reasonably well controlled by PRN meds. Will require regular pain assessment and comprenhensive pain management, continue APAP and PRN Norco; long standing hx of chronic low back pain due to spinal stenosis. -1/7 no specific complaints. Intermittent headache, stiff lower back. No radiating pain     Pneumonia prophylaxis - incentive spirometer every hour while awake.     DVT risk / DVT prophylaxis - will require daily physician / PA exam to assess for signs and symptoms as patient is at increased risk for of thromboembolism. Mobilize as tolerated. Sequential pneumatic compression devices (SCDs) when in bed; thigh-high or knee-high thromboembolic deterrent hose when out of bed. No OAC or SQ Lovenox / heparin due to craniotomy and hemorrhage.     Hx of depression - previously on Zoloft. At higher risk of depression and further anxiety due to diagnosis and hospitalization. PRN Klonopin if needed. Coping well. Wife supportive.  -cont Zoloft 150mg daily. Coping well     General skin care / wound prevention - monitor incision and general skin wound status daily per staff and physician / PA. At risk for failure. Will require 24/7 rehab nursing. Keep wound clean and dry; scalp incision looks great / sutures / dry / no swelling or erythema.  -1/6 posterior scalp incision intact with sutures, clean and dry without edema, erythema. DC sutures 1/12     Urinary retention / neurogenic bladder - schedule voids q 6-8 hrs.  Check post-void residual as needed; in and out catheter if post-void residual is more than 400ml.     Bowel program - at risk for constipation as a side effect of opioids, iron and calcium supplements and other meds. MiraLAX daily for regularity, Brigida-Colace for stool softener. PRN MOM, bisacodyl suppository or tablets for constipation. Senokot advanced to bid     GERD - resume PPI. At times may need additional antacids, Maalox prn.      Time spent was 15 minutes with over 1/2 in direct patient care/examination, consultation and coordination of care.      Signed By: José Miguel Ambrose MD     January 10, 2021

## 2021-01-10 NOTE — PROGRESS NOTES
Problem: Falls - Risk of  Goal: *Absence of Falls  Description: Document Patrick Samuels Fall Risk and appropriate interventions in the flowsheet.   Outcome: Progressing Towards Goal  Note: Fall Risk Interventions:  Mobility Interventions: Utilize walker, cane, or other assistive device    Mentation Interventions: Bed/chair exit alarm, Door open when patient unattended    Medication Interventions: Bed/chair exit alarm    Elimination Interventions: Bed/chair exit alarm, Call light in reach    History of Falls Interventions: Bed/chair exit alarm, Door open when patient unattended         Problem: Patient Education: Go to Patient Education Activity  Goal: Patient/Family Education  Outcome: Progressing Towards Goal

## 2021-01-10 NOTE — PROGRESS NOTES
Pt is up every hr to the BR. Pt wanting to walk to the BR each time. Disoriented to time.   Trazodone and norco given for rest.

## 2021-01-11 LAB
ERYTHROCYTE [DISTWIDTH] IN BLOOD BY AUTOMATED COUNT: 12.2 % (ref 11.9–14.6)
GLUCOSE BLD STRIP.AUTO-MCNC: 122 MG/DL (ref 65–100)
GLUCOSE BLD STRIP.AUTO-MCNC: 134 MG/DL (ref 65–100)
GLUCOSE BLD STRIP.AUTO-MCNC: 135 MG/DL (ref 65–100)
GLUCOSE BLD STRIP.AUTO-MCNC: 146 MG/DL (ref 65–100)
GLUCOSE BLD STRIP.AUTO-MCNC: 150 MG/DL (ref 65–100)
HCT VFR BLD AUTO: 37.3 % (ref 41.1–50.3)
HGB BLD-MCNC: 12.8 G/DL (ref 13.6–17.2)
MCH RBC QN AUTO: 31.1 PG (ref 26.1–32.9)
MCHC RBC AUTO-ENTMCNC: 34.3 G/DL (ref 31.4–35)
MCV RBC AUTO: 90.8 FL (ref 79.6–97.8)
NRBC # BLD: 0 K/UL (ref 0–0.2)
PLATELET # BLD AUTO: 185 K/UL (ref 150–450)
PMV BLD AUTO: 10.2 FL (ref 9.4–12.3)
RBC # BLD AUTO: 4.11 M/UL (ref 4.23–5.6)
WBC # BLD AUTO: 14.7 K/UL (ref 4.3–11.1)

## 2021-01-11 PROCEDURE — 36415 COLL VENOUS BLD VENIPUNCTURE: CPT

## 2021-01-11 PROCEDURE — 74011250637 HC RX REV CODE- 250/637: Performed by: PHYSICAL MEDICINE & REHABILITATION

## 2021-01-11 PROCEDURE — 85027 COMPLETE CBC AUTOMATED: CPT

## 2021-01-11 PROCEDURE — 92507 TX SP LANG VOICE COMM INDIV: CPT

## 2021-01-11 PROCEDURE — 65310000000 HC RM PRIVATE REHAB

## 2021-01-11 PROCEDURE — 97110 THERAPEUTIC EXERCISES: CPT

## 2021-01-11 PROCEDURE — 74011250637 HC RX REV CODE- 250/637: Performed by: PHYSICIAN ASSISTANT

## 2021-01-11 PROCEDURE — 99232 SBSQ HOSP IP/OBS MODERATE 35: CPT | Performed by: PHYSICIAN ASSISTANT

## 2021-01-11 PROCEDURE — 97116 GAIT TRAINING THERAPY: CPT

## 2021-01-11 PROCEDURE — 97112 NEUROMUSCULAR REEDUCATION: CPT

## 2021-01-11 PROCEDURE — 97535 SELF CARE MNGMENT TRAINING: CPT

## 2021-01-11 PROCEDURE — 82962 GLUCOSE BLOOD TEST: CPT

## 2021-01-11 PROCEDURE — 97530 THERAPEUTIC ACTIVITIES: CPT

## 2021-01-11 PROCEDURE — 74011250636 HC RX REV CODE- 250/636: Performed by: PHYSICAL MEDICINE & REHABILITATION

## 2021-01-11 RX ORDER — TAMSULOSIN HYDROCHLORIDE 0.4 MG/1
0.4 CAPSULE ORAL DAILY
Status: DISCONTINUED | OUTPATIENT
Start: 2021-01-11 | End: 2021-01-14 | Stop reason: HOSPADM

## 2021-01-11 RX ORDER — LEVETIRACETAM 500 MG/1
1000 TABLET ORAL EVERY 12 HOURS
Status: DISCONTINUED | OUTPATIENT
Start: 2021-01-11 | End: 2021-01-12

## 2021-01-11 RX ADMIN — LEVETIRACETAM 1000 MG: 500 TABLET ORAL at 21:12

## 2021-01-11 RX ADMIN — DEXAMETHASONE 4 MG: 4 TABLET ORAL at 21:12

## 2021-01-11 RX ADMIN — LEVETIRACETAM 1000 MG: 500 SOLUTION ORAL at 08:08

## 2021-01-11 RX ADMIN — DEXAMETHASONE 4 MG: 4 TABLET ORAL at 05:15

## 2021-01-11 RX ADMIN — PANTOPRAZOLE SODIUM 40 MG: 40 TABLET, DELAYED RELEASE ORAL at 05:15

## 2021-01-11 RX ADMIN — TRAZODONE HYDROCHLORIDE 25 MG: 50 TABLET ORAL at 21:12

## 2021-01-11 RX ADMIN — DOCUSATE SODIUM 50MG AND SENNOSIDES 8.6MG 1 TABLET: 8.6; 5 TABLET, FILM COATED ORAL at 17:02

## 2021-01-11 RX ADMIN — DOCUSATE SODIUM 50MG AND SENNOSIDES 8.6MG 1 TABLET: 8.6; 5 TABLET, FILM COATED ORAL at 08:08

## 2021-01-11 RX ADMIN — DEXAMETHASONE 4 MG: 4 TABLET ORAL at 13:06

## 2021-01-11 RX ADMIN — TAMSULOSIN HYDROCHLORIDE 0.4 MG: 0.4 CAPSULE ORAL at 08:27

## 2021-01-11 RX ADMIN — AMLODIPINE BESYLATE 10 MG: 5 TABLET ORAL at 08:08

## 2021-01-11 RX ADMIN — SERTRALINE HYDROCHLORIDE 150 MG: 50 TABLET ORAL at 08:08

## 2021-01-11 RX ADMIN — LOSARTAN POTASSIUM 50 MG: 50 TABLET, FILM COATED ORAL at 08:08

## 2021-01-11 RX ADMIN — FUROSEMIDE 20 MG: 20 TABLET ORAL at 08:08

## 2021-01-11 NOTE — PROGRESS NOTES
PHYSICAL THERAPY DAILY NOTE  Time In: 7682  Time Out: 6647  Patient Seen For: PM;Balance activities;Gait training;Patient education; Therapeutic exercise;Transfer training; Other (see progress notes)    Subjective: Patient reporting he is tired and sleepy. Reports he would like to take a nap         Objective:Vital Signs:  Patient Vitals for the past 12 hrs:   Temp Pulse Resp BP SpO2   01/11/21 0657 97.7 °F (36.5 °C) 65 16 133/66 98 %     Pain level:No c/o pain during treatment  Pain location:NA  Pain interventions:NA    Patient education:bed mobility training,transfer training, gait training, balance training,fall precautions, body mechanics,activity pacing, Patient verbalizing understanding and demonstrating partial understanding of patient education. Recommend follow up education. Interdisciplinary Communication:spoke with OT regarding functional status. Seizure, Other (comment)(falls)  GROSS ASSESSMENT Daily Assessment     NA       COGNITION Daily Assessment    Orientation:A+O x 2  Communication:able to express needs verbally, able to follow single step commands with multiple and frequent cues  Social Interaction:cooperating, appropriate with PT, participating, motivated to improve  Problem Solving:difficulty with managing body mechanics during functional mobility with delayed processing and motor planning deficits  Memory:multiple cues to recall safe body mechanics during functional mobility           BED/MAT MOBILITY Daily Assessment    Rolling Right : 6 (Modified independent)  Rolling Left : 0 (Not tested)  Supine to Sit : 5 (Supervision)  Sit to Supine : 5 (Supervision)       TRANSFERS Daily Assessment   Increased time and effort to complete with cues for body mechanics   Transfer Type: SPT without device  Other: CGA with SPT w/c<>commode using grab bar with verbal cues for body mechanics.  SBA to CGA for balance while patient managing lower body clothing during toileting  Transfer Assistance : 4 (Contact guard assistance)  Sit to Stand Assistance: Stand-by assistance  Car Transfers: Not tested       GAIT Daily Assessment   Gait training with cues for improved gait speed step clearance and step length. Amount of Assistance: 5 (Stand-by assistance)  Distance (ft): 200 Feet (ft)  Assistive Device: (No device)   Gait training x 20 ft x 4 in figure 8 pattern around bolsters with min assist and multiple cues to make multiple turns      STEPS or STAIRS Daily Assessment   . Cues for safe foot placement and improved step clearance    Steps/Stairs Ambulated (#): 1(6 inch step x 2 attempts) up/down 6 inch step  Level of Assist : 4 (Contact guard assistance)  Rail Use: None     Gait training up/down 3 inch step  X 2 with left hand held assist with min assist with multiple cues for gait sequence. BALANCE Daily Assessment   Static standing balance activities during toileting with SBA  and cues for balance control while managing lower body clothing         Sitting - Static: Good (unsupported)  Sitting - Dynamic: Fair (occasional)  Standing - Static: Fair  Standing - Dynamic : Impaired       WHEELCHAIR MOBILITY Daily Assessment    Curbs/Ramps Assist Required (FIM Score): 0 (Not tested)  Wheelchair Setup Assist Required : 0 (Not tested)       LOWER EXTREMITY EXERCISES Daily Assessment      Extremity: Both  Exercise Type #1: Other (comment)(LE motomed x 10 mins at level 2)  Level of Assist: Supervision          Assessment: Progressing towards goals. Gait balance and distance slowly improving. Patient returned to room at end of treatment. Patient supine in bed with head of bed elevated and bed rails up x 2. Needs placed in reach of patient. [place] alarm on      Plan of Care: Continue with POC and progress as tolerated.      Davide Marie, PT  1/11/2021

## 2021-01-11 NOTE — PROGRESS NOTES
Jewel Nieto MD  Medical Director  [address] [facility], [address] [facility]  Tel: [phone]       [facility] PROGRESS NOTE    Lillian Calix [facility]  Admit Date: 1/4/2021  Admit Diagnosis:   Brain mass [G93.89]    Subjective     Patient seen and examined eating breakfast after ADLs. Reports constipation, unsure of last BM (yesterday, per RN staff), denies voiding issues. Denies HA, CP, abdominal pain, general arthritic pain, dyspnea, palpitations, extremity dysesthesia. +STM deficits. Participating well in therapies.     Objective:     Current Facility-Administered Medications   Medication Dose Route Frequency    tamsulosin (FLOMAX) capsule 0.4 mg  0.4 mg Oral DAILY    senna-docusate (PERICOLACE) 8.6-50 mg per tablet 1 Tab  1 Tab Oral BID    dexAMETHasone (DECADRON) tablet 4 mg  4 mg Oral Q8H    alum-mag hydroxide-simeth (MYLANTA) oral suspension 30 mL  30 mL Oral Q4H PRN    amLODIPine (NORVASC) tablet 10 mg  10 mg Oral DAILY    carboxymethylcellulose sodium (REFRESH LIQUIGEL) 1 % ophthalmic solution 1 Drop  1 Drop Both Eyes QID PRN    furosemide (LASIX) tablet 20 mg  20 mg Oral DAILY    insulin lispro (HUMALOG) injection   SubCUTAneous AC&HS    levETIRAcetam (KEPPRA) oral solution 1,000 mg  1,000 mg Oral Q12H    losartan (COZAAR) tablet 50 mg  50 mg Oral DAILY    magnesium hydroxide (MILK OF MAGNESIA) 400 mg/5 mL oral suspension 30 mL  30 mL Oral DAILY PRN    ondansetron (ZOFRAN ODT) tablet 4 mg  4 mg Oral Q6H PRN    pantoprazole (PROTONIX) tablet 40 mg  40 mg Oral ACB    traZODone (DESYREL) tablet 25 mg  25 mg Oral QHS PRN    acetaminophen (TYLENOL) tablet 650 mg  650 mg Oral Q6H PRN    Or    acetaminophen (TYLENOL) suppository 650 mg  650 mg Rectal Q6H PRN    polyethylene glycol (MIRALAX) packet 17 g  17 g Oral DAILY PRN    sodium chloride (NS) flush 5-40 mL  5-40 mL IntraVENous PRN    naloxone (NARCAN) injection 0.4 mg  0.4 mg IntraVENous PRN    sertraline (ZOLOFT) tablet 150 mg  150 mg Oral DAILY    HYDROcodone-acetaminophen (NORCO) 5-325 mg per tablet 1 Tab  1 Tab Oral Q4H PRN       Review of Systems:   Denies cough, visual problems, dysuria, calf pain. Pertinent positives are as noted in the HPI, ROS unremarkable otherwise. Visit Vitals  /66   Pulse 65   Temp 97.7 °F (36.5 °C)   Resp 16   Wt 187 lb 11.2 oz (85.1 kg)   SpO2 98%   BMI 26.93 kg/m²        Physical Exam:   General: Alert, appropriately oriented. No acute cardiorespiratory distress. HEENT: Normocephalic, EOM intact. Oral mucosa moist.   Lungs: Clear to auscultation bilaterally. Respiration even and unlabored. Heart: Regular rate and rhythm. No appreciable murmur. Abdomen: Soft, non-tender, not distended. Bowel sounds normoactive. Genitourinary: Deferred. Neuromuscular:      No gross focal motor deficits noted. B UE strength at biceps 4+/5, triceps 4/5, finger flexion 5/5. B LE strength at HF 4/5, KE/KF 4+/5, DF/PF 5/5. No sensory deficits distally. Skin/extremity: No rashes, no erythema. Calves soft, non-tender B LE. Posterior scalp incision intact with sutures, clean and dry without edema, erythema. Functional Assessment:  Balance  Sitting - Static: Good (unsupported) (01/09/21 1100)  Sitting - Dynamic: Fair (occasional) (01/09/21 1100)  Standing - Static: Fair (01/09/21 1100)  Standing - Dynamic : Impaired (01/09/21 1100)       Danne Lobe Fall Risk Assessment:  Danne Lobe Fall Risk  Mobility: Ambulates or transfers with assist devices or assistance (01/10/21 2120)  Mobility Interventions: Utilize walker, cane, or other assistive device (01/10/21 2120)  Mentation: Periodic confusion (01/10/21 2120)  Mentation Interventions: Bed/chair exit alarm; Door open when patient unattended (01/10/21 2120)  Medication: Patient receiving anticonvulsants, sedatives(tranquilizers), psychotropics or hypnotics, hypoglycemics, narcotics, sleep aids, antihypertensives, laxatives, or diuretics (01/10/21 2120)  Medication Interventions: Bed/chair exit alarm; Patient to call before getting OOB (01/10/21 2120)  Elimination: Needs assistance with toileting (01/10/21 2120)  Elimination Interventions: Bed/chair exit alarm;Call light in reach (01/10/21 2120)  Prior Fall History: Before admission in past 12 months _home or previous inpatient care) (01/10/21 2120)  History of Falls Interventions: Door open when patient unattended (01/10/21 2120)  Total Score: 5 (01/10/21 2120)  Standard Fall Precautions: Yes (01/05/21 0723)  High Fall Risk: Yes (01/10/21 2120)     Speech Assessment:  Aspiration Signs/Symptoms: None (01/05/21 1020)      Ambulation:  Gait  Distance (ft): 200 Feet (ft) (01/09/21 1100)  Assistive Device: Gait belt (01/09/21 1100)  Rail Use: Both(on one attempt, one on second attempt) (01/09/21 1100)     Labs/Studies:  Recent Results (from the past 72 hour(s))   HEPATIC FUNCTION PANEL    Collection Time: 01/09/21  4:46 AM   Result Value Ref Range    Protein, total 5.7 (L) 6.3 - 8.2 g/dL    Albumin 2.6 (L) 3.2 - 4.6 g/dL    Globulin 3.1 2.3 - 3.5 g/dL    A-G Ratio 0.8 (L) 1.2 - 3.5      Bilirubin, total 0.4 0.2 - 1.1 MG/DL    Bilirubin, direct 0.1 <0.4 MG/DL    Alk.  phosphatase 107 50 - 136 U/L    AST (SGOT) 15 15 - 37 U/L    ALT (SGPT) 69 (H) 12 - 65 U/L   CBC W/O DIFF    Collection Time: 01/09/21  4:46 AM   Result Value Ref Range    WBC 16.3 (H) 4.3 - 11.1 K/uL    RBC 3.95 (L) 4.23 - 5.6 M/uL    HGB 12.2 (L) 13.6 - 17.2 g/dL    HCT 35.9 (L) 41.1 - 50.3 %    MCV 90.9 79.6 - 97.8 FL    MCH 30.9 26.1 - 32.9 PG    MCHC 34.0 31.4 - 35.0 g/dL    RDW 12.1 11.9 - 14.6 %    PLATELET 052 602 - 589 K/uL    MPV 10.4 9.4 - 12.3 FL    ABSOLUTE NRBC 0.00 0.0 - 0.2 K/uL   CBC W/O DIFF    Collection Time: 01/11/21  4:50 AM   Result Value Ref Range    WBC 14.7 (H) 4.3 - 11.1 K/uL    RBC 4.11 (L) 4.23 - 5.6 M/uL HGB 12.8 (L) 13.6 - 17.2 g/dL    HCT 37.3 (L) 41.1 - 50.3 %    MCV 90.8 79.6 - 97.8 FL    MCH 31.1 26.1 - 32.9 PG    MCHC 34.3 31.4 - 35.0 g/dL    RDW 12.2 11.9 - 14.6 %    PLATELET 569 030 - 693 K/uL    MPV 10.2 9.4 - 12.3 FL    ABSOLUTE NRBC 0.00 0.0 - 0.2 K/uL       Assessment:     Problem List as of 1/11/2021 Date Reviewed: 9/28/2020          Codes Class Noted - Resolved    * (Principal) Brain mass ICD-10-CM: G93.89  ICD-9-CM: 348.89  1/4/2021 - Present        Cerebral edema ([facility] ICD-10-CM: G93.6  ICD-9-CM: 348.5  12/31/2020 - Present        Seizures ([facility] ICD-10-CM: R56.9  ICD-9-CM: 780.39  12/28/2020 - Present        Essential hypertension (Chronic) ICD-10-CM: I10  ICD-9-CM: 401.9  12/26/2020 - Present        Brain tumor ([facility] ICD-10-CM: D49.6  ICD-9-CM: 239.6  12/26/2020 - Present        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  ICD-9-CM: 348.89  12/26/2020 - Present        ICH (intracerebral hemorrhage) ([facility] ICD-10-CM: I61.9  ICD-9-CM: 950  12/25/2020 - Present        History of lumbar laminectomy for spinal cord decompression ICD-10-CM: Z98.890  ICD-9-CM: V45.89  2/18/2019 - Present        Idiopathic scoliosis ICD-10-CM: M41.20  ICD-9-CM: 737.30  6/7/2018 - Present        HTN (hypertension), benign ICD-10-CM: I10  ICD-9-CM: 401.1  2/15/2017 - Present        Rosacea ICD-10-CM: L71.9  ICD-9-CM: 695.3  2/15/2017 - Present        MEGGAN (generalized anxiety disorder) ICD-10-CM: F41.1  ICD-9-CM: 300.02  12/9/2014 - Present        Arthritis ICD-10-CM: M19.90  ICD-9-CM: 716.90  12/9/2014 - Present        Idiopathic chronic gout of right ankle ICD-10-CM: M1A.0710  ICD-9-CM: 274.02  12/9/2014 - Present        Mixed hyperlipidemia ICD-10-CM: E78.2  ICD-9-CM: 272.2  12/9/2014 - Present        Primary insomnia ICD-10-CM: F51.01  ICD-9-CM: 307.42  12/9/2014 - Present        Panic disorder ICD-10-CM: F41.0  ICD-9-CM: 300.01  12/9/2014 - Present        RESOLVED: Acute renal failure (ARF) ([facility] ICD-10-CM: N17.9  ICD-9-CM: 584.9  12/26/2020 - 12/28/2020        RESOLVED: Recurrent depression ([facility] ICD-10-CM: F33.9  ICD-9-CM: 296.30  8/16/2018 - 8/19/2019        RESOLVED: Lumbar stenosis with neurogenic claudication ICD-10-CM: M48.062  ICD-9-CM: 724.03  6/7/2018 - 9/28/2020        RESOLVED: Depression ICD-10-CM: F32.9  ICD-9-CM: 692  12/9/2014 - 2/15/2017            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 Did undergo intraoperative placement of Gliadel (BCNU) chemotherapeutic wafers. Await pathology. Will likely need Onc / Rad Onc referral.  -1/8 pathology still pending  -1/11 Dr. Mitzi Yang / Dr. Alejandro Lopez to discuss pathology with patient / family     Seizures - management with Keppra, tolerating current dose. No post-op seizures  -1/5 LFTs sltly increased; recheck 1/8. Not done, only BMP, add on LFTs  -1/9  > 69, trending down, AST 15, now wnml  -1/11 after lengthy investigation as why Keppra dose is liquid (no reason identified), will change to tablets starting PM dose     Cerebral edema - s/p tumor resection. Continue Decadron 4mg po QID; slow taper. -1/7 will dec Decadron to 4mg q 8hrs     Encephalopathy - multifactorial, improving.     Dysphagia - post-op feeding difficulties. Modified barium swallow 1/4 unremarkable.     Hypertension - BP controlled, fluctuating, managed medically. Continue Norvasc 10mg daily, Lasix 20mg daily, Cozaar 50mg daily. -1/5 BP 112-138/63-74; controlled  -1/8 VSS  -1/10 HR and BP acceptable  -1/11 /66, HR 65     Prerenal azotemia - BUN 35; push PO fluids and monitor.   -1/5 creatinine normal at 1.12  -1/7 recheck in a.m.  -1/8 BUN 28 (35), creat stable, unchanged, at 1.12; cont to push po fluids     Steroid-induced hyperglycemia - uncontrolled / poor glycemic control. Will require daily, close FSG monitoring and medication adjustment to optimize glycemic control in setting of acute illness and hospitalization. Currently on SSI. Will check HgbA1c, no reported history of diabetes. -1/5 HgbA1c 6.0% (12/25/2020)  -1/8 glucose in      Leukocytosis - likely steroid-induced. No active fever or s/sx of infection; will monitor.   -1/5 WBC 15.3; recheck 1/8; Pending  -1/9 WBC 16.3, afebrile  -1/11 no s/sx infection     Pain control - stable, mild-to-moderate joint symptoms intermittently, reasonably well controlled by PRN meds. Will require regular pain assessment and comprenhensive pain management, continue APAP and PRN Norco; long standing hx of chronic low back pain due to spinal stenosis. -1/7 no specific complaints. Intermittent headache, stiff lower back. No radiating pain  -1/11 no pain complaints     Pneumonia prophylaxis - incentive spirometer every hour while awake.     DVT risk / DVT prophylaxis - will require daily physician / PA exam to assess for signs and symptoms as patient is at increased risk for of thromboembolism. Mobilize as tolerated. Sequential pneumatic compression devices (SCDs) when in bed; thigh-high or knee-high thromboembolic deterrent hose when out of bed. No OAC or SQ Lovenox / heparin due to craniotomy and hemorrhage.     Hx of depression - previously on Zoloft. At higher risk of depression and further anxiety due to diagnosis and hospitalization. PRN Klonopin if needed. Coping well. Wife supportive.  -continue Zoloft 150mg daily. Coping well     General skin care / wound prevention - monitor incision and general skin wound status daily per staff and physician / PA. At risk for failure.  Will require 24/7 rehab nursing. Keep wound clean and dry; scalp incision looks great / sutures / dry / no swelling or erythema.  -1/6 posterior scalp incision intact with sutures, clean and dry without edema, erythema. D/C sutures 1/12  -1/11 incision C/D/I with sutures, no erythema or edema     Urinary retention / neurogenic bladder - schedule voids q 6-8 hrs. Check post-void residual as needed; in and out catheter if post-void residual is more than 400ml.     Bowel program - at risk for constipation as a side effect of opioids, iron and calcium supplements and other meds. MiraLAX daily for regularity, Brigida-Colace for stool softener. PRN MOM, bisacodyl suppository or tablets for constipation. -1/9 Senokot advanced to BID     GERD - resume PPI. At times may need additional antacids, Maalox prn.           Time spent was 25 minutes with over 1/2 in direct patient care/examination, consultation and coordination of care. Signed By: Jose Logan PA-C    January 11, 2021      Physician Assistant with [facility]  Nohemy Mcginnis MD, Medical Director

## 2021-01-11 NOTE — PROGRESS NOTES
01/11/21 1223   Time Spent With Patient   Time In 1129   Time Out 1201   Patient Seen For: AM;Neuro-linguistics   Mental Status   Neurologic State Alert   Orientation Level Oriented to person;Oriented to place;Oriented to situation;Disoriented to time   Cognition Follows commands; Impaired decision making   Perception Appears intact   Perseveration No perseveration noted   Safety/Judgement Fall prevention   Patient seen in room. He was pleasant and cooperative. He is hard of hearing. Discussed with patient to verbalize when he needs repetition, either due to difficulty hearing or difficulty processing. Patient answered orientation questions to person, place and situation with 100% accuracy. He was unable to name day of week, even with cue to reference white board. Stated year as 67. Required mod cues for birthday and age. Patient answered simple-moderate Y/N questions with 100% accuracy. Patient required min-mod cues to identify potential problems in a given scenario with 80% accuracy. Patient able to state solution to problems with 100% accuracy. Patient would benefit from continued ST to address his cognitive-linguistic deficits.   Jorge Quan MA, CCC-SLP

## 2021-01-11 NOTE — PROGRESS NOTES
Problem: Falls - Risk of  Goal: *Absence of Falls  Description: Document Jennifer Green Fall Risk and appropriate interventions in the flowsheet.   Outcome: Progressing Towards Goal  Note: Fall Risk Interventions:  Mobility Interventions: Utilize walker, cane, or other assistive device    Mentation Interventions: Bed/chair exit alarm, Door open when patient unattended    Medication Interventions: Bed/chair exit alarm, Patient to call before getting OOB    Elimination Interventions: Bed/chair exit alarm, Call light in reach    History of Falls Interventions: Door open when patient unattended         Problem: Patient Education: Go to Patient Education Activity  Goal: Patient/Family Education  Outcome: Progressing Towards Goal

## 2021-01-11 NOTE — PROGRESS NOTES
Problem: Falls - Risk of  Goal: *Absence of Falls  Description: Document Daria Puente Fall Risk and appropriate interventions in the flowsheet.   Outcome: Progressing Towards Goal  Note: Fall Risk Interventions:  Mobility Interventions: Patient to call before getting OOB, Utilize walker, cane, or other assistive device    Mentation Interventions: Bed/chair exit alarm, More frequent rounding, Toileting rounds    Medication Interventions: Bed/chair exit alarm, Patient to call before getting OOB    Elimination Interventions: Bed/chair exit alarm, Call light in reach, Patient to call for help with toileting needs, Toileting schedule/hourly rounds, Urinal in reach    History of Falls Interventions: Bed/chair exit alarm, Door open when patient unattended, Room close to nurse's station         Problem: Patient Education: Go to Patient Education Activity  Goal: Patient/Family Education  Outcome: Progressing Towards Goal

## 2021-01-11 NOTE — PROGRESS NOTES
PT WEEKLY PROGRESS NOTE   Time In: 6569   Time Out: 0929    Subjective: Patient reporting he feels OK. Reports he gets tired when walking long distance         Objective: Vital Signs:  Patient Vitals for the past 12 hrs:   Temp Pulse Resp BP SpO2   01/11/21 0657 97.7 °F (36.5 °C) 65 16 133/66 98 %     Pain level:No c/o pain during treatment  Pain location:NA  Pain interventions:NA    Patient education:Bed mobility training,transfer training, gait training, balance training,fall precautions, body mechanics,activity pacing, Patient verbalizing understanding and demonstrating partial understanding of patient education. Recommend follow up education.     Interdisciplinary Communication:spoke with OT regarding progress towards goals    Seizure, Other (comment)(falls)    Outcome Measures:     Cognition: Orientation:A+O x 2  Communication:able to express needs verbally, able to follow single step commands with multiple and frequent cues  Social Interaction:cooperating, appropriate with PT, participating, motivated to improve  Problem Solving:difficulty with managing body mechanics during functional mobility with delayed processing and motor planning deficits  Memory:multiple cues to recall safe body mechanics during functional mobility       BED/CHAIR/WHEELCHAIR TRANSFERS Initial Assessment Weekly Progress Assessment 1/11/2021   Rolling Right 5 (Supervision) 6 (Modified independent)   Rolling Left 5 (Supervision) 6 (Modified independent)   Supine to Sit 5 (Supervision) 5 (Supervision)   Sit to Stand Contact guard assistance Stand-by assistance   Sit to Supine 5 (Supervision) 5 (Supervision)   Transfer Type SPT without device SPT without device   Comments min A for safety w/ balance w/ short, shuffling steps observed   Increased time and effort to complete with cues for body mechanics     Car Transfer Not tested Not tested   Car Type         GROSS ASSESSMENT Weekly Progress Assessment 1/11/2021   AROM  Bilateral LE generally decreased, functional   Strength  Bilateral LE generally decreased, functional   Coordination  LE fine motor control impaired   Tone  LE normal   Sensation  LE intact   PROM  Generally decreased, functional     POSTURE Weekly Progress Assessment 1/11/2021   Posture (WDL) Exceptions to WDL   Posture Assessment Forward head;Rounded shoulders;Trunk flexion     WHEELCHAIR MOBILITY/MANAGEMENT Initial Assessment Weekly Progress Assessment 1/11/2021   Able to Propel 0 feet  NA   Curbs/ramps assistance required 0 (Not tested) 0 (Not tested)   Wheelchair set up assistance required 0 (Not tested)  NA   Wheelchair management    NA     WALKING INDEPENDENCE Initial Assessment Weekly Progress Assessment 1/11/2021   Assistive device Other (comment)(no A/D) Gait belt   Ambulation assistance - level surface 3 (Moderate assistance)  SBA with cues to correct gait deviations   Distance 50 Feet (ft) 175 Feet (ft)   Comments Pt. w/ flexed posture, shuffle step gait pattern, increased B stance time & 1 posterior LOB reuqiring mod A to correct Multiple and frequent cues to correct gait deviations     GAIT Weekly Progress Assessment 1/11/2021   Gait Description (WDL)  slow cont step through gait pattern with occasional mild ataxia   Gait Abnormalities  decreased step length and gait speed with decreased ankle DF and knee ext at initial contact. STEPS/STAIRS Initial Assessment Weekly Progress Assessment 1/11/2021   Steps/Stairs ambulated 0 8(4 steps x 2 with 3 min sitting rest break between)   [employer] Both(on one attempt, one on second attempt) Left (going up and right going down.)   Comments    slow reciprocating pattern going up steps and single step at a time going down steps.  Cues for improve step clearance and safe foot placement   Curbs/Ramps 0 (Not tested)  Up/down 3 inch step with hand held assist and cues for gait sequence     STANDING EXERCISES Sets Reps Comments Increased time and effort to complete with multiple and frequent rest breaks. Cues for correct form. Able to complete with SBA and verbal/visual cues, UE support on counter top     Heel Raises 1 10    Toe Raises 1 10    Hip Flexion/Marching 1 10    Hip Abduction 1 10    Mini Squats 3 10             Assessment: Patient progressing towards goals. Patient has met LTG for stairs and progressing towards remaining LTGs with potential to meet goals by D/C. Overall functional endurance and strength improving. Gait pattern and balance improving with patient progressing to gait without a device and SBA. Processing speed during functional mobility slowly improving but cont to require cues for safe body mechanics during functional mobility. Anticipate patient will need 24 hour supervision at home due to decreased safety awareness. Recommend family training prior to D/C and follow up [place] PT. Patient return to room at end of treatment and in bathroom on commode with call light in reach. RN to assist patient out of bathroom    Plan of Care: weekly assessment completed. Goals updated and revised. Please see Care Plan for updated LTGs.     Family Training: Needs to be scheduled    Jade Santiago, PT  1/11/2021

## 2021-01-11 NOTE — PROGRESS NOTES
OT WEEKLY PROGRESS NOTE    Time In: 0700  Time Out: 0743    Mobility   Score Comments   Rolling 6: Independent Side: Bilateral   Supine to Sit 6: Independent    Sit to Stand 4: Supervision or touching A S   Transfer Assist 4: Supervision or touching A Transfer Type: SPT   Equipment: Rolling Walker   Comments: SBA     Activities of Daily Living    Score Comments   Eating 6: Independent    Oral Hyigene 6: Independent    Bathing 4: Supervision or touching A Type of Shower: Shower  Position: Supported sitting and Standing PRN   Adaptive  Equipment: Tub Transfer Bench, Grab Bars and W/C  Comments: S   Upper Body  Dressing 5: S/U or clean-up assist Items Applied: Pullover  Position: Supported Sitting   Lower Body Dressing 4: Supervision or touching A Items Applied: Underwear and Elastic pants  Position: Supported sitting and Standing PRN  Adaptive Equipment: W/C  Comments: SBA   Donning/[place] Footwear 4: Supervision or touching A Items Applied: Socks and Shoes with fasteners   Adaptive Equipment: N/A  Comments: A for orientation of socks. S/U of shoes. Toilet Transfer 4: Supervision or touching A Transfer Type: SPT   Equipment: Grab Bars   Comments: [facility] 4: Supervision or touching A Output: Urine  Comments: S   Education  Benefits of OT       Plan of Care: Please see Care Plan for updated LTGs. Family Training: Will schedule closer to discharge    Summary of Progress: Pt has demonstrated good progress towards goals over LOS. Pt has shown improvement with ADL independence, functional transfers, and coordination tasks. Pt continues to have deficits with cognition, initiation of tasks, and safety awareness. Pt continues to benefit from skilled occupational therapy services to address areas of deficits to increase independence to return home. Summary of Session: Pt demonstrated good participation in OT tasks during the session. Pt's performance with ADL is reflected in above chart.  Pt was left in w/c with call bell within reach and all needs met.      Nova Parr MS OTR/L  1/11/2021

## 2021-01-11 NOTE — PROGRESS NOTES
OT Daily Note  Time In 1030   Time Out 1110     Subjective: \"I'm pretty tired. \" Pt was agreeable to tx. Pain: Patient had no complaint of pain. Functional Mobility      Score Comments   Supine to Sit 6: Independent    Transfer Assist 4: Supervision or touching A Transfer Type: SPT   Equipment: N/A   Comments: S        Coordination   Pt engaged with small nuts  with a focus of using BUE to promote fine motor coordination, strength, and bilateral coordination. Pt worked with tweezers and nuts for accuracy placement tasks. Pt demonstrated good ability to place nuts into holes with good accuracy. Good strength demonstrated. Cognition   Pt engaged in game of [place] with a deck of cards. Therapist set up game. Pt demonstrated good ability to move cards onto correct piles from draw pile and regular playing pile. Pt demonstrated difficulty recalling to turn the playing card over after a card was removed. Upon using all cards from the draw pile, cards were flipped back over and Pt required verbal cues on how to re-engage in game play. Deficits with sequencing were noted during the game. Good initiation of the tasks. Education   Benefits of OT     Interdisciplinary Communication: Communicated with PT on fatigue levels. Summary of session: Pt demonstrated good participation in session. Pt required increased need for assistance with sequencing during [place] game, as opposed to previous sessions where it was occasional verbal cues. Pt was left in w/c with call bell within reach and all needs met. Plan: Continue with POC.      Janelle Burgos MS OTR/L  1/11/2021

## 2021-01-12 PROCEDURE — 92507 TX SP LANG VOICE COMM INDIV: CPT

## 2021-01-12 PROCEDURE — 97116 GAIT TRAINING THERAPY: CPT

## 2021-01-12 PROCEDURE — 82962 GLUCOSE BLOOD TEST: CPT

## 2021-01-12 PROCEDURE — 74011636637 HC RX REV CODE- 636/637: Performed by: PHYSICAL MEDICINE & REHABILITATION

## 2021-01-12 PROCEDURE — 97535 SELF CARE MNGMENT TRAINING: CPT

## 2021-01-12 PROCEDURE — 97110 THERAPEUTIC EXERCISES: CPT

## 2021-01-12 PROCEDURE — 74011250637 HC RX REV CODE- 250/637: Performed by: PHYSICAL MEDICINE & REHABILITATION

## 2021-01-12 PROCEDURE — 97530 THERAPEUTIC ACTIVITIES: CPT

## 2021-01-12 PROCEDURE — 74011250636 HC RX REV CODE- 250/636: Performed by: PHYSICAL MEDICINE & REHABILITATION

## 2021-01-12 PROCEDURE — 99232 SBSQ HOSP IP/OBS MODERATE 35: CPT | Performed by: PHYSICAL MEDICINE & REHABILITATION

## 2021-01-12 PROCEDURE — 65310000000 HC RM PRIVATE REHAB

## 2021-01-12 RX ADMIN — LEVETIRACETAM 1000 MG: 500 SOLUTION ORAL at 08:25

## 2021-01-12 RX ADMIN — DEXAMETHASONE 4 MG: 4 TABLET ORAL at 21:08

## 2021-01-12 RX ADMIN — PANTOPRAZOLE SODIUM 40 MG: 40 TABLET, DELAYED RELEASE ORAL at 06:21

## 2021-01-12 RX ADMIN — SERTRALINE HYDROCHLORIDE 150 MG: 50 TABLET ORAL at 08:25

## 2021-01-12 RX ADMIN — INSULIN LISPRO 2 UNITS: 100 INJECTION, SOLUTION INTRAVENOUS; SUBCUTANEOUS at 21:08

## 2021-01-12 RX ADMIN — DEXAMETHASONE 4 MG: 4 TABLET ORAL at 06:20

## 2021-01-12 RX ADMIN — FUROSEMIDE 20 MG: 20 TABLET ORAL at 08:25

## 2021-01-12 RX ADMIN — DOCUSATE SODIUM 50MG AND SENNOSIDES 8.6MG 1 TABLET: 8.6; 5 TABLET, FILM COATED ORAL at 08:25

## 2021-01-12 RX ADMIN — AMLODIPINE BESYLATE 10 MG: 5 TABLET ORAL at 08:25

## 2021-01-12 RX ADMIN — DEXAMETHASONE 4 MG: 4 TABLET ORAL at 14:55

## 2021-01-12 RX ADMIN — LEVETIRACETAM 1000 MG: 500 SOLUTION ORAL at 17:00

## 2021-01-12 RX ADMIN — DOCUSATE SODIUM 50MG AND SENNOSIDES 8.6MG 1 TABLET: 8.6; 5 TABLET, FILM COATED ORAL at 17:00

## 2021-01-12 RX ADMIN — LOSARTAN POTASSIUM 50 MG: 50 TABLET, FILM COATED ORAL at 08:25

## 2021-01-12 RX ADMIN — TAMSULOSIN HYDROCHLORIDE 0.4 MG: 0.4 CAPSULE ORAL at 08:25

## 2021-01-12 NOTE — PROGRESS NOTES
Herson Aguilera MD  Medical Director  [address] [facility], [address] [facility]  Tel: [phone]       [facility] PROGRESS NOTE    Denise Cristobal [facility]  Admit Date: 1/4/2021  Admit Diagnosis:   Brain mass [G93.89]    Subjective     Patient seen and examined. Feeling well. Affect blunted. No headache. Denies visual changes or change in functional status. Pt reports that Flomax has helped with the frequent voids. RN reports that he did not get up as much overnoc.  To void    Objective:     Current Facility-Administered Medications   Medication Dose Route Frequency    tamsulosin (FLOMAX) capsule 0.4 mg  0.4 mg Oral DAILY    levETIRAcetam (KEPPRA) tablet 1,000 mg  1,000 mg Oral Q12H    senna-docusate (PERICOLACE) 8.6-50 mg per tablet 1 Tab  1 Tab Oral BID    dexAMETHasone (DECADRON) tablet 4 mg  4 mg Oral Q8H    alum-mag hydroxide-simeth (MYLANTA) oral suspension 30 mL  30 mL Oral Q4H PRN    amLODIPine (NORVASC) tablet 10 mg  10 mg Oral DAILY    carboxymethylcellulose sodium (REFRESH LIQUIGEL) 1 % ophthalmic solution 1 Drop  1 Drop Both Eyes QID PRN    furosemide (LASIX) tablet 20 mg  20 mg Oral DAILY    insulin lispro (HUMALOG) injection   SubCUTAneous AC&HS    losartan (COZAAR) tablet 50 mg  50 mg Oral DAILY    magnesium hydroxide (MILK OF MAGNESIA) 400 mg/5 mL oral suspension 30 mL  30 mL Oral DAILY PRN    ondansetron (ZOFRAN ODT) tablet 4 mg  4 mg Oral Q6H PRN    pantoprazole (PROTONIX) tablet 40 mg  40 mg Oral ACB    traZODone (DESYREL) tablet 25 mg  25 mg Oral QHS PRN    acetaminophen (TYLENOL) tablet 650 mg  650 mg Oral Q6H PRN    Or    acetaminophen (TYLENOL) suppository 650 mg  650 mg Rectal Q6H PRN    polyethylene glycol (MIRALAX) packet 17 g  17 g Oral DAILY PRN    sodium chloride (NS) flush 5-40 mL  5-40 mL IntraVENous PRN    naloxone (NARCAN) injection 0.4 mg  0.4 mg IntraVENous PRN    sertraline (ZOLOFT) tablet 150 mg  150 mg Oral DAILY    HYDROcodone-acetaminophen (NORCO) 5-325 mg per tablet 1 Tab  1 Tab Oral Q4H PRN       Review of Systems:   Denies chest pain, shortness of breath, cough, headache, visual problems, abdominal pain, dysuria, calf pain. Pertinent positives are as noted in the HPI, ROS unremarkable otherwise. Visit Vitals  /73 (BP 1 Location: Left arm, BP Patient Position: Supine; At rest)   Pulse 71   Temp 97.9 °F (36.6 °C)   Resp 18   Wt 187 lb 11.2 oz (85.1 kg)   SpO2 98%   BMI 26.93 kg/m²        Physical Exam:   General: Alert and age appropriately oriented to person/place/situation, not time  No acute cardiorespiratory distress. HEENT: Normocephalic, no scleral icterus. Oral mucosa moist without cyanosis. Right scalp inc; sutures intact, dry, no erythema; healing very well   Lungs: Clear to auscultation bilaterally. Respiration even and unlabored. Heart: Regular rate and rhythm, S1, S2. No murmurs, clicks, rub or gallops. Abdomen: Soft, non-tender, not distended. Bowel sounds normoactive. No organomegaly. Genitourinary: Deferred. Neuromuscular:      fcs with mult vcs, dec problem solving, dec insight. [place]  Generalized prox>distal weakness  Sensation intact  PERRLA, EOMI   Skin/extremity: No rashes, no erythema. No calf tenderness B LE.   No edema                                                                              Functional Assessment:          Balance  Sitting - Static: Good (unsupported) (01/11/21 1600)  Sitting - Dynamic: Fair (occasional) (01/11/21 1600)  Standing - Static: Fair (01/11/21 1600)  Standing - Dynamic : Impaired (01/11/21 1600)                     Elyssa Bare Fall Risk Assessment:  Elyssa Johnson Fall Risk  Mobility: (P) Ambulates or transfers with assist devices or assistance (01/11/21 2005)  Mobility Interventions: (P) Utilize walker, cane, or other assistive device (01/11/21 2005)  Mentation: (P) Periodic confusion (01/11/21 2005)  Mentation Interventions: (P) Bed/chair exit alarm; Door open when patient unattended (01/11/21 2005)  Medication: (P) Patient receiving anticonvulsants, sedatives(tranquilizers), psychotropics or hypnotics, hypoglycemics, narcotics, sleep aids, antihypertensives, laxatives, or diuretics (01/11/21 2005)  Medication Interventions: (P) Bed/chair exit alarm; Patient to call before getting OOB (01/11/21 2005)  Elimination: (P) Needs assistance with toileting (01/11/21 2005)  Elimination Interventions: (P) Bed/chair exit alarm;Call light in reach (01/11/21 2005)  Prior Fall History: (P) Before admission in past 12 months _home or previous inpatient care) (01/11/21 2005)  History of Falls Interventions: (P) Bed/chair exit alarm; Door open when patient unattended (01/11/21 2005)  Total Score: (P) 5 (01/11/21 2005)  Standard Fall Precautions: Yes (01/05/21 0723)  High Fall Risk: (P) Yes (01/11/21 2005)     Speech Assessment:  [place] Signs/Symptoms: None (01/05/21 1020)      Ambulation:  Gait  Distance (ft): 200 Feet (ft) (01/11/21 1600)  Assistive Device: (No device) (01/11/21 1600)  Rail Use: None (01/11/21 1600)     Labs/Studies:  Recent Results (from the past 72 hour(s))   CBC W/O DIFF    Collection Time: 01/11/21  4:50 AM   Result Value Ref Range    WBC 14.7 (H) 4.3 - 11.1 K/uL    RBC 4.11 (L) 4.23 - 5.6 M/uL    HGB 12.8 (L) 13.6 - 17.2 g/dL    HCT 37.3 (L) 41.1 - 50.3 %    MCV 90.8 79.6 - 97.8 FL    MCH 31.1 26.1 - 32.9 PG    MCHC 34.3 31.4 - 35.0 g/dL    RDW 12.2 11.9 - 14.6 %    PLATELET 445 984 - 513 K/uL    MPV 10.2 9.4 - 12.3 FL    ABSOLUTE NRBC 0.00 0.0 - 0.2 K/uL       Assessment:     Problem List as of 1/12/2021 Date Reviewed: 9/28/2020          Codes Class Noted - Resolved    * (Principal) Brain mass ICD-10-CM: G93.89  ICD-9-CM: 348.89  1/4/2021 - Present        Cerebral edema ([address] ICD-10-CM: G93.6  ICD-9-CM: 348.5  12/31/2020 - Present        Seizures ([facility] ICD-10-CM: R56.9  ICD-9-CM: 780.39  12/28/2020 - Present        Essential hypertension (Chronic) ICD-10-CM: I10  ICD-9-CM: 401.9  12/26/2020 - Present        Brain tumor ([facility] ICD-10-CM: D49.6  ICD-9-CM: 239.6  12/26/2020 - Present        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  ICD-9-CM: 348.89  12/26/2020 - Present        ICH (intracerebral hemorrhage) ([facility] ICD-10-CM: I61.9  ICD-9-CM: 772  12/25/2020 - Present        History of lumbar laminectomy for spinal cord decompression ICD-10-CM: Z98.890  ICD-9-CM: V45.89  2/18/2019 - Present        Idiopathic scoliosis ICD-10-CM: M41.20  ICD-9-CM: 737.30  6/7/2018 - Present        HTN (hypertension), benign ICD-10-CM: I10  ICD-9-CM: 401.1  2/15/2017 - Present        Rosacea ICD-10-CM: L71.9  ICD-9-CM: 695.3  2/15/2017 - Present        MEGGAN (generalized anxiety disorder) ICD-10-CM: F41.1  ICD-9-CM: 300.02  12/9/2014 - Present        Arthritis ICD-10-CM: M19.90  ICD-9-CM: 716.90  12/9/2014 - Present        Idiopathic chronic gout of right ankle ICD-10-CM: M1A.0710  ICD-9-CM: 274.02  12/9/2014 - Present        Mixed hyperlipidemia ICD-10-CM: E78.2  ICD-9-CM: 272.2  12/9/2014 - Present        Primary insomnia ICD-10-CM: F51.01  ICD-9-CM: 307.42  12/9/2014 - Present        Panic disorder ICD-10-CM: F41.0  ICD-9-CM: 300.01  12/9/2014 - Present        RESOLVED: Acute renal failure (ARF) ([facility] ICD-10-CM: N17.9  ICD-9-CM: 584.9  12/26/2020 - 12/28/2020        RESOLVED: Recurrent depression ([facility] ICD-10-CM: F33.9  ICD-9-CM: 296.30  8/16/2018 - 8/19/2019        RESOLVED: Lumbar stenosis with neurogenic claudication ICD-10-CM: M48.062  ICD-9-CM: 724.03  6/7/2018 - 9/28/2020        RESOLVED: Depression ICD-10-CM: F32.9  ICD-9-CM: 671  12/9/2014 - 2/15/2017            S/p right craniotomy for right parietal lobe tumor resection, s/p intraoperative Gliadel (BCNU) chemotherapeutic wafer placement     Plan / Recommendations / Medical Decision Making:      Continue daily physician / PA medical management:     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 Did undergo intraoperative placement of Gliadel (BCNU) chemotherapeutic wafers. Await pathology. Will likely need Onc / Rad Onc referral.  -1/8 pathology still pending  -1/11 Dr. Carlos Voss / Dr. Irlanda Portillo to discuss pathology with patient / family; 1/12 d/w Dr Irlanda Portillo. 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 Dr Irlanda Portillo prefers to address as an outpt. Plans for possible dc End of week     Seizures - management with Keppra, tolerating current dose. No post-op seizures  -1/5 LFTs sltly increased; recheck 1/8. Not done, only BMP, add on LFTs  -1/9  > 69, trending down, AST 15, now wnml  -1/11 after lengthy investigation as why Keppra dose is liquid (no reason identified), will change to tablets starting PM dose (per PA)  -1/12 spoke with nursing; pt has to crush pills in applesauce. Keppra tablet is lg and has difficulty; pt requests liquid      Cerebral edema - s/p tumor resection. Continue Decadron 4mg po QID; slow taper. -1/7 will dec Decadron to 4mg q 8hrs     Encephalopathy - multifactorial, improving.     Dysphagia - post-op feeding difficulties. Modified barium swallow 1/4 unremarkable.     Hypertension - BP controlled, fluctuating, managed medically. Continue Norvasc 10mg daily, Lasix 20mg daily, Cozaar 50mg daily. -1/5 BP 112-138/63-74; controlled  -1/8 VSS  -1/10 HR and BP acceptable  -1/11 /66, HR 65  -vss     Prerenal azotemia - BUN 35; push PO fluids and monitor. -1/5 creatinine normal at 1.12  -1/7 recheck in a.m.  -1/8 BUN 28 (35), creat stable, unchanged, at 1.12; cont to push po fluids; 1/12 bmp in a.m.     Steroid-induced hyperglycemia - uncontrolled / poor glycemic control. Will require daily, close FSG monitoring and medication adjustment to optimize glycemic control in setting of acute illness and hospitalization. Currently on SSI. Will check HgbA1c, no reported history of diabetes.   -1/5 HgbA1c 6.0% (12/25/2020)  -1/8 glucose in ; 1/12 dc accu checks.      Leukocytosis - likely steroid-induced. No active fever or s/sx of infection; will monitor.   -1/5 WBC 15.3; recheck 1/8; Pending  -1/9 WBC 16.3, afebrile  -1/11 no s/sx infection; wbc improved at 14.7      Pain control - stable, mild-to-moderate joint symptoms intermittently, reasonably well controlled by PRN meds. Will require regular pain assessment and comprenhensive pain management, continue APAP and PRN Norco; long standing hx of chronic low back pain due to spinal stenosis. -1/7 no specific complaints. Intermittent headache, stiff lower back. No radiating pain  -1/11 no pain complaints     Pneumonia prophylaxis - incentive spirometer every hour while awake.     DVT risk / DVT prophylaxis - will require daily physician / PA exam to assess for signs and symptoms as patient is at increased risk for of thromboembolism. Mobilize as tolerated. Sequential pneumatic compression devices (SCDs) when in bed; thigh-high or knee-high thromboembolic deterrent hose when out of bed. No OAC or SQ Lovenox / heparin due to craniotomy and hemorrhage.     Hx of depression - previously on Zoloft. At higher risk of depression and further anxiety due to diagnosis and hospitalization. PRN Klonopin if needed. Coping well. Wife supportive.  -continue Zoloft 150mg daily. Coping well     General skin care / wound prevention - monitor incision and general skin wound status daily per staff and physician / PA. At risk for failure. Will require 24/7 rehab nursing. Keep wound clean and dry; scalp incision looks great / sutures / dry / no swelling or erythema.  -1/6 posterior scalp incision intact with sutures, clean and dry without edema, erythema. D/C sutures 1/12  -1/11 incision C/D/I with sutures, no erythema or edema     Urinary retention / neurogenic bladder - schedule voids q 6-8 hrs.  Check post-void residual as needed; in and out catheter if post-void residual is more than 400ml.     Bowel program - at risk for constipation as a side effect of opioids, iron and calcium supplements and other meds. MiraLAX daily for regularity, Brigida-Colace for stool softener. PRN MOM, bisacodyl suppository or tablets for constipation. -1/9 Senokot advanced to BID     GERD - resume PPI. At times may need additional antacids, Maalox prn.        Time spent was 25 minutes with over 1/2 in direct patient care/examination, consultation and coordination of care.      Signed By: Gloria Cornejo MD     January 12, 2021

## 2021-01-12 NOTE — PROGRESS NOTES
OT Daily Note  Time In 1407   Time Out 1430     Subjective: : \"I'm a little tired today. \" Pt was agreeable to tx. Pain: Patient had no complaint of pain. Functional Mobility      Score Comments   Rolling 6: Independent Side: Right   Supine to Sit 6: Independent    Sit to Stand 4: Supervision or touching A S   Transfer Assist 4: Supervision or touching A Transfer Type: SPT   Equipment: N/A   Comments: S        Strengthening   Pt completed the following exercises to promote UB strength, activity tolerance, and shoulder stabilization for integration into ADLs and Transfers:  Exercise Sets Reps Comments   Overhead Press 2 10 3lb weighted bar   Bicep Curls 2 10 4lb weighted bar   Canoe Paddle 2 10 3lb weighted bar                Education   Benefits of OT     Interdisciplinary Communication: Communicated with PT on handoff. Summary of session: Pt demonstrated good participation in session. Pt reported he was tired during the session. Wife was present for session in the gym. Pt was left in w/c awaiting PT for session. Plan: Continue with POC.      Jaron Almodovar MS OTR/L  1/12/2021

## 2021-01-12 NOTE — PROGRESS NOTES
01/12/21 1303   Time Spent With Patient   Time In 1125   Time Out 1202   Patient Seen For: AM   Mental Status   Neurologic State Alert;Confused   Cognition Decreased command following; Impaired decision making;Memory loss   Perseveration Perseverates during conversation   Safety/Judgement Home safety;Decreased insight into deficits        01/12/21 1303   Neuro-Linguistic Exercises   Verbal Problem Solving Impaired; decreased reasoning to navigate phone operations   Memory Impaired; significantly impaired   Attention  Impaired; benefits from brief one step instructions     Patient expressed \"getting used to my new laptop\" as goal that will make him happy when he gets home. Patient reported finding phone numbers quickly as a grievance when using his current iPhone. Assisted patient to save his daughter's phone number under favorites to easily locate (wife's phone number was already set up). Also noted two unchecked voicemails with patient requiring moderate cues initially to be able to navigate to listen to the messages. Practiced tapping appropriate icons for patient to locate family members phone numbers and to check his voicemails with patient requiring assistance on 3 of 5 attempts. Wrote key information down related to cell phone usage for future reference. Patient requesting to use the bathroom at the conclusion of the session with staff notified.     Luis Lux MS, CCC-SLP

## 2021-01-12 NOTE — PROGRESS NOTES
PHYSICAL THERAPY DAILY NOTE  Time In: (P) 1434  Time Out: (P) 1528  Patient Seen For: (P) PM;Transfer training;Gait training; Therapeutic exercise; Other (see progress notes)    Subjective: Patient had no complaints this afternoon. Wife present for afternoon sesion. Objective: NO pain noted. Wife shown how to assist patient on / off floor. Seizure, Other (comment)(falls)  GROSS ASSESSMENT Daily Assessment            COGNITION Daily Assessment           BED/MAT MOBILITY Daily Assessment    Supine to Sit : (P) 5 (Supervision)  Sit to Supine : (P) 5 (Supervision)       TRANSFERS Daily Assessment    Transfer Type: (P) SPT with walker  Transfer Assistance : (P) 5 (Supervision/setup)  Sit to Stand Assistance: (P) Supervision  Car Transfers: (P) Not tested       GAIT Daily Assessment    Amount of Assistance: (P) 5 (Supervision/setup)  Distance (ft): (P) 200 Feet (ft)  Assistive Device: (P) Walker, rolling       STEPS or STAIRS Daily Assessment    Steps/Stairs Ambulated (#): (P) 8  Level of Assist : (P) 4 (Contact guard assistance)  Rail Use: (P) Left        BALANCE Daily Assessment            WHEELCHAIR MOBILITY Daily Assessment            LOWER EXTREMITY EXERCISES Daily Assessment   Practiced and instructed wife on how to assist patient on and off floor. Extremity: (P) Both  Exercise Type #1: (P) Standing lower extremity strengthening  Sets Performed: (P) 1  Reps Performed: (P) 10  Level of Assist: (P) Contact guard assistance          Assessment: Patient making good progress. Plan of Care: Continue with plan of care.     NazarioAbrazo West Campusadia Ca, PTA  1/12/2021

## 2021-01-12 NOTE — PROGRESS NOTES
OT Daily Note    Time In 0748   Time Out 0817     Subjective: \"I think I slept pretty good. \" Pt was agreeable to tx. Pain: Pt reported no pain. Mobility   Score Comments   Sit to Stand 4: Supervision or touching A S   Transfer Assist 4: Supervision or touching A Transfer Type: SPT   Equipment: Rolling Walker   Comments: SBA     Activities of Daily Living    Score Comments   Eating 6: Independent    Upper Body  Dressing 5: S/U or clean-up assist Items Applied: Pullover  Position: Unsupported Sitting   Lower Body Dressing 4: Supervision or touching A Items Applied: Underwear and Elastic pants  Position: Standing PRN and Unsupported Sitting  Adaptive Equipment: N/A   Donning/[place] Footwear 5: S/U or clean-up assist Items Applied: Socks and Shoes with fasteners   Adaptive Equipment: N/A   Toilet Transfer 4: Supervision or touching A Transfer Type: SPT   Equipment: Rolling Walker   Comments: [facility] 4: Supervision or touching A Output: Urine  Comments: S   Education  Benefits of OT     Interdisciplinary Communication: Communicated with RN about hospital bracelet falling off. Summary of Session: Pt demonstrated good participation in OT tasks during this session. Pt's performance with ADL is reflected in above chart. Pt was left in w/c with call bell within reach and all needs met. Plan: Continue per OT MAHSA.        Sirena Fees MS OTR/L  1/12/2021

## 2021-01-12 NOTE — PROGRESS NOTES
PHYSICAL THERAPY DAILY NOTE  Time In: (P) 0830  Time Out: (P) 0919  Patient Seen For: (P) AM;Transfer training;Gait training; Therapeutic exercise; Other (see progress notes)    Subjective: Patient complained of feeling tired. Objective: No pain noted just fatigue. Seizure, Other (comment)(falls)  GROSS ASSESSMENT Daily Assessment            COGNITION Daily Assessment           BED/MAT MOBILITY Daily Assessment    Supine to Sit : (P) 7 (Independent)  Sit to Supine : (P) 7 (Independent)       TRANSFERS Daily Assessment    Transfer Type: (P) SPT without device  Transfer Assistance : (P) 5 (Stand-by assistance)  Sit to Stand Assistance: (P) Stand-by assistance  Car Transfers: (P) Not tested       GAIT Daily Assessment    Amount of Assistance: (P) 4 (Contact guard assistance)  Distance (ft): (P) 200 Feet (ft)  Assistive Device: (P) Other (comment)(no device)       STEPS or STAIRS Daily Assessment    Level of Assist : (P) 0 (Not tested)       BALANCE Daily Assessment            WHEELCHAIR MOBILITY Daily Assessment            LOWER EXTREMITY EXERCISES Daily Assessment    Extremity: (P) Both  Exercise Type #1: (P) Seated lower extremity strengthening  Sets Performed: (P) 2  Reps Performed: (P) 10  Level of Assist: (P) Stand-by assistance          Assessment: Patient making good progress. Plan of Care: Continue with plan of care.     Castillo Corral, ADONIS  1/12/2021

## 2021-01-13 LAB
ANION GAP SERPL CALC-SCNC: 9 MMOL/L (ref 7–16)
BUN SERPL-MCNC: 28 MG/DL (ref 8–23)
CALCIUM SERPL-MCNC: 8.5 MG/DL (ref 8.3–10.4)
CHLORIDE SERPL-SCNC: 105 MMOL/L (ref 98–107)
CO2 SERPL-SCNC: 27 MMOL/L (ref 21–32)
CREAT SERPL-MCNC: 1.19 MG/DL (ref 0.8–1.5)
GLUCOSE BLD STRIP.AUTO-MCNC: 114 MG/DL (ref 65–100)
GLUCOSE BLD STRIP.AUTO-MCNC: 127 MG/DL (ref 65–100)
GLUCOSE BLD STRIP.AUTO-MCNC: 128 MG/DL (ref 65–100)
GLUCOSE BLD STRIP.AUTO-MCNC: 138 MG/DL (ref 65–100)
GLUCOSE BLD STRIP.AUTO-MCNC: 139 MG/DL (ref 65–100)
GLUCOSE BLD STRIP.AUTO-MCNC: 140 MG/DL (ref 65–100)
GLUCOSE BLD STRIP.AUTO-MCNC: 161 MG/DL (ref 65–100)
GLUCOSE BLD STRIP.AUTO-MCNC: 167 MG/DL (ref 65–100)
GLUCOSE BLD STRIP.AUTO-MCNC: 176 MG/DL (ref 65–100)
GLUCOSE SERPL-MCNC: 115 MG/DL (ref 65–100)
POTASSIUM SERPL-SCNC: 3.8 MMOL/L (ref 3.5–5.1)
SODIUM SERPL-SCNC: 141 MMOL/L (ref 136–145)

## 2021-01-13 PROCEDURE — 97116 GAIT TRAINING THERAPY: CPT

## 2021-01-13 PROCEDURE — 74011250637 HC RX REV CODE- 250/637: Performed by: PHYSICAL MEDICINE & REHABILITATION

## 2021-01-13 PROCEDURE — 99232 SBSQ HOSP IP/OBS MODERATE 35: CPT | Performed by: PHYSICAL MEDICINE & REHABILITATION

## 2021-01-13 PROCEDURE — 74011636637 HC RX REV CODE- 636/637: Performed by: PHYSICAL MEDICINE & REHABILITATION

## 2021-01-13 PROCEDURE — 80048 BASIC METABOLIC PNL TOTAL CA: CPT

## 2021-01-13 PROCEDURE — 97530 THERAPEUTIC ACTIVITIES: CPT

## 2021-01-13 PROCEDURE — 97110 THERAPEUTIC EXERCISES: CPT

## 2021-01-13 PROCEDURE — 92507 TX SP LANG VOICE COMM INDIV: CPT

## 2021-01-13 PROCEDURE — 82962 GLUCOSE BLOOD TEST: CPT

## 2021-01-13 PROCEDURE — 74011250636 HC RX REV CODE- 250/636: Performed by: PHYSICAL MEDICINE & REHABILITATION

## 2021-01-13 PROCEDURE — 36415 COLL VENOUS BLD VENIPUNCTURE: CPT

## 2021-01-13 PROCEDURE — 65310000000 HC RM PRIVATE REHAB

## 2021-01-13 PROCEDURE — 97535 SELF CARE MNGMENT TRAINING: CPT

## 2021-01-13 RX ORDER — AMOXICILLIN 250 MG
1 CAPSULE ORAL 2 TIMES DAILY
Qty: 60 TAB | Status: SHIPPED
Start: 2021-01-13

## 2021-01-13 RX ORDER — TRAZODONE HYDROCHLORIDE 50 MG/1
25 TABLET ORAL
Qty: 30 TAB | Refills: 1 | Status: SHIPPED | OUTPATIENT
Start: 2021-01-13 | End: 2021-02-25

## 2021-01-13 RX ORDER — HYDROCODONE BITARTRATE AND ACETAMINOPHEN 5; 325 MG/1; MG/1
1 TABLET ORAL
Qty: 30 TAB | Refills: 0 | Status: SHIPPED | OUTPATIENT
Start: 2021-01-13 | End: 2021-01-18

## 2021-01-13 RX ORDER — POLYETHYLENE GLYCOL 3350 17 G/17G
17 POWDER, FOR SOLUTION ORAL
Qty: 1 EACH | Status: SHIPPED
Start: 2021-01-13

## 2021-01-13 RX ORDER — PANTOPRAZOLE SODIUM 40 MG/1
40 TABLET, DELAYED RELEASE ORAL
Qty: 30 TAB | Refills: 1 | Status: SHIPPED | OUTPATIENT
Start: 2021-01-13 | End: 2021-03-08

## 2021-01-13 RX ORDER — TAMSULOSIN HYDROCHLORIDE 0.4 MG/1
0.4 CAPSULE ORAL DAILY
Qty: 30 CAP | Refills: 1 | Status: SHIPPED | OUTPATIENT
Start: 2021-01-14 | End: 2021-03-08

## 2021-01-13 RX ORDER — DEXAMETHASONE 4 MG/1
TABLET ORAL
Qty: 90 TAB | Refills: 0 | Status: SHIPPED | OUTPATIENT
Start: 2021-01-13 | End: 2021-01-20 | Stop reason: DRUGHIGH

## 2021-01-13 RX ORDER — ONDANSETRON 4 MG/1
4 TABLET, ORALLY DISINTEGRATING ORAL
Qty: 30 TAB | Refills: 1 | Status: SHIPPED | OUTPATIENT
Start: 2021-01-13 | End: 2021-04-26 | Stop reason: DRUGHIGH

## 2021-01-13 RX ADMIN — TRAZODONE HYDROCHLORIDE 25 MG: 50 TABLET ORAL at 21:49

## 2021-01-13 RX ADMIN — DEXAMETHASONE 4 MG: 4 TABLET ORAL at 04:26

## 2021-01-13 RX ADMIN — INSULIN LISPRO 2 UNITS: 100 INJECTION, SOLUTION INTRAVENOUS; SUBCUTANEOUS at 21:53

## 2021-01-13 RX ADMIN — TAMSULOSIN HYDROCHLORIDE 0.4 MG: 0.4 CAPSULE ORAL at 08:59

## 2021-01-13 RX ADMIN — PANTOPRAZOLE SODIUM 40 MG: 40 TABLET, DELAYED RELEASE ORAL at 04:27

## 2021-01-13 RX ADMIN — DOCUSATE SODIUM 50MG AND SENNOSIDES 8.6MG 1 TABLET: 8.6; 5 TABLET, FILM COATED ORAL at 08:59

## 2021-01-13 RX ADMIN — INSULIN LISPRO 2 UNITS: 100 INJECTION, SOLUTION INTRAVENOUS; SUBCUTANEOUS at 12:33

## 2021-01-13 RX ADMIN — LEVETIRACETAM 1000 MG: 500 SOLUTION ORAL at 17:55

## 2021-01-13 RX ADMIN — AMLODIPINE BESYLATE 10 MG: 5 TABLET ORAL at 08:59

## 2021-01-13 RX ADMIN — SERTRALINE HYDROCHLORIDE 150 MG: 50 TABLET ORAL at 08:59

## 2021-01-13 RX ADMIN — DEXAMETHASONE 4 MG: 4 TABLET ORAL at 21:49

## 2021-01-13 RX ADMIN — LEVETIRACETAM 1000 MG: 500 SOLUTION ORAL at 08:57

## 2021-01-13 RX ADMIN — LOSARTAN POTASSIUM 50 MG: 50 TABLET, FILM COATED ORAL at 08:59

## 2021-01-13 RX ADMIN — DEXAMETHASONE 4 MG: 4 TABLET ORAL at 15:17

## 2021-01-13 RX ADMIN — FUROSEMIDE 20 MG: 20 TABLET ORAL at 08:59

## 2021-01-13 NOTE — PROGRESS NOTES
PHYSICAL THERAPY DISCHARGE SUMMARY   TIME IN: 2173  Time out:1556   Precautions at discharge: Other (comment)(fall precautions)    Problem List:    Decreased strength B LE  [x]     Decreased strength trunk/core  [x]     Decreased AROM   []     Decreased PROM  []     Decreased balance sitting  []     Decreased balance standing  [x]     Decreased endurance  [x]     Pain  []       Functional Limitations:   Decreased independence with bed mobility  []     Decreased independence with functional transfers  [x]     Decreased independence with ambulation  [x]     Decreased independence with stair negotiation  [x]            Outcome Measures: Vital Signs:  Patient Vitals for the past 12 hrs:   Temp Pulse Resp BP SpO2   01/13/21 0721 97.8 °F (36.6 °C) 68 18 124/63 96 %     Pain level:No c/o pain during treatment  Pain location:NA  Pain interventions:NA    Patient education:,transfer training, gait training, balance training,fall precautions, body mechanics,activity pacing, Patient verbalizing understanding and demonstrating partial understanding of patient education. Recommend follow up education.     Interdisciplinary Communication:spoke with PTA regarding DME and D/C plans    Cognition: Orientation:A+O x 2  Communication:able to express needs verbally, able to follow single step commands  Social Interaction:cooperating, appropriate with PT, participating, motivated to improve  Problem Solving:difficulty with managing body mechanics during functional mobility  Memory: cues to recall safe body mechanics during functional mobility     MMT Initial Asssessment   Right Lower Extremity Left Lower Extremity   Hip Flexion 3+ 4   Knee Extension 4+ 5   Knee Flexion 4+ 5   Ankle Dorsiflexion 5 5      MMT Discharge Assessment   Right Lower Extremity Left Lower Extremity   Hip Flexion 4 4   Knee Extension 5 5   Knee Flexion 4+ 5   Ankle Dorsiflexion 5 5   0/5 No palpable muscle contraction  1/5 Palpable muscle contraction, no joint movement  2-/5 Less than full range of motion in gravity eliminated position  2/5 Able to complete full range of motion in gravity eliminated position  2+/5 Able to initiate movement against gravity  3-/5 More than half but not full range of motion against gravity  3/5 Able to complete full range of motion against gravity  3+/5 Completes full range of motion against gravity with minimal resistance  4-/5 Completes full range of motion against gravity with minimal-moderate resistance  4/5 Completes full range of motion against gravity with moderate resistance  4+/5 Completes full range of motion against gravity with moderate-maximum resistance  5/5 Completes full range of motion against gravity with maximum resistance     AROM: Bilateral LE generally decreased, functional    PRIMARY MODE OF LOCOMOTION: ambulation  Please see IRC Interdisciplinary Eval: Coordination/Balance Section for details regarding FIM score description.     BED/CHAIR/WHEELCHAIR TRANSFERS Initial Assessment Discharge Assessment   Rolling Right 5 (Supervision) 6 (Modified independent)   Rolling Left 5 (Supervision) 6 (Modified independent)   Supine to Sit 5 (Supervision) 5 (Supervision)   Sit to Stand Contact guard assistance Stand-by assistance   Sit to Supine 5 (Supervision) 5 (Supervision)   Transfer Type SPT without device SPT without device   Comments min A for safety w/ balance w/ short, shuffling steps observed Increased time and effort to complete bed mobility and transfers   Car Transfer Not tested Not tested   Car Type           WHEELCHAIR MOBILITY/MANAGEMENT Initial Assessment Discharge Assessment   Able to Propel 0 feet     Functional Level       Curbs/ramps assistance required 0 (Not tested) 0 (Not tested)   Wheelchair set up assistance required 0 (Not tested) 0 (Not tested)   Wheelchair management           WALKING INDEPENDENCE Initial Assessment Discharge Assessment   Assistive device Other (comment)(no A/D) Walker, rolling Ambulation assistance - level surface 3 (Moderate assistance) 5 (Supervision)   Distance 50 Feet (ft) 80 Feet (ft)   Comments Pt. w/ flexed posture, shuffle step gait pattern, increased B stance time & 1 posterior LOB reuqiring mod A to correct Cont step through gait pattern with decrased gait speed, occasional mild ataxia, decreased ankle DF and knee ext at initial contact. gait training x 150ft x 1 without a device and SBA with cues for balance control. Ambulation assistance - unlevel surface 0 (Not tested) 4 (Minimal assistance)(10 ft x 2 on 2 inch foam mat)       STEPS/STAIRS Initial Assessment Discharge Assessment   Steps/Stairs ambulated 0 12(4 steps x 3)   Rail Use Both(on one attempt, one on second attempt) Both(Both w/ 1st 4,single hand rail w/ 4 steps x 2, L going up)   Functional Level       Comments SLow reciprocating pattern going up steps and single step a at time going down steps. Increased time and effort to complete with cues for improved step clearance and foot placement SLow reciprocating pattern going up steps and single step a at time going down steps. Increased time and effort to complete with cues for improved step clearance and foot placement   Curbs/Ramps 0 (Not tested) 4 (Minimal assistance)(up/down 6 inch step with RW and without RW)       QUALITY INDICATOR ASSIST COMMENTS   Roll right (&return to back) 6: Independent    Roll left (& return to back) 6:  Independent    Supine to sit 6: Independent    Sit to stand 4: Supervision or touching A    Chair/bed-to-chair transfer 4: Supervision or touching A    Walk 10 feet 4: Supervision or touching A    Walk 50 feet with 2 turns 4: Supervision or touching A    Walk 150 feet 4: Supervision or touching A    Walk 10 feet on uneven  4: Supervision or touching A    1 step/curb 4: Supervision or touching A    4 steps 4: Supervision or touching A    12 steps 4: Supervision or touching A     object 4: Supervision or touching A Min assist Wheel 48' w/2 turns Not Tested: Not applicable secondary to pt not completing activity previously    Wheel 150' Not Tested: Not applicable secondary to pt not completing activity previously    Car Transfer Not Tested: Not attempted due to environmental concerns: Equipment             PHYSICAL THERAPY PLAN OF CARE    LTGs: Patient met all goals per eval.    Pt would benefit from continued skilled physical therapy in order to improve independent functional mobility within the home with use of least restrictive device. Interventions may include range of motion (AROM, PROM B LE/trunk), motor function (B LE/trunk strengthening/coordination), activity tolerance (vitals, oxygen saturation levels), bed mobility training, balance activities, gait training (progressive ambulation program), and functional transfer training. HEP handout:issued written LE HEP. Able to complete with SBA and verbal/ visual cues. Increased time and effort to complete with multiple and frequent rest breaks. Cues for correct form. UE support on counter to perform UE standing exercises    SEATED EXERCISES Sets Reps Comments   Ankle Pumps 2 10    Hip Flexion 2 10    Long Arc Quads 2 10    Hip Adduction/Ball Squeeze 2 10    Hip Abduction with red Theraband 2 10    Hamstring Curls with red Theraband 2 10    Hip Extension with red Theraband 2 10      STANDING EXERCISES Sets Reps Comments   Heel Raises 1 10    Toe Raises 1 10    Hip Flexion/Marching 1 10    Hamstring Curls 1 10    Hip Abduction 1 10    Hip Extension 1 10    Mini Squats 1 10      Pt to be discharged 01/14/21  with assistance provided by family. Family training completed earlier today with PT for HEP. Instructed patient's family member on how to assist patient with functional mobility listed above.  Patient and family member verbalizing and demonstrating understanding of patient /family education/training noted above    Made recommendations for modifying home environment to decrease risk for falls. Therapy Recommendations upon discharge: [employer] needs at discharge: DME company to provide RW      Please see IRC; Interdisciplinary Eval, Care Plan, and Patient Education for further information regarding physical therapy discharge summary and plan of care. Patient returned to room at end of treatment. Patient supine in bed with head of bed elevated and bed rails up x 2. Needs placed in reach of patient. [place] alarm on    Ade Maciel, PT  1/13/2021

## 2021-01-13 NOTE — PROGRESS NOTES
OT DISCHARGE REPORT    Time In: 5244  Time Out: 1347  Mobility   Usual Performance Score Comments   Rolling 4: Supervision or touching A Side: Bilateral   Supine to Sit 6: Independent    Sit to Supine 6: Independent    Sit to Stand 4: Supervision or touching A S   Transfer Assist 4: Supervision or touching A Transfer Type: SPT   Equipment: Rolling Walker   Comments: S      Activities of Daily Living    Usual Performance Score Comments   Eating 6: Independent    Oral Hyigene 6: Independent While seated   Bathing 4: Supervision or touching A Type of Shower: Shower  Position: Supported sitting and Standing PRN   Adaptive  Equipment: Tub Transfer Bench, Grab Bars and Walker  Comments: S   Upper Body  Dressing 6: Independent Items Applied: Pullover  Position: Supported Sitting    Lower Body Dressing 4: Supervision or touching A Items Applied: Underwear and Elastic pants  Position: Supported sitting and Standing PRN  Adaptive Equipment: RW and W/C  Comments: S in standing   Donning/[place] Footwear 5: S/U or clean-up assist Items Applied: Socks and Shoes with fasteners   Adaptive Equipment: N/A   Toilet Transfer 4: Supervision or touching A Transfer Type: SPT   Equipment: Michaelyn Marrow   Comments: [facility] 4: Supervision or touching A Output: Urine  Comments: S   Education  Benefits of OT     Plan of Care: Please see Care Plan for progress towards goals during stay  Precautions at Discharge: Falls, Poor Safety Awareness, Confused and Impulsive   Discharge Location: Private Residence with spouse  Safety/Supervision Recommendations/Functional Level: Pt will need 24 hour supervision for safety while at home. Family Training: Completed with spouse 1/13/2021. Recommended Continuing Therapy: Home Health Occupational Therapy  Residual Deficits: Strength, cognition, ADL independence  Progress over LOS: Pt demonstrated good progress over LOS. Pt's performance with ADL is reflected in above chart.  Pt has made improvements with balance, coordination, and strength. Summary of Session: Pt demonstrated good participation in OT session. Pt's performance with ADL is reflected in above chart. Pt was left in w/c with PT for session in the gym.     Renny Ashton MS OTR/L  1/13/2021

## 2021-01-13 NOTE — PROGRESS NOTES
Problem: Falls - Risk of  Goal: *Absence of Falls  Description: Document Thora Bare Fall Risk and appropriate interventions in the flowsheet. Outcome: Progressing Towards Goal  Note: Fall Risk Interventions:  Mobility Interventions: Utilize walker, cane, or other assistive device    Mentation Interventions: Bed/chair exit alarm    Medication Interventions: Patient to call before getting OOB, Teach patient to arise slowly    Elimination Interventions: Call light in reach    History of Falls Interventions: Bed/chair exit alarm         Problem: Patient Education: Go to Patient Education Activity  Goal: Patient/Family Education  Outcome: Progressing Towards Goal     Problem: Pressure Injury - Risk of  Goal: *Prevention of pressure injury  Description: Document Marquez Scale and appropriate interventions in the flowsheet. Outcome: Progressing Towards Goal  Note: Pressure Injury Interventions:  Sensory Interventions: Assess changes in LOC    Moisture Interventions: Absorbent underpads    Activity Interventions: Increase time out of bed, Pressure redistribution bed/mattress(bed type)    Mobility Interventions: HOB 30 degrees or less    Nutrition Interventions: Document food/fluid/supplement intake    Friction and Shear Interventions: HOB 30 degrees or less                Problem: Patient Education: Go to Patient Education Activity  Goal: Patient/Family Education  Outcome: Progressing Towards Goal     Problem: Falls - Risk of  Goal: *Absence of Falls  Description: Document Kalpana Fall Risk and appropriate interventions in the flowsheet.   Outcome: Progressing Towards Goal  Note: Fall Risk Interventions:  Mobility Interventions: Utilize walker, cane, or other assistive device    Mentation Interventions: Bed/chair exit alarm    Medication Interventions: Patient to call before getting OOB, Teach patient to arise slowly    Elimination Interventions: Call light in reach    History of Falls Interventions: Bed/chair exit alarm Problem: Patient Education: Go to Patient Education Activity  Goal: Patient/Family Education  Outcome: Progressing Towards Goal     Problem: Inpatient Rehab (Adult)  Goal: *LTG: Avoids injury/falls 100% of time related to deficits  Outcome: Progressing Towards Goal  Goal: *LTG: Avoids infection 100% of time related to deficits  Outcome: Progressing Towards Goal  Goal: *LTG: Verbalize understanding of diagnosis and risk factors for recurring stroke  Outcome: Progressing Towards Goal  Goal: *LTG: Absence of DVT during hospitalization  Outcome: Progressing Towards Goal  Goal: *LTG: Maintains Skin Integrity With No Evidence of Pressure Injury 100% of Time  Outcome: Progressing Towards Goal     Problem: Patient Education: Go to Patient Education Activity  Goal: Patient/Family Education  Outcome: Progressing Towards Goal

## 2021-01-13 NOTE — PROGRESS NOTES
Team conference today with discharge set for 1/14. BSN, CM met with pt at bedside using appropriate PPE and social distancing. Pt made aware of d/c date. DME needs include RW. HH needs include RN/PT/OT. Family training already complete with wife. Contacted wife, Naresh Alejandre, via phone with answer, V/M left. Wife present and aware of d/c for PA. [facility]. CM to continue to follow and monitor for any further needs.

## 2021-01-13 NOTE — PROGRESS NOTES
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 So i'm a little upset about that. \"   \" I just feel really tired today. \"         Objective:  No pain reported just fatigue. Seizure, Other (comment)(falls)  GROSS ASSESSMENT Daily Assessment            COGNITION Daily Assessment           BED/MAT MOBILITY Daily Assessment    Rolling Left : (P) 5 (Supervision)  Supine to Sit : (P) 5 (Supervision)  Sit to Supine : (P) 5 (Supervision)       TRANSFERS Daily Assessment    Transfer Type: (P) SPT with walker  Transfer Assistance : (P) 5 (Stand-by assistance)  Sit to Stand Assistance: (P) Stand-by assistance  Car Transfers: (P) Not tested       GAIT Daily Assessment    Amount of Assistance: (P) 5 (Stand-by assistance)  Distance (ft): (P) 150 Feet (ft)  Assistive Device: (P) Walker, rolling       STEPS or STAIRS Daily Assessment    Level of Assist : (P) 0 (Not tested)       BALANCE Daily Assessment            WHEELCHAIR MOBILITY Daily Assessment            LOWER EXTREMITY EXERCISES Daily Assessment    Extremity: (P) Both  Exercise Type #1: (P) Other (comment)(nustep x 10 minutes)  Sets Performed: (P) 1  Reps Performed: (P) 10  Level of Assist: (P) Supervision          Assessment: Patient making good progress. Plan of Care: Continue with plan of care. Continue to review HEP with wife to assist patient at home. Wife here in PM during therapy.     Kajal Hough, PTA  1/13/2021

## 2021-01-13 NOTE — PROGRESS NOTES
01/13/21 1233   Time Spent With Patient   Time In 1125   Time Out 1200   Patient Seen For: AM   Mental Status   Neurologic State Alert   Cognition Decreased attention/concentration; Impaired decision making;Memory loss   Perception Appears intact   Perseveration Perseverates during conversation   Safety/Judgement Home safety        01/13/21 1234   Neuro-Linguistic Exercises   Verbal Reasoning Tasks Impaired; very basic verbal reasoning related to hospital and household ADLs completed with 70% accuracy and moderate cues   Verbal Problem Solving Impaired   Memory Impaired; cues to use visual aids to answer orientation questions; once cued answered Fred  Recalled birthdate independently; decreased recall of functions on phone  Able to completed with use of written instructions with SBA     Patient participated with basic cognitive treatment related to problem solving and short-term memory. Basic verbal reasoning related to hospital and household ADLs completed with 70% accuracy and moderate cues. Cues to use visual aids to answer orientation questions; once cued answered with SBA. Recalled birthdate independently; decreased recall of basic functions on phone reviewed previous session. Able to complete with use of two step written instructions with minimal cues. Discussed memory strategies with patient's wife. Plans to use a whiteboard for reminders and visual cues. Written instruction most useful method due to decreased ability to recall use of compensatory memory strategies during tx sessions with decreased carryover. 24/7 supervision indicated.     Kristin Cooper MS, CCC-SLP

## 2021-01-13 NOTE — PROGRESS NOTES
OT Daily Note  Time In 1037   Time Out 1123     Subjective: \"I am just tired. \"   Pain: Patient had no complaint of pain. Functional Mobility      Score Comments   Supine to Sit 6: Independent    Sit to Stand 4: Supervision or touching A S   Transfer Assist 4: Supervision or touching A Transfer Type: SPT   Equipment: N/A   Comments: S        Visual-Perceptual   Pt engaged with the coRank 29 Test-Numbers board to improve visual perceptual skills, coordination, sequencing, and working memory to utilize during ADLs and Transfers. Pt had 10 errors and benefited from verbal cuing to select correct number. Education   Benefits of OT     Interdisciplinary Communication: Team Conference  Summary of session: Pt demonstrated good participation in session. Spouse was present for session. Pt was left in recliner with call bell within reach, [place] alarm on, and all needs met. Plan: Continue with POC.      Ruthie Ricks MS OTR/L  1/13/2021

## 2021-01-13 NOTE — PROGRESS NOTES
PHYSICAL THERAPY DAILY NOTE  Time In: (P) 1346  Time Out: (P) 1449  Patient Seen For: (P) PM;Family training;Equipment assessment; Other (see progress notes)    Subjective: Patient complained of fatigue. Objective:  No pain noted. Daughter and wife present during afternoon sessions. Seizure, Other (comment)(falls)  GROSS ASSESSMENT Daily Assessment            COGNITION Daily Assessment           BED/MAT MOBILITY Daily Assessment    Rolling Right : 6 (Modified independent)  Rolling Left : 6 (Modified independent)  Supine to Sit : (P) 5 (Supervision)  Sit to Supine : (P) 5 (Supervision)       TRANSFERS Daily Assessment    Transfer Type: (P) SPT without device  Transfer Assistance : (P) 5 (Stand-by assistance)  Sit to Stand Assistance: (P) Stand-by assistance  Car Transfers: (P) Not tested       GAIT Daily Assessment    Amount of Assistance: (P) 5 (Stand-by assistance)  Distance (ft): (P) 80 Feet (ft)  Assistive Device: (P) Walker, rolling       STEPS or STAIRS Daily Assessment    Level of Assist : (P) 0 (Not tested)(discussed and demonstrated to daughter how to assist patient on the stairs using one rail)       BALANCE Daily Assessment    Sitting - Static: Good (unsupported)  Sitting - Dynamic: Good (unsupported)  Standing - Static: Fair       WHEELCHAIR MOBILITY Daily Assessment            LOWER EXTREMITY EXERCISES Daily Assessment    Extremity: (P) Both  Exercise Type #1: (P) Seated lower extremity strengthening  Sets Performed: (P) 1  Reps Performed: (P) 10  Level of Assist: (P) Supervision          Assessment: Patient making good progress with mobility . Ordered rolling walker. Plan of Care: Continue with plan of care.     Saranya Early, PTA  1/13/2021

## 2021-01-13 NOTE — PROGRESS NOTES
Sutures removed from pt s incision right side of head. Steri strips applied. Pt tolerated procedure well. No complaint of pain. Pt in bed resting at present with call light at hand and bed alarm on.

## 2021-01-13 NOTE — PROGRESS NOTES
Izzy Grant MD  Medical Director  [address] [facility], [address] [facility]  Tel: [phone]       [facility] PROGRESS NOTE    [facility]  Admit Date: 1/4/2021  Admit Diagnosis:   Brain mass [G93.89]    Subjective     Patient seen and examined. Affect blunted. Denies feeling depressed. Slept well. Continent b/b.  Denies headache    Objective:     Current Facility-Administered Medications   Medication Dose Route Frequency    levETIRAcetam (KEPPRA) oral solution 1,000 mg  1,000 mg Oral BID    tamsulosin (FLOMAX) capsule 0.4 mg  0.4 mg Oral DAILY    senna-docusate (PERICOLACE) 8.6-50 mg per tablet 1 Tab  1 Tab Oral BID    dexAMETHasone (DECADRON) tablet 4 mg  4 mg Oral Q8H    alum-mag hydroxide-simeth (MYLANTA) oral suspension 30 mL  30 mL Oral Q4H PRN    amLODIPine (NORVASC) tablet 10 mg  10 mg Oral DAILY    carboxymethylcellulose sodium (REFRESH LIQUIGEL) 1 % ophthalmic solution 1 Drop  1 Drop Both Eyes QID PRN    furosemide (LASIX) tablet 20 mg  20 mg Oral DAILY    insulin lispro (HUMALOG) injection   SubCUTAneous AC&HS    losartan (COZAAR) tablet 50 mg  50 mg Oral DAILY    magnesium hydroxide (MILK OF MAGNESIA) 400 mg/5 mL oral suspension 30 mL  30 mL Oral DAILY PRN    ondansetron (ZOFRAN ODT) tablet 4 mg  4 mg Oral Q6H PRN    pantoprazole (PROTONIX) tablet 40 mg  40 mg Oral ACB    traZODone (DESYREL) tablet 25 mg  25 mg Oral QHS PRN    acetaminophen (TYLENOL) tablet 650 mg  650 mg Oral Q6H PRN    Or    acetaminophen (TYLENOL) suppository 650 mg  650 mg Rectal Q6H PRN    polyethylene glycol (MIRALAX) packet 17 g  17 g Oral DAILY PRN    sodium chloride (NS) flush 5-40 mL  5-40 mL IntraVENous PRN    naloxone (NARCAN) injection 0.4 mg  0.4 mg IntraVENous PRN    sertraline (ZOLOFT) tablet 150 mg  150 mg Oral DAILY    HYDROcodone-acetaminophen (NORCO) 5-325 mg per tablet 1 Tab  1 Tab Oral Q4H PRN       Review of Systems: Denies chest pain, shortness of breath, cough, headache, visual problems, abdominal pain, dysuria, calf pain. Pertinent positives are as noted in the HPI, ROS unremarkable otherwise. Visit Vitals  /65 (BP 1 Location: Left arm, BP Patient Position: Sitting)   Pulse 83   Temp 98 °F (36.7 °C)   Resp 18   Wt 187 lb 11.2 oz (85.1 kg)   SpO2 98%   BMI 26.93 kg/m²        Physical Exam:   General: Alert and age appropriately oriented to person , place but not time (February 2001)  No acute cardiorespiratory distress. HEENT: Normocephalic, no scleral icterus. Oral mucosa moist without cyanosis. Lungs: Clear to auscultation bilaterally. Respiration even and unlabored. Heart: Regular rate and rhythm, S1, S2. No murmurs, clicks, rub or gallops. Abdomen: Soft, non-tender, not distended. Bowel sounds normoactive. No organomegaly. Genitourinary: Deferred. Neuromuscular: Following commands. occasional vcs  [place]. Generalized non focal prox>distal weakness  Sensation intact   Skin/extremity: No rashes, no erythema. No calf tenderness B LE. No edema  Scalp incision c/d/i. Sutures out.  No erythema                                                                              Functional Assessment:          Balance  Sitting - Static: Good (unsupported) (01/11/21 1600)  Sitting - Dynamic: Fair (occasional) (01/11/21 1600)  Standing - Static: Fair (01/11/21 1600)  Standing - Dynamic : Impaired (01/11/21 1600)                     Dixon Chavez Fall Risk Assessment:  Dixon Chavez Fall Risk  Mobility: Ambulates or transfers with assist devices or assistance (01/12/21 2221)  Mobility Interventions: Utilize walker, cane, or other assistive device (01/12/21 2221)  Mentation: Periodic confusion (01/12/21 2221)  Mentation Interventions: Bed/chair exit alarm (01/12/21 2221)  Medication: Patient receiving anticonvulsants, sedatives(tranquilizers), psychotropics or hypnotics, hypoglycemics, narcotics, sleep aids, antihypertensives, laxatives, or diuretics (01/12/21 2221)  Medication Interventions: Patient to call before getting OOB; Teach patient to arise slowly (01/12/21 2221)  Elimination: Needs assistance with toileting (01/12/21 2221)  Elimination Interventions: Call light in reach (01/12/21 2221)  Prior Fall History: Before admission in past 12 months _home or previous inpatient care) (01/12/21 2221)  History of Falls Interventions: Bed/chair exit alarm (01/12/21 2221)  Total Score: 5 (01/12/21 2221)  Standard Fall Precautions: Yes (01/05/21 0723)  High Fall Risk: Yes (01/12/21 2221)     Speech Assessment:  Aspiration Signs/Symptoms: None (01/05/21 1020)      Ambulation:  Gait  Distance (ft): 200 Feet (ft) (01/12/21 1649)  Assistive Device: Walker, rolling (01/12/21 1649)  Rail Use: Left  (01/12/21 1649)     Labs/Studies:  Recent Results (from the past 72 hour(s))   CBC W/O DIFF    Collection Time: 01/11/21  4:50 AM   Result Value Ref Range    WBC 14.7 (H) 4.3 - 11.1 K/uL    RBC 4.11 (L) 4.23 - 5.6 M/uL    HGB 12.8 (L) 13.6 - 17.2 g/dL    HCT 37.3 (L) 41.1 - 50.3 %    MCV 90.8 79.6 - 97.8 FL    MCH 31.1 26.1 - 32.9 PG    MCHC 34.3 31.4 - 35.0 g/dL    RDW 12.2 11.9 - 14.6 %    PLATELET 898 494 - 158 K/uL    MPV 10.2 9.4 - 12.3 FL    ABSOLUTE NRBC 0.00 0.0 - 0.2 K/uL       Assessment:     Problem List as of 1/13/2021 Date Reviewed: 9/28/2020          Codes Class Noted - Resolved    * (Principal) Brain mass ICD-10-CM: G93.89  ICD-9-CM: 348.89  1/4/2021 - Present        Cerebral edema ([facility] ICD-10-CM: G93.6  ICD-9-CM: 348.5  12/31/2020 - Present        Seizures ([facility] ICD-10-CM: R56.9  ICD-9-CM: 780.39  12/28/2020 - Present        Essential hypertension (Chronic) ICD-10-CM: I10  ICD-9-CM: 401.9  12/26/2020 - Present        Brain tumor ([facility] ICD-10-CM: D49.6  ICD-9-CM: 239.6  12/26/2020 - Present        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  ICD-9-CM: 348.89  12/26/2020 - Present        ICH (intracerebral hemorrhage) ([facility] ICD-10-CM: I61.9  ICD-9-CM: 864  12/25/2020 - Present        History of lumbar laminectomy for spinal cord decompression ICD-10-CM: Z98.890  ICD-9-CM: V45.89  2/18/2019 - Present        Idiopathic scoliosis ICD-10-CM: M41.20  ICD-9-CM: 737.30  6/7/2018 - Present        HTN (hypertension), benign ICD-10-CM: I10  ICD-9-CM: 401.1  2/15/2017 - Present        Rosacea ICD-10-CM: L71.9  ICD-9-CM: 695.3  2/15/2017 - Present        MEGGAN (generalized anxiety disorder) ICD-10-CM: F41.1  ICD-9-CM: 300.02  12/9/2014 - Present        Arthritis ICD-10-CM: M19.90  ICD-9-CM: 716.90  12/9/2014 - Present        Idiopathic chronic gout of right ankle ICD-10-CM: M1A.0710  ICD-9-CM: 274.02  12/9/2014 - Present        Mixed hyperlipidemia ICD-10-CM: E78.2  ICD-9-CM: 272.2  12/9/2014 - Present        Primary insomnia ICD-10-CM: F51.01  ICD-9-CM: 307.42  12/9/2014 - Present        Panic disorder ICD-10-CM: F41.0  ICD-9-CM: 300.01  12/9/2014 - Present        RESOLVED: Acute renal failure (ARF) ([facility] ICD-10-CM: N17.9  ICD-9-CM: 584.9  12/26/2020 - 12/28/2020        RESOLVED: Recurrent depression ([facility] ICD-10-CM: F33.9  ICD-9-CM: 296.30  8/16/2018 - 8/19/2019        RESOLVED: Lumbar stenosis with neurogenic claudication ICD-10-CM: M48.062  ICD-9-CM: 724.03  6/7/2018 - 9/28/2020        RESOLVED: Depression ICD-10-CM: F32.9  ICD-9-CM: 713  12/9/2014 - 2/15/2017            S/p right craniotomy for right parietal lobe tumor resection, s/p intraoperative Gliadel (BCNU) chemotherapeutic wafer placement     Plan / Recommendations / Medical Decision Making:      Continue daily physician / PA medical management:     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 Did undergo intraoperative placement of Gliadel (BCNU) chemotherapeutic wafers. Await pathology. Will likely need Onc / Rad Onc referral.  -1/8 pathology still pending  -1/11 Dr. Edwin Burgess / Dr. Pia Rivas to discuss pathology with patient / family; 1/12 d/w Dr Pia Rivas.  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 Dr Kemal Fuller prefers to address as an outpt. Plans for possible dc End of week     Seizures - management with Keppra, tolerating current dose. No post-op seizures  -1/5 LFTs sltly increased; recheck 1/8. Not done, only BMP, add on LFTs  -1/9  > 69, trending down, AST 15, now wnml  -1/11 after lengthy investigation as why Keppra dose is liquid (no reason identified), will change to tablets starting PM dose (per PA)  -1/12 spoke with nursing; pt has to crush pills in applesauce. Keppra tablet is lg and has difficulty; pt requests liquid      Cerebral edema - s/p tumor resection. Continue Decadron 4mg po QID; slow taper. -1/7 will dec Decadron to 4mg q 8hrs; 1/13 will cont Decadron due to need for XRT, whole brain     Encephalopathy - multifactorial, improving.     Dysphagia - post-op feeding difficulties. Modified barium swallow 1/4 unremarkable.     Hypertension - BP controlled, fluctuating, managed medically. Continue Norvasc 10mg daily, Lasix 20mg daily, Cozaar 50mg daily. -1/5 BP 112-138/63-74; controlled  -1/8 VSS  -1/10 HR and BP acceptable  -1/11 /66, HR 65  -vss     Prerenal azotemia - BUN 35; push PO fluids and monitor. -1/5 creatinine normal at 1.12  -1/7 recheck in a.m.  -1/8 BUN 28 (35), creat stable, unchanged, at 1.12; cont to push po fluids; 1/12 bmp in a.m.     Steroid-induced hyperglycemia - uncontrolled / poor glycemic control. Will require daily, close FSG monitoring and medication adjustment to optimize glycemic control in setting of acute illness and hospitalization. Currently on SSI. Will check HgbA1c, no reported history of diabetes. -1/5 HgbA1c 6.0% (12/25/2020)  -1/8 glucose in ; 1/12 dc accu checks.      Leukocytosis - likely steroid-induced.  No active fever or s/sx of infection; will monitor.   -1/5 WBC 15.3; recheck 1/8; Pending  -1/9 WBC 16.3, afebrile  -1/11 no s/sx infection; wbc improved at 14.7      Pain control - stable, mild-to-moderate joint symptoms intermittently, reasonably well controlled by PRN meds. Will require regular pain assessment and comprenhensive pain management, continue APAP and PRN Norco; long standing hx of chronic low back pain due to spinal stenosis. -1/7 no specific complaints. Intermittent headache, stiff lower back. No radiating pain  -1/11 no pain complaints     Pneumonia prophylaxis - incentive spirometer every hour while awake.     DVT risk / DVT prophylaxis - will require daily physician / PA exam to assess for signs and symptoms as patient is at increased risk for of thromboembolism. Mobilize as tolerated. Sequential pneumatic compression devices (SCDs) when in bed; thigh-high or knee-high thromboembolic deterrent hose when out of bed. No OAC or SQ Lovenox / heparin due to craniotomy and hemorrhage.     Hx of depression - previously on Zoloft. At higher risk of depression and further anxiety due to diagnosis and hospitalization. PRN Klonopin if needed. Coping well. Wife supportive.  -continue Zoloft 150mg daily. Coping well     General skin care / wound prevention - monitor incision and general skin wound status daily per staff and physician / PA. At risk for failure. Will require 24/7 rehab nursing. Keep wound clean and dry; scalp incision looks great / sutures / dry / no swelling or erythema.  -1/6 posterior scalp incision intact with sutures, clean and dry without edema, erythema. D/C sutures 1/12  -1/11 incision C/D/I with sutures, no erythema or edema  -1/13 sutures dc'd yesterday. Looks good      Urinary retention / neurogenic bladder - schedule voids q 6-8 hrs. Check post-void residual as needed; in and out catheter if post-void residual is more than 400ml.     Bowel program - at risk for constipation as a side effect of opioids, iron and calcium supplements and other meds. MiraLAX daily for regularity, Brigida-Colace for stool softener.  PRN MOM, bisacodyl suppository or tablets for constipation.  -1/9 Senokot advanced to BID     GERD - resume PPI. At times may need additional antacids, Maalox prn.        Time spent was 25 minutes with over 1/2 in direct patient care/examination, consultation and coordination of care.      Signed By: Izzy Grant MD     January 13, 2021

## 2021-01-14 ENCOUNTER — HOME HEALTH ADMISSION (OUTPATIENT)
Dept: HOME HEALTH SERVICES | Facility: HOME HEALTH | Age: 81
End: 2021-01-14
Payer: MEDICARE

## 2021-01-14 VITALS
DIASTOLIC BLOOD PRESSURE: 67 MMHG | RESPIRATION RATE: 18 BRPM | SYSTOLIC BLOOD PRESSURE: 113 MMHG | WEIGHT: 187.7 LBS | HEART RATE: 66 BPM | OXYGEN SATURATION: 98 % | BODY MASS INDEX: 26.93 KG/M2 | TEMPERATURE: 97.8 F

## 2021-01-14 LAB
GLUCOSE BLD STRIP.AUTO-MCNC: 116 MG/DL (ref 65–100)
GLUCOSE BLD STRIP.AUTO-MCNC: 122 MG/DL (ref 65–100)
GLUCOSE BLD STRIP.AUTO-MCNC: 128 MG/DL (ref 65–100)
GLUCOSE BLD STRIP.AUTO-MCNC: 136 MG/DL (ref 65–100)
GLUCOSE BLD STRIP.AUTO-MCNC: 180 MG/DL (ref 65–100)

## 2021-01-14 PROCEDURE — 97110 THERAPEUTIC EXERCISES: CPT

## 2021-01-14 PROCEDURE — 97116 GAIT TRAINING THERAPY: CPT

## 2021-01-14 PROCEDURE — 92507 TX SP LANG VOICE COMM INDIV: CPT

## 2021-01-14 PROCEDURE — 99239 HOSP IP/OBS DSCHRG MGMT >30: CPT | Performed by: PHYSICAL MEDICINE & REHABILITATION

## 2021-01-14 PROCEDURE — 74011250637 HC RX REV CODE- 250/637: Performed by: PHYSICAL MEDICINE & REHABILITATION

## 2021-01-14 PROCEDURE — 74011250636 HC RX REV CODE- 250/636: Performed by: PHYSICAL MEDICINE & REHABILITATION

## 2021-01-14 RX ADMIN — PANTOPRAZOLE SODIUM 40 MG: 40 TABLET, DELAYED RELEASE ORAL at 05:02

## 2021-01-14 RX ADMIN — SERTRALINE HYDROCHLORIDE 150 MG: 50 TABLET ORAL at 08:49

## 2021-01-14 RX ADMIN — LEVETIRACETAM 1000 MG: 500 SOLUTION ORAL at 08:52

## 2021-01-14 RX ADMIN — LOSARTAN POTASSIUM 50 MG: 50 TABLET, FILM COATED ORAL at 08:49

## 2021-01-14 RX ADMIN — TAMSULOSIN HYDROCHLORIDE 0.4 MG: 0.4 CAPSULE ORAL at 08:47

## 2021-01-14 RX ADMIN — POLYETHYLENE GLYCOL 3350 17 G: 17 POWDER, FOR SOLUTION ORAL at 08:49

## 2021-01-14 RX ADMIN — FUROSEMIDE 20 MG: 20 TABLET ORAL at 08:48

## 2021-01-14 RX ADMIN — DEXAMETHASONE 4 MG: 4 TABLET ORAL at 08:54

## 2021-01-14 RX ADMIN — AMLODIPINE BESYLATE 10 MG: 5 TABLET ORAL at 08:48

## 2021-01-14 RX ADMIN — DOCUSATE SODIUM 50MG AND SENNOSIDES 8.6MG 1 TABLET: 8.6; 5 TABLET, FILM COATED ORAL at 08:47

## 2021-01-14 NOTE — PROGRESS NOTES
Problem: Falls - Risk of  Goal: *Absence of Falls  Description: Document Maura Fuller Fall Risk and appropriate interventions in the flowsheet.   Outcome: Resolved/Met  Note: Fall Risk Interventions:  Mobility Interventions: Bed/chair exit alarm, OT consult for ADLs, Patient to call before getting OOB, Utilize walker, cane, or other assistive device    Mentation Interventions: Adequate sleep, hydration, pain control, Bed/chair exit alarm, Door open when patient unattended, Increase mobility, Update white board    Medication Interventions: Bed/chair exit alarm, Evaluate medications/consider consulting pharmacy, Teach patient to arise slowly    Elimination Interventions: Bed/chair exit alarm, Call light in reach, Patient to call for help with toileting needs    History of Falls Interventions: Bed/chair exit alarm, Consult care management for discharge planning, Evaluate medications/consider consulting pharmacy

## 2021-01-14 NOTE — PROGRESS NOTES
PHYSICAL THERAPY DAILY NOTE  Time In: (P) 0848  Time Out: (P) 0960  Patient Seen For: (P) AM;Transfer training; Therapeutic exercise; Other (see progress notes)    Subjective: Patient had no complaints. Objective: NO pain noted. Seizure, Other (comment)(falls)  GROSS ASSESSMENT Daily Assessment            COGNITION Daily Assessment           BED/MAT MOBILITY Daily Assessment    Supine to Sit : (P) 5 (Supervision)  Sit to Supine : (P) 5 (Supervision)       TRANSFERS Daily Assessment    Transfer Type: (P) SPT without device  Transfer Assistance : (P) 5 (Stand-by assistance)  Sit to Stand Assistance: (P) Stand-by assistance  Car Transfers: (P) Not tested       GAIT Daily Assessment    Amount of Assistance: (P) 5 (Stand-by assistance)  Distance (ft): (P) 150 Feet (ft)  Assistive Device: (P) Walker, rolling       STEPS or STAIRS Daily Assessment    Level of Assist : (P) 0 (Not tested)       BALANCE Daily Assessment            WHEELCHAIR MOBILITY Daily Assessment            LOWER EXTREMITY EXERCISES Daily Assessment    Extremity: (P) Both  Exercise Type #1: (P) Other (comment)(nustep x 10 minutes)  Sets Performed: (P) 1  Reps Performed: (P) 10  Level of Assist: (P) Supervision          Assessment: Patient making progress with balance and mobility. He will require supervision at discharge and wife seems to understand his deficits . Plan of Care: Continue with plan of care.     Memo Sanches, PTA  1/14/2021

## 2021-01-14 NOTE — DISCHARGE SUMMARY
Nmarata Covarrubias MD  Medical Director  [address] [facility], [address] [facility]  Tel: [phone]       PHYSICAL MEDICINE & REHABILITATION DISCHARGE SUMMARY     Date: 1/14/2021  Admission Date: 1/4/2021  Discharge Date: 1/14/2021    Neurosurgeon: Dr Pia Rivas  Primary Care Provider: Krys MÉNDEZ DO    Active Problems:    Brain mass (1/4/2021)     Encephalopathy  Hyperglycemia  Leukocytosis  Seizure risk  HTN  Anx/depression  Dysphagia;resolved    Admission Condition: good  Discharged Condition: good    Hospital Course: The patient was admitted to [address] [place] [address] by Nohemy Lewis MD on 1/4/2021 s/p right crani for tumor resection    HPI: The patient is a right hand dominant, previously functionally independent 70yo male with a PMH of HTN, HLD, nephrolithiasis, lumbar stenosis, gouty arthritis, depression and anxiety who presented to [facility] on 12/25 with encephalopathy and sz. He was intubated for airway protection. 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 CT chest abdomen pelvis with no evidence of metastatic source. Szs controlled with Keppra. 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 (peripherally enhancing right  parietal lesion with evidence of internal hemorrhage and necrosis measuring 3.7 x 3.3 x 3.2 cm ). This was associated with surrounding vasogenic edema but no significant midline shift. Pt successfully extubated and was on the neuro floor when I saw him in consultation 12/29  NS, Dr Pia Rivas, [facility] and the pt and family elected to undergo crani with bx and resection of the tumor. Passed swallow eval ( MBS) and was on a reg diet with thin liquids.  Continues on Decadron 4mg q 6hrs  He subsequently underwent a right parietal crani for resection of the right parietal tumor with internal hemorrhage and surrounding vasogenic edema. He also had placement of Intraoperative Gliadel (BCNU) chemotherapeutic wafers in the operative resection bed on 12/31/2020  He tolerated the procedure well and spent one day in the ICU before returning to prior room. He will be on a po decadron taper and will need f/u with Dr Itzel Capps 1/14, unless still hospitalized, to have sutures removed. Post op he was on a DIET MECHANICAL SOFT 2 [place]/2 Mildly Thick; No Conc. Sweets HOWEVER, had MBS prior to [facility] transfer today 1/4 and is now on a reg diet/thin liquids. Rehabilitation Course:   S/p right craniotomy for right parietal lobe tumor resection, s/p intraoperative Gliadel (BCNU) chemotherapeutic wafer placement     Plan / Recommendations / Medical Decision Making:      Continue daily physician / PA medical management:     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 Did undergo intraoperative placement of Gliadel (BCNU) chemotherapeutic wafers. Await pathology. Will likely need Onc / Rad Onc referral.  -1/8 pathology still pending  -1/11 Dr. Anthony Queen / Dr. Itzel Capps to discuss pathology with patient / family; 1/12 d/w Dr Itzel Capps. 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 Dr Itzel Capps prefers to address as an outpt. Plans for possible dc End of week  -1/14 Patient to dc today. Discussed pathology with wife yesterday and need for onc f/u and with Dr Itzel Capps. Per Dr Itzel Capps, he will discuss further and refer to XRT Onc/Med Oncology at time of f/u     Seizures - management with Keppra, tolerating current dose.  No post-op seizures  -1/5 LFTs sltly increased; recheck 1/8. Not done, only BMP, add on LFTs  -1/9  > 69, trending down, AST 15, now wnml  -1/11 after lengthy investigation as why Keppra dose is liquid (no reason identified), will change to tablets starting PM dose (per PA)  -1/12 spoke with nursing; pt has to crush pills in applesauce. Keppra tablet is lg and has difficulty; pt requests liquid      Cerebral edema - s/p tumor resection. Continue Decadron 4mg po QID; slow taper. -1/7 will dec Decadron to 4mg q 8hrs; 1/13 will cont Decadron due to need for XRT, whole brain     Encephalopathy - multifactorial, improving.     Dysphagia - post-op feeding difficulties. Modified barium swallow 1/4 unremarkable.     Hypertension - BP controlled, fluctuating, managed medically. Continue Norvasc 10mg daily, Lasix 20mg daily, Cozaar 50mg daily. -1/5 BP 112-138/63-74; controlled  -1/8 VSS  -1/10 HR and BP acceptable  -1/11 /66, HR 65  -vss     Prerenal azotemia - BUN 35; push PO fluids and monitor. -1/5 creatinine normal at 1.12  -1/7 recheck in a.m.  -1/8 BUN 28 (35), creat stable, unchanged, at 1.12; cont to push po fluids; 1/12 bmp in a.m.; BUN 28, CREAT 1.19     Steroid-induced hyperglycemia - uncontrolled / poor glycemic control. Will require daily, close FSG monitoring and medication adjustment to optimize glycemic control in setting of acute illness and hospitalization. Currently on SSI. Will check HgbA1c, no reported history of diabetes. -1/5 HgbA1c 6.0% (12/25/2020)  -1/8 glucose in ; 1/12 dc accu checks.      Leukocytosis - likely steroid-induced. No active fever or s/sx of infection; will monitor.   -1/5 WBC 15.3; recheck 1/8; Pending  -1/9 WBC 16.3, afebrile  -1/14 no s/sx infection; wbc improved at 14.7      Pain control - stable, mild-to-moderate joint symptoms intermittently, reasonably well controlled by PRN meds. Will require regular pain assessment and comprenhensive pain management, continue APAP and PRN Norco; long standing hx of chronic low back pain due to spinal stenosis. -1/7 no specific complaints. Intermittent headache, stiff lower back.  No radiating pain  -1/11 no pain complaints     Pneumonia prophylaxis - incentive spirometer every hour while awake.     DVT risk / DVT prophylaxis - will require daily physician / PA exam to assess for signs and symptoms as patient is at increased risk for of thromboembolism. Mobilize as tolerated. Sequential pneumatic compression devices (SCDs) when in bed; thigh-high or knee-high thromboembolic deterrent hose when out of bed. No OAC or SQ Lovenox / heparin due to craniotomy and hemorrhage.     Hx of depression - previously on Zoloft. At higher risk of depression and further anxiety due to diagnosis and hospitalization. PRN Klonopin if needed. Coping well. Wife supportive.  -continue Zoloft 150mg daily. Coping well     General skin care / wound prevention - monitor incision and general skin wound status daily per staff and physician / PA. At risk for failure. Will require 24/7 rehab nursing. Keep wound clean and dry; scalp incision looks great / sutures / dry / no swelling or erythema.  -1/6 posterior scalp incision intact with sutures, clean and dry without edema, erythema. D/C sutures 1/12  -1/11 incision C/D/I with sutures, no erythema or edema  -1/13 sutures dc'd yesterday. Looks good      Urinary retention / neurogenic bladder - schedule voids q 6-8 hrs. Check post-void residual as needed; in and out catheter if post-void residual is more than 400ml.     Bowel program - at risk for constipation as a side effect of opioids, iron and calcium supplements and other meds. MiraLAX daily for regularity, Brigida-Colace for stool softener. PRN MOM, bisacodyl suppository or tablets for constipation.  -1/9 Senokot advanced to BID     GERD - resume PPI. At times may need additional antacids, Maalox prn.          Functional Level On Admission:  SBA bed mobility, min assist STS, transfers min assist of 2, gait 60 ft RW and gait belts with min assist of 2.  Moderate assist projected per OT with bathing, toileting, dressing; supervision with grooming and feeding and min assist with RW with 2 people for functional mobility.        Functional Level At Discharge:    PHYSICAL THERAPY DISCHARGE SUMMARY   TIME IN: 9779  Time out:1556   Precautions at discharge: Other (comment)(fall precautions)     Problem List:    Decreased strength B LE  [x]? Decreased strength trunk/core  [x]? Decreased AROM   []? Decreased PROM  []? Decreased balance sitting  []? Decreased balance standing  [x]? Decreased endurance  [x]? Pain  []?        Functional Limitations:   Decreased independence with bed mobility  []? Decreased independence with functional transfers  [x]? Decreased independence with ambulation  [x]? Decreased independence with stair negotiation  [x]?           Outcome Measures: Vital Signs:  Patient Vitals for the past 12 hrs:    Temp Pulse Resp BP SpO2   01/13/21 0721 97.8 °F (36.6 °C) 68 18 124/63 96 %      Pain level:No c/o pain during treatment  Pain location:NA  Pain interventions:NA     Patient education:,transfer training, gait training, balance training,fall precautions, body mechanics,activity pacing, Patient verbalizing understanding and demonstrating partial understanding of patient education.  Recommend follow up education.     Interdisciplinary Communication:spoke with PTA regarding DME and D/C plans     Cognition: Orientation:A+O x 2  Communication:able to express needs verbally, able to follow single step commands  Social Interaction:cooperating, appropriate with PT, participating, motivated to improve  Problem Solving:difficulty with managing body mechanics during functional mobility  Memory: cues to recall safe body mechanics during functional mobility                 MMT Initial Asssessment    Right Lower Extremity Left Lower Extremity   Hip Flexion 3+ 4   Knee Extension 4+ 5   Knee Flexion 4+ 5   Ankle Dorsiflexion 5 5                   MMT Discharge Assessment    Right Lower Extremity Left Lower Extremity   Hip Flexion 4 4   Knee Extension 5 5   Knee Flexion 4+ 5   Ankle Dorsiflexion 5 5   0/5            No palpable muscle contraction  1/5 Palpable muscle contraction, no joint movement  2-/5          Less than full range of motion in gravity eliminated position  2/5            Able to complete full range of motion in gravity eliminated position  2+/5          Able to initiate movement against gravity  3-/5          More than half but not full range of motion against gravity  3/5            Able to complete full range of motion against gravity  3+/5          Completes full range of motion against gravity with minimal resistance  4-/5          Completes full range of motion against gravity with minimal-moderate resistance  4/5            Completes full range of motion against gravity with moderate resistance  4+/5          Completes full range of motion against gravity with moderate-maximum resistance  5/5            Completes full range of motion against gravity with maximum resistance                 AROM: Bilateral LE generally decreased, functional     PRIMARY MODE OF LOCOMOTION: ambulation  Please see IRC Interdisciplinary Eval: Coordination/Balance Section for details regarding FIM score description.     BED/CHAIR/WHEELCHAIR TRANSFERS Initial Assessment Discharge Assessment   Rolling Right 5 (Supervision) 6 (Modified independent)   Rolling Left 5 (Supervision) 6 (Modified independent)   Supine to Sit 5 (Supervision) 5 (Supervision)   Sit to Stand Contact guard assistance Stand-by assistance   Sit to Supine 5 (Supervision) 5 (Supervision)   Transfer Type SPT without device SPT without device   Comments min A for safety w/ balance w/ short, shuffling steps observed Increased time and effort to complete bed mobility and transfers   Car Transfer Not tested Not tested   Car Type           WHEELCHAIR MOBILITY/MANAGEMENT Initial Assessment Discharge Assessment   Able to Propel 0 feet    Functional Level     Curbs/ramps assistance required 0 (Not tested) 0 (Not tested)   Wheelchair set up assistance required 0 (Not tested) 0 (Not tested)   Wheelchair management           WALKING INDEPENDENCE Initial Assessment Discharge Assessment   Assistive device Other (comment)(no A/D) Walker, rolling   Ambulation assistance - level surface 3 (Moderate assistance) 5 (Supervision)   Distance 50 Feet (ft) 80 Feet (ft)   Comments Pt. w/ flexed posture, shuffle step gait pattern, increased B stance time & 1 posterior LOB reuqiring mod A to correct Cont step through gait pattern with decrased gait speed, occasional mild ataxia, decreased ankle DF and knee ext at initial contact. gait training x 150ft x 1 without a device and SBA with cues for balance control. Ambulation assistance - unlevel surface 0 (Not tested) 4 (Minimal assistance)(10 ft x 2 on 2 inch foam mat)         STEPS/STAIRS Initial Assessment Discharge Assessment   Steps/Stairs ambulated 0 12(4 steps x 3)   Rail Use Both(on one attempt, one on second attempt) Both(Both w/ 1st 4,single hand rail w/ 4 steps x 2, L going up)   Functional Level     Comments SLow reciprocating pattern going up steps and single step a at time going down steps. Increased time and effort to complete with cues for improved step clearance and foot placement SLow reciprocating pattern going up steps and single step a at time going down steps. Increased time and effort to complete with cues for improved step clearance and foot placement   Curbs/Ramps 0 (Not tested) 4 (Minimal assistance)(up/down 6 inch step with RW and without RW)         QUALITY INDICATOR ASSIST COMMENTS   Roll right (&return to back) 6: Independent     Roll left (& return to back) 6:  Independent     Supine to sit 6: Independent     Sit to stand 4: Supervision or touching A     Chair/bed-to-chair transfer 4: Supervision or touching A     Walk 10 feet 4: Supervision or touching A     Walk 50 feet with 2 turns 4: Supervision or touching A     Walk 150 feet 4: Supervision or touching A     Walk 10 feet on uneven  4: Supervision or touching A     1 step/curb 4: Supervision or touching A     4 steps 4: Supervision or touching A     12 steps 4: Supervision or touching A      object 4: Supervision or touching A Min assist    Wheel 48' w/2 turns Not Tested: Not applicable secondary to pt not completing activity previously     Wheel 150' Not Tested: Not applicable secondary to pt not completing activity previously     Car Transfer Not Tested: Not attempted due to environmental concerns: Equipment                PHYSICAL THERAPY PLAN OF CARE     LTGs: Patient met all goals per eval.     Pt would benefit from continued skilled physical therapy in order to improve independent functional mobility within the home with use of least restrictive device. Interventions may include range of motion (AROM, PROM B LE/trunk), motor function (B LE/trunk strengthening/coordination), activity tolerance (vitals, oxygen saturation levels), bed mobility training, balance activities, gait training (progressive ambulation program), and functional transfer training.      HEP handout:issued written LE HEP. Able to complete with SBA and verbal/ visual cues. Increased time and effort to complete with multiple and frequent rest breaks. Cues for correct form. UE support on counter to perform UE standing exercises     SEATED EXERCISES Sets Reps Comments   Ankle Pumps 2 10     Hip Flexion 2 10     Long Arc Quads 2 10     Hip Adduction/Ball Squeeze 2 10     Hip Abduction with red Theraband 2 10     Hamstring Curls with red Theraband 2 10     Hip Extension with red Theraband 2 10        STANDING EXERCISES Sets Reps Comments   Heel Raises 1 10     Toe Raises 1 10     Hip Flexion/Marching 1 10     Hamstring Curls 1 10     Hip Abduction 1 10     Hip Extension 1 10     Mini Squats 1 10        Pt to be discharged 01/14/21  with assistance provided by family. Family training completed earlier today with PT for HEP. Instructed patient's family member on how to assist patient with functional mobility listed above.  Patient and family member verbalizing and demonstrating understanding of patient /family education/training noted above     Made recommendations for modifying home environment to decrease risk for falls.     Therapy Recommendations upon discharge: [address] needs at discharge: [employer] company to provide RW       Please see IRC; Interdisciplinary Eval, Care Plan, and Patient Education for further information regarding physical therapy discharge summary and plan of care.      Patient returned to room at end of treatment. Patient supine in bed with head of bed elevated and bed rails up x 2. Needs placed in reach of patient. Parvez alarm on     [place] Ra, PT  1/13/2021           OT DISCHARGE REPORT     Time In: 5870  Time Out: 1347  Mobility    Usual Performance Score Comments   Rolling 4: Supervision or touching A Side: Bilateral   Supine to Sit 6: Independent     Sit to Supine 6: Independent     Sit to Stand 4: Supervision or touching A S   Transfer Assist 4: Supervision or touching A Transfer Type: SPT   Equipment: Rolling Walker   Comments: S       Activities of Daily Living     Usual Performance Score Comments   Eating 6: Independent     Oral Hyigene 6: Independent While seated   Bathing 4: Supervision or touching A Type of Shower: Shower  Position: Supported sitting and Standing PRN   Adaptive  Equipment: Tub Transfer Bench, Grab Bars and Walker  Comments: S   Upper Body  Dressing 6:  Independent Items Applied: Pullover  Position: Supported Sitting    Lower Body Dressing 4: Supervision or touching A Items Applied: Underwear and Elastic pants  Position: Supported sitting and Standing PRN  Adaptive Equipment: RW and W/C  Comments: S in standing   Donning/[place] Footwear 5: S/U or clean-up assist Items Applied: Socks and Shoes with fasteners   Adaptive Equipment: N/A   Toilet Transfer 4: Supervision or touching A Transfer Type: SPT   Equipment: Rolling Walker   Comments: S   Toileting Hygiene 4: Supervision or touching A Output: Urine  Comments: S   Education   Benefits of OT      Plan of Care: Please see Care Plan for progress towards goals during stay  Precautions at Discharge: Falls, Poor Safety Awareness, Confused and Impulsive   Discharge Location: Private Residence with spouse  Safety/Supervision Recommendations/Functional Level: Pt will need 24 hour supervision for safety while at home. Family Training: Completed with spouse 1/13/2021.     Recommended Continuing Therapy: Home Health Occupational Therapy  Residual Deficits: Strength, cognition, ADL independence  Progress over LOS: Pt demonstrated good progress over LOS. Pt's performance with ADL is reflected in above chart. Pt has made improvements with balance, coordination, and strength.      Summary of Session: Pt demonstrated good participation in OT session. Pt's performance with ADL is reflected in above chart. Pt was left in w/c with PT for session in the gym.     Paz Carmichael MS OTR/L  1/13/2021          SPEECH THERAPY      01/13/21 1233   Time Spent With Patient   Time In 1125   Time Out 1200   Patient Seen For: AM   Mental Status   Neurologic State Alert   Cognition Decreased attention/concentration; Impaired decision making;Memory loss   Perception Appears intact   Perseveration Perseverates during conversation   Safety/Judgement Home safety           01/13/21 1234   Neuro-Linguistic Exercises   Verbal Reasoning Tasks Impaired; very basic verbal reasoning related to hospital and household ADLs completed with 70% accuracy and moderate cues   Verbal Problem Solving Impaired   Memory Impaired; cues to use visual aids to answer orientation questions; once cued answered Fred  Recalled birthdate independently; decreased recall of functions on phone  Able to completed with use of written instructions with SBA      Patient participated with basic cognitive treatment related to problem solving and short-term memory.   Basic verbal reasoning related to hospital and household ADLs completed with 70% accuracy and moderate cues. Cues to use visual aids to answer orientation questions; once cued answered with SBA. Recalled birthdate independently; decreased recall of basic functions on phone reviewed previous session. Able to complete with use of two step written instructions with minimal cues. Discussed memory strategies with patient's wife. Plans to use a whiteboard for reminders and visual cues. Written instruction most useful method due to decreased ability to recall use of compensatory memory strategies during tx sessions with decreased carryover.   24/7 supervision indicated.     Sanna Rivera MS, CCC-SLP               Home Architecture:  Home Environment: Private residence  # Steps to Enter: 15  One/Two Story Residence: One story  Living Alone: No  Support Systems: Spouse/Significant Other/Partner  Retired / [employer]           Past Medical History:   Diagnosis Date    Anxiety disorder 12/9/2014    Arthritis 12/9/2014    Depression 12/9/2014    Gout 12/9/2014    History of lumbar laminectomy for spinal cord decompression 2/18/2019    HTN (hypertension), benign 2/15/2017    Hyperlipemia 12/9/2014    Hypertension     Idiopathic chronic gout of right ankle 12/9/2014    Insomnia 12/9/2014    Kidney stone     Mixed hyperlipidemia 12/9/2014    Panic disorder 12/9/2014    Primary insomnia 12/9/2014    Rosacea 2/15/2017      Past Surgical History:   Procedure Laterality Date    HX ANKLE FRACTURE TX Left 6/1974    HX COLONOSCOPY  2007    HX CYST REMOVAL      HX CYST REMOVAL      pylonidal cyst    HX CYST REMOVAL      HX HERNIA REPAIR Bilateral     HX OTHER SURGICAL      wrist    HX WRIST FRACTURE TX Bilateral 6/1993    wrist plate/pin      Family History   Problem Relation Age of Onset    Cancer Mother         lung ca    Hypertension Mother     Heart Disease Mother     Arthritis-osteo Father     Cancer Brother         stomach      Social History     Tobacco Use    Smoking status: Former Smoker     Types: Cigarettes     Quit date: 1970     Years since quittin.0    Smokeless tobacco: Never Used   Substance Use Topics    Alcohol use: Yes     Alcohol/week: 0.0 standard drinks     Comment: occ     No Known Allergies   Prior to Admission medications    Medication Sig Start Date End Date Taking? Authorizing Provider   dexAMETHasone (DECADRON) 4 mg tablet Take one tablet by mouth every 8 hours *OR* as directed by surgeon. Take with food. (Steroid used for brain swelling from tumor surgery.) 21  Yes BARBIE Estrella   levETIRAcetam (KEPPRA) 100 mg/ml soln oral solution Take 10 mL by mouth two (2) times a day. Neurosurgeon or PCP will continue to prescribe this medication. Indications: seizures from brain tumor 21  Yes Kit Herbert PA   HYDROcodone-acetaminophen (NORCO) 5-325 mg per tablet Take 1 Tab by mouth every four (4) hours as needed (for surgery pain) for up to 5 days. Max Daily Amount: 6 Tabs. 21 Yes Jackson, Marylee C, PA   ondansetron (ZOFRAN ODT) 4 mg disintegrating tablet Take 1 Tab by mouth every six (6) hours as needed for Nausea or Vomiting. 21  Yes Jackson, Marylee C, PA   pantoprazole (PROTONIX) 40 mg tablet Take 1 Tab by mouth Daily (before breakfast). PCP will continue to prescribe this medication. (For prevention of stress stomach ulcers.) 21  Yes Kit Herbert PA   polyethylene glycol (MIRALAX) 17 gram packet Take 1 Packet by mouth daily as needed for Constipation. (available over the counter) 21  Yes BARBIE Estrella   senna-docusate (PERICOLACE) 8.6-50 mg per tablet Take 1 Tab by mouth two (2) times a day. (available over the counter)  Indications: constipation 21  Yes BARBIE Estrella   tamsulosin (FLOMAX) 0.4 mg capsule Take 1 Cap by mouth daily. PCP will continue to prescribe this medication.   Indications: for slow or decreased urine flow 1/14/21  Yes Kit Devi PA   traZODone (DESYREL) 50 mg tablet Take 0.5 Tabs by mouth nightly as needed for Sleep. 1/13/21  Yes Jackson, Marylee C, PA   levETIRAcetam 1,000 mg tablet Take 1 Tab by mouth two (2) times a day. 1/4/21  Yes Delroy Dejesus MD   dexAMETHasone (DECADRON) 2 mg tablet Take 0.5 Tabs by mouth See Admin Instructions. Take 3mg TID x3 days, 2mg BID x2 days, 1mg daily x1 day then stop 1/4/21  Yes Delroy Dejesus MD   sertraline (ZOLOFT) 100 mg tablet Take 1.5 Tabs by mouth daily. One and half tab every day 9/28/20  Yes Darlene Starks, DO   amLODIPine-Olmesartan (Cuco) 10-40 mg tab Take 1 Tab by mouth daily. 9/28/20  Yes BrothersDarlene, DO   furosemide (LASIX) 20 mg tablet Take 1 Tab by mouth daily. 8/19/19  Yes BrothDarlene tolentino, DO   allopurinoL (ZYLOPRIM) 100 mg tablet Take 2 Tabs by mouth daily. 9/28/20   BrothersDarlene, DO   atorvastatin (LIPITOR) 40 mg tablet TAKE 1 TABLET BY MOUTH EVERY DAY 9/28/20   Brothers, Darlene MÉNDEZ, DO   zolpidem (AMBIEN) 10 mg tablet TAKE 1 TABLET BY MOUTH AT BEDTIME AS NEEDED FOR SLEEP  Indications: difficulty falling asleep 9/28/20   Gisella Vernon T, DO   potassium chloride SR (Klor-Con 10) 10 mEq tablet Take 1 Tab by mouth daily. 4/29/20   BrothDarlene tolentino, DO   metroNIDAZOLE (METROGEL) 1 % topical gel Apply  to affected area daily. Use a thin layer to affected areas after washing 3/2/20   BARBIE Newell   diclofenac sodium (PENNSAID) 1.5 % drop by Apply Externally route. Provider, Historical   diclofenac (VOLTAREN) 1 % gel Apply 4 g to affected area four (4) times daily. 2/15/17   BrothDarlene tolentino, DO   multivitamin (ONE A DAY) tablet Take 1 Tab by mouth daily. Provider, Historical   cholecalciferol, vitamin D3, (VITAMIN D3) 2,000 unit tab Take  by mouth.     Provider, Historical       Lab Review:   Recent Results (from the past 72 hour(s))   GLUCOSE, POC    Collection Time: 01/11/21 11:40 AM Result Value Ref Range    Glucose (POC) 138 (H) 65 - 100 mg/dL   GLUCOSE, POC    Collection Time: 01/11/21  3:50 PM   Result Value Ref Range    Glucose (POC) 128 (H) 65 - 212 mg/dL   METABOLIC PANEL, BASIC    Collection Time: 01/13/21  6:10 AM   Result Value Ref Range    Sodium 141 136 - 145 mmol/L    Potassium 3.8 3.5 - 5.1 mmol/L    Chloride 105 98 - 107 mmol/L    CO2 27 21 - 32 mmol/L    Anion gap 9 7 - 16 mmol/L    Glucose 115 (H) 65 - 100 mg/dL    BUN 28 (H) 8 - 23 MG/DL    Creatinine 1.19 0.8 - 1.5 MG/DL    GFR est AA >60 >60 ml/min/1.73m2    GFR est non-AA >60 >60 ml/min/1.73m2    Calcium 8.5 8.3 - 10.4 MG/DL       Functional Assessment:          Balance  Sitting - Static: Good (unsupported) (01/13/21 1600)  Sitting - Dynamic: Good (unsupported) (01/13/21 1600)  Standing - Static: Fair (01/13/21 1600)  Standing - Dynamic : Impaired (01/11/21 1600)                     Starleen Freeze Fall Risk Assessment:  Starleen Freeze Fall Risk  Mobility: Ambulates or transfers with assist devices or assistance (01/14/21 0346)  Mobility Interventions: Bed/chair exit alarm;Communicate number of staff needed for ambulation/transfer;Patient to call before getting OOB; Strengthening exercises (ROM-active/passive); Utilize walker, cane, or other assistive device (01/14/21 0346)  Mentation: Confusion at all times (01/14/21 0346)  Mentation Interventions: Adequate sleep, hydration, pain control;Bed/chair exit alarm;Evaluate medications/consider consulting pharmacy; Eyeglasses and hearing aids; Increase mobility (01/14/21 0346)  Medication: Patient receiving anticonvulsants, sedatives(tranquilizers), psychotropics or hypnotics, hypoglycemics, narcotics, sleep aids, antihypertensives, laxatives, or diuretics (01/14/21 0346)  Medication Interventions: Bed/chair exit alarm;Evaluate medications/consider consulting pharmacy; Patient to call before getting OOB; Teach patient to arise slowly (01/14/21 0346)  Elimination: Needs assistance with toileting (01/14/21 0346)  Elimination Interventions: Bed/chair exit alarm;Call light in reach; Patient to call for help with toileting needs; Toilet paper/wipes in reach (01/14/21 0346)  Prior Fall History: Before admission in past 12 months _home or previous inpatient care) (01/14/21 0346)  History of Falls Interventions: Bed/chair exit alarm; Consult care management for discharge planning;Evaluate medications/consider consulting pharmacy (01/14/21 0346)  Total Score: 5 (01/14/21 0346)  Standard Fall Precautions: Yes (01/05/21 0723)  High Fall Risk: Yes (01/14/21 0346)     Speech Assessment:  Aspiration Signs/Symptoms: None (01/05/21 1020)      Ambulation:  Gait  Distance (ft): 80 Feet (ft) (01/13/21 1638)  Assistive Device: Walker, rolling (01/13/21 1638)  Rail Use: Both(Both w/ 1st 4,single hand rail w/ 4 steps x 2, L going up) (01/13/21 1600)     Visit Vitals  /66   Pulse 72   Temp 97.7 °F (36.5 °C)   Resp 18   Wt 187 lb 11.2 oz (85.1 kg)   SpO2 96%   BMI 26.93 kg/m²      Intake and Output:    No intake/output data recorded. Discharge Exam:  General: Alert and age appropriately oriented to person , place but not time (February 2001)  No acute cardiorespiratory distress. HEENT: Normocephalic, no scleral icterus. Oral mucosa moist without cyanosis. Lungs: Clear to auscultation bilaterally. Respiration even and unlabored. Heart: Regular rate and rhythm, S1, S2. No murmurs, clicks, rub or gallops. Abdomen: Soft, non-tender, not distended. Bowel sounds normoactive. No organomegaly. Genitourinary: Deferred. Neuromuscular:        Following commands. occasional vcs  [place]. Generalized non focal prox>distal weakness  Sensation intact   Skin/extremity: No rashes, no erythema. No calf tenderness B LE. No edema  Scalp incision c/d/i. Sutures out.  No erythema         Problem List as of 1/14/2021 Date Reviewed: 1/13/2021          Codes Class Noted - Resolved    * (Principal) Brain mass ICD-10-CM: G93.89  ICD-9-CM: 348.89  1/4/2021 - Present        Cerebral edema (HCC) ICD-10-CM: G93.6  ICD-9-CM: 348.5  12/31/2020 - Present        Seizures ([facility] ICD-10-CM: R56.9  ICD-9-CM: 780.39  12/28/2020 - Present        Essential hypertension (Chronic) ICD-10-CM: I10  ICD-9-CM: 401.9  12/26/2020 - Present        Brain tumor ([facility] ICD-10-CM: D49.6  ICD-9-CM: 239.6  12/26/2020 - Present        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  ICD-9-CM: 348.89  12/26/2020 - Present        ICH (intracerebral hemorrhage) ([facility] ICD-10-CM: I61.9  ICD-9-CM: 776  12/25/2020 - Present        History of lumbar laminectomy for spinal cord decompression ICD-10-CM: Z98.890  ICD-9-CM: V45.89  2/18/2019 - Present        Idiopathic scoliosis ICD-10-CM: M41.20  ICD-9-CM: 737.30  6/7/2018 - Present        HTN (hypertension), benign ICD-10-CM: I10  ICD-9-CM: 401.1  2/15/2017 - Present        Rosacea ICD-10-CM: L71.9  ICD-9-CM: 695.3  2/15/2017 - Present        MEGGAN (generalized anxiety disorder) ICD-10-CM: F41.1  ICD-9-CM: 300.02  12/9/2014 - Present        Arthritis ICD-10-CM: M19.90  ICD-9-CM: 716.90  12/9/2014 - Present        Idiopathic chronic gout of right ankle ICD-10-CM: M1A.0710  ICD-9-CM: 274.02  12/9/2014 - Present        Mixed hyperlipidemia ICD-10-CM: E78.2  ICD-9-CM: 272.2  12/9/2014 - Present        Primary insomnia ICD-10-CM: F51.01  ICD-9-CM: 307.42  12/9/2014 - Present        Panic disorder ICD-10-CM: F41.0  ICD-9-CM: 300.01  12/9/2014 - Present        RESOLVED: Acute renal failure (ARF) ([facility] ICD-10-CM: N17.9  ICD-9-CM: 584.9  12/26/2020 - 12/28/2020        RESOLVED: Recurrent depression ([facility] ICD-10-CM: F33.9  ICD-9-CM: 296.30  8/16/2018 - 8/19/2019        RESOLVED: Lumbar stenosis with neurogenic claudication ICD-10-CM: M48.062  ICD-9-CM: 724.03  6/7/2018 - 9/28/2020        RESOLVED: Depression ICD-10-CM: F32.9  ICD-9-CM: 574  12/9/2014 - 2/15/2017              Discharge Instructions:   1. Diet: regular  2.  Activity:no driving, no climbing, recommend 24 supervision  3. Incision / wound care: scalp inc healing well. Sutures have been removed. May shower; pat dry    Current Discharge Medication List      START taking these medications    Details   levETIRAcetam (KEPPRA) 100 mg/ml soln oral solution Take 10 mL by mouth two (2) times a day. Neurosurgeon or PCP will continue to prescribe this medication. Indications: seizures from brain tumor  Qty: 600 mL, Refills: 1      HYDROcodone-acetaminophen (NORCO) 5-325 mg per tablet Take 1 Tab by mouth every four (4) hours as needed (for surgery pain) for up to 5 days. Max Daily Amount: 6 Tabs. Qty: 30 Tab, Refills: 0    Associated Diagnoses: S/P craniotomy      ondansetron (ZOFRAN ODT) 4 mg disintegrating tablet Take 1 Tab by mouth every six (6) hours as needed for Nausea or Vomiting. Qty: 30 Tab, Refills: 1      pantoprazole (PROTONIX) 40 mg tablet Take 1 Tab by mouth Daily (before breakfast). PCP will continue to prescribe this medication. (For prevention of stress stomach ulcers.)  Qty: 30 Tab, Refills: 1      polyethylene glycol (MIRALAX) 17 gram packet Take 1 Packet by mouth daily as needed for Constipation. (available over the counter)  Qty: 1 Each, Refills: prn      senna-docusate (PERICOLACE) 8.6-50 mg per tablet Take 1 Tab by mouth two (2) times a day. (available over the counter)  Indications: constipation  Qty: 60 Tab, Refills: prn      tamsulosin (FLOMAX) 0.4 mg capsule Take 1 Cap by mouth daily. PCP will continue to prescribe this medication. Indications: for slow or decreased urine flow  Qty: 30 Cap, Refills: 1      traZODone (DESYREL) 50 mg tablet Take 0.5 Tabs by mouth nightly as needed for Sleep. Qty: 30 Tab, Refills: 1         CONTINUE these medications which have CHANGED    Details   dexAMETHasone (DECADRON) 4 mg tablet Take one tablet by mouth every 8 hours *OR* as directed by surgeon. Take with food.  (Steroid used for brain swelling from tumor surgery.)  Qty: 90 Tab, Refills: 0 CONTINUE these medications which have NOT CHANGED    Details   sertraline (ZOLOFT) 100 mg tablet Take 1.5 Tabs by mouth daily. One and half tab every day  Qty: 135 Tab, Refills: 1    Associated Diagnoses: MEGGAN (generalized anxiety disorder)      amLODIPine-Olmesartan (Cuco) 10-40 mg tab Take 1 Tab by mouth daily. Qty: 90 Tab, Refills: 1    Associated Diagnoses: HTN (hypertension), benign      furosemide (LASIX) 20 mg tablet Take 1 Tab by mouth daily. Qty: 90 Tab, Refills: 1    Associated Diagnoses: Bilateral leg edema      allopurinoL (ZYLOPRIM) 100 mg tablet Take 2 Tabs by mouth daily. Qty: 180 Tab, Refills: 1    Associated Diagnoses: Idiopathic chronic gout of right ankle without tophus      atorvastatin (LIPITOR) 40 mg tablet TAKE 1 TABLET BY MOUTH EVERY DAY  Qty: 90 Tab, Refills: 1    Associated Diagnoses: Mixed hyperlipidemia      potassium chloride SR (Klor-Con 10) 10 mEq tablet Take 1 Tab by mouth daily. Qty: 90 Tab, Refills: 3    Associated Diagnoses: HTN (hypertension), benign      metroNIDAZOLE (METROGEL) 1 % topical gel Apply  to affected area daily. Use a thin layer to affected areas after washing  Qty: 45 g, Refills: 3    Associated Diagnoses: Rosacea      diclofenac sodium (PENNSAID) 1.5 % drop by Apply Externally route. diclofenac (VOLTAREN) 1 % gel Apply 4 g to affected area four (4) times daily. Qty: 2 Each, Refills: 3    Associated Diagnoses: Arthritis      multivitamin (ONE A DAY) tablet Take 1 Tab by mouth daily. cholecalciferol, vitamin D3, (VITAMIN D3) 2,000 unit tab Take  by mouth.          STOP taking these medications       levETIRAcetam 1,000 mg tablet Comments:   Reason for Stopping:         zolpidem (AMBIEN) 10 mg tablet Comments:   Reason for Stopping:               I have reviewed the patients controlled substance prescription history as maintained in the [place] prescription monitoring program () so that prescription(s) for controlled substance, if any provided, could be given. Rehabilitation Management:   1. Devices: RW  2. Consult: PT/OT/ST/CM    Disposition: home with [facility] PT/OT/RN    Follow-up Information     Follow up With Specialties Details Why Contact Info    Brothers, Lane Riley, DO Family Medicine Schedule an appointment as soon as possible for a visit in 2 weeks for follow-up after hospitalization for brain tumor [address] [facility]      Aliyah Evans MD Neurosurgery Schedule an appointment as soon as possible for a visit in 1 week to discuss brain tumor options [address] [facility]   [facility] [address] [facility]  [phone]          Time spent in patient discharge planning and coordination: >35 minutes. Nohemy Mills MD, Medical Director  [address] [facility]

## 2021-01-14 NOTE — PROGRESS NOTES
Problem: Falls - Risk of  Goal: *Absence of Falls  Description: Document Daria Puente Fall Risk and appropriate interventions in the flowsheet. Outcome: Progressing Towards Goal  Note: Fall Risk Interventions:  Mobility Interventions: Bed/chair exit alarm, OT consult for ADLs, Patient to call before getting OOB, Utilize walker, cane, or other assistive device    Mentation Interventions: Adequate sleep, hydration, pain control, Bed/chair exit alarm, Door open when patient unattended, Increase mobility, Update white board    Medication Interventions: Bed/chair exit alarm, Evaluate medications/consider consulting pharmacy, Teach patient to arise slowly    Elimination Interventions: Bed/chair exit alarm, Call light in reach, Patient to call for help with toileting needs    History of Falls Interventions: Bed/chair exit alarm, Consult care management for discharge planning, Evaluate medications/consider consulting pharmacy         Problem: Patient Education: Go to Patient Education Activity  Goal: Patient/Family Education  Outcome: Progressing Towards Goal     Problem: Pressure Injury - Risk of  Goal: *Prevention of pressure injury  Description: Document Marquez Scale and appropriate interventions in the flowsheet.   Outcome: Progressing Towards Goal  Note: Pressure Injury Interventions:  Sensory Interventions: Assess changes in LOC, Discuss PT/OT consult with provider, Keep linens dry and wrinkle-free    Moisture Interventions: Absorbent underpads, Check for incontinence Q2 hours and as needed, Moisture barrier, Offer toileting Q_hr    Activity Interventions: Increase time out of bed, Pressure redistribution bed/mattress(bed type)    Mobility Interventions: Pressure redistribution bed/mattress (bed type), PT/OT evaluation    Nutrition Interventions: Document food/fluid/supplement intake    Friction and Shear Interventions: Minimize layers                Problem: Patient Education: Go to Patient Education Activity  Goal: Patient/Family Education  Outcome: Progressing Towards Goal     Problem: Falls - Risk of  Goal: *Absence of Falls  Description: Document Leslie Live Fall Risk and appropriate interventions in the flowsheet.   Outcome: Progressing Towards Goal  Note: Fall Risk Interventions:  Mobility Interventions: Bed/chair exit alarm, OT consult for ADLs, Patient to call before getting OOB, Utilize walker, cane, or other assistive device    Mentation Interventions: Adequate sleep, hydration, pain control, Bed/chair exit alarm, Door open when patient unattended, Increase mobility, Update white board    Medication Interventions: Bed/chair exit alarm, Evaluate medications/consider consulting pharmacy, Teach patient to arise slowly    Elimination Interventions: Bed/chair exit alarm, Call light in reach, Patient to call for help with toileting needs    History of Falls Interventions: Bed/chair exit alarm, Consult care management for discharge planning, Evaluate medications/consider consulting pharmacy         Problem: Patient Education: Go to Patient Education Activity  Goal: Patient/Family Education  Outcome: Progressing Towards Goal     Problem: Inpatient Rehab (Adult)  Goal: *LTG: Avoids injury/falls 100% of time related to deficits  Outcome: Progressing Towards Goal  Goal: *LTG: Avoids infection 100% of time related to deficits  Outcome: Progressing Towards Goal  Goal: *LTG: Verbalize understanding of diagnosis and risk factors for recurring stroke  Outcome: Progressing Towards Goal  Goal: *LTG: Absence of DVT during hospitalization  Outcome: Progressing Towards Goal  Goal: *LTG: Maintains Skin Integrity With No Evidence of Pressure Injury 100% of Time  Outcome: Progressing Towards Goal  Goal: *Nursing Goal (Insert Text)  Outcome: Progressing Towards Goal  Goal: *Nursing Goal (Insert Text)  Outcome: Progressing Towards Goal  Goal: Interventions  Outcome: Progressing Towards Goal     Problem: Patient Education: Go to Patient Education Activity  Goal: Patient/Family Education  Outcome: Progressing Towards Goal     Problem: Patient Education: Go to Patient Education Activity  Goal: Patient/Family Education  Outcome: Progressing Towards Goal

## 2021-01-14 NOTE — PROGRESS NOTES
Problem: Self Care Deficits Care Plan (Adult)  Goal: *Therapy Goal (Edit Goal, Insert Text)  Description: LTG 1: Pt will be independent with toilet transfer by 10 days to prevent skin breakdown. 1/11/2021 Progressing towards goal. Pt is S  1/14/2021 Goal not met. Pt is S  Outcome: Resolved/Not Met  Goal: *Therapy Goal (Edit Goal, Insert Text)  Description: LTG 2: Pt will be independent with toilet hygiene by 10 days to prevent skin breakdown. 1/11/2021 Progressing towards goal. Pt is S  1/14/2021 Goal Not Met. Pt is S in standing. Outcome: Resolved/Not Met  Goal: *Therapy Goal (Edit Goal, Insert Text)  Description: LTG 3: Pt will be independent with LB dressing by 10 days to reduce risk of falls. 1/11/2021 Progressing towards goal. Pt is S  1/14/2021 Pt is S. Goal Not Met. Outcome: Resolved/Not Met  Goal: *Therapy Goal (Edit Goal, Insert Text)  Description: LTG 4: Pt will be independent with bathing by 10 days to promote good skin integrity. 1/11/2021 Progressing towards goal. Pt is S  1/14/2021 Goal Not Met. Pt is S  Outcome: Resolved/Not Met     Problem: Self Care Deficits Care Plan (Adult)  Goal: *Therapy Goal (Edit Goal, Insert Text)  Description: LTG 5: Pt will be independent with UB dressing by 10 days to reduce risk of falls. 1/11/2021 Progressing towards goal. Pt is S/U  1/14/2021 Goal Met. Outcome: Resolved/Met  Goal: Interventions  Outcome: Resolved/Met     Problem: Patient Education: Go to Patient Education Activity  Goal: Patient/Family Education  Description: Pt/caregiver will verbalize  understanding of OT recommendations regarding ADL status, functional transfer status, home safety, DME, AE, energy conservation techniques, safety awareness, activity tolerance, and/or follow-up therapy to increase safety with functional tasks upon discharge. 1/14/2021 Goal Met.    Outcome: Resolved/Met     Karina Mcmullen MS OTR/L  1/14/2021

## 2021-01-14 NOTE — PROGRESS NOTES
DISCHARGE SUMMARY     01/14/21 0940   Time Spent With Patient   Time In 0913   Time Out 0938   Patient Seen For: AM   Mental Status   Neurologic State Alert   Cognition Impaired decision making;Decreased attention/concentration;Decreased command following   Perseveration Perseverates during conversation   Safety/Judgement Home safety        01/14/21 0940   Neuro-Linguistic Exercises   Verbal Problem Solving Impaired; decreased initiation; increased time for processing required   Memory Impaired; immediate recall of information during functional memory task listening to basic directions and recalling information from voicemails required MaxA; decreased initiation to write down key points  Patient was able to recall 1 of 2 of the activities reviewed for ease of use with his phone that was practiced previous sessions stating \"using favorites\"     Patient participated with cognitive treatment. Decreased initiation and increased time for processing required throughout the session. Basic reasoning related to hospital/household ADLs completed with ModA.    immediate recall of information during functional memory task listening to basic directions and recalling information from voicemails required MaxA; decreased initiation to write down key points despite cues. Patient was able to recall 1 of 2 of the activities reviewed for ease of use with his phone that was practiced previous sessions stating \"using favorites\". Discussed family using visual reminders at home to assist with short-term memory deficits. Patient with decreased ability to implement independently at this time with decline in function of cognitive abilities. Patient will be discharging home with his wife today who is aware of need for 24/7 care and cognitive deficits. Potential for further decline given dx; no further ST recommended at discharge.     Navneet Carrington MS, CCC-SLP

## 2021-01-14 NOTE — PROGRESS NOTES
Discharge instructions completed with patient and patient's family member. Follow up appointments and prescription information given to patient's family member. Family member shows verbal understanding of discharge instructions. No other verbal needs made known upon discharge instructions.

## 2021-01-14 NOTE — PROGRESS NOTES
Pt to d/c today. Contacted wife, Carisa Orellana, and after reviewing choice list wife has no HH choice preference. [facility] referral placed, Liaison aware. HH needs include RN/PT/CELIA Schwartz to supply RW. Family training complete. No new needs noted, all needs met. CM available if any new needs arise. Care Management Interventions  PCP Verified by CM: Yes  Mode of Transport at Discharge:  Other (see comment)(Family )  Transition of Care Consult (CM Consult): [address]: Yes  Discharge Durable Medical Equipment: Yes(RW)  Physical Therapy Consult: Yes  Occupational Therapy Consult: Yes  Speech Therapy Consult: No  Current Support Network: Own Home, Lives with Spouse  Confirm Follow Up Transport: Family  The Plan for Transition of Care is Related to the Following Treatment Goals : Return to baseline   The Patient and/or Patient Representative was Provided with a Choice of Provider and Agrees with the Discharge Plan?: Yes  Name of the Patient Representative Who was Provided with a Choice of Provider and Agrees with the Discharge Plan: Alisha Simmons [place] of Choice List was Provided with Basic Dialogue that Supports the Patient's Individualized Plan of Care/Goals, Treatment Preferences and Shares the Quality Data Associated with the Providers?: Yes  [place] Resource Information Provided?: No  Discharge Location  Discharge Placement: Home with home health([facility])

## 2021-01-15 ENCOUNTER — HOME CARE VISIT (OUTPATIENT)
Dept: SCHEDULING | Facility: HOME HEALTH | Age: 81
End: 2021-01-15
Payer: MEDICARE

## 2021-01-15 VITALS
RESPIRATION RATE: 16 BRPM | DIASTOLIC BLOOD PRESSURE: 60 MMHG | TEMPERATURE: 97.8 F | SYSTOLIC BLOOD PRESSURE: 108 MMHG | OXYGEN SATURATION: 97 % | HEART RATE: 72 BPM

## 2021-01-15 PROCEDURE — 3331090001 HH PPS REVENUE CREDIT

## 2021-01-15 PROCEDURE — 400018 HH-NO PAY CLAIM PROCEDURE

## 2021-01-15 PROCEDURE — 3331090002 HH PPS REVENUE DEBIT

## 2021-01-15 PROCEDURE — 400013 HH SOC

## 2021-01-15 PROCEDURE — G0151 HHCP-SERV OF PT,EA 15 MIN: HCPCS

## 2021-01-15 NOTE — PROGRESS NOTES
Problem: Falls - Risk of  Goal: *Absence of Falls  Description: Document Chantel Rucker Fall Risk and appropriate interventions in the flowsheet.   Outcome: Resolved/Met  Note: Fall Risk Interventions:  Mobility Interventions: Bed/chair exit alarm, OT consult for ADLs, Patient to call before getting OOB, Utilize walker, cane, or other assistive device    Mentation Interventions: Adequate sleep, hydration, pain control, Bed/chair exit alarm, Door open when patient unattended, Increase mobility, Update white board    Medication Interventions: Bed/chair exit alarm, Evaluate medications/consider consulting pharmacy, Teach patient to arise slowly    Elimination Interventions: Bed/chair exit alarm, Call light in reach, Patient to call for help with toileting needs    History of Falls Interventions: Bed/chair exit alarm, Consult care management for discharge planning, Evaluate medications/consider consulting pharmacy

## 2021-01-16 ENCOUNTER — HOME CARE VISIT (OUTPATIENT)
Dept: SCHEDULING | Facility: HOME HEALTH | Age: 81
End: 2021-01-16
Payer: MEDICARE

## 2021-01-16 VITALS
TEMPERATURE: 97.2 F | HEART RATE: 64 BPM | DIASTOLIC BLOOD PRESSURE: 62 MMHG | OXYGEN SATURATION: 96 % | SYSTOLIC BLOOD PRESSURE: 122 MMHG

## 2021-01-16 PROCEDURE — G0299 HHS/HOSPICE OF RN EA 15 MIN: HCPCS

## 2021-01-16 PROCEDURE — 3331090002 HH PPS REVENUE DEBIT

## 2021-01-16 PROCEDURE — 3331090001 HH PPS REVENUE CREDIT

## 2021-01-16 PROCEDURE — G0152 HHCP-SERV OF OT,EA 15 MIN: HCPCS

## 2021-01-16 NOTE — Clinical Note
S: Patient admitted to [facility] on 12/25/20, for a br ain mass. Patient presented with encephalopathy and seizure. Patient intubated. CT of head showed a right parietal mass with hemorrhage. Neurosurgery and Neurology consulted. CT c/a/p without evidence of primary or metastatic disease. MRI of brain showed a right temporoparietal mass with associated hemorrhage and showed cystic changes of the left thalamus suggesting a remote infarction. Patient was started on Keppra and dexame thasone. EEG abnormal. Patient extubated and transferred to the floor. On 12/31/20, patient underwent a craniotomy and right parietal lobe mass resection. Tolerated procedure well. Patient's blood pressure controlled. Patient was approved to [facility]. On 1/4/21, patient was transferred to Inpatient rehab. Patient's right parietal mass suspicious for a glioma, pathology pending. Pathology discussed with spouse and need for Oncology f/u and with Dr Cook. Per Dr Cook, he will discuss further and refer to XRT Onc/Med Oncology at time of f/u appointment. Patient discharging 1/14/21.   B: PMH: Anxiety, Arthritis, Depression, Former smoker, Gout, H/O lumbar laminectomy for spinal cord decompression, Hyperlipemia, Hypertension, Insomnia, Kidney stone, Panic dis order   A: Pt lives with wife in two story home. Patient living on first floor exclusively. PLOF includes limited community distances but independent ambulator and independent with ADLs otherwise. Right lateral cranial incision appears to be healing well, no s/s infection, steristrips in place.   R: Per patient and caregiver, no further OT visits required. Patient completes ADLs independently and functional mobility utilizing RW for household ambulation. Patient received skilled OT evaluation, education and training related to home safety with self-cares and functional transfers, fall prevention, and HEP.  Pt. benefitted from skilled OT education and training; he has met all goals and does not  require additional skilled OT services. Patient verbalizes understanding of, and agreement with, OT POC.

## 2021-01-17 PROCEDURE — 3331090001 HH PPS REVENUE CREDIT

## 2021-01-17 PROCEDURE — 3331090002 HH PPS REVENUE DEBIT

## 2021-01-18 VITALS
RESPIRATION RATE: 20 BRPM | DIASTOLIC BLOOD PRESSURE: 70 MMHG | TEMPERATURE: 97 F | SYSTOLIC BLOOD PRESSURE: 138 MMHG | HEART RATE: 90 BPM

## 2021-01-18 PROCEDURE — 3331090001 HH PPS REVENUE CREDIT

## 2021-01-18 PROCEDURE — 3331090002 HH PPS REVENUE DEBIT

## 2021-01-19 PROCEDURE — 3331090001 HH PPS REVENUE CREDIT

## 2021-01-19 PROCEDURE — 3331090002 HH PPS REVENUE DEBIT

## 2021-01-20 ENCOUNTER — HOME CARE VISIT (OUTPATIENT)
Dept: SCHEDULING | Facility: HOME HEALTH | Age: 81
End: 2021-01-20
Payer: MEDICARE

## 2021-01-20 VITALS
TEMPERATURE: 97.4 F | SYSTOLIC BLOOD PRESSURE: 112 MMHG | HEART RATE: 80 BPM | RESPIRATION RATE: 16 BRPM | DIASTOLIC BLOOD PRESSURE: 64 MMHG

## 2021-01-20 PROCEDURE — 3331090001 HH PPS REVENUE CREDIT

## 2021-01-20 PROCEDURE — G0151 HHCP-SERV OF PT,EA 15 MIN: HCPCS

## 2021-01-20 PROCEDURE — 3331090002 HH PPS REVENUE DEBIT

## 2021-01-21 ENCOUNTER — HOME CARE VISIT (OUTPATIENT)
Dept: SCHEDULING | Facility: HOME HEALTH | Age: 81
End: 2021-01-21
Payer: MEDICARE

## 2021-01-21 VITALS
HEART RATE: 72 BPM | OXYGEN SATURATION: 99 % | TEMPERATURE: 98 F | SYSTOLIC BLOOD PRESSURE: 126 MMHG | DIASTOLIC BLOOD PRESSURE: 74 MMHG | RESPIRATION RATE: 18 BRPM

## 2021-01-21 PROCEDURE — 3331090002 HH PPS REVENUE DEBIT

## 2021-01-21 PROCEDURE — G0157 HHC PT ASSISTANT EA 15: HCPCS

## 2021-01-21 PROCEDURE — 3331090001 HH PPS REVENUE CREDIT

## 2021-01-22 PROCEDURE — 3331090002 HH PPS REVENUE DEBIT

## 2021-01-22 PROCEDURE — 3331090001 HH PPS REVENUE CREDIT

## 2021-01-23 PROCEDURE — 3331090002 HH PPS REVENUE DEBIT

## 2021-01-23 PROCEDURE — 3331090001 HH PPS REVENUE CREDIT

## 2021-01-24 PROCEDURE — 3331090002 HH PPS REVENUE DEBIT

## 2021-01-24 PROCEDURE — 3331090001 HH PPS REVENUE CREDIT

## 2021-01-25 ENCOUNTER — HOME CARE VISIT (OUTPATIENT)
Dept: SCHEDULING | Facility: HOME HEALTH | Age: 81
End: 2021-01-25
Payer: MEDICARE

## 2021-01-25 VITALS
OXYGEN SATURATION: 98 % | HEART RATE: 78 BPM | RESPIRATION RATE: 18 BRPM | DIASTOLIC BLOOD PRESSURE: 68 MMHG | SYSTOLIC BLOOD PRESSURE: 122 MMHG | TEMPERATURE: 97.8 F

## 2021-01-25 PROCEDURE — G0153 HHCP-SVS OF S/L PATH,EA 15MN: HCPCS

## 2021-01-25 PROCEDURE — 3331090001 HH PPS REVENUE CREDIT

## 2021-01-25 PROCEDURE — 3331090002 HH PPS REVENUE DEBIT

## 2021-01-26 ENCOUNTER — HOME CARE VISIT (OUTPATIENT)
Dept: SCHEDULING | Facility: HOME HEALTH | Age: 81
End: 2021-01-26
Payer: MEDICARE

## 2021-01-26 ENCOUNTER — HOSPITAL ENCOUNTER (OUTPATIENT)
Dept: LAB | Age: 81
Discharge: HOME | End: 2021-01-26
Payer: MEDICARE

## 2021-01-26 DIAGNOSIS — C71.9 GBM (GLIOBLASTOMA MULTIFORME) (HCC): ICD-10-CM

## 2021-01-26 LAB
ALBUMIN SERPL-MCNC: 3.4 G/DL (ref 3.2–4.6)
ALBUMIN/GLOB SERPL: 1.1 {RATIO} (ref 1.2–3.5)
ALP SERPL-CCNC: 104 U/L (ref 50–136)
ALT SERPL-CCNC: 38 U/L (ref 12–65)
ANION GAP SERPL CALC-SCNC: 4 MMOL/L (ref 7–16)
AST SERPL-CCNC: 14 U/L (ref 15–37)
BASOPHILS # BLD: 0 K/UL (ref 0–0.2)
BASOPHILS NFR BLD: 0 % (ref 0–2)
BILIRUB SERPL-MCNC: 0.6 MG/DL (ref 0.2–1.1)
BUN SERPL-MCNC: 26 MG/DL (ref 8–23)
CALCIUM SERPL-MCNC: 8.7 MG/DL (ref 8.3–10.4)
CHLORIDE SERPL-SCNC: 108 MMOL/L (ref 98–107)
CO2 SERPL-SCNC: 29 MMOL/L (ref 21–32)
CREAT SERPL-MCNC: 1.4 MG/DL (ref 0.8–1.5)
DIFFERENTIAL METHOD BLD: ABNORMAL
EOSINOPHIL # BLD: 0.1 K/UL (ref 0–0.8)
EOSINOPHIL NFR BLD: 1 % (ref 0.5–7.8)
ERYTHROCYTE [DISTWIDTH] IN BLOOD BY AUTOMATED COUNT: 13.1 % (ref 11.9–14.6)
GLOBULIN SER CALC-MCNC: 3.1 G/DL (ref 2.3–3.5)
GLUCOSE SERPL-MCNC: 160 MG/DL (ref 65–100)
HCT VFR BLD AUTO: 40.4 % (ref 41.1–50.3)
HGB BLD-MCNC: 13.3 G/DL (ref 13.6–17.2)
IMM GRANULOCYTES # BLD AUTO: 0.1 K/UL (ref 0–0.5)
IMM GRANULOCYTES NFR BLD AUTO: 1 % (ref 0–5)
LDH SERPL L TO P-CCNC: 210 U/L (ref 110–210)
LYMPHOCYTES # BLD: 0.6 K/UL (ref 0.5–4.6)
LYMPHOCYTES NFR BLD: 6 % (ref 13–44)
MCH RBC QN AUTO: 30.6 PG (ref 26.1–32.9)
MCHC RBC AUTO-ENTMCNC: 32.9 G/DL (ref 31.4–35)
MCV RBC AUTO: 92.9 FL (ref 79.6–97.8)
MONOCYTES # BLD: 0.6 K/UL (ref 0.1–1.3)
MONOCYTES NFR BLD: 6 % (ref 4–12)
NEUTS SEG # BLD: 8.8 K/UL (ref 1.7–8.2)
NEUTS SEG NFR BLD: 86 % (ref 43–78)
NRBC # BLD: 0 K/UL (ref 0–0.2)
PLATELET # BLD AUTO: 87 K/UL (ref 150–450)
PMV BLD AUTO: 9.5 FL (ref 9.4–12.3)
POTASSIUM SERPL-SCNC: 3.6 MMOL/L (ref 3.5–5.1)
PROT SERPL-MCNC: 6.5 G/DL (ref 6.3–8.2)
RBC # BLD AUTO: 4.35 M/UL (ref 4.23–5.67)
SODIUM SERPL-SCNC: 141 MMOL/L (ref 136–145)
WBC # BLD AUTO: 10.2 K/UL (ref 4.3–11.1)

## 2021-01-26 PROCEDURE — 80053 COMPREHEN METABOLIC PANEL: CPT

## 2021-01-26 PROCEDURE — 85025 COMPLETE CBC W/AUTO DIFF WBC: CPT

## 2021-01-26 PROCEDURE — 3331090002 HH PPS REVENUE DEBIT

## 2021-01-26 PROCEDURE — 3331090001 HH PPS REVENUE CREDIT

## 2021-01-26 PROCEDURE — 36415 COLL VENOUS BLD VENIPUNCTURE: CPT

## 2021-01-26 PROCEDURE — 83615 LACTATE (LD) (LDH) ENZYME: CPT

## 2021-01-26 NOTE — PROGRESS NOTES
PTA scheduled routine PTA visit with this pt for 12:45pm. The pt's spouse contacted the homecare office this morning and stated that the pt had a doctor's appointment this morning, and when they arrived home, the pt was too fatigued and would not like a PT visit. Pt requested a missed visit. PTA contacted Dr. Aylin Pickens' office at 3:07pm and left message to inform of the missed visit. Will continue with next scheduled visit on Thursday.

## 2021-01-27 ENCOUNTER — HOME CARE VISIT (OUTPATIENT)
Dept: SCHEDULING | Facility: HOME HEALTH | Age: 81
End: 2021-01-27
Payer: MEDICARE

## 2021-01-27 VITALS
TEMPERATURE: 98.4 F | SYSTOLIC BLOOD PRESSURE: 124 MMHG | OXYGEN SATURATION: 96 % | DIASTOLIC BLOOD PRESSURE: 72 MMHG | HEART RATE: 72 BPM | RESPIRATION RATE: 18 BRPM

## 2021-01-27 PROCEDURE — 3331090002 HH PPS REVENUE DEBIT

## 2021-01-27 PROCEDURE — G0153 HHCP-SVS OF S/L PATH,EA 15MN: HCPCS

## 2021-01-27 PROCEDURE — 3331090001 HH PPS REVENUE CREDIT

## 2021-01-28 ENCOUNTER — HOME CARE VISIT (OUTPATIENT)
Dept: SCHEDULING | Facility: HOME HEALTH | Age: 81
End: 2021-01-28
Payer: MEDICARE

## 2021-01-28 VITALS
TEMPERATURE: 97.8 F | RESPIRATION RATE: 17 BRPM | DIASTOLIC BLOOD PRESSURE: 60 MMHG | SYSTOLIC BLOOD PRESSURE: 100 MMHG | HEART RATE: 78 BPM | OXYGEN SATURATION: 97 %

## 2021-01-28 VITALS
HEART RATE: 82 BPM | OXYGEN SATURATION: 98 % | DIASTOLIC BLOOD PRESSURE: 66 MMHG | TEMPERATURE: 98.1 F | SYSTOLIC BLOOD PRESSURE: 116 MMHG | RESPIRATION RATE: 16 BRPM

## 2021-01-28 PROCEDURE — G0153 HHCP-SVS OF S/L PATH,EA 15MN: HCPCS

## 2021-01-28 PROCEDURE — G0157 HHC PT ASSISTANT EA 15: HCPCS

## 2021-01-28 PROCEDURE — 3331090002 HH PPS REVENUE DEBIT

## 2021-01-28 PROCEDURE — 3331090001 HH PPS REVENUE CREDIT

## 2021-01-29 PROBLEM — G93.89: Status: RESOLVED | Noted: 2020-12-26 | Resolved: 2021-01-29

## 2021-01-29 PROBLEM — Z87.898 HISTORY OF SEIZURE: Status: ACTIVE | Noted: 2020-12-28

## 2021-01-29 PROBLEM — Z86.79 HISTORY OF INTRACRANIAL HEMORRHAGE: Status: ACTIVE | Noted: 2020-12-25

## 2021-01-29 PROBLEM — C71.9 GLIOBLASTOMA (HCC): Status: ACTIVE | Noted: 2020-12-26

## 2021-01-29 PROBLEM — G93.6 CEREBRAL EDEMA (HCC): Status: RESOLVED | Noted: 2020-12-31 | Resolved: 2021-01-29

## 2021-01-29 PROBLEM — G93.89 BRAIN MASS: Status: RESOLVED | Noted: 2021-01-04 | Resolved: 2021-01-29

## 2021-01-29 PROCEDURE — 3331090001 HH PPS REVENUE CREDIT

## 2021-01-29 PROCEDURE — 3331090002 HH PPS REVENUE DEBIT

## 2021-01-30 ENCOUNTER — APPOINTMENT (OUTPATIENT)
Dept: CT IMAGING | Age: 81
End: 2021-01-30
Attending: EMERGENCY MEDICINE
Payer: MEDICARE

## 2021-01-30 ENCOUNTER — HOSPITAL ENCOUNTER (EMERGENCY)
Age: 81
Discharge: HOME | End: 2021-01-30
Attending: EMERGENCY MEDICINE
Payer: MEDICARE

## 2021-01-30 VITALS
SYSTOLIC BLOOD PRESSURE: 154 MMHG | RESPIRATION RATE: 14 BRPM | OXYGEN SATURATION: 97 % | HEART RATE: 79 BPM | DIASTOLIC BLOOD PRESSURE: 67 MMHG | TEMPERATURE: 98.2 F

## 2021-01-30 DIAGNOSIS — N30.90 CYSTITIS: ICD-10-CM

## 2021-01-30 DIAGNOSIS — R56.9 SEIZURE (HCC): Primary | ICD-10-CM

## 2021-01-30 LAB
ALBUMIN SERPL-MCNC: 3.3 G/DL (ref 3.2–4.6)
ALBUMIN/GLOB SERPL: 0.9 {RATIO} (ref 1.2–3.5)
ALP SERPL-CCNC: 108 U/L (ref 50–136)
ALT SERPL-CCNC: 41 U/L (ref 12–65)
ANION GAP SERPL CALC-SCNC: 5 MMOL/L (ref 7–16)
AST SERPL-CCNC: 18 U/L (ref 15–37)
ATRIAL RATE: 79 BPM
BACTERIA URNS QL MICRO: ABNORMAL /HPF
BASOPHILS # BLD: 0 K/UL (ref 0–0.2)
BASOPHILS NFR BLD: 0 % (ref 0–2)
BILIRUB SERPL-MCNC: 0.5 MG/DL (ref 0.2–1.1)
BUN SERPL-MCNC: 24 MG/DL (ref 8–23)
CALCIUM SERPL-MCNC: 8.9 MG/DL (ref 8.3–10.4)
CALCULATED P AXIS, ECG09: 64 DEGREES
CALCULATED R AXIS, ECG10: 33 DEGREES
CALCULATED T AXIS, ECG11: 43 DEGREES
CASTS URNS QL MICRO: ABNORMAL /LPF
CHLORIDE SERPL-SCNC: 112 MMOL/L (ref 98–107)
CO2 SERPL-SCNC: 29 MMOL/L (ref 21–32)
CREAT SERPL-MCNC: 1.37 MG/DL (ref 0.8–1.5)
DIAGNOSIS, 93000: NORMAL
DIFFERENTIAL METHOD BLD: ABNORMAL
EOSINOPHIL # BLD: 0.1 K/UL (ref 0–0.8)
EOSINOPHIL NFR BLD: 1 % (ref 0.5–7.8)
EPI CELLS #/AREA URNS HPF: 0 /HPF
ERYTHROCYTE [DISTWIDTH] IN BLOOD BY AUTOMATED COUNT: 13.2 % (ref 11.9–14.6)
GLOBULIN SER CALC-MCNC: 3.6 G/DL (ref 2.3–3.5)
GLUCOSE SERPL-MCNC: 126 MG/DL (ref 65–100)
HCT VFR BLD AUTO: 39.7 % (ref 41.1–50.3)
HGB BLD-MCNC: 13.3 G/DL (ref 13.6–17.2)
IMM GRANULOCYTES # BLD AUTO: 0.1 K/UL (ref 0–0.5)
IMM GRANULOCYTES NFR BLD AUTO: 1 % (ref 0–5)
LYMPHOCYTES # BLD: 1.3 K/UL (ref 0.5–4.6)
LYMPHOCYTES NFR BLD: 14 % (ref 13–44)
MAGNESIUM SERPL-MCNC: 2.4 MG/DL (ref 1.8–2.4)
MCH RBC QN AUTO: 31 PG (ref 26.1–32.9)
MCHC RBC AUTO-ENTMCNC: 33.5 G/DL (ref 31.4–35)
MCV RBC AUTO: 92.5 FL (ref 79.6–97.8)
MONOCYTES # BLD: 0.6 K/UL (ref 0.1–1.3)
MONOCYTES NFR BLD: 6 % (ref 4–12)
NEUTS SEG # BLD: 7.2 K/UL (ref 1.7–8.2)
NEUTS SEG NFR BLD: 78 % (ref 43–78)
NRBC # BLD: 0 K/UL (ref 0–0.2)
PLATELET # BLD AUTO: 73 K/UL (ref 150–450)
PMV BLD AUTO: 10 FL (ref 9.4–12.3)
POTASSIUM SERPL-SCNC: 3.6 MMOL/L (ref 3.5–5.1)
PROT SERPL-MCNC: 6.9 G/DL (ref 6.3–8.2)
Q-T INTERVAL, ECG07: 382 MS
QRS DURATION, ECG06: 100 MS
QTC CALCULATION (BEZET), ECG08: 438 MS
RBC # BLD AUTO: 4.29 M/UL (ref 4.23–5.6)
RBC #/AREA URNS HPF: ABNORMAL /HPF
SODIUM SERPL-SCNC: 146 MMOL/L (ref 136–145)
VENTRICULAR RATE, ECG03: 79 BPM
WBC # BLD AUTO: 9.2 K/UL (ref 4.3–11.1)
WBC URNS QL MICRO: ABNORMAL /HPF

## 2021-01-30 PROCEDURE — 74011000258 HC RX REV CODE- 258: Performed by: EMERGENCY MEDICINE

## 2021-01-30 PROCEDURE — 3331090002 HH PPS REVENUE DEBIT

## 2021-01-30 PROCEDURE — 80053 COMPREHEN METABOLIC PANEL: CPT

## 2021-01-30 PROCEDURE — 81015 MICROSCOPIC EXAM OF URINE: CPT

## 2021-01-30 PROCEDURE — 74011250636 HC RX REV CODE- 250/636: Performed by: EMERGENCY MEDICINE

## 2021-01-30 PROCEDURE — 99284 EMERGENCY DEPT VISIT MOD MDM: CPT

## 2021-01-30 PROCEDURE — 70450 CT HEAD/BRAIN W/O DYE: CPT

## 2021-01-30 PROCEDURE — 83735 ASSAY OF MAGNESIUM: CPT

## 2021-01-30 PROCEDURE — 93005 ELECTROCARDIOGRAM TRACING: CPT | Performed by: EMERGENCY MEDICINE

## 2021-01-30 PROCEDURE — 3331090001 HH PPS REVENUE CREDIT

## 2021-01-30 PROCEDURE — 85025 COMPLETE CBC W/AUTO DIFF WBC: CPT

## 2021-01-30 PROCEDURE — 96367 TX/PROPH/DG ADDL SEQ IV INF: CPT

## 2021-01-30 PROCEDURE — 96365 THER/PROPH/DIAG IV INF INIT: CPT

## 2021-01-30 PROCEDURE — 81003 URINALYSIS AUTO W/O SCOPE: CPT

## 2021-01-30 RX ORDER — CEFDINIR 300 MG/1
300 CAPSULE ORAL 2 TIMES DAILY
Qty: 20 CAP | Refills: 0 | Status: SHIPPED | OUTPATIENT
Start: 2021-01-30 | End: 2021-02-10 | Stop reason: ALTCHOICE

## 2021-01-30 RX ORDER — LEVETIRACETAM 500 MG/1
500 TABLET ORAL 2 TIMES DAILY
Qty: 60 TAB | Refills: 0 | Status: SHIPPED | OUTPATIENT
Start: 2021-01-30 | End: 2021-02-25 | Stop reason: SDUPTHER

## 2021-01-30 RX ADMIN — CEFTRIAXONE SODIUM 1 G: 1 INJECTION, POWDER, FOR SOLUTION INTRAMUSCULAR; INTRAVENOUS at 08:41

## 2021-01-30 RX ADMIN — LEVETIRACETAM 1000 MG: 100 INJECTION, SOLUTION INTRAVENOUS at 05:56

## 2021-01-30 NOTE — ED TRIAGE NOTES
Pt arrives via EMS from home for complaint of a witnessed seizure. Just PTA wife walked in the bathroom to find the pt on the floor shaking. Pt is not post-ictal at this time but was for a short period of time after the seizure. Pt is at baseline mentation. Pt states the last thing he remembers is getting in the ambulance. Wife describes hearing the pt fall. Pt denies pain of any kind.

## 2021-01-30 NOTE — ED PROVIDER NOTES
Patient was brought to the emergency department by EMS from home this morning after an apparent seizure. Patient's wife states that she heard a crash and when she got up she found the patient lying in the doorway to the bathroom. She is unsure if he was going into her coming out at the time. She states that he was stiff and his eyelids were quivering. He did not appear to have a postictal state. The patient was admitted to the hospital earlier in the month with a seizure and subsequently diagnosed with GBM. Patient denies any injuries although he was incontinent of urine. And has no complaints at this time. The patient had been on Keppra initially but was discharged without prescription for Keppra. The patient had also been on a Decadron taper and has only 1 or 2 doses left. The history is provided by the patient, the spouse and medical records. Seizure   This is a recurrent problem. The current episode started less than 1 hour ago. The problem has been resolved. There was 1 seizure. The most recent episode lasted 30 to 120 seconds. Characteristics include eye blinking, bladder incontinence, rhythmic jerking and loss of consciousness. Characteristics do not include eye deviation, bowel incontinence, bit tongue, apnea or cyanosis. The episode was witnessed. There was no sensation of an aura present. There was return to baseline postseizure. The seizures did not continue in the ED. The seizure(s) had no focality. Possible causes include missed seizure meds and missed seizure meds. There has been no fever.   There were no medications administered prior to arrival.        Past Medical History:   Diagnosis Date    Anxiety disorder 12/9/2014    Arthritis 12/9/2014    Depression 12/9/2014    Glioblastoma ([facility] [address] 12/26/2020    Gout 12/9/2014    History of intracranial hemorrhage 12/25/2020    History of lumbar laminectomy for spinal cord decompression 2/18/2019    History of seizure 12/28/2020    HTN (hypertension), benign 2/15/2017    Hyperlipemia 2014    Hypertension     Idiopathic chronic gout of right ankle 2014    Insomnia 2014    Kidney stone     Mixed hyperlipidemia 2014    Panic disorder 2014    Primary insomnia 2014    Rosacea 2/15/2017       Past Surgical History:   Procedure Laterality Date    HX ANKLE FRACTURE TX Left 1974    HX COLONOSCOPY      HX CYST REMOVAL      HX CYST REMOVAL      pylonidal cyst    HX CYST REMOVAL      HX HERNIA REPAIR Bilateral     HX OTHER SURGICAL      wrist    HX WRIST FRACTURE TX Bilateral 1993    wrist plate/pin         Family History:   Problem Relation Age of Onset    Cancer Mother         lung ca    Hypertension Mother     Heart Disease Mother     Arthritis-osteo Father     Cancer Brother         stomach       Social History     Socioeconomic History    Marital status:      Spouse name: Not on file    Number of children: Not on file    Years of education: Not on file    Highest education level: Not on file   Occupational History    Not on file   Social Needs    Financial resource strain: Not on file    Food insecurity     Worry: Not on file     Inability: Not on file    Transportation needs     Medical: Not on file     Non-medical: Not on file   Tobacco Use    Smoking status: Former Smoker     Types: Cigarettes     Quit date: 1970     Years since quittin.1    Smokeless tobacco: Never Used   Substance and Sexual Activity    Alcohol use:  Yes     Alcohol/week: 0.0 standard drinks     Comment: occ    Drug use: No    Sexual activity: Yes     Partners: Female   Lifestyle    Physical activity     Days per week: Not on file     Minutes per session: Not on file    Stress: Not on file   Relationships    Social connections     Talks on phone: Not on file     Gets together: Not on file     Attends Cheondoism service: Not on file     Active member of club or organization: Not on file Attends meetings of clubs or organizations: Not on file     Relationship status: Not on file    Intimate partner violence     Fear of current or ex partner: Not on file     Emotionally abused: Not on file     Physically abused: Not on file     Forced sexual activity: Not on file   Other Topics Concern    Not on file   Social History Narrative    Not on file         ALLERGIES: Patient has no known allergies. Review of Systems   Constitutional: Negative for chills and fever. Respiratory: Negative for apnea. Cardiovascular: Negative for cyanosis. Gastrointestinal: Negative for bowel incontinence. Genitourinary: Positive for bladder incontinence. Neurological: Positive for loss of consciousness. All other systems reviewed and are negative. Vitals:    01/30/21 0532   BP: 134/63   Pulse: 86   Resp: 16   Temp: 98.2 °F (36.8 °C)   SpO2: 97%            Physical Exam  Vitals signs and nursing note reviewed. Constitutional:       General: He is not in acute distress. Appearance: Normal appearance. He is normal weight. He is not ill-appearing, toxic-appearing or diaphoretic. HENT:      Head: Normocephalic and atraumatic. Mouth/Throat:      Mouth: Mucous membranes are moist.      Pharynx: No oropharyngeal exudate or posterior oropharyngeal erythema. Eyes:      Extraocular Movements: Extraocular movements intact. Conjunctiva/sclera: Conjunctivae normal.      Pupils: Pupils are equal, round, and reactive to light. Neck:      Musculoskeletal: Normal range of motion and neck supple. No neck rigidity or muscular tenderness. Cardiovascular:      Rate and Rhythm: Normal rate and regular rhythm. Pulses: Normal pulses. Heart sounds: Normal heart sounds. Pulmonary:      Effort: Pulmonary effort is normal.      Breath sounds: Normal breath sounds. Abdominal:      Tenderness: There is no abdominal tenderness. There is no guarding or rebound.    Musculoskeletal: Normal range of motion. Lymphadenopathy:      Cervical: No cervical adenopathy. Skin:     General: Skin is warm and dry. Capillary Refill: Capillary refill takes less than 2 seconds. Neurological:      General: No focal deficit present. Mental Status: He is alert and oriented to person, place, and time. Mental status is at baseline. Psychiatric:         Mood and Affect: Mood normal.         Behavior: Behavior normal.          MDM  Number of Diagnoses or Management Options     Amount and/or Complexity of Data Reviewed  Clinical lab tests: ordered and reviewed  Tests in the radiology section of CPT®: ordered and reviewed  Review and summarize past medical records: yes  Independent visualization of images, tracings, or specimens: yes (EKG at 0 620: Is a sinus rhythm with occasional PACs.   Rate of 79, otherwise normal EKG)    Risk of Complications, Morbidity, and/or Mortality  Presenting problems: moderate  Diagnostic procedures: moderate  Management options: moderate    Patient Progress  Patient progress: stable         Procedures

## 2021-01-30 NOTE — ED NOTES
I have reviewed discharge instructions with the patient. The patient verbalized understanding. Patient left ED via Discharge Method: ambulatory to Home with spouse. Opportunity for questions and clarification provided. Patient given 2 scripts. To continue your aftercare when you leave the hospital, you may receive an automated call from our care team to check in on how you are doing. This is a free service and part of our promise to provide the best care and service to meet your aftercare needs.  If you have questions, or wish to unsubscribe from this service please call [phone]. Thank you for Choosing our [employer] Emergency Department.

## 2021-01-31 PROCEDURE — 3331090001 HH PPS REVENUE CREDIT

## 2021-01-31 PROCEDURE — 3331090002 HH PPS REVENUE DEBIT

## 2021-02-01 ENCOUNTER — HOME CARE VISIT (OUTPATIENT)
Dept: SCHEDULING | Facility: HOME HEALTH | Age: 81
End: 2021-02-01
Payer: MEDICARE

## 2021-02-01 VITALS
SYSTOLIC BLOOD PRESSURE: 138 MMHG | DIASTOLIC BLOOD PRESSURE: 68 MMHG | OXYGEN SATURATION: 97 % | RESPIRATION RATE: 18 BRPM | TEMPERATURE: 98.4 F | HEART RATE: 78 BPM

## 2021-02-01 PROCEDURE — G0153 HHCP-SVS OF S/L PATH,EA 15MN: HCPCS

## 2021-02-01 PROCEDURE — 3331090002 HH PPS REVENUE DEBIT

## 2021-02-01 PROCEDURE — 3331090001 HH PPS REVENUE CREDIT

## 2021-02-02 ENCOUNTER — HOME CARE VISIT (OUTPATIENT)
Dept: SCHEDULING | Facility: HOME HEALTH | Age: 81
End: 2021-02-02
Payer: MEDICARE

## 2021-02-02 PROCEDURE — 3331090002 HH PPS REVENUE DEBIT

## 2021-02-02 PROCEDURE — 3331090001 HH PPS REVENUE CREDIT

## 2021-02-02 NOTE — PROGRESS NOTES
I spoke with Tosin Longoria regarding his Medicare and [place] [employer] coverage, potential oral medication authorizations, enrollment in the [employer] ([employer]) and the [employer] [place] ([address]), and assistance organization resource sheet. At this time, Tosin Longoria would like time to review the [employer] and the [employer]. Next, I spoke with Tosin Swati regarding the Oncology Care Model Notification Letter. I answered questions regarding the costs associated with Medicare Benefits. I explained to Tosin Longoria the estimated cost of treatment and services for six months under the [employer]. Tosin Longoria was advised to contact Medicare or their healthcare provider for questions or concerns related to service of care. The Oncology Care Model provides different options of contact for Tosin Longoria regarding concerns and complaints of treatments and services. Next, I spoke with Jayjeff Longoria regarding his Prescription Drug Benefits. Next, I spoke with Jayjeff Longoria about billing questions and treatment services. We discussed copayments, deductibles, and out of pocket maximums. Next, I spoke with Tosin Longoria regarding the Limited [facility]. After reading the form, Tosin Longorai signed the Limited Power of  document. Next, I spoke with Jayjeff Longoria regarding potential oral medication authorizations. I told him that if he ever had any problems getting his oral medications filled to give the dedicated [facility]  a call. Most of the time, it is simply an authorization that needs to be done with the insurance company. Next, I gave Tosin Longoria flyers about the free Yoga classes for cancer survivors and the Oncology Massage program.  I let him know that he can get a free 30-minute massage.   Lastly, I gave Tosin Longoria a form with various resource organizations that could assist with specific needs (example:  transportation, lodging, preparing meals, home cleaning)      Marlys Lizarraga expressed understanding of the information above and all questions were answered to his satisfaction.

## 2021-02-03 ENCOUNTER — HOME CARE VISIT (OUTPATIENT)
Dept: SCHEDULING | Facility: HOME HEALTH | Age: 81
End: 2021-02-03
Payer: MEDICARE

## 2021-02-03 VITALS
TEMPERATURE: 98.1 F | OXYGEN SATURATION: 95 % | SYSTOLIC BLOOD PRESSURE: 124 MMHG | HEART RATE: 80 BPM | RESPIRATION RATE: 16 BRPM | DIASTOLIC BLOOD PRESSURE: 66 MMHG

## 2021-02-03 VITALS
RESPIRATION RATE: 16 BRPM | DIASTOLIC BLOOD PRESSURE: 66 MMHG | OXYGEN SATURATION: 95 % | HEART RATE: 84 BPM | SYSTOLIC BLOOD PRESSURE: 124 MMHG | TEMPERATURE: 98.1 F

## 2021-02-03 PROCEDURE — 3331090001 HH PPS REVENUE CREDIT

## 2021-02-03 PROCEDURE — G0153 HHCP-SVS OF S/L PATH,EA 15MN: HCPCS

## 2021-02-03 PROCEDURE — 3331090002 HH PPS REVENUE DEBIT

## 2021-02-03 PROCEDURE — G0157 HHC PT ASSISTANT EA 15: HCPCS

## 2021-02-04 PROCEDURE — 3331090001 HH PPS REVENUE CREDIT

## 2021-02-04 PROCEDURE — 3331090002 HH PPS REVENUE DEBIT

## 2021-02-05 ENCOUNTER — HOME CARE VISIT (OUTPATIENT)
Dept: SCHEDULING | Facility: HOME HEALTH | Age: 81
End: 2021-02-05
Payer: MEDICARE

## 2021-02-05 VITALS
TEMPERATURE: 98.4 F | HEART RATE: 88 BPM | RESPIRATION RATE: 18 BRPM | OXYGEN SATURATION: 98 % | DIASTOLIC BLOOD PRESSURE: 64 MMHG | SYSTOLIC BLOOD PRESSURE: 126 MMHG

## 2021-02-05 PROCEDURE — G0151 HHCP-SERV OF PT,EA 15 MIN: HCPCS

## 2021-02-05 PROCEDURE — 3331090002 HH PPS REVENUE DEBIT

## 2021-02-05 PROCEDURE — 3331090001 HH PPS REVENUE CREDIT

## 2021-02-05 NOTE — PROGRESS NOTES
Patient: Panda Andres MRN: [id number]  SSN: [id number]    YOB: 1940  Age: 68 y.o. Sex: male      Other Providers:  Dr. Srivastava Brand: Seizure    DIAGNOSIS: GBM    PREVIOUS RADIATION TREATMENT:  1) None    HISTORY OF PRESENT ILLNESS:  Panda Andres is a 68 y.o. male who I am seeing at the request of Dr. Apolinar Herrera. Mr. Isabel Glover was in his usual state of health until 12/25/2020 at which time he was found unresponsive in the bathroom by his wife. A head CT was performed which demonstrated a right parietal hemorrhage with surrounding vasogenic edema. This was followed by an MRI brain which confirmed a 3.7 x 3.3 x 3.2 cm heterogeneously enhancing mass within the temporoparietal region concerning for metastatic versus primary disease. A CT of the chest/ abdomen/ pelvis were performed 12/27/2020 which showed no evidence of metastatic or primary disease. Mr. Isabel Glover subsequently underwent right craniotomy with placement of BCNU wafers on 12/31/2021 by Dr. Trenton Cohen. This confirmed glioblastoma, WHO grade IV. A postoperative CT scan 1/1/2021 demonstrated interval right parietal craniotomy and right parietal mass resection with mild enhancement along the anterior margin of the resection cavity which may relate to residual tumor or postoperative change. He had a second seizure on 1/23/21 for which he was re-started on Keppra. He has been able to wean off all steroid medications. Mr. Isabel Glover is recovering well from surgery. He is using a walker to ambulate and working with speech therapy. He and his wife endorse short term memory loss and difficulty with both speech perception and word finding. He denies any focal weakness as well as numbness or tingling. He denies changes in vision and has decreased hearing prior to surgery which he feels is stable. He has been evaluated by Dr. Apolinar Herrera and felt to be a candidate for concurrent TMZ.     PAST MEDICAL HISTORY:    Past Medical History: Diagnosis Date    Anxiety disorder 12/9/2014    Arthritis 12/9/2014    Depression 12/9/2014    Glioblastoma ([facility] [address] 12/26/2020    Gout 12/9/2014    History of intracranial hemorrhage 12/25/2020    History of lumbar laminectomy for spinal cord decompression 2/18/2019    History of seizure 12/28/2020    HTN (hypertension), benign 2/15/2017    Hyperlipemia 12/9/2014    Hypertension     Idiopathic chronic gout of right ankle 12/9/2014    Insomnia 12/9/2014    Kidney stone     Mixed hyperlipidemia 12/9/2014    Panic disorder 12/9/2014    Primary insomnia 12/9/2014    Rosacea 2/15/2017       The patient denies history of collagen vascular diseases, pacemaker insertion, prior radiation or prior chemotherapy. PAST SURGICAL HISTORY:   Past Surgical History:   Procedure Laterality Date    HX ANKLE FRACTURE TX Left 6/1974    HX COLONOSCOPY  2007    HX CYST REMOVAL      HX CYST REMOVAL      pylonidal cyst    HX CYST REMOVAL      HX HERNIA REPAIR Bilateral     HX OTHER SURGICAL      wrist    HX WRIST FRACTURE TX Bilateral 6/1993    wrist plate/pin       MEDICATIONS:     Current Outpatient Medications:     levETIRAcetam (Keppra) 500 mg tablet, Take 1 Tab by mouth two (2) times a day., Disp: 60 Tab, Rfl: 0    cefdinir (OMNICEF) 300 mg capsule, Take 1 Cap by mouth two (2) times a day., Disp: 20 Cap, Rfl: 0    temozolomide (Temodar) 140 mg capsule, 1 Cap daily. Take 1 cap 1 hr prior to XRT. 1 cap in the morning on days without XRT., Disp: 21 Cap, Rfl: 0    ondansetron (ZOFRAN ODT) 4 mg disintegrating tablet, Take 1 Tab by mouth every six (6) hours as needed for Nausea or Vomiting., Disp: 30 Tab, Rfl: 1    pantoprazole (PROTONIX) 40 mg tablet, Take 1 Tab by mouth Daily (before breakfast). PCP will continue to prescribe this medication.  (For prevention of stress stomach ulcers.), Disp: 30 Tab, Rfl: 1    polyethylene glycol (MIRALAX) 17 gram packet, Take 1 Packet by mouth daily as needed for Constipation. (available over the counter) (Patient taking differently: Take 1 Packet by mouth daily as needed for Constipation. mix with 8oz water), Disp: 1 Each, Rfl: prn    senna-docusate (PERICOLACE) 8.6-50 mg per tablet, Take 1 Tab by mouth two (2) times a day. (available over the counter)  Indications: constipation, Disp: 60 Tab, Rfl: prn    tamsulosin (FLOMAX) 0.4 mg capsule, Take 1 Cap by mouth daily. PCP will continue to prescribe this medication. Indications: for slow or decreased urine flow, Disp: 30 Cap, Rfl: 1    traZODone (DESYREL) 50 mg tablet, Take 0.5 Tabs by mouth nightly as needed for Sleep., Disp: 30 Tab, Rfl: 1    sertraline (ZOLOFT) 100 mg tablet, Take 1.5 Tabs by mouth daily. One and half tab every day, Disp: 135 Tab, Rfl: 1    allopurinoL (ZYLOPRIM) 100 mg tablet, Take 2 Tabs by mouth daily. , Disp: 180 Tab, Rfl: 1    amLODIPine-Olmesartan (Cuco) 10-40 mg tab, Take 1 Tab by mouth daily. , Disp: 90 Tab, Rfl: 1    atorvastatin (LIPITOR) 40 mg tablet, TAKE 1 TABLET BY MOUTH EVERY DAY, Disp: 90 Tab, Rfl: 1    potassium chloride SR (Klor-Con 10) 10 mEq tablet, Take 1 Tab by mouth daily. , Disp: 90 Tab, Rfl: 3    furosemide (LASIX) 20 mg tablet, Take 1 Tab by mouth daily. , Disp: 90 Tab, Rfl: 1    diclofenac (VOLTAREN) 1 % gel, Apply 4 g to affected area four (4) times daily. (Patient taking differently: Apply 4 g to affected area four (4) times daily. to lower back area), Disp: 2 Each, Rfl: 3    multivitamin (ONE A DAY) tablet, Take 1 Tab by mouth daily. , Disp: , Rfl:     cholecalciferol, vitamin D3, (VITAMIN D3) 2,000 unit tab, Take 1 Tab by mouth daily. , Disp: , Rfl:     ALLERGIES:   No Known Allergies    SOCIAL HISTORY:   Social History     Socioeconomic History    Marital status:      Spouse name: Not on file    Number of children: Not on file    Years of education: Not on file    Highest education level: Not on file   Occupational History    Not on file   Social Needs    Financial resource strain: Not on file    Food insecurity     Worry: Not on file     Inability: Not on file    Transportation needs     Medical: Not on file     Non-medical: Not on file   Tobacco Use    Smoking status: Former Smoker     Types: Cigarettes     Quit date: 1970     Years since quittin.1    Smokeless tobacco: Never Used   Substance and Sexual Activity    Alcohol use: Yes     Alcohol/week: 0.0 standard drinks     Comment: occ    Drug use: No    Sexual activity: Yes     Partners: Female   Lifestyle    Physical activity     Days per week: Not on file     Minutes per session: Not on file    Stress: Not on file   Relationships    Social connections     Talks on phone: Not on file     Gets together: Not on file     Attends Rastafarian service: Not on file     Active member of club or organization: Not on file     Attends meetings of clubs or organizations: Not on file     Relationship status: Not on file    Intimate partner violence     Fear of current or ex partner: Not on file     Emotionally abused: Not on file     Physically abused: Not on file     Forced sexual activity: Not on file   Other Topics Concern    Not on file   Social History Narrative    Not on file       FAMILY HISTORY:   Family History   Problem Relation Age of Onset    Cancer Mother         lung ca    Hypertension Mother     Heart Disease Mother     Arthritis-osteo Father     Cancer Brother         stomach       REVIEW OF SYSTEMS:   A full 12-point review of systems was completed and was negative unless noted in the history of present illness.     PHYSICAL EXAMINATION:   ECOG Performance status 2  VITAL SIGNS:   Visit Vitals  /71 (BP 1 Location: Left upper arm, BP Patient Position: Sitting)   Pulse 96   Temp 98.4 °F (36.9 °C)   Wt 88.4 kg (194 lb 12.8 oz)   SpO2 95%   BMI 30.06 kg/m²        General: well developed/nourished adult Male in no acute distress; appears stated age  [de-identified]: normocephalic, atraumatic; EOMI; well healed right-sided craniotomy incision  Neck: supple with full ROM; no cervical lymphadenopathy  Cardiovascular: normal S1 and S2, RRR, no murmurs  Respiratory: normal inspiratory effort, no audible wheezes  Extremities: no cyanosis, clubbing, or edema  Musculoskeletal: mobility intact x4; normal ROM in all joints  Skin: no skin lesions identified  Neuro: resting tremor in the left hand; AOx3; sensation intact x 4; CNII-XII grossly intact  Psych: appropriate affect, insight, and judgement  GI: abdomen soft, non-distended  : not indicated   Breast: not indicated     PATHOLOGY:    I personally reviewed the pathology reports as documented in the HPI. LABORATORY:   Lab Results   Component Value Date/Time    Sodium 146 (H) 01/30/2021 05:47 AM    Potassium 3.6 01/30/2021 05:47 AM    Chloride 112 (H) 01/30/2021 05:47 AM    CO2 29 01/30/2021 05:47 AM    Anion gap 5 (L) 01/30/2021 05:47 AM    Glucose 126 (H) 01/30/2021 05:47 AM    BUN 24 (H) 01/30/2021 05:47 AM    Creatinine 1.37 01/30/2021 05:47 AM    GFR est AA >60 01/30/2021 05:47 AM    GFR est non-AA 53 (L) 01/30/2021 05:47 AM    Calcium 8.9 01/30/2021 05:47 AM    Magnesium 2.4 01/30/2021 05:47 AM    Albumin 3.3 01/30/2021 05:47 AM    Protein, total 6.9 01/30/2021 05:47 AM    Globulin 3.6 (H) 01/30/2021 05:47 AM    A-G Ratio 0.9 (L) 01/30/2021 05:47 AM    ALT (SGPT) 41 01/30/2021 05:47 AM     Lab Results   Component Value Date/Time    WBC 9.2 01/30/2021 05:47 AM    HGB 13.3 (L) 01/30/2021 05:47 AM    HCT 39.7 (L) 01/30/2021 05:47 AM    PLATELET 73 (L) 30/79/0900 05:47 AM       RADIOLOGY:    I personally reviewed the images and agree with the findings as documented in the HPI. IMPRESSION:  Mark Núñez is a 68 y.o. male with recently diagnosed glioblastoma multiforme of the right parietal lobe s/p gross total resection presenting with his wife for discussion of adjuvant therapy.       I had a long discussion with Mark Núñez regarding treatment options, specifically adjuvant radiation to minimize the risk of tumor recurrence with concurrent and adjuvant temozolomide. I recommended a 3 week course of radiation therapy given the patient's age and no difference in long-term outcomes with decreased side effects as compared to a 6 week course. I reviewed in detail the anticipated acute and late toxicities of radiation therapy as well as the logistics of treatment and expected disease control. Jon Perez and his family had the opportunity to ask questions which appeared to be answered to their satisfaction and have elected to proceed with concurrent chemoradiation. PLAN:    1) Consented patient for treatment with external beam radiation after discussing risk, benefits, and side effects from treatment. 2) Reviewed available research treatment and cancer care protocols for which patient may be eligible. Unfortunately there are no matching clinical trials available at this time. 3) Coordinate care and start date with medical oncology.     4) Repeat MRI for radiation treatment planning  5) CT simulation to be scheduled    Rishabh Kirby MD   February 10, 2021

## 2021-02-06 PROCEDURE — 3331090001 HH PPS REVENUE CREDIT

## 2021-02-06 PROCEDURE — 3331090002 HH PPS REVENUE DEBIT

## 2021-02-07 PROCEDURE — 3331090001 HH PPS REVENUE CREDIT

## 2021-02-07 PROCEDURE — 3331090002 HH PPS REVENUE DEBIT

## 2021-02-08 ENCOUNTER — HOME CARE VISIT (OUTPATIENT)
Dept: SCHEDULING | Facility: HOME HEALTH | Age: 81
End: 2021-02-08
Payer: MEDICARE

## 2021-02-08 VITALS
TEMPERATURE: 98.6 F | SYSTOLIC BLOOD PRESSURE: 132 MMHG | DIASTOLIC BLOOD PRESSURE: 76 MMHG | HEART RATE: 74 BPM | OXYGEN SATURATION: 96 % | RESPIRATION RATE: 18 BRPM

## 2021-02-08 PROCEDURE — G0153 HHCP-SVS OF S/L PATH,EA 15MN: HCPCS

## 2021-02-08 PROCEDURE — 3331090001 HH PPS REVENUE CREDIT

## 2021-02-08 PROCEDURE — 3331090002 HH PPS REVENUE DEBIT

## 2021-02-09 ENCOUNTER — HOME CARE VISIT (OUTPATIENT)
Dept: SCHEDULING | Facility: HOME HEALTH | Age: 81
End: 2021-02-09
Payer: MEDICARE

## 2021-02-09 VITALS
TEMPERATURE: 98.7 F | HEART RATE: 78 BPM | OXYGEN SATURATION: 97 % | SYSTOLIC BLOOD PRESSURE: 132 MMHG | RESPIRATION RATE: 16 BRPM | DIASTOLIC BLOOD PRESSURE: 68 MMHG

## 2021-02-09 PROCEDURE — 3331090001 HH PPS REVENUE CREDIT

## 2021-02-09 PROCEDURE — 3331090002 HH PPS REVENUE DEBIT

## 2021-02-09 PROCEDURE — G0153 HHCP-SVS OF S/L PATH,EA 15MN: HCPCS

## 2021-02-10 ENCOUNTER — HOSPITAL ENCOUNTER (OUTPATIENT)
Dept: RADIATION ONCOLOGY | Age: 81
Discharge: HOME | End: 2021-02-10
Payer: MEDICARE

## 2021-02-10 ENCOUNTER — HOSPITAL ENCOUNTER (OUTPATIENT)
Dept: LAB | Age: 81
Discharge: HOME | End: 2021-02-10
Payer: MEDICARE

## 2021-02-10 VITALS
WEIGHT: 194.8 LBS | HEART RATE: 96 BPM | SYSTOLIC BLOOD PRESSURE: 139 MMHG | BODY MASS INDEX: 30.06 KG/M2 | DIASTOLIC BLOOD PRESSURE: 71 MMHG | OXYGEN SATURATION: 95 % | TEMPERATURE: 98.4 F

## 2021-02-10 DIAGNOSIS — C71.9 GBM (GLIOBLASTOMA MULTIFORME) (HCC): Primary | ICD-10-CM

## 2021-02-10 DIAGNOSIS — N30.90 CYSTITIS: ICD-10-CM

## 2021-02-10 DIAGNOSIS — C71.9 GBM (GLIOBLASTOMA MULTIFORME) (HCC): ICD-10-CM

## 2021-02-10 LAB
ALBUMIN SERPL-MCNC: 3.6 G/DL (ref 3.2–4.6)
ALBUMIN/GLOB SERPL: 0.9 {RATIO} (ref 1.2–3.5)
ALP SERPL-CCNC: 92 U/L (ref 50–136)
ALT SERPL-CCNC: 30 U/L (ref 12–65)
ANION GAP SERPL CALC-SCNC: 5 MMOL/L (ref 7–16)
APPEARANCE UR: CLEAR
AST SERPL-CCNC: 17 U/L (ref 15–37)
BASOPHILS # BLD: 0 K/UL (ref 0–0.2)
BASOPHILS NFR BLD: 0 % (ref 0–2)
BILIRUB SERPL-MCNC: 0.7 MG/DL (ref 0.2–1.1)
BILIRUB UR QL: NEGATIVE
BUN SERPL-MCNC: 13 MG/DL (ref 8–23)
CALCIUM SERPL-MCNC: 9.2 MG/DL (ref 8.3–10.4)
CHLORIDE SERPL-SCNC: 107 MMOL/L (ref 98–107)
CO2 SERPL-SCNC: 30 MMOL/L (ref 21–32)
COLOR UR: YELLOW
CREAT SERPL-MCNC: 1.3 MG/DL (ref 0.8–1.5)
DIFFERENTIAL METHOD BLD: ABNORMAL
EOSINOPHIL # BLD: 0 K/UL (ref 0–0.8)
EOSINOPHIL NFR BLD: 1 % (ref 0.5–7.8)
ERYTHROCYTE [DISTWIDTH] IN BLOOD BY AUTOMATED COUNT: 13.2 % (ref 11.9–14.6)
GLOBULIN SER CALC-MCNC: 3.9 G/DL (ref 2.3–3.5)
GLUCOSE SERPL-MCNC: 116 MG/DL (ref 65–100)
GLUCOSE UR STRIP.AUTO-MCNC: NEGATIVE MG/DL
HCT VFR BLD AUTO: 36.8 % (ref 41.1–50.3)
HGB BLD-MCNC: 12.5 G/DL (ref 13.6–17.2)
HGB UR QL STRIP: NEGATIVE
IMM GRANULOCYTES # BLD AUTO: 0.1 K/UL (ref 0–0.5)
IMM GRANULOCYTES NFR BLD AUTO: 1 % (ref 0–5)
KETONES UR QL STRIP.AUTO: NEGATIVE MG/DL
LEUKOCYTE ESTERASE UR QL STRIP.AUTO: NEGATIVE
LYMPHOCYTES # BLD: 1.1 K/UL (ref 0.5–4.6)
LYMPHOCYTES NFR BLD: 17 % (ref 13–44)
MCH RBC QN AUTO: 30.7 PG (ref 26.1–32.9)
MCHC RBC AUTO-ENTMCNC: 34 G/DL (ref 31.4–35)
MCV RBC AUTO: 90.4 FL (ref 79.6–97.8)
MONOCYTES # BLD: 0.6 K/UL (ref 0.1–1.3)
MONOCYTES NFR BLD: 9 % (ref 4–12)
NEUTS SEG # BLD: 4.6 K/UL (ref 1.7–8.2)
NEUTS SEG NFR BLD: 72 % (ref 43–78)
NITRITE UR QL STRIP.AUTO: NEGATIVE
NRBC # BLD: 0 K/UL (ref 0–0.2)
PH UR STRIP: 5 [PH] (ref 5–9)
PLATELET # BLD AUTO: 152 K/UL (ref 150–450)
PMV BLD AUTO: 9.6 FL (ref 9.4–12.3)
POTASSIUM SERPL-SCNC: 3.8 MMOL/L (ref 3.5–5.1)
PROT SERPL-MCNC: 7.5 G/DL (ref 6.3–8.2)
PROT UR STRIP-MCNC: NEGATIVE MG/DL
RBC # BLD AUTO: 4.07 M/UL (ref 4.23–5.67)
SODIUM SERPL-SCNC: 142 MMOL/L (ref 136–145)
SP GR UR REFRACTOMETRY: 1.02 (ref 1–1.02)
UROBILINOGEN UR QL STRIP.AUTO: 0.2 EU/DL (ref 0.2–1)
WBC # BLD AUTO: 6.4 K/UL (ref 4.3–11.1)

## 2021-02-10 PROCEDURE — 85025 COMPLETE CBC W/AUTO DIFF WBC: CPT

## 2021-02-10 PROCEDURE — 3331090002 HH PPS REVENUE DEBIT

## 2021-02-10 PROCEDURE — 3331090001 HH PPS REVENUE CREDIT

## 2021-02-10 PROCEDURE — 80053 COMPREHEN METABOLIC PANEL: CPT

## 2021-02-10 PROCEDURE — 87086 URINE CULTURE/COLONY COUNT: CPT

## 2021-02-10 PROCEDURE — 81003 URINALYSIS AUTO W/O SCOPE: CPT

## 2021-02-10 PROCEDURE — 36415 COLL VENOUS BLD VENIPUNCTURE: CPT

## 2021-02-10 PROCEDURE — 99211 OFF/OP EST MAY X REQ PHY/QHP: CPT

## 2021-02-10 RX ORDER — MELATONIN
1000 DAILY
COMMUNITY

## 2021-02-10 RX ORDER — DIAPER,BRIEF,ADULT, DISPOSABLE
500 EACH MISCELLANEOUS DAILY
COMMUNITY

## 2021-02-10 NOTE — NURSE NAVIGATOR
Consult GBM. F/u with Dr. Gillian Esquivel today. F/u with Dr. Lucia Dick 2-17-21. New pt apt with Dr. Andreas Estrada on 2-25-21. Swallow study 12-29-20, and 1-4-21. Pathology 12-31-20. MRI brain 12-31-20. Pt denies pain. Pt c/o occasional headache right side that does not last long. CONSENTS SIGNED FOR RT BRAIN. PLAN TEMODAR/RT. CT [facility] SCHEDULED Thursday, 2-18-21 AT 11 AM.  APT GIVEN TO PT.   MRI BRAIN ORDERED FOR POST OP RESTAGING FOR RT PLANNING.      Tye Jacob RN

## 2021-02-11 PROCEDURE — 3331090001 HH PPS REVENUE CREDIT

## 2021-02-11 PROCEDURE — 3331090002 HH PPS REVENUE DEBIT

## 2021-02-12 ENCOUNTER — APPOINTMENT (OUTPATIENT)
Dept: CT IMAGING | Age: 81
End: 2021-02-12
Payer: MEDICARE

## 2021-02-12 ENCOUNTER — HOSPITAL ENCOUNTER (OUTPATIENT)
Age: 81
Setting detail: OBSERVATION
Discharge: HOME | End: 2021-02-12
Admitting: HOSPITALIST
Payer: MEDICARE

## 2021-02-12 VITALS
TEMPERATURE: 98 F | RESPIRATION RATE: 18 BRPM | WEIGHT: 193 LBS | SYSTOLIC BLOOD PRESSURE: 145 MMHG | HEIGHT: 70 IN | HEART RATE: 78 BPM | DIASTOLIC BLOOD PRESSURE: 70 MMHG | OXYGEN SATURATION: 96 % | BODY MASS INDEX: 27.63 KG/M2

## 2021-02-12 DIAGNOSIS — R29.898 WEAKNESS OF LEFT UPPER EXTREMITY: Primary | ICD-10-CM

## 2021-02-12 PROBLEM — M62.81 ACUTE LEFT-SIDED MUSCLE WEAKNESS: Status: ACTIVE | Noted: 2021-02-12

## 2021-02-12 LAB
ANION GAP SERPL CALC-SCNC: 5 MMOL/L (ref 7–16)
BASOPHILS # BLD: 0 K/UL (ref 0–0.2)
BASOPHILS NFR BLD: 0 % (ref 0–2)
BUN SERPL-MCNC: 12 MG/DL (ref 8–23)
CALCIUM SERPL-MCNC: 9.2 MG/DL (ref 8.3–10.4)
CHLORIDE SERPL-SCNC: 109 MMOL/L (ref 98–107)
CO2 SERPL-SCNC: 30 MMOL/L (ref 21–32)
CREAT SERPL-MCNC: 1.27 MG/DL (ref 0.8–1.5)
DIFFERENTIAL METHOD BLD: ABNORMAL
EOSINOPHIL # BLD: 0.1 K/UL (ref 0–0.8)
EOSINOPHIL NFR BLD: 1 % (ref 0.5–7.8)
ERYTHROCYTE [DISTWIDTH] IN BLOOD BY AUTOMATED COUNT: 13.2 % (ref 11.9–14.6)
GLUCOSE SERPL-MCNC: 145 MG/DL (ref 65–100)
HCT VFR BLD AUTO: 35.6 % (ref 41.1–50.3)
HGB BLD-MCNC: 12 G/DL (ref 13.6–17.2)
IMM GRANULOCYTES # BLD AUTO: 0.1 K/UL (ref 0–0.5)
IMM GRANULOCYTES NFR BLD AUTO: 1 % (ref 0–5)
INR PPP: 1
LYMPHOCYTES # BLD: 1.6 K/UL (ref 0.5–4.6)
LYMPHOCYTES NFR BLD: 22 % (ref 13–44)
MCH RBC QN AUTO: 30.8 PG (ref 26.1–32.9)
MCHC RBC AUTO-ENTMCNC: 33.7 G/DL (ref 31.4–35)
MCV RBC AUTO: 91.3 FL (ref 79.6–97.8)
MONOCYTES # BLD: 0.6 K/UL (ref 0.1–1.3)
MONOCYTES NFR BLD: 9 % (ref 4–12)
NEUTS SEG # BLD: 5 K/UL (ref 1.7–8.2)
NEUTS SEG NFR BLD: 68 % (ref 43–78)
NRBC # BLD: 0 K/UL (ref 0–0.2)
PLATELET # BLD AUTO: 165 K/UL (ref 150–450)
PMV BLD AUTO: 10 FL (ref 9.4–12.3)
POTASSIUM SERPL-SCNC: 3.8 MMOL/L (ref 3.5–5.1)
PROTHROMBIN TIME: 13.2 SEC (ref 12.5–14.7)
RBC # BLD AUTO: 3.9 M/UL (ref 4.23–5.6)
SODIUM SERPL-SCNC: 144 MMOL/L (ref 136–145)
WBC # BLD AUTO: 7.3 K/UL (ref 4.3–11.1)

## 2021-02-12 PROCEDURE — 85025 COMPLETE CBC W/AUTO DIFF WBC: CPT

## 2021-02-12 PROCEDURE — 85610 PROTHROMBIN TIME: CPT

## 2021-02-12 PROCEDURE — 70450 CT HEAD/BRAIN W/O DYE: CPT

## 2021-02-12 PROCEDURE — 3331090001 HH PPS REVENUE CREDIT

## 2021-02-12 PROCEDURE — 80177 DRUG SCRN QUAN LEVETIRACETAM: CPT

## 2021-02-12 PROCEDURE — 99285 EMERGENCY DEPT VISIT HI MDM: CPT

## 2021-02-12 PROCEDURE — 3331090002 HH PPS REVENUE DEBIT

## 2021-02-12 PROCEDURE — 99218 HC RM OBSERVATION: CPT

## 2021-02-12 PROCEDURE — 74011250637 HC RX REV CODE- 250/637

## 2021-02-12 PROCEDURE — 80048 BASIC METABOLIC PNL TOTAL CA: CPT

## 2021-02-12 RX ORDER — SODIUM CHLORIDE 0.9 % (FLUSH) 0.9 %
5-40 SYRINGE (ML) INJECTION EVERY 8 HOURS
Status: CANCELLED | OUTPATIENT
Start: 2021-02-12

## 2021-02-12 RX ORDER — LEVETIRACETAM 500 MG/1
500 TABLET ORAL 2 TIMES DAILY
Status: CANCELLED | OUTPATIENT
Start: 2021-02-12

## 2021-02-12 RX ORDER — TAMSULOSIN HYDROCHLORIDE 0.4 MG/1
0.4 CAPSULE ORAL DAILY
Status: CANCELLED | OUTPATIENT
Start: 2021-02-13

## 2021-02-12 RX ORDER — TRAZODONE HYDROCHLORIDE 50 MG/1
25 TABLET ORAL
Status: CANCELLED | OUTPATIENT
Start: 2021-02-12

## 2021-02-12 RX ORDER — FUROSEMIDE 40 MG/1
20 TABLET ORAL DAILY
Status: CANCELLED | OUTPATIENT
Start: 2021-02-13

## 2021-02-12 RX ORDER — ALLOPURINOL 100 MG/1
200 TABLET ORAL DAILY
Status: CANCELLED | OUTPATIENT
Start: 2021-02-13

## 2021-02-12 RX ORDER — MELATONIN
1000 DAILY
Status: CANCELLED | OUTPATIENT
Start: 2021-02-13

## 2021-02-12 RX ORDER — SODIUM CHLORIDE 0.9 % (FLUSH) 0.9 %
5-40 SYRINGE (ML) INJECTION AS NEEDED
Status: CANCELLED | OUTPATIENT
Start: 2021-02-12

## 2021-02-12 RX ORDER — SERTRALINE HYDROCHLORIDE 50 MG/1
150 TABLET, FILM COATED ORAL DAILY
Status: CANCELLED | OUTPATIENT
Start: 2021-02-13

## 2021-02-12 RX ORDER — POLYETHYLENE GLYCOL 3350 17 G/17G
17 POWDER, FOR SOLUTION ORAL DAILY
Status: CANCELLED | OUTPATIENT
Start: 2021-02-13

## 2021-02-12 RX ORDER — ADHESIVE BANDAGE
30 BANDAGE TOPICAL DAILY PRN
Status: CANCELLED | OUTPATIENT
Start: 2021-02-12

## 2021-02-12 RX ORDER — PANTOPRAZOLE SODIUM 40 MG/1
40 TABLET, DELAYED RELEASE ORAL
Status: CANCELLED | OUTPATIENT
Start: 2021-02-13

## 2021-02-12 RX ORDER — AMOXICILLIN 250 MG
1 CAPSULE ORAL 2 TIMES DAILY
Status: CANCELLED | OUTPATIENT
Start: 2021-02-12

## 2021-02-12 RX ORDER — ONDANSETRON 2 MG/ML
4 INJECTION INTRAMUSCULAR; INTRAVENOUS
Status: CANCELLED | OUTPATIENT
Start: 2021-02-12

## 2021-02-12 RX ORDER — SULFAMETHOXAZOLE AND TRIMETHOPRIM 800; 160 MG/1; MG/1
1 TABLET ORAL
Status: CANCELLED | OUTPATIENT
Start: 2021-02-12

## 2021-02-12 RX ORDER — LEVETIRACETAM 500 MG/1
500 TABLET ORAL
Status: COMPLETED | OUTPATIENT
Start: 2021-02-12 | End: 2021-02-12

## 2021-02-12 RX ORDER — ACETAMINOPHEN 650 MG/1
650 SUPPOSITORY RECTAL
Status: CANCELLED | OUTPATIENT
Start: 2021-02-12

## 2021-02-12 RX ORDER — ACETAMINOPHEN 325 MG/1
650 TABLET ORAL
Status: CANCELLED | OUTPATIENT
Start: 2021-02-12

## 2021-02-12 RX ORDER — FACIAL-BODY WIPES
10 EACH TOPICAL DAILY PRN
Status: CANCELLED | OUTPATIENT
Start: 2021-02-12

## 2021-02-12 RX ORDER — DICLOFENAC SODIUM 10 MG/G
4 GEL TOPICAL 4 TIMES DAILY
Status: CANCELLED | OUTPATIENT
Start: 2021-02-12

## 2021-02-12 RX ORDER — ONDANSETRON 4 MG/1
4 TABLET, ORALLY DISINTEGRATING ORAL
Status: CANCELLED | OUTPATIENT
Start: 2021-02-12

## 2021-02-12 RX ORDER — POLYETHYLENE GLYCOL 3350 17 G/17G
17 POWDER, FOR SOLUTION ORAL
Status: CANCELLED | OUTPATIENT
Start: 2021-02-12

## 2021-02-12 RX ORDER — ATORVASTATIN CALCIUM 40 MG/1
40 TABLET, FILM COATED ORAL DAILY
Status: CANCELLED | OUTPATIENT
Start: 2021-02-13

## 2021-02-12 RX ADMIN — LEVETIRACETAM 500 MG: 500 TABLET ORAL at 15:31

## 2021-02-12 NOTE — ED NOTES
I have reviewed discharge instructions with the patient and spouse. The patient and spouse verbalized understanding. Patient left ED via Discharge Method: ambulatory to Home with (spouse). Opportunity for questions and clarification provided. Patient given 0 scripts. No esign       To continue your aftercare when you leave the hospital, you may receive an automated call from our care team to check in on how you are doing. This is a free service and part of our promise to provide the best care and service to meet your aftercare needs.  If you have questions, or wish to unsubscribe from this service please call [phone]. Thank you for Choosing our [facility] Emergency Department.

## 2021-02-12 NOTE — PROGRESS NOTES
NEUROSURGERY PLAN OF CARE NOTE:     Chart and Imaging Reviewed: Patient Discussed with Dr. Gene Jolly 80 y.o. male presented to [facility] following weakness of his left and and wrist with some drift. He underwent a CT Code Stroke. I have independently reviewed and interpreted the CT Head WO contrast which demonstrates a right parietal resection cavity and of gliadel wafers still in place, there has been interval resolution of pnemocephalus. There is no evidence of new acute hemorrhage, hydrocephalus or new intracranial abnormality. 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 There is no evidence of any significant cerebral edema. The hand enlargement of the right motor strip has not edema or hemorrhage. He had had seizures in the post-operative setting. 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 No acute neurosurgical intervention necessary at this time. No scheduled repeat imaging necessary at this time. Please call with questions or concerns.     I spent a total of 5-10 minutes of medical consultative discussion and review.     Zeb Cook MD  [facility]  [facility]

## 2021-02-12 NOTE — ED NOTES
Patient advises going to bed last night around 2230 and was normal, woke up this morning and unable to move left hand noted with obvious weakness to left hand able to lift wrist noted with drift present. Mask on during triage. No further complaints. History of brain tumor removed in Jan 2021.

## 2021-02-12 NOTE — Clinical Note
Patient Class[de-identified] OBSERVATION [104]   Type of Bed: Telemetry [19]   Reason for Observation: acute neuro deficit   Admitting Diagnosis: Acute left-sided muscle weakness [[id number]]   Admitting Physician: Marce Burdick [[id number]]   Attending Physician: Patrick Garsia

## 2021-02-12 NOTE — ED PROVIDER NOTES
80-year-old male presents with left arm weakness.  Patient was last seen normal 12 2200 hrs. last night with this morning and had with left arm weakness.  Patient had glioblastoma resection on December 31 last year and since been seen several times for seizures.  No seizure was noted last night.  Patient was placed in the code stroke upon arrival in the ED.      Extremity Weakness   This is a new problem. The problem occurs constantly. The pain is present in the left arm. The quality of the pain is described as constant. The pain is at a severity of 8/10. The pain is moderate. He has tried nothing for the symptoms. There has been no history of extremity trauma.        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/2014   • Insomnia 12/9/2014   • Kidney stone    • Mixed hyperlipidemia 12/9/2014   • Panic disorder 12/9/2014   • Primary insomnia 12/9/2014   • Rosacea 2/15/2017       Past Surgical History:   Procedure Laterality Date   • HX ANKLE FRACTURE TX Left 6/1974   • HX COLONOSCOPY  2007   • HX CYST REMOVAL     • HX CYST REMOVAL      pylonidal cyst   • HX CYST REMOVAL     • HX HERNIA REPAIR Bilateral    • HX OTHER SURGICAL      wrist   • HX WRIST FRACTURE TX Bilateral 6/1993    wrist plate/pin         Family History:   Problem Relation Age of Onset   • Cancer Mother         lung ca   • Hypertension Mother    • Heart Disease Mother    • Arthritis-osteo Father    • Cancer Brother         stomach       Social History     Socioeconomic History   • Marital status:      Spouse name: Not on file   •  Number of children: Not on file    Years of education: Not on file    Highest education level: Not on file   Occupational History    Not on file   Social Needs    Financial resource strain: Not on file    Food insecurity     Worry: Not on file     Inability: Not on file    Transportation needs     Medical: Not on file     Non-medical: Not on file   Tobacco Use    Smoking status: Former Smoker     Types: Cigarettes     Quit date: 1970     Years since quittin.1    Smokeless tobacco: Never Used   Substance and Sexual Activity    Alcohol use: Yes     Alcohol/week: 0.0 standard drinks     Comment: occ    Drug use: No    Sexual activity: Yes     Partners: Female   Lifestyle    Physical activity     Days per week: Not on file     Minutes per session: Not on file    Stress: Not on file   Relationships    Social connections     Talks on phone: Not on file     Gets together: Not on file     Attends Orthodoxy service: Not on file     Active member of club or organization: Not on file     Attends meetings of clubs or organizations: Not on file     Relationship status: Not on file    Intimate partner violence     Fear of current or ex partner: Not on file     Emotionally abused: Not on file     Physically abused: Not on file     Forced sexual activity: Not on file   Other Topics Concern    Not on file   Social History Narrative    Not on file         ALLERGIES: Patient has no known allergies. Review of Systems   Constitutional: Negative. Negative for activity change. HENT: Negative. Eyes: Negative. Respiratory: Negative. Cardiovascular: Negative. Gastrointestinal: Negative. Genitourinary: Negative. Musculoskeletal: Negative. Skin: Negative. Neurological: Positive for weakness. Psychiatric/Behavioral: Negative. All other systems reviewed and are negative.       Vitals:    21 1044   BP: 138/66   Pulse: 83   Resp: 16   Temp: 98 °F (36.7 °C)   SpO2: 97%   Weight: 87.5 kg (193 lb)   Height: 5' 10\" (1.778 m)            Physical Exam  Vitals signs and nursing note reviewed. Constitutional:       General: He is not in acute distress. Appearance: Normal appearance. He is well-developed. He is not ill-appearing, toxic-appearing or diaphoretic. HENT:      Head: Normocephalic and atraumatic. No right periorbital erythema or left periorbital erythema. Jaw: There is normal jaw occlusion. Salivary Glands: Right salivary gland is not diffusely enlarged. Left salivary gland is not diffusely enlarged. Right Ear: External ear normal.      Left Ear: External ear normal.      Nose: Nose normal. No congestion or rhinorrhea. Mouth/Throat:      Mouth: Mucous membranes are moist.      Pharynx: No oropharyngeal exudate or posterior oropharyngeal erythema. Eyes:      General: Lids are normal. No scleral icterus. Right eye: No discharge. Left eye: No discharge. Extraocular Movements: Extraocular movements intact. Conjunctiva/sclera: Conjunctivae normal.      Right eye: Right conjunctiva is not injected. Left eye: Left conjunctiva is not injected. Pupils: Pupils are equal, round, and reactive to light. Neck:      Musculoskeletal: Full passive range of motion without pain, normal range of motion and neck supple. Normal range of motion. No erythema, neck rigidity, injury, pain with movement or muscular tenderness. Thyroid: No thyroid mass. Vascular: No JVD. Trachea: Trachea and phonation normal.   Cardiovascular:      Rate and Rhythm: Normal rate and regular rhythm. Pulses: Normal pulses. Heart sounds: Normal heart sounds. Heart sounds not distant. No murmur. No friction rub. No gallop. Pulmonary:      Effort: Pulmonary effort is normal. No tachypnea, accessory muscle usage, respiratory distress or retractions. Breath sounds: No stridor. No decreased breath sounds, wheezing, rhonchi or rales.    Chest: Chest wall: No tenderness. Abdominal:      General: Abdomen is flat. Bowel sounds are normal. There is no distension. There are no signs of injury. Palpations: Abdomen is soft. There is no fluid wave, mass or pulsatile mass. Tenderness: There is no abdominal tenderness. There is no guarding or rebound. Musculoskeletal: Normal range of motion. General: No swelling, tenderness, deformity or signs of injury. Right lower leg: No edema. Left lower leg: No edema. Lymphadenopathy:      Cervical: No cervical adenopathy. Skin:     General: Skin is warm and dry. Capillary Refill: Capillary refill takes less than 2 seconds. Coloration: Skin is not jaundiced or pale. Findings: No bruising, erythema or rash. Neurological:      General: No focal deficit present. Mental Status: He is alert and oriented to person, place, and time. Mental status is at baseline. GCS: GCS eye subscore is 4. GCS verbal subscore is 5. GCS motor subscore is 6. Cranial Nerves: No dysarthria or facial asymmetry. Sensory: No sensory deficit. Motor: No weakness or tremor. Coordination: Coordination normal.   Psychiatric:         Mood and Affect: Mood normal.         Behavior: Behavior normal. Behavior is cooperative. Thought Content: Thought content normal.         Judgment: Judgment normal.          MDM  Number of Diagnoses or Management Options  Weakness of left upper extremity  Diagnosis management comments: And has underlying disability secondary to his glioblastoma resection. CT head shows mild bleed at the surgical site neurologist on call does not feel intervention is necessary    Patient symptoms resolved while in emergency department patient did not want to stay in the hospital discharged with follow-up as planned.        Amount and/or Complexity of Data Reviewed  Clinical lab tests: ordered and reviewed  Tests in the radiology section of CPT®: ordered and reviewed  Tests in the medicine section of CPT®: ordered and reviewed  Decide to obtain previous medical records or to obtain history from someone other than the patient: yes  Review and summarize past medical records: yes  Discuss the patient with other providers: yes  Independent visualization of images, tracings, or specimens: yes    Risk of Complications, Morbidity, and/or Mortality  Presenting problems: high  Diagnostic procedures: high  Management options: high    Patient Progress  Patient progress: stable         Procedures

## 2021-02-13 LAB
BACTERIA SPEC CULT: NORMAL
SERVICE CMNT-IMP: NORMAL

## 2021-02-13 PROCEDURE — 3331090002 HH PPS REVENUE DEBIT

## 2021-02-13 PROCEDURE — 3331090001 HH PPS REVENUE CREDIT

## 2021-02-14 PROCEDURE — 400018 HH-NO PAY CLAIM PROCEDURE

## 2021-02-14 PROCEDURE — 3331090002 HH PPS REVENUE DEBIT

## 2021-02-14 PROCEDURE — 3331090001 HH PPS REVENUE CREDIT

## 2021-02-15 ENCOUNTER — HOME CARE VISIT (OUTPATIENT)
Dept: SCHEDULING | Facility: HOME HEALTH | Age: 81
End: 2021-02-15
Payer: MEDICARE

## 2021-02-15 VITALS
SYSTOLIC BLOOD PRESSURE: 128 MMHG | DIASTOLIC BLOOD PRESSURE: 72 MMHG | OXYGEN SATURATION: 98 % | RESPIRATION RATE: 16 BRPM | HEART RATE: 78 BPM | TEMPERATURE: 98.2 F

## 2021-02-15 LAB — LEVETIRACETAM SERPL-MCNC: 14.8 UG/ML (ref 10–40)

## 2021-02-15 PROCEDURE — 3331090001 HH PPS REVENUE CREDIT

## 2021-02-15 PROCEDURE — G0153 HHCP-SVS OF S/L PATH,EA 15MN: HCPCS

## 2021-02-15 PROCEDURE — 3331090003 HH PPS REVENUE ADJ

## 2021-02-15 PROCEDURE — 400013 HH SOC

## 2021-02-15 PROCEDURE — 3331090002 HH PPS REVENUE DEBIT

## 2021-02-18 ENCOUNTER — HOSPITAL ENCOUNTER (OUTPATIENT)
Dept: RADIATION ONCOLOGY | Age: 81
Discharge: HOME | End: 2021-02-18
Payer: MEDICARE

## 2021-02-18 PROCEDURE — 77334 RADIATION TREATMENT AID(S): CPT

## 2021-02-18 PROCEDURE — 77470 SPECIAL RADIATION TREATMENT: CPT

## 2021-02-20 ENCOUNTER — HOSPITAL ENCOUNTER (OUTPATIENT)
Dept: MRI IMAGING | Age: 81
Discharge: HOME | End: 2021-02-20
Attending: RADIOLOGY
Payer: MEDICARE

## 2021-02-20 DIAGNOSIS — C71.9 GBM (GLIOBLASTOMA MULTIFORME) (HCC): ICD-10-CM

## 2021-02-20 PROCEDURE — 70553 MRI BRAIN STEM W/O & W/DYE: CPT

## 2021-02-20 PROCEDURE — A9575 INJ GADOTERATE MEGLUMI 0.1ML: HCPCS | Performed by: RADIOLOGY

## 2021-02-20 PROCEDURE — 74011250636 HC RX REV CODE- 250/636: Performed by: RADIOLOGY

## 2021-02-20 RX ORDER — GADOTERATE MEGLUMINE 376.9 MG/ML
17 INJECTION INTRAVENOUS
Status: COMPLETED | OUTPATIENT
Start: 2021-02-20 | End: 2021-02-20

## 2021-02-20 RX ORDER — SODIUM CHLORIDE 0.9 % (FLUSH) 0.9 %
10 SYRINGE (ML) INJECTION
Status: COMPLETED | OUTPATIENT
Start: 2021-02-20 | End: 2021-02-20

## 2021-02-20 RX ADMIN — Medication 10 ML: at 13:42

## 2021-02-20 RX ADMIN — GADOTERATE MEGLUMINE 17 ML: 376.9 INJECTION INTRAVENOUS at 13:42

## 2021-02-24 ENCOUNTER — HOSPITAL ENCOUNTER (OUTPATIENT)
Dept: RADIATION ONCOLOGY | Age: 81
Discharge: HOME | End: 2021-02-24
Payer: MEDICARE

## 2021-02-24 PROCEDURE — 77301 RADIOTHERAPY DOSE PLAN IMRT: CPT

## 2021-02-24 PROCEDURE — 77399 UNLISTED PX MED RADJ PHYSICS: CPT

## 2021-02-24 PROCEDURE — 77300 RADIATION THERAPY DOSE PLAN: CPT

## 2021-02-26 ENCOUNTER — HOSPITAL ENCOUNTER (OUTPATIENT)
Dept: RADIATION ONCOLOGY | Age: 81
Discharge: HOME | End: 2021-02-26
Payer: MEDICARE

## 2021-02-26 PROCEDURE — 77338 DESIGN MLC DEVICE FOR IMRT: CPT

## 2021-03-08 ENCOUNTER — HOSPITAL ENCOUNTER (OUTPATIENT)
Dept: RADIATION ONCOLOGY | Age: 81
Discharge: HOME | End: 2021-03-08
Payer: MEDICARE

## 2021-03-08 PROCEDURE — 77386 HC IMRT TRMT DLVR COMPL: CPT

## 2021-03-09 ENCOUNTER — HOSPITAL ENCOUNTER (OUTPATIENT)
Dept: RADIATION ONCOLOGY | Age: 81
Discharge: HOME | End: 2021-03-09
Payer: MEDICARE

## 2021-03-09 PROCEDURE — 77386 HC IMRT TRMT DLVR COMPL: CPT

## 2021-03-10 ENCOUNTER — HOSPITAL ENCOUNTER (OUTPATIENT)
Dept: RADIATION ONCOLOGY | Age: 81
Discharge: HOME | End: 2021-03-10
Payer: MEDICARE

## 2021-03-10 ENCOUNTER — HOSPITAL ENCOUNTER (OUTPATIENT)
Dept: LAB | Age: 81
Discharge: HOME | End: 2021-03-10
Payer: MEDICARE

## 2021-03-10 DIAGNOSIS — C71.9 GBM (GLIOBLASTOMA MULTIFORME) (HCC): ICD-10-CM

## 2021-03-10 LAB
ALBUMIN SERPL-MCNC: 3.5 G/DL (ref 3.2–4.6)
ALBUMIN/GLOB SERPL: 0.9 {RATIO} (ref 1.2–3.5)
ALP SERPL-CCNC: 111 U/L (ref 50–136)
ALT SERPL-CCNC: 27 U/L (ref 12–65)
ANION GAP SERPL CALC-SCNC: 5 MMOL/L (ref 7–16)
AST SERPL-CCNC: 13 U/L (ref 15–37)
BASOPHILS # BLD: 0 K/UL (ref 0–0.2)
BASOPHILS NFR BLD: 0 % (ref 0–2)
BILIRUB SERPL-MCNC: 0.6 MG/DL (ref 0.2–1.1)
BUN SERPL-MCNC: 21 MG/DL (ref 8–23)
CALCIUM SERPL-MCNC: 9.3 MG/DL (ref 8.3–10.4)
CHLORIDE SERPL-SCNC: 106 MMOL/L (ref 98–107)
CO2 SERPL-SCNC: 29 MMOL/L (ref 21–32)
CREAT SERPL-MCNC: 1.3 MG/DL (ref 0.8–1.5)
DIFFERENTIAL METHOD BLD: ABNORMAL
EOSINOPHIL # BLD: 0.1 K/UL (ref 0–0.8)
EOSINOPHIL NFR BLD: 1 % (ref 0.5–7.8)
ERYTHROCYTE [DISTWIDTH] IN BLOOD BY AUTOMATED COUNT: 14 % (ref 11.9–14.6)
GLOBULIN SER CALC-MCNC: 3.9 G/DL (ref 2.3–3.5)
GLUCOSE SERPL-MCNC: 102 MG/DL (ref 65–100)
HCT VFR BLD AUTO: 36.5 % (ref 41.1–50.3)
HGB BLD-MCNC: 11.9 G/DL (ref 13.6–17.2)
IMM GRANULOCYTES # BLD AUTO: 0.1 K/UL (ref 0–0.5)
IMM GRANULOCYTES NFR BLD AUTO: 1 % (ref 0–5)
LYMPHOCYTES # BLD: 1.4 K/UL (ref 0.5–4.6)
LYMPHOCYTES NFR BLD: 15 % (ref 13–44)
MCH RBC QN AUTO: 29.9 PG (ref 26.1–32.9)
MCHC RBC AUTO-ENTMCNC: 32.6 G/DL (ref 31.4–35)
MCV RBC AUTO: 91.7 FL (ref 79.6–97.8)
MONOCYTES # BLD: 0.8 K/UL (ref 0.1–1.3)
MONOCYTES NFR BLD: 9 % (ref 4–12)
NEUTS SEG # BLD: 6.5 K/UL (ref 1.7–8.2)
NEUTS SEG NFR BLD: 73 % (ref 43–78)
NRBC # BLD: 0 K/UL (ref 0–0.2)
PLATELET # BLD AUTO: 195 K/UL (ref 150–450)
PMV BLD AUTO: 10.2 FL (ref 9.4–12.3)
POTASSIUM SERPL-SCNC: 3.9 MMOL/L (ref 3.5–5.1)
PROT SERPL-MCNC: 7.4 G/DL (ref 6.3–8.2)
RBC # BLD AUTO: 3.98 M/UL (ref 4.23–5.67)
SODIUM SERPL-SCNC: 140 MMOL/L (ref 136–145)
WBC # BLD AUTO: 8.8 K/UL (ref 4.3–11.1)

## 2021-03-10 PROCEDURE — 36415 COLL VENOUS BLD VENIPUNCTURE: CPT

## 2021-03-10 PROCEDURE — 85025 COMPLETE CBC W/AUTO DIFF WBC: CPT

## 2021-03-10 PROCEDURE — 77386 HC IMRT TRMT DLVR COMPL: CPT

## 2021-03-10 PROCEDURE — 80053 COMPREHEN METABOLIC PANEL: CPT

## 2021-03-11 ENCOUNTER — HOSPITAL ENCOUNTER (OUTPATIENT)
Dept: RADIATION ONCOLOGY | Age: 81
Discharge: HOME | End: 2021-03-11
Payer: MEDICARE

## 2021-03-11 PROCEDURE — 77386 HC IMRT TRMT DLVR COMPL: CPT

## 2021-03-12 ENCOUNTER — HOSPITAL ENCOUNTER (OUTPATIENT)
Dept: RADIATION ONCOLOGY | Age: 81
Discharge: HOME | End: 2021-03-12
Payer: MEDICARE

## 2021-03-12 VITALS
DIASTOLIC BLOOD PRESSURE: 51 MMHG | WEIGHT: 194.2 LBS | HEART RATE: 102 BPM | BODY MASS INDEX: 27.86 KG/M2 | SYSTOLIC BLOOD PRESSURE: 106 MMHG | OXYGEN SATURATION: 95 % | TEMPERATURE: 97.3 F

## 2021-03-12 PROCEDURE — 77336 RADIATION PHYSICS CONSULT: CPT

## 2021-03-12 PROCEDURE — 77386 HC IMRT TRMT DLVR COMPL: CPT

## 2021-03-12 NOTE — PROGRESS NOTES
Patient: Avery Mcginnis MRN: [id number]  SSN: [id number]    YOB: 1940  Age: 68 y.o. Sex: male      DIAGNOSIS:  GBM    TREATMENT SITE:  [facility]    DOSE and FRACTIONATION:  1335/4005    INTERVAL HISTORY:  Avery Mcginnis is a 68 y.o. male being treated for GBM. Week 1:  Mild nausea during the first treatments, now managed with diet modifications and Zofran. OBJECTIVE:  NAD  Visit Vitals  BP (!) 106/51   Pulse (!) 102   Temp 97.3 °F (36.3 °C)   Wt 88.1 kg (194 lb 3.2 oz)   SpO2 95%   BMI 27.86 kg/m²       Lab Results   Component Value Date/Time    Sodium 140 03/10/2021 11:52 AM    Potassium 3.9 03/10/2021 11:52 AM    Chloride 106 03/10/2021 11:52 AM    CO2 29 03/10/2021 11:52 AM    Anion gap 5 (L) 03/10/2021 11:52 AM    Glucose 102 (H) 03/10/2021 11:52 AM    BUN 21 03/10/2021 11:52 AM    Creatinine 1.30 03/10/2021 11:52 AM    GFR est AA >60 03/10/2021 11:52 AM    GFR est non-AA 57 (L) 03/10/2021 11:52 AM    Calcium 9.3 03/10/2021 11:52 AM    Magnesium 2.4 01/30/2021 05:47 AM    Albumin 3.5 03/10/2021 11:52 AM    Protein, total 7.4 03/10/2021 11:52 AM    Globulin 3.9 (H) 03/10/2021 11:52 AM    A-G Ratio 0.9 (L) 03/10/2021 11:52 AM    ALT (SGPT) 27 03/10/2021 11:52 AM     Lab Results   Component Value Date/Time    WBC 8.8 03/10/2021 11:52 AM    HGB 11.9 (L) 03/10/2021 11:52 AM    HCT 36.5 (L) 03/10/2021 11:52 AM    PLATELET 639 98/58/7912 11:52 AM       ASSESSMENT and PLAN:  Avery Mcginnis is tolerating radiation as anticipated for the current dose and fraction. We will continue on as planned with another treatment visit anticipated next week.         Nesha Mata MD   March 12, 2021

## 2021-03-15 ENCOUNTER — HOSPITAL ENCOUNTER (OUTPATIENT)
Dept: RADIATION ONCOLOGY | Age: 81
Discharge: HOME | End: 2021-03-15
Payer: MEDICARE

## 2021-03-15 PROCEDURE — 77386 HC IMRT TRMT DLVR COMPL: CPT

## 2021-03-16 ENCOUNTER — HOSPITAL ENCOUNTER (OUTPATIENT)
Dept: RADIATION ONCOLOGY | Age: 81
Discharge: HOME | End: 2021-03-16
Payer: MEDICARE

## 2021-03-16 PROCEDURE — 77386 HC IMRT TRMT DLVR COMPL: CPT

## 2021-03-17 ENCOUNTER — HOSPITAL ENCOUNTER (OUTPATIENT)
Dept: RADIATION ONCOLOGY | Age: 81
Discharge: HOME | End: 2021-03-17
Payer: MEDICARE

## 2021-03-17 ENCOUNTER — HOSPITAL ENCOUNTER (OUTPATIENT)
Dept: LAB | Age: 81
Discharge: HOME | End: 2021-03-17
Payer: MEDICARE

## 2021-03-17 DIAGNOSIS — C71.9 GBM (GLIOBLASTOMA MULTIFORME) (HCC): ICD-10-CM

## 2021-03-17 LAB
ALBUMIN SERPL-MCNC: 3.6 G/DL (ref 3.2–4.6)
ALBUMIN/GLOB SERPL: 1 {RATIO} (ref 1.2–3.5)
ALP SERPL-CCNC: 113 U/L (ref 50–136)
ALT SERPL-CCNC: 33 U/L (ref 12–65)
ANION GAP SERPL CALC-SCNC: 5 MMOL/L (ref 7–16)
AST SERPL-CCNC: 25 U/L (ref 15–37)
BASOPHILS # BLD: 0 K/UL (ref 0–0.2)
BASOPHILS NFR BLD: 0 % (ref 0–2)
BILIRUB SERPL-MCNC: 0.5 MG/DL (ref 0.2–1.1)
BUN SERPL-MCNC: 19 MG/DL (ref 8–23)
CALCIUM SERPL-MCNC: 9.3 MG/DL (ref 8.3–10.4)
CHLORIDE SERPL-SCNC: 111 MMOL/L (ref 98–107)
CO2 SERPL-SCNC: 28 MMOL/L (ref 21–32)
CREAT SERPL-MCNC: 1.4 MG/DL (ref 0.8–1.5)
DIFFERENTIAL METHOD BLD: ABNORMAL
EOSINOPHIL # BLD: 0.2 K/UL (ref 0–0.8)
EOSINOPHIL NFR BLD: 3 % (ref 0.5–7.8)
ERYTHROCYTE [DISTWIDTH] IN BLOOD BY AUTOMATED COUNT: 14.2 % (ref 11.9–14.6)
GLOBULIN SER CALC-MCNC: 3.6 G/DL (ref 2.3–3.5)
GLUCOSE SERPL-MCNC: 115 MG/DL (ref 65–100)
HCT VFR BLD AUTO: 38.4 % (ref 41.1–50.3)
HGB BLD-MCNC: 12.4 G/DL (ref 13.6–17.2)
IMM GRANULOCYTES # BLD AUTO: 0.1 K/UL (ref 0–0.5)
IMM GRANULOCYTES NFR BLD AUTO: 1 % (ref 0–5)
LYMPHOCYTES # BLD: 1.4 K/UL (ref 0.5–4.6)
LYMPHOCYTES NFR BLD: 15 % (ref 13–44)
MCH RBC QN AUTO: 30 PG (ref 26.1–32.9)
MCHC RBC AUTO-ENTMCNC: 32.3 G/DL (ref 31.4–35)
MCV RBC AUTO: 92.8 FL (ref 79.6–97.8)
MONOCYTES # BLD: 0.7 K/UL (ref 0.1–1.3)
MONOCYTES NFR BLD: 8 % (ref 4–12)
NEUTS SEG # BLD: 6.7 K/UL (ref 1.7–8.2)
NEUTS SEG NFR BLD: 73 % (ref 43–78)
NRBC # BLD: 0 K/UL (ref 0–0.2)
PLATELET # BLD AUTO: 218 K/UL (ref 150–450)
PMV BLD AUTO: 9.9 FL (ref 9.4–12.3)
POTASSIUM SERPL-SCNC: 4 MMOL/L (ref 3.5–5.1)
PROT SERPL-MCNC: 7.2 G/DL (ref 6.3–8.2)
RBC # BLD AUTO: 4.14 M/UL (ref 4.23–5.67)
SODIUM SERPL-SCNC: 144 MMOL/L (ref 136–145)
WBC # BLD AUTO: 9.1 K/UL (ref 4.3–11.1)

## 2021-03-17 PROCEDURE — 77386 HC IMRT TRMT DLVR COMPL: CPT

## 2021-03-17 PROCEDURE — 36415 COLL VENOUS BLD VENIPUNCTURE: CPT

## 2021-03-17 PROCEDURE — 85025 COMPLETE CBC W/AUTO DIFF WBC: CPT

## 2021-03-17 PROCEDURE — 80053 COMPREHEN METABOLIC PANEL: CPT

## 2021-03-18 ENCOUNTER — HOSPITAL ENCOUNTER (OUTPATIENT)
Dept: RADIATION ONCOLOGY | Age: 81
Discharge: HOME | End: 2021-03-18
Payer: MEDICARE

## 2021-03-18 PROCEDURE — 77386 HC IMRT TRMT DLVR COMPL: CPT

## 2021-03-19 ENCOUNTER — HOSPITAL ENCOUNTER (OUTPATIENT)
Dept: RADIATION ONCOLOGY | Age: 81
Discharge: HOME | End: 2021-03-19
Payer: MEDICARE

## 2021-03-19 VITALS
BODY MASS INDEX: 28.5 KG/M2 | HEART RATE: 83 BPM | OXYGEN SATURATION: 95 % | RESPIRATION RATE: 16 BRPM | TEMPERATURE: 97.2 F | DIASTOLIC BLOOD PRESSURE: 63 MMHG | SYSTOLIC BLOOD PRESSURE: 118 MMHG | WEIGHT: 198.6 LBS

## 2021-03-19 PROCEDURE — 77336 RADIATION PHYSICS CONSULT: CPT

## 2021-03-19 PROCEDURE — 77386 HC IMRT TRMT DLVR COMPL: CPT

## 2021-03-19 NOTE — PROGRESS NOTES
Patient: Venancio Dotson MRN: [id number]  SSN: [id number]    YOB: 1940  Age: 68 y.o. Sex: male      DIAGNOSIS:  GBM    TREATMENT SITE:   parietal    DOSE and FRACTIONATION:  9670/4005    INTERVAL HISTORY:  Venancio Dotson is a 68 y.o. male being treated for GBM. Week 1:  Mild nausea during the first treatments, now managed with diet modifications and Zofran. Week 2: Mild fatigue. OBJECTIVE:  NAD  Visit Vitals  /63 (BP 1 Location: Left upper arm, BP Patient Position: Sitting)   Pulse 83   Temp 97.2 °F (36.2 °C)   Resp 16   Wt 90.1 kg (198 lb 9.6 oz)   SpO2 95%   BMI 28.50 kg/m²       Lab Results   Component Value Date/Time    Sodium 144 03/17/2021 12:45 PM    Potassium 4.0 03/17/2021 12:45 PM    Chloride 111 (H) 03/17/2021 12:45 PM    CO2 28 03/17/2021 12:45 PM    Anion gap 5 (L) 03/17/2021 12:45 PM    Glucose 115 (H) 03/17/2021 12:45 PM    BUN 19 03/17/2021 12:45 PM    Creatinine 1.40 03/17/2021 12:45 PM    GFR est AA >60 03/17/2021 12:45 PM    GFR est non-AA 52 (L) 03/17/2021 12:45 PM    Calcium 9.3 03/17/2021 12:45 PM    Magnesium 2.4 01/30/2021 05:47 AM    Albumin 3.6 03/17/2021 12:45 PM    Protein, total 7.2 03/17/2021 12:45 PM    Globulin 3.6 (H) 03/17/2021 12:45 PM    A-G Ratio 1.0 (L) 03/17/2021 12:45 PM    ALT (SGPT) 33 03/17/2021 12:45 PM     Lab Results   Component Value Date/Time    WBC 9.1 03/17/2021 12:45 PM    HGB 12.4 (L) 03/17/2021 12:45 PM    HCT 38.4 (L) 03/17/2021 12:45 PM    PLATELET 574 12/33/2169 12:45 PM       ASSESSMENT and PLAN:  Venancio Dotson is tolerating radiation as anticipated for the current dose and fraction. We will continue on as planned with another treatment visit anticipated next week and coordinate chemotherapy and follow up with medical oncology.         Kev Martin MD   March 19, 2021

## 2021-03-22 ENCOUNTER — HOSPITAL ENCOUNTER (OUTPATIENT)
Dept: RADIATION ONCOLOGY | Age: 81
Discharge: HOME | End: 2021-03-22
Payer: MEDICARE

## 2021-03-22 PROCEDURE — 77386 HC IMRT TRMT DLVR COMPL: CPT

## 2021-03-23 ENCOUNTER — HOSPITAL ENCOUNTER (OUTPATIENT)
Dept: RADIATION ONCOLOGY | Age: 81
Discharge: HOME | End: 2021-03-23
Payer: MEDICARE

## 2021-03-23 DIAGNOSIS — C71.3 MALIGNANT NEOPLASM OF PARIETAL LOBE OF BRAIN (HCC): Primary | ICD-10-CM

## 2021-03-23 PROCEDURE — 77386 HC IMRT TRMT DLVR COMPL: CPT

## 2021-03-24 ENCOUNTER — HOSPITAL ENCOUNTER (OUTPATIENT)
Dept: RADIATION ONCOLOGY | Age: 81
Discharge: HOME | End: 2021-03-24
Payer: MEDICARE

## 2021-03-24 ENCOUNTER — HOSPITAL ENCOUNTER (OUTPATIENT)
Dept: LAB | Age: 81
Discharge: HOME | End: 2021-03-24
Payer: MEDICARE

## 2021-03-24 DIAGNOSIS — C71.9 GBM (GLIOBLASTOMA MULTIFORME) (HCC): ICD-10-CM

## 2021-03-24 LAB
ALBUMIN SERPL-MCNC: 3.6 G/DL (ref 3.2–4.6)
ALBUMIN/GLOB SERPL: 1 {RATIO} (ref 1.2–3.5)
ALP SERPL-CCNC: 104 U/L (ref 50–136)
ALT SERPL-CCNC: 34 U/L (ref 12–65)
ANION GAP SERPL CALC-SCNC: 4 MMOL/L (ref 7–16)
AST SERPL-CCNC: 19 U/L (ref 15–37)
BASOPHILS # BLD: 0 K/UL (ref 0–0.2)
BASOPHILS NFR BLD: 0 % (ref 0–2)
BILIRUB SERPL-MCNC: 0.4 MG/DL (ref 0.2–1.1)
BUN SERPL-MCNC: 18 MG/DL (ref 8–23)
CALCIUM SERPL-MCNC: 9.4 MG/DL (ref 8.3–10.4)
CHLORIDE SERPL-SCNC: 108 MMOL/L (ref 98–107)
CO2 SERPL-SCNC: 30 MMOL/L (ref 21–32)
CREAT SERPL-MCNC: 1.4 MG/DL (ref 0.8–1.5)
DIFFERENTIAL METHOD BLD: ABNORMAL
EOSINOPHIL # BLD: 0.2 K/UL (ref 0–0.8)
EOSINOPHIL NFR BLD: 3 % (ref 0.5–7.8)
ERYTHROCYTE [DISTWIDTH] IN BLOOD BY AUTOMATED COUNT: 14.1 % (ref 11.9–14.6)
GLOBULIN SER CALC-MCNC: 3.5 G/DL (ref 2.3–3.5)
GLUCOSE SERPL-MCNC: 107 MG/DL (ref 65–100)
HCT VFR BLD AUTO: 38.7 % (ref 41.1–50.3)
HGB BLD-MCNC: 12.7 G/DL (ref 13.6–17.2)
IMM GRANULOCYTES # BLD AUTO: 0 K/UL (ref 0–0.5)
IMM GRANULOCYTES NFR BLD AUTO: 1 % (ref 0–5)
LYMPHOCYTES # BLD: 1.1 K/UL (ref 0.5–4.6)
LYMPHOCYTES NFR BLD: 13 % (ref 13–44)
MAGNESIUM SERPL-MCNC: 2.2 MG/DL (ref 1.8–2.4)
MCH RBC QN AUTO: 30.8 PG (ref 26.1–32.9)
MCHC RBC AUTO-ENTMCNC: 32.8 G/DL (ref 31.4–35)
MCV RBC AUTO: 93.7 FL (ref 79.6–97.8)
MONOCYTES # BLD: 0.9 K/UL (ref 0.1–1.3)
MONOCYTES NFR BLD: 11 % (ref 4–12)
NEUTS SEG # BLD: 6.3 K/UL (ref 1.7–8.2)
NEUTS SEG NFR BLD: 73 % (ref 43–78)
NRBC # BLD: 0 K/UL (ref 0–0.2)
PLATELET # BLD AUTO: 173 K/UL (ref 150–450)
PMV BLD AUTO: 10.2 FL (ref 9.4–12.3)
POTASSIUM SERPL-SCNC: 4.6 MMOL/L (ref 3.5–5.1)
PROT SERPL-MCNC: 7.1 G/DL (ref 6.3–8.2)
RBC # BLD AUTO: 4.13 M/UL (ref 4.23–5.6)
SODIUM SERPL-SCNC: 142 MMOL/L (ref 136–145)
WBC # BLD AUTO: 8.7 K/UL (ref 4.3–11.1)

## 2021-03-24 PROCEDURE — 77386 HC IMRT TRMT DLVR COMPL: CPT

## 2021-03-24 PROCEDURE — 85025 COMPLETE CBC W/AUTO DIFF WBC: CPT

## 2021-03-24 PROCEDURE — 83735 ASSAY OF MAGNESIUM: CPT

## 2021-03-24 PROCEDURE — 80053 COMPREHEN METABOLIC PANEL: CPT

## 2021-03-24 PROCEDURE — 36415 COLL VENOUS BLD VENIPUNCTURE: CPT

## 2021-03-25 ENCOUNTER — HOSPITAL ENCOUNTER (OUTPATIENT)
Dept: RADIATION ONCOLOGY | Age: 81
Discharge: HOME | End: 2021-03-25
Payer: MEDICARE

## 2021-03-25 PROCEDURE — 77386 HC IMRT TRMT DLVR COMPL: CPT

## 2021-03-26 ENCOUNTER — HOSPITAL ENCOUNTER (OUTPATIENT)
Dept: RADIATION ONCOLOGY | Age: 81
Discharge: HOME | End: 2021-03-26
Payer: MEDICARE

## 2021-03-26 VITALS
DIASTOLIC BLOOD PRESSURE: 57 MMHG | WEIGHT: 195.7 LBS | TEMPERATURE: 97.3 F | SYSTOLIC BLOOD PRESSURE: 108 MMHG | HEART RATE: 93 BPM | BODY MASS INDEX: 28.08 KG/M2 | OXYGEN SATURATION: 93 %

## 2021-03-26 PROCEDURE — 77336 RADIATION PHYSICS CONSULT: CPT

## 2021-03-26 PROCEDURE — 77386 HC IMRT TRMT DLVR COMPL: CPT

## 2021-03-26 NOTE — PROGRESS NOTES
Patient: Marianna Monroe MRN: [id number]  SSN: [id number]    YOB: 1940  Age: 68 y.o. Sex: male      DIAGNOSIS:  GBM    TREATMENT SITE:  R parietal    DOSE and FRACTIONATION:  4005/4005    INTERVAL HISTORY:  Marianna Monroe is a 68 y.o. male being treated for GBM. Week 1:  Mild nausea during the first treatments, now managed with diet modifications and Zofran. Week 2: Mild fatigue. Week 3: Swelling at the site of the incision and persistent fatigue. OBJECTIVE:  Pulsatile swelling at craniotomy defect, no tenderness to palpation or erythema. Visit Vitals  BP (!) 108/57   Pulse 93   Temp 97.3 °F (36.3 °C)   Wt 88.8 kg (195 lb 11.2 oz)   SpO2 93%   BMI 28.08 kg/m²       Lab Results   Component Value Date/Time    Sodium 142 03/24/2021 07:57 AM    Potassium 4.6 03/24/2021 07:57 AM    Chloride 108 (H) 03/24/2021 07:57 AM    CO2 30 03/24/2021 07:57 AM    Anion gap 4 (L) 03/24/2021 07:57 AM    Glucose 107 (H) 03/24/2021 07:57 AM    BUN 18 03/24/2021 07:57 AM    Creatinine 1.40 03/24/2021 07:57 AM    GFR est AA >60 03/24/2021 07:57 AM    GFR est non-AA 52 (L) 03/24/2021 07:57 AM    Calcium 9.4 03/24/2021 07:57 AM    Magnesium 2.2 03/24/2021 07:57 AM    Albumin 3.6 03/24/2021 07:57 AM    Protein, total 7.1 03/24/2021 07:57 AM    Globulin 3.5 03/24/2021 07:57 AM    A-G Ratio 1.0 (L) 03/24/2021 07:57 AM    ALT (SGPT) 34 03/24/2021 07:57 AM     Lab Results   Component Value Date/Time    WBC 8.7 03/24/2021 07:57 AM    HGB 12.7 (L) 03/24/2021 07:57 AM    HCT 38.7 (L) 03/24/2021 07:57 AM    PLATELET 685 35/82/9841 07:57 AM       ASSESSMENT and PLAN:  Marianna Monroe is tolerating radiation as anticipated for the current dose and fraction. He will complete radiation and concurrent chemotherapy today and return for follow up in one month with an MRI.  He and his wife will continue to monitor the swelling at the incision site and call if he experiences any pain, fevers, or new neurologic symptoms.       Buddy Haile MD   March 26, 2021

## 2021-03-31 ENCOUNTER — HOSPITAL ENCOUNTER (OUTPATIENT)
Dept: LAB | Age: 81
Discharge: HOME | End: 2021-03-31
Payer: MEDICARE

## 2021-03-31 DIAGNOSIS — C71.9 GBM (GLIOBLASTOMA MULTIFORME) (HCC): ICD-10-CM

## 2021-03-31 LAB
ALBUMIN SERPL-MCNC: 3.5 G/DL (ref 3.2–4.6)
ALBUMIN/GLOB SERPL: 1 {RATIO} (ref 1.2–3.5)
ALP SERPL-CCNC: 98 U/L (ref 50–136)
ALT SERPL-CCNC: 31 U/L (ref 12–65)
ANION GAP SERPL CALC-SCNC: 5 MMOL/L (ref 7–16)
AST SERPL-CCNC: 19 U/L (ref 15–37)
BASOPHILS # BLD: 0 K/UL (ref 0–0.2)
BASOPHILS NFR BLD: 0 % (ref 0–2)
BILIRUB SERPL-MCNC: 0.4 MG/DL (ref 0.2–1.1)
BUN SERPL-MCNC: 18 MG/DL (ref 8–23)
CALCIUM SERPL-MCNC: 9.1 MG/DL (ref 8.3–10.4)
CHLORIDE SERPL-SCNC: 107 MMOL/L (ref 98–107)
CO2 SERPL-SCNC: 29 MMOL/L (ref 21–32)
CREAT SERPL-MCNC: 1.5 MG/DL (ref 0.8–1.5)
DIFFERENTIAL METHOD BLD: ABNORMAL
EOSINOPHIL # BLD: 0.3 K/UL (ref 0–0.8)
EOSINOPHIL NFR BLD: 4 % (ref 0.5–7.8)
ERYTHROCYTE [DISTWIDTH] IN BLOOD BY AUTOMATED COUNT: 14.1 % (ref 11.9–14.6)
GLOBULIN SER CALC-MCNC: 3.6 G/DL (ref 2.3–3.5)
GLUCOSE SERPL-MCNC: 151 MG/DL (ref 65–100)
HCT VFR BLD AUTO: 39.9 %
HGB BLD-MCNC: 13.2 G/DL (ref 13.6–17.2)
IMM GRANULOCYTES # BLD AUTO: 0 K/UL (ref 0–0.5)
IMM GRANULOCYTES NFR BLD AUTO: 0 % (ref 0–5)
LYMPHOCYTES # BLD: 0.9 K/UL (ref 0.5–4.6)
LYMPHOCYTES NFR BLD: 11 % (ref 13–44)
MAGNESIUM SERPL-MCNC: 2.3 MG/DL (ref 1.8–2.4)
MCH RBC QN AUTO: 31.1 PG (ref 26.1–32.9)
MCHC RBC AUTO-ENTMCNC: 33.1 G/DL (ref 31.4–35)
MCV RBC AUTO: 93.9 FL (ref 79.6–97.8)
MONOCYTES # BLD: 0.8 K/UL (ref 0.1–1.3)
MONOCYTES NFR BLD: 10 % (ref 4–12)
NEUTS SEG # BLD: 6.2 K/UL (ref 1.7–8.2)
NEUTS SEG NFR BLD: 75 % (ref 43–78)
NRBC # BLD: 0 K/UL (ref 0–0.2)
PLATELET # BLD AUTO: 154 K/UL (ref 150–450)
PMV BLD AUTO: 10 FL (ref 9.4–12.3)
POTASSIUM SERPL-SCNC: 4.1 MMOL/L (ref 3.5–5.1)
PROT SERPL-MCNC: 7.1 G/DL (ref 6.3–8.2)
RBC # BLD AUTO: 4.25 M/UL (ref 4.23–5.6)
SODIUM SERPL-SCNC: 141 MMOL/L (ref 136–145)
WBC # BLD AUTO: 8.2 K/UL (ref 4.3–11.1)

## 2021-03-31 PROCEDURE — 36415 COLL VENOUS BLD VENIPUNCTURE: CPT

## 2021-03-31 PROCEDURE — 80053 COMPREHEN METABOLIC PANEL: CPT

## 2021-03-31 PROCEDURE — 83735 ASSAY OF MAGNESIUM: CPT

## 2021-03-31 PROCEDURE — 85025 COMPLETE CBC W/AUTO DIFF WBC: CPT

## 2021-04-23 ENCOUNTER — HOSPITAL ENCOUNTER (OUTPATIENT)
Dept: MRI IMAGING | Age: 81
Discharge: HOME | End: 2021-04-23
Attending: RADIOLOGY
Payer: MEDICARE

## 2021-04-23 DIAGNOSIS — C71.3 MALIGNANT NEOPLASM OF PARIETAL LOBE OF BRAIN (HCC): ICD-10-CM

## 2021-04-23 PROCEDURE — 74011636320 HC RX REV CODE- 636/320: Performed by: RADIOLOGY

## 2021-04-23 PROCEDURE — A9576 INJ PROHANCE MULTIPACK: HCPCS | Performed by: RADIOLOGY

## 2021-04-23 PROCEDURE — 70553 MRI BRAIN STEM W/O & W/DYE: CPT

## 2021-04-23 RX ADMIN — GADOTERIDOL 20 ML: 279.3 INJECTION, SOLUTION INTRAVENOUS at 11:45

## 2021-04-26 ENCOUNTER — HOSPITAL ENCOUNTER (OUTPATIENT)
Dept: LAB | Age: 81
Discharge: HOME | End: 2021-04-26
Payer: MEDICARE

## 2021-04-26 DIAGNOSIS — C71.9 GLIOBLASTOMA (HCC): ICD-10-CM

## 2021-04-26 LAB
ALBUMIN SERPL-MCNC: 3.9 G/DL (ref 3.2–4.6)
ALBUMIN/GLOB SERPL: 1.1 {RATIO} (ref 1.2–3.5)
ALP SERPL-CCNC: 97 U/L (ref 50–136)
ALT SERPL-CCNC: 27 U/L (ref 12–65)
ANION GAP SERPL CALC-SCNC: 4 MMOL/L (ref 7–16)
AST SERPL-CCNC: 19 U/L (ref 15–37)
BASOPHILS # BLD: 0 K/UL (ref 0–0.2)
BASOPHILS NFR BLD: 0 % (ref 0–2)
BILIRUB SERPL-MCNC: 0.5 MG/DL (ref 0.2–1.1)
BUN SERPL-MCNC: 22 MG/DL (ref 8–23)
CALCIUM SERPL-MCNC: 9.5 MG/DL (ref 8.3–10.4)
CHLORIDE SERPL-SCNC: 108 MMOL/L (ref 98–107)
CO2 SERPL-SCNC: 29 MMOL/L (ref 21–32)
CREAT SERPL-MCNC: 1.5 MG/DL (ref 0.8–1.5)
DIFFERENTIAL METHOD BLD: ABNORMAL
EOSINOPHIL # BLD: 0.1 K/UL (ref 0–0.8)
EOSINOPHIL NFR BLD: 2 % (ref 0.5–7.8)
ERYTHROCYTE [DISTWIDTH] IN BLOOD BY AUTOMATED COUNT: 13 % (ref 11.9–14.6)
GLOBULIN SER CALC-MCNC: 3.5 G/DL (ref 2.3–3.5)
GLUCOSE SERPL-MCNC: 129 MG/DL (ref 65–100)
HCT VFR BLD AUTO: 40.4 %
HGB BLD-MCNC: 13.7 G/DL (ref 13.6–17.2)
IMM GRANULOCYTES # BLD AUTO: 0 K/UL (ref 0–0.5)
IMM GRANULOCYTES NFR BLD AUTO: 0 % (ref 0–5)
LDH SERPL L TO P-CCNC: 150 U/L (ref 110–210)
LYMPHOCYTES # BLD: 1.1 K/UL (ref 0.5–4.6)
LYMPHOCYTES NFR BLD: 13 % (ref 13–44)
MAGNESIUM SERPL-MCNC: 2.3 MG/DL (ref 1.8–2.4)
MCH RBC QN AUTO: 30.6 PG (ref 26.1–32.9)
MCHC RBC AUTO-ENTMCNC: 33.9 G/DL (ref 31.4–35)
MCV RBC AUTO: 90.4 FL (ref 79.6–97.8)
MONOCYTES # BLD: 0.7 K/UL (ref 0.1–1.3)
MONOCYTES NFR BLD: 9 % (ref 4–12)
NEUTS SEG # BLD: 6.6 K/UL (ref 1.7–8.2)
NEUTS SEG NFR BLD: 76 % (ref 43–78)
NRBC # BLD: 0 K/UL (ref 0–0.2)
PLATELET # BLD AUTO: 142 K/UL (ref 150–450)
PMV BLD AUTO: 9.8 FL (ref 9.4–12.3)
POTASSIUM SERPL-SCNC: 4.2 MMOL/L (ref 3.5–5.1)
PROT SERPL-MCNC: 7.4 G/DL (ref 6.3–8.2)
RBC # BLD AUTO: 4.47 M/UL (ref 4.23–5.6)
SODIUM SERPL-SCNC: 141 MMOL/L (ref 136–145)
WBC # BLD AUTO: 8.6 K/UL (ref 4.3–11.1)

## 2021-04-26 PROCEDURE — 80053 COMPREHEN METABOLIC PANEL: CPT

## 2021-04-26 PROCEDURE — 85025 COMPLETE CBC W/AUTO DIFF WBC: CPT

## 2021-04-26 PROCEDURE — 83735 ASSAY OF MAGNESIUM: CPT

## 2021-04-26 PROCEDURE — 83615 LACTATE (LD) (LDH) ENZYME: CPT

## 2021-04-26 PROCEDURE — 36415 COLL VENOUS BLD VENIPUNCTURE: CPT

## 2021-05-05 ENCOUNTER — HOSPITAL ENCOUNTER (OUTPATIENT)
Dept: RADIATION ONCOLOGY | Age: 81
Discharge: HOME | End: 2021-05-05
Payer: MEDICARE

## 2021-05-05 VITALS
HEART RATE: 80 BPM | BODY MASS INDEX: 27.76 KG/M2 | WEIGHT: 193.5 LBS | OXYGEN SATURATION: 95 % | RESPIRATION RATE: 16 BRPM | SYSTOLIC BLOOD PRESSURE: 125 MMHG | TEMPERATURE: 98.1 F | DIASTOLIC BLOOD PRESSURE: 58 MMHG

## 2021-05-05 DIAGNOSIS — C71.9 GBM (GLIOBLASTOMA MULTIFORME) (HCC): Primary | ICD-10-CM

## 2021-05-05 PROCEDURE — 99211 OFF/OP EST MAY X REQ PHY/QHP: CPT

## 2021-05-05 NOTE — PROGRESS NOTES
Patient: Kirstin Garvin MRN: [id number]  SSN: [id number]    YOB: 1940  Age: 68 y.o. Sex: male      Other Providers:  Dr. Urbano Ranks: Seizure    DIAGNOSIS: GBM    PREVIOUS RADIATION TREATMENT:  1) 4005cGy to the right parietal lobe, completed 3/26/2021    HISTORY OF PRESENT ILLNESS:  Kirstin Garvin is a 68 y.o. male who I am seeing at the request of Dr. Ankit Bella. Mr. Reji Umanzor was in his usual state of health until 12/25/2020 at which time he was found unresponsive in the bathroom by his wife. A head CT was performed which demonstrated a right parietal hemorrhage with surrounding vasogenic edema. This was followed by an MRI brain which confirmed a 3.7 x 3.3 x 3.2 cm heterogeneously enhancing mass within the temporoparietal region concerning for metastatic versus primary disease. A CT of the chest/ abdomen/ pelvis were performed 12/27/2020 which showed no evidence of metastatic or primary disease. Mr. Reji Umanzor subsequently underwent right craniotomy with placement of BCNU wafers on 12/31/2021 by Dr. Nohemy Rolon. This confirmed glioblastoma, WHO grade IV. A postoperative CT scan 1/1/2021 demonstrated interval right parietal craniotomy and right parietal mass resection with mild enhancement along the anterior margin of the resection cavity which may relate to residual tumor or postoperative change. He had a second seizure on 1/23/21 for which he was re-started on Keppra. He has been able to wean off all steroid medications. He was treated with concurrent chemoradiation and tolerated this uneventfully. INTERVAL HISTORY:  Mr. Reji Umanzor has done well over the past month. He reports continuing to take multiple naps daily but feels that his strength and balance have improved. He denies any new seizures and new numbness, tingling, or weakness. A repeat MRI was performed 4/23/2021 which demonstrated postradiation change and nonprogressive residual tumor at the right parietal resection site. He has started adjuvant temozolomide with Dr. Musa Ware, which he reports he has tolerated well with the addition of Zofran for nausea. He will be meeting with an [facility] [employer] representative next week to discuss tumor treating field therapy. PAST MEDICAL HISTORY:    Past Medical History:   Diagnosis Date    Anxiety disorder 12/9/2014    Arthritis 12/9/2014    Depression 12/9/2014    Glioblastoma ([address] 12/26/2020    Gout 12/9/2014    History of intracranial hemorrhage 12/25/2020    History of lumbar laminectomy for spinal cord decompression 2/18/2019    History of seizure 12/28/2020    HTN (hypertension), benign 2/15/2017    Hyperlipemia 12/9/2014    Hypertension     Idiopathic chronic gout of right ankle 12/9/2014    Insomnia 12/9/2014    Kidney stone     Mixed hyperlipidemia 12/9/2014    Panic disorder 12/9/2014    Primary insomnia 12/9/2014    Rosacea 2/15/2017     PAST SURGICAL HISTORY:   Past Surgical History:   Procedure Laterality Date    HX ANKLE FRACTURE TX Left 6/1974    HX COLONOSCOPY  2007    HX CYST REMOVAL      HX CYST REMOVAL      pylonidal cyst    HX CYST REMOVAL      HX HERNIA REPAIR Bilateral     HX OTHER SURGICAL      wrist    HX WRIST FRACTURE TX Bilateral 6/1993    wrist plate/pin       MEDICATIONS:     Current Outpatient Medications:     atorvastatin (LIPITOR) 40 mg tablet, TAKE 1 TABLET BY MOUTH EVERY DAY, Disp: 90 Tab, Rfl: 1    sertraline (ZOLOFT) 100 mg tablet, Take 1.5 Tabs by mouth daily. One and half tab every day, Disp: 135 Tab, Rfl: 1    olmesartan (BENICAR) 40 mg tablet, Take 1 Tab by mouth daily. , Disp: 90 Tab, Rfl: 1    levETIRAcetam (Keppra) 500 mg tablet, Take 1 Tab by mouth two (2) times a day., Disp: 180 Tab, Rfl: 1    ondansetron hcl (ZOFRAN) 8 mg tablet, Take 1 Tab by mouth every eight (8) hours as needed for Nausea or Vomiting., Disp: 90 Tab, Rfl: 1    potassium chloride SR (KLOR-CON 10) 10 mEq tablet, TAKE 1 TABLET BY MOUTH EVERY DAY, Disp: 90 Tab, Rfl: 3    esomeprazole (NexIUM) 40 mg capsule, Take  by mouth daily. , Disp: , Rfl:     furosemide (LASIX) 20 mg tablet, Take 1 Tab by mouth daily. , Disp: 90 Tab, Rfl: 1    tamsulosin (FLOMAX) 0.4 mg capsule, TAKE 1 CAP BY MOUTH DAILY. PCP WILL CONTINUE TO PRESCRIBE THIS MEDICATION. INDICATIONS: FOR SLOW OR DECREASED URINE FLOW, Disp: 30 Cap, Rfl: 1    cholecalciferol (Vitamin D3) (1000 Units /25 mcg) tablet, Take 1,000 Units by mouth daily. , Disp: , Rfl:     lysine (L-LYSINE) 500 mg tab tablet, Take 500 mg by mouth daily. , Disp: , Rfl:     polyethylene glycol (MIRALAX) 17 gram packet, Take 1 Packet by mouth daily as needed for Constipation. (available over the counter) (Patient taking differently: Take 1 Packet by mouth daily as needed for Constipation. mix with 8oz water), Disp: 1 Each, Rfl: prn    senna-docusate (PERICOLACE) 8.6-50 mg per tablet, Take 1 Tab by mouth two (2) times a day. (available over the counter)  Indications: constipation, Disp: 60 Tab, Rfl: prn    multivitamin (ONE A DAY) tablet, Take 1 Tab by mouth daily. , Disp: , Rfl:     temozolomide (TEMODAR) 20 mg capsule, , Disp: , Rfl:     temozolomide (TEMODAR) 250 mg capsule, , Disp: , Rfl:     temozolomide (TEMODAR) 5 mg capsule, , Disp: , Rfl:     allopurinoL (ZYLOPRIM) 100 mg tablet, Take 2 Tabs by mouth daily. , Disp: 180 Tab, Rfl: 1    ALLERGIES:   No Known Allergies    SOCIAL HISTORY:   Social History     Socioeconomic History    Marital status:      Spouse name: Not on file    Number of children: Not on file    Years of education: Not on file    Highest education level: Not on file   Occupational History    Not on file   Social Needs    Financial resource strain: Not on file    Food insecurity     Worry: Not on file     Inability: Not on file    Transportation needs     Medical: Not on file     Non-medical: Not on file   Tobacco Use    Smoking status: Former Smoker     Types: Cigarettes     Quit date: 1/1/1970 Years since quittin.3    Smokeless tobacco: Never Used   Substance and Sexual Activity    Alcohol use: Not Currently    Drug use: No    Sexual activity: Yes     Partners: Female   Lifestyle    Physical activity     Days per week: Not on file     Minutes per session: Not on file    Stress: Not on file   Relationships    Social connections     Talks on phone: Not on file     Gets together: Not on file     Attends Scientologist service: Not on file     Active member of club or organization: Not on file     Attends meetings of clubs or organizations: Not on file     Relationship status: Not on file    Intimate partner violence     Fear of current or ex partner: Not on file     Emotionally abused: Not on file     Physically abused: Not on file     Forced sexual activity: Not on file   Other Topics Concern    Not on file   Social History Narrative    Not on file       FAMILY HISTORY:   Family History   Problem Relation Age of Onset    Cancer Mother         lung ca    Hypertension Mother     Heart Disease Mother     Arthritis-osteo Father     Cancer Brother         stomach       REVIEW OF SYSTEMS:   A full 12-point review of systems was completed and was negative unless noted in the history of present illness.     PHYSICAL EXAMINATION:   ECOG Performance status 1  VITAL SIGNS:   Visit Vitals  BP (!) 125/58 (BP 1 Location: Left upper arm, BP Patient Position: Sitting)   Pulse 80   Temp 98.1 °F (36.7 °C)   Resp 16   Wt 87.8 kg (193 lb 8 oz)   SpO2 95%   BMI 27.76 kg/m²      General: well developed/nourished adult Male in no acute distress; appears stated age  [de-identified]: normocephalic, atraumatic; EOMI; well healed right-sided craniotomy incision with alopecia  Neck: supple with full ROM; no cervical lymphadenopathy  Cardiovascular: normal S1 and S2, RRR, no murmurs  Respiratory: normal inspiratory effort, no audible wheezes  Extremities: no cyanosis, clubbing, or edema  Musculoskeletal: mobility intact x4; normal ROM in all joints  Skin: no skin lesions identified  Neuro: resting tremor in the left hand; AOx3; sensation intact x 4; CNII-XII grossly intact  Psych: appropriate affect, insight, and judgement  GI: abdomen soft, non-distended    PATHOLOGY:    No interval pathology. LABORATORY:   Lab Results   Component Value Date/Time    Sodium 141 04/26/2021 10:32 AM    Potassium 4.2 04/26/2021 10:32 AM    Chloride 108 (H) 04/26/2021 10:32 AM    CO2 29 04/26/2021 10:32 AM    Anion gap 4 (L) 04/26/2021 10:32 AM    Glucose 129 (H) 04/26/2021 10:32 AM    BUN 22 04/26/2021 10:32 AM    Creatinine 1.50 04/26/2021 10:32 AM    GFR est AA 58 (L) 04/26/2021 10:32 AM    GFR est non-AA 48 (L) 04/26/2021 10:32 AM    Calcium 9.5 04/26/2021 10:32 AM    Magnesium 2.3 04/26/2021 10:32 AM    Albumin 3.9 04/26/2021 10:32 AM    Protein, total 7.4 04/26/2021 10:32 AM    Globulin 3.5 04/26/2021 10:32 AM    A-G Ratio 1.1 (L) 04/26/2021 10:32 AM    ALT (SGPT) 27 04/26/2021 10:32 AM     Lab Results   Component Value Date/Time    WBC 8.6 04/26/2021 10:32 AM    HGB 13.7 04/26/2021 10:32 AM    HCT 40.4 04/26/2021 10:32 AM    PLATELET 567 (L) 89/22/9409 10:32 AM       RADIOLOGY:    I personally reviewed the images and agree with the findings as documented in the HPI. IMPRESSION:  María Elena Mills is a 68 y.o. 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     PLAN:    1) Continue adjuvant TMZ and proceed with discussion regarding Optune therapy  2) Follow up in radiation oncology in 3 months with repeat MRI    Radha Pérez MD   May 5, 2021

## 2021-05-05 NOTE — PROGRESS NOTES
1 Month Follow Up (Parietal Lobe)    05/10/2021 - Med Onc Follow Up (CHANEL Cummings)    RT with concurrent chemo    MRI Brain (04/23/2021): residual tumor        Fernande Kussmaul, CMA

## 2021-05-10 ENCOUNTER — HOSPITAL ENCOUNTER (OUTPATIENT)
Dept: LAB | Age: 81
Discharge: HOME | End: 2021-05-10
Payer: MEDICARE

## 2021-05-10 DIAGNOSIS — C71.9 GLIOBLASTOMA (HCC): ICD-10-CM

## 2021-05-10 LAB
ALBUMIN SERPL-MCNC: 3.7 G/DL (ref 3.2–4.6)
ALBUMIN/GLOB SERPL: 1.2 {RATIO} (ref 1.2–3.5)
ALP SERPL-CCNC: 107 U/L (ref 50–136)
ALT SERPL-CCNC: 27 U/L (ref 12–65)
ANION GAP SERPL CALC-SCNC: 5 MMOL/L (ref 7–16)
AST SERPL-CCNC: 14 U/L (ref 15–37)
BASOPHILS # BLD: 0 K/UL (ref 0–0.2)
BASOPHILS NFR BLD: 0 % (ref 0–2)
BILIRUB SERPL-MCNC: 0.3 MG/DL (ref 0.2–1.1)
BUN SERPL-MCNC: 18 MG/DL (ref 8–23)
CALCIUM SERPL-MCNC: 8.6 MG/DL (ref 8.3–10.4)
CHLORIDE SERPL-SCNC: 110 MMOL/L (ref 98–107)
CO2 SERPL-SCNC: 28 MMOL/L (ref 21–32)
CREAT SERPL-MCNC: 1.4 MG/DL (ref 0.8–1.5)
DIFFERENTIAL METHOD BLD: ABNORMAL
EOSINOPHIL # BLD: 0.3 K/UL (ref 0–0.8)
EOSINOPHIL NFR BLD: 3 % (ref 0.5–7.8)
ERYTHROCYTE [DISTWIDTH] IN BLOOD BY AUTOMATED COUNT: 13.2 % (ref 11.9–14.6)
GLOBULIN SER CALC-MCNC: 3.2 G/DL (ref 2.3–3.5)
GLUCOSE SERPL-MCNC: 127 MG/DL (ref 65–100)
HCT VFR BLD AUTO: 41.4 %
HGB BLD-MCNC: 13.6 G/DL (ref 13.6–17.2)
IMM GRANULOCYTES # BLD AUTO: 0 K/UL (ref 0–0.5)
IMM GRANULOCYTES NFR BLD AUTO: 0 % (ref 0–5)
LDH SERPL L TO P-CCNC: 146 U/L (ref 110–210)
LYMPHOCYTES # BLD: 1.1 K/UL (ref 0.5–4.6)
LYMPHOCYTES NFR BLD: 14 % (ref 13–44)
MCH RBC QN AUTO: 30 PG (ref 26.1–32.9)
MCHC RBC AUTO-ENTMCNC: 32.9 G/DL (ref 31.4–35)
MCV RBC AUTO: 91.2 FL (ref 79.6–97.8)
MONOCYTES # BLD: 0.8 K/UL (ref 0.1–1.3)
MONOCYTES NFR BLD: 10 % (ref 4–12)
NEUTS SEG # BLD: 5.6 K/UL (ref 1.7–8.2)
NEUTS SEG NFR BLD: 73 % (ref 43–78)
NRBC # BLD: 0 K/UL (ref 0–0.2)
PLATELET # BLD AUTO: 142 K/UL (ref 150–450)
PMV BLD AUTO: 9.5 FL (ref 9.4–12.3)
POTASSIUM SERPL-SCNC: 3.7 MMOL/L (ref 3.5–5.1)
PROT SERPL-MCNC: 6.9 G/DL (ref 6.3–8.2)
RBC # BLD AUTO: 4.54 M/UL (ref 4.23–5.6)
SODIUM SERPL-SCNC: 143 MMOL/L (ref 136–145)
WBC # BLD AUTO: 7.7 K/UL (ref 4.3–11.1)

## 2021-05-10 PROCEDURE — 85025 COMPLETE CBC W/AUTO DIFF WBC: CPT

## 2021-05-10 PROCEDURE — 36415 COLL VENOUS BLD VENIPUNCTURE: CPT

## 2021-05-10 PROCEDURE — 80053 COMPREHEN METABOLIC PANEL: CPT

## 2021-05-10 PROCEDURE — 83615 LACTATE (LD) (LDH) ENZYME: CPT

## 2021-05-24 ENCOUNTER — HOSPITAL ENCOUNTER (OUTPATIENT)
Dept: LAB | Age: 81
Discharge: HOME | End: 2021-05-24
Payer: MEDICARE

## 2021-05-24 DIAGNOSIS — C71.9 GLIOBLASTOMA (HCC): ICD-10-CM

## 2021-05-24 LAB
ALBUMIN SERPL-MCNC: 3.9 G/DL (ref 3.2–4.6)
ALBUMIN/GLOB SERPL: 1.1 {RATIO} (ref 1.2–3.5)
ALP SERPL-CCNC: 105 U/L (ref 50–136)
ALT SERPL-CCNC: 27 U/L (ref 12–65)
ANION GAP SERPL CALC-SCNC: 8 MMOL/L (ref 7–16)
AST SERPL-CCNC: 12 U/L (ref 15–37)
BASOPHILS # BLD: 0 K/UL (ref 0–0.2)
BASOPHILS NFR BLD: 0 % (ref 0–2)
BILIRUB SERPL-MCNC: 0.4 MG/DL (ref 0.2–1.1)
BUN SERPL-MCNC: 23 MG/DL (ref 8–23)
CALCIUM SERPL-MCNC: 9.3 MG/DL (ref 8.3–10.4)
CHLORIDE SERPL-SCNC: 108 MMOL/L (ref 98–107)
CO2 SERPL-SCNC: 25 MMOL/L (ref 21–32)
CREAT SERPL-MCNC: 1.4 MG/DL (ref 0.8–1.5)
DIFFERENTIAL METHOD BLD: ABNORMAL
EOSINOPHIL # BLD: 0.2 K/UL (ref 0–0.8)
EOSINOPHIL NFR BLD: 2 % (ref 0.5–7.8)
ERYTHROCYTE [DISTWIDTH] IN BLOOD BY AUTOMATED COUNT: 13.5 % (ref 11.9–14.6)
GLOBULIN SER CALC-MCNC: 3.6 G/DL (ref 2.3–3.5)
GLUCOSE SERPL-MCNC: 158 MG/DL (ref 65–100)
HCT VFR BLD AUTO: 43.9 %
HGB BLD-MCNC: 14.1 G/DL (ref 13.6–17.2)
IMM GRANULOCYTES # BLD AUTO: 0 K/UL (ref 0–0.5)
IMM GRANULOCYTES NFR BLD AUTO: 0 % (ref 0–5)
LDH SERPL L TO P-CCNC: 147 U/L (ref 110–210)
LYMPHOCYTES # BLD: 1 K/UL (ref 0.5–4.6)
LYMPHOCYTES NFR BLD: 14 % (ref 13–44)
MCH RBC QN AUTO: 30.3 PG (ref 26.1–32.9)
MCHC RBC AUTO-ENTMCNC: 32.1 G/DL (ref 31.4–35)
MCV RBC AUTO: 94.4 FL (ref 79.6–97.8)
MONOCYTES # BLD: 0.6 K/UL (ref 0.1–1.3)
MONOCYTES NFR BLD: 9 % (ref 4–12)
NEUTS SEG # BLD: 5.3 K/UL (ref 1.7–8.2)
NEUTS SEG NFR BLD: 75 % (ref 43–78)
NRBC # BLD: 0 K/UL (ref 0–0.2)
PLATELET # BLD AUTO: 137 K/UL (ref 150–450)
PMV BLD AUTO: 10.1 FL (ref 9.4–12.3)
POTASSIUM SERPL-SCNC: 4.1 MMOL/L (ref 3.5–5.1)
PROT SERPL-MCNC: 7.5 G/DL (ref 6.3–8.2)
RBC # BLD AUTO: 4.65 M/UL (ref 4.23–5.6)
SODIUM SERPL-SCNC: 141 MMOL/L (ref 136–145)
WBC # BLD AUTO: 7.1 K/UL (ref 4.3–11.1)

## 2021-05-24 PROCEDURE — 80053 COMPREHEN METABOLIC PANEL: CPT

## 2021-05-24 PROCEDURE — 83615 LACTATE (LD) (LDH) ENZYME: CPT

## 2021-05-24 PROCEDURE — 85025 COMPLETE CBC W/AUTO DIFF WBC: CPT

## 2021-05-24 PROCEDURE — 36415 COLL VENOUS BLD VENIPUNCTURE: CPT

## 2021-06-21 ENCOUNTER — HOSPITAL ENCOUNTER (OUTPATIENT)
Dept: LAB | Age: 81
Discharge: HOME | End: 2021-06-21
Payer: MEDICARE

## 2021-06-21 DIAGNOSIS — C71.9 GLIOBLASTOMA (HCC): ICD-10-CM

## 2021-06-21 DIAGNOSIS — C71.9 GBM (GLIOBLASTOMA MULTIFORME) (HCC): ICD-10-CM

## 2021-06-21 LAB
ALBUMIN SERPL-MCNC: 3.8 G/DL (ref 3.2–4.6)
ALBUMIN/GLOB SERPL: 1.2 {RATIO} (ref 1.2–3.5)
ALP SERPL-CCNC: 89 U/L (ref 50–136)
ALT SERPL-CCNC: 22 U/L (ref 12–65)
ANION GAP SERPL CALC-SCNC: 4 MMOL/L (ref 7–16)
AST SERPL-CCNC: 15 U/L (ref 15–37)
BASOPHILS # BLD: 0 K/UL (ref 0–0.2)
BASOPHILS NFR BLD: 0 % (ref 0–2)
BILIRUB SERPL-MCNC: 0.7 MG/DL (ref 0.2–1.1)
BUN SERPL-MCNC: 21 MG/DL (ref 8–23)
CALCIUM SERPL-MCNC: 8.9 MG/DL (ref 8.3–10.4)
CHLORIDE SERPL-SCNC: 110 MMOL/L (ref 98–107)
CO2 SERPL-SCNC: 28 MMOL/L (ref 21–32)
CREAT SERPL-MCNC: 1.4 MG/DL (ref 0.8–1.5)
DIFFERENTIAL METHOD BLD: ABNORMAL
EOSINOPHIL # BLD: 0.2 K/UL (ref 0–0.8)
EOSINOPHIL NFR BLD: 3 % (ref 0.5–7.8)
ERYTHROCYTE [DISTWIDTH] IN BLOOD BY AUTOMATED COUNT: 14.2 % (ref 11.9–14.6)
GLOBULIN SER CALC-MCNC: 3.3 G/DL (ref 2.3–3.5)
GLUCOSE SERPL-MCNC: 94 MG/DL (ref 65–100)
HCT VFR BLD AUTO: 37.4 %
HGB BLD-MCNC: 12.9 G/DL (ref 13.6–17.2)
IMM GRANULOCYTES # BLD AUTO: 0 K/UL (ref 0–0.5)
IMM GRANULOCYTES NFR BLD AUTO: 0 % (ref 0–5)
LDH SERPL L TO P-CCNC: 119 U/L (ref 110–210)
LYMPHOCYTES # BLD: 1.3 K/UL (ref 0.5–4.6)
LYMPHOCYTES NFR BLD: 17 % (ref 13–44)
MCH RBC QN AUTO: 30.5 PG (ref 26.1–32.9)
MCHC RBC AUTO-ENTMCNC: 34.5 G/DL (ref 31.4–35)
MCV RBC AUTO: 88.4 FL (ref 79.6–97.8)
MONOCYTES # BLD: 0.7 K/UL (ref 0.1–1.3)
MONOCYTES NFR BLD: 10 % (ref 4–12)
NEUTS SEG # BLD: 5.3 K/UL (ref 1.7–8.2)
NEUTS SEG NFR BLD: 70 % (ref 43–78)
NRBC # BLD: 0 K/UL (ref 0–0.2)
PLATELET # BLD AUTO: 142 K/UL (ref 150–450)
PMV BLD AUTO: 9.7 FL (ref 9.4–12.3)
POTASSIUM SERPL-SCNC: 4 MMOL/L (ref 3.5–5.1)
PROT SERPL-MCNC: 7.1 G/DL (ref 6.3–8.2)
RBC # BLD AUTO: 4.23 M/UL (ref 4.23–5.6)
SODIUM SERPL-SCNC: 142 MMOL/L (ref 136–145)
WBC # BLD AUTO: 7.6 K/UL (ref 4.3–11.1)

## 2021-06-21 PROCEDURE — 85025 COMPLETE CBC W/AUTO DIFF WBC: CPT

## 2021-06-21 PROCEDURE — 80053 COMPREHEN METABOLIC PANEL: CPT

## 2021-06-21 PROCEDURE — 36415 COLL VENOUS BLD VENIPUNCTURE: CPT

## 2021-06-21 PROCEDURE — 83615 LACTATE (LD) (LDH) ENZYME: CPT

## 2021-07-20 ENCOUNTER — HOSPITAL ENCOUNTER (OUTPATIENT)
Dept: LAB | Age: 81
Discharge: HOME | End: 2021-07-20
Payer: MEDICARE

## 2021-07-20 DIAGNOSIS — C71.9 GLIOBLASTOMA (HCC): ICD-10-CM

## 2021-07-20 LAB
ALBUMIN SERPL-MCNC: 4 G/DL (ref 3.2–4.6)
ALBUMIN/GLOB SERPL: 1.3 {RATIO} (ref 1.2–3.5)
ALP SERPL-CCNC: 102 U/L (ref 50–136)
ALT SERPL-CCNC: 23 U/L (ref 12–65)
ANION GAP SERPL CALC-SCNC: 7 MMOL/L (ref 7–16)
AST SERPL-CCNC: 15 U/L (ref 15–37)
BASOPHILS # BLD: 0 K/UL (ref 0–0.2)
BASOPHILS NFR BLD: 0 % (ref 0–2)
BILIRUB SERPL-MCNC: 0.6 MG/DL (ref 0.2–1.1)
BUN SERPL-MCNC: 24 MG/DL (ref 8–23)
CALCIUM SERPL-MCNC: 9 MG/DL (ref 8.3–10.4)
CHLORIDE SERPL-SCNC: 108 MMOL/L (ref 98–107)
CO2 SERPL-SCNC: 27 MMOL/L (ref 21–32)
CREAT SERPL-MCNC: 1.4 MG/DL (ref 0.8–1.5)
DIFFERENTIAL METHOD BLD: ABNORMAL
EOSINOPHIL # BLD: 0.1 K/UL (ref 0–0.8)
EOSINOPHIL NFR BLD: 2 % (ref 0.5–7.8)
ERYTHROCYTE [DISTWIDTH] IN BLOOD BY AUTOMATED COUNT: 15.7 % (ref 11.9–14.6)
GLOBULIN SER CALC-MCNC: 3.2 G/DL (ref 2.3–3.5)
GLUCOSE SERPL-MCNC: 120 MG/DL (ref 65–100)
HCT VFR BLD AUTO: 34.7 %
HGB BLD-MCNC: 11.8 G/DL (ref 13.6–17.2)
IMM GRANULOCYTES # BLD AUTO: 0 K/UL (ref 0–0.5)
IMM GRANULOCYTES NFR BLD AUTO: 0 % (ref 0–5)
LDH SERPL L TO P-CCNC: 122 U/L (ref 110–210)
LYMPHOCYTES # BLD: 0.6 K/UL (ref 0.5–4.6)
LYMPHOCYTES NFR BLD: 13 % (ref 13–44)
MCH RBC QN AUTO: 32 PG (ref 26.1–32.9)
MCHC RBC AUTO-ENTMCNC: 34 G/DL (ref 31.4–35)
MCV RBC AUTO: 94 FL (ref 79.6–97.8)
MONOCYTES # BLD: 0.4 K/UL (ref 0.1–1.3)
MONOCYTES NFR BLD: 8 % (ref 4–12)
NEUTS SEG # BLD: 3.7 K/UL (ref 1.7–8.2)
NEUTS SEG NFR BLD: 77 % (ref 43–78)
NRBC # BLD: 0 K/UL (ref 0–0.2)
PLATELET # BLD AUTO: 105 K/UL (ref 150–450)
PMV BLD AUTO: 9.8 FL (ref 9.4–12.3)
POTASSIUM SERPL-SCNC: 4 MMOL/L (ref 3.5–5.1)
PROT SERPL-MCNC: 7.2 G/DL (ref 6.3–8.2)
RBC # BLD AUTO: 3.69 M/UL (ref 4.23–5.6)
SODIUM SERPL-SCNC: 142 MMOL/L (ref 136–145)
WBC # BLD AUTO: 4.8 K/UL (ref 4.3–11.1)

## 2021-07-20 PROCEDURE — 36415 COLL VENOUS BLD VENIPUNCTURE: CPT

## 2021-07-20 PROCEDURE — 80053 COMPREHEN METABOLIC PANEL: CPT

## 2021-07-20 PROCEDURE — 85025 COMPLETE CBC W/AUTO DIFF WBC: CPT

## 2021-07-20 PROCEDURE — 83615 LACTATE (LD) (LDH) ENZYME: CPT

## 2021-07-29 PROBLEM — G40.89 OTHER SEIZURES (HCC): Status: ACTIVE | Noted: 2021-07-29

## 2021-07-29 PROBLEM — R56.9 UNSPECIFIED CONVULSIONS (HCC): Status: ACTIVE | Noted: 2021-07-29

## 2021-08-03 PROBLEM — I10 HTN (HYPERTENSION), BENIGN: Status: RESOLVED | Noted: 2017-02-15 | Resolved: 2021-08-03

## 2021-08-05 ENCOUNTER — HOSPITAL ENCOUNTER (OUTPATIENT)
Dept: MRI IMAGING | Age: 81
Discharge: HOME | End: 2021-08-05
Attending: RADIOLOGY
Payer: MEDICARE

## 2021-08-05 DIAGNOSIS — C71.9 GBM (GLIOBLASTOMA MULTIFORME) (HCC): ICD-10-CM

## 2021-08-05 PROCEDURE — A9576 INJ PROHANCE MULTIPACK: HCPCS | Performed by: RADIOLOGY

## 2021-08-05 PROCEDURE — 70553 MRI BRAIN STEM W/O & W/DYE: CPT

## 2021-08-05 PROCEDURE — 74011636320 HC RX REV CODE- 636/320: Performed by: RADIOLOGY

## 2021-08-05 RX ORDER — SODIUM CHLORIDE 0.9 % (FLUSH) 0.9 %
10 SYRINGE (ML) INJECTION
Status: COMPLETED | OUTPATIENT
Start: 2021-08-05 | End: 2021-08-05

## 2021-08-05 RX ADMIN — GADOTERIDOL 17 ML: 279.3 INJECTION, SOLUTION INTRAVENOUS at 11:09

## 2021-08-05 RX ADMIN — Medication 10 ML: at 11:09

## 2021-08-09 ENCOUNTER — HOSPITAL ENCOUNTER (OUTPATIENT)
Dept: RADIATION ONCOLOGY | Age: 81
Discharge: HOME | End: 2021-08-09
Payer: MEDICARE

## 2021-08-09 VITALS
OXYGEN SATURATION: 96 % | HEART RATE: 81 BPM | SYSTOLIC BLOOD PRESSURE: 114 MMHG | TEMPERATURE: 98.6 F | WEIGHT: 185.8 LBS | BODY MASS INDEX: 28.25 KG/M2 | DIASTOLIC BLOOD PRESSURE: 68 MMHG

## 2021-08-09 DIAGNOSIS — C71.9 GLIOBLASTOMA (HCC): Primary | ICD-10-CM

## 2021-08-09 PROCEDURE — 99211 OFF/OP EST MAY X REQ PHY/QHP: CPT

## 2021-08-09 NOTE — NURSE NAVIGATOR
Follow up GBM. RT end 3-26-21. S/p Temodar/RT. MRI brain 8-5-21. Taking maintenance Temodar with Optune. Pt denies pain. Tolerating Optune device well. Keppra 500mg bid. Not taking Decadron. MRI brain and f/u in 3 months.      Davidson Evans RN

## 2021-08-13 NOTE — PROGRESS NOTES
Patient: Peyton Cottrell MRN: [id number]  SSN: [id number]    YOB: 1940  Age: 68 y.o. Sex: male      Other Providers:  Dr. De La Torre Close: Seizure    DIAGNOSIS: GBM    PREVIOUS RADIATION TREATMENT:  1) 4005cGy to the right parietal lobe, completed 3/26/2021    HISTORY OF PRESENT ILLNESS:  Peyton Cottrell is a 68 y.o. male who I am seeing at the request of Dr. Arleen Shook. Mr. Veronica Stafford was in his usual state of health until 12/25/2020 at which time he was found unresponsive in the bathroom by his wife. A head CT was performed which demonstrated a right parietal hemorrhage with surrounding vasogenic edema. This was followed by an MRI brain which confirmed a 3.7 x 3.3 x 3.2 cm heterogeneously enhancing mass within the temporoparietal region concerning for metastatic versus primary disease. A CT of the chest/ abdomen/ pelvis were performed 12/27/2020 which showed no evidence of metastatic or primary disease. Mr. Veronica Stafford subsequently underwent right craniotomy with placement of BCNU wafers on 12/31/2021 by Dr. Bj Nix. This confirmed glioblastoma, WHO grade IV. A postoperative CT scan 1/1/2021 demonstrated interval right parietal craniotomy and right parietal mass resection with mild enhancement along the anterior margin of the resection cavity which may relate to residual tumor or postoperative change. He had a second seizure on 1/23/21 for which he was re-started on Keppra. He has been able to wean off all steroid medications. He was treated with concurrent chemoradiation and tolerated this uneventfully. INTERVAL HISTORY:  Mr. Veronica Stafford has done well over the past month. He reports continuing to take multiple naps daily but feels that his strength and balance have improved. He denies any new seizures and new numbness, tingling, or weakness. A repeat MRI was performed 4/23/2021 which demonstrated postradiation change and nonprogressive residual tumor at the right parietal resection site. He has started adjuvant temozolomide with Dr. Arleen Shook, which he reports he has tolerated well with the addition of Zofran for nausea. He will be meeting with an [address] [place] [address] [facility] to discuss tumor treating field therapy. 8/9/21: Mr. Veronica Stafford now presents approximately 5 months after completion of radiation therapy. He denies any new symptoms and has been tolerating TMZ and Optune treatments extremely well. He underwent a repeat MRI brain 8/5/21 which demonstrated an overall improvement in the lesion with decreased enhancement and surrounding edema. PAST MEDICAL HISTORY:    Past Medical History:   Diagnosis Date    Anxiety disorder 12/9/2014    Arthritis 12/9/2014    Depression 12/9/2014    Gli[address] ([facility] [address] 12/26/2020    Gout 12/9/2014    History of intracranial hemorrhage 12/25/2020    History of lumbar laminectomy for spinal cord decompression 2/18/2019    History of seizure 12/28/2020    HTN (hypertension), benign 2/15/2017    Hyperlipemia 12/9/2014    Hypertension     Idiopathic chronic gout of right ankle 12/9/2014    Insomnia 12/9/2014    Kidney stone     Mixed hyperlipidemia 12/9/2014    Panic disorder 12/9/2014    Primary insomnia 12/9/2014    Rosacea 2/15/2017     PAST SURGICAL HISTORY:   Past Surgical History:   Procedure Laterality Date    HX ANKLE FRACTURE TX Left 6/1974    HX COLONOSCOPY  2007    HX CYST REMOVAL      HX CYST REMOVAL      pylonidal cyst    HX CYST REMOVAL      HX HERNIA REPAIR Bilateral     HX OTHER SURGICAL      wrist    HX WRIST FRACTURE TX Bilateral 6/1993    wrist plate/pin       MEDICATIONS:     Current Outpatient Medications:     tamsulosin (FLOMAX) 0.4 mg capsule, TAKE 1 CAP BY MOUTH DAILY. PCP WILL CONTINUE TO PRESCRIBE THIS MEDICATION.  INDICATIONS: FOR SLOW OR DECREASED URINE FLOW, Disp: 30 Capsule, Rfl: 1    levETIRAcetam (Keppra) 500 mg tablet, Take 1 Tablet by mouth two (2) times a day., Disp: 180 Tablet, Rfl: 3   trimethoprim-sulfamethoxazole (BACTRIM DS, SEPTRA DS) 160-800 mg per tablet, Take on Monday, Wednesday, Friday for 2 weeks starting each temodar cycle, Disp: 6 Tablet, Rfl: 4    atorvastatin (LIPITOR) 40 mg tablet, TAKE 1 TABLET BY MOUTH EVERY DAY, Disp: 90 Tab, Rfl: 1    sertraline (ZOLOFT) 100 mg tablet, Take 1.5 Tabs by mouth daily. One and half tab every day, Disp: 135 Tab, Rfl: 1    olmesartan (BENICAR) 40 mg tablet, Take 1 Tab by mouth daily. , Disp: 90 Tab, Rfl: 1    ondansetron hcl (ZOFRAN) 8 mg tablet, Take 1 Tab by mouth every eight (8) hours as needed for Nausea or Vomiting., Disp: 90 Tab, Rfl: 1    potassium chloride SR (KLOR-CON 10) 10 mEq tablet, TAKE 1 TABLET BY MOUTH EVERY DAY, Disp: 90 Tab, Rfl: 3    esomeprazole (NexIUM) 40 mg capsule, Take  by mouth daily. , Disp: , Rfl:     furosemide (LASIX) 20 mg tablet, Take 1 Tab by mouth daily. , Disp: 90 Tab, Rfl: 1    cholecalciferol (Vitamin D3) (1000 Units /25 mcg) tablet, Take 1,000 Units by mouth daily. , Disp: , Rfl:     lysine (L-LYSINE) 500 mg tab tablet, Take 500 mg by mouth daily. , Disp: , Rfl:     polyethylene glycol (MIRALAX) 17 gram packet, Take 1 Packet by mouth daily as needed for Constipation. (available over the counter) (Patient taking differently: Take 1 Packet by mouth daily as needed for Constipation. mix with 8oz water), Disp: 1 Each, Rfl: prn    senna-docusate (PERICOLACE) 8.6-50 mg per tablet, Take 1 Tab by mouth two (2) times a day. (available over the counter)  Indications: constipation, Disp: 60 Tab, Rfl: prn    multivitamin (ONE A DAY) tablet, Take 1 Tab by mouth daily. , Disp: , Rfl:     ALLERGIES:   No Known Allergies    SOCIAL HISTORY:   Social History     Socioeconomic History    Marital status:      Spouse name: Not on file    Number of children: Not on file    Years of education: Not on file    Highest education level: Not on file   Occupational History    Not on file   Tobacco Use    Smoking status: Former Smoker     Types: Cigarettes     Quit date: 1970     Years since quittin.6    Smokeless tobacco: Never Used   Vaping Use    Vaping Use: Never used   Substance and Sexual Activity    Alcohol use: Not Currently    Drug use: No    Sexual activity: Yes     Partners: Female   Other Topics Concern    Not on file   Social History Narrative    Not on file     Social Determinants of Health     Financial Resource Strain:     Difficulty of Paying Living Expenses:    Food Insecurity:     Worried About Running Out of Food in the Last Year:     [address] in the Last Year:    Transportation Needs:     Lack of Transportation (Medical):  Lack of Transportation (Non-Medical):    Physical Activity:     Days of Exercise per Week:     Minutes of Exercise per Session:    Stress:     Feeling of Stress :    Social Connections:     Frequency of Communication with Friends and Family:     Frequency of Social Gatherings with Friends and Family:     Attends Worship Services:     Active Member of Clubs or Organizations:     Attends Club or Organization Meetings:     Marital Status:    Intimate Partner Violence:     Fear of Current or Ex-Partner:     Emotionally Abused:     Physically Abused:     Sexually Abused:        FAMILY HISTORY:   Family History   Problem Relation Age of Onset    Cancer Mother         lung ca    Hypertension Mother     Heart Disease Mother     Arthritis-osteo Father     Cancer Brother         stomach       REVIEW OF SYSTEMS:   A full 12-point review of systems was completed and was negative unless noted in the history of present illness.     PHYSICAL EXAMINATION:   ECOG Performance status 1  VITAL SIGNS:   Visit Vitals  /68 (BP 1 Location: Left upper arm, BP Patient Position: Sitting)   Pulse 81   Temp 98.6 °F (37 °C)   Wt 84.3 kg (185 lb 12.8 oz)   SpO2 96%   BMI 28.25 kg/m²      General: well developed/nourished adult Male in no acute distress; appears stated age  [de-identified]: normocephalic, atraumatic; EOMI; well healed right-sided craniotomy incision with alopecia  Neck: supple with full ROM  Respiratory: normal inspiratory effort, no audible wheezes  Extremities: no cyanosis, clubbing, or edema  Musculoskeletal: mobility intact x4; normal ROM in all joints  Skin: no skin lesions identified  Neuro: resting tremor in the left hand; AOx3; sensation intact x 4; CNII-XII grossly intact  Psych: appropriate affect, insight, and judgement  GI: abdomen soft, non-distended    PATHOLOGY:    No interval pathology. LABORATORY:   Lab Results   Component Value Date/Time    Sodium 142 07/20/2021 02:13 PM    Potassium 4.0 07/20/2021 02:13 PM    Chloride 108 (H) 07/20/2021 02:13 PM    CO2 27 07/20/2021 02:13 PM    Anion gap 7 07/20/2021 02:13 PM    Glucose 120 (H) 07/20/2021 02:13 PM    BUN 24 (H) 07/20/2021 02:13 PM    Creatinine 1.40 07/20/2021 02:13 PM    GFR est AA >60 07/20/2021 02:13 PM    GFR est non-AA 52 (L) 07/20/2021 02:13 PM    Calcium 9.0 07/20/2021 02:13 PM    Magnesium 2.3 04/26/2021 10:32 AM    Albumin 4.0 07/20/2021 02:13 PM    Protein, total 7.2 07/20/2021 02:13 PM    Globulin 3.2 07/20/2021 02:13 PM    A-G Ratio 1.3 07/20/2021 02:13 PM    ALT (SGPT) 23 07/20/2021 02:13 PM     Lab Results   Component Value Date/Time    WBC 4.8 07/20/2021 02:13 PM    HGB 11.8 (L) 07/20/2021 02:13 PM    HCT 34.7 07/20/2021 02:13 PM    PLATELET 802 (L) 44/73/4690 02:13 PM       RADIOLOGY:    I personally reviewed the MRI brain 8/5/21 and agree with the findings as per HPI. IMPRESSION:  Amanda Robles is a 68 y.o. 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     PLAN:    1) Continue adjuvant TMZ and Optune  2) Follow up in 3 months with repeat MRI brain or sooner as needed    Ryan Hanks MD   August 13, 2021

## 2021-08-17 ENCOUNTER — HOSPITAL ENCOUNTER (OUTPATIENT)
Dept: LAB | Age: 81
Discharge: HOME | End: 2021-08-17
Payer: MEDICARE

## 2021-08-17 DIAGNOSIS — C71.9 GBM (GLIOBLASTOMA MULTIFORME) (HCC): ICD-10-CM

## 2021-08-17 DIAGNOSIS — Z13.6 ENCOUNTER FOR SCREENING FOR CARDIOVASCULAR DISORDERS: ICD-10-CM

## 2021-08-17 LAB
ALBUMIN SERPL-MCNC: 3.6 G/DL (ref 3.2–4.6)
ALBUMIN/GLOB SERPL: 1 {RATIO} (ref 1.2–3.5)
ALP SERPL-CCNC: 90 U/L (ref 50–136)
ALT SERPL-CCNC: 23 U/L (ref 12–65)
ANION GAP SERPL CALC-SCNC: 7 MMOL/L (ref 7–16)
AST SERPL-CCNC: 14 U/L (ref 15–37)
BASOPHILS # BLD: 0 K/UL (ref 0–0.2)
BASOPHILS NFR BLD: 0 % (ref 0–2)
BILIRUB SERPL-MCNC: 0.5 MG/DL (ref 0.2–1.1)
BUN SERPL-MCNC: 23 MG/DL (ref 8–23)
CALCIUM SERPL-MCNC: 8.7 MG/DL (ref 8.3–10.4)
CHLORIDE SERPL-SCNC: 111 MMOL/L (ref 98–107)
CHOLEST SERPL-MCNC: 108 MG/DL
CO2 SERPL-SCNC: 24 MMOL/L (ref 21–32)
CREAT SERPL-MCNC: 1.3 MG/DL (ref 0.8–1.5)
DIFFERENTIAL METHOD BLD: ABNORMAL
EOSINOPHIL # BLD: 0.1 K/UL (ref 0–0.8)
EOSINOPHIL NFR BLD: 2 % (ref 0.5–7.8)
ERYTHROCYTE [DISTWIDTH] IN BLOOD BY AUTOMATED COUNT: 14.8 % (ref 11.9–14.6)
GLOBULIN SER CALC-MCNC: 3.6 G/DL (ref 2.3–3.5)
GLUCOSE SERPL-MCNC: 98 MG/DL (ref 65–100)
HCT VFR BLD AUTO: 33.2 %
HDLC SERPL-MCNC: 34 MG/DL (ref 40–60)
HDLC SERPL: 3.2 {RATIO}
HGB BLD-MCNC: 11.6 G/DL (ref 13.6–17.2)
IMM GRANULOCYTES # BLD AUTO: 0.1 K/UL (ref 0–0.5)
IMM GRANULOCYTES NFR BLD AUTO: 1 % (ref 0–5)
LDH SERPL L TO P-CCNC: 120 U/L (ref 110–210)
LDLC SERPL CALC-MCNC: 49.4 MG/DL
LYMPHOCYTES # BLD: 0.8 K/UL (ref 0.5–4.6)
LYMPHOCYTES NFR BLD: 11 % (ref 13–44)
MCH RBC QN AUTO: 33.2 PG (ref 26.1–32.9)
MCHC RBC AUTO-ENTMCNC: 34.9 G/DL (ref 31.4–35)
MCV RBC AUTO: 95.1 FL (ref 79.6–97.8)
MONOCYTES # BLD: 0.7 K/UL (ref 0.1–1.3)
MONOCYTES NFR BLD: 10 % (ref 4–12)
NEUTS SEG # BLD: 5.3 K/UL (ref 1.7–8.2)
NEUTS SEG NFR BLD: 76 % (ref 43–78)
NRBC # BLD: 0 K/UL (ref 0–0.2)
PLATELET # BLD AUTO: 109 K/UL (ref 150–450)
PMV BLD AUTO: 9.7 FL (ref 9.4–12.3)
POTASSIUM SERPL-SCNC: 3.7 MMOL/L (ref 3.5–5.1)
PROT SERPL-MCNC: 7.2 G/DL (ref 6.3–8.2)
RBC # BLD AUTO: 3.49 M/UL (ref 4.23–5.6)
SODIUM SERPL-SCNC: 142 MMOL/L (ref 136–145)
TRIGL SERPL-MCNC: 123 MG/DL (ref 35–150)
VLDLC SERPL CALC-MCNC: 24.6 MG/DL (ref 6–23)
WBC # BLD AUTO: 6.9 K/UL (ref 4.3–11.1)

## 2021-08-17 PROCEDURE — 83615 LACTATE (LD) (LDH) ENZYME: CPT

## 2021-08-17 PROCEDURE — 85025 COMPLETE CBC W/AUTO DIFF WBC: CPT

## 2021-08-17 PROCEDURE — 80053 COMPREHEN METABOLIC PANEL: CPT

## 2021-08-17 PROCEDURE — 36415 COLL VENOUS BLD VENIPUNCTURE: CPT

## 2021-08-17 PROCEDURE — 80061 LIPID PANEL: CPT

## 2021-09-14 ENCOUNTER — HOSPITAL ENCOUNTER (OUTPATIENT)
Dept: LAB | Age: 81
Discharge: HOME | End: 2021-09-14
Payer: MEDICARE

## 2021-09-14 DIAGNOSIS — C71.9 GBM (GLIOBLASTOMA MULTIFORME) (HCC): ICD-10-CM

## 2021-09-14 LAB
ALBUMIN SERPL-MCNC: 3.7 G/DL (ref 3.2–4.6)
ALBUMIN/GLOB SERPL: 1 {RATIO} (ref 1.2–3.5)
ALP SERPL-CCNC: 97 U/L (ref 50–136)
ALT SERPL-CCNC: 22 U/L (ref 12–65)
ANION GAP SERPL CALC-SCNC: 5 MMOL/L (ref 7–16)
AST SERPL-CCNC: 14 U/L (ref 15–37)
BASOPHILS # BLD: 0 K/UL (ref 0–0.2)
BASOPHILS NFR BLD: 0 % (ref 0–2)
BILIRUB SERPL-MCNC: 0.5 MG/DL (ref 0.2–1.1)
BUN SERPL-MCNC: 22 MG/DL (ref 8–23)
CALCIUM SERPL-MCNC: 9 MG/DL (ref 8.3–10.4)
CHLORIDE SERPL-SCNC: 112 MMOL/L (ref 98–107)
CO2 SERPL-SCNC: 28 MMOL/L (ref 21–32)
CREAT SERPL-MCNC: 1.5 MG/DL (ref 0.8–1.5)
DIFFERENTIAL METHOD BLD: ABNORMAL
EOSINOPHIL # BLD: 0.2 K/UL (ref 0–0.8)
EOSINOPHIL NFR BLD: 3 % (ref 0.5–7.8)
ERYTHROCYTE [DISTWIDTH] IN BLOOD BY AUTOMATED COUNT: 13.9 % (ref 11.9–14.6)
GLOBULIN SER CALC-MCNC: 3.6 G/DL (ref 2.3–3.5)
GLUCOSE SERPL-MCNC: 116 MG/DL (ref 65–100)
HCT VFR BLD AUTO: 34.1 %
HGB BLD-MCNC: 11.7 G/DL (ref 13.6–17.2)
IMM GRANULOCYTES # BLD AUTO: 0 K/UL (ref 0–0.5)
IMM GRANULOCYTES NFR BLD AUTO: 0 % (ref 0–5)
LDH SERPL L TO P-CCNC: 142 U/L (ref 110–210)
LYMPHOCYTES # BLD: 0.8 K/UL (ref 0.5–4.6)
LYMPHOCYTES NFR BLD: 14 % (ref 13–44)
MCH RBC QN AUTO: 33.2 PG (ref 26.1–32.9)
MCHC RBC AUTO-ENTMCNC: 34.3 G/DL (ref 31.4–35)
MCV RBC AUTO: 96.9 FL (ref 79.6–97.8)
MONOCYTES # BLD: 0.5 K/UL (ref 0.1–1.3)
MONOCYTES NFR BLD: 9 % (ref 4–12)
NEUTS SEG # BLD: 4.2 K/UL (ref 1.7–8.2)
NEUTS SEG NFR BLD: 74 % (ref 43–78)
NRBC # BLD: 0 K/UL (ref 0–0.2)
PLATELET # BLD AUTO: 71 K/UL (ref 150–450)
PMV BLD AUTO: 10.5 FL (ref 9.4–12.3)
POTASSIUM SERPL-SCNC: 4.1 MMOL/L (ref 3.5–5.1)
PROT SERPL-MCNC: 7.3 G/DL (ref 6.3–8.2)
RBC # BLD AUTO: 3.52 M/UL (ref 4.23–5.6)
SODIUM SERPL-SCNC: 145 MMOL/L (ref 136–145)
WBC # BLD AUTO: 5.7 K/UL (ref 4.3–11.1)

## 2021-09-14 PROCEDURE — 83615 LACTATE (LD) (LDH) ENZYME: CPT

## 2021-09-14 PROCEDURE — 80053 COMPREHEN METABOLIC PANEL: CPT

## 2021-09-14 PROCEDURE — 36415 COLL VENOUS BLD VENIPUNCTURE: CPT

## 2021-09-14 PROCEDURE — 85025 COMPLETE CBC W/AUTO DIFF WBC: CPT

## 2021-09-21 ENCOUNTER — HOSPITAL ENCOUNTER (OUTPATIENT)
Dept: LAB | Age: 81
Discharge: HOME | End: 2021-09-21
Payer: MEDICARE

## 2021-09-21 DIAGNOSIS — C71.9 GBM (GLIOBLASTOMA MULTIFORME) (HCC): ICD-10-CM

## 2021-09-21 LAB
BASOPHILS # BLD: 0 K/UL (ref 0–0.2)
BASOPHILS NFR BLD: 0 % (ref 0–2)
DIFFERENTIAL METHOD BLD: ABNORMAL
EOSINOPHIL # BLD: 0 K/UL (ref 0–0.8)
EOSINOPHIL NFR BLD: 1 % (ref 0.5–7.8)
ERYTHROCYTE [DISTWIDTH] IN BLOOD BY AUTOMATED COUNT: 13.6 % (ref 11.9–14.6)
HCT VFR BLD AUTO: 33.5 %
HGB BLD-MCNC: 11.6 G/DL (ref 13.6–17.2)
IMM GRANULOCYTES # BLD AUTO: 0 K/UL (ref 0–0.5)
IMM GRANULOCYTES NFR BLD AUTO: 0 % (ref 0–5)
LYMPHOCYTES # BLD: 0.6 K/UL (ref 0.5–4.6)
LYMPHOCYTES NFR BLD: 12 % (ref 13–44)
MCH RBC QN AUTO: 33.5 PG (ref 26.1–32.9)
MCHC RBC AUTO-ENTMCNC: 34.6 G/DL (ref 31.4–35)
MCV RBC AUTO: 96.8 FL (ref 79.6–97.8)
MONOCYTES # BLD: 0.5 K/UL (ref 0.1–1.3)
MONOCYTES NFR BLD: 9 % (ref 4–12)
NEUTS SEG # BLD: 4.3 K/UL (ref 1.7–8.2)
NEUTS SEG NFR BLD: 78 % (ref 43–78)
NRBC # BLD: 0 K/UL (ref 0–0.2)
PLATELET # BLD AUTO: 133 K/UL (ref 150–450)
PMV BLD AUTO: 9.3 FL (ref 9.4–12.3)
RBC # BLD AUTO: 3.46 M/UL (ref 4.23–5.6)
WBC # BLD AUTO: 5.5 K/UL (ref 4.3–11.1)

## 2021-09-21 PROCEDURE — 36415 COLL VENOUS BLD VENIPUNCTURE: CPT

## 2021-09-21 PROCEDURE — 85025 COMPLETE CBC W/AUTO DIFF WBC: CPT

## 2021-11-17 ENCOUNTER — HOSPITAL ENCOUNTER (OUTPATIENT)
Dept: MRI IMAGING | Age: 81
Discharge: HOME | End: 2021-11-17
Attending: RADIOLOGY
Payer: MEDICARE

## 2021-11-17 DIAGNOSIS — C71.9 GLIOBLASTOMA (HCC): ICD-10-CM

## 2021-11-17 PROCEDURE — A9576 INJ PROHANCE MULTIPACK: HCPCS | Performed by: RADIOLOGY

## 2021-11-17 PROCEDURE — 74011250636 HC RX REV CODE- 250/636: Performed by: RADIOLOGY

## 2021-11-17 PROCEDURE — 70553 MRI BRAIN STEM W/O & W/DYE: CPT

## 2021-11-17 RX ORDER — SODIUM CHLORIDE 0.9 % (FLUSH) 0.9 %
10 SYRINGE (ML) INJECTION
Status: COMPLETED | OUTPATIENT
Start: 2021-11-17 | End: 2021-11-17

## 2021-11-17 RX ADMIN — Medication 10 ML: at 11:40

## 2021-11-17 RX ADMIN — GADOTERIDOL 17 ML: 279.3 INJECTION, SOLUTION INTRAVENOUS at 11:40

## 2021-11-22 ENCOUNTER — HOSPITAL ENCOUNTER (OUTPATIENT)
Dept: LAB | Age: 81
Discharge: HOME | End: 2021-11-22
Payer: MEDICARE

## 2021-11-22 DIAGNOSIS — C71.9 GBM (GLIOBLASTOMA MULTIFORME) (HCC): ICD-10-CM

## 2021-11-22 LAB
ALBUMIN SERPL-MCNC: 3.6 G/DL (ref 3.2–4.6)
ALBUMIN/GLOB SERPL: 0.8 {RATIO} (ref 1.2–3.5)
ALP SERPL-CCNC: 99 U/L (ref 50–136)
ALT SERPL-CCNC: 28 U/L (ref 12–65)
ANION GAP SERPL CALC-SCNC: 6 MMOL/L (ref 7–16)
AST SERPL-CCNC: 15 U/L (ref 15–37)
BASOPHILS # BLD: 0 K/UL (ref 0–0.2)
BASOPHILS NFR BLD: 0 % (ref 0–2)
BILIRUB SERPL-MCNC: 0.6 MG/DL (ref 0.2–1.1)
BUN SERPL-MCNC: 20 MG/DL (ref 8–23)
CALCIUM SERPL-MCNC: 9.4 MG/DL (ref 8.3–10.4)
CHLORIDE SERPL-SCNC: 106 MMOL/L (ref 98–107)
CO2 SERPL-SCNC: 28 MMOL/L (ref 21–32)
CREAT SERPL-MCNC: 1.4 MG/DL (ref 0.8–1.5)
DIFFERENTIAL METHOD BLD: ABNORMAL
EOSINOPHIL # BLD: 0.1 K/UL (ref 0–0.8)
EOSINOPHIL NFR BLD: 1 % (ref 0.5–7.8)
ERYTHROCYTE [DISTWIDTH] IN BLOOD BY AUTOMATED COUNT: 12.4 % (ref 11.9–14.6)
GLOBULIN SER CALC-MCNC: 4.5 G/DL (ref 2.3–3.5)
GLUCOSE SERPL-MCNC: 108 MG/DL (ref 65–100)
HCT VFR BLD AUTO: 36.4 %
HGB BLD-MCNC: 12.2 G/DL (ref 13.6–17.2)
IMM GRANULOCYTES # BLD AUTO: 0 K/UL (ref 0–0.5)
IMM GRANULOCYTES NFR BLD AUTO: 0 % (ref 0–5)
LDH SERPL L TO P-CCNC: 165 U/L (ref 110–210)
LYMPHOCYTES # BLD: 0.6 K/UL (ref 0.5–4.6)
LYMPHOCYTES NFR BLD: 7 % (ref 13–44)
MCH RBC QN AUTO: 31.9 PG (ref 26.1–32.9)
MCHC RBC AUTO-ENTMCNC: 33.5 G/DL (ref 31.4–35)
MCV RBC AUTO: 95 FL (ref 79.6–97.8)
MONOCYTES # BLD: 0.9 K/UL (ref 0.1–1.3)
MONOCYTES NFR BLD: 9 % (ref 4–12)
NEUTS SEG # BLD: 7.4 K/UL (ref 1.7–8.2)
NEUTS SEG NFR BLD: 82 % (ref 43–78)
NRBC # BLD: 0 K/UL (ref 0–0.2)
PLATELET # BLD AUTO: 175 K/UL (ref 150–450)
PMV BLD AUTO: 9.7 FL (ref 9.4–12.3)
POTASSIUM SERPL-SCNC: 4 MMOL/L (ref 3.5–5.1)
PROT SERPL-MCNC: 8.1 G/DL (ref 6.3–8.2)
RBC # BLD AUTO: 3.83 M/UL (ref 4.23–5.6)
SODIUM SERPL-SCNC: 140 MMOL/L (ref 136–145)
WBC # BLD AUTO: 9 K/UL (ref 4.3–11.1)

## 2021-11-22 PROCEDURE — 83615 LACTATE (LD) (LDH) ENZYME: CPT

## 2021-11-22 PROCEDURE — 85025 COMPLETE CBC W/AUTO DIFF WBC: CPT

## 2021-11-22 PROCEDURE — 36415 COLL VENOUS BLD VENIPUNCTURE: CPT

## 2021-11-22 PROCEDURE — 80053 COMPREHEN METABOLIC PANEL: CPT

## 2022-02-04 PROBLEM — G40.89 OTHER SEIZURES (HCC): Status: RESOLVED | Noted: 2021-07-29 | Resolved: 2022-02-04

## 2022-02-04 PROBLEM — N40.0 BENIGN PROSTATIC HYPERPLASIA WITHOUT LOWER URINARY TRACT SYMPTOMS: Status: ACTIVE | Noted: 2022-02-04

## 2022-02-04 PROBLEM — R60.0 BILATERAL LEG EDEMA: Status: ACTIVE | Noted: 2022-02-04

## 2022-02-04 PROBLEM — R56.9 UNSPECIFIED CONVULSIONS (HCC): Status: RESOLVED | Noted: 2021-07-29 | Resolved: 2022-02-04

## 2022-02-16 ENCOUNTER — HOSPITAL ENCOUNTER (OUTPATIENT)
Dept: MRI IMAGING | Age: 82
Discharge: HOME | End: 2022-02-16
Attending: INTERNAL MEDICINE
Payer: MEDICARE

## 2022-02-16 DIAGNOSIS — C71.9 GLIOBLASTOMA (HCC): ICD-10-CM

## 2022-02-16 PROCEDURE — A9576 INJ PROHANCE MULTIPACK: HCPCS | Performed by: INTERNAL MEDICINE

## 2022-02-16 PROCEDURE — 74011250636 HC RX REV CODE- 250/636: Performed by: INTERNAL MEDICINE

## 2022-02-16 PROCEDURE — 70553 MRI BRAIN STEM W/O & W/DYE: CPT

## 2022-02-16 RX ORDER — SODIUM CHLORIDE 0.9 % (FLUSH) 0.9 %
10 SYRINGE (ML) INJECTION
Status: COMPLETED | OUTPATIENT
Start: 2022-02-16 | End: 2022-02-16

## 2022-02-16 RX ADMIN — Medication 10 ML: at 14:43

## 2022-02-16 RX ADMIN — GADOTERIDOL 16 ML: 279.3 INJECTION, SOLUTION INTRAVENOUS at 14:43

## 2022-02-25 ENCOUNTER — HOSPITAL ENCOUNTER (OUTPATIENT)
Dept: RADIATION ONCOLOGY | Age: 82
Discharge: HOME | End: 2022-02-25
Payer: MEDICARE

## 2022-02-25 ENCOUNTER — HOSPITAL ENCOUNTER (OUTPATIENT)
Dept: LAB | Age: 82
Discharge: HOME | End: 2022-02-25
Payer: MEDICARE

## 2022-02-25 VITALS
RESPIRATION RATE: 16 BRPM | OXYGEN SATURATION: 96 % | WEIGHT: 187.2 LBS | DIASTOLIC BLOOD PRESSURE: 65 MMHG | BODY MASS INDEX: 28.46 KG/M2 | HEART RATE: 86 BPM | TEMPERATURE: 98.4 F | SYSTOLIC BLOOD PRESSURE: 105 MMHG

## 2022-02-25 DIAGNOSIS — C71.9 GLIOBLASTOMA (HCC): ICD-10-CM

## 2022-02-25 DIAGNOSIS — C71.9 MALIGNANT NEOPLASM OF BRAIN, UNSPECIFIED LOCATION (HCC): Primary | ICD-10-CM

## 2022-02-25 LAB
ALBUMIN SERPL-MCNC: 3.4 G/DL (ref 3.2–4.6)
ALBUMIN/GLOB SERPL: 0.9 {RATIO} (ref 1.2–3.5)
ALP SERPL-CCNC: 102 U/L (ref 50–136)
ALT SERPL-CCNC: 19 U/L (ref 12–65)
ANION GAP SERPL CALC-SCNC: 4 MMOL/L (ref 7–16)
AST SERPL-CCNC: 12 U/L (ref 15–37)
BASOPHILS # BLD: 0 K/UL (ref 0–0.2)
BASOPHILS NFR BLD: 0 % (ref 0–2)
BILIRUB SERPL-MCNC: 0.5 MG/DL (ref 0.2–1.1)
BUN SERPL-MCNC: 20 MG/DL (ref 8–23)
CALCIUM SERPL-MCNC: 9.1 MG/DL (ref 8.3–10.4)
CHLORIDE SERPL-SCNC: 110 MMOL/L (ref 98–107)
CO2 SERPL-SCNC: 29 MMOL/L (ref 21–32)
CREAT SERPL-MCNC: 1.4 MG/DL (ref 0.8–1.5)
DIFFERENTIAL METHOD BLD: ABNORMAL
EOSINOPHIL # BLD: 0.2 K/UL (ref 0–0.8)
EOSINOPHIL NFR BLD: 3 % (ref 0.5–7.8)
ERYTHROCYTE [DISTWIDTH] IN BLOOD BY AUTOMATED COUNT: 13 % (ref 11.9–14.6)
GLOBULIN SER CALC-MCNC: 3.6 G/DL (ref 2.3–3.5)
GLUCOSE SERPL-MCNC: 134 MG/DL (ref 65–100)
HCT VFR BLD AUTO: 38.7 %
HGB BLD-MCNC: 12.6 G/DL (ref 13.6–17.2)
IMM GRANULOCYTES # BLD AUTO: 0 K/UL (ref 0–0.5)
IMM GRANULOCYTES NFR BLD AUTO: 0 % (ref 0–5)
LDH SERPL L TO P-CCNC: 132 U/L (ref 110–210)
LYMPHOCYTES # BLD: 1.1 K/UL (ref 0.5–4.6)
LYMPHOCYTES NFR BLD: 15 % (ref 13–44)
MCH RBC QN AUTO: 29.6 PG (ref 26.1–32.9)
MCHC RBC AUTO-ENTMCNC: 32.6 G/DL (ref 31.4–35)
MCV RBC AUTO: 91.1 FL (ref 79.6–97.8)
MONOCYTES # BLD: 0.6 K/UL (ref 0.1–1.3)
MONOCYTES NFR BLD: 8 % (ref 4–12)
NEUTS SEG # BLD: 5.4 K/UL (ref 1.7–8.2)
NEUTS SEG NFR BLD: 74 % (ref 43–78)
NRBC # BLD: 0 K/UL (ref 0–0.2)
PLATELET # BLD AUTO: 140 K/UL (ref 150–450)
PMV BLD AUTO: 9.5 FL (ref 9.4–12.3)
POTASSIUM SERPL-SCNC: 3.8 MMOL/L (ref 3.5–5.1)
PROT SERPL-MCNC: 7 G/DL (ref 6.3–8.2)
RBC # BLD AUTO: 4.25 M/UL (ref 4.23–5.6)
SODIUM SERPL-SCNC: 143 MMOL/L (ref 136–145)
WBC # BLD AUTO: 7.3 K/UL (ref 4.3–11.1)

## 2022-02-25 PROCEDURE — 80053 COMPREHEN METABOLIC PANEL: CPT

## 2022-02-25 PROCEDURE — 99211 OFF/OP EST MAY X REQ PHY/QHP: CPT

## 2022-02-25 PROCEDURE — 83615 LACTATE (LD) (LDH) ENZYME: CPT

## 2022-02-25 PROCEDURE — 36415 COLL VENOUS BLD VENIPUNCTURE: CPT

## 2022-02-25 PROCEDURE — 85025 COMPLETE CBC W/AUTO DIFF WBC: CPT

## 2022-02-25 NOTE — PROGRESS NOTES
3 Month Follow Up GBM    02/25/2022 - Med Onc Follow Up (Dr. Djeon Bertrand) after us    RT End: 03/26/2021 / Temodar  12/31/2020 - S/P resection (Dr. Kayode Apple and ROSALBA Younger)  5/2021-10/2021 - S/P Temodar, Optune  Keppra BID    MRI Brain (02/16/2022):  No evidence of disease progression      Dameon Vega CMA

## 2022-02-25 NOTE — PROGRESS NOTES
Patient: Matheus Knight MRN: [id number]  SSN: [id number]    YOB: 1940  Age: 80 y.o. Sex: male      Other Providers:  Dr. Erika Segal: Seizure    DIAGNOSIS: GBM    PREVIOUS RADIATION TREATMENT:  1) 4005cGy to the right parietal lobe, completed 3/26/2021    HISTORY OF PRESENT ILLNESS:  Matheus Knight is a 80 y.o. male who I am seeing at the request of Dr. Ivone Rojas. Mr. Doc Sampson was in his usual state of health until 12/25/2020 at which time he was found unresponsive in the bathroom by his wife. A head CT was performed which demonstrated a right parietal hemorrhage with surrounding vasogenic edema. This was followed by an MRI brain which confirmed a 3.7 x 3.3 x 3.2 cm heterogeneously enhancing mass within the temporoparietal region concerning for metastatic versus primary disease. A CT of the chest/ abdomen/ pelvis were performed 12/27/2020 which showed no evidence of metastatic or primary disease. Mr. Doc Sampson subsequently underwent right craniotomy with placement of BCNU wafers on 12/31/2021 by Dr. Geri Doe. This confirmed glioblastoma, WHO grade IV. A postoperative CT scan 1/1/2021 demonstrated interval right parietal craniotomy and right parietal mass resection with mild enhancement along the anterior margin of the resection cavity which may relate to residual tumor or postoperative change. He had a second seizure on 1/23/21 for which he was re-started on Keppra. He has been able to wean off all steroid medications. He was treated with concurrent chemoradiation and tolerated this uneventfully. INTERVAL HISTORY:  Mr. Doc Sampson has done well over the past month. He reports continuing to take multiple naps daily but feels that his strength and balance have improved. He denies any new seizures and new numbness, tingling, or weakness. A repeat MRI was performed 4/23/2021 which demonstrated postradiation change and nonprogressive residual tumor at the right parietal resection site. He has started adjuvant temozolomide with Dr. Elayne Lopez, which he reports he has tolerated well with the addition of Zofran for nausea. He will be meeting with an [facility] representative next week to discuss tumor treating field therapy. 8/9/21: Mr. Juliana Culelar now presents approximately 5 months after completion of radiation therapy. He denies any new symptoms and has been tolerating TMZ and Optune treatments extremely well. He underwent a repeat MRI brain 8/5/21 which demonstrated an overall improvement in the lesion with decreased enhancement and surrounding edema. 11/29/21: Here today for follow up, he has been doing okay since last seen. He has no new complaints or concerns. He has completed his course of temodar and has chosen to forgo further optune. He has no Concerns with balance. No headaches or nausea. He has gotten new prescription glasses. No other worrisome concerns. 2/25/22 Here today for follow up. Doing okay since last seen. He has no new complaints orc concerns. Notes some ongoing fatigue but manageable. No infectious symptoms. No vision changes. No headaches or nausea.      PAST MEDICAL HISTORY:    Past Medical History:   Diagnosis Date    Anxiety disorder 12/9/2014    Arthritis 12/9/2014    Depression 12/9/2014    Glioblastoma ([facility] [address] 12/26/2020    Gout 12/9/2014    History of intracranial hemorrhage 12/25/2020    History of lumbar laminectomy for spinal cord decompression 2/18/2019    History of seizure 12/28/2020    HTN (hypertension), benign 2/15/2017    Hyperlipemia 12/9/2014    Hypertension     Idiopathic chronic gout of right ankle 12/9/2014    Insomnia 12/9/2014    Kidney stone     Mixed hyperlipidemia 12/9/2014    Panic disorder 12/9/2014    Primary insomnia 12/9/2014    Rosacea 2/15/2017     PAST SURGICAL HISTORY:   Past Surgical History:   Procedure Laterality Date    HX ANKLE FRACTURE TX Left 6/1974    HX COLONOSCOPY  2007    HX CYST REMOVAL      HX CYST REMOVAL pylonidal cyst    HX CYST REMOVAL      HX HERNIA REPAIR Bilateral     HX OTHER SURGICAL      wrist    HX WRIST FRACTURE TX Bilateral 6/1993    wrist plate/pin       MEDICATIONS:     Current Outpatient Medications:     esomeprazole (NexIUM) 20 mg capsule, Take 20 mg by mouth daily. , Disp: , Rfl:     furosemide (LASIX) 20 mg tablet, Take 1 Tablet by mouth daily. , Disp: 90 Tablet, Rfl: 1    olmesartan (BENICAR) 40 mg tablet, Take 1 Tablet by mouth daily. , Disp: 90 Tablet, Rfl: 1    sertraline (ZOLOFT) 100 mg tablet, Take 1.5 Tablets by mouth daily. One and half tab every day, Disp: 135 Tablet, Rfl: 1    tamsulosin (FLOMAX) 0.4 mg capsule, Take 1 Capsule by mouth daily. Indications: for slow or decreased urine flow, Disp: 90 Capsule, Rfl: 1    atorvastatin (LIPITOR) 40 mg tablet, TAKE 1 TABLET BY MOUTH EVERY DAY, Disp: 90 Tablet, Rfl: 1    levETIRAcetam (Keppra) 500 mg tablet, Take 1 Tablet by mouth two (2) times a day., Disp: 180 Tablet, Rfl: 3    ondansetron hcl (ZOFRAN) 8 mg tablet, Take 1 Tab by mouth every eight (8) hours as needed for Nausea or Vomiting., Disp: 90 Tab, Rfl: 1    potassium chloride SR (KLOR-CON 10) 10 mEq tablet, TAKE 1 TABLET BY MOUTH EVERY DAY, Disp: 90 Tab, Rfl: 3    cholecalciferol (Vitamin D3) (1000 Units /25 mcg) tablet, Take 1,000 Units by mouth daily. , Disp: , Rfl:     lysine (L-LYSINE) 500 mg tab tablet, Take 500 mg by mouth daily. , Disp: , Rfl:     polyethylene glycol (MIRALAX) 17 gram packet, Take 1 Packet by mouth daily as needed for Constipation. (available over the counter), Disp: 1 Each, Rfl: prn    senna-docusate (PERICOLACE) 8.6-50 mg per tablet, Take 1 Tab by mouth two (2) times a day. (available over the counter)  Indications: constipation, Disp: 60 Tab, Rfl: prn    multivitamin (ONE A DAY) tablet, Take 1 Tab by mouth daily. , Disp: , Rfl:     ALLERGIES:   No Known Allergies    SOCIAL HISTORY:   Social History     Socioeconomic History    Marital status:      Spouse name: Not on file    Number of children: Not on file    Years of education: Not on file    Highest education level: Not on file   Occupational History    Not on file   Tobacco Use    Smoking status: Former Smoker     Types: Cigarettes     Quit date: 1970     Years since quittin.1    Smokeless tobacco: Never Used   Vaping Use    Vaping Use: Never used   Substance and Sexual Activity    Alcohol use: Not Currently    Drug use: No    Sexual activity: Yes     Partners: Female   Other Topics Concern    Not on file   Social History Narrative    Not on file     Social Determinants of Health     Financial Resource Strain:     Difficulty of Paying Living Expenses: Not on file   Food Insecurity:     Worried About [address] [employer] in the Last Year: Not on file    John of Food in the Last Year: Not on file   Transportation Needs: No Transportation Needs    Lack of Transportation (Medical): No    Lack of Transportation (Non-Medical):  No   Physical Activity:     Days of Exercise per Week: Not on file    Minutes of Exercise per Session: Not on file   Stress:     Feeling of Stress : Not on file   Social Connections:     Frequency of Communication with Friends and Family: Not on file    Frequency of Social Gatherings with Friends and Family: Not on file    Attends Che[employer] Services: Not on file    Active Member of [address] [place] [address] [employer] or Organizations: Not on file    Attends Club or Organization Meetings: Not on file    Marital Status: Not on file   Intimate Partner Violence:     Fear of Current or Ex-Partner: Not on file    Emotionally Abused: Not on file    Physically Abused: Not on file    Sexually Abused: Not on file   Housing Stability:     Unable to Pay for Housing in the Last Year: Not on file    Number of [place] in the Last Year: Not on file    Unstable Housing in the Last Year: Not on file       FAMILY HISTORY:   Family History   Problem Relation Age of Onset    Cancer Mother         lung ca    Hypertension Mother     Heart Disease Mother     OSTEOARTHRITIS Father     Cancer Brother         stomach       REVIEW OF SYSTEMS:   A full 12-point review of systems was completed and was negative unless noted in the history of present illness. PHYSICAL EXAMINATION:   ECOG Performance status 1  VITAL SIGNS:   Visit Vitals  /65 (BP 1 Location: Left upper arm, BP Patient Position: Sitting)   Pulse 86   Temp 98.4 °F (36.9 °C)   Resp 16   Wt 187 lb 3.2 oz (84.9 kg)   SpO2 96%   BMI 28.46 kg/m²      General: well developed/nourished adult Male in no acute distress; appears stated age  [de-identified]: normocephalic, atraumatic; EOMI; well healed right-sided craniotomy incision with alopecia  Neck: supple with full ROM  Respiratory: normal inspiratory effort, no audible wheezes  Extremities: no cyanosis, clubbing, or edema  Musculoskeletal: mobility intact x4; normal ROM in all joints  Skin: no skin lesions identified  Neuro: resting tremor in the left hand; AOx3; sensation intact x 4; CNII-XII grossly intact  Psych: appropriate affect, insight, and judgement  GI: abdomen soft, non-distended    PATHOLOGY:    No interval pathology.     LABORATORY:   Lab Results   Component Value Date/Time    Sodium 140 11/22/2021 02:10 PM    Potassium 4.0 11/22/2021 02:10 PM    Chloride 106 11/22/2021 02:10 PM    CO2 28 11/22/2021 02:10 PM    Anion gap 6 (L) 11/22/2021 02:10 PM    Glucose 108 (H) 11/22/2021 02:10 PM    BUN 20 11/22/2021 02:10 PM    Creatinine 1.40 11/22/2021 02:10 PM    GFR est AA >60 11/22/2021 02:10 PM    GFR est non-AA 52 (L) 11/22/2021 02:10 PM    Calcium 9.4 11/22/2021 02:10 PM    Magnesium 2.3 04/26/2021 10:32 AM    Albumin 3.6 11/22/2021 02:10 PM    Protein, total 8.1 11/22/2021 02:10 PM    Globulin 4.5 (H) 11/22/2021 02:10 PM    A-G Ratio 0.8 (L) 11/22/2021 02:10 PM    ALT (SGPT) 28 11/22/2021 02:10 PM     Lab Results   Component Value Date/Time    WBC 9.0 11/22/2021 02:10 PM HGB 12.2 (L) 11/22/2021 02:10 PM    HCT 36.4 11/22/2021 02:10 PM    PLATELET 065 26/58/9562 02:10 PM       RADIOLOGY:    I personally reviewed the MRI brain 8/5/21 and agree with the findings as per HPI. IMPRESSION:  Theresa Loo is a 80 y.o. 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 MRI noted no progressive disease. There is an area of notation at T2 which has worsened which was felt to be post treatment changes or infiltrative tumor. Follow up recommended on next scans.      PLAN:  Follow up in 3 months with repeat MRI brain or sooner as needed  Continue follow up with medical oncology as scheduled            Vero Donald, [address] [place] [address] Hematology and Oncology/Radiation Oncology   [address] [place] [address]  Office : [phone]  Fax : [phone]

## 2022-03-18 PROBLEM — R60.0 BILATERAL LEG EDEMA: Status: ACTIVE | Noted: 2022-02-04

## 2022-03-18 PROBLEM — Z98.890 HISTORY OF LUMBAR LAMINECTOMY FOR SPINAL CORD DECOMPRESSION: Status: ACTIVE | Noted: 2019-02-18

## 2022-03-18 PROBLEM — M41.20 IDIOPATHIC SCOLIOSIS: Status: ACTIVE | Noted: 2018-06-07

## 2022-03-18 PROBLEM — Z87.898 HISTORY OF SEIZURE: Status: ACTIVE | Noted: 2020-12-28

## 2022-03-18 PROBLEM — M62.81 ACUTE LEFT-SIDED MUSCLE WEAKNESS: Status: ACTIVE | Noted: 2021-02-12

## 2022-03-19 PROBLEM — C71.9 GLIOBLASTOMA (HCC): Status: ACTIVE | Noted: 2020-12-26

## 2022-03-19 PROBLEM — I10 HTN (HYPERTENSION), BENIGN: Status: ACTIVE | Noted: 2017-02-15

## 2022-03-19 PROBLEM — N40.0 BENIGN PROSTATIC HYPERPLASIA WITHOUT LOWER URINARY TRACT SYMPTOMS: Status: ACTIVE | Noted: 2022-02-04

## 2022-03-19 PROBLEM — I10 ESSENTIAL HYPERTENSION: Status: ACTIVE | Noted: 2020-12-26

## 2022-03-20 PROBLEM — Z86.79 HISTORY OF INTRACRANIAL HEMORRHAGE: Status: ACTIVE | Noted: 2020-12-25

## 2022-03-20 PROBLEM — L71.9 ROSACEA: Status: ACTIVE | Noted: 2017-02-15

## 2022-05-25 ENCOUNTER — HOSPITAL ENCOUNTER (OUTPATIENT)
Dept: MRI IMAGING | Age: 82
Discharge: HOME | End: 2022-05-28
Payer: MEDICARE

## 2022-05-25 DIAGNOSIS — C71.9 MALIGNANT NEOPLASM OF BRAIN, UNSPECIFIED LOCATION (HCC): ICD-10-CM

## 2022-05-25 PROCEDURE — A9579 GAD-BASE MR CONTRAST NOS,1ML: HCPCS | Performed by: NURSE PRACTITIONER

## 2022-05-25 PROCEDURE — 6360000004 HC RX CONTRAST MEDICATION: Performed by: NURSE PRACTITIONER

## 2022-05-25 PROCEDURE — 70553 MRI BRAIN STEM W/O & W/DYE: CPT

## 2022-05-25 RX ADMIN — GADOTERIDOL 16 ML: 279.3 INJECTION, SOLUTION INTRAVENOUS at 11:16

## 2022-06-02 DIAGNOSIS — C71.9 GLIOBLASTOMA (HCC): Primary | ICD-10-CM

## 2022-06-03 ENCOUNTER — HOSPITAL ENCOUNTER (OUTPATIENT)
Dept: RADIATION ONCOLOGY | Age: 82
Discharge: HOME | End: 2022-06-06
Payer: MEDICARE

## 2022-06-03 ENCOUNTER — APPOINTMENT (OUTPATIENT)
Dept: RADIATION ONCOLOGY | Age: 82
End: 2022-06-03

## 2022-06-03 ENCOUNTER — TELEPHONE (OUTPATIENT)
Dept: NEUROSURGERY | Age: 82
End: 2022-06-03

## 2022-06-03 VITALS
WEIGHT: 192 LBS | BODY MASS INDEX: 29.19 KG/M2 | SYSTOLIC BLOOD PRESSURE: 129 MMHG | HEART RATE: 75 BPM | TEMPERATURE: 97.6 F | DIASTOLIC BLOOD PRESSURE: 67 MMHG | OXYGEN SATURATION: 96 %

## 2022-06-03 PROCEDURE — 99211 OFF/OP EST MAY X REQ PHY/QHP: CPT

## 2022-06-03 NOTE — PROGRESS NOTES
Pt is here today to see Dr. Terrie Schaeffer for FUP post RT to the brain which ended 3/26/21. The 5/25/22 MRI Brain indicated there is a new enhancing focus and progressive disease is favored. Per Dr. Terrie Schaeffer, scheduling will reach out to Dr. Andrew Epley' office, pt's neurosurgeon, for assessment to see if pt needs a brain resection. Pt denies headaches, dizziness, or any mobility deficits. Pt stated that he takes Keppra as ordered and has not had any recent seizures.

## 2022-06-03 NOTE — PROGRESS NOTES
Patient: Farooq Dsouza  MRN: [id number]   SSN: [id number]          YOB: 1940   Age: 80 y.o. Sex: male         Other Providers:  Dr. Lion Simmons, Dr. Sanabria Brian: Seizure      DIAGNOSIS: GBM      PREVIOUS RADIATION TREATMENT:  1)   4005cGy to the right parietal lobe, completed 3/26/2021      HISTORY OF PRESENT ILLNESS:  Farooq Dsouza is a 80 y.o.  male who I am seeing at the request of Dr. Lion Simmons. Mr. Tomasa Camargo was in his usual state of health until 12/25/2020 at which time he was found unresponsive in the bathroom by his wife. A head CT was performed which demonstrated a right parietal hemorrhage with surrounding vasogenic edema. This was followed  by an MRI brain which confirmed a 3.7 x 3.3 x 3.2 cm heterogeneously enhancing mass within the temporoparietal region concerning for metastatic versus primary disease. A CT of the chest/ abdomen/ pelvis were performed 12/27/2020 which showed no evidence  of metastatic or primary disease. Mr. Tomasa Camargo subsequently underwent right craniotomy with placement of BCNU wafers on 12/31/2021 by Dr. Beronica Friedman. This confirmed glioblastoma, WHO grade IV. A postoperative CT scan 1/1/2021 demonstrated interval right parietal  craniotomy and right parietal mass resection with mild enhancement along the anterior margin of the resection cavity which may relate to residual tumor or postoperative change. He had a second seizure on 1/23/21 for which he was re-started on Keppra. He has been able to wean off all steroid medications. He was treated with concurrent chemoradiation and tolerated this uneventfully. INTERVAL HISTORY:  Mr. Tomasa Cmaargo has done well over the past month. He reports continuing to take multiple naps daily but feels that his strength and balance have improved. He denies any new seizures and new numbness, tingling, or weakness.  A repeat MRI was performed 4/23/2021  which demonstrated postradiation change and nonprogressive residual tumor at the right parietal resection site. He has started adjuvant temozolomide with Dr. Eros Martinez, which he reports he has tolerated well with the addition of Zofran for nausea. He will  be meeting with an [facility] [employer] representative next week to discuss tumor treating field therapy. 8/9/21: Mr. Isis Quarles now presents approximately 5 months after completion of radiation therapy. He denies any new symptoms and has been tolerating TMZ and Optune treatments extremely well. He underwent a repeat MRI brain 8/5/21 which demonstrated an overall  improvement in the lesion with decreased enhancement and surrounding edema. 11/29/21: Here today for follow up, he has been doing okay since last seen. He has no new complaints or concerns. He has completed his course of temodar and has chosen to forgo further optune. He has no Concerns with balance. No headaches or nausea. He  has gotten new prescription glasses. No other worrisome concerns. 2/25/22 Here today for follow up. Doing okay since last seen. He has no new complaints orc concerns. Notes some ongoing fatigue but manageable. No infectious symptoms. No vision changes. No headaches or nausea. 6/3/22: Here for follow up. Repeat MRI brain demonstrates a new 1.6 x 1.1cm enhancing focus anterior and superior to the right sided operative cavity favoring disease progression. He denies any new neurologic symptoms including headaches, nausea, and focal numbness or weakness. He notes pain in the bilateral hips and knees which is stable.       PAST MEDICAL HISTORY:    Past Medical History:   Diagnosis Date    Anxiety disorder 12/9/2014    Arthritis 12/9/2014    Depression 12/9/2014    Glioblastoma ([facility] [address] 12/26/2020    Gout 12/9/2014    History of intracranial hemorrhage 12/25/2020    History of lumbar laminectomy for spinal cord decompression 2/18/2019    History of seizure 12/28/2020    HTN (hypertension), benign 2/15/2017    Hyperlipemia 12/9/2014    Hypertension     Idiopathic chronic gout of right ankle 12/9/2014    Insomnia 12/9/2014    Kidney stone     Mixed hyperlipidemia 12/9/2014    Panic disorder 12/9/2014    Primary insomnia 12/9/2014    Rosacea 2/15/2017      PAST SURGICAL HISTORY:   Past Surgical History:   Procedure Laterality Date    ANKLE FRACTURE SURGERY Left 6/1974    COLONOSCOPY  2007    CYST REMOVAL      CYST REMOVAL      pylonidal cyst    CYST REMOVAL      HERNIA REPAIR Bilateral     OTHER SURGICAL HISTORY      wrist    WRIST FRACTURE SURGERY Bilateral 6/1993    wrist plate/pin       MEDICATIONS:       Current Outpatient Medications:   Current Outpatient Medications   Medication Sig Dispense Refill    atorvastatin (LIPITOR) 40 MG tablet TAKE 1 TABLET BY MOUTH EVERY DAY      vitamin D (CHOLECALCIFEROL) 25 MCG (1000 UT) TABS tablet Take 1,000 Units by mouth daily      esomeprazole (NEXIUM) 20 MG delayed release capsule Take 20 mg by mouth daily      furosemide (LASIX) 20 MG tablet Take 20 mg by mouth daily      levETIRAcetam (KEPPRA) 500 MG tablet Take 500 mg by mouth 2 times daily      L-Lysine 500 MG TABS Take 500 mg by mouth daily      olmesartan (BENICAR) 40 MG tablet Take 40 mg by mouth daily      ondansetron (ZOFRAN) 8 MG tablet Take 8 mg by mouth every 8 hours as needed      polyethylene glycol (GLYCOLAX) 17 GM/SCOOP powder Take 17 g by mouth daily as needed      potassium chloride (KLOR-CON) 10 MEQ extended release tablet TAKE 1 TABLET BY MOUTH EVERY DAY      senna-docusate (PERICOLACE) 8.6-50 MG per tablet Take 1 tablet by mouth 2 times daily      sertraline (ZOLOFT) 100 MG tablet Take 150 mg by mouth daily      tamsulosin (FLOMAX) 0.4 MG capsule Take 0.4 mg by mouth daily       No current facility-administered medications for this encounter.         ALLERGIES:   Not on File      SOCIAL HISTORY:   Social History     Tobacco History     Smoking Status  Former Smoker Quit date  1/1/1970    Smokeless Tobacco >60 02/25/2022    AGRATIO 0.9 (L) 02/25/2022    GLOB 3.6 (H) 02/25/2022     RADIOLOGY:    I personally reviewed the MRI brain 5/25/22 and agree with the findings as per HPI. IMPRESSION:  Julián Ackreman is a 80 y.o.  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 His most recent MRI is concerning for a new area of contrast enhancement consistent with progression given his timeframe from completion of last therapy and histology. I reviewed these results with Mr. Denisa Herring and his wife and have recommended follow up with Dr. Jerad Lamar to see if this area would be amenable to re-resection. I will follow up with them after this meeting to discuss next steps which may include additional radiation and systemic therapy.        PLAN:  - Referral back to neurosurgery to see if re-resection is feasible  - Follow up in radiation oncology after this meeting to discuss next steps    Lupe Avina MD  6/3/2022

## 2022-06-06 ENCOUNTER — HOSPITAL ENCOUNTER (OUTPATIENT)
Dept: LAB | Age: 82
Discharge: HOME | End: 2022-06-09
Payer: MEDICARE

## 2022-06-06 ENCOUNTER — HOSPITAL ENCOUNTER (OUTPATIENT)
Dept: RADIATION ONCOLOGY | Age: 82
Setting detail: RECURRING SERIES
End: 2022-06-06
Payer: MEDICARE

## 2022-06-06 ENCOUNTER — OFFICE VISIT (OUTPATIENT)
Dept: ONCOLOGY | Age: 82
End: 2022-06-06
Payer: MEDICARE

## 2022-06-06 VITALS
HEART RATE: 78 BPM | BODY MASS INDEX: 28.95 KG/M2 | DIASTOLIC BLOOD PRESSURE: 59 MMHG | RESPIRATION RATE: 17 BRPM | HEIGHT: 68 IN | WEIGHT: 191 LBS | OXYGEN SATURATION: 97 % | TEMPERATURE: 98 F | SYSTOLIC BLOOD PRESSURE: 117 MMHG

## 2022-06-06 DIAGNOSIS — C71.9 GLIOBLASTOMA (HCC): ICD-10-CM

## 2022-06-06 DIAGNOSIS — R53.83 OTHER FATIGUE: ICD-10-CM

## 2022-06-06 DIAGNOSIS — C71.9 GLIOBLASTOMA (HCC): Primary | ICD-10-CM

## 2022-06-06 LAB
ALBUMIN SERPL-MCNC: 3.6 G/DL (ref 3.2–4.6)
ALBUMIN/GLOB SERPL: 1.1 {RATIO} (ref 1.2–3.5)
ALP SERPL-CCNC: 97 U/L (ref 50–136)
ALT SERPL-CCNC: 21 U/L (ref 12–65)
ANION GAP SERPL CALC-SCNC: 6 MMOL/L (ref 7–16)
AST SERPL-CCNC: 15 U/L (ref 15–37)
BASOPHILS # BLD: 0 K/UL (ref 0–0.2)
BASOPHILS NFR BLD: 0 % (ref 0–2)
BILIRUB SERPL-MCNC: 0.5 MG/DL (ref 0.2–1.1)
BUN SERPL-MCNC: 21 MG/DL (ref 8–23)
CALCIUM SERPL-MCNC: 9 MG/DL (ref 8.3–10.4)
CHLORIDE SERPL-SCNC: 110 MMOL/L (ref 98–107)
CO2 SERPL-SCNC: 28 MMOL/L (ref 21–32)
CREAT SERPL-MCNC: 1.5 MG/DL (ref 0.8–1.5)
DIFFERENTIAL METHOD BLD: ABNORMAL
EOSINOPHIL # BLD: 0.1 K/UL (ref 0–0.8)
EOSINOPHIL NFR BLD: 2 % (ref 0.5–7.8)
ERYTHROCYTE [DISTWIDTH] IN BLOOD BY AUTOMATED COUNT: 13.4 % (ref 11.9–14.6)
GLOBULIN SER CALC-MCNC: 3.4 G/DL (ref 2.3–3.5)
GLUCOSE SERPL-MCNC: 105 MG/DL (ref 65–100)
HCT VFR BLD AUTO: 39.4 %
HGB BLD-MCNC: 13.1 G/DL (ref 13.6–17.2)
IMM GRANULOCYTES # BLD AUTO: 0 K/UL (ref 0–0.5)
IMM GRANULOCYTES NFR BLD AUTO: 0 % (ref 0–5)
LDH SERPL L TO P-CCNC: 152 U/L (ref 110–210)
LYMPHOCYTES # BLD: 1 K/UL (ref 0.5–4.6)
LYMPHOCYTES NFR BLD: 14 % (ref 13–44)
MCH RBC QN AUTO: 30.7 PG (ref 26.1–32.9)
MCHC RBC AUTO-ENTMCNC: 33.2 G/DL (ref 31.4–35)
MCV RBC AUTO: 92.3 FL (ref 79.6–97.8)
MONOCYTES # BLD: 0.7 K/UL (ref 0.1–1.3)
MONOCYTES NFR BLD: 10 % (ref 4–12)
NEUTS SEG # BLD: 5.4 K/UL (ref 1.7–8.2)
NEUTS SEG NFR BLD: 74 % (ref 43–78)
NRBC # BLD: 0 K/UL (ref 0–0.2)
PLATELET # BLD AUTO: 130 K/UL (ref 150–450)
PMV BLD AUTO: 9.8 FL (ref 9.4–12.3)
POTASSIUM SERPL-SCNC: 3.5 MMOL/L (ref 3.5–5.1)
PROT SERPL-MCNC: 7 G/DL (ref 6.3–8.2)
RBC # BLD AUTO: 4.27 M/UL (ref 4.23–5.6)
SODIUM SERPL-SCNC: 144 MMOL/L (ref 136–145)
WBC # BLD AUTO: 7.3 K/UL (ref 4.3–11.1)

## 2022-06-06 PROCEDURE — 80053 COMPREHEN METABOLIC PANEL: CPT

## 2022-06-06 PROCEDURE — 77470 SPECIAL RADIATION TREATMENT: CPT

## 2022-06-06 PROCEDURE — 1123F ACP DISCUSS/DSCN MKR DOCD: CPT | Performed by: INTERNAL MEDICINE

## 2022-06-06 PROCEDURE — 36415 COLL VENOUS BLD VENIPUNCTURE: CPT

## 2022-06-06 PROCEDURE — 83615 LACTATE (LD) (LDH) ENZYME: CPT

## 2022-06-06 PROCEDURE — 99214 OFFICE O/P EST MOD 30 MIN: CPT | Performed by: INTERNAL MEDICINE

## 2022-06-06 PROCEDURE — 1036F TOBACCO NON-USER: CPT | Performed by: INTERNAL MEDICINE

## 2022-06-06 PROCEDURE — G8428 CUR MEDS NOT DOCUMENT: HCPCS | Performed by: INTERNAL MEDICINE

## 2022-06-06 PROCEDURE — 85025 COMPLETE CBC W/AUTO DIFF WBC: CPT

## 2022-06-06 PROCEDURE — G8417 CALC BMI ABV UP PARAM F/U: HCPCS | Performed by: INTERNAL MEDICINE

## 2022-06-06 ASSESSMENT — PATIENT HEALTH QUESTIONNAIRE - PHQ9
SUM OF ALL RESPONSES TO PHQ QUESTIONS 1-9: 0
1. LITTLE INTEREST OR PLEASURE IN DOING THINGS: 0
SUM OF ALL RESPONSES TO PHQ QUESTIONS 1-9: 0
SUM OF ALL RESPONSES TO PHQ9 QUESTIONS 1 & 2: 0
SUM OF ALL RESPONSES TO PHQ QUESTIONS 1-9: 0
SUM OF ALL RESPONSES TO PHQ QUESTIONS 1-9: 0
2. FEELING DOWN, DEPRESSED OR HOPELESS: 0

## 2022-06-06 NOTE — PATIENT INSTRUCTIONS
Patient Instructions from Today's Visit    Reason for Visit:  Follow up    Plan:  Proceed with radiation with Dr. Felipa Starkey. We will likely put you back on Temodar and possibly add another IV medication called Avastin once radiation is done. Follow Up:   Follow up TBD    Recent Lab Results:  Hospital Outpatient Visit on 06/06/2022   Component Date Value Ref Range Status    WBC 06/06/2022 7.3  4.3 - 11.1 K/uL Final    RBC 06/06/2022 4.27  4.23 - 5.6 M/uL Final    Hemoglobin 06/06/2022 13.1* 13.6 - 17.2 g/dL Final    Hematocrit 06/06/2022 39.4  % Final    MCV 06/06/2022 92.3  79.6 - 97.8 FL Final    MCH 06/06/2022 30.7  26.1 - 32.9 PG Final    MCHC 06/06/2022 33.2  31.4 - 35.0 g/dL Final    RDW 06/06/2022 13.4  11.9 - 14.6 % Final    Platelets 74/54/8231 130* 150 - 450 K/uL Final    MPV 06/06/2022 9.8  9.4 - 12.3 FL Final    nRBC 06/06/2022 0.00  0.0 - 0.2 K/uL Final    **Note: Absolute NRBC parameter is now reported with Hemogram**    Seg Neutrophils 06/06/2022 74  43 - 78 % Final    Lymphocytes 06/06/2022 14  13 - 44 % Final    Monocytes 06/06/2022 10  4.0 - 12.0 % Final    Eosinophils % 06/06/2022 2  0.5 - 7.8 % Final    Basophils 06/06/2022 0  0.0 - 2.0 % Final    Immature Granulocytes 06/06/2022 0  0.0 - 5.0 % Final    Segs Absolute 06/06/2022 5.4  1.7 - 8.2 K/UL Final    Absolute Lymph # 06/06/2022 1.0  0.5 - 4.6 K/UL Final    Absolute Mono # 06/06/2022 0.7  0.1 - 1.3 K/UL Final    Absolute Eos # 06/06/2022 0.1  0.0 - 0.8 K/UL Final    Basophils Absolute 06/06/2022 0.0  0.0 - 0.2 K/UL Final    Absolute Immature Granulocyte 06/06/2022 0.0  0.0 - 0.5 K/UL Final    Differential Type 06/06/2022 AUTOMATED    Final    Sodium 06/06/2022 144  136 - 145 mmol/L Final    Potassium 06/06/2022 3.5  3.5 - 5.1 mmol/L Final    Chloride 06/06/2022 110* 98 - 107 mmol/L Final    CO2 06/06/2022 28  21 - 32 mmol/L Final    Anion Gap 06/06/2022 6* 7 - 16 mmol/L Final    Glucose 06/06/2022 105* 65 - 100 mg/dL Final    BUN 06/06/2022 21  8 - 23 MG/DL Final    CREATININE 06/06/2022 1.50  0.8 - 1.5 MG/DL Final    GFR  06/06/2022 58* >60 ml/min/1.73m2 Final    GFR Non- 06/06/2022 48* >60 ml/min/1.73m2 Final    Comment:      Estimated GFR is calculated using the Modification of Diet in Renal Disease (MDRD) Study equation, reported for both  Americans (GFRAA) and non- Americans (GFRNA), and normalized to 1.73m2 body surface area. The physician must decide which value applies to the patient. The MDRD study equation should only be used in individuals age 25 or older. It has not been validated for the following: pregnant women, patients with serious comorbid conditions,or on certain medications, or persons with extremes of body size, muscle mass, or nutritional status.  Calcium 06/06/2022 9.0  8.3 - 10.4 MG/DL Final    Total Bilirubin 06/06/2022 0.5  0.2 - 1.1 MG/DL Final    ALT 06/06/2022 21  12 - 65 U/L Final    AST 06/06/2022 15  15 - 37 U/L Final    Alk Phosphatase 06/06/2022 97  50 - 136 U/L Final    Total Protein 06/06/2022 7.0  6.3 - 8.2 g/dL Final    Albumin 06/06/2022 3.6  3.2 - 4.6 g/dL Final    Globulin 06/06/2022 3.4  2.3 - 3.5 g/dL Final    Albumin/Globulin Ratio 06/06/2022 1.1* 1.2 - 3.5   Final    LD 06/06/2022 152  110 - 210 U/L Final       Treatment Summary has been discussed and given to patient: n/a        -------------------------------------------------------------------------------------------------------------------  Please call our office at [phone] if you have any  of the following symptoms:   · Fever of 100.5 or greater  · Chills  · Shortness of breath  · Swelling or pain in one leg    After office hours an answering service is available and will contact a provider for emergencies or if you are experiencing any of the above symptoms.  Patient did express an interest in My Chart.   My Chart log in information explained on the after visit summary printout at the [facility] desk.     Qasim Anne RN

## 2022-06-06 NOTE — PROGRESS NOTES
Data Source: Patient, [facility] record. 6/6/2022    11:25 AM    Reji Erickson [id number]    63 y.o. 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, history of depression/anxiety disorder, gout, insomnia, hernia repair, ankle fracture repair, lower back surgery with limited mobility thereafter, more recently admitted 12/25/2020 after being found unresponsive in bathroom by wife. Abnormal head CT leading to a brain MRI 12/25/2020 showing right temporal parietal mass 3.7 x 3.3 cm in size with adjacent cerebral edema, as well as associated hemorrhage and/or calcification. 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 CT CAP 12/27/2020 without evidence of malignancy or metastatic disease. Patient was seen by neurosurgery Dr. Lelia Sadler and taken to the OR 12/31/2020. Underwent right parietal craniotomy with resection of right parietal tumor and placement of intraoperative BCNU wafers to surgical bed. 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 Recovering well, now referred to medical oncology as well as radiation oncology for further therapy. He is also undergoing speech therapy. On evaluation today using a walker to get around. Today reports some short term memory issues but otherwise doing well. Family support includes wife and one daughter who lives in [place] and was here for his surgery. Not currently on any seizure medicines and being tapered off of Decadron (will be done next Monday). Hospice Referral:  na  Interval History:  6/6/2022: Repeat brain MRI with/without contrast 5/25/2022 with changes concerning for disease progression including new 1.6 x 1.1 cm enhancing focus anterior and superior to the right sided operative cavity.   Patient has seen radiation oncology Dr. Keene Leventhal as well as neurosurgery Dr. Rusty Rouse. The patient and family report being told not a candidate for repeat surgery, seeing radiation oncology later today. Will discuss case with Dr. Keene Leventhal, likely repeat XRT followed by systemic therapy (likely temozolomide single agent given MGMT methylation status, and previous good tolerance). Today reports doing reasonably. Some fatigue but manageable. Blood work today with adequate counts, chemistries unremarkable. REVIEW OF SYSTEMS:  As mentioned above, all other systems were reviewed in full and are negative.     Past Medical History:   Diagnosis Date    Anxiety disorder 12/9/2014    Arthritis 12/9/2014    Depression 12/9/2014    Glioblastoma ([facility] [address] 12/26/2020    Gout 12/9/2014    History of intracranial hemorrhage 12/25/2020    History of lumbar laminectomy for spinal cord decompression 2/18/2019    History of seizure 12/28/2020    HTN (hypertension), benign 2/15/2017    Hyperlipemia 12/9/2014    Hypertension     Idiopathic chronic gout of right ankle 12/9/2014    Insomnia 12/9/2014    Kidney stone     Mixed hyperlipidemia 12/9/2014    Panic disorder 12/9/2014    Primary insomnia 12/9/2014    Rosacea 2/15/2017       Past Surgical History:   Procedure Laterality Date    ANKLE FRACTURE SURGERY Left 6/1974    COLONOSCOPY  2007    CYST REMOVAL      CYST REMOVAL      pylonidal cyst    CYST REMOVAL      HERNIA REPAIR Bilateral     OTHER SURGICAL HISTORY      wrist    WRIST FRACTURE SURGERY Bilateral 6/1993    wrist plate/pin       Current Outpatient Medications   Medication Sig Dispense Refill    Calcium-Magnesium-Vitamin D (CALCIUM 1200+D3 PO) Take by mouth daily      atorvastatin (LIPITOR) 40 MG tablet TAKE 1 TABLET BY MOUTH EVERY DAY      vitamin D (CHOLECALCIFEROL) 25 MCG (1000 UT) TABS tablet Take 1,000 Units by mouth daily      esomeprazole (NEXIUM) 20 MG delayed release capsule Take 20 mg by mouth daily      furosemide (LASIX) 20 MG tablet Take 20 mg by mouth daily      levETIRAcetam (KEPPRA) 500 MG tablet Take 500 mg by mouth 2 times daily      L-Lysine 500 MG TABS Take 500 mg by mouth daily      olmesartan (BENICAR) 40 MG tablet Take 40 mg by mouth daily      ondansetron (ZOFRAN) 8 MG tablet Take 8 mg by mouth every 8 hours as needed      polyethylene glycol (GLYCOLAX) 17 GM/SCOOP powder Take 17 g by mouth daily as needed      potassium chloride (KLOR-CON) 10 MEQ extended release tablet TAKE 1 TABLET BY MOUTH EVERY DAY      sertraline (ZOLOFT) 100 MG tablet Take 150 mg by mouth daily      tamsulosin (FLOMAX) 0.4 MG capsule Take 0.4 mg by mouth daily      senna-docusate (PERICOLACE) 8.6-50 MG per tablet Take 1 tablet by mouth 2 times daily       No current facility-administered medications for this visit. Social History     Socioeconomic History    Marital status:      Spouse name: None    Number of children: None    Years of education: None    Highest education level: None   Occupational History    None   Tobacco Use    Smoking status: Former Smoker     Quit date: 1970     Years since quittin.4    Smokeless tobacco: Never Used   Substance and Sexual Activity    Alcohol use: Not Currently    Drug use: No    Sexual activity: None   Other Topics Concern    None   Social History Narrative    None     Social Determinants of Health     Financial Resource Strain:     Difficulty of Paying Living Expenses: Not on file   Food Insecurity:     Worried About Running Out of Food in the Last Year: Not on file    Linnea of Food in the Last Year: Not on file   Transportation Needs:     Lack of Transportation (Medical): Not on file    Lack of Transportation (Non-Medical):  Not on file   Physical Activity:     Days of Exercise per Week: Not on file    Minutes of Exercise per Session: Not on file   Stress:     Feeling of Stress : Not on file   Social Connections:     Frequency of Communication with Friends and Family: Not on file    Frequency of Social Gatherings with Friends and Family: Not on file    Attends Sabianist Services: Not on file    Active Member of Clubs or Organizations: Not on file    Attends Club or Organization Meetings: Not on file    Marital Status: Not on file   Intimate Partner Violence:     Fear of Current or Ex-Partner: Not on file    Emotionally Abused: Not on file    Physically Abused: Not on file    Sexually Abused: Not on file   Housing Stability:     Unable to Pay for Housing in the Last Year: Not on file    Number of [place] in the Last Year: Not on file    Unstable Housing in the Last Year: Not on file       Family History   Problem Relation Age of Onset    Cancer Mother         lung ca    Hypertension Mother     Heart Disease Mother     Osteoarthritis Father     Cancer Brother         stomach       No Known Allergies    PHYSICAL EXAMINATION:  General Appearance: Healthy appearing patient in no acute distress  Vitals reviewed. BP (!) 117/59 (Site: Right Upper Arm, Position: Sitting, Cuff Size: Medium Adult)   Pulse 78   Temp 98 °F (36.7 °C) (Oral)   Resp 17   Ht 5' 8\" (1.727 m)   Wt 191 lb (86.6 kg)   SpO2 97%   BMI 29.04 kg/m²   HEENT: No oral or pharyngeal masses, ulceration or thrush noted, no sinus tenderness. Neck is supple with no thyromegaly or JVD noted. Lymph Nodes: No lymphadenopathy noted in the occipital, pre and post auricular, cervical, supra and infraclavicular, axillary, epitrochlear, inguinal, and popliteal region. Breasts: No palpable masses, nipple discharge or skin retraction  Lungs/Thorax: Clear to auscultation, no accessory muscles of respiration being used.   Heart: Regular rate and rhythm, normal S1, S2, no appreciable murmurs, rubs, gallops  Abdomen: Soft, nontender, bowel sounds present, no appreciable hepatosplenomegaly, no palpable masses  Extremeties: Good pulses bilaterally, no peripheral edema.  Skin: Normal skin tone with no rash, petechiae, ecchymosis noted. Musculoskeletal: No pain on palpation over bony prominence, no edema, no evidence of gout, no joint or bony deformity  Neurologic: Grossly intact    LABS/IMAGING:    Lab Results   Component Value Date    WBC 7.3 06/06/2022    HGB 13.1 06/06/2022    HCT 39.4 06/06/2022     06/06/2022    MCV 92.3 06/06/2022       Lab Results   Component Value Date     06/06/2022    K 3.5 06/06/2022     06/06/2022    CO2 28 06/06/2022    BUN 21 06/06/2022    GFRAA 58 06/06/2022    GLOB 3.4 06/06/2022    ALT 21 06/06/2022         Above results reviewed with patient. 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, history of depression/anxiety disorder, gout, insomnia, hernia repair, ankle fracture repair, lower back surgery with limited mobility thereafter, more recently admitted 12/25/2020 after being found unresponsive in bathroom by wife. Abnormal head CT leading to a brain MRI 12/25/2020 showing right temporal parietal mass 3.7 x 3.3 cm in size with adjacent cerebral edema, as well as associated hemorrhage and/or calcification. 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 CT CAP 12/27/2020 without evidence of malignancy or metastatic disease. Patient was seen by neurosurgery Dr. Yajaira Mijares and taken to the OR 12/31/2020. Underwent right parietal craniotomy with resection of right parietal tumor and placement of intraoperative BCNU wafers to surgical bed. 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 Recovering well, now referred to medical oncology as well as radiation oncology for further therapy. He is also undergoing speech therapy. On evaluation today using a walker to get around. Today reports some short term memory issues but otherwise doing well. Family support includes wife and one daughter who lives in [place] and was here for his surgery.  Not currently remained ongoing fatigue for which he takes regular naps. Denies any new sensorimotor deficits, or seizure activity. Reasonable p.o. intake and mobility now. Also started Optune therapy a few weeks ago, tolerating reasonably, scalp without any rash. Okay to proceed with cycle 2 adjuvant temozolomide tomorrow, given labs without cytopenias, we will simply see him back with repeat labs in a month. Now scheduled for repeat brain MRI per radiation oncology in 8/21.    7/20/2021: Continues to tolerate therapy well. Mobility has significantly improved, walking briskly in the room. Regular with his Keppra, as well as Bactrim. Blood work unremarkable, regular with Optune which he is tolerating well without any signs of scalp dermatitis. Okay to proceed with cycle 4. Scheduled for brain MRI and subsequent radiation oncology follow-up next month, will chart check imaging. Otherwise we will see him back in a month. 9/14/2021: Patient here for cycle 6 ( last cycle of adjuvant temozolomide). Performance status remains improved. Some fatigue though. Nausea better on days with Temodar since taking Zofran an hour before each dose. Also regular with his Keppra as well as Bactrim. Last brain MRI 8/21 with responding disease, now scheduled for repeat brain MRI in 11/17/2021. Labs today with thrombocytopenia with platelet count 51,877. Will defer next cycle by week, till counts recover. Discussed role of Optune therapy continuation beyond 6 cycles, they will discuss but are leaning towards continuing. 11/22/2021: More recently seeing primary care for sciatica as well as neck stiffness. Reasonable p.o. intake and mobility. Some scalp dermatitis, they have decided to forego further Optune therapy. This is reasonable. Blood work unremarkable, platelet count having normalized. Brain MRI with SD. Simple monitoring moving forward, will repeat brain imaging in 3 months time.     2/25/2022: Reports doing reasonably, some difficulty with time management in the day but otherwise mobile, has been in right now able to do things together, looking forward to [employer] in a few months. Has seen neurology, no further seizures, remains on Keppra 500 twice daily. Recent brain MRI with no clear evidence of disease progression at surgical site, T2 prolongation in right precentral gyrus and splenium of corpus callosum slowly worsened, possibly posttreatment change versus infiltrative tumor. Will get radiation oncology opinion also. We will see him back in 3 months with repeat imaging    Addendum: Case discussed with Dr. Felipa Starkey, felt to have therapy related changes rather than true progression. 6/6/2022: Repeat brain MRI with/without contrast 5/25/2022 with changes concerning for disease progression including new 1.6 x 1.1 cm enhancing focus anterior and superior to the right sided operative cavity. Patient has seen radiation oncology Dr. Felipa Starkey as well as neurosurgery Dr. Ramana Fulton. The patient and family report being told not a candidate for repeat surgery, seeing radiation oncology later today. Will discuss case with Dr. Felipa Starkey, likely repeat XRT followed by systemic therapy (likely temozolomide single agent given MGMT methylation status, and previous good tolerance). Today reports doing reasonably. Some fatigue but manageable. Blood work today with adequate counts, chemistries unremarkable. We will see him back in a few weeks. Addendum: Case discussed with Dr. Felipa Starkey. Patient not a surgical candidate. Plan for SBRT to site of disease progression, will plan for temozolomide 150 mg per metered squared daily x5 every 28 days thereafter. 1. RT temporal-parietal GBM s/p MSR & BCNU wafer placement 12/31/20  2. Fatigue    PLAN:  - As above. S/p concurrent Temozolomide 75 mg/m2 daily w/ XRT (completed 3/26/21). On PCP prophylaxis w Bactrim DS MWF. Brain MRI w NV.  Started (4/21) adjuvant Temozolomide 150 mg/m2 daily x 5 days every 28 days x 6 (completed 10/21). PD 6/22: consider re-treatment as above. - Optune alternating electrical field therapy post chemoradiation (5/21-10/21). - Continue Keppra 500 bid. - MGMT methylation detected, IDH wild type. RTC in 2 weeks w MD with labs, LDH. Serial brain MRIs every 3 months. Thank you Dr Tiffany Yap for allowing us to paticipate in care of this pleasant patient.  Please call w/ any alina Horner MD  [address] [place] [address],[address]  Hematology Oncology  [address] [place] [address]  Office : [phone]  Fax : [phone]

## 2022-06-08 RX ORDER — TEMOZOLOMIDE 100 MG/1
CAPSULE ORAL
Qty: 15 CAPSULE | Refills: 5 | Status: SHIPPED | OUTPATIENT
Start: 2022-06-08 | End: 2022-10-27 | Stop reason: SDUPTHER

## 2022-06-08 NOTE — TELEPHONE ENCOUNTER
Per Dr. Carlyn Guajardo. Will plan for temodar 150 mg/m2 (300 mg) daily x 5 days every 28 days once finished with SBRT. Prescription sent to [facility]. Once we know radiation schedule, I will schedule appt for patient to come in and be seen to start temodar and he will need tox check 2 weeks later. Wife verbalized understanding of POC.

## 2022-06-09 ENCOUNTER — HOSPITAL ENCOUNTER (OUTPATIENT)
Dept: RADIATION ONCOLOGY | Age: 82
Setting detail: RECURRING SERIES
Discharge: HOME | End: 2022-06-12
Attending: RADIOLOGY
Payer: MEDICARE

## 2022-06-09 PROCEDURE — 77334 RADIATION TREATMENT AID(S): CPT

## 2022-06-15 ENCOUNTER — HOSPITAL ENCOUNTER (OUTPATIENT)
Dept: RADIATION ONCOLOGY | Age: 82
Setting detail: RECURRING SERIES
Discharge: HOME | End: 2022-06-18
Attending: RADIOLOGY
Payer: MEDICARE

## 2022-06-15 PROCEDURE — 77373 STRTCTC BDY RAD THER TX DLVR: CPT

## 2022-06-17 ENCOUNTER — HOSPITAL ENCOUNTER (OUTPATIENT)
Dept: RADIATION ONCOLOGY | Age: 82
Setting detail: RECURRING SERIES
Discharge: HOME | End: 2022-06-20
Attending: RADIOLOGY
Payer: MEDICARE

## 2022-06-17 DIAGNOSIS — C71.9 MALIGNANT NEOPLASM OF BRAIN, UNSPECIFIED LOCATION (HCC): Primary | ICD-10-CM

## 2022-06-17 PROCEDURE — 77373 STRTCTC BDY RAD THER TX DLVR: CPT

## 2022-06-17 NOTE — PROGRESS NOTES
Patient: Gianni Fung MRN: [id number]  SSN: [id number]    YOB: 1940  Age: 80 y.o. Sex: male      DIAGNOSIS:  Recurrent GBM    TREATMENT SITE:  L temporal    DOSE and FRACTIONATION:  2 of 3 fractions; 1800 of 2700cGy    INTERVAL HISTORY:  Gianni Fung is a 80 y.o. male being treated for recurrent GBM. Week 1: No complaints. OBJECTIVE:  NAD  There were no vitals filed for this visit. Lab Results   Component Value Date     06/06/2022    K 3.5 06/06/2022     06/06/2022    CO2 28 06/06/2022    BUN 21 06/06/2022    GFRAA 58 06/06/2022    MG 2.3 04/26/2021    GLOB 3.4 06/06/2022    ALT 21 06/06/2022     Lab Results   Component Value Date    WBC 7.3 06/06/2022    HGB 13.1 06/06/2022    HCT 39.4 06/06/2022     06/06/2022       ASSESSMENT and PLAN:  Gianni Fung is tolerating radiation as anticipated for the current dose and fraction. He completes treatment 6/20/22 and will follow up in 2 months with repeat MRI brain or sooner as needed.     Timur Armando MD   June 17, 2022

## 2022-06-20 ENCOUNTER — HOSPITAL ENCOUNTER (OUTPATIENT)
Dept: RADIATION ONCOLOGY | Age: 82
Setting detail: RECURRING SERIES
Discharge: HOME | End: 2022-06-23
Attending: RADIOLOGY
Payer: MEDICARE

## 2022-06-20 PROCEDURE — 77373 STRTCTC BDY RAD THER TX DLVR: CPT

## 2022-06-20 PROCEDURE — 77336 RADIATION PHYSICS CONSULT: CPT

## 2022-06-22 DIAGNOSIS — C71.9 GLIOBLASTOMA (HCC): Primary | ICD-10-CM

## 2022-06-23 ENCOUNTER — OFFICE VISIT (OUTPATIENT)
Dept: ONCOLOGY | Age: 82
End: 2022-06-23
Payer: MEDICARE

## 2022-06-23 ENCOUNTER — HOSPITAL ENCOUNTER (OUTPATIENT)
Dept: LAB | Age: 82
Discharge: HOME | End: 2022-06-26
Payer: MEDICARE

## 2022-06-23 VITALS
WEIGHT: 188.9 LBS | RESPIRATION RATE: 18 BRPM | HEART RATE: 74 BPM | OXYGEN SATURATION: 96 % | DIASTOLIC BLOOD PRESSURE: 58 MMHG | TEMPERATURE: 98.1 F | SYSTOLIC BLOOD PRESSURE: 125 MMHG | BODY MASS INDEX: 28.72 KG/M2

## 2022-06-23 DIAGNOSIS — C71.9 GLIOBLASTOMA (HCC): Primary | ICD-10-CM

## 2022-06-23 DIAGNOSIS — R53.83 FATIGUE, UNSPECIFIED TYPE: ICD-10-CM

## 2022-06-23 DIAGNOSIS — C71.9 GLIOBLASTOMA (HCC): ICD-10-CM

## 2022-06-23 PROBLEM — M62.81 ACUTE LEFT-SIDED MUSCLE WEAKNESS: Status: RESOLVED | Noted: 2021-02-12 | Resolved: 2022-06-23

## 2022-06-23 PROBLEM — I10 HTN (HYPERTENSION), BENIGN: Status: RESOLVED | Noted: 2017-02-15 | Resolved: 2022-06-23

## 2022-06-23 PROBLEM — R60.0 BILATERAL LEG EDEMA: Status: RESOLVED | Noted: 2022-02-04 | Resolved: 2022-06-23

## 2022-06-23 LAB
ALBUMIN SERPL-MCNC: 3.5 G/DL (ref 3.2–4.6)
ALBUMIN/GLOB SERPL: 1.1 {RATIO} (ref 1.2–3.5)
ALP SERPL-CCNC: 100 U/L (ref 50–136)
ALT SERPL-CCNC: 22 U/L (ref 12–65)
ANION GAP SERPL CALC-SCNC: 6 MMOL/L (ref 7–16)
AST SERPL-CCNC: 14 U/L (ref 15–37)
BASOPHILS # BLD: 0 K/UL (ref 0–0.2)
BASOPHILS NFR BLD: 0 % (ref 0–2)
BILIRUB SERPL-MCNC: 0.6 MG/DL (ref 0.2–1.1)
BUN SERPL-MCNC: 22 MG/DL (ref 8–23)
CALCIUM SERPL-MCNC: 8.8 MG/DL (ref 8.3–10.4)
CHLORIDE SERPL-SCNC: 110 MMOL/L (ref 98–107)
CO2 SERPL-SCNC: 27 MMOL/L (ref 21–32)
CREAT SERPL-MCNC: 1.4 MG/DL (ref 0.8–1.5)
DIFFERENTIAL METHOD BLD: ABNORMAL
EOSINOPHIL # BLD: 0.2 K/UL (ref 0–0.8)
EOSINOPHIL NFR BLD: 3 % (ref 0.5–7.8)
ERYTHROCYTE [DISTWIDTH] IN BLOOD BY AUTOMATED COUNT: 13.2 % (ref 11.9–14.6)
GLOBULIN SER CALC-MCNC: 3.3 G/DL (ref 2.3–3.5)
GLUCOSE SERPL-MCNC: 140 MG/DL (ref 65–100)
HCT VFR BLD AUTO: 36.6 %
HGB BLD-MCNC: 12.5 G/DL (ref 13.6–17.2)
IMM GRANULOCYTES # BLD AUTO: 0.1 K/UL (ref 0–0.5)
IMM GRANULOCYTES NFR BLD AUTO: 1 % (ref 0–5)
LDH SERPL L TO P-CCNC: 157 U/L (ref 110–210)
LYMPHOCYTES # BLD: 0.8 K/UL (ref 0.5–4.6)
LYMPHOCYTES NFR BLD: 12 % (ref 13–44)
MCH RBC QN AUTO: 31 PG (ref 26.1–32.9)
MCHC RBC AUTO-ENTMCNC: 34.2 G/DL (ref 31.4–35)
MCV RBC AUTO: 90.8 FL (ref 79.6–97.8)
MONOCYTES # BLD: 0.6 K/UL (ref 0.1–1.3)
MONOCYTES NFR BLD: 9 % (ref 4–12)
NEUTS SEG # BLD: 5.1 K/UL (ref 1.7–8.2)
NEUTS SEG NFR BLD: 75 % (ref 43–78)
NRBC # BLD: 0 K/UL (ref 0–0.2)
PLATELET # BLD AUTO: 132 K/UL (ref 150–450)
PMV BLD AUTO: 9.7 FL (ref 9.4–12.3)
POTASSIUM SERPL-SCNC: 3.6 MMOL/L (ref 3.5–5.1)
PROT SERPL-MCNC: 6.8 G/DL (ref 6.3–8.2)
RBC # BLD AUTO: 4.03 M/UL (ref 4.23–5.6)
SODIUM SERPL-SCNC: 143 MMOL/L (ref 136–145)
WBC # BLD AUTO: 6.8 K/UL (ref 4.3–11.1)

## 2022-06-23 PROCEDURE — 83615 LACTATE (LD) (LDH) ENZYME: CPT

## 2022-06-23 PROCEDURE — 36415 COLL VENOUS BLD VENIPUNCTURE: CPT

## 2022-06-23 PROCEDURE — G8427 DOCREV CUR MEDS BY ELIG CLIN: HCPCS | Performed by: NURSE PRACTITIONER

## 2022-06-23 PROCEDURE — 1036F TOBACCO NON-USER: CPT | Performed by: NURSE PRACTITIONER

## 2022-06-23 PROCEDURE — 99214 OFFICE O/P EST MOD 30 MIN: CPT | Performed by: NURSE PRACTITIONER

## 2022-06-23 PROCEDURE — 85025 COMPLETE CBC W/AUTO DIFF WBC: CPT

## 2022-06-23 PROCEDURE — 1123F ACP DISCUSS/DSCN MKR DOCD: CPT | Performed by: NURSE PRACTITIONER

## 2022-06-23 PROCEDURE — 80053 COMPREHEN METABOLIC PANEL: CPT

## 2022-06-23 PROCEDURE — G8417 CALC BMI ABV UP PARAM F/U: HCPCS | Performed by: NURSE PRACTITIONER

## 2022-06-23 RX ORDER — SULFAMETHOXAZOLE AND TRIMETHOPRIM 800; 160 MG/1; MG/1
TABLET ORAL
Qty: 48 TABLET | Refills: 2 | Status: SHIPPED | OUTPATIENT
Start: 2022-06-23

## 2022-06-23 ASSESSMENT — PATIENT HEALTH QUESTIONNAIRE - PHQ9
SUM OF ALL RESPONSES TO PHQ QUESTIONS 1-9: 0
3. TROUBLE FALLING OR STAYING ASLEEP: 0
SUM OF ALL RESPONSES TO PHQ QUESTIONS 1-9: 0
8. MOVING OR SPEAKING SO SLOWLY THAT OTHER PEOPLE COULD HAVE NOTICED. OR THE OPPOSITE, BEING SO FIGETY OR RESTLESS THAT YOU HAVE BEEN MOVING AROUND A LOT MORE THAN USUAL: 0
1. LITTLE INTEREST OR PLEASURE IN DOING THINGS: 0
5. POOR APPETITE OR OVEREATING: 0
7. TROUBLE CONCENTRATING ON THINGS, SUCH AS READING THE NEWSPAPER OR WATCHING TELEVISION: 0
6. FEELING BAD ABOUT YOURSELF - OR THAT YOU ARE A FAILURE OR HAVE LET YOURSELF OR YOUR FAMILY DOWN: 0
SUM OF ALL RESPONSES TO PHQ QUESTIONS 1-9: 0
SUM OF ALL RESPONSES TO PHQ9 QUESTIONS 1 & 2: 0
SUM OF ALL RESPONSES TO PHQ QUESTIONS 1-9: 0
9. THOUGHTS THAT YOU WOULD BE BETTER OFF DEAD, OR OF HURTING YOURSELF: 0
4. FEELING TIRED OR HAVING LITTLE ENERGY: 0
10. IF YOU CHECKED OFF ANY PROBLEMS, HOW DIFFICULT HAVE THESE PROBLEMS MADE IT FOR YOU TO DO YOUR WORK, TAKE CARE OF THINGS AT HOME, OR GET ALONG WITH OTHER PEOPLE: 0
2. FEELING DOWN, DEPRESSED OR HOPELESS: 0

## 2022-06-23 NOTE — PROGRESS NOTES
Data Source: Patient, [facility] record. 6/23/2022    12:37 PM    Georganna Lanes Menton [id number]    2 y.o. 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, history of depression/anxiety disorder, gout, insomnia, hernia repair, ankle fracture repair, lower back surgery with limited mobility thereafter, more recently admitted 12/25/2020 after being found unresponsive in bathroom by wife. Abnormal head CT leading to a brain MRI 12/25/2020 showing right temporal parietal mass 3.7 x 3.3 cm in size with adjacent cerebral edema, as well as associated hemorrhage and/or calcification. 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 CT CAP 12/27/2020 without evidence of malignancy or metastatic disease. Patient was seen by neurosurgery Dr. Julia Hogan and taken to the OR 12/31/2020. Underwent right parietal craniotomy with resection of right parietal tumor and placement of intraoperative BCNU wafers to surgical bed. 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 Recovering well, now referred to medical oncology as well as radiation oncology for further therapy. He is also undergoing speech therapy. On evaluation today using a walker to get around. Today reports some short term memory issues but otherwise doing well. Family support includes wife and one daughter who lives in [place] and was here for his surgery. Not currently on any seizure medicines and being tapered off of Decadron (will be done next Monday). Hospice Referral:  na  Interval History:  6/23/2022: He returns today with wife for follow up and to start maintenance Temodar. He completed 3 fractions XRT to left temporal on 6/20 and tolerated it well. He currently has no GI or bowel complaints.   Appetite is good and weight is stable. Mild fatigue is ongoing, naps during the day. There is no cough, shortness of breath, or edema. Denies headaches, vision changes, or dizziness. No pain. No recent fevers or infectious symptoms. He has Temodar and is ready to start 300 mg daily x 5 out of 28 day cycle. Restart Bactrim MWF. Labs reviewed and adequate. Return for follow up/labs in 2 weeks. REVIEW OF SYSTEMS:  As mentioned above, all other systems were reviewed in full and are negative. Past Medical History:   Diagnosis Date    Anxiety disorder 12/9/2014    Arthritis 12/9/2014    Depression 12/9/2014    Glioblastoma ([facility] [address] 12/26/2020    Gout 12/9/2014    History of intracranial hemorrhage 12/25/2020    History of lumbar laminectomy for spinal cord decompression 2/18/2019    History of seizure 12/28/2020    HTN (hypertension), benign 2/15/2017    Hyperlipemia 12/9/2014    Hypertension     Idiopathic chronic gout of right ankle 12/9/2014    Insomnia 12/9/2014    Kidney stone     Mixed hyperlipidemia 12/9/2014    Panic disorder 12/9/2014    Primary insomnia 12/9/2014    Rosacea 2/15/2017       Past Surgical History:   Procedure Laterality Date    ANKLE FRACTURE SURGERY Left 6/1974    COLONOSCOPY  2007    CYST REMOVAL      CYST REMOVAL      pylonidal cyst    CYST REMOVAL      HERNIA REPAIR Bilateral     OTHER SURGICAL HISTORY      wrist    WRIST FRACTURE SURGERY Bilateral 6/1993    wrist plate/pin       Current Outpatient Medications   Medication Sig Dispense Refill    sulfamethoxazole-trimethoprim (BACTRIM DS;SEPTRA DS) 800-160 MG per tablet Take 1 tab on Monday, Wednesday, and Friday.  48 tablet 2    temozolomide (TEMODAR) 100 MG chemo capsule Take 3 capsules by mouth daily for 5 days every 28 days 15 capsule 5    Calcium-Magnesium-Vitamin D (CALCIUM 1200+D3 PO) Take by mouth daily      atorvastatin (LIPITOR) 40 MG tablet TAKE 1 TABLET BY MOUTH EVERY DAY      vitamin D (CHOLECALCIFEROL) 25 MCG (1000 UT) TABS tablet Take 1,000 Units by mouth daily      esomeprazole (NEXIUM) 20 MG delayed release capsule Take 20 mg by mouth daily      furosemide (LASIX) 20 MG tablet Take 20 mg by mouth daily      levETIRAcetam (KEPPRA) 500 MG tablet Take 500 mg by mouth 2 times daily      L-Lysine 500 MG TABS Take 500 mg by mouth daily      olmesartan (BENICAR) 40 MG tablet Take 40 mg by mouth daily      ondansetron (ZOFRAN) 8 MG tablet Take 8 mg by mouth every 8 hours as needed      polyethylene glycol (GLYCOLAX) 17 GM/SCOOP powder Take 17 g by mouth daily as needed      potassium chloride (KLOR-CON) 10 MEQ extended release tablet TAKE 1 TABLET BY MOUTH EVERY DAY      sertraline (ZOLOFT) 100 MG tablet Take 150 mg by mouth daily      tamsulosin (FLOMAX) 0.4 MG capsule Take 0.4 mg by mouth daily      senna-docusate (PERICOLACE) 8.6-50 MG per tablet Take 1 tablet by mouth 2 times daily       No current facility-administered medications for this visit. Social History     Socioeconomic History    Marital status:      Spouse name: None    Number of children: None    Years of education: None    Highest education level: None   Occupational History    None   Tobacco Use    Smoking status: Former Smoker     Quit date: 1970     Years since quittin.5    Smokeless tobacco: Never Used   Substance and Sexual Activity    Alcohol use: Not Currently    Drug use: No    Sexual activity: None   Other Topics Concern    None   Social History Narrative    None     Social Determinants of Health     Financial Resource Strain:     Difficulty of Paying Living Expenses: Not on file   Food Insecurity:     Worried About Running Out of Food in the Last Year: Not on file    Linnea of Food in the Last Year: Not on file   Transportation Needs:     Lack of Transportation (Medical): Not on file    Lack of Transportation (Non-Medical):  Not on file   Physical Activity:     Days of Exercise per Week: Not on file    Minutes of Exercise per Session: Not on file   Stress:     Feeling of Stress : Not on file   Social Connections:     Frequency of Communication with Friends and Family: Not on file    Frequency of Social Gatherings with Friends and Family: Not on file    Attends [employer] Services: Not on file    Active Member of [address] [place] [address] or Organizations: Not on file    Attends Club or Organization Meetings: Not on file    Marital Status: Not on file   Intimate Partner Violence:     Fear of Current or Ex-Partner: Not on file    Emotionally Abused: Not on file    Physically Abused: Not on file    Sexually Abused: Not on file   Housing Stability:     Unable to Pay for Housing in the Last Year: Not on file    Number of [place] in the Last Year: Not on file    Unstable Housing in the Last Year: Not on file       Family History   Problem Relation Age of Onset    Cancer Mother         lung ca    Hypertension Mother     Heart Disease Mother     Osteoarthritis Father     Cancer Brother         stomach       No Known Allergies    PHYSICAL EXAMINATION:  General Appearance: Healthy appearing patient in no acute distress  Vitals reviewed. BP (!) 125/58 (Site: Right Upper Arm, Position: Standing, Cuff Size: Small Adult)   Pulse 74   Temp 98.1 °F (36.7 °C) (Oral)   Resp 18   Wt 188 lb 14.4 oz (85.7 kg)   SpO2 96%   BMI 28.72 kg/m²   HEENT: Neck is supple with no thyromegaly or JVD noted. Lungs/Thorax: Clear to auscultation, no accessory muscles of respiration being used. Heart: Regular rate and rhythm, normal S1, S2, no appreciable murmurs, rubs, gallops  Abdomen: Soft, nontender, bowel sounds present  Extremeties: Good pulses bilaterally, no peripheral edema. Skin: Normal skin tone with no rash, petechiae, ecchymosis noted.   Musculoskeletal: No pain on palpation over bony prominence, no edema, no evidence of gout, no joint or bony deformity  Neurologic: Grossly intact    LABS/IMAGING:    Lab Results Component Value Date    WBC 6.8 06/23/2022    HGB 12.5 06/23/2022    HCT 36.6 06/23/2022     06/23/2022    MCV 90.8 06/23/2022       Lab Results   Component Value Date     06/23/2022    K 3.6 06/23/2022     06/23/2022    CO2 27 06/23/2022    BUN 22 06/23/2022    GFRAA >60 06/23/2022    GLOB 3.3 06/23/2022    ALT 22 06/23/2022         Above results reviewed with patient. 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, history of depression/anxiety disorder, gout, insomnia, hernia repair, ankle fracture repair, lower back surgery with limited mobility thereafter, more recently admitted 12/25/2020 after being found unresponsive in bathroom by wife. Abnormal head CT leading to a brain MRI 12/25/2020 showing right temporal parietal mass 3.7 x 3.3 cm in size with adjacent cerebral edema, as well as associated hemorrhage and/or calcification. 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 CT CAP 12/27/2020 without evidence of malignancy or metastatic disease. Patient was seen by neurosurgery Dr. Tiffany Yap and taken to the OR 12/31/2020. Underwent right parietal craniotomy with resection of right parietal tumor and placement of intraoperative BCNU wafers to surgical bed. 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 Recovering well, now referred to medical oncology as well as radiation oncology for further therapy. He is also undergoing speech therapy. On evaluation today using a walker to get around. Today reports some short term memory issues but otherwise doing well. Family support includes wife and one daughter who lives in [place] and was here for his surgery. Not currently on any seizure medicines and being tapered off of Decadron (will be done next Monday). 2/10/2021: Since last visit, significant course including recurrent seizure requiring ED visit, noncontrasted head CT with improved postoperative changes.   He has since been placed on Keppra 500 twice daily. He was also treated for UTI though appears suboptimal specimen. We will simply repeat UA today, denies any symptoms. Performance status slowly improving, has completed physical therapy, currently undergoing speech therapy. PS 1 today. Recent transient thrombocytopenia, wife reports briefly being prescribed trazodone which they have stopped now as it caused excessive sleepiness. Plan for concurrent chemo-XRT with temozolomide with shorter course XRT over 3 weeks. Repeat simulation and brain MRI 2/20/2021: Results will be followed. Once starts temozolomide, will also benefit with Bactrim 3 times a week for PCP prophylaxis. Weekly CBC once on therapy. RTC once ready to start therapy, weekly provider visits there on.    3/31/2021: Completed Temodar-XRT 3/26/2021. Tolerated reasonably. Ongoing fatigue, though mobility and speech appear improved. Remains on Keppra for past seizures per neurology. Now scheduled for follow-up brain MRI 4/23/2021. Will start adjuvant temozolomide at 150 mg per metered squared daily x5 days every 28 days at that point. We will also look into Optune therapy. RTC post brain MRI.    4/26/2021: Reports doing reasonably. Mobility has somewhat improved. Per wife takes multiple naps during the day. They are planning to  more regular outdoor walking. Labs today unremarkable. Recent brain MRI without disease progression ([place]), treatment related changes seen. Okay to proceed with adjuvant temozolomide. Repeat labs and provider visit in 2 weeks. Paperwork initiated for optune therapy. 5/24/2021:  appears to have tolerated adjuvant temozolomide cycle 1 well. Main complaint has remained ongoing fatigue for which he takes regular naps. Denies any new sensorimotor deficits, or seizure activity. Reasonable p.o. intake and mobility now. Also started Optune therapy a few weeks ago, tolerating reasonably, scalp without any rash. Okay to proceed with cycle 2 adjuvant temozolomide tomorrow, given labs without cytopenias, we will simply see him back with repeat labs in a month. Now scheduled for repeat brain MRI per radiation oncology in 8/21.    7/20/2021: Continues to tolerate therapy well. Mobility has significantly improved, walking briskly in the room. Regular with his Keppra, as well as Bactrim. Blood work unremarkable, regular with Optune which he is tolerating well without any signs of scalp dermatitis. Okay to proceed with cycle 4. Scheduled for brain MRI and subsequent radiation oncology follow-up next month, will chart check imaging. Otherwise we will see him back in a month. 9/14/2021: Patient here for cycle 6 ( last cycle of adjuvant temozolomide). Performance status remains improved. Some fatigue though. Nausea better on days with Temodar since taking Zofran an hour before each dose. Also regular with his Keppra as well as Bactrim. Last brain MRI 8/21 with responding disease, now scheduled for repeat brain MRI in 11/17/2021. Labs today with thrombocytopenia with platelet count 85,484. Will defer next cycle by week, till counts recover. Discussed role of Optune therapy continuation beyond 6 cycles, they will discuss but are leaning towards continuing. 11/22/2021: More recently seeing primary care for sciatica as well as neck stiffness. Reasonable p.o. intake and mobility. Some scalp dermatitis, they have decided to forego further Optune therapy. This is reasonable. Blood work unremarkable, platelet count having normalized. Brain MRI with SD. Simple monitoring moving forward, will repeat brain imaging in 3 months time. 2/25/2022: Reports doing reasonably, some difficulty with time management in the day but otherwise mobile, has been in right now able to do things together, looking forward to [facility] in a few months. Has seen neurology, no further seizures, remains on Keppra 500 twice daily. Recent brain MRI with no clear evidence of disease progression at surgical site, T2 prolongation in right precentral gyrus and splenium of corpus callosum slowly worsened, possibly posttreatment change versus infiltrative tumor. Will get radiation oncology opinion also. We will see him back in 3 months with repeat imaging    Addendum: Case discussed with Dr. Naz Rogers, felt to have therapy related changes rather than true progression. 6/6/2022: Repeat brain MRI with/without contrast 5/25/2022 with changes concerning for disease progression including new 1.6 x 1.1 cm enhancing focus anterior and superior to the right sided operative cavity. Patient has seen radiation oncology Dr. Naz Rogers as well as neurosurgery Dr. Audrey Traylor. The patient and family report being told not a candidate for repeat surgery, seeing radiation oncology later today. Will discuss case with Dr. Naz Rogers, likely repeat XRT followed by systemic therapy (likely temozolomide single agent given MGMT methylation status, and previous good tolerance). Today reports doing reasonably. Some fatigue but manageable. Blood work today with adequate counts, chemistries unremarkable. We will see him back in a few weeks. Addendum: Case discussed with Dr. Naz Rogers. Patient not a surgical candidate. Plan for SBRT to site of disease progression, will plan for temozolomide 150 mg per metered squared daily x5 every 28 days thereafter. 6/23/2022: He returns today with wife for follow up and to start maintenance Temodar. He completed 3 fractions XRT to left temporal on 6/20 and tolerated it well. He currently has no GI or bowel complaints. Appetite is good and weight is stable. Mild fatigue is ongoing, naps during the day. There is no cough, shortness of breath, or edema. Denies headaches, vision changes, or dizziness. No pain. No recent fevers or infectious symptoms. He has Temodar and is ready to start 300 mg daily x 5 out of 28 day cycle.   Restart Bactrim MWF. Labs reviewed and adequate. Return for follow up/labs in 2 weeks. 1. RT temporal-parietal GBM s/p MSR & BCNU wafer placement 12/31/20  2. Fatigue    PLAN:  - As above. S/p concurrent Temozolomide 75 mg/m2 daily w/ XRT (completed 3/26/21). On PCP prophylaxis w Bactrim DS MWF. Brain MRI w MT. Started (4/21) adjuvant Temozolomide 150 mg/m2 daily x 5 days every 28 days x 6 (completed 10/21). PD 6/22: consider re-treatment as above. - Optune alternating electrical field therapy post chemoradiation (5/21-10/21). - Continue Keppra 500 bid. - MGMT methylation detected, IDH wild type. RTC in 2 weeks with labs, LDH. Serial brain MRIs every 3 months.         Juan Mckeoner) [address] [place] [address] Hematology and Oncology  [address]  Office : [phone]  Fax : [phone]

## 2022-06-27 ENCOUNTER — TELEPHONE (OUTPATIENT)
Dept: ONCOLOGY | Age: 82
End: 2022-06-27

## 2022-06-27 ENCOUNTER — TELEPHONE (OUTPATIENT)
Dept: RADIATION ONCOLOGY | Age: 82
End: 2022-06-27

## 2022-06-27 DIAGNOSIS — C71.9 GLIOBLASTOMA (HCC): Primary | ICD-10-CM

## 2022-06-27 RX ORDER — DEXAMETHASONE 4 MG/1
8 TABLET ORAL 2 TIMES DAILY WITH MEALS
Qty: 90 TABLET | Refills: 0 | Status: SHIPPED | OUTPATIENT
Start: 2022-06-27 | End: 2022-08-05 | Stop reason: ALTCHOICE

## 2022-06-27 RX ORDER — PANTOPRAZOLE SODIUM 40 MG/1
40 TABLET, DELAYED RELEASE ORAL
Qty: 90 TABLET | Refills: 1 | Status: SHIPPED | OUTPATIENT
Start: 2022-06-27 | End: 2022-10-06 | Stop reason: SDUPTHER

## 2022-06-27 NOTE — TELEPHONE ENCOUNTER
Pt's wife LMOM in [address] [place] [address]. to report that pt has had increasing weakness of his left hand and now fully of his left side. Pt's wife also reached out to Med Onc today, and they called Dr. Toma Heard who is out of state and out of the office until 6/29/22. Dr. Toma Heard ordered Decadron 8 mg BID and also Protonix to start immediately. I called pt's wife and she said so far she has gotten one dose of the Decadron 8 mg PO into pt today, and also since she last talked with Med Onc, his weakness has gotten progressively worse on his left side. Pt's wife stated that he fell at home and she called EMS who came to the house and evaluated pt, but he refused to go to the hospital.  The above was discussed with Dr. Anay Medina, pt's wife was instructed to give a second Decadron 8 mg dose now, and if there is no improvement in the morning from his left sided weakness, pt is to go directly to the ER. Pt's wife verbalized understanding.

## 2022-06-27 NOTE — TELEPHONE ENCOUNTER
Received call from pt's wife - States that pt has had numbness, tingling and tremors in the left hand that started over the weekend and has progressively gotten worse. States pt has nearly lost all sensation in the left arm. Maintenance dose of Temodar was started today. Pt's wife also reports that the pt fell today - pt is not on any blood thinners, no injuries sustained in the fall. Wife believes his balance may be effected. Reported above to NP Nicks. Due to recent completion of of radiation tx, pt may be experiencing some swelling - NP Nicks to contact Dr. Jazmyne Drummond to determine steroid dosing for pt.

## 2022-06-27 NOTE — TELEPHONE ENCOUNTER
Received call from pt's wife inquiring about whether pt should continue taking the Temodar while on steroids for swelling. Pt took first dose of Temodar this morning. Reviewed with NP Karen  Due to patient just starting Temodar today and pt's current symptoms beginning prior to this first dose, the symptoms are likely more related to post-radiation tx swelling. Per NP Karen, pt OK to continue Temodar. Pt's wife stated that she spoke with RN in radiology dpt who instructed her to administer second dose of dexamethsone now and continue to monitor pt. If symptoms worsen, pt to go to ED. Otherwise, wife instructed to call back in AM to provide update on patient's condition. Informed pt's wife that we were agreeable with this plan and instructed her to keep us updated as well.

## 2022-06-27 NOTE — TELEPHONE ENCOUNTER
Received following orders from MUKESH Hinds after her discussion with Dr. Luann Keith. Start taking dexamethasone 8mg BID and protonix 40mg daily. Scripts sent to pharmacy - pt's wife notified. Pt's wife instructed to call Dr. Ronni Agosto office tomorrow with update regarding pt's condition. She verbalized understanding of my instruction. Pt appreciated my call.

## 2022-06-28 ENCOUNTER — TELEPHONE (OUTPATIENT)
Dept: RADIATION ONCOLOGY | Age: 82
End: 2022-06-28

## 2022-06-28 NOTE — TELEPHONE ENCOUNTER
Pt's wife called Ann-Marie Diamond today to report that pt is \"doing much better\" since taking the second dose of Decadron with-in hours of the first dose, ordered yesterday by Dr. Anastacio Quintanilla (see NN on 6/27/22). Pt's wife stated that he now has movement of all extremities on his left side and that she will not be taking him to the ER today. Dr. Marilyn Ledbetter will be back in the office tomorrow. Pt's wife verbalized understanding to call if she needs further assistance. Dr. Anastacio Quintanilla was notified about the above.

## 2022-07-06 DIAGNOSIS — C71.9 GLIOBLASTOMA (HCC): Primary | ICD-10-CM

## 2022-07-07 ENCOUNTER — OFFICE VISIT (OUTPATIENT)
Dept: ONCOLOGY | Age: 82
End: 2022-07-07
Payer: MEDICARE

## 2022-07-07 ENCOUNTER — HOSPITAL ENCOUNTER (OUTPATIENT)
Dept: LAB | Age: 82
Discharge: HOME | End: 2022-07-10
Payer: MEDICARE

## 2022-07-07 VITALS
BODY MASS INDEX: 28.45 KG/M2 | RESPIRATION RATE: 14 BRPM | HEART RATE: 76 BPM | DIASTOLIC BLOOD PRESSURE: 54 MMHG | WEIGHT: 187.7 LBS | HEIGHT: 68 IN | SYSTOLIC BLOOD PRESSURE: 105 MMHG | OXYGEN SATURATION: 97 % | TEMPERATURE: 98.3 F

## 2022-07-07 DIAGNOSIS — C71.9 GLIOBLASTOMA (HCC): ICD-10-CM

## 2022-07-07 DIAGNOSIS — C71.9 GLIOBLASTOMA (HCC): Primary | ICD-10-CM

## 2022-07-07 DIAGNOSIS — R53.1 LEFT-SIDED WEAKNESS: ICD-10-CM

## 2022-07-07 DIAGNOSIS — D72.829 LEUKOCYTOSIS, UNSPECIFIED TYPE: ICD-10-CM

## 2022-07-07 PROBLEM — N18.30 CHRONIC RENAL DISEASE, STAGE III (HCC): Status: ACTIVE | Noted: 2022-07-07

## 2022-07-07 LAB
ALBUMIN SERPL-MCNC: 3.3 G/DL (ref 3.2–4.6)
ALBUMIN/GLOB SERPL: 1.1 {RATIO} (ref 1.2–3.5)
ALP SERPL-CCNC: 111 U/L (ref 50–136)
ALT SERPL-CCNC: 24 U/L (ref 12–65)
ANION GAP SERPL CALC-SCNC: 10 MMOL/L (ref 7–16)
AST SERPL-CCNC: 11 U/L (ref 15–37)
BASOPHILS # BLD: 0 K/UL (ref 0–0.2)
BASOPHILS NFR BLD: 0 % (ref 0–2)
BILIRUB SERPL-MCNC: 0.7 MG/DL (ref 0.2–1.1)
BUN SERPL-MCNC: 37 MG/DL (ref 8–23)
CALCIUM SERPL-MCNC: 8.2 MG/DL (ref 8.3–10.4)
CHLORIDE SERPL-SCNC: 108 MMOL/L (ref 98–107)
CO2 SERPL-SCNC: 23 MMOL/L (ref 21–32)
CREAT SERPL-MCNC: 1.6 MG/DL (ref 0.8–1.5)
DIFFERENTIAL METHOD BLD: ABNORMAL
EOSINOPHIL # BLD: 0 K/UL (ref 0–0.8)
EOSINOPHIL NFR BLD: 0 % (ref 0.5–7.8)
ERYTHROCYTE [DISTWIDTH] IN BLOOD BY AUTOMATED COUNT: 13.8 % (ref 11.9–14.6)
GLOBULIN SER CALC-MCNC: 3.1 G/DL (ref 2.3–3.5)
GLUCOSE SERPL-MCNC: 185 MG/DL (ref 65–100)
HCT VFR BLD AUTO: 41.8 %
HGB BLD-MCNC: 14.3 G/DL (ref 13.6–17.2)
IMM GRANULOCYTES # BLD AUTO: 0.4 K/UL (ref 0–0.5)
IMM GRANULOCYTES NFR BLD AUTO: 2 % (ref 0–5)
LDH SERPL L TO P-CCNC: 156 U/L (ref 110–210)
LYMPHOCYTES # BLD: 0.4 K/UL (ref 0.5–4.6)
LYMPHOCYTES NFR BLD: 2 % (ref 13–44)
MCH RBC QN AUTO: 30.8 PG (ref 26.1–32.9)
MCHC RBC AUTO-ENTMCNC: 34.2 G/DL (ref 31.4–35)
MCV RBC AUTO: 90.1 FL (ref 79.6–97.8)
MONOCYTES # BLD: 0.8 K/UL (ref 0.1–1.3)
MONOCYTES NFR BLD: 4 % (ref 4–12)
NEUTS SEG # BLD: 17.6 K/UL (ref 1.7–8.2)
NEUTS SEG NFR BLD: 92 % (ref 43–78)
NRBC # BLD: 0 K/UL (ref 0–0.2)
PLATELET # BLD AUTO: 146 K/UL (ref 150–450)
PMV BLD AUTO: 9.7 FL (ref 9.4–12.3)
POTASSIUM SERPL-SCNC: 3.7 MMOL/L (ref 3.5–5.1)
PROT SERPL-MCNC: 6.4 G/DL (ref 6.3–8.2)
RBC # BLD AUTO: 4.64 M/UL (ref 4.23–5.6)
SODIUM SERPL-SCNC: 141 MMOL/L (ref 136–145)
WBC # BLD AUTO: 19.2 K/UL (ref 4.3–11.1)

## 2022-07-07 PROCEDURE — 80053 COMPREHEN METABOLIC PANEL: CPT

## 2022-07-07 PROCEDURE — G8427 DOCREV CUR MEDS BY ELIG CLIN: HCPCS | Performed by: NURSE PRACTITIONER

## 2022-07-07 PROCEDURE — 36415 COLL VENOUS BLD VENIPUNCTURE: CPT

## 2022-07-07 PROCEDURE — 85025 COMPLETE CBC W/AUTO DIFF WBC: CPT

## 2022-07-07 PROCEDURE — 1036F TOBACCO NON-USER: CPT | Performed by: NURSE PRACTITIONER

## 2022-07-07 PROCEDURE — 83615 LACTATE (LD) (LDH) ENZYME: CPT

## 2022-07-07 PROCEDURE — G8417 CALC BMI ABV UP PARAM F/U: HCPCS | Performed by: NURSE PRACTITIONER

## 2022-07-07 PROCEDURE — 99214 OFFICE O/P EST MOD 30 MIN: CPT | Performed by: NURSE PRACTITIONER

## 2022-07-07 PROCEDURE — 1123F ACP DISCUSS/DSCN MKR DOCD: CPT | Performed by: NURSE PRACTITIONER

## 2022-07-07 ASSESSMENT — PATIENT HEALTH QUESTIONNAIRE - PHQ9
1. LITTLE INTEREST OR PLEASURE IN DOING THINGS: 0
2. FEELING DOWN, DEPRESSED OR HOPELESS: 0
SUM OF ALL RESPONSES TO PHQ9 QUESTIONS 1 & 2: 0
6. FEELING BAD ABOUT YOURSELF - OR THAT YOU ARE A FAILURE OR HAVE LET YOURSELF OR YOUR FAMILY DOWN: 0
SUM OF ALL RESPONSES TO PHQ QUESTIONS 1-9: 0
SUM OF ALL RESPONSES TO PHQ QUESTIONS 1-9: 0
3. TROUBLE FALLING OR STAYING ASLEEP: 0
4. FEELING TIRED OR HAVING LITTLE ENERGY: 0
5. POOR APPETITE OR OVEREATING: 0
SUM OF ALL RESPONSES TO PHQ QUESTIONS 1-9: 0
SUM OF ALL RESPONSES TO PHQ QUESTIONS 1-9: 0
8. MOVING OR SPEAKING SO SLOWLY THAT OTHER PEOPLE COULD HAVE NOTICED. OR THE OPPOSITE, BEING SO FIGETY OR RESTLESS THAT YOU HAVE BEEN MOVING AROUND A LOT MORE THAN USUAL: 0
9. THOUGHTS THAT YOU WOULD BE BETTER OFF DEAD, OR OF HURTING YOURSELF: 0
7. TROUBLE CONCENTRATING ON THINGS, SUCH AS READING THE NEWSPAPER OR WATCHING TELEVISION: 0

## 2022-07-07 NOTE — PROGRESS NOTES
Data Source: Patient, [facility] record. 7/7/2022    12:53 PM    Reddy Gastelum [id number]    26 y.o. 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, history of depression/anxiety disorder, gout, insomnia, hernia repair, ankle fracture repair, lower back surgery with limited mobility thereafter, more recently admitted 12/25/2020 after being found unresponsive in bathroom by wife. Abnormal head CT leading to a brain MRI 12/25/2020 showing right temporal parietal mass 3.7 x 3.3 cm in size with adjacent cerebral edema, as well as associated hemorrhage and/or calcification. 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 CT CAP 12/27/2020 without evidence of malignancy or metastatic disease. Patient was seen by neurosurgery Dr. Reji Hayes and taken to the OR 12/31/2020. Underwent right parietal craniotomy with resection of right parietal tumor and placement of intraoperative BCNU wafers to surgical bed. 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 Recovering well, now referred to medical oncology as well as radiation oncology for further therapy. He is also undergoing speech therapy. On evaluation today using a walker to get around. Today reports some short term memory issues but otherwise doing well. Family support includes wife and one daughter who lives in [place] and was here for his surgery. Not currently on any seizure medicines and being tapered off of Decadron (will be done next Monday). Hospice Referral:  na  Interval History:  7/7/2022: He returns today with wife for follow-up. He started and completed the first week of TMZ 300mg x5 days out of 28 day cycle and had no issues. He feels that his L-sided weakness is much improved.  He was ambulating with walker and now requires no assistance. He denies headaches or other neurological symptoms including vision changes or dizziness. He continues on Dex 8mg BID. He is eating and drinking well. Denies nausea, vomiting. No GI issues. Denies fevers or infectious symptoms. Labs reviewed - WBC 19, mostly neutrophils and without symptoms. Instructed patient and wife to monitor closely and call with fever or symptoms although this is likely secondary to steroids. Encouraged to push PO fluids. RTC in 2 weeks prior to next cycle of treatment. REVIEW OF SYSTEMS:  As mentioned above, all other systems were reviewed in full and are negative.     Past Medical History:   Diagnosis Date    Anxiety disorder 12/9/2014    Arthritis 12/9/2014    Depression 12/9/2014    Glioblastoma ([facility] [address] 12/26/2020    Gout 12/9/2014    History of intracranial hemorrhage 12/25/2020    History of lumbar laminectomy for spinal cord decompression 2/18/2019    History of seizure 12/28/2020    HTN (hypertension), benign 2/15/2017    Hyperlipemia 12/9/2014    Hypertension     Idiopathic chronic gout of right ankle 12/9/2014    Insomnia 12/9/2014    Kidney stone     Mixed hyperlipidemia 12/9/2014    Panic disorder 12/9/2014    Primary insomnia 12/9/2014    Rosacea 2/15/2017       Past Surgical History:   Procedure Laterality Date    ANKLE FRACTURE SURGERY Left 6/1974    COLONOSCOPY  2007    CYST REMOVAL      CYST REMOVAL      pylonidal cyst    CYST REMOVAL      HERNIA REPAIR Bilateral     OTHER SURGICAL HISTORY      wrist    WRIST FRACTURE SURGERY Bilateral 6/1993    wrist plate/pin       Current Outpatient Medications   Medication Sig Dispense Refill    dexamethasone (DECADRON) 4 MG tablet Take 2 tablets by mouth 2 times daily (with meals) 90 tablet 0    pantoprazole (PROTONIX) 40 MG tablet Take 1 tablet by mouth every morning (before breakfast) 90 tablet 1    sulfamethoxazole-trimethoprim (BACTRIM DS;SEPTRA DS) 800-160 MG per tablet in the Last Year: Not on file    Ran Out of Food in the Last Year: Not on file   Transportation Needs:     Lack of Transportation (Medical): Not on file    Lack of Transportation (Non-Medical): Not on file   Physical Activity:     Days of Exercise per Week: Not on file    Minutes of Exercise per Session: Not on file   Stress:     Feeling of Stress : Not on file   Social Connections:     Frequency of Communication with Friends and Family: Not on file    Frequency of Social Gatherings with Friends and Family: Not on file    Attends Caoda[employer] Services: Not on file    Active Member of [address] [place] [address] [employer] or Organizations: Not on file    Attends Club or Organization Meetings: Not on file    Marital Status: Not on file   Intimate Partner Violence:     Fear of Current or Ex-Partner: Not on file    Emotionally Abused: Not on file    Physically Abused: Not on file    Sexually Abused: Not on file   Housing Stability:     Unable to Pay for Housing in the Last Year: Not on file    Number of [place] in the Last Year: Not on file    Unstable Housing in the Last Year: Not on file       Family History   Problem Relation Age of Onset    Cancer Mother         lung ca    Hypertension Mother     Heart Disease Mother     Osteoarthritis Father     Cancer Brother         stomach       No Known Allergies    PHYSICAL EXAMINATION:  General Appearance: Healthy appearing patient in no acute distress  Vitals reviewed. BP (!) 105/54 Comment: standing  Pulse 76   Temp 98.3 °F (36.8 °C) (Oral)   Resp 14   Ht 5' 8\" (1.727 m)   Wt 187 lb 11.2 oz (85.1 kg)   SpO2 97%   BMI 28.54 kg/m²   HEENT: Neck is supple with no thyromegaly or JVD noted. Lungs/Thorax: Clear to auscultation, no accessory muscles of respiration being used. Heart: Regular rate and rhythm, normal S1, S2, no appreciable murmurs, rubs, gallops  Abdomen: Soft, nontender, bowel sounds present  Extremeties: Good pulses bilaterally, no peripheral edema.   Skin: Normal skin tone with no rash, petechiae, ecchymosis noted. Musculoskeletal: No pain on palpation over bony prominence, no edema, no evidence of gout, no joint or bony deformity  Neurologic: Grossly intact    LABS/IMAGING:    Lab Results   Component Value Date/Time    WBC 19.2 07/07/2022 09:58 AM    HGB 14.3 07/07/2022 09:58 AM    HCT 41.8 07/07/2022 09:58 AM     07/07/2022 09:58 AM    MCV 90.1 07/07/2022 09:58 AM       Lab Results   Component Value Date/Time     07/07/2022 09:58 AM    K 3.7 07/07/2022 09:58 AM     07/07/2022 09:58 AM    CO2 23 07/07/2022 09:58 AM    BUN 37 07/07/2022 09:58 AM    GFRAA 54 07/07/2022 09:58 AM    GLOB 3.1 07/07/2022 09:58 AM    ALT 24 07/07/2022 09:58 AM         Above results reviewed with patient. 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, history of depression/anxiety disorder, gout, insomnia, hernia repair, ankle fracture repair, lower back surgery with limited mobility thereafter, more recently admitted 12/25/2020 after being found unresponsive in bathroom by wife. Abnormal head CT leading to a brain MRI 12/25/2020 showing right temporal parietal mass 3.7 x 3.3 cm in size with adjacent cerebral edema, as well as associated hemorrhage and/or calcification. 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 CT CAP 12/27/2020 without evidence of malignancy or metastatic disease. Patient was seen by neurosurgery Dr. Benny Ji and taken to the OR 12/31/2020. Underwent right parietal craniotomy with resection of right parietal tumor and placement of intraoperative BCNU wafers to surgical bed. 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 Recovering well, now referred to medical oncology as well as radiation oncology for further therapy. He is also undergoing speech therapy. On evaluation today using a walker to get around.  Today reports some short term memory issues but otherwise doing well. Family support includes wife and one daughter who lives in [place] and was here for his surgery. Not currently on any seizure medicines and being tapered off of Decadron (will be done next Monday). 2/10/2021: Since last visit, significant course including recurrent seizure requiring ED visit, noncontrasted head CT with improved postoperative changes. He has since been placed on Keppra 500 twice daily. He was also treated for UTI though appears suboptimal specimen. We will simply repeat UA today, denies any symptoms. Performance status slowly improving, has completed physical therapy, currently undergoing speech therapy. PS 1 today. Recent transient thrombocytopenia, wife reports briefly being prescribed trazodone which they have stopped now as it caused excessive sleepiness. Plan for concurrent chemo-XRT with temozolomide with shorter course XRT over 3 weeks. Repeat simulation and brain MRI 2/20/2021: Results will be followed. Once starts temozolomide, will also benefit with Bactrim 3 times a week for PCP prophylaxis. Weekly CBC once on therapy. RTC once ready to start therapy, weekly provider visits there on.    3/31/2021: Completed Temodar-XRT 3/26/2021. Tolerated reasonably. Ongoing fatigue, though mobility and speech appear improved. Remains on Keppra for past seizures per neurology. Now scheduled for follow-up brain MRI 4/23/2021. Will start adjuvant temozolomide at 150 mg per metered squared daily x5 days every 28 days at that point. We will also look into Optune therapy. RTC post brain MRI.    4/26/2021: Reports doing reasonably. Mobility has somewhat improved. Per wife takes multiple naps during the day. They are planning to  more regular outdoor walking. Labs today unremarkable. Recent brain MRI without disease progression ([place]), treatment related changes seen. Okay to proceed with adjuvant temozolomide. Repeat labs and provider visit in 2 weeks.   Paperwork initiated for optune therapy. 5/24/2021:  appears to have tolerated adjuvant temozolomide cycle 1 well. Main complaint has remained ongoing fatigue for which he takes regular naps. Denies any new sensorimotor deficits, or seizure activity. Reasonable p.o. intake and mobility now. Also started Optune therapy a few weeks ago, tolerating reasonably, scalp without any rash. Okay to proceed with cycle 2 adjuvant temozolomide tomorrow, given labs without cytopenias, we will simply see him back with repeat labs in a month. Now scheduled for repeat brain MRI per radiation oncology in 8/21.    7/20/2021: Continues to tolerate therapy well. Mobility has significantly improved, walking briskly in the room. Regular with his Keppra, as well as Bactrim. Blood work unremarkable, regular with Optune which he is tolerating well without any signs of scalp dermatitis. Okay to proceed with cycle 4. Scheduled for brain MRI and subsequent radiation oncology follow-up next month, will chart check imaging. Otherwise we will see him back in a month. 9/14/2021: Patient here for cycle 6 ( last cycle of adjuvant temozolomide). Performance status remains improved. Some fatigue though. Nausea better on days with Temodar since taking Zofran an hour before each dose. Also regular with his Keppra as well as Bactrim. Last brain MRI 8/21 with responding disease, now scheduled for repeat brain MRI in 11/17/2021. Labs today with thrombocytopenia with platelet count 36,613. Will defer next cycle by week, till counts recover. Discussed role of Optune therapy continuation beyond 6 cycles, they will discuss but are leaning towards continuing. 11/22/2021: More recently seeing primary care for sciatica as well as neck stiffness. Reasonable p.o. intake and mobility. Some scalp dermatitis, they have decided to forego further Optune therapy. This is reasonable. Blood work unremarkable, platelet count having normalized.   Brain MRI with SD. Simple monitoring moving forward, will repeat brain imaging in 3 months time. 2/25/2022: Reports doing reasonably, some difficulty with time management in the day but otherwise mobile, has been in right now able to do things together, looking forward to [employer] in a few months. Has seen neurology, no further seizures, remains on Keppra 500 twice daily. Recent brain MRI with no clear evidence of disease progression at surgical site, T2 prolongation in right precentral gyrus and splenium of corpus callosum slowly worsened, possibly posttreatment change versus infiltrative tumor. Will get radiation oncology opinion also. We will see him back in 3 months with repeat imaging    Addendum: Case discussed with Dr. Powell Krabbe, felt to have therapy related changes rather than true progression. 6/6/2022: Repeat brain MRI with/without contrast 5/25/2022 with changes concerning for disease progression including new 1.6 x 1.1 cm enhancing focus anterior and superior to the right sided operative cavity. Patient has seen radiation oncology Dr. Powell Krabbe as well as neurosurgery Dr. Rosendo Montgomery. The patient and family report being told not a candidate for repeat surgery, seeing radiation oncology later today. Will discuss case with Dr. Powell Krabbe, likely repeat XRT followed by systemic therapy (likely temozolomide single agent given MGMT methylation status, and previous good tolerance). Today reports doing reasonably. Some fatigue but manageable. Blood work today with adequate counts, chemistries unremarkable. We will see him back in a few weeks. Addendum: Case discussed with Dr. Powell Krabbe. Patient not a surgical candidate. Plan for SBRT to site of disease progression, will plan for temozolomide 150 mg per metered squared daily x5 every 28 days thereafter. 6/23/2022: He returns today with wife for follow up and to start maintenance Temodar. He completed 3 fractions XRT to left temporal on 6/20 and tolerated it well.   He [address]  Office : [phone]  Fax : [phone]

## 2022-07-20 DIAGNOSIS — C71.9 GLIOBLASTOMA (HCC): Primary | ICD-10-CM

## 2022-07-21 ENCOUNTER — HOSPITAL ENCOUNTER (OUTPATIENT)
Dept: LAB | Age: 82
Discharge: HOME | End: 2022-07-24
Payer: MEDICARE

## 2022-07-21 ENCOUNTER — OFFICE VISIT (OUTPATIENT)
Dept: ONCOLOGY | Age: 82
End: 2022-07-21
Payer: MEDICARE

## 2022-07-21 ENCOUNTER — CLINICAL DOCUMENTATION (OUTPATIENT)
Dept: ONCOLOGY | Age: 82
End: 2022-07-21

## 2022-07-21 ENCOUNTER — HOSPITAL ENCOUNTER (OUTPATIENT)
Dept: INFUSION THERAPY | Age: 82
Discharge: HOME | End: 2022-07-21
Payer: MEDICARE

## 2022-07-21 VITALS
TEMPERATURE: 97.9 F | RESPIRATION RATE: 14 BRPM | BODY MASS INDEX: 28.14 KG/M2 | DIASTOLIC BLOOD PRESSURE: 46 MMHG | HEIGHT: 68 IN | OXYGEN SATURATION: 99 % | HEART RATE: 91 BPM | WEIGHT: 185.7 LBS | SYSTOLIC BLOOD PRESSURE: 77 MMHG

## 2022-07-21 DIAGNOSIS — I95.1 ORTHOSTATIC HYPOTENSION: ICD-10-CM

## 2022-07-21 DIAGNOSIS — C71.9 GLIOBLASTOMA (HCC): Primary | ICD-10-CM

## 2022-07-21 DIAGNOSIS — R53.1 LEFT-SIDED WEAKNESS: ICD-10-CM

## 2022-07-21 DIAGNOSIS — Z79.899 HIGH RISK MEDICATION USE: ICD-10-CM

## 2022-07-21 DIAGNOSIS — I95.9 HYPOTENSION, UNSPECIFIED HYPOTENSION TYPE: ICD-10-CM

## 2022-07-21 DIAGNOSIS — R53.83 FATIGUE, UNSPECIFIED TYPE: ICD-10-CM

## 2022-07-21 DIAGNOSIS — I95.1 ORTHOSTATIC HYPOTENSION: Primary | ICD-10-CM

## 2022-07-21 DIAGNOSIS — C71.9 GLIOBLASTOMA (HCC): ICD-10-CM

## 2022-07-21 DIAGNOSIS — D84.9 IMMUNOCOMPROMISED (HCC): ICD-10-CM

## 2022-07-21 DIAGNOSIS — D69.6 THROMBOCYTOPENIA (HCC): ICD-10-CM

## 2022-07-21 LAB
ALBUMIN SERPL-MCNC: 3.1 G/DL (ref 3.2–4.6)
ALBUMIN/GLOB SERPL: 1.2 {RATIO} (ref 1.2–3.5)
ALP SERPL-CCNC: 83 U/L (ref 50–136)
ALT SERPL-CCNC: 30 U/L (ref 12–65)
ANION GAP SERPL CALC-SCNC: 5 MMOL/L (ref 7–16)
AST SERPL-CCNC: 12 U/L (ref 15–37)
BASOPHILS # BLD: 0 K/UL (ref 0–0.2)
BASOPHILS NFR BLD: 0 % (ref 0–2)
BILIRUB SERPL-MCNC: 1.3 MG/DL (ref 0.2–1.1)
BUN SERPL-MCNC: 40 MG/DL (ref 8–23)
CALCIUM SERPL-MCNC: 8.4 MG/DL (ref 8.3–10.4)
CHLORIDE SERPL-SCNC: 110 MMOL/L (ref 98–107)
CO2 SERPL-SCNC: 27 MMOL/L (ref 21–32)
CREAT SERPL-MCNC: 1.8 MG/DL (ref 0.8–1.5)
DIFFERENTIAL METHOD BLD: ABNORMAL
EOSINOPHIL # BLD: 0.2 K/UL (ref 0–0.8)
EOSINOPHIL NFR BLD: 6 % (ref 0.5–7.8)
ERYTHROCYTE [DISTWIDTH] IN BLOOD BY AUTOMATED COUNT: 13.6 % (ref 11.9–14.6)
GLOBULIN SER CALC-MCNC: 2.6 G/DL (ref 2.3–3.5)
GLUCOSE SERPL-MCNC: 148 MG/DL (ref 65–100)
HCT VFR BLD AUTO: 39.5 %
HGB BLD-MCNC: 13.4 G/DL (ref 13.6–17.2)
IMM GRANULOCYTES # BLD AUTO: 0 K/UL (ref 0–0.5)
IMM GRANULOCYTES NFR BLD AUTO: 0 % (ref 0–5)
LDH SERPL L TO P-CCNC: 164 U/L (ref 110–210)
LYMPHOCYTES # BLD: 0.5 K/UL (ref 0.5–4.6)
LYMPHOCYTES NFR BLD: 20 % (ref 13–44)
MCH RBC QN AUTO: 31 PG (ref 26.1–32.9)
MCHC RBC AUTO-ENTMCNC: 33.9 G/DL (ref 31.4–35)
MCV RBC AUTO: 91.4 FL (ref 79.6–97.8)
MONOCYTES # BLD: 0.1 K/UL (ref 0.1–1.3)
MONOCYTES NFR BLD: 5 % (ref 4–12)
NEUTS SEG # BLD: 1.9 K/UL (ref 1.7–8.2)
NEUTS SEG NFR BLD: 70 % (ref 43–78)
NRBC # BLD: 0 K/UL (ref 0–0.2)
PLATELET # BLD AUTO: 22 K/UL (ref 150–450)
PMV BLD AUTO: 11.1 FL (ref 9.4–12.3)
POTASSIUM SERPL-SCNC: 4.1 MMOL/L (ref 3.5–5.1)
PROT SERPL-MCNC: 5.7 G/DL (ref 6.3–8.2)
RBC # BLD AUTO: 4.32 M/UL (ref 4.23–5.6)
SODIUM SERPL-SCNC: 142 MMOL/L (ref 136–145)
WBC # BLD AUTO: 2.7 K/UL (ref 4.3–11.1)

## 2022-07-21 PROCEDURE — 80053 COMPREHEN METABOLIC PANEL: CPT

## 2022-07-21 PROCEDURE — 96360 HYDRATION IV INFUSION INIT: CPT

## 2022-07-21 PROCEDURE — 99215 OFFICE O/P EST HI 40 MIN: CPT | Performed by: INTERNAL MEDICINE

## 2022-07-21 PROCEDURE — G8417 CALC BMI ABV UP PARAM F/U: HCPCS | Performed by: INTERNAL MEDICINE

## 2022-07-21 PROCEDURE — 85025 COMPLETE CBC W/AUTO DIFF WBC: CPT

## 2022-07-21 PROCEDURE — G8427 DOCREV CUR MEDS BY ELIG CLIN: HCPCS | Performed by: INTERNAL MEDICINE

## 2022-07-21 PROCEDURE — 6360000002 HC RX W HCPCS: Performed by: INTERNAL MEDICINE

## 2022-07-21 PROCEDURE — 83615 LACTATE (LD) (LDH) ENZYME: CPT

## 2022-07-21 PROCEDURE — 1123F ACP DISCUSS/DSCN MKR DOCD: CPT | Performed by: INTERNAL MEDICINE

## 2022-07-21 PROCEDURE — 1036F TOBACCO NON-USER: CPT | Performed by: INTERNAL MEDICINE

## 2022-07-21 PROCEDURE — 36415 COLL VENOUS BLD VENIPUNCTURE: CPT

## 2022-07-21 PROCEDURE — 2580000003 HC RX 258: Performed by: INTERNAL MEDICINE

## 2022-07-21 RX ORDER — DEXAMETHASONE 4 MG/1
4 TABLET ORAL ONCE
Qty: 1 TABLET | Refills: 0 | Status: SHIPPED | OUTPATIENT
Start: 2022-07-21 | End: 2022-07-21

## 2022-07-21 RX ORDER — 0.9 % SODIUM CHLORIDE 0.9 %
1000 INTRAVENOUS SOLUTION INTRAVENOUS ONCE
Status: COMPLETED | OUTPATIENT
Start: 2022-07-21 | End: 2022-07-21

## 2022-07-21 RX ORDER — 0.9 % SODIUM CHLORIDE 0.9 %
1000 INTRAVENOUS SOLUTION INTRAVENOUS ONCE
Status: CANCELLED | OUTPATIENT
Start: 2022-07-21 | End: 2022-07-21

## 2022-07-21 RX ORDER — SODIUM CHLORIDE 0.9 % (FLUSH) 0.9 %
5-40 SYRINGE (ML) INJECTION PRN
OUTPATIENT
Start: 2022-07-21

## 2022-07-21 RX ORDER — SODIUM CHLORIDE 9 MG/ML
5-250 INJECTION, SOLUTION INTRAVENOUS PRN
OUTPATIENT
Start: 2022-07-21

## 2022-07-21 RX ORDER — HEPARIN SODIUM (PORCINE) LOCK FLUSH IV SOLN 100 UNIT/ML 100 UNIT/ML
500 SOLUTION INTRAVENOUS PRN
OUTPATIENT
Start: 2022-07-21

## 2022-07-21 RX ORDER — DEXAMETHASONE 4 MG/1
4 TABLET ORAL 2 TIMES DAILY WITH MEALS
Qty: 60 TABLET | Refills: 1 | Status: SHIPPED | OUTPATIENT
Start: 2022-07-21 | End: 2022-08-17 | Stop reason: SDUPTHER

## 2022-07-21 RX ORDER — DEXAMETHASONE 4 MG/1
4 TABLET ORAL ONCE
Status: COMPLETED | OUTPATIENT
Start: 2022-07-21 | End: 2022-07-21

## 2022-07-21 RX ORDER — DEXAMETHASONE 4 MG/1
4 TABLET ORAL ONCE
Status: CANCELLED
Start: 2022-07-21 | End: 2022-07-21

## 2022-07-21 RX ADMIN — SODIUM CHLORIDE 1000 ML: 900 INJECTION, SOLUTION INTRAVENOUS at 11:43

## 2022-07-21 RX ADMIN — DEXAMETHASONE 4 MG: 4 TABLET ORAL at 11:30

## 2022-07-21 ASSESSMENT — PATIENT HEALTH QUESTIONNAIRE - PHQ9
SUM OF ALL RESPONSES TO PHQ QUESTIONS 1-9: 0
1. LITTLE INTEREST OR PLEASURE IN DOING THINGS: 0
SUM OF ALL RESPONSES TO PHQ QUESTIONS 1-9: 0
2. FEELING DOWN, DEPRESSED OR HOPELESS: 0
SUM OF ALL RESPONSES TO PHQ9 QUESTIONS 1 & 2: 0
SUM OF ALL RESPONSES TO PHQ QUESTIONS 1-9: 0
SUM OF ALL RESPONSES TO PHQ QUESTIONS 1-9: 0

## 2022-07-21 NOTE — PROGRESS NOTES
Data Source: Patient, [facility] record. 7/21/2022    10:33 AM    Amanda Rojo [id number]    80 y.o. 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, history of depression/anxiety disorder, gout, insomnia, hernia repair, ankle fracture repair, lower back surgery with limited mobility thereafter, more recently admitted 12/25/2020 after being found unresponsive in bathroom by wife. Abnormal head CT leading to a brain MRI 12/25/2020 showing right temporal parietal mass 3.7 x 3.3 cm in size with adjacent cerebral edema, as well as associated hemorrhage and/or calcification. 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 CT CAP 12/27/2020 without evidence of malignancy or metastatic disease. Patient was seen by neurosurgery Dr. Brandon Montalvo and taken to the OR 12/31/2020. Underwent right parietal craniotomy with resection of right parietal tumor and placement of intraoperative BCNU wafers to surgical bed. 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 Recovering well, now referred to medical oncology as well as radiation oncology for further therapy. He is also undergoing speech therapy. On evaluation today using a walker to get around. Today reports some short term memory issues but otherwise doing well. Family support includes wife and one daughter who lives in [place] and was here for his surgery. Not currently on any seizure medicines and being tapered off of Decadron (will be done next Monday). Hospice Referral:  na  Interval History:  7/21/22: Patient since last visit has completed 3 fractions SBRT to left temporal on 6/20/22. Restarted Temodar 150 mg/m2 x5 days, and is here for cycle 2. Restart Bactrim MWF prophylaxis as well.   Today reports left-sided weakness is improved. Abruptly stopped his dexamethasone 8 mg twice daily as ran out 2 days ago, blood pressure on lower side today. Denies any fevers, chills or otherwise feeling unwell. Hypotension probably related to stopping steroids, will restart dexamethasone 4 mg twice daily, will also give additional 1 L IV NS today. Defer cycle to biweekly given platelet count 99,191, will recheck counts early next week also. CBC Monday. REVIEW OF SYSTEMS:  As mentioned above, all other systems were reviewed in full and are negative.     Past Medical History:   Diagnosis Date    Anxiety disorder 12/9/2014    Arthritis 12/9/2014    Depression 12/9/2014    Glioblastoma ([facility] [address] 12/26/2020    Gout 12/9/2014    History of intracranial hemorrhage 12/25/2020    History of lumbar laminectomy for spinal cord decompression 2/18/2019    History of seizure 12/28/2020    HTN (hypertension), benign 2/15/2017    Hyperlipemia 12/9/2014    Hypertension     Idiopathic chronic gout of right ankle 12/9/2014    Insomnia 12/9/2014    Kidney stone     Mixed hyperlipidemia 12/9/2014    Panic disorder 12/9/2014    Primary insomnia 12/9/2014    Rosacea 2/15/2017       Past Surgical History:   Procedure Laterality Date    ANKLE FRACTURE SURGERY Left 6/1974    COLONOSCOPY  2007    CYST REMOVAL      CYST REMOVAL      pylonidal cyst    CYST REMOVAL      HERNIA REPAIR Bilateral     OTHER SURGICAL HISTORY      wrist    WRIST FRACTURE SURGERY Bilateral 6/1993    wrist plate/pin       Current Outpatient Medications   Medication Sig Dispense Refill    pantoprazole (PROTONIX) 40 MG tablet Take 1 tablet by mouth every morning (before breakfast) 90 tablet 1    Calcium-Magnesium-Vitamin D (CALCIUM 1200+D3 PO) Take by mouth daily      atorvastatin (LIPITOR) 40 MG tablet TAKE 1 TABLET BY MOUTH EVERY DAY      vitamin D (CHOLECALCIFEROL) 25 MCG (1000 UT) TABS tablet Take 1,000 Units by mouth daily      esomeprazole (NEXIUM) 20 MG delayed release capsule Take 20 mg by mouth daily      furosemide (LASIX) 20 MG tablet Take 20 mg by mouth daily      levETIRAcetam (KEPPRA) 500 MG tablet Take 500 mg by mouth 2 times daily      L-Lysine 500 MG TABS Take 500 mg by mouth daily      olmesartan (BENICAR) 40 MG tablet Take 40 mg by mouth daily      ondansetron (ZOFRAN) 8 MG tablet Take 8 mg by mouth every 8 hours as needed      polyethylene glycol (GLYCOLAX) 17 GM/SCOOP powder Take 17 g by mouth daily as needed      potassium chloride (KLOR-CON) 10 MEQ extended release tablet TAKE 1 TABLET BY MOUTH EVERY DAY      sertraline (ZOLOFT) 100 MG tablet Take 150 mg by mouth daily      tamsulosin (FLOMAX) 0.4 MG capsule Take 0.4 mg by mouth daily      dexamethasone (DECADRON) 4 MG tablet Take 2 tablets by mouth 2 times daily (with meals) (Patient not taking: Reported on 2022) 90 tablet 0    sulfamethoxazole-trimethoprim (BACTRIM DS;SEPTRA DS) 800-160 MG per tablet Take 1 tab on Monday, Wednesday, and Friday. (Patient not taking: Reported on 2022) 48 tablet 2    temozolomide (TEMODAR) 100 MG chemo capsule Take 3 capsules by mouth daily for 5 days every 28 days (Patient not taking: Reported on 2022) 15 capsule 5     No current facility-administered medications for this visit.        Social History     Socioeconomic History    Marital status:      Spouse name: None    Number of children: None    Years of education: None    Highest education level: None   Tobacco Use    Smoking status: Former     Types: Cigarettes     Quit date: 1970     Years since quittin.5    Smokeless tobacco: Never   Substance and Sexual Activity    Alcohol use: Not Currently    Drug use: No       Family History   Problem Relation Age of Onset    Cancer Mother         lung ca    Hypertension Mother     Heart Disease Mother     Osteoarthritis Father     Cancer Brother         stomach       No Known Allergies    PHYSICAL EXAMINATION:  General Appearance: Healthy appearing patient in no acute distress  Vitals reviewed. BP (!) 77/46   Pulse 91   Temp 97.9 °F (36.6 °C) (Oral)   Resp 14   Ht 5' 8\" (1.727 m)   Wt 185 lb 11.2 oz (84.2 kg)   SpO2 99%   BMI 28.24 kg/m²   HEENT: No oral or pharyngeal masses, ulceration or thrush noted, no sinus tenderness. Neck is supple with no thyromegaly or JVD noted. Lymph Nodes: No lymphadenopathy noted in the occipital, pre and post auricular, cervical, supra and infraclavicular, axillary, epitrochlear, inguinal, and popliteal region. Breasts: No palpable masses, nipple discharge or skin retraction  Lungs/Thorax: Clear to auscultation, no accessory muscles of respiration being used. Heart: Regular rate and rhythm, normal S1, S2, no appreciable murmurs, rubs, gallops  Abdomen: Soft, nontender, bowel sounds present, no appreciable hepatosplenomegaly, no palpable masses  Extremeties: Good pulses bilaterally, no peripheral edema. Skin: Normal skin tone with no rash, petechiae, ecchymosis noted. Musculoskeletal: No pain on palpation over bony prominence, no edema, no evidence of gout, no joint or bony deformity  Neurologic: Grossly intact    LABS/IMAGING:    Lab Results   Component Value Date/Time    WBC 2.7 07/21/2022 09:32 AM    HGB 13.4 07/21/2022 09:32 AM    HCT 39.5 07/21/2022 09:32 AM    PLT 22 07/21/2022 09:32 AM    MCV 91.4 07/21/2022 09:32 AM       Lab Results   Component Value Date/Time     07/21/2022 09:32 AM    K 4.1 07/21/2022 09:32 AM     07/21/2022 09:32 AM    CO2 27 07/21/2022 09:32 AM    BUN 40 07/21/2022 09:32 AM    GFRAA 47 07/21/2022 09:32 AM    GLOB 2.6 07/21/2022 09:32 AM    ALT 30 07/21/2022 09:32 AM         Above results reviewed with patient.        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 Abnormal head CT leading to a brain MRI 12/25/2020 showing right temporal parietal mass 3.7 x 3.3 cm in size with adjacent cerebral edema, as well as associated hemorrhage and/or calcification. 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 CT CAP 12/27/2020 without evidence of malignancy or metastatic disease. Patient was seen by neurosurgery Dr. Philippe Jacmoe and taken to the OR 12/31/2020. Underwent right parietal craniotomy with resection of right parietal tumor and placement of intraoperative BCNU wafers to surgical bed. 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 Recovering well, now referred to medical oncology as well as radiation oncology for further therapy. He is also undergoing speech therapy. On evaluation today using a walker to get around. Today reports some short term memory issues but otherwise doing well. Family support includes wife and one daughter who lives in [place] and was here for his surgery. Not currently on any seizure medicines and being tapered off of Decadron (will be done next Monday). 2/10/2021: Since last visit, significant course including recurrent seizure requiring ED visit, noncontrasted head CT with improved postoperative changes. He has since been placed on Keppra 500 twice daily. He was also treated for UTI though appears suboptimal specimen. We will simply repeat UA today, denies any symptoms. Performance status slowly improving, has completed physical therapy, currently undergoing speech therapy. PS 1 today. Recent transient thrombocytopenia, wife reports briefly being prescribed trazodone which they have stopped now as it caused excessive sleepiness. Plan for concurrent chemo-XRT with temozolomide with shorter course XRT over 3 weeks. Repeat simulation and brain MRI 2/20/2021: Results will be followed. Once starts temozolomide, will also benefit with Bactrim 3 times a week for PCP prophylaxis. Weekly CBC once on therapy. RTC once ready to start therapy, weekly provider visits there on.    3/31/2021: Completed Temodar-XRT 3/26/2021. Tolerated reasonably. Ongoing fatigue, though mobility and speech appear improved. Remains on Keppra for past seizures per neurology. Now scheduled for follow-up brain MRI 4/23/2021. Will start adjuvant temozolomide at 150 mg per metered squared daily x5 days every 28 days at that point. We will also look into Optune therapy. RTC post brain MRI.    4/26/2021: Reports doing reasonably. Mobility has somewhat improved. Per wife takes multiple naps during the day. They are planning to  more regular outdoor walking. Labs today unremarkable. Recent brain MRI without disease progression (UT), treatment related changes seen. Okay to proceed with adjuvant temozolomide. Repeat labs and provider visit in 2 weeks. Paperwork initiated for optune therapy. 5/24/2021:  appears to have tolerated adjuvant temozolomide cycle 1 well. Main complaint has remained ongoing fatigue for which he takes regular naps. Denies any new sensorimotor deficits, or seizure activity. Reasonable p.o. intake and mobility now. Also started Optune therapy a few weeks ago, tolerating reasonably, scalp without any rash. Okay to proceed with cycle 2 adjuvant temozolomide tomorrow, given labs without cytopenias, we will simply see him back with repeat labs in a month. Now scheduled for repeat brain MRI per radiation oncology in 8/21.    7/20/2021: Continues to tolerate therapy well. Mobility has significantly improved, walking briskly in the room. Regular with his Keppra, as well as Bactrim. Blood work unremarkable, regular with Optune which he is tolerating well without any signs of scalp dermatitis. Okay to proceed with cycle 4. Scheduled for brain MRI and subsequent radiation oncology follow-up next month, will chart check imaging.   Otherwise we will see him back in a month.    9/14/2021: Patient here for cycle 6 ( last cycle of adjuvant temozolomide). Performance status remains improved. Some fatigue though. Nausea better on days with Temodar since taking Zofran an hour before each dose. Also regular with his Keppra as well as Bactrim. Last brain MRI 8/21 with responding disease, now scheduled for repeat brain MRI in 11/17/2021. Labs today with thrombocytopenia with platelet count 85,423. Will defer next cycle by week, till counts recover. Discussed role of Optune therapy continuation beyond 6 cycles, they will discuss but are leaning towards continuing. 11/22/2021: More recently seeing primary care for sciatica as well as neck stiffness. Reasonable p.o. intake and mobility. Some scalp dermatitis, they have decided to forego further Optune therapy. This is reasonable. Blood work unremarkable, platelet count having normalized. Brain MRI with SD. Simple monitoring moving forward, will repeat brain imaging in 3 months time. 2/25/2022: Reports doing reasonably, some difficulty with time management in the day but otherwise mobile, has been in right now able to do things together, looking forward to codebender in a few months. Has seen neurology, no further seizures, remains on Keppra 500 twice daily. Recent brain MRI with no clear evidence of disease progression at surgical site, T2 prolongation in right precentral gyrus and splenium of corpus callosum slowly worsened, possibly posttreatment change versus infiltrative tumor. Will get radiation oncology opinion also. We will see him back in 3 months with repeat imaging    Addendum: Case discussed with Dr. Baltazar Arenas, felt to have therapy related changes rather than true progression. 6/6/2022: Repeat brain MRI with/without contrast 5/25/2022 with changes concerning for disease progression including new 1.6 x 1.1 cm enhancing focus anterior and superior to the right sided operative cavity.   Patient has seen radiation oncology Dr. Tae Chery as well as neurosurgery Dr. Shane Mehta. The patient and family report being told not a candidate for repeat surgery, seeing radiation oncology later today. Will discuss case with Dr. Tae Chery, likely repeat XRT followed by systemic therapy (likely temozolomide single agent given MGMT methylation status, and previous good tolerance). Today reports doing reasonably. Some fatigue but manageable. Blood work today with adequate counts, chemistries unremarkable. We will see him back in a few weeks. Addendum: Case discussed with Dr. Tae Chery. Patient not a surgical candidate. Plan for SBRT to site of disease progression, will plan for temozolomide 150 mg per metered squared daily x 5 every 28 days thereafter. 7/21/22: Patient since last visit has completed 3 fractions SBRT to left temporal on 6/20/22. Restarted Temodar 150 mg/m2 x5 days, and is here for cycle 2. Restart Bactrim MWF prophylaxis as well. Today reports left-sided weakness is improved. Abruptly stopped his dexamethasone 8 mg twice daily as ran out 2 days ago, blood pressure on lower side today. Denies any fevers, chills or otherwise feeling unwell. Hypotension probably related to stopping steroids, will restart dexamethasone 4 mg twice daily, will also give additional 1 L IV NS today. Defer cycle to biweekly given platelet count 47,782, will recheck counts early next week also. CBC Monday. RTC in 1 week with labs (start cycle 2 if platelet count 316,967 or above). CBC on day 1 and day 21 of each cycle. 1. RT temporal-parietal GBM s/p MSR & BCNU wafer placement 12/31/20  2. Fatigue    PLAN:  - As above. S/p concurrent Temozolomide 75 mg/m2 daily w/ XRT (completed 3/26/21). On PCP prophylaxis w Bactrim DS MWF. Brain MRI w IL. Started (4/21) adjuvant Temozolomide 150 mg/m2 daily x 5 days every 28 days x 6 (completed 10/21).  PD 6/22: s/p SBRT, now on single agent temozolomide 150 mg per metered squared daily x 5 every 28 days. - Optune alternating electrical field therapy post chemoradiation (5/21-10/21). - Continue Keppra 500 bid. - MGMT methylation detected, IDH wild type. CBC Monday. RTC in 1 week with labs (start cycle 2 if platelet count 780,832 or above). CBC on day 1 and day 21 of each cycle. . Serial brain MRIs every 3 months. Thank you Dr Nando Maldonado for allowing us to paticipate in care of this pleasant patient.  Please call w/ any alina Thacker MD  [address] [place] [address],[address]  Hematology Oncology  [address] [place] [address]  Office : [phone]  Fax : [phone]

## 2022-07-21 NOTE — PROGRESS NOTES
Arrived to the [facility]. Hydration and Decadron completed. Provided education on same. Patient instructed to report any side affects to ordering provider. Patient tolerated well. Any issues or concerns during appointment: none. Discharged ambulatory.

## 2022-07-21 NOTE — PATIENT INSTRUCTIONS
Patient Instructions from Today's Visit    Reason for Visit:  Follow up    Plan: Your platelets are too low to start treatment today. We will recheck your labs again on Monday and on Thursday. If labs are good on Thursday we will let you know to start the temodar    Restart decadron at 4 mg one pill at breakfast and one pill at dinner. You should be taking bactrim every Monday, Wednesday, and Friday. You will have lab work 3 weeks after starting the temodar. Follow Up:   Follow up next week for labs     Recent Lab Results:  Hospital Outpatient Visit on 07/21/2022   Component Date Value Ref Range Status    WBC 07/21/2022 2.7 (A) 4.3 - 11.1 K/uL Final    RBC 07/21/2022 4.32  4.23 - 5.6 M/uL Final    Hemoglobin 07/21/2022 13.4 (A) 13.6 - 17.2 g/dL Final    Hematocrit 07/21/2022 39.5  % Final    MCV 07/21/2022 91.4  79.6 - 97.8 FL Final    MCH 07/21/2022 31.0  26.1 - 32.9 PG Final    MCHC 07/21/2022 33.9  31.4 - 35.0 g/dL Final    RDW 07/21/2022 13.6  11.9 - 14.6 % Final    Platelets 46/06/4917 22 (A) 150 - 450 K/uL Final    Comment: RESULTS CHECKED X 2  RESULTS VERIFIED, PHONED TO AND READ BACK BY  SUSAN SMALL RN AT 1003 ON 7/21/22 BY MACI WALLER 07/21/2022 11.1  9.4 - 12.3 FL Final    nRBC 07/21/2022 0.00  0.0 - 0.2 K/uL Final    **Note: Absolute NRBC parameter is now reported with Hemogram**    Differential Type 07/21/2022 AUTOMATED    Final    Seg Neutrophils 07/21/2022 70  43 - 78 % Final    Lymphocytes 07/21/2022 20  13 - 44 % Final    Monocytes 07/21/2022 5  4.0 - 12.0 % Final    Eosinophils % 07/21/2022 6  0.5 - 7.8 % Final    Basophils 07/21/2022 0  0.0 - 2.0 % Final    Immature Granulocytes 07/21/2022 0  0.0 - 5.0 % Final    Segs Absolute 07/21/2022 1.9  1.7 - 8.2 K/UL Final    Absolute Lymph # 07/21/2022 0.5  0.5 - 4.6 K/UL Final    Absolute Mono # 07/21/2022 0.1  0.1 - 1.3 K/UL Final    Absolute Eos # 07/21/2022 0.2  0.0 - 0.8 K/UL Final    Basophils Absolute 07/21/2022 0.0  0.0 - 0.2 K/UL Final    Absolute Immature Granulocyte 07/21/2022 0.0  0.0 - 0.5 K/UL Final    Sodium 07/21/2022 142  136 - 145 mmol/L Final    Potassium 07/21/2022 4.1  3.5 - 5.1 mmol/L Final    Chloride 07/21/2022 110 (A) 98 - 107 mmol/L Final    CO2 07/21/2022 27  21 - 32 mmol/L Final    Anion Gap 07/21/2022 5 (A) 7 - 16 mmol/L Final    Glucose 07/21/2022 148 (A) 65 - 100 mg/dL Final    BUN 07/21/2022 40 (A) 8 - 23 MG/DL Final    Creatinine 07/21/2022 1.80 (A) 0.8 - 1.5 MG/DL Final    GFR  07/21/2022 47 (A) >60 ml/min/1.73m2 Final    GFR Non- 07/21/2022 39 (A) >60 ml/min/1.73m2 Final    Comment:      Estimated GFR is calculated using the Modification of Diet in Renal Disease (MDRD) Study equation, reported for both  Americans (GFRAA) and non- Americans (GFRNA), and normalized to 1.73m2 body surface area. The physician must decide which value applies to the patient. The MDRD study equation should only be used in individuals age 25 or older. It has not been validated for the following: pregnant women, patients with serious comorbid conditions,or on certain medications, or persons with extremes of body size, muscle mass, or nutritional status.       Calcium 07/21/2022 8.4  8.3 - 10.4 MG/DL Final    Total Bilirubin 07/21/2022 1.3 (A) 0.2 - 1.1 MG/DL Final    ALT 07/21/2022 30  12 - 65 U/L Final    AST 07/21/2022 12 (A) 15 - 37 U/L Final    Alk Phosphatase 07/21/2022 83  50 - 136 U/L Final    Total Protein 07/21/2022 5.7 (A) 6.3 - 8.2 g/dL Final    Albumin 07/21/2022 3.1 (A) 3.2 - 4.6 g/dL Final    Globulin 07/21/2022 2.6  2.3 - 3.5 g/dL Final    Albumin/Globulin Ratio 07/21/2022 1.2  1.2 - 3.5   Final    LD 07/21/2022 164  110 - 210 U/L Final        Treatment Summary has been discussed and given to patient: n/a        -------------------------------------------------------------------------------------------------------------------  Please call our office at [phone] if you have any of the following symptoms:   Fever of 100.5 or greater  Chills  Shortness of breath  Swelling or pain in one leg    After office hours an answering service is available and will contact a provider for emergencies or if you are experiencing any of the above symptoms. Patient did express an interest in My Chart. My Chart log in information explained on the after visit summary printout [facility].     Dhruv Gibson RN

## 2022-07-24 ENCOUNTER — PATIENT MESSAGE (OUTPATIENT)
Dept: INTERNAL MEDICINE CLINIC | Facility: CLINIC | Age: 82
End: 2022-07-24

## 2022-07-25 ENCOUNTER — HOSPITAL ENCOUNTER (OUTPATIENT)
Dept: LAB | Age: 82
Discharge: HOME | End: 2022-07-28
Payer: MEDICARE

## 2022-07-25 DIAGNOSIS — C71.9 GLIOBLASTOMA (HCC): ICD-10-CM

## 2022-07-25 LAB
BASOPHILS # BLD: 0 K/UL (ref 0–0.2)
BASOPHILS NFR BLD: 0 % (ref 0–2)
DIFFERENTIAL METHOD BLD: ABNORMAL
EOSINOPHIL # BLD: 0 K/UL (ref 0–0.8)
EOSINOPHIL NFR BLD: 1 % (ref 0.5–7.8)
ERYTHROCYTE [DISTWIDTH] IN BLOOD BY AUTOMATED COUNT: 13.6 % (ref 11.9–14.6)
HCT VFR BLD AUTO: 35 %
HGB BLD-MCNC: 12.4 G/DL (ref 13.6–17.2)
IMM GRANULOCYTES # BLD AUTO: 0 K/UL (ref 0–0.5)
IMM GRANULOCYTES NFR BLD AUTO: 1 % (ref 0–5)
LYMPHOCYTES # BLD: 0.4 K/UL (ref 0.5–4.6)
LYMPHOCYTES NFR BLD: 16 % (ref 13–44)
MCH RBC QN AUTO: 31 PG (ref 26.1–32.9)
MCHC RBC AUTO-ENTMCNC: 35.4 G/DL (ref 31.4–35)
MCV RBC AUTO: 87.5 FL (ref 79.6–97.8)
MONOCYTES # BLD: 0.3 K/UL (ref 0.1–1.3)
MONOCYTES NFR BLD: 12 % (ref 4–12)
NEUTS SEG # BLD: 1.5 K/UL (ref 1.7–8.2)
NEUTS SEG NFR BLD: 70 % (ref 43–78)
NRBC # BLD: 0 K/UL (ref 0–0.2)
PLATELET # BLD AUTO: 40 K/UL (ref 150–450)
PLATELET COMMENT: ABNORMAL
PMV BLD AUTO: 10.8 FL (ref 9.4–12.3)
RBC # BLD AUTO: 4 M/UL (ref 4.23–5.6)
RBC MORPH BLD: ABNORMAL
WBC # BLD AUTO: 2.2 K/UL (ref 4.3–11.1)
WBC MORPH BLD: ABNORMAL

## 2022-07-25 PROCEDURE — 36415 COLL VENOUS BLD VENIPUNCTURE: CPT

## 2022-07-25 PROCEDURE — 85025 COMPLETE CBC W/AUTO DIFF WBC: CPT

## 2022-07-26 DIAGNOSIS — E78.2 MIXED HYPERLIPIDEMIA: ICD-10-CM

## 2022-07-26 RX ORDER — ATORVASTATIN CALCIUM 40 MG/1
TABLET, FILM COATED ORAL
Qty: 90 TABLET | Refills: 1 | Status: SHIPPED | OUTPATIENT
Start: 2022-07-26

## 2022-07-26 NOTE — TELEPHONE ENCOUNTER
From: Emily Gastelum  To: Dr. Mian Snyder Brothers  Sent: 7/24/2022 3:05 PM EDT  Subject: Low blood pressure     At an appointment with Dr Vickie Richard at the [facility] on Thursday, July 21, [place] blood pressure was 85/44. I took his blood pressure today (Sunday, July 24) and it was 96/47. Dr. Adrián Winston notes from last Thursday indicated that the low BP could be a result of the steroid (dexamethasone) that Patricia Hurst is taking to address his left side weakness following radiation treatments. Dr Vickie Richard has reduced the dosage of the steroid. Nuzhatrola Aris has an appoint with you on August 5. I wanted to mention this to you now since he currently is even more tired than ever. I understand that cancer causes fatigue, but might low BP contribute to this total lack of energy? Are there any changes to his BP medication that you would like to make now? Thank you.   Leslie Monroe

## 2022-07-28 ENCOUNTER — HOSPITAL ENCOUNTER (OUTPATIENT)
Dept: LAB | Age: 82
Discharge: HOME | End: 2022-07-31
Payer: MEDICARE

## 2022-07-28 ENCOUNTER — TELEPHONE (OUTPATIENT)
Dept: ONCOLOGY | Age: 82
End: 2022-07-28

## 2022-07-28 DIAGNOSIS — C71.9 GLIOBLASTOMA (HCC): ICD-10-CM

## 2022-07-28 DIAGNOSIS — C71.9 GLIOBLASTOMA (HCC): Primary | ICD-10-CM

## 2022-07-28 LAB
ALBUMIN SERPL-MCNC: 3.1 G/DL (ref 3.2–4.6)
ALBUMIN/GLOB SERPL: 1.1 {RATIO} (ref 1.2–3.5)
ALP SERPL-CCNC: 82 U/L (ref 50–136)
ALT SERPL-CCNC: 31 U/L (ref 12–65)
ANION GAP SERPL CALC-SCNC: 5 MMOL/L (ref 7–16)
AST SERPL-CCNC: 13 U/L (ref 15–37)
BASOPHILS # BLD: 0 K/UL (ref 0–0.2)
BASOPHILS NFR BLD: 0 % (ref 0–2)
BILIRUB SERPL-MCNC: 0.6 MG/DL (ref 0.2–1.1)
BUN SERPL-MCNC: 26 MG/DL (ref 8–23)
CALCIUM SERPL-MCNC: 8.8 MG/DL (ref 8.3–10.4)
CHLORIDE SERPL-SCNC: 111 MMOL/L (ref 98–107)
CO2 SERPL-SCNC: 27 MMOL/L (ref 21–32)
CREAT SERPL-MCNC: 1.3 MG/DL (ref 0.8–1.5)
DIFFERENTIAL METHOD BLD: ABNORMAL
EOSINOPHIL # BLD: 0 K/UL (ref 0–0.8)
EOSINOPHIL NFR BLD: 0 % (ref 0.5–7.8)
ERYTHROCYTE [DISTWIDTH] IN BLOOD BY AUTOMATED COUNT: 13.9 % (ref 11.9–14.6)
GLOBULIN SER CALC-MCNC: 2.9 G/DL (ref 2.3–3.5)
GLUCOSE SERPL-MCNC: 154 MG/DL (ref 65–100)
HCT VFR BLD AUTO: 34.9 %
HGB BLD-MCNC: 11.8 G/DL (ref 13.6–17.2)
IMM GRANULOCYTES # BLD AUTO: 0.1 K/UL (ref 0–0.5)
IMM GRANULOCYTES NFR BLD AUTO: 3 % (ref 0–5)
LYMPHOCYTES # BLD: 0.4 K/UL (ref 0.5–4.6)
LYMPHOCYTES NFR BLD: 13 % (ref 13–44)
MCH RBC QN AUTO: 30.7 PG (ref 26.1–32.9)
MCHC RBC AUTO-ENTMCNC: 33.8 G/DL (ref 31.4–35)
MCV RBC AUTO: 90.9 FL (ref 79.6–97.8)
MONOCYTES # BLD: 0.3 K/UL (ref 0.1–1.3)
MONOCYTES NFR BLD: 10 % (ref 4–12)
NEUTS SEG # BLD: 2.2 K/UL (ref 1.7–8.2)
NEUTS SEG NFR BLD: 74 % (ref 43–78)
NRBC # BLD: 0 K/UL (ref 0–0.2)
PLATELET # BLD AUTO: 71 K/UL (ref 150–450)
PLATELET COMMENT: SLIGHT
PMV BLD AUTO: 10.4 FL (ref 9.4–12.3)
POTASSIUM SERPL-SCNC: 3.6 MMOL/L (ref 3.5–5.1)
PROT SERPL-MCNC: 6 G/DL (ref 6.3–8.2)
RBC # BLD AUTO: 3.84 M/UL (ref 4.23–5.6)
RBC MORPH BLD: ABNORMAL
SODIUM SERPL-SCNC: 143 MMOL/L (ref 136–145)
WBC # BLD AUTO: 3 K/UL (ref 4.3–11.1)
WBC MORPH BLD: ABNORMAL

## 2022-07-28 PROCEDURE — 80053 COMPREHEN METABOLIC PANEL: CPT

## 2022-07-28 PROCEDURE — 36415 COLL VENOUS BLD VENIPUNCTURE: CPT

## 2022-07-28 PROCEDURE — 85025 COMPLETE CBC W/AUTO DIFF WBC: CPT

## 2022-07-28 NOTE — TELEPHONE ENCOUNTER
Labs reviewed by Dr. Arnol Mariano. Hold temodar cycle by 1 week. Patient will come in for labs on 8/4 and we will call him with results and whether or not to start treatment.

## 2022-08-04 ENCOUNTER — TELEPHONE (OUTPATIENT)
Dept: ONCOLOGY | Age: 82
End: 2022-08-04

## 2022-08-04 ENCOUNTER — HOSPITAL ENCOUNTER (OUTPATIENT)
Dept: LAB | Age: 82
Discharge: HOME | End: 2022-08-07
Payer: MEDICARE

## 2022-08-04 DIAGNOSIS — C71.9 GLIOBLASTOMA (HCC): ICD-10-CM

## 2022-08-04 DIAGNOSIS — C71.9 GLIOBLASTOMA (HCC): Primary | ICD-10-CM

## 2022-08-04 LAB
ALBUMIN SERPL-MCNC: 3.2 G/DL (ref 3.2–4.6)
ALBUMIN/GLOB SERPL: 1 {RATIO} (ref 1.2–3.5)
ALP SERPL-CCNC: 93 U/L (ref 50–136)
ALT SERPL-CCNC: 42 U/L (ref 12–65)
ANION GAP SERPL CALC-SCNC: 7 MMOL/L (ref 7–16)
AST SERPL-CCNC: 14 U/L (ref 15–37)
BASOPHILS # BLD: 0.1 K/UL (ref 0–0.2)
BASOPHILS NFR BLD: 1 % (ref 0–2)
BILIRUB SERPL-MCNC: 0.8 MG/DL (ref 0.2–1.1)
BUN SERPL-MCNC: 32 MG/DL (ref 8–23)
CALCIUM SERPL-MCNC: 8.9 MG/DL (ref 8.3–10.4)
CHLORIDE SERPL-SCNC: 109 MMOL/L (ref 98–107)
CO2 SERPL-SCNC: 28 MMOL/L (ref 21–32)
CREAT SERPL-MCNC: 1.7 MG/DL (ref 0.8–1.5)
DIFFERENTIAL METHOD BLD: ABNORMAL
EOSINOPHIL # BLD: 0 K/UL (ref 0–0.8)
EOSINOPHIL NFR BLD: 0 % (ref 0.5–7.8)
ERYTHROCYTE [DISTWIDTH] IN BLOOD BY AUTOMATED COUNT: 14.4 % (ref 11.9–14.6)
GLOBULIN SER CALC-MCNC: 3.1 G/DL (ref 2.3–3.5)
GLUCOSE SERPL-MCNC: 143 MG/DL (ref 65–100)
HCT VFR BLD AUTO: 36.1 %
HGB BLD-MCNC: 12.4 G/DL (ref 13.6–17.2)
IMM GRANULOCYTES # BLD AUTO: 0.3 K/UL (ref 0–0.5)
IMM GRANULOCYTES NFR BLD AUTO: 5 % (ref 0–5)
LYMPHOCYTES # BLD: 0.8 K/UL (ref 0.5–4.6)
LYMPHOCYTES NFR BLD: 13 % (ref 13–44)
MCH RBC QN AUTO: 31 PG (ref 26.1–32.9)
MCHC RBC AUTO-ENTMCNC: 34.3 G/DL (ref 31.4–35)
MCV RBC AUTO: 90.3 FL (ref 79.6–97.8)
MONOCYTES # BLD: 0.5 K/UL (ref 0.1–1.3)
MONOCYTES NFR BLD: 8 % (ref 4–12)
NEUTS SEG # BLD: 4.7 K/UL (ref 1.7–8.2)
NEUTS SEG NFR BLD: 73 % (ref 43–78)
NRBC # BLD: 0.04 K/UL (ref 0–0.2)
PLATELET # BLD AUTO: 100 K/UL (ref 150–450)
PMV BLD AUTO: 9.3 FL (ref 9.4–12.3)
POTASSIUM SERPL-SCNC: 3.6 MMOL/L (ref 3.5–5.1)
PROT SERPL-MCNC: 6.3 G/DL (ref 6.3–8.2)
RBC # BLD AUTO: 4 M/UL (ref 4.23–5.6)
SODIUM SERPL-SCNC: 144 MMOL/L (ref 136–145)
WBC # BLD AUTO: 6.4 K/UL (ref 4.3–11.1)

## 2022-08-04 PROCEDURE — 85025 COMPLETE CBC W/AUTO DIFF WBC: CPT

## 2022-08-04 PROCEDURE — 36415 COLL VENOUS BLD VENIPUNCTURE: CPT

## 2022-08-04 PROCEDURE — 80053 COMPREHEN METABOLIC PANEL: CPT

## 2022-08-04 NOTE — TELEPHONE ENCOUNTER
Labs reviewed. Okay to start temodar. He will get CBC in 3 weeks and follow up with our office in 4 weeks for consideration for next cycle of temodar.

## 2022-08-05 ENCOUNTER — OFFICE VISIT (OUTPATIENT)
Dept: INTERNAL MEDICINE CLINIC | Facility: CLINIC | Age: 82
End: 2022-08-05
Payer: MEDICARE

## 2022-08-05 VITALS
WEIGHT: 185 LBS | DIASTOLIC BLOOD PRESSURE: 70 MMHG | OXYGEN SATURATION: 98 % | BODY MASS INDEX: 28.04 KG/M2 | HEART RATE: 75 BPM | HEIGHT: 68 IN | RESPIRATION RATE: 17 BRPM | SYSTOLIC BLOOD PRESSURE: 118 MMHG

## 2022-08-05 DIAGNOSIS — K21.9 GASTROESOPHAGEAL REFLUX DISEASE, UNSPECIFIED WHETHER ESOPHAGITIS PRESENT: ICD-10-CM

## 2022-08-05 DIAGNOSIS — H25.9 AGE-RELATED CATARACT OF BOTH EYES, UNSPECIFIED AGE-RELATED CATARACT TYPE: ICD-10-CM

## 2022-08-05 DIAGNOSIS — I95.1 ORTHOSTATIC HYPOTENSION: Primary | ICD-10-CM

## 2022-08-05 DIAGNOSIS — C71.9 GLIOBLASTOMA (HCC): ICD-10-CM

## 2022-08-05 PROCEDURE — 1123F ACP DISCUSS/DSCN MKR DOCD: CPT | Performed by: FAMILY MEDICINE

## 2022-08-05 PROCEDURE — 1036F TOBACCO NON-USER: CPT | Performed by: FAMILY MEDICINE

## 2022-08-05 PROCEDURE — 99214 OFFICE O/P EST MOD 30 MIN: CPT | Performed by: FAMILY MEDICINE

## 2022-08-05 PROCEDURE — G8417 CALC BMI ABV UP PARAM F/U: HCPCS | Performed by: FAMILY MEDICINE

## 2022-08-05 PROCEDURE — G8427 DOCREV CUR MEDS BY ELIG CLIN: HCPCS | Performed by: FAMILY MEDICINE

## 2022-08-05 ASSESSMENT — PATIENT HEALTH QUESTIONNAIRE - PHQ9
SUM OF ALL RESPONSES TO PHQ9 QUESTIONS 1 & 2: 0
SUM OF ALL RESPONSES TO PHQ QUESTIONS 1-9: 0
SUM OF ALL RESPONSES TO PHQ QUESTIONS 1-9: 0
2. FEELING DOWN, DEPRESSED OR HOPELESS: 0
SUM OF ALL RESPONSES TO PHQ QUESTIONS 1-9: 0
SUM OF ALL RESPONSES TO PHQ QUESTIONS 1-9: 0
1. LITTLE INTEREST OR PLEASURE IN DOING THINGS: 0

## 2022-08-05 NOTE — PROGRESS NOTES
Tami Cunningham DO  Diplomate of the [facility] of [facility] Internal Medicine      Jaime Mustafa (: 1940) is a 80 y.o. male, here for evaluation of the following chief complaint(s):  Hypertension (/)       ASSESSMENT/PLAN:  1. Orthostatic hypotension  2. Glioblastoma ([address]  3. Gastroesophageal reflux disease, unspecified whether esophagitis present  4. Age-related cataract of both eyes, unspecified age-related cataract type     -Encouraged good fluid intake, will continue to watch the blood pressure.  -Continued follow up with Oncology.  -Will add on pepcid prn for breakthrough GERD. -Advised to discuss with Oncology if ok for cataract surgery.  -Tylenol prn for low back pain. SUBJECTIVE/OBJECTIVE:  HPI:  -Patient is here with his wife today.  -Was having issues with profound hypotension. We did go ahead and stop the olmesartan and his blood pressure has improved. Systolics are now typically running in the 1 teens to 120s. He is feeling a little bit better, but still feels very fatigued. Wife states he does not get a whole lot of fluid intake.  -Has had some recurrence of the glioblastoma. He is back on Temodar for this. Did have some problems with left upper extremity paralysis, but this has improved. -Is on pantoprazole for reflux, but still occasionally has breakthrough symptoms. No melena or hematochezia. -Also having problems with his vision due to cataracts.  -Continues to have intermittent back pain. ROS negative except as noted above today.     Social History     Tobacco Use    Smoking status: Former     Types: Cigarettes     Quit date: 1970     Years since quittin.6    Smokeless tobacco: Never   Substance Use Topics    Alcohol use: Not Currently    Drug use: No     Vitals:    22 0958   BP: 118/70   Site: Left Upper Arm   Position: Sitting   Pulse: 75   Resp: 17   SpO2: 98%   Weight: 185 lb (83.9 kg)   Height: 5' 8\" (1.727 m)      Body mass index is 28.13 kg/m². Physical Exam  Constitutional:       General: He is not in acute distress. Appearance: He is not ill-appearing. HENT:      Head: Normocephalic. Cardiovascular:      Heart sounds: Normal heart sounds. Pulmonary:      Effort: Pulmonary effort is normal.      Breath sounds: Normal breath sounds. Skin:     General: Skin is warm and dry. Neurological:      Mental Status: He is alert. An electronic signature was used to authenticate this note.   Deb Conklin DO

## 2022-08-11 ENCOUNTER — TELEPHONE (OUTPATIENT)
Dept: NEUROLOGY | Age: 82
End: 2022-08-11

## 2022-08-11 NOTE — TELEPHONE ENCOUNTER
Patient was seen by Dr Lukas Jameson only to refill medication to prevent seizures. Requesting follow up at [facility] office. Last seen by Dr Lukas Jameson 2/25/21    There is really no reason for active neurologic follow-up on anything but an intermittent basis he is doing exceptionally well in terms of tumor.

## 2022-08-17 ENCOUNTER — TELEPHONE (OUTPATIENT)
Dept: ONCOLOGY | Age: 82
End: 2022-08-17

## 2022-08-17 RX ORDER — DEXAMETHASONE 4 MG/1
4 TABLET ORAL 2 TIMES DAILY WITH MEALS
Qty: 60 TABLET | Refills: 1 | Status: SHIPPED | OUTPATIENT
Start: 2022-08-17 | End: 2022-10-05

## 2022-08-17 NOTE — TELEPHONE ENCOUNTER
Call to Payam Watt at the pharmacy and the script came across only 1 tablet. I resent the script as decadron 4 mg BID with meals.

## 2022-08-17 NOTE — TELEPHONE ENCOUNTER
Wife calling on the behalf of the Pt. Wife stated  she picked up the pt prescription decadron 4mg. Wife also  stated the Pt only  received  one pill.  Wife would like to know if the PT is being cut back on that medication to why he only received one pill for his decadron medication

## 2022-08-18 ENCOUNTER — HOSPITAL ENCOUNTER (OUTPATIENT)
Dept: MRI IMAGING | Age: 82
Discharge: HOME | End: 2022-08-21
Payer: MEDICARE

## 2022-08-18 DIAGNOSIS — C71.9 MALIGNANT NEOPLASM OF BRAIN, UNSPECIFIED LOCATION (HCC): ICD-10-CM

## 2022-08-18 PROCEDURE — 6360000004 HC RX CONTRAST MEDICATION: Performed by: RADIOLOGY

## 2022-08-18 PROCEDURE — 2580000003 HC RX 258: Performed by: RADIOLOGY

## 2022-08-18 PROCEDURE — 70553 MRI BRAIN STEM W/O & W/DYE: CPT

## 2022-08-18 PROCEDURE — A9579 GAD-BASE MR CONTRAST NOS,1ML: HCPCS | Performed by: RADIOLOGY

## 2022-08-18 RX ORDER — SODIUM CHLORIDE 0.9 % (FLUSH) 0.9 %
10 SYRINGE (ML) INJECTION AS NEEDED
Status: COMPLETED | OUTPATIENT
Start: 2022-08-18 | End: 2022-08-18

## 2022-08-18 RX ADMIN — GADOTERIDOL 17 ML: 279.3 INJECTION, SOLUTION INTRAVENOUS at 11:10

## 2022-08-18 RX ADMIN — SODIUM CHLORIDE, PRESERVATIVE FREE 10 ML: 5 INJECTION INTRAVENOUS at 11:10

## 2022-08-25 ENCOUNTER — HOSPITAL ENCOUNTER (OUTPATIENT)
Dept: RADIATION ONCOLOGY | Age: 82
Setting detail: RECURRING SERIES
Discharge: HOME | End: 2022-08-28
Attending: RADIOLOGY
Payer: MEDICARE

## 2022-08-25 ENCOUNTER — HOSPITAL ENCOUNTER (OUTPATIENT)
Dept: LAB | Age: 82
Discharge: HOME | End: 2022-08-28
Payer: MEDICARE

## 2022-08-25 ENCOUNTER — TELEPHONE (OUTPATIENT)
Dept: ONCOLOGY | Age: 82
End: 2022-08-25

## 2022-08-25 VITALS
HEART RATE: 82 BPM | DIASTOLIC BLOOD PRESSURE: 73 MMHG | SYSTOLIC BLOOD PRESSURE: 131 MMHG | TEMPERATURE: 98 F | OXYGEN SATURATION: 98 %

## 2022-08-25 DIAGNOSIS — C71.9 MALIGNANT NEOPLASM OF BRAIN, UNSPECIFIED LOCATION (HCC): Primary | ICD-10-CM

## 2022-08-25 DIAGNOSIS — C71.9 GLIOBLASTOMA (HCC): ICD-10-CM

## 2022-08-25 LAB
BASOPHILS # BLD: 0 K/UL (ref 0–0.2)
BASOPHILS NFR BLD: 0 % (ref 0–2)
DIFFERENTIAL METHOD BLD: ABNORMAL
EOSINOPHIL # BLD: 0 K/UL (ref 0–0.8)
EOSINOPHIL NFR BLD: 1 % (ref 0.5–7.8)
ERYTHROCYTE [DISTWIDTH] IN BLOOD BY AUTOMATED COUNT: 17.1 % (ref 11.9–14.6)
HCT VFR BLD AUTO: 37.1 %
HGB BLD-MCNC: 12.6 G/DL (ref 13.6–17.2)
IMM GRANULOCYTES # BLD AUTO: 0.1 K/UL (ref 0–0.5)
IMM GRANULOCYTES NFR BLD AUTO: 1 % (ref 0–5)
LYMPHOCYTES # BLD: 0.5 K/UL (ref 0.5–4.6)
LYMPHOCYTES NFR BLD: 7 % (ref 13–44)
MCH RBC QN AUTO: 31.8 PG (ref 26.1–32.9)
MCHC RBC AUTO-ENTMCNC: 34 G/DL (ref 31.4–35)
MCV RBC AUTO: 93.7 FL (ref 79.6–97.8)
MONOCYTES # BLD: 0.3 K/UL (ref 0.1–1.3)
MONOCYTES NFR BLD: 5 % (ref 4–12)
NEUTS SEG # BLD: 5.7 K/UL (ref 1.7–8.2)
NEUTS SEG NFR BLD: 86 % (ref 43–78)
NRBC # BLD: 0 K/UL (ref 0–0.2)
PLATELET # BLD AUTO: 55 K/UL (ref 150–450)
PMV BLD AUTO: 9.8 FL (ref 9.4–12.3)
RBC # BLD AUTO: 3.96 M/UL (ref 4.23–5.6)
WBC # BLD AUTO: 6.6 K/UL (ref 4.3–11.1)

## 2022-08-25 PROCEDURE — 85025 COMPLETE CBC W/AUTO DIFF WBC: CPT

## 2022-08-25 PROCEDURE — 99211 OFF/OP EST MAY X REQ PHY/QHP: CPT

## 2022-08-25 PROCEDURE — 36415 COLL VENOUS BLD VENIPUNCTURE: CPT

## 2022-08-25 NOTE — PROGRESS NOTES
Patient: Ene Meier  MRN: [id number]   SSN: [id number]          YOB: 1940   Age: 80 y.o. Sex: male         Other Providers:  Dr. Sandy Russell, Dr. Jack Files: Seizure      DIAGNOSIS: GBM      PREVIOUS RADIATION TREATMENT:   4005cGy to the right parietal lobe, completed 3/26/2021  900cGy SRS to R parietal recurrence, completed 6/20/22      HISTORY OF PRESENT ILLNESS:  Ene Meier is a 80 y.o.  male who I am seeing at the request of Dr. Sandy Russell. Mr. Arlet Breaux was in his usual state of health until 12/25/2020 at which time he was found unresponsive in the bathroom by his wife. A head CT was performed which demonstrated a right parietal hemorrhage with surrounding vasogenic edema. This was followed  by an MRI brain which confirmed a 3.7 x 3.3 x 3.2 cm heterogeneously enhancing mass within the temporoparietal region concerning for metastatic versus primary disease. A CT of the chest/ abdomen/ pelvis were performed 12/27/2020 which showed no evidence  of metastatic or primary disease. Mr. Arlet Breaux subsequently underwent right craniotomy with placement of BCNU wafers on 12/31/2021 by Dr. Lukas Lew. This confirmed glioblastoma, WHO grade IV. A postoperative CT scan 1/1/2021 demonstrated interval right parietal  craniotomy and right parietal mass resection with mild enhancement along the anterior margin of the resection cavity which may relate to residual tumor or postoperative change. He had a second seizure on 1/23/21 for which he was re-started on Keppra. He has been able to wean off all steroid medications. He was treated with concurrent chemoradiation and tolerated this uneventfully. INTERVAL HISTORY:  Mr. Arlet Breaux has done well over the past month. He reports continuing to take multiple naps daily but feels that his strength and balance have improved. He denies any new seizures and new numbness, tingling, or weakness.  A repeat MRI was performed 4/23/2021  which demonstrated postradiation change and nonprogressive residual tumor at the right parietal resection site. He has started adjuvant temozolomide with Dr. Nancy Alvares, which he reports he has tolerated well with the addition of Zofran for nausea. He will  be meeting with an [facility] representative next week to discuss tumor treating field therapy. 8/9/21: Mr. Odalis Andres now presents approximately 5 months after completion of radiation therapy. He denies any new symptoms and has been tolerating TMZ and Optune treatments extremely well. He underwent a repeat MRI brain 8/5/21 which demonstrated an overall  improvement in the lesion with decreased enhancement and surrounding edema. 11/29/21: Here today for follow up, he has been doing okay since last seen. He has no new complaints or concerns. He has completed his course of temodar and has chosen to forgo further optune. He has no Concerns with balance. No headaches or nausea. He  has gotten new prescription glasses. No other worrisome concerns. 2/25/22 Here today for follow up. Doing okay since last seen. He has no new complaints orc concerns. Notes some ongoing fatigue but manageable. No infectious symptoms. No vision changes. No headaches or nausea. 6/3/22: Here for follow up. Repeat MRI brain demonstrates a new 1.6 x 1.1cm enhancing focus anterior and superior to the right sided operative cavity favoring disease progression. He denies any new neurologic symptoms including headaches, nausea, and focal numbness or weakness. He notes pain in the bilateral hips and knees which is stable. He was subsequently treated with SRS to the area of recurrence. He experienced acute onset L sided weakness following SRS and was started on dexamethasone 8mg BID.    8/25/22: Repeat MRI brain 8/18/22 demonstrates unchanged right parietal resection cavity with 0.7 x 0.5cm enhancing focus anterior and superior to the resection cavity improved compared to prior.  There is T2 hyperintensity in the white matter of the R precentral gyri, adjacent to the posterior portions of the R lateral ventricle in the posterior aspect of the R periventricular white matter which has worsened and is concerning for nonenhancing tumor. He is currently taking dexamethasone 4 mg twice daily with resolution of his L sided weakness. PAST MEDICAL HISTORY:    Past Medical History:   Diagnosis Date    Anxiety disorder 12/9/2014    Arthritis 12/9/2014    Depression 12/9/2014    Glioblastoma ([address] 12/26/2020    Gout 12/9/2014    History of intracranial hemorrhage 12/25/2020    History of lumbar laminectomy for spinal cord decompression 2/18/2019    History of seizure 12/28/2020    HTN (hypertension), benign 2/15/2017    Hyperlipemia 12/9/2014    Hypertension     Idiopathic chronic gout of right ankle 12/9/2014    Insomnia 12/9/2014    Kidney stone     Mixed hyperlipidemia 12/9/2014    Panic disorder 12/9/2014    Primary insomnia 12/9/2014    Rosacea 2/15/2017      PAST SURGICAL HISTORY:   Past Surgical History:   Procedure Laterality Date    ANKLE FRACTURE SURGERY Left 6/1974    COLONOSCOPY  2007    CYST REMOVAL      CYST REMOVAL      pylonidal cyst    CYST REMOVAL      HERNIA REPAIR Bilateral     OTHER SURGICAL HISTORY      wrist    WRIST FRACTURE SURGERY Bilateral 6/1993    wrist plate/pin       MEDICATIONS:       Current Outpatient Medications:   Current Outpatient Medications   Medication Sig Dispense Refill    dexamethasone (DECADRON) 4 MG tablet Take 1 tablet by mouth 2 times daily (with meals) 60 tablet 1    atorvastatin (LIPITOR) 40 MG tablet TAKE 1 TABLET BY MOUTH EVERY DAY 90 tablet 1    pantoprazole (PROTONIX) 40 MG tablet Take 1 tablet by mouth every morning (before breakfast) 90 tablet 1    sulfamethoxazole-trimethoprim (BACTRIM DS;SEPTRA DS) 800-160 MG per tablet Take 1 tab on Monday, Wednesday, and Friday.  48 tablet 2    temozolomide (TEMODAR) 100 MG chemo capsule Take 3 capsules by mouth daily for 5 days every 28 days 15 capsule 5    Calcium-Magnesium-Vitamin D (CALCIUM 1200+D3 PO) Take by mouth daily      vitamin D (CHOLECALCIFEROL) 25 MCG (1000 UT) TABS tablet Take 1,000 Units by mouth daily      furosemide (LASIX) 20 MG tablet Take 20 mg by mouth daily      levETIRAcetam (KEPPRA) 500 MG tablet Take 500 mg by mouth 2 times daily      L-Lysine 500 MG TABS Take 500 mg by mouth daily      ondansetron (ZOFRAN) 8 MG tablet Take 8 mg by mouth every 8 hours as needed      polyethylene glycol (GLYCOLAX) 17 GM/SCOOP powder Take 17 g by mouth daily as needed      potassium chloride (KLOR-CON) 10 MEQ extended release tablet TAKE 1 TABLET BY MOUTH EVERY DAY      sertraline (ZOLOFT) 100 MG tablet Take 150 mg by mouth daily      tamsulosin (FLOMAX) 0.4 MG capsule Take 0.4 mg by mouth daily       No current facility-administered medications for this encounter. ALLERGIES:   No Known Allergies     SOCIAL HISTORY:   Social History     Socioeconomic History    Marital status:    Tobacco Use    Smoking status: Former     Types: Cigarettes     Quit date: 1970     Years since quittin.6    Smokeless tobacco: Never   Substance and Sexual Activity    Alcohol use: Not Currently    Drug use: No      FAMILY HISTORY:   Family History   Problem Relation Age of Onset    Cancer Mother         lung ca    Hypertension Mother     Heart Disease Mother     Osteoarthritis Father     Cancer Brother         stomach       REVIEW OF SYSTEMS:    A full 12-point review of systems was completed and was negative unless noted in the history of present illness.       PHYSICAL EXAMINATION:    ECOG Performance status 1   VITAL SIGNS:    Visit Vitals  /73   Pulse 82   Temp 98 °F (36.7 °C) (Oral)   SpO2 98%      General: well developed/nourished adult Male in no acute distress; appears  stated age  [de-identified]: normocephalic, atraumatic; EOMI; well healed right-sided craniotomy incision with alopecia  Neck: supple with full continued observation with close interval MRI. Given his improvement in symptoms he wishes to try to taper off dexamethasone which is reasonable and I provided a taper, he will go back to 2mg BID if symptoms worsen. He will follow up with Julien Rojo 9/1/22 and radiation oncology in 3 months or sooner as needed. PLAN:  1) Repeat MRI and follow up with radiation oncology 11/22  2) Follow up with medical oncology 9/1/22  3) Decrease dexamethasone to 2mg daily for 7 days then discontinue. Increase to previous dose if symptoms return.     Eran Hernandez MD  8/25/2022

## 2022-08-25 NOTE — PROGRESS NOTES
Pt is here for FUP post RT to the brain which ended 6/2022 for GBM. Pt's wife is here today with him and stated that he is taking Decadron 4 mg twice daily as ordered. The 8/18/22 MRI Brain indicated that the periventricular lesion has progressed. Pt and his wife denied any motor changes, headaches, N/V, or vision changes. Pt will return in 3 months with a repeat MRI Brain. Pt is followed by Dr. Sonal Muñiz in [address] [place] [address]

## 2022-08-27 DIAGNOSIS — I10 ESSENTIAL (PRIMARY) HYPERTENSION: ICD-10-CM

## 2022-08-28 RX ORDER — OLMESARTAN MEDOXOMIL 40 MG/1
TABLET ORAL
Qty: 90 TABLET | Refills: 1 | OUTPATIENT
Start: 2022-08-28

## 2022-08-30 DIAGNOSIS — R60.0 LOCALIZED EDEMA: ICD-10-CM

## 2022-08-30 RX ORDER — FUROSEMIDE 20 MG/1
TABLET ORAL
Qty: 90 TABLET | Refills: 1 | Status: SHIPPED | OUTPATIENT
Start: 2022-08-30

## 2022-09-02 ENCOUNTER — TELEPHONE (OUTPATIENT)
Dept: ONCOLOGY | Age: 82
End: 2022-09-02

## 2022-09-06 ENCOUNTER — TELEPHONE (OUTPATIENT)
Dept: ONCOLOGY | Age: 82
End: 2022-09-06

## 2022-09-09 ENCOUNTER — TELEPHONE (OUTPATIENT)
Dept: ONCOLOGY | Age: 82
End: 2022-09-09

## 2022-09-09 NOTE — TELEPHONE ENCOUNTER
Dr Svetlana Barrera weaned the patient off the steroids in her note 8/25/22. Pt cancelled the last 2 NP appts. Msg to NP Pool. Msg from Np and the patient needs to be eating and drinking well. He is eating breakfast and drinking gatorade. She will try to get him up frequently during the day with rest breaks.  She will try the suggestions over the week end and call us Monday if he would like the PT referral. She Verbalizes understanding of instructions

## 2022-09-09 NOTE — TELEPHONE ENCOUNTER
Patient has been weak since he stopped taking steroid. They want to know what they can do to increase his energy. Please call them at [phone].

## 2022-09-15 ENCOUNTER — OFFICE VISIT (OUTPATIENT)
Dept: ONCOLOGY | Age: 82
End: 2022-09-15
Payer: MEDICARE

## 2022-09-15 ENCOUNTER — HOSPITAL ENCOUNTER (OUTPATIENT)
Dept: LAB | Age: 82
Discharge: HOME | End: 2022-09-18
Payer: MEDICARE

## 2022-09-15 VITALS
TEMPERATURE: 98.5 F | WEIGHT: 180.2 LBS | RESPIRATION RATE: 16 BRPM | SYSTOLIC BLOOD PRESSURE: 142 MMHG | OXYGEN SATURATION: 98 % | HEART RATE: 99 BPM | BODY MASS INDEX: 27.31 KG/M2 | HEIGHT: 68 IN | DIASTOLIC BLOOD PRESSURE: 62 MMHG

## 2022-09-15 DIAGNOSIS — D64.9 SEVERE ANEMIA: ICD-10-CM

## 2022-09-15 DIAGNOSIS — Z03.89 ENCOUNTER FOR OBSERVATION FOR OTHER SUSPECTED DISEASES AND CONDITIONS RULED OUT: ICD-10-CM

## 2022-09-15 DIAGNOSIS — R10.9 ABDOMINAL PAIN, UNSPECIFIED ABDOMINAL LOCATION: ICD-10-CM

## 2022-09-15 DIAGNOSIS — Z13.6 ENCOUNTER FOR SCREENING FOR CARDIOVASCULAR DISORDERS: ICD-10-CM

## 2022-09-15 DIAGNOSIS — D69.6 THROMBOCYTOPENIA (HCC): ICD-10-CM

## 2022-09-15 DIAGNOSIS — Z51.81 THERAPEUTIC DRUG MONITORING: ICD-10-CM

## 2022-09-15 DIAGNOSIS — R53.1 LEFT-SIDED WEAKNESS: ICD-10-CM

## 2022-09-15 DIAGNOSIS — C71.9 GLIOBLASTOMA (HCC): Primary | ICD-10-CM

## 2022-09-15 DIAGNOSIS — C71.9 GLIOBLASTOMA (HCC): ICD-10-CM

## 2022-09-15 DIAGNOSIS — D84.9 IMMUNOCOMPROMISED (HCC): ICD-10-CM

## 2022-09-15 LAB
ABO + RH BLD: NORMAL
ALBUMIN SERPL-MCNC: 3 G/DL (ref 3.2–4.6)
ALBUMIN/GLOB SERPL: 0.8 {RATIO} (ref 1.2–3.5)
ALP SERPL-CCNC: 65 U/L (ref 50–136)
ALT SERPL-CCNC: 20 U/L (ref 12–65)
ANION GAP SERPL CALC-SCNC: 9 MMOL/L (ref 4–13)
AST SERPL-CCNC: 14 U/L (ref 15–37)
BASOPHILS # BLD: 0 K/UL (ref 0–0.2)
BASOPHILS NFR BLD: 0 % (ref 0–2)
BILIRUB DIRECT SERPL-MCNC: 0.3 MG/DL
BILIRUB INDIRECT SERPL-MCNC: 0.6 MG/DL (ref 0–1.1)
BILIRUB SERPL-MCNC: 0.9 MG/DL (ref 0.2–1.1)
BLOOD GROUP ANTIBODIES SERPL: NORMAL
BUN SERPL-MCNC: 12 MG/DL (ref 8–23)
CALCIUM SERPL-MCNC: 8.6 MG/DL (ref 8.3–10.4)
CHLORIDE SERPL-SCNC: 105 MMOL/L (ref 101–110)
CO2 SERPL-SCNC: 27 MMOL/L (ref 21–32)
CREAT SERPL-MCNC: 1.5 MG/DL (ref 0.8–1.5)
DAT POLY-SP REAG RBC QL: NORMAL
DIFFERENTIAL METHOD BLD: ABNORMAL
EOSINOPHIL # BLD: 0 K/UL (ref 0–0.8)
EOSINOPHIL NFR BLD: 0 % (ref 0.5–7.8)
ERYTHROCYTE [DISTWIDTH] IN BLOOD BY AUTOMATED COUNT: 19.5 % (ref 11.9–14.6)
ERYTHROCYTE [SEDIMENTATION RATE] IN BLOOD: 31 MM/HR (ref 0–20)
FERRITIN SERPL-MCNC: 690 NG/ML (ref 8–388)
FOLATE SERPL-MCNC: 18.9 NG/ML (ref 3.1–17.5)
GLOBULIN SER CALC-MCNC: 3.9 G/DL (ref 2.3–3.5)
GLUCOSE SERPL-MCNC: 147 MG/DL (ref 65–100)
HAPTOGLOB SERPL-MCNC: 271 MG/DL (ref 30–200)
HCT VFR BLD AUTO: 22.1 %
HCT VFR BLD AUTO: 23.3 %
HGB BLD-MCNC: 7.4 G/DL (ref 13.6–17.2)
HGB BLD-MCNC: 7.7 G/DL (ref 13.6–17.2)
HGB RETIC QN AUTO: 33 PG (ref 29–35)
IMM GRANULOCYTES # BLD AUTO: 0.1 K/UL (ref 0–0.5)
IMM GRANULOCYTES NFR BLD AUTO: 2 % (ref 0–5)
IMM RETICS NFR: 26.8 % (ref 2.3–13.4)
IRON SATN MFR SERPL: 17 %
IRON SERPL-MCNC: 35 UG/DL (ref 35–150)
LDH SERPL L TO P-CCNC: 283 U/L (ref 110–210)
LYMPHOCYTES # BLD: 0.5 K/UL (ref 0.5–4.6)
LYMPHOCYTES NFR BLD: 16 % (ref 13–44)
MAGNESIUM SERPL-MCNC: 1.7 MG/DL (ref 1.8–2.4)
MCH RBC QN AUTO: 32.2 PG (ref 26.1–32.9)
MCHC RBC AUTO-ENTMCNC: 33 G/DL (ref 31.4–35)
MCV RBC AUTO: 97.5 FL (ref 79.6–97.8)
MONOCYTES # BLD: 0.9 K/UL (ref 0.1–1.3)
MONOCYTES NFR BLD: 26 % (ref 4–12)
NEUTS SEG # BLD: 1.9 K/UL (ref 1.7–8.2)
NEUTS SEG NFR BLD: 56 % (ref 43–78)
NRBC # BLD: 0.04 K/UL (ref 0–0.2)
PLATELET # BLD AUTO: 248 K/UL (ref 150–450)
PMV BLD AUTO: 10.2 FL (ref 9.4–12.3)
POTASSIUM SERPL-SCNC: 3.1 MMOL/L (ref 3.5–5.1)
PROT SERPL-MCNC: 6.9 G/DL (ref 6.3–8.2)
RBC # BLD AUTO: 2.39 M/UL (ref 4.23–5.6)
RETICS # AUTO: 0.16 M/UL (ref 0.03–0.1)
RETICS/RBC NFR AUTO: 7.1 % (ref 0.3–2)
SODIUM SERPL-SCNC: 141 MMOL/L (ref 136–145)
SPECIMEN EXP DATE BLD: NORMAL
TIBC SERPL-MCNC: 212 UG/DL (ref 250–450)
VIT B12 SERPL-MCNC: 892 PG/ML (ref 193–986)
WBC # BLD AUTO: 3.4 K/UL (ref 4.3–11.1)

## 2022-09-15 PROCEDURE — 85014 HEMATOCRIT: CPT

## 2022-09-15 PROCEDURE — G8417 CALC BMI ABV UP PARAM F/U: HCPCS | Performed by: INTERNAL MEDICINE

## 2022-09-15 PROCEDURE — 86901 BLOOD TYPING SEROLOGIC RH(D): CPT

## 2022-09-15 PROCEDURE — 85652 RBC SED RATE AUTOMATED: CPT

## 2022-09-15 PROCEDURE — 85025 COMPLETE CBC W/AUTO DIFF WBC: CPT

## 2022-09-15 PROCEDURE — 83921 ORGANIC ACID SINGLE QUANT: CPT

## 2022-09-15 PROCEDURE — 36415 COLL VENOUS BLD VENIPUNCTURE: CPT

## 2022-09-15 PROCEDURE — 83615 LACTATE (LD) (LDH) ENZYME: CPT

## 2022-09-15 PROCEDURE — 83010 ASSAY OF HAPTOGLOBIN QUANT: CPT

## 2022-09-15 PROCEDURE — 1123F ACP DISCUSS/DSCN MKR DOCD: CPT | Performed by: INTERNAL MEDICINE

## 2022-09-15 PROCEDURE — 82728 ASSAY OF FERRITIN: CPT

## 2022-09-15 PROCEDURE — 85046 RETICYTE/HGB CONCENTRATE: CPT

## 2022-09-15 PROCEDURE — 82248 BILIRUBIN DIRECT: CPT

## 2022-09-15 PROCEDURE — 82746 ASSAY OF FOLIC ACID SERUM: CPT

## 2022-09-15 PROCEDURE — 1036F TOBACCO NON-USER: CPT | Performed by: INTERNAL MEDICINE

## 2022-09-15 PROCEDURE — 86880 COOMBS TEST DIRECT: CPT

## 2022-09-15 PROCEDURE — 80053 COMPREHEN METABOLIC PANEL: CPT

## 2022-09-15 PROCEDURE — 82607 VITAMIN B-12: CPT

## 2022-09-15 PROCEDURE — 83735 ASSAY OF MAGNESIUM: CPT

## 2022-09-15 PROCEDURE — 83090 ASSAY OF HOMOCYSTEINE: CPT

## 2022-09-15 PROCEDURE — 83540 ASSAY OF IRON: CPT

## 2022-09-15 PROCEDURE — G8427 DOCREV CUR MEDS BY ELIG CLIN: HCPCS | Performed by: INTERNAL MEDICINE

## 2022-09-15 PROCEDURE — 99215 OFFICE O/P EST HI 40 MIN: CPT | Performed by: INTERNAL MEDICINE

## 2022-09-15 RX ORDER — DEXAMETHASONE 2 MG/1
2 TABLET ORAL
Qty: 30 TABLET | Refills: 1 | Status: SHIPPED | OUTPATIENT
Start: 2022-09-15

## 2022-09-15 RX ORDER — PANTOPRAZOLE SODIUM 40 MG/1
40 TABLET, DELAYED RELEASE ORAL
Qty: 180 TABLET | Refills: 1 | Status: SHIPPED | OUTPATIENT
Start: 2022-09-15 | End: 2022-10-05

## 2022-09-15 ASSESSMENT — ANXIETY QUESTIONNAIRES
1. FEELING NERVOUS, ANXIOUS, OR ON EDGE: 0
2. NOT BEING ABLE TO STOP OR CONTROL WORRYING: 0
GAD7 TOTAL SCORE: 0
4. TROUBLE RELAXING: 0
6. BECOMING EASILY ANNOYED OR IRRITABLE: 0
7. FEELING AFRAID AS IF SOMETHING AWFUL MIGHT HAPPEN: 0
3. WORRYING TOO MUCH ABOUT DIFFERENT THINGS: 0
5. BEING SO RESTLESS THAT IT IS HARD TO SIT STILL: 0
IF YOU CHECKED OFF ANY PROBLEMS ON THIS QUESTIONNAIRE, HOW DIFFICULT HAVE THESE PROBLEMS MADE IT FOR YOU TO DO YOUR WORK, TAKE CARE OF THINGS AT HOME, OR GET ALONG WITH OTHER PEOPLE: NOT DIFFICULT AT ALL

## 2022-09-15 ASSESSMENT — PATIENT HEALTH QUESTIONNAIRE - PHQ9
SUM OF ALL RESPONSES TO PHQ QUESTIONS 1-9: 9
9. THOUGHTS THAT YOU WOULD BE BETTER OFF DEAD, OR OF HURTING YOURSELF: 0
4. FEELING TIRED OR HAVING LITTLE ENERGY: 3
SUM OF ALL RESPONSES TO PHQ QUESTIONS 1-9: 9
8. MOVING OR SPEAKING SO SLOWLY THAT OTHER PEOPLE COULD HAVE NOTICED. OR THE OPPOSITE, BEING SO FIGETY OR RESTLESS THAT YOU HAVE BEEN MOVING AROUND A LOT MORE THAN USUAL: 0
SUM OF ALL RESPONSES TO PHQ9 QUESTIONS 1 & 2: 0
6. FEELING BAD ABOUT YOURSELF - OR THAT YOU ARE A FAILURE OR HAVE LET YOURSELF OR YOUR FAMILY DOWN: 0
2. FEELING DOWN, DEPRESSED OR HOPELESS: 0
5. POOR APPETITE OR OVEREATING: 3
7. TROUBLE CONCENTRATING ON THINGS, SUCH AS READING THE NEWSPAPER OR WATCHING TELEVISION: 0
SUM OF ALL RESPONSES TO PHQ QUESTIONS 1-9: 9
SUM OF ALL RESPONSES TO PHQ QUESTIONS 1-9: 9
1. LITTLE INTEREST OR PLEASURE IN DOING THINGS: 0
3. TROUBLE FALLING OR STAYING ASLEEP: 3

## 2022-09-15 NOTE — PROGRESS NOTES
Data Source: Patient, [facility] record. 9/15/2022    11:40 AM    Lucia Blankenship [id number]    61 y.o. 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, history of depression/anxiety disorder, gout, insomnia, hernia repair, ankle fracture repair, lower back surgery with limited mobility thereafter, more recently admitted 12/25/2020 after being found unresponsive in bathroom by wife. Abnormal head CT leading to a brain MRI 12/25/2020 showing right temporal parietal mass 3.7 x 3.3 cm in size with adjacent cerebral edema, as well as associated hemorrhage and/or calcification. 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 CT CAP 12/27/2020 without evidence of malignancy or metastatic disease. Patient was seen by neurosurgery Dr. Haven Webb and taken to the OR 12/31/2020. Underwent right parietal craniotomy with resection of right parietal tumor and placement of intraoperative BCNU wafers to surgical bed. 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 Recovering well, now referred to medical oncology as well as radiation oncology for further therapy. He is also undergoing speech therapy. On evaluation today using a walker to get around. Today reports some short term memory issues but otherwise doing well. Family support includes wife and one daughter who lives in [place] and was here for his surgery. Not currently on any seizure medicines and being tapered off of Decadron (will be done next Monday). Hospice Referral:  na  Interval History:  9/15/2022: Patient seen for his recurrent GBM, ongoing treatments and toxicity management. His last temozolomide cycle was approximately 7 weeks ago. The last few weeks he has missed appointments related to weakness.   He has seen radiation oncology Dr. Johnny Butt in interim, with brain MRI 8/18/2022, overall felt to have responding disease though there was increased swelling for which he completed a tapering course of dexamethasone. Wife today accompanies patient and feels patient had clinical decline since completely coming off dexamethasone. He has been complaining of abdominal discomfort as well. Today appears to be, minimally involved in conversation, gait has also worsened. Blood work today with surprise finding of hemoglobin down to 7 range from normal range. Will defer next temozolomide cycle for now. We will place patient back on dexamethasone 2 mg daily for now with subsequent taper over time. We will also recheck H&H, start PPI twice daily. Initiate work-up for new significant anemia including iron panel, B12, folate, MMA, HC, hemolysis labs, ESR, as well as CT AP without contrast to rule out retroperitoneal bleeding. We will see him back in 1 week's time. REVIEW OF SYSTEMS:  As mentioned above, all other systems were reviewed in full and are negative.     Past Medical History:   Diagnosis Date    Anxiety disorder 12/9/2014    Arthritis 12/9/2014    Depression 12/9/2014    Glioblastoma ([facility] [address] 12/26/2020    Gout 12/9/2014    History of intracranial hemorrhage 12/25/2020    History of lumbar laminectomy for spinal cord decompression 2/18/2019    History of seizure 12/28/2020    HTN (hypertension), benign 2/15/2017    Hyperlipemia 12/9/2014    Hypertension     Idiopathic chronic gout of right ankle 12/9/2014    Insomnia 12/9/2014    Kidney stone     Mixed hyperlipidemia 12/9/2014    Panic disorder 12/9/2014    Primary insomnia 12/9/2014    Rosacea 2/15/2017       Past Surgical History:   Procedure Laterality Date    ANKLE FRACTURE SURGERY Left 6/1974    COLONOSCOPY  2007    CYST REMOVAL      CYST REMOVAL      pylonidal cyst    CYST REMOVAL      HERNIA REPAIR Bilateral     OTHER SURGICAL HISTORY      wrist    WRIST FRACTURE SURGERY Bilateral 1993    wrist plate/pin       Current Outpatient Medications   Medication Sig Dispense Refill    furosemide (LASIX) 20 MG tablet TAKE 1 TABLET BY MOUTH EVERY DAY 90 tablet 1    dexamethasone (DECADRON) 4 MG tablet Take 1 tablet by mouth 2 times daily (with meals) 60 tablet 1    atorvastatin (LIPITOR) 40 MG tablet TAKE 1 TABLET BY MOUTH EVERY DAY 90 tablet 1    pantoprazole (PROTONIX) 40 MG tablet Take 1 tablet by mouth every morning (before breakfast) 90 tablet 1    sulfamethoxazole-trimethoprim (BACTRIM DS;SEPTRA DS) 800-160 MG per tablet Take 1 tab on Monday, Wednesday, and Friday. 48 tablet 2    temozolomide (TEMODAR) 100 MG chemo capsule Take 3 capsules by mouth daily for 5 days every 28 days 15 capsule 5    Calcium-Magnesium-Vitamin D (CALCIUM 1200+D3 PO) Take by mouth daily      vitamin D (CHOLECALCIFEROL) 25 MCG (1000 UT) TABS tablet Take 1,000 Units by mouth daily      levETIRAcetam (KEPPRA) 500 MG tablet Take 500 mg by mouth 2 times daily      L-Lysine 500 MG TABS Take 500 mg by mouth daily      ondansetron (ZOFRAN) 8 MG tablet Take 8 mg by mouth every 8 hours as needed      polyethylene glycol (GLYCOLAX) 17 GM/SCOOP powder Take 17 g by mouth daily as needed      potassium chloride (KLOR-CON) 10 MEQ extended release tablet TAKE 1 TABLET BY MOUTH EVERY DAY      sertraline (ZOLOFT) 100 MG tablet Take 150 mg by mouth daily      tamsulosin (FLOMAX) 0.4 MG capsule Take 0.4 mg by mouth daily       No current facility-administered medications for this visit.        Social History     Socioeconomic History    Marital status:      Spouse name: None    Number of children: None    Years of education: None    Highest education level: None   Tobacco Use    Smoking status: Former     Types: Cigarettes     Quit date: 1970     Years since quittin.7    Smokeless tobacco: Never   Substance and Sexual Activity    Alcohol use: Not Currently    Drug use: No       Family History   Problem Relation Age of Onset    Cancer Mother         lung ca    Hypertension Mother     Heart Disease Mother     Osteoarthritis Father     Cancer Brother         stomach       No Known Allergies    PHYSICAL EXAMINATION:  General Appearance: Healthy appearing patient in no acute distress  Vitals reviewed. BP (!) 142/62   Pulse 99   Temp 98.5 °F (36.9 °C)   Resp 16   Ht 5' 8\" (1.727 m)   Wt 180 lb 3.2 oz (81.7 kg)   SpO2 98%   BMI 27.40 kg/m²   HEENT: No oral or pharyngeal masses, ulceration or thrush noted, no sinus tenderness. Neck is supple with no thyromegaly or JVD noted. Lymph Nodes: No lymphadenopathy noted in the occipital, pre and post auricular, cervical, supra and infraclavicular, axillary, epitrochlear, inguinal, and popliteal region. Breasts: No palpable masses, nipple discharge or skin retraction  Lungs/Thorax: Clear to auscultation, no accessory muscles of respiration being used. Heart: Regular rate and rhythm, normal S1, S2, no appreciable murmurs, rubs, gallops  Abdomen: Soft, nontender, bowel sounds present, no appreciable hepatosplenomegaly, no palpable masses  Extremeties: Good pulses bilaterally, no peripheral edema. Skin: Normal skin tone with no rash, petechiae, ecchymosis noted. Musculoskeletal: No pain on palpation over bony prominence, no edema, no evidence of gout, no joint or bony deformity  Neurologic: Grossly intact    LABS/IMAGING:    Lab Results   Component Value Date/Time    WBC 6.6 08/25/2022 09:53 AM    HGB 12.6 08/25/2022 09:53 AM    HCT 37.1 08/25/2022 09:53 AM    PLT 55 08/25/2022 09:53 AM    MCV 93.7 08/25/2022 09:53 AM       Lab Results   Component Value Date/Time     08/04/2022 10:31 AM    K 3.6 08/04/2022 10:31 AM     08/04/2022 10:31 AM    CO2 28 08/04/2022 10:31 AM    BUN 32 08/04/2022 10:31 AM    GFRAA 50 08/04/2022 10:31 AM    GLOB 3.1 08/04/2022 10:31 AM    ALT 42 08/04/2022 10:31 AM         Above results reviewed with patient.        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, history of depression/anxiety disorder, gout, insomnia, hernia repair, ankle fracture repair, lower back surgery with limited mobility thereafter, more recently admitted 12/25/2020 after being found unresponsive in bathroom by wife. Abnormal head CT leading to a brain MRI 12/25/2020 showing right temporal parietal mass 3.7 x 3.3 cm in size with adjacent cerebral edema, as well as associated hemorrhage and/or calcification. 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 CT CAP 12/27/2020 without evidence of malignancy or metastatic disease. Patient was seen by neurosurgery Dr. Jonny Ackerman and taken to the OR 12/31/2020. Underwent right parietal craniotomy with resection of right parietal tumor and placement of intraoperative BCNU wafers to surgical bed. 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 Recovering well, now referred to medical oncology as well as radiation oncology for further therapy. He is also undergoing speech therapy. On evaluation today using a walker to get around. Today reports some short term memory issues but otherwise doing well. Family support includes wife and one daughter who lives in [place] and was here for his surgery. Not currently on any seizure medicines and being tapered off of Decadron (will be done next Monday). 2/10/2021: Since last visit, significant course including recurrent seizure requiring ED visit, noncontrasted head CT with improved postoperative changes. He has since been placed on Keppra 500 twice daily. He was also treated for UTI though appears suboptimal specimen. We will simply repeat UA today, denies any symptoms. Performance status slowly improving, has completed physical therapy, currently undergoing speech therapy. PS 1 today.   Recent transient thrombocytopenia, wife reports briefly being prescribed trazodone which they have stopped now as it caused excessive sleepiness. Plan for concurrent chemo-XRT with temozolomide with shorter course XRT over 3 weeks. Repeat simulation and brain MRI 2/20/2021: Results will be followed. Once starts temozolomide, will also benefit with Bactrim 3 times a week for PCP prophylaxis. Weekly CBC once on therapy. RTC once ready to start therapy, weekly provider visits there on.    3/31/2021: Completed Temodar-XRT 3/26/2021. Tolerated reasonably. Ongoing fatigue, though mobility and speech appear improved. Remains on Keppra for past seizures per neurology. Now scheduled for follow-up brain MRI 4/23/2021. Will start adjuvant temozolomide at 150 mg per metered squared daily x5 days every 28 days at that point. We will also look into Optune therapy. RTC post brain MRI.    4/26/2021: Reports doing reasonably. Mobility has somewhat improved. Per wife takes multiple naps during the day. They are planning to  more regular outdoor walking. Labs today unremarkable. Recent brain MRI without disease progression ([place]), treatment related changes seen. Okay to proceed with adjuvant temozolomide. Repeat labs and provider visit in 2 weeks. Paperwork initiated for optune therapy. 5/24/2021:  appears to have tolerated adjuvant temozolomide cycle 1 well. Main complaint has remained ongoing fatigue for which he takes regular naps. Denies any new sensorimotor deficits, or seizure activity. Reasonable p.o. intake and mobility now. Also started Optune therapy a few weeks ago, tolerating reasonably, scalp without any rash. Okay to proceed with cycle 2 adjuvant temozolomide tomorrow, given labs without cytopenias, we will simply see him back with repeat labs in a month. Now scheduled for repeat brain MRI per radiation oncology in 8/21.    7/20/2021: Continues to tolerate therapy well. Mobility has significantly improved, walking briskly in the room. Regular with his Keppra, as well as Bactrim.   Blood work unremarkable, regular with Optune which he is tolerating well without any signs of scalp dermatitis. Okay to proceed with cycle 4. Scheduled for brain MRI and subsequent radiation oncology follow-up next month, will chart check imaging. Otherwise we will see him back in a month. 9/14/2021: Patient here for cycle 6 ( last cycle of adjuvant temozolomide). Performance status remains improved. Some fatigue though. Nausea better on days with Temodar since taking Zofran an hour before each dose. Also regular with his Keppra as well as Bactrim. Last brain MRI 8/21 with responding disease, now scheduled for repeat brain MRI in 11/17/2021. Labs today with thrombocytopenia with platelet count 00,813. Will defer next cycle by week, till counts recover. Discussed role of Optune therapy continuation beyond 6 cycles, they will discuss but are leaning towards continuing. 11/22/2021: More recently seeing primary care for sciatica as well as neck stiffness. Reasonable p.o. intake and mobility. Some scalp dermatitis, they have decided to forego further Optune therapy. This is reasonable. Blood work unremarkable, platelet count having normalized. Brain MRI with SD. Simple monitoring moving forward, will repeat brain imaging in 3 months time. 2/25/2022: Reports doing reasonably, some difficulty with time management in the day but otherwise mobile, has been in right now able to do things together, looking forward to DGIT in a few months. Has seen neurology, no further seizures, remains on Keppra 500 twice daily. Recent brain MRI with no clear evidence of disease progression at surgical site, T2 prolongation in right precentral gyrus and splenium of corpus callosum slowly worsened, possibly posttreatment change versus infiltrative tumor. Will get radiation oncology opinion also.   We will see him back in 3 months with repeat imaging    Addendum: Case discussed with Dr. Sean Dalton, felt to have therapy related changes rather than true progression. 6/6/2022: Repeat brain MRI with/without contrast 5/25/2022 with changes concerning for disease progression including new 1.6 x 1.1 cm enhancing focus anterior and superior to the right sided operative cavity. Patient has seen radiation oncology Dr. Gareth Escobar as well as neurosurgery Dr. Vikas Christine. The patient and family report being told not a candidate for repeat surgery, seeing radiation oncology later today. Will discuss case with Dr. Gareth Escobar, likely repeat XRT followed by systemic therapy (likely temozolomide single agent given MGMT methylation status, and previous good tolerance). Today reports doing reasonably. Some fatigue but manageable. Blood work today with adequate counts, chemistries unremarkable. We will see him back in a few weeks. Addendum: Case discussed with Dr. Gareth Escobar. Patient not a surgical candidate. Plan for SBRT to site of disease progression, will plan for temozolomide 150 mg per metered squared daily x 5 every 28 days thereafter. 7/21/22: Patient since last visit has completed 3 fractions SBRT to left temporal on 6/20/22. Restarted Temodar 150 mg/m2 x5 days, and is here for cycle 2. Restart Bactrim MWF prophylaxis as well. Today reports left-sided weakness is improved. Abruptly stopped his dexamethasone 8 mg twice daily as ran out 2 days ago, blood pressure on lower side today. Denies any fevers, chills or otherwise feeling unwell. Hypotension probably related to stopping steroids, will restart dexamethasone 4 mg twice daily, will also give additional 1 L IV NS today. Defer cycle 2 given platelet count 51,948, will recheck counts early next week also. CBC Monday. RTC in 1 week with labs (start cycle 2 if platelet count 105,049 or above). CBC on day 1 and day 21 of each cycle. 9/15/2022: Patient seen for his recurrent GBM, ongoing treatments and toxicity management. His last temozolomide cycle was approximately 7 weeks ago.   The last few weeks he has missed appointments related to weakness. He has seen radiation oncology Dr. Gary Gutierrez in interim, with brain MRI 8/18/2022, overall felt to have responding disease though there was increased swelling for which he completed a tapering course of dexamethasone. Next brain MRI scheduled for 11/18/2022. Wife today accompanies patient and feels patient had clinical decline since completely coming off dexamethasone. He has been complaining of abdominal discomfort as well. Today appears to be, minimally involved in conversation, gait has also worsened. Blood work today with surprise finding of hemoglobin down to 7 range from normal range. Will defer next temozolomide cycle for now. We will place patient back on dexamethasone 2 mg daily for now with subsequent taper over time. We will also recheck H&H, start PPI twice daily. Initiate work-up for new significant anemia including smear, iron panel, B12, folate, MMA, HC, hemolysis labs, ESR, as well as CT AP without contrast to rule out retroperitoneal bleeding. Refer to GI. We will see him back in 1 week's time. 1. RT temporal-parietal GBM s/p MSR & BCNU wafer placement 12/31/20  2. Fatigue    PLAN:  - As above. - S/p concurrent Temozolomide 75 mg/m2 daily w/ XRT (completed 3/26/21). On PCP prophylaxis w Bactrim DS MWF. Brain MRI w MA. Started (4/21) adjuvant Temozolomide 150 mg/m2 daily x 5 days every 28 days x 6 (completed 10/21). PD 6/22: s/p SBRT, now on single agent temozolomide 150 mg per metered squared daily x 5 every 28 days. Platelet count needs to be over 100,000 prior to each cycle. - Optune alternating electrical field therapy post chemoradiation (5/21-10/21). - Continue Keppra 500 bid. - MGMT methylation detected, IDH wild type. RTC in 1 week with labs. CBC on day 1 and day 21 of each cycle. . Serial brain MRIs every 3 months. Thank you Dr Ermelinda Rosa for allowing us to paticipate in care of this pleasant patient.  Please call w/ any

## 2022-09-15 NOTE — PROGRESS NOTES
Oral Chemotherapy Adherence:     Current Regimen:  Drug Name: temozolomide   Dose: 300 mg  Frequency: daily for 5 days every 28 days    Barriers to care identified including (financial, physical, psychosocial) : No    Missed doses reported: Yes- has been off extra 2 weeks    Patient verbalizes understanding of what to do in the event of a missed dose:  Yes    Adverse reactions/toxicities reported: severe fatigue, feeling \"unwell\", anemia, will hold off on starting next cycle while workup for anemia

## 2022-09-15 NOTE — PATIENT INSTRUCTIONS
Patient Instructions from Today's Visit    Reason for Visit:  Follow up    Diagnosis Information:  https://www.[employer]/. net/about-us/asco-answers-patient-education-materials/jbrh-umofsmx-ixjn-sheets  Patient was educated and given handouts published by [employer] entitled [employer] Answers Fact Sheets about their diagnosis of  during todays office visit. Plan: Your hemoglobin has dropped significantly. This is probably why you are feeling so bad. We will do some more lab work today. We will set him up for a CT scan. We will also refer him to a gastroenterologist.    Hold off on the temodar for now. We want to figure out the     Follow Up:   Follow up next week    Recent Lab Results:  Hospital Outpatient Visit on 09/15/2022   Component Date Value Ref Range Status    WBC 09/15/2022 3.4 (A) 4.3 - 11.1 K/uL Final    RBC 09/15/2022 2.39 (A) 4.23 - 5.6 M/uL Final    Hemoglobin 09/15/2022 7.7 (A) 13.6 - 17.2 g/dL Final    RESULTS CHECKED X 2    Hematocrit 09/15/2022 23.3  % Final    MCV 09/15/2022 97.5  79.6 - 97.8 FL Final    MCH 09/15/2022 32.2  26.1 - 32.9 PG Final    MCHC 09/15/2022 33.0  31.4 - 35.0 g/dL Final    RDW 09/15/2022 19.5 (A) 11.9 - 14.6 % Final    Platelets 44/32/8390 248  150 - 450 K/uL Final    RESULTS CHECKED X 2    MPV 09/15/2022 10.2  9.4 - 12.3 FL Final    nRBC 09/15/2022 0.04  0.0 - 0.2 K/uL Final    **Note: Absolute NRBC parameter is now reported with Hemogram**    Differential Type 09/15/2022 AUTOMATED    Final    Seg Neutrophils 09/15/2022 56  43 - 78 % Final    Lymphocytes 09/15/2022 16  13 - 44 % Final    Monocytes 09/15/2022 26 (A) 4.0 - 12.0 % Final    Eosinophils % 09/15/2022 0 (A) 0.5 - 7.8 % Final    Basophils 09/15/2022 0  0.0 - 2.0 % Final    Immature Granulocytes 09/15/2022 2  0.0 - 5.0 % Final    Segs Absolute 09/15/2022 1.9  1.7 - 8.2 K/UL Final    Absolute Lymph # 09/15/2022 0.5  0.5 - 4.6 K/UL Final    Absolute Mono # 09/15/2022 0.9  0.1 - 1.3 K/UL Final    Absolute Eos # 09/15/2022 0.0 0.0 - 0.8 K/UL Final    Basophils Absolute 09/15/2022 0.0  0.0 - 0.2 K/UL Final    Absolute Immature Granulocyte 09/15/2022 0.1  0.0 - 0.5 K/UL Final    Sodium 09/15/2022 141  136 - 145 mmol/L Final    Potassium 09/15/2022 3.1 (A) 3.5 - 5.1 mmol/L Final    Chloride 09/15/2022 105  101 - 110 mmol/L Final    CO2 09/15/2022 27  21 - 32 mmol/L Final    Anion Gap 09/15/2022 9  4 - 13 mmol/L Final    Glucose 09/15/2022 147 (A) 65 - 100 mg/dL Final    BUN 09/15/2022 12  8 - 23 MG/DL Final    Creatinine 09/15/2022 1.50  0.8 - 1.5 MG/DL Final    GFR  09/15/2022 58 (A) >60 ml/min/1.73m2 Final    GFR Non- 09/15/2022 48 (A) >60 ml/min/1.73m2 Final    Comment:      Estimated GFR is calculated using the Modification of Diet in Renal Disease (MDRD) Study equation, reported for both  Americans (GFRAA) and non- Americans (GFRNA), and normalized to 1.73m2 body surface area. The physician must decide which value applies to the patient. The MDRD study equation should only be used in individuals age 25 or older. It has not been validated for the following: pregnant women, patients with serious comorbid conditions,or on certain medications, or persons with extremes of body size, muscle mass, or nutritional status.       Calcium 09/15/2022 8.6  8.3 - 10.4 MG/DL Final    Total Bilirubin 09/15/2022 0.9  0.2 - 1.1 MG/DL Final    ALT 09/15/2022 20  12 - 65 U/L Final    AST 09/15/2022 14 (A) 15 - 37 U/L Final    Alk Phosphatase 09/15/2022 65  50 - 136 U/L Final    Total Protein 09/15/2022 6.9  6.3 - 8.2 g/dL Final    Albumin 09/15/2022 3.0 (A) 3.2 - 4.6 g/dL Final    Globulin 09/15/2022 3.9 (A) 2.3 - 3.5 g/dL Final    Albumin/Globulin Ratio 09/15/2022 0.8 (A) 1.2 - 3.5   Final    Magnesium 09/15/2022 1.7 (A) 1.8 - 2.4 mg/dL Final        Treatment Summary has been discussed and given to patient: n/a        -------------------------------------------------------------------------------------------------------------------  Please call our office at [phone] if you have any  of the following symptoms:   Fever of 100.5 or greater  Chills  Shortness of breath  Swelling or pain in one leg    After office hours an answering service is available and will contact a provider for emergencies or if you are experiencing any of the above symptoms. Patient did express an interest in My Chart. My Chart log in information explained on the after visit summary printout at the [facility] desk.     Emma Riggins RN

## 2022-09-16 ENCOUNTER — HOSPITAL ENCOUNTER (OUTPATIENT)
Dept: CT IMAGING | Age: 82
Discharge: HOME | End: 2022-09-19

## 2022-09-16 DIAGNOSIS — D64.9 SEVERE ANEMIA: ICD-10-CM

## 2022-09-16 DIAGNOSIS — C71.9 GLIOBLASTOMA (HCC): ICD-10-CM

## 2022-09-16 DIAGNOSIS — R10.9 ABDOMINAL PAIN, UNSPECIFIED ABDOMINAL LOCATION: ICD-10-CM

## 2022-09-16 LAB — HCYS SERPL-SCNC: 9.5 UMOL/L (ref 0–19.2)

## 2022-09-19 ENCOUNTER — OFFICE VISIT (OUTPATIENT)
Dept: ONCOLOGY | Age: 82
End: 2022-09-19
Payer: MEDICARE

## 2022-09-19 ENCOUNTER — HOSPITAL ENCOUNTER (OUTPATIENT)
Dept: LAB | Age: 82
Discharge: HOME | End: 2022-09-22
Payer: MEDICARE

## 2022-09-19 VITALS
HEIGHT: 68 IN | OXYGEN SATURATION: 97 % | TEMPERATURE: 97.9 F | RESPIRATION RATE: 22 BRPM | DIASTOLIC BLOOD PRESSURE: 73 MMHG | BODY MASS INDEX: 27.55 KG/M2 | WEIGHT: 181.8 LBS | HEART RATE: 93 BPM | SYSTOLIC BLOOD PRESSURE: 132 MMHG

## 2022-09-19 DIAGNOSIS — Z87.898 HISTORY OF SEIZURE: ICD-10-CM

## 2022-09-19 DIAGNOSIS — C71.9 GLIOBLASTOMA (HCC): Primary | ICD-10-CM

## 2022-09-19 DIAGNOSIS — C71.9 GLIOBLASTOMA (HCC): ICD-10-CM

## 2022-09-19 DIAGNOSIS — Z51.81 THERAPEUTIC DRUG MONITORING: ICD-10-CM

## 2022-09-19 DIAGNOSIS — D64.9 ANEMIA, UNSPECIFIED TYPE: ICD-10-CM

## 2022-09-19 DIAGNOSIS — D69.6 THROMBOCYTOPENIA (HCC): ICD-10-CM

## 2022-09-19 LAB
ALBUMIN SERPL-MCNC: 2.9 G/DL (ref 3.2–4.6)
ALBUMIN/GLOB SERPL: 0.8 {RATIO} (ref 1.2–3.5)
ALP SERPL-CCNC: 71 U/L (ref 50–136)
ALT SERPL-CCNC: 22 U/L (ref 12–65)
ANION GAP SERPL CALC-SCNC: 6 MMOL/L (ref 4–13)
AST SERPL-CCNC: 13 U/L (ref 15–37)
BASOPHILS # BLD: 0 K/UL (ref 0–0.2)
BASOPHILS NFR BLD: 0 % (ref 0–2)
BILIRUB SERPL-MCNC: 0.6 MG/DL (ref 0.2–1.1)
BUN SERPL-MCNC: 18 MG/DL (ref 8–23)
CALCIUM SERPL-MCNC: 8.6 MG/DL (ref 8.3–10.4)
CHLORIDE SERPL-SCNC: 109 MMOL/L (ref 101–110)
CO2 SERPL-SCNC: 29 MMOL/L (ref 21–32)
CREAT SERPL-MCNC: 1.4 MG/DL (ref 0.8–1.5)
DIFFERENTIAL METHOD BLD: ABNORMAL
EOSINOPHIL # BLD: 0 K/UL (ref 0–0.8)
EOSINOPHIL NFR BLD: 0 % (ref 0.5–7.8)
ERYTHROCYTE [DISTWIDTH] IN BLOOD BY AUTOMATED COUNT: 19.1 % (ref 11.9–14.6)
GLOBULIN SER CALC-MCNC: 3.5 G/DL (ref 2.3–3.5)
GLUCOSE SERPL-MCNC: 142 MG/DL (ref 65–100)
HCT VFR BLD AUTO: 23.5 %
HGB BLD-MCNC: 7.8 G/DL (ref 13.6–17.2)
IMM GRANULOCYTES # BLD AUTO: 0.3 K/UL (ref 0–0.5)
IMM GRANULOCYTES NFR BLD AUTO: 4 % (ref 0–5)
LDH SERPL L TO P-CCNC: 249 U/L (ref 110–210)
LYMPHOCYTES # BLD: 0.8 K/UL (ref 0.5–4.6)
LYMPHOCYTES NFR BLD: 14 % (ref 13–44)
MCH RBC QN AUTO: 32.8 PG (ref 26.1–32.9)
MCHC RBC AUTO-ENTMCNC: 33.2 G/DL (ref 31.4–35)
MCV RBC AUTO: 98.7 FL (ref 79.6–97.8)
METHYLMALONATE SERPL-SCNC: 184 NMOL/L (ref 0–378)
MONOCYTES # BLD: 0.8 K/UL (ref 0.1–1.3)
MONOCYTES NFR BLD: 14 % (ref 4–12)
NEUTS SEG # BLD: 4 K/UL (ref 1.7–8.2)
NEUTS SEG NFR BLD: 67 % (ref 43–78)
NRBC # BLD: 0.13 K/UL (ref 0–0.2)
PLATELET # BLD AUTO: 241 K/UL (ref 150–450)
PMV BLD AUTO: 9.5 FL (ref 9.4–12.3)
POTASSIUM SERPL-SCNC: 3 MMOL/L (ref 3.5–5.1)
PROT SERPL-MCNC: 6.4 G/DL (ref 6.3–8.2)
RBC # BLD AUTO: 2.38 M/UL (ref 4.23–5.6)
SODIUM SERPL-SCNC: 144 MMOL/L (ref 136–145)
WBC # BLD AUTO: 5.9 K/UL (ref 4.3–11.1)

## 2022-09-19 PROCEDURE — 85025 COMPLETE CBC W/AUTO DIFF WBC: CPT

## 2022-09-19 PROCEDURE — G8417 CALC BMI ABV UP PARAM F/U: HCPCS | Performed by: INTERNAL MEDICINE

## 2022-09-19 PROCEDURE — 83615 LACTATE (LD) (LDH) ENZYME: CPT

## 2022-09-19 PROCEDURE — G8428 CUR MEDS NOT DOCUMENT: HCPCS | Performed by: INTERNAL MEDICINE

## 2022-09-19 PROCEDURE — 80053 COMPREHEN METABOLIC PANEL: CPT

## 2022-09-19 PROCEDURE — 99215 OFFICE O/P EST HI 40 MIN: CPT | Performed by: INTERNAL MEDICINE

## 2022-09-19 PROCEDURE — 1036F TOBACCO NON-USER: CPT | Performed by: INTERNAL MEDICINE

## 2022-09-19 PROCEDURE — 36415 COLL VENOUS BLD VENIPUNCTURE: CPT

## 2022-09-19 PROCEDURE — 1123F ACP DISCUSS/DSCN MKR DOCD: CPT | Performed by: INTERNAL MEDICINE

## 2022-09-19 ASSESSMENT — PATIENT HEALTH QUESTIONNAIRE - PHQ9
SUM OF ALL RESPONSES TO PHQ QUESTIONS 1-9: 0
1. LITTLE INTEREST OR PLEASURE IN DOING THINGS: 0
SUM OF ALL RESPONSES TO PHQ9 QUESTIONS 1 & 2: 0
SUM OF ALL RESPONSES TO PHQ QUESTIONS 1-9: 0
2. FEELING DOWN, DEPRESSED OR HOPELESS: 0
SUM OF ALL RESPONSES TO PHQ QUESTIONS 1-9: 0
SUM OF ALL RESPONSES TO PHQ QUESTIONS 1-9: 0

## 2022-09-19 NOTE — PROGRESS NOTES
Data Source: Patient, [facility] record. 9/19/2022    3:03 PM    Merly Archibald [id number]    24 y.o. 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, history of depression/anxiety disorder, gout, insomnia, hernia repair, ankle fracture repair, lower back surgery with limited mobility thereafter, more recently admitted 12/25/2020 after being found unresponsive in bathroom by wife. Abnormal head CT leading to a brain MRI 12/25/2020 showing right temporal parietal mass 3.7 x 3.3 cm in size with adjacent cerebral edema, as well as associated hemorrhage and/or calcification. 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 CT CAP 12/27/2020 without evidence of malignancy or metastatic disease. Patient was seen by neurosurgery Dr. Robby Cho and taken to the OR 12/31/2020. Underwent right parietal craniotomy with resection of right parietal tumor and placement of intraoperative BCNU wafers to surgical bed. 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 Recovering well, now referred to medical oncology as well as radiation oncology for further therapy. He is also undergoing speech therapy. On evaluation today using a walker to get around. Today reports some short term memory issues but otherwise doing well. Family support includes wife and one daughter who lives in [place] and was here for his surgery. Not currently on any seizure medicines and being tapered off of Decadron (will be done next Monday). Hospice Referral:  na  Interval History:  9/19/2022: Patient seen for his GBM, ongoing treatments and toxicity management, as well as recent anemia. Appears to have significantly improved clinically since going back on dexamethasone, more communicative today.   Per wife has been taking dexamethasone 4 mg twice daily, advised to drop down to 2 mg twice daily. Labs from previous visit reviewed, no clear evidence of hemolysis, adequate iron, F25 and folic acid levels. CTAP without retroperitoneal bleeding. Has been referred to GI. Denies any overt bleeding. Reasonable to proceed with next cycle of temozolomide. We will see him back next week for tox check as well as repeat labs. REVIEW OF SYSTEMS:  As mentioned above, all other systems were reviewed in full and are negative.     Past Medical History:   Diagnosis Date    Anxiety disorder 12/9/2014    Arthritis 12/9/2014    Depression 12/9/2014    Glioblastoma ([facility] [address] 12/26/2020    Gout 12/9/2014    History of intracranial hemorrhage 12/25/2020    History of lumbar laminectomy for spinal cord decompression 2/18/2019    History of seizure 12/28/2020    HTN (hypertension), benign 2/15/2017    Hyperlipemia 12/9/2014    Hypertension     Idiopathic chronic gout of right ankle 12/9/2014    Insomnia 12/9/2014    Kidney stone     Mixed hyperlipidemia 12/9/2014    Panic disorder 12/9/2014    Primary insomnia 12/9/2014    Rosacea 2/15/2017       Past Surgical History:   Procedure Laterality Date    ANKLE FRACTURE SURGERY Left 6/1974    COLONOSCOPY  2007    CYST REMOVAL      CYST REMOVAL      pylonidal cyst    CYST REMOVAL      HERNIA REPAIR Bilateral     OTHER SURGICAL HISTORY      wrist    WRIST FRACTURE SURGERY Bilateral 6/1993    wrist plate/pin       Current Outpatient Medications   Medication Sig Dispense Refill    dexamethasone (DECADRON) 2 MG tablet Take 1 tablet by mouth daily (with breakfast) 30 tablet 1    pantoprazole (PROTONIX) 40 MG tablet Take 1 tablet by mouth 2 times daily (before meals) 180 tablet 1    furosemide (LASIX) 20 MG tablet TAKE 1 TABLET BY MOUTH EVERY DAY 90 tablet 1    dexamethasone (DECADRON) 4 MG tablet Take 1 tablet by mouth 2 times daily (with meals) 60 tablet 1    atorvastatin (LIPITOR) 40 MG tablet TAKE 1 TABLET Mother     Osteoarthritis Father     Cancer Brother         stomach       No Known Allergies    PHYSICAL EXAMINATION:  General Appearance: Healthy appearing patient in no acute distress  Vitals reviewed. /73 (Site: Right Upper Arm, Position: Sitting, Cuff Size: Medium Adult)   Pulse 93   Temp 97.9 °F (36.6 °C) (Oral)   Resp 22   Ht 5' 8\" (1.727 m)   Wt 181 lb 12.8 oz (82.5 kg)   SpO2 97%   BMI 27.64 kg/m²   HEENT: No oral or pharyngeal masses, ulceration or thrush noted, no sinus tenderness. Neck is supple with no thyromegaly or JVD noted. Lymph Nodes: No lymphadenopathy noted in the occipital, pre and post auricular, cervical, supra and infraclavicular, axillary, epitrochlear, inguinal, and popliteal region. Breasts: No palpable masses, nipple discharge or skin retraction  Lungs/Thorax: Clear to auscultation, no accessory muscles of respiration being used. Heart: Regular rate and rhythm, normal S1, S2, no appreciable murmurs, rubs, gallops  Abdomen: Soft, nontender, bowel sounds present, no appreciable hepatosplenomegaly, no palpable masses  Extremeties: Good pulses bilaterally, no peripheral edema. Skin: Normal skin tone with no rash, petechiae, ecchymosis noted. Musculoskeletal: No pain on palpation over bony prominence, no edema, no evidence of gout, no joint or bony deformity  Neurologic: Grossly intact    LABS/IMAGING:    Lab Results   Component Value Date/Time    WBC 5.9 09/19/2022 02:17 PM    HGB 7.8 09/19/2022 02:17 PM    HCT 23.5 09/19/2022 02:17 PM     09/19/2022 02:17 PM    MCV 98.7 09/19/2022 02:17 PM       Lab Results   Component Value Date/Time     09/19/2022 02:17 PM    K 3.0 09/19/2022 02:17 PM     09/19/2022 02:17 PM    CO2 29 09/19/2022 02:17 PM    BUN 18 09/19/2022 02:17 PM    GFRAA >60 09/19/2022 02:17 PM    GLOB 3.5 09/19/2022 02:17 PM    ALT 22 09/19/2022 02:17 PM         Above results reviewed with patient.        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, history of depression/anxiety disorder, gout, insomnia, hernia repair, ankle fracture repair, lower back surgery with limited mobility thereafter, more recently admitted 12/25/2020 after being found unresponsive in bathroom by wife. Abnormal head CT leading to a brain MRI 12/25/2020 showing right temporal parietal mass 3.7 x 3.3 cm in size with adjacent cerebral edema, as well as associated hemorrhage and/or calcification. 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 CT CAP 12/27/2020 without evidence of malignancy or metastatic disease. Patient was seen by neurosurgery Dr. Varun Shepherd and taken to the OR 12/31/2020. Underwent right parietal craniotomy with resection of right parietal tumor and placement of intraoperative BCNU wafers to surgical bed. 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 Recovering well, now referred to medical oncology as well as radiation oncology for further therapy. He is also undergoing speech therapy. On evaluation today using a walker to get around. Today reports some short term memory issues but otherwise doing well. Family support includes wife and one daughter who lives in [place] and was here for his surgery. Not currently on any seizure medicines and being tapered off of Decadron (will be done next Monday). 2/10/2021: Since last visit, significant course including recurrent seizure requiring ED visit, noncontrasted head CT with improved postoperative changes. He has since been placed on Keppra 500 twice daily. He was also treated for UTI though appears suboptimal specimen. We will simply repeat UA today, denies any symptoms. Performance status slowly improving, has completed physical therapy, currently undergoing speech therapy. PS 1 today. Recent transient thrombocytopenia, wife reports briefly being prescribed trazodone which they have stopped now as it caused excessive sleepiness.   Plan for concurrent chemo-XRT with temozolomide with shorter course XRT over 3 weeks. Repeat simulation and brain MRI 2/20/2021: Results will be followed. Once starts temozolomide, will also benefit with Bactrim 3 times a week for PCP prophylaxis. Weekly CBC once on therapy. RTC once ready to start therapy, weekly provider visits there on.    3/31/2021: Completed Temodar-XRT 3/26/2021. Tolerated reasonably. Ongoing fatigue, though mobility and speech appear improved. Remains on Keppra for past seizures per neurology. Now scheduled for follow-up brain MRI 4/23/2021. Will start adjuvant temozolomide at 150 mg per metered squared daily x5 days every 28 days at that point. We will also look into Optune therapy. RTC post brain MRI.    4/26/2021: Reports doing reasonably. Mobility has somewhat improved. Per wife takes multiple naps during the day. They are planning to  more regular outdoor walking. Labs today unremarkable. Recent brain MRI without disease progression ([place]), treatment related changes seen. Okay to proceed with adjuvant temozolomide. Repeat labs and provider visit in 2 weeks. Paperwork initiated for optune therapy. 5/24/2021:  appears to have tolerated adjuvant temozolomide cycle 1 well. Main complaint has remained ongoing fatigue for which he takes regular naps. Denies any new sensorimotor deficits, or seizure activity. Reasonable p.o. intake and mobility now. Also started Optune therapy a few weeks ago, tolerating reasonably, scalp without any rash. Okay to proceed with cycle 2 adjuvant temozolomide tomorrow, given labs without cytopenias, we will simply see him back with repeat labs in a month. Now scheduled for repeat brain MRI per radiation oncology in 8/21.    7/20/2021: Continues to tolerate therapy well. Mobility has significantly improved, walking briskly in the room. Regular with his Keppra, as well as Bactrim.   Blood work unremarkable, regular with Optune which he is progression. 6/6/2022: Repeat brain MRI with/without contrast 5/25/2022 with changes concerning for disease progression including new 1.6 x 1.1 cm enhancing focus anterior and superior to the right sided operative cavity. Patient has seen radiation oncology Dr. Michelle Lopez as well as neurosurgery Dr. Nj Preciado. The patient and family report being told not a candidate for repeat surgery, seeing radiation oncology later today. Will discuss case with Dr. Michelle Lopez, likely repeat XRT followed by systemic therapy (likely temozolomide single agent given MGMT methylation status, and previous good tolerance). Today reports doing reasonably. Some fatigue but manageable. Blood work today with adequate counts, chemistries unremarkable. We will see him back in a few weeks. Addendum: Case discussed with Dr. Michelle Lopez. Patient not a surgical candidate. Plan for SBRT to site of disease progression, will plan for temozolomide 150 mg per metered squared daily x 5 every 28 days thereafter. 7/21/22: Patient since last visit has completed 3 fractions SBRT to left temporal on 6/20/22. Restarted Temodar 150 mg/m2 x5 days, and is here for cycle 2. Restart Bactrim MWF prophylaxis as well. Today reports left-sided weakness is improved. Abruptly stopped his dexamethasone 8 mg twice daily as ran out 2 days ago, blood pressure on lower side today. Denies any fevers, chills or otherwise feeling unwell. Hypotension probably related to stopping steroids, will restart dexamethasone 4 mg twice daily, will also give additional 1 L IV NS today. Defer cycle 2 given platelet count 22,320, will recheck counts early next week also. CBC Monday. RTC in 1 week with labs (start cycle 2 if platelet count 347,660 or above). CBC on day 1 and day 21 of each cycle. 9/15/2022: Patient seen for his recurrent GBM, ongoing treatments and toxicity management. His last temozolomide cycle was approximately 7 weeks ago.   The last few weeks he has missed appointments related to weakness. He has seen radiation oncology Dr. Tae Chery in interim, with brain MRI 8/18/2022, overall felt to have responding disease though there was increased swelling for which he completed a tapering course of dexamethasone. Next brain MRI scheduled for 11/18/2022. Wife today accompanies patient and feels patient had clinical decline since completely coming off dexamethasone. He has been complaining of abdominal discomfort as well. Today appears to be, minimally involved in conversation, gait has also worsened. Blood work today with surprise finding of hemoglobin down to 7 range from normal range. Will defer next temozolomide cycle for now. We will place patient back on dexamethasone 2 mg daily for now with subsequent taper over time. We will also recheck H&H, start PPI twice daily. Initiate work-up for new significant anemia including smear, iron panel, B12, folate, MMA, HC, hemolysis labs, ESR, as well as CT AP without contrast to rule out retroperitoneal bleeding. Refer to GI. We will see him back in 1 week's time. 9/19/2022: Patient seen for his GBM, ongoing treatments and toxicity management, as well as recent anemia. Appears to have significantly improved clinically since going back on dexamethasone, more communicative today. Per wife has been taking dexamethasone 4 mg twice daily, advised to drop down to 2 mg twice daily. Labs from previous visit reviewed, no clear evidence of hemolysis, adequate iron, C33 and folic acid levels. CTAP without retroperitoneal bleeding. Has been referred to GI. Denies any overt bleeding. Reasonable to proceed with next cycle of temozolomide. We will see him back next week for tox check as well as repeat labs. 1. RT temporal-parietal GBM s/p MSR & BCNU wafer placement 12/31/20  2. Fatigue    PLAN:  - As above. - S/p concurrent Temozolomide 75 mg/m2 daily w/ XRT (completed 3/26/21). On PCP prophylaxis w Bactrim DS MWF.  Brain MRI w NH. Started (4/21) adjuvant Temozolomide 150 mg/m2 daily x 5 days every 28 days x 6 (completed 10/21). PD 6/22: s/p SBRT, now on single agent temozolomide 150 mg per metered squared daily x 5 every 28 days. Platelet count needs to be over 100,000 prior to each cycle. - Optune alternating electrical field therapy post chemoradiation (5/21-10/21). - Continue Keppra 500 bid. - MGMT methylation detected, IDH wild type. - Anemia: No clear evidence of hemolysis, CTAP without retroperitoneal bleeding. Iron panel, I57 and folic acid levels unremarkable. Referred to GI.    RTC in 1 week with labs. CBC on day 1 and day 21 of each cycle. Serial brain MRIs every 3 months. Thank you Dr Philippe Jacome for allowing us to paticipate in care of this pleasant patient.  Please call w/ any quesions      Tanika Tristan MD  [address] [place] [address],[address]  Hematology Oncology  [address] [place] [address]  Office : [phone]  Fax : [phone]

## 2022-09-19 NOTE — PATIENT INSTRUCTIONS
Patient Instructions from Today's Visit    Reason for Visit:  Follow up    Plan:  The CT did not show any internal bleeding. The blood work also didn't show anything that would cause your hemoglobin to be low. We will send you to a GI doctor to rule out bleeding from your bowels/stomach. Go ahead and start the temodar tomorrow- 300 mg (3 pills) daily x 5 days. We will check lab work next week. Make sure you are consistently taking your potassium pills. Decrease decadron down to 2 mg twice a day. Follow Up:   Follow up next week    Recent Lab Results:  Hospital Outpatient Visit on 09/19/2022   Component Date Value Ref Range Status    WBC 09/19/2022 5.9  4.3 - 11.1 K/uL Final    RBC 09/19/2022 2.38 (A) 4.23 - 5.6 M/uL Final    Hemoglobin 09/19/2022 7.8 (A) 13.6 - 17.2 g/dL Final    Hematocrit 09/19/2022 23.5  % Final    MCV 09/19/2022 98.7 (A) 79.6 - 97.8 FL Final    MCH 09/19/2022 32.8  26.1 - 32.9 PG Final    MCHC 09/19/2022 33.2  31.4 - 35.0 g/dL Final    RDW 09/19/2022 19.1 (A) 11.9 - 14.6 % Final    Platelets 67/48/2151 241  150 - 450 K/uL Final    MPV 09/19/2022 9.5  9.4 - 12.3 FL Final    nRBC 09/19/2022 0.13  0.0 - 0.2 K/uL Final    **Note: Absolute NRBC parameter is now reported with Hemogram**    Differential Type 09/19/2022 AUTOMATED    Final    Seg Neutrophils 09/19/2022 67  43 - 78 % Final    Lymphocytes 09/19/2022 14  13 - 44 % Final    Monocytes 09/19/2022 14 (A) 4.0 - 12.0 % Final    Eosinophils % 09/19/2022 0 (A) 0.5 - 7.8 % Final    Basophils 09/19/2022 0  0.0 - 2.0 % Final    Immature Granulocytes 09/19/2022 4  0.0 - 5.0 % Final    Segs Absolute 09/19/2022 4.0  1.7 - 8.2 K/UL Final    Absolute Lymph # 09/19/2022 0.8  0.5 - 4.6 K/UL Final    Absolute Mono # 09/19/2022 0.8  0.1 - 1.3 K/UL Final    Absolute Eos # 09/19/2022 0.0  0.0 - 0.8 K/UL Final    Basophils Absolute 09/19/2022 0.0  0.0 - 0.2 K/UL Final    Absolute Immature Granulocyte 09/19/2022 0.3  0.0 - 0.5 K/UL Final    Sodium 09/19/2022 144  136 - 145 mmol/L Final    Potassium 09/19/2022 3.0 (A) 3.5 - 5.1 mmol/L Final    Chloride 09/19/2022 109  101 - 110 mmol/L Final    CO2 09/19/2022 29  21 - 32 mmol/L Final    Anion Gap 09/19/2022 6  4 - 13 mmol/L Final    Glucose 09/19/2022 142 (A) 65 - 100 mg/dL Final    BUN 09/19/2022 18  8 - 23 MG/DL Final    Creatinine 09/19/2022 1.40  0.8 - 1.5 MG/DL Final    GFR  09/19/2022 >60  >60 ml/min/1.73m2 Final    GFR Non- 09/19/2022 52 (A) >60 ml/min/1.73m2 Final    Comment:      Estimated GFR is calculated using the Modification of Diet in Renal Disease (MDRD) Study equation, reported for both  Americans (GFRAA) and non- Americans (GFRNA), and normalized to 1.73m2 body surface area. The physician must decide which value applies to the patient. The MDRD study equation should only be used in individuals age 25 or older. It has not been validated for the following: pregnant women, patients with serious comorbid conditions,or on certain medications, or persons with extremes of body size, muscle mass, or nutritional status.       Calcium 09/19/2022 8.6  8.3 - 10.4 MG/DL Final    Total Bilirubin 09/19/2022 0.6  0.2 - 1.1 MG/DL Final    ALT 09/19/2022 22  12 - 65 U/L Final    AST 09/19/2022 13 (A) 15 - 37 U/L Final    Alk Phosphatase 09/19/2022 71  50 - 136 U/L Final    Total Protein 09/19/2022 6.4  6.3 - 8.2 g/dL Final    Albumin 09/19/2022 2.9 (A) 3.2 - 4.6 g/dL Final    Globulin 09/19/2022 3.5  2.3 - 3.5 g/dL Final    Albumin/Globulin Ratio 09/19/2022 0.8 (A) 1.2 - 3.5   Final    LD 09/19/2022 249 (A) 110 - 210 U/L Final   Hospital Outpatient Visit on 09/15/2022   Component Date Value Ref Range Status    WBC 09/15/2022 3.4 (A) 4.3 - 11.1 K/uL Final    RBC 09/15/2022 2.39 (A) 4.23 - 5.6 M/uL Final    Hemoglobin 09/15/2022 7.7 (A) 13.6 - 17.2 g/dL Final    RESULTS CHECKED X 2    Hematocrit 09/15/2022 23.3  % Final    MCV 09/15/2022 97.5  79.6 - 97.8 FL Final MCH 09/15/2022 32.2  26.1 - 32.9 PG Final    MCHC 09/15/2022 33.0  31.4 - 35.0 g/dL Final    RDW 09/15/2022 19.5 (A) 11.9 - 14.6 % Final    Platelets 98/92/6936 248  150 - 450 K/uL Final    RESULTS CHECKED X 2    MPV 09/15/2022 10.2  9.4 - 12.3 FL Final    nRBC 09/15/2022 0.04  0.0 - 0.2 K/uL Final    **Note: Absolute NRBC parameter is now reported with Hemogram**    Differential Type 09/15/2022 AUTOMATED    Final    Seg Neutrophils 09/15/2022 56  43 - 78 % Final    Lymphocytes 09/15/2022 16  13 - 44 % Final    Monocytes 09/15/2022 26 (A) 4.0 - 12.0 % Final    Eosinophils % 09/15/2022 0 (A) 0.5 - 7.8 % Final    Basophils 09/15/2022 0  0.0 - 2.0 % Final    Immature Granulocytes 09/15/2022 2  0.0 - 5.0 % Final    Segs Absolute 09/15/2022 1.9  1.7 - 8.2 K/UL Final    Absolute Lymph # 09/15/2022 0.5  0.5 - 4.6 K/UL Final    Absolute Mono # 09/15/2022 0.9  0.1 - 1.3 K/UL Final    Absolute Eos # 09/15/2022 0.0  0.0 - 0.8 K/UL Final    Basophils Absolute 09/15/2022 0.0  0.0 - 0.2 K/UL Final    Absolute Immature Granulocyte 09/15/2022 0.1  0.0 - 0.5 K/UL Final    Sodium 09/15/2022 141  136 - 145 mmol/L Final    Potassium 09/15/2022 3.1 (A) 3.5 - 5.1 mmol/L Final    Chloride 09/15/2022 105  101 - 110 mmol/L Final    CO2 09/15/2022 27  21 - 32 mmol/L Final    Anion Gap 09/15/2022 9  4 - 13 mmol/L Final    Glucose 09/15/2022 147 (A) 65 - 100 mg/dL Final    BUN 09/15/2022 12  8 - 23 MG/DL Final    Creatinine 09/15/2022 1.50  0.8 - 1.5 MG/DL Final    GFR  09/15/2022 58 (A) >60 ml/min/1.73m2 Final    GFR Non- 09/15/2022 48 (A) >60 ml/min/1.73m2 Final    Comment:      Estimated GFR is calculated using the Modification of Diet in Renal Disease (MDRD) Study equation, reported for both  Americans (GFRAA) and non- Americans (GFRNA), and normalized to 1.73m2 body surface area. The physician must decide which value applies to the patient.   The MDRD study equation should only be used in individuals age 25 or older. It has not been validated for the following: pregnant women, patients with serious comorbid conditions,or on certain medications, or persons with extremes of body size, muscle mass, or nutritional status. Calcium 09/15/2022 8.6  8.3 - 10.4 MG/DL Final    Total Bilirubin 09/15/2022 0.9  0.2 - 1.1 MG/DL Final    ALT 09/15/2022 20  12 - 65 U/L Final    AST 09/15/2022 14 (A) 15 - 37 U/L Final    Alk Phosphatase 09/15/2022 65  50 - 136 U/L Final    Total Protein 09/15/2022 6.9  6.3 - 8.2 g/dL Final    Albumin 09/15/2022 3.0 (A) 3.2 - 4.6 g/dL Final    Globulin 09/15/2022 3.9 (A) 2.3 - 3.5 g/dL Final    Albumin/Globulin Ratio 09/15/2022 0.8 (A) 1.2 - 3.5   Final    Magnesium 09/15/2022 1.7 (A) 1.8 - 2.4 mg/dL Final    Ferritin 09/15/2022 690 (A) 8 - 388 NG/ML Final    LD 09/15/2022 283 (A) 110 - 210 U/L Final    Reticulocyte Count,Automated 09/15/2022 7.1 (A) 0.3 - 2.0 % Final    Absolute Retic # 09/15/2022 0.1593 (A) 0.026 - 0.095 M/ul Final    Immature Retic Fraction 09/15/2022 26.8 (A) 2.3 - 13.4 % Final    Retic Hemoglobin conc. 09/15/2022 33  29 - 35 pg Final    Iron 09/15/2022 35  35 - 150 ug/dL Final    Comment: Known Interfering Substances section:  \"Iron values may be falsely elevated in  serum samples from patients with  anticoagulants (e.g., hemodialysis patients). \"  Limitations of Procedure section:  \"Turbidity resulting from precipitation of  fibrinogen in the serum of patients treated  with anticoagulants (e.g. hemodialysis  patients) may cause spuriously elevated  iron results. \"      TIBC 09/15/2022 212 (A) 250 - 450 ug/dL Final    TRANSFERRIN SATURATION 09/15/2022 17 (A) >20 % Final    Polyspecific Jose Carlos 09/15/2022 NEG   Final    Haptoglobin 09/15/2022 271 (A) 30 - 200 mg/dL Final    Sed Rate, Automated 09/15/2022 31 (A) 0 - 20 mm/hr Final    Vitamin B-12 09/15/2022 892  193 - 986 pg/mL Final    Folate 09/15/2022 18.9 (A) 3.1 - 17.5 ng/mL Final    Homocysteine 09/15/2022 9.5  0.0 - 19.2 umol/L Final    Comment: (NOTE)  Performed At: [facility] & [address] [id number]  Scarlet Lai MD XK:[id number]      Crossmatch expiration date 09/15/2022 09/18/2022,2359   Final    ABO/Rh 09/15/2022 B POSITIVE   Final    Antibody Screen 09/15/2022 NEG   Final    Hemoglobin 09/15/2022 7.4 (A) 13.6 - 17.2 g/dL Final    Hematocrit 09/15/2022 22.1  % Final    Bilirubin, Direct 09/15/2022 0.3  <0.4 MG/DL Final    Bilirubin, Indirect 09/15/2022 0.6  0.0 - 1.1 MG/DL Final        Treatment Summary has been discussed and given to patient: n/a        -------------------------------------------------------------------------------------------------------------------  Please call our office at [phone] if you have any  of the following symptoms:   Fever of 100.5 or greater  Chills  Shortness of breath  Swelling or pain in one leg    After office hours an answering service is available and will contact a provider for emergencies or if you are experiencing any of the above symptoms. Patient did express an interest in My Chart. My Chart log in information explained on the after visit summary printout at the [facility] desk.     Collette Curet, RN

## 2022-09-19 NOTE — PROGRESS NOTES
Oral Chemotherapy Adherence:     Current Regimen:  Drug Name: temozolomide  Dose: 300 mg  Frequency: daily x 5 days, repeat every 28 days    Barriers to care identified including (financial, physical, psychosocial) : No    Missed doses reported: No    Patient verbalizes understanding of what to do in the event of a missed dose:  Yes    Adverse reactions/toxicities reported:None

## 2022-09-23 ENCOUNTER — HOSPITAL ENCOUNTER (OUTPATIENT)
Dept: LAB | Age: 82
Discharge: HOME | End: 2022-09-26
Payer: MEDICARE

## 2022-09-23 DIAGNOSIS — D64.9 ANEMIA, UNSPECIFIED TYPE: ICD-10-CM

## 2022-09-23 DIAGNOSIS — C71.9 GLIOBLASTOMA (HCC): ICD-10-CM

## 2022-09-23 LAB
ABO + RH BLD: NORMAL
ALBUMIN SERPL-MCNC: 3.1 G/DL (ref 3.2–4.6)
ALBUMIN/GLOB SERPL: 1 {RATIO} (ref 1.2–3.5)
ALP SERPL-CCNC: 73 U/L (ref 50–136)
ALT SERPL-CCNC: 23 U/L (ref 12–65)
ANION GAP SERPL CALC-SCNC: 6 MMOL/L (ref 4–13)
AST SERPL-CCNC: 15 U/L (ref 15–37)
BASOPHILS # BLD: 0 K/UL (ref 0–0.2)
BASOPHILS NFR BLD: 0 % (ref 0–2)
BILIRUB SERPL-MCNC: 0.8 MG/DL (ref 0.2–1.1)
BLOOD GROUP ANTIBODIES SERPL: NORMAL
BUN SERPL-MCNC: 19 MG/DL (ref 8–23)
CALCIUM SERPL-MCNC: 8.5 MG/DL (ref 8.3–10.4)
CHLORIDE SERPL-SCNC: 108 MMOL/L (ref 101–110)
CO2 SERPL-SCNC: 30 MMOL/L (ref 21–32)
CREAT SERPL-MCNC: 1.4 MG/DL (ref 0.8–1.5)
DIFFERENTIAL METHOD BLD: ABNORMAL
EOSINOPHIL # BLD: 0 K/UL (ref 0–0.8)
EOSINOPHIL NFR BLD: 0 % (ref 0.5–7.8)
ERYTHROCYTE [DISTWIDTH] IN BLOOD BY AUTOMATED COUNT: 19.8 % (ref 11.9–14.6)
GLOBULIN SER CALC-MCNC: 3 G/DL (ref 2.3–3.5)
GLUCOSE SERPL-MCNC: 119 MG/DL (ref 65–100)
HCT VFR BLD AUTO: 26.5 %
HGB BLD-MCNC: 8.5 G/DL (ref 13.6–17.2)
IMM GRANULOCYTES # BLD AUTO: 0.2 K/UL (ref 0–0.5)
IMM GRANULOCYTES NFR BLD AUTO: 3 % (ref 0–5)
LYMPHOCYTES # BLD: 0.5 K/UL (ref 0.5–4.6)
LYMPHOCYTES NFR BLD: 6 % (ref 13–44)
MCH RBC QN AUTO: 32.8 PG (ref 26.1–32.9)
MCHC RBC AUTO-ENTMCNC: 32.1 G/DL (ref 31.4–35)
MCV RBC AUTO: 102.3 FL (ref 79.6–97.8)
MONOCYTES # BLD: 0.7 K/UL (ref 0.1–1.3)
MONOCYTES NFR BLD: 9 % (ref 4–12)
NEUTS SEG # BLD: 6.4 K/UL (ref 1.7–8.2)
NEUTS SEG NFR BLD: 82 % (ref 43–78)
NRBC # BLD: 0.05 K/UL (ref 0–0.2)
PLATELET # BLD AUTO: 168 K/UL (ref 150–450)
PMV BLD AUTO: 9.8 FL (ref 9.4–12.3)
POTASSIUM SERPL-SCNC: 3.2 MMOL/L (ref 3.5–5.1)
PROT SERPL-MCNC: 6.1 G/DL (ref 6.3–8.2)
RBC # BLD AUTO: 2.59 M/UL (ref 4.23–5.6)
SODIUM SERPL-SCNC: 144 MMOL/L (ref 136–145)
SPECIMEN EXP DATE BLD: NORMAL
WBC # BLD AUTO: 7.8 K/UL (ref 4.3–11.1)

## 2022-09-23 PROCEDURE — 85025 COMPLETE CBC W/AUTO DIFF WBC: CPT

## 2022-09-23 PROCEDURE — 36415 COLL VENOUS BLD VENIPUNCTURE: CPT

## 2022-09-23 PROCEDURE — 86901 BLOOD TYPING SEROLOGIC RH(D): CPT

## 2022-09-23 PROCEDURE — 80053 COMPREHEN METABOLIC PANEL: CPT

## 2022-09-28 DIAGNOSIS — C71.9 GLIOBLASTOMA (HCC): Primary | ICD-10-CM

## 2022-09-29 ENCOUNTER — OFFICE VISIT (OUTPATIENT)
Dept: ONCOLOGY | Age: 82
End: 2022-09-29
Payer: MEDICARE

## 2022-09-29 ENCOUNTER — HOSPITAL ENCOUNTER (OUTPATIENT)
Dept: LAB | Age: 82
Discharge: HOME | End: 2022-10-02
Payer: MEDICARE

## 2022-09-29 VITALS
DIASTOLIC BLOOD PRESSURE: 62 MMHG | SYSTOLIC BLOOD PRESSURE: 134 MMHG | BODY MASS INDEX: 27.78 KG/M2 | OXYGEN SATURATION: 98 % | WEIGHT: 183.3 LBS | HEART RATE: 82 BPM | RESPIRATION RATE: 16 BRPM | TEMPERATURE: 98.4 F | HEIGHT: 68 IN

## 2022-09-29 DIAGNOSIS — Z51.81 THERAPEUTIC DRUG MONITORING: ICD-10-CM

## 2022-09-29 DIAGNOSIS — Z87.898 HISTORY OF SEIZURE: ICD-10-CM

## 2022-09-29 DIAGNOSIS — D64.9 ANEMIA, UNSPECIFIED TYPE: ICD-10-CM

## 2022-09-29 DIAGNOSIS — D69.6 THROMBOCYTOPENIA (HCC): ICD-10-CM

## 2022-09-29 DIAGNOSIS — C71.9 GLIOBLASTOMA (HCC): ICD-10-CM

## 2022-09-29 DIAGNOSIS — C71.9 GLIOBLASTOMA (HCC): Primary | ICD-10-CM

## 2022-09-29 LAB
ALBUMIN SERPL-MCNC: 3.1 G/DL (ref 3.2–4.6)
ALBUMIN/GLOB SERPL: 1 {RATIO} (ref 1.2–3.5)
ALP SERPL-CCNC: 90 U/L (ref 50–136)
ALT SERPL-CCNC: 27 U/L (ref 12–65)
ANION GAP SERPL CALC-SCNC: 5 MMOL/L (ref 4–13)
AST SERPL-CCNC: 16 U/L (ref 15–37)
BASOPHILS # BLD: 0 K/UL (ref 0–0.2)
BASOPHILS NFR BLD: 0 % (ref 0–2)
BILIRUB SERPL-MCNC: 0.5 MG/DL (ref 0.2–1.1)
BUN SERPL-MCNC: 16 MG/DL (ref 8–23)
CALCIUM SERPL-MCNC: 8.7 MG/DL (ref 8.3–10.4)
CHLORIDE SERPL-SCNC: 108 MMOL/L (ref 101–110)
CO2 SERPL-SCNC: 28 MMOL/L (ref 21–32)
CREAT SERPL-MCNC: 1.2 MG/DL (ref 0.8–1.5)
DIFFERENTIAL METHOD BLD: ABNORMAL
EOSINOPHIL # BLD: 0.1 K/UL (ref 0–0.8)
EOSINOPHIL NFR BLD: 1 % (ref 0.5–7.8)
ERYTHROCYTE [DISTWIDTH] IN BLOOD BY AUTOMATED COUNT: 19.7 % (ref 11.9–14.6)
GLOBULIN SER CALC-MCNC: 3.2 G/DL (ref 2.3–3.5)
GLUCOSE SERPL-MCNC: 120 MG/DL (ref 65–100)
HCT VFR BLD AUTO: 28.9 %
HGB BLD-MCNC: 9.4 G/DL (ref 13.6–17.2)
IMM GRANULOCYTES # BLD AUTO: 0.1 K/UL (ref 0–0.5)
IMM GRANULOCYTES NFR BLD AUTO: 1 % (ref 0–5)
LYMPHOCYTES # BLD: 0.7 K/UL (ref 0.5–4.6)
LYMPHOCYTES NFR BLD: 8 % (ref 13–44)
MCH RBC QN AUTO: 33.7 PG (ref 26.1–32.9)
MCHC RBC AUTO-ENTMCNC: 32.5 G/DL (ref 31.4–35)
MCV RBC AUTO: 103.6 FL (ref 79.6–97.8)
MONOCYTES # BLD: 0.5 K/UL (ref 0.1–1.3)
MONOCYTES NFR BLD: 6 % (ref 4–12)
NEUTS SEG # BLD: 7.7 K/UL (ref 1.7–8.2)
NEUTS SEG NFR BLD: 84 % (ref 43–78)
NRBC # BLD: 0 K/UL (ref 0–0.2)
PLATELET # BLD AUTO: 155 K/UL (ref 150–450)
PMV BLD AUTO: 10.1 FL (ref 9.4–12.3)
POTASSIUM SERPL-SCNC: 3.3 MMOL/L (ref 3.5–5.1)
PROT SERPL-MCNC: 6.3 G/DL (ref 6.3–8.2)
RBC # BLD AUTO: 2.79 M/UL (ref 4.23–5.6)
SODIUM SERPL-SCNC: 141 MMOL/L (ref 136–145)
WBC # BLD AUTO: 9.1 K/UL (ref 4.3–11.1)

## 2022-09-29 PROCEDURE — 1123F ACP DISCUSS/DSCN MKR DOCD: CPT | Performed by: INTERNAL MEDICINE

## 2022-09-29 PROCEDURE — G8417 CALC BMI ABV UP PARAM F/U: HCPCS | Performed by: INTERNAL MEDICINE

## 2022-09-29 PROCEDURE — 85025 COMPLETE CBC W/AUTO DIFF WBC: CPT

## 2022-09-29 PROCEDURE — 36415 COLL VENOUS BLD VENIPUNCTURE: CPT

## 2022-09-29 PROCEDURE — 1036F TOBACCO NON-USER: CPT | Performed by: INTERNAL MEDICINE

## 2022-09-29 PROCEDURE — 80053 COMPREHEN METABOLIC PANEL: CPT

## 2022-09-29 PROCEDURE — G8427 DOCREV CUR MEDS BY ELIG CLIN: HCPCS | Performed by: INTERNAL MEDICINE

## 2022-09-29 PROCEDURE — 99215 OFFICE O/P EST HI 40 MIN: CPT | Performed by: INTERNAL MEDICINE

## 2022-09-29 RX ORDER — TEMAZEPAM 15 MG/1
15 CAPSULE ORAL NIGHTLY PRN
Qty: 20 CAPSULE | Refills: 2 | Status: SHIPPED | OUTPATIENT
Start: 2022-09-29 | End: 2022-12-28

## 2022-09-29 ASSESSMENT — PATIENT HEALTH QUESTIONNAIRE - PHQ9
SUM OF ALL RESPONSES TO PHQ QUESTIONS 1-9: 0
2. FEELING DOWN, DEPRESSED OR HOPELESS: 0
SUM OF ALL RESPONSES TO PHQ QUESTIONS 1-9: 0

## 2022-09-29 NOTE — PATIENT INSTRUCTIONS
Patient Instructions from Today's Visit    Reason for Visit:  Follow up    Plan: Your labs look much better! Continue decadron 2 mg once a day. We think the temodar is helpful     Follow Up:   Follow up in 2 weeks as scheduled    Recent Lab Results:  Hospital Outpatient Visit on 09/29/2022   Component Date Value Ref Range Status    WBC 09/29/2022 9.1  4.3 - 11.1 K/uL Final    RBC 09/29/2022 2.79 (A)  4.23 - 5.6 M/uL Final    Hemoglobin 09/29/2022 9.4 (A)  13.6 - 17.2 g/dL Final    Hematocrit 09/29/2022 28.9  % Final    MCV 09/29/2022 103.6 (A)  79.6 - 97.8 FL Final    MCH 09/29/2022 33.7 (A)  26.1 - 32.9 PG Final    MCHC 09/29/2022 32.5  31.4 - 35.0 g/dL Final    RDW 09/29/2022 19.7 (A)  11.9 - 14.6 % Final    Platelets 48/52/8998 155  150 - 450 K/uL Final    MPV 09/29/2022 10.1  9.4 - 12.3 FL Final    nRBC 09/29/2022 0.00  0.0 - 0.2 K/uL Final    **Note: Absolute NRBC parameter is now reported with Hemogram**    Differential Type 09/29/2022 AUTOMATED    Final    Seg Neutrophils 09/29/2022 84 (A)  43 - 78 % Final    Lymphocytes 09/29/2022 8 (A)  13 - 44 % Final    Monocytes 09/29/2022 6  4.0 - 12.0 % Final    Eosinophils % 09/29/2022 1  0.5 - 7.8 % Final    Basophils 09/29/2022 0  0.0 - 2.0 % Final    Immature Granulocytes 09/29/2022 1  0.0 - 5.0 % Final    Segs Absolute 09/29/2022 7.7  1.7 - 8.2 K/UL Final    Absolute Lymph # 09/29/2022 0.7  0.5 - 4.6 K/UL Final    Absolute Mono # 09/29/2022 0.5  0.1 - 1.3 K/UL Final    Absolute Eos # 09/29/2022 0.1  0.0 - 0.8 K/UL Final    Basophils Absolute 09/29/2022 0.0  0.0 - 0.2 K/UL Final    Absolute Immature Granulocyte 09/29/2022 0.1  0.0 - 0.5 K/UL Final        Treatment Summary has been discussed and given to patient: n/a        -------------------------------------------------------------------------------------------------------------------  Please call our office at [phone] if you have any  of the following symptoms:   Fever of 100.5 or greater  Chills  Shortness of breath  Swelling or pain in one leg    After office hours an answering service is available and will contact a provider for emergencies or if you are experiencing any of the above symptoms. Patient did express an interest in My Chart. My Chart log in information explained on the after visit summary printout at the [facility] desk.     Mable Mendes RN

## 2022-09-29 NOTE — PROGRESS NOTES
Data Source: [facility], [facility]. 9/29/2022    11:36 AM    Margareth Wu [id number]    30 y.o. 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, history of depression/anxiety disorder, gout, insomnia, hernia repair, ankle fracture repair, lower back surgery with limited mobility thereafter, more recently admitted 12/25/2020 after being found unresponsive in bathroom by wife. Abnormal head CT leading to a brain MRI 12/25/2020 showing right temporal parietal mass 3.7 x 3.3 cm in size with adjacent cerebral edema, as well as associated hemorrhage and/or calcification. 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 CT CAP 12/27/2020 without evidence of malignancy or metastatic disease. Patient was seen by neurosurgery Dr. Gabrielle Dove and taken to the OR 12/31/2020. Underwent right parietal craniotomy with resection of right parietal tumor and placement of intraoperative BCNU wafers to surgical bed. 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 Recovering well, now referred to medical oncology as well as radiation oncology for further therapy. He is also undergoing speech therapy. On evaluation today using a walker to get around. Today reports some short term memory issues but otherwise doing well. Family support includes wife and one daughter who lives in [place] and was here for his surgery. Not currently on any seizure medicines and being tapered off of Decadron (will be done next Monday). Hospice Referral:  na  Interval History:  9/29/2022: Patient seen for his GBM, ongoing treatments and toxicity management. Completed 5 days of temozolomide over the weekend, some additional fatigue but has now improved.   He has seen GI, he was offered endoscopic work-up however his preference is to hold off on this for now, this is reasonable. Blood work today with hemoglobin continuing to improve at 9.4, adequate ANC and platelet count. Currently on Dex 2 mg daily . Next brain MRI scheduled in 11/18/2022. Continue following with rad unk as needed. We will see him back prior to next cycle. REVIEW OF SYSTEMS:  As mentioned above, all other systems were reviewed in full and are negative.     Past Medical History:   Diagnosis Date    Anxiety disorder 12/9/2014    Arthritis 12/9/2014    Depression 12/9/2014    Glioblastoma ([facility] [address] 12/26/2020    Gout 12/9/2014    History of intracranial hemorrhage 12/25/2020    History of lumbar laminectomy for spinal cord decompression 2/18/2019    History of seizure 12/28/2020    HTN (hypertension), benign 2/15/2017    Hyperlipemia 12/9/2014    Hypertension     Idiopathic chronic gout of right ankle 12/9/2014    Insomnia 12/9/2014    Kidney stone     Mixed hyperlipidemia 12/9/2014    Panic disorder 12/9/2014    Primary insomnia 12/9/2014    Rosacea 2/15/2017       Past Surgical History:   Procedure Laterality Date    ANKLE FRACTURE SURGERY Left 6/1974    COLONOSCOPY  2007    CYST REMOVAL      CYST REMOVAL      pylonidal cyst    CYST REMOVAL      HERNIA REPAIR Bilateral     OTHER SURGICAL HISTORY      wrist    WRIST FRACTURE SURGERY Bilateral 6/1993    wrist plate/pin       Current Outpatient Medications   Medication Sig Dispense Refill    dexamethasone (DECADRON) 2 MG tablet Take 1 tablet by mouth daily (with breakfast) 30 tablet 1    pantoprazole (PROTONIX) 40 MG tablet Take 1 tablet by mouth 2 times daily (before meals) 180 tablet 1    furosemide (LASIX) 20 MG tablet TAKE 1 TABLET BY MOUTH EVERY DAY 90 tablet 1    dexamethasone (DECADRON) 4 MG tablet Take 1 tablet by mouth 2 times daily (with meals) 60 tablet 1    atorvastatin (LIPITOR) 40 MG tablet TAKE 1 TABLET BY MOUTH EVERY DAY 90 tablet 1    pantoprazole (PROTONIX) 40 MG tablet Take 1 tablet by mouth every morning (before breakfast) 90 tablet 1    sulfamethoxazole-trimethoprim (BACTRIM DS;SEPTRA DS) 800-160 MG per tablet Take 1 tab on Monday, Wednesday, and Friday. 48 tablet 2    temozolomide (TEMODAR) 100 MG chemo capsule Take 3 capsules by mouth daily for 5 days every 28 days 15 capsule 5    Calcium-Magnesium-Vitamin D (CALCIUM 1200+D3 PO) Take by mouth daily      vitamin D (CHOLECALCIFEROL) 25 MCG (1000 UT) TABS tablet Take 1,000 Units by mouth daily      levETIRAcetam (KEPPRA) 500 MG tablet Take 500 mg by mouth 2 times daily      L-Lysine 500 MG TABS Take 500 mg by mouth daily      ondansetron (ZOFRAN) 8 MG tablet Take 8 mg by mouth every 8 hours as needed      polyethylene glycol (GLYCOLAX) 17 GM/SCOOP powder Take 17 g by mouth daily as needed      potassium chloride (KLOR-CON) 10 MEQ extended release tablet TAKE 1 TABLET BY MOUTH EVERY DAY      sertraline (ZOLOFT) 100 MG tablet Take 150 mg by mouth daily      tamsulosin (FLOMAX) 0.4 MG capsule Take 0.4 mg by mouth daily       No current facility-administered medications for this visit. Social History     Socioeconomic History    Marital status:      Spouse name: None    Number of children: None    Years of education: None    Highest education level: None   Tobacco Use    Smoking status: Former     Types: Cigarettes     Quit date: 1970     Years since quittin.7    Smokeless tobacco: Never   Substance and Sexual Activity    Alcohol use: Not Currently    Drug use: No       Family History   Problem Relation Age of Onset    Cancer Mother         lung ca    Hypertension Mother     Heart Disease Mother     Osteoarthritis Father     Cancer Brother         stomach       No Known Allergies    PHYSICAL EXAMINATION:  General Appearance: Healthy appearing patient in no acute distress  Vitals reviewed.    /62   Pulse 82   Temp 98.4 °F (36.9 °C)   Resp 16   Ht 5' 8\" (1.727 m)   Wt 183 lb 4.8 oz (83.1 kg)   SpO2 98%   BMI 27.87 kg/m²   HEENT: No oral or pharyngeal masses, ulceration or thrush noted, no sinus tenderness. Neck is supple with no thyromegaly or JVD noted. Lymph Nodes: No lymphadenopathy noted in the occipital, pre and post auricular, cervical, supra and infraclavicular, axillary, epitrochlear, inguinal, and popliteal region. Breasts: No palpable masses, nipple discharge or skin retraction  Lungs/Thorax: Clear to auscultation, no accessory muscles of respiration being used. Heart: Regular rate and rhythm, normal S1, S2, no appreciable murmurs, rubs, gallops  Abdomen: Soft, nontender, bowel sounds present, no appreciable hepatosplenomegaly, no palpable masses  Extremeties: Good pulses bilaterally, no peripheral edema. Skin: Normal skin tone with no rash, petechiae, ecchymosis noted. Musculoskeletal: No pain on palpation over bony prominence, no edema, no evidence of gout, no joint or bony deformity  Neurologic: Grossly intact    LABS/IMAGING:    Lab Results   Component Value Date/Time    WBC 9.1 09/29/2022 11:19 AM    HGB 9.4 09/29/2022 11:19 AM    HCT 28.9 09/29/2022 11:19 AM     09/29/2022 11:19 AM    .6 09/29/2022 11:19 AM       Lab Results   Component Value Date/Time     09/23/2022 11:19 AM    K 3.2 09/23/2022 11:19 AM     09/23/2022 11:19 AM    CO2 30 09/23/2022 11:19 AM    BUN 19 09/23/2022 11:19 AM    GFRAA >60 09/23/2022 11:19 AM    GLOB 3.0 09/23/2022 11:19 AM    ALT 23 09/23/2022 11:19 AM         Above results reviewed with patient. 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, history of depression/anxiety disorder, gout, insomnia, hernia repair, ankle fracture repair, lower back surgery with limited mobility thereafter, more recently admitted 12/25/2020 after being found unresponsive in bathroom by wife.  Abnormal head CT leading to a brain MRI 12/25/2020 showing right temporal parietal mass 3.7 x 3.3 cm in size with adjacent Ongoing fatigue, though mobility and speech appear improved. Remains on Keppra for past seizures per neurology. Now scheduled for follow-up brain MRI 4/23/2021. Will start adjuvant temozolomide at 150 mg per metered squared daily x5 days every 28 days at that point. We will also look into Optune therapy. RTC post brain MRI.    4/26/2021: Reports doing reasonably. Mobility has somewhat improved. Per wife takes multiple naps during the day. They are planning to  more regular outdoor walking. Labs today unremarkable. Recent brain MRI without disease progression (ID), treatment related changes seen. Okay to proceed with adjuvant temozolomide. Repeat labs and provider visit in 2 weeks. Paperwork initiated for optune therapy. 5/24/2021:  appears to have tolerated adjuvant temozolomide cycle 1 well. Main complaint has remained ongoing fatigue for which he takes regular naps. Denies any new sensorimotor deficits, or seizure activity. Reasonable p.o. intake and mobility now. Also started Optune therapy a few weeks ago, tolerating reasonably, scalp without any rash. Okay to proceed with cycle 2 adjuvant temozolomide tomorrow, given labs without cytopenias, we will simply see him back with repeat labs in a month. Now scheduled for repeat brain MRI per radiation oncology in 8/21.    7/20/2021: Continues to tolerate therapy well. Mobility has significantly improved, walking briskly in the room. Regular with his Keppra, as well as Bactrim. Blood work unremarkable, regular with Optune which he is tolerating well without any signs of scalp dermatitis. Okay to proceed with cycle 4. Scheduled for brain MRI and subsequent radiation oncology follow-up next month, will chart check imaging. Otherwise we will see him back in a month. 9/14/2021: Patient here for cycle 6 ( last cycle of adjuvant temozolomide). Performance status remains improved. Some fatigue though.   Nausea better on days with Temodar since taking Zofran an hour before each dose. Also regular with his Keppra as well as Bactrim. Last brain MRI 8/21 with responding disease, now scheduled for repeat brain MRI in 11/17/2021. Labs today with thrombocytopenia with platelet count 78,261. Will defer next cycle by week, till counts recover. Discussed role of Optune therapy continuation beyond 6 cycles, they will discuss but are leaning towards continuing. 11/22/2021: More recently seeing primary care for sciatica as well as neck stiffness. Reasonable p.o. intake and mobility. Some scalp dermatitis, they have decided to forego further Optune therapy. This is reasonable. Blood work unremarkable, platelet count having normalized. Brain MRI with SD. Simple monitoring moving forward, will repeat brain imaging in 3 months time. 2/25/2022: Reports doing reasonably, some difficulty with time management in the day but otherwise mobile, has been in right now able to do things together, looking forward to [employer] in a few months. Has seen neurology, no further seizures, remains on Keppra 500 twice daily. Recent brain MRI with no clear evidence of disease progression at surgical site, T2 prolongation in right precentral gyrus and splenium of corpus callosum slowly worsened, possibly posttreatment change versus infiltrative tumor. Will get radiation oncology opinion also. We will see him back in 3 months with repeat imaging    Addendum: Case discussed with Dr. Salazar Livingston, felt to have therapy related changes rather than true progression. 6/6/2022: Repeat brain MRI with/without contrast 5/25/2022 with changes concerning for disease progression including new 1.6 x 1.1 cm enhancing focus anterior and superior to the right sided operative cavity. Patient has seen radiation oncology Dr. Salazar Livingston as well as neurosurgery Dr. Cristo De La Vega.   The patient and family report being told not a candidate for repeat surgery, seeing radiation oncology complaining of abdominal discomfort as well. Today appears to be, minimally involved in conversation, gait has also worsened. Blood work today with surprise finding of hemoglobin down to 7 range from normal range. Will defer next temozolomide cycle for now. We will place patient back on dexamethasone 2 mg daily for now with subsequent taper over time. We will also recheck H&H, start PPI twice daily. Initiate work-up for new significant anemia including smear, iron panel, B12, folate, MMA, HC, hemolysis labs, ESR, as well as CT AP without contrast to rule out retroperitoneal bleeding. Refer to GI. We will see him back in 1 week's time. 9/19/2022: Patient seen for his GBM, ongoing treatments and toxicity management, as well as recent anemia. Appears to have significantly improved clinically since going back on dexamethasone, more communicative today. Per wife has been taking dexamethasone 4 mg twice daily, advised to drop down to 2 mg twice daily. Labs from previous visit reviewed, no clear evidence of hemolysis, adequate iron, J26 and folic acid levels. CT AP without retroperitoneal bleeding. Has been referred to GI. Denies any overt bleeding. Reasonable to proceed with next cycle of temozolomide. We will see him back next week for tox check as well as repeat labs. 9/29/2022: Patient seen for his GBM, ongoing treatments and toxicity management. Completed 5 days of temozolomide over the weekend, some additional fatigue but has now improved. He has seen GI, he was offered endoscopic work-up however his preference is to hold off on this for now, this is reasonable. Blood work today with hemoglobin continuing to improve at 9.4, adequate ANC and platelet count. Currently on Dex 2 mg daily . Next brain MRI scheduled in 11/18/2022. Continue following with rad unk as needed. We will see him back prior to next cycle. 1. RT temporal-parietal GBM s/p MSR & BCNU wafer placement 12/31/20  2. Fatigue    PLAN:  - As above. - S/p concurrent Temozolomide 75 mg/m2 daily w/ XRT (completed 3/26/21). On PCP prophylaxis w Bactrim DS MWF. Brain MRI w TN. Started (4/21) adjuvant Temozolomide 150 mg/m2 daily x 5 days every 28 days x 6 (completed 10/21). PD 6/22: s/p SBRT, now on single agent temozolomide 150 mg per metered squared daily x 5 every 28 days. Platelet count needs to be over 100,000 prior to each cycle. - Optune alternating electrical field therapy post chemoradiation (5/21-10/21). - Continue Keppra 500 bid. - MGMT methylation detected, IDH wild type. - Anemia: No clear evidence of hemolysis, CTAP without retroperitoneal bleeding. Iron panel, M97 and folic acid levels unremarkable. Referred to GI. Offered endoscopic work-up but patient preference to hold off for now. RTC in 3 week with labs. CBC on day 1 and day 21 of each cycle. Serial brain MRIs every 3 months. Thank you Dr Gabrielle Dove for allowing us to paticipate in care of this pleasant patient.  Please call w/ any quesions      Lalita Nguyen MD  [address] [place] [address],[address]  Hematology Oncology  [address] [place] [address]  Office : [phone]  Fax : [phone]

## 2022-10-05 ENCOUNTER — HOME HEALTH ADMISSION (OUTPATIENT)
Dept: HOME HEALTH SERVICES | Facility: HOME HEALTH | Age: 82
End: 2022-10-05

## 2022-10-05 ENCOUNTER — OFFICE VISIT (OUTPATIENT)
Dept: INTERNAL MEDICINE CLINIC | Facility: CLINIC | Age: 82
End: 2022-10-05
Payer: MEDICARE

## 2022-10-05 VITALS
WEIGHT: 185 LBS | HEART RATE: 50 BPM | DIASTOLIC BLOOD PRESSURE: 72 MMHG | BODY MASS INDEX: 28.04 KG/M2 | OXYGEN SATURATION: 97 % | SYSTOLIC BLOOD PRESSURE: 110 MMHG | HEIGHT: 68 IN

## 2022-10-05 DIAGNOSIS — S22.31XA CLOSED FRACTURE OF ONE RIB OF RIGHT SIDE, INITIAL ENCOUNTER: Primary | ICD-10-CM

## 2022-10-05 DIAGNOSIS — Z91.81 AT HIGH RISK FOR FALLS: ICD-10-CM

## 2022-10-05 DIAGNOSIS — R07.81 RIB PAIN: ICD-10-CM

## 2022-10-05 PROCEDURE — 99213 OFFICE O/P EST LOW 20 MIN: CPT | Performed by: NURSE PRACTITIONER

## 2022-10-05 PROCEDURE — G8427 DOCREV CUR MEDS BY ELIG CLIN: HCPCS | Performed by: NURSE PRACTITIONER

## 2022-10-05 PROCEDURE — 1036F TOBACCO NON-USER: CPT | Performed by: NURSE PRACTITIONER

## 2022-10-05 PROCEDURE — 1123F ACP DISCUSS/DSCN MKR DOCD: CPT | Performed by: NURSE PRACTITIONER

## 2022-10-05 PROCEDURE — G8417 CALC BMI ABV UP PARAM F/U: HCPCS | Performed by: NURSE PRACTITIONER

## 2022-10-05 PROCEDURE — G8484 FLU IMMUNIZE NO ADMIN: HCPCS | Performed by: NURSE PRACTITIONER

## 2022-10-05 RX ORDER — HYDROCODONE BITARTRATE AND ACETAMINOPHEN 5; 325 MG/1; MG/1
1 TABLET ORAL EVERY 6 HOURS PRN
Qty: 30 TABLET | Refills: 0 | Status: SHIPPED | OUTPATIENT
Start: 2022-10-05 | End: 2022-10-13

## 2022-10-05 ASSESSMENT — PATIENT HEALTH QUESTIONNAIRE - PHQ9
SUM OF ALL RESPONSES TO PHQ QUESTIONS 1-9: 1
SUM OF ALL RESPONSES TO PHQ QUESTIONS 1-9: 1
2. FEELING DOWN, DEPRESSED OR HOPELESS: 0
1. LITTLE INTEREST OR PLEASURE IN DOING THINGS: 1
SUM OF ALL RESPONSES TO PHQ QUESTIONS 1-9: 1
SUM OF ALL RESPONSES TO PHQ9 QUESTIONS 1 & 2: 1
SUM OF ALL RESPONSES TO PHQ QUESTIONS 1-9: 1

## 2022-10-05 ASSESSMENT — ENCOUNTER SYMPTOMS
ABDOMINAL PAIN: 0
VOMITING: 0
BOWEL INCONTINENCE: 0
NAUSEA: 0

## 2022-10-06 DIAGNOSIS — C71.9 GLIOBLASTOMA (HCC): ICD-10-CM

## 2022-10-06 RX ORDER — OLMESARTAN MEDOXOMIL 40 MG/1
TABLET ORAL
Qty: 90 TABLET | Refills: 1 | OUTPATIENT
Start: 2022-10-06

## 2022-10-06 RX ORDER — PANTOPRAZOLE SODIUM 40 MG/1
40 TABLET, DELAYED RELEASE ORAL
Qty: 90 TABLET | Refills: 3 | Status: SHIPPED | OUTPATIENT
Start: 2022-10-06

## 2022-10-15 DIAGNOSIS — F41.1 GENERALIZED ANXIETY DISORDER: ICD-10-CM

## 2022-10-17 ENCOUNTER — OFFICE VISIT (OUTPATIENT)
Dept: ONCOLOGY | Age: 82
End: 2022-10-17
Payer: MEDICARE

## 2022-10-17 ENCOUNTER — HOSPITAL ENCOUNTER (OUTPATIENT)
Dept: LAB | Age: 82
Discharge: HOME | End: 2022-10-20
Payer: MEDICARE

## 2022-10-17 VITALS
DIASTOLIC BLOOD PRESSURE: 63 MMHG | RESPIRATION RATE: 14 BRPM | HEART RATE: 82 BPM | WEIGHT: 185.1 LBS | HEIGHT: 68 IN | TEMPERATURE: 97.9 F | OXYGEN SATURATION: 97 % | SYSTOLIC BLOOD PRESSURE: 134 MMHG | BODY MASS INDEX: 28.05 KG/M2

## 2022-10-17 DIAGNOSIS — D69.6 THROMBOCYTOPENIA (HCC): ICD-10-CM

## 2022-10-17 DIAGNOSIS — Z87.898 HISTORY OF SEIZURE: ICD-10-CM

## 2022-10-17 DIAGNOSIS — Z51.81 THERAPEUTIC DRUG MONITORING: ICD-10-CM

## 2022-10-17 DIAGNOSIS — C71.9 GLIOBLASTOMA (HCC): Primary | ICD-10-CM

## 2022-10-17 DIAGNOSIS — C71.9 GLIOBLASTOMA (HCC): ICD-10-CM

## 2022-10-17 LAB
ALBUMIN SERPL-MCNC: 3.4 G/DL (ref 3.2–4.6)
ALBUMIN/GLOB SERPL: 1.1 {RATIO} (ref 0.4–1.6)
ALP SERPL-CCNC: 132 U/L (ref 50–136)
ALT SERPL-CCNC: 23 U/L (ref 12–65)
ANION GAP SERPL CALC-SCNC: 4 MMOL/L (ref 2–11)
AST SERPL-CCNC: 11 U/L (ref 15–37)
BASOPHILS # BLD: 0 K/UL (ref 0–0.2)
BASOPHILS NFR BLD: 0 % (ref 0–2)
BILIRUB SERPL-MCNC: 0.7 MG/DL (ref 0.2–1.1)
BUN SERPL-MCNC: 24 MG/DL (ref 8–23)
CALCIUM SERPL-MCNC: 8.8 MG/DL (ref 8.3–10.4)
CHLORIDE SERPL-SCNC: 110 MMOL/L (ref 101–110)
CO2 SERPL-SCNC: 31 MMOL/L (ref 21–32)
CREAT SERPL-MCNC: 1.3 MG/DL (ref 0.8–1.5)
DIFFERENTIAL METHOD BLD: ABNORMAL
EOSINOPHIL # BLD: 0 K/UL (ref 0–0.8)
EOSINOPHIL NFR BLD: 1 % (ref 0.5–7.8)
ERYTHROCYTE [DISTWIDTH] IN BLOOD BY AUTOMATED COUNT: 14.5 % (ref 11.9–14.6)
GLOBULIN SER CALC-MCNC: 3 G/DL (ref 2.8–4.5)
GLUCOSE SERPL-MCNC: 168 MG/DL (ref 65–100)
HCT VFR BLD AUTO: 27.3 %
HGB BLD-MCNC: 9.1 G/DL (ref 13.6–17.2)
IMM GRANULOCYTES # BLD AUTO: 0 K/UL (ref 0–0.5)
IMM GRANULOCYTES NFR BLD AUTO: 0 % (ref 0–5)
LDH SERPL L TO P-CCNC: 202 U/L (ref 110–210)
LYMPHOCYTES # BLD: 0.7 K/UL (ref 0.5–4.6)
LYMPHOCYTES NFR BLD: 29 % (ref 13–44)
MCH RBC QN AUTO: 33.8 PG (ref 26.1–32.9)
MCHC RBC AUTO-ENTMCNC: 33.3 G/DL (ref 31.4–35)
MCV RBC AUTO: 101.5 FL (ref 82–102)
MONOCYTES # BLD: 0.1 K/UL (ref 0.1–1.3)
MONOCYTES NFR BLD: 5 % (ref 4–12)
NEUTS SEG # BLD: 1.5 K/UL (ref 1.7–8.2)
NEUTS SEG NFR BLD: 65 % (ref 43–78)
NRBC # BLD: 0 K/UL (ref 0–0.2)
PLATELET # BLD AUTO: 48 K/UL (ref 150–450)
PLATELET COMMENT: ABNORMAL
PMV BLD AUTO: 12.6 FL (ref 9.4–12.3)
POTASSIUM SERPL-SCNC: 3.5 MMOL/L (ref 3.5–5.1)
PROT SERPL-MCNC: 6.4 G/DL (ref 6.3–8.2)
RBC # BLD AUTO: 2.69 M/UL (ref 4.23–5.6)
RBC MORPH BLD: ABNORMAL
SODIUM SERPL-SCNC: 145 MMOL/L (ref 133–143)
WBC # BLD AUTO: 2.3 K/UL (ref 4.3–11.1)
WBC MORPH BLD: ABNORMAL

## 2022-10-17 PROCEDURE — 83615 LACTATE (LD) (LDH) ENZYME: CPT

## 2022-10-17 PROCEDURE — 36415 COLL VENOUS BLD VENIPUNCTURE: CPT

## 2022-10-17 PROCEDURE — G8484 FLU IMMUNIZE NO ADMIN: HCPCS | Performed by: INTERNAL MEDICINE

## 2022-10-17 PROCEDURE — 85025 COMPLETE CBC W/AUTO DIFF WBC: CPT

## 2022-10-17 PROCEDURE — 1123F ACP DISCUSS/DSCN MKR DOCD: CPT | Performed by: INTERNAL MEDICINE

## 2022-10-17 PROCEDURE — 1036F TOBACCO NON-USER: CPT | Performed by: INTERNAL MEDICINE

## 2022-10-17 PROCEDURE — G8417 CALC BMI ABV UP PARAM F/U: HCPCS | Performed by: INTERNAL MEDICINE

## 2022-10-17 PROCEDURE — 99215 OFFICE O/P EST HI 40 MIN: CPT | Performed by: INTERNAL MEDICINE

## 2022-10-17 PROCEDURE — G8427 DOCREV CUR MEDS BY ELIG CLIN: HCPCS | Performed by: INTERNAL MEDICINE

## 2022-10-17 PROCEDURE — 80053 COMPREHEN METABOLIC PANEL: CPT

## 2022-10-17 RX ORDER — ESOMEPRAZOLE MAGNESIUM 20 MG/1
20 FOR SUSPENSION ORAL DAILY
COMMUNITY

## 2022-10-17 RX ORDER — SERTRALINE HYDROCHLORIDE 100 MG/1
TABLET, FILM COATED ORAL
Qty: 135 TABLET | Refills: 1 | Status: SHIPPED | OUTPATIENT
Start: 2022-10-17

## 2022-10-17 ASSESSMENT — PATIENT HEALTH QUESTIONNAIRE - PHQ9
SUM OF ALL RESPONSES TO PHQ QUESTIONS 1-9: 0
SUM OF ALL RESPONSES TO PHQ QUESTIONS 1-9: 0
2. FEELING DOWN, DEPRESSED OR HOPELESS: 0
SUM OF ALL RESPONSES TO PHQ QUESTIONS 1-9: 0
SUM OF ALL RESPONSES TO PHQ QUESTIONS 1-9: 0

## 2022-10-17 NOTE — PROGRESS NOTES
Data Source: Patient, [facility] record. 10/17/2022    1:16 PM    Daniel Palomino [facility] [id number]    50 y.o. 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, history of depression/anxiety disorder, gout, insomnia, hernia repair, ankle fracture repair, lower back surgery with limited mobility thereafter, more recently admitted 12/25/2020 after being found unresponsive in bathroom by wife. Abnormal head CT leading to a brain MRI 12/25/2020 showing right temporal parietal mass 3.7 x 3.3 cm in size with adjacent cerebral edema, as well as associated hemorrhage and/or calcification. 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 CT CAP 12/27/2020 without evidence of malignancy or metastatic disease. Patient was seen by neurosurgery Dr. Eleanor Herrera and taken to the OR 12/31/2020. Underwent right parietal craniotomy with resection of right parietal tumor and placement of intraoperative BCNU wafers to surgical bed. 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 Recovering well, now referred to medical oncology as well as radiation oncology for further therapy. He is also undergoing speech therapy. On evaluation today using a walker to get around. Today reports some short term memory issues but otherwise doing well. Family support includes wife and one daughter who lives in [place] and was here for his surgery. Not currently on any seizure medicines and being tapered off of Decadron (will be done next Monday). Hospice Referral:  na  Interval History:  10/17/2022: Patient seen for his GBM, ongoing treatments and toxicity management.   Reports earlier in the month having had a fall on his left side over walker, was seen in urgent care and diagnosed with rib fracture, conservatively managed, pain has not mostly resolved, breathing is at baseline. Denies any cough or phlegm. Has remained on dexamethasone as well as Keppra. Blood work today with chemistries unremarkable, however platelet count low at 48,000, will defer next cycle by week and proceed once platelet count over 900,971. CBC day 21. We will see him back in 5 weeks. He is also due for repeat brain MRI in 11/22. REVIEW OF SYSTEMS:  As mentioned above, all other systems were reviewed in full and are negative. Past Medical History:   Diagnosis Date    Anxiety disorder 12/9/2014    Arthritis 12/9/2014    Depression 12/9/2014    Glioblastoma ([facility] [address] 12/26/2020    Gout 12/9/2014    History of intracranial hemorrhage 12/25/2020    History of lumbar laminectomy for spinal cord decompression 2/18/2019    History of seizure 12/28/2020    HTN (hypertension), benign 2/15/2017    Hyperlipemia 12/9/2014    Hypertension     Idiopathic chronic gout of right ankle 12/9/2014    Insomnia 12/9/2014    Kidney stone     Mixed hyperlipidemia 12/9/2014    Panic disorder 12/9/2014    Primary insomnia 12/9/2014    Rosacea 2/15/2017       Past Surgical History:   Procedure Laterality Date    ANKLE FRACTURE SURGERY Left 6/1974    COLONOSCOPY  2007    CYST REMOVAL      CYST REMOVAL      pylonidal cyst    CYST REMOVAL      HERNIA REPAIR Bilateral     OTHER SURGICAL HISTORY      wrist    WRIST FRACTURE SURGERY Bilateral 6/1993    wrist plate/pin       Current Outpatient Medications   Medication Sig Dispense Refill    pantoprazole (PROTONIX) 40 MG tablet Take 1 tablet by mouth every morning (before breakfast) 90 tablet 3    temazepam (RESTORIL) 15 MG capsule Take 1 capsule by mouth nightly as needed for Sleep for up to 90 days.  (Patient not taking: Reported on 10/5/2022) 20 capsule 2    dexamethasone (DECADRON) 2 MG tablet Take 1 tablet by mouth daily (with breakfast) 30 tablet 1    furosemide (LASIX) 20 MG tablet TAKE 1 TABLET BY MOUTH EVERY DAY 90 tablet 1 atorvastatin (LIPITOR) 40 MG tablet TAKE 1 TABLET BY MOUTH EVERY DAY 90 tablet 1    sulfamethoxazole-trimethoprim (BACTRIM DS;SEPTRA DS) 800-160 MG per tablet Take 1 tab on Monday, Wednesday, and Friday. 48 tablet 2    temozolomide (TEMODAR) 100 MG chemo capsule Take 3 capsules by mouth daily for 5 days every 28 days (Patient not taking: Reported on 10/5/2022) 15 capsule 5    Calcium-Magnesium-Vitamin D (CALCIUM 1200+D3 PO) Take by mouth daily      vitamin D (CHOLECALCIFEROL) 25 MCG (1000 UT) TABS tablet Take 1,000 Units by mouth daily      levETIRAcetam (KEPPRA) 500 MG tablet Take 500 mg by mouth 2 times daily      L-Lysine 500 MG TABS Take 500 mg by mouth daily      ondansetron (ZOFRAN) 8 MG tablet Take 8 mg by mouth every 8 hours as needed      polyethylene glycol (GLYCOLAX) 17 GM/SCOOP powder Take 17 g by mouth daily as needed      potassium chloride (KLOR-CON) 10 MEQ extended release tablet TAKE 1 TABLET BY MOUTH EVERY DAY      sertraline (ZOLOFT) 100 MG tablet Take 150 mg by mouth daily      tamsulosin (FLOMAX) 0.4 MG capsule Take 0.4 mg by mouth daily       No current facility-administered medications for this visit. Social History     Socioeconomic History    Marital status:    Tobacco Use    Smoking status: Former     Types: Cigarettes     Quit date: 1970     Years since quittin.8    Smokeless tobacco: Never   Substance and Sexual Activity    Alcohol use: Not Currently    Drug use: No       Family History   Problem Relation Age of Onset    Cancer Mother         lung ca    Hypertension Mother     Heart Disease Mother     Osteoarthritis Father     Cancer Brother         stomach       No Known Allergies    PHYSICAL EXAMINATION:  General Appearance: Healthy appearing patient in no acute distress  Vitals reviewed. Ht 5' 8\" (1.727 m)   Wt 185 lb 1.6 oz (84 kg)   BMI 28.14 kg/m²   HEENT: No oral or pharyngeal masses, ulceration or thrush noted, no sinus tenderness.   Neck is supple with no thyromegaly or JVD noted. Lymph Nodes: No lymphadenopathy noted in the occipital, pre and post auricular, cervical, supra and infraclavicular, axillary, epitrochlear, inguinal, and popliteal region. Breasts: No palpable masses, nipple discharge or skin retraction  Lungs/Thorax: Clear to auscultation, no accessory muscles of respiration being used. Heart: Regular rate and rhythm, normal S1, S2, no appreciable murmurs, rubs, gallops  Abdomen: Soft, nontender, bowel sounds present, no appreciable hepatosplenomegaly, no palpable masses  Extremeties: Good pulses bilaterally, no peripheral edema. Skin: Normal skin tone with no rash, petechiae, ecchymosis noted. Musculoskeletal: No pain on palpation over bony prominence, no edema, no evidence of gout, no joint or bony deformity  Neurologic: Grossly intact    LABS/IMAGING:    Lab Results   Component Value Date/Time    WBC 9.1 09/29/2022 11:19 AM    HGB 9.4 09/29/2022 11:19 AM    HCT 28.9 09/29/2022 11:19 AM     09/29/2022 11:19 AM    .6 09/29/2022 11:19 AM       Lab Results   Component Value Date/Time     09/29/2022 11:19 AM    K 3.3 09/29/2022 11:19 AM     09/29/2022 11:19 AM    CO2 28 09/29/2022 11:19 AM    BUN 16 09/29/2022 11:19 AM    GFRAA >60 09/29/2022 11:19 AM    GLOB 3.2 09/29/2022 11:19 AM    ALT 27 09/29/2022 11:19 AM         Above results reviewed with patient. 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, history of depression/anxiety disorder, gout, insomnia, hernia repair, ankle fracture repair, lower back surgery with limited mobility thereafter, more recently admitted 12/25/2020 after being found unresponsive in bathroom by wife. Abnormal head CT leading to a brain MRI 12/25/2020 showing right temporal parietal mass 3.7 x 3.3 cm in size with adjacent cerebral edema, as well as associated hemorrhage and/or calcification.   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 CT CAP 12/27/2020 without evidence of malignancy or metastatic disease. Patient was seen by neurosurgery Dr. Cata Flores and taken to the OR 12/31/2020. Underwent right parietal craniotomy with resection of right parietal tumor and placement of intraoperative BCNU wafers to surgical bed. 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 Recovering well, now referred to medical oncology as well as radiation oncology for further therapy. He is also undergoing speech therapy. On evaluation today using a walker to get around. Today reports some short term memory issues but otherwise doing well. Family support includes wife and one daughter who lives in [place] and was here for his surgery. Not currently on any seizure medicines and being tapered off of Decadron (will be done next Monday). 2/10/2021: Since last visit, significant course including recurrent seizure requiring ED visit, noncontrasted head CT with improved postoperative changes. He has since been placed on Keppra 500 twice daily. He was also treated for UTI though appears suboptimal specimen. We will simply repeat UA today, denies any symptoms. Performance status slowly improving, has completed physical therapy, currently undergoing speech therapy. PS 1 today. Recent transient thrombocytopenia, wife reports briefly being prescribed trazodone which they have stopped now as it caused excessive sleepiness. Plan for concurrent chemo-XRT with temozolomide with shorter course XRT over 3 weeks. Repeat simulation and brain MRI 2/20/2021: Results will be followed. Once starts temozolomide, will also benefit with Bactrim 3 times a week for PCP prophylaxis. Weekly CBC once on therapy. RTC once ready to start therapy, weekly provider visits there on.    3/31/2021: Completed Temodar-XRT 3/26/2021. Tolerated reasonably. Ongoing fatigue, though mobility and speech appear improved. Remains on Keppra for past seizures per neurology.   Now scheduled for follow-up brain MRI 4/23/2021. Will start adjuvant temozolomide at 150 mg per metered squared daily x5 days every 28 days at that point. We will also look into Optune therapy. RTC post brain MRI.    4/26/2021: Reports doing reasonably. Mobility has somewhat improved. Per wife takes multiple naps during the day. They are planning to  more regular outdoor walking. Labs today unremarkable. Recent brain MRI without disease progression ([place]), treatment related changes seen. Okay to proceed with adjuvant temozolomide. Repeat labs and provider visit in 2 weeks. Paperwork initiated for optune therapy. 5/24/2021:  appears to have tolerated adjuvant temozolomide cycle 1 well. Main complaint has remained ongoing fatigue for which he takes regular naps. Denies any new sensorimotor deficits, or seizure activity. Reasonable p.o. intake and mobility now. Also started Optune therapy a few weeks ago, tolerating reasonably, scalp without any rash. Okay to proceed with cycle 2 adjuvant temozolomide tomorrow, given labs without cytopenias, we will simply see him back with repeat labs in a month. Now scheduled for repeat brain MRI per radiation oncology in 8/21.    7/20/2021: Continues to tolerate therapy well. Mobility has significantly improved, walking briskly in the room. Regular with his Keppra, as well as Bactrim. Blood work unremarkable, regular with Optune which he is tolerating well without any signs of scalp dermatitis. Okay to proceed with cycle 4. Scheduled for brain MRI and subsequent radiation oncology follow-up next month, will chart check imaging. Otherwise we will see him back in a month. 9/14/2021: Patient here for cycle 6 ( last cycle of adjuvant temozolomide). Performance status remains improved. Some fatigue though. Nausea better on days with Temodar since taking Zofran an hour before each dose. Also regular with his Keppra as well as Bactrim.   Last brain MRI 8/21 with responding disease, now scheduled for repeat brain MRI in 11/17/2021. Labs today with thrombocytopenia with platelet count 01,289. Will defer next cycle by week, till counts recover. Discussed role of Optune therapy continuation beyond 6 cycles, they will discuss but are leaning towards continuing. 11/22/2021: More recently seeing primary care for sciatica as well as neck stiffness. Reasonable p.o. intake and mobility. Some scalp dermatitis, they have decided to forego further Optune therapy. This is reasonable. Blood work unremarkable, platelet count having normalized. Brain MRI with SD. Simple monitoring moving forward, will repeat brain imaging in 3 months time. 2/25/2022: Reports doing reasonably, some difficulty with time management in the day but otherwise mobile, has been in right now able to do things together, looking forward to [employer] in a few months. Has seen neurology, no further seizures, remains on Keppra 500 twice daily. Recent brain MRI with no clear evidence of disease progression at surgical site, T2 prolongation in right precentral gyrus and splenium of corpus callosum slowly worsened, possibly posttreatment change versus infiltrative tumor. Will get radiation oncology opinion also. We will see him back in 3 months with repeat imaging    Addendum: Case discussed with Dr. Kate Patel, felt to have therapy related changes rather than true progression. 6/6/2022: Repeat brain MRI with/without contrast 5/25/2022 with changes concerning for disease progression including new 1.6 x 1.1 cm enhancing focus anterior and superior to the right sided operative cavity. Patient has seen radiation oncology Dr. Kate Patel as well as neurosurgery Dr. Neeraj Carroll. The patient and family report being told not a candidate for repeat surgery, seeing radiation oncology later today.   Will discuss case with Dr. Kate Patel, likely repeat XRT followed by systemic therapy (likely temozolomide single agent given MGMT methylation status, and previous good tolerance). Today reports doing reasonably. Some fatigue but manageable. Blood work today with adequate counts, chemistries unremarkable. We will see him back in a few weeks. Addendum: Case discussed with Dr. Joaquina Russell. Patient not a surgical candidate. Plan for SBRT to site of disease progression, will plan for temozolomide 150 mg per metered squared daily x 5 every 28 days thereafter. 7/21/22: Patient since last visit has completed 3 fractions SBRT to left temporal on 6/20/22. Restarted Temodar 150 mg/m2 x5 days, and is here for cycle 2. Restart Bactrim MWF prophylaxis as well. Today reports left-sided weakness is improved. Abruptly stopped his dexamethasone 8 mg twice daily as ran out 2 days ago, blood pressure on lower side today. Denies any fevers, chills or otherwise feeling unwell. Hypotension probably related to stopping steroids, will restart dexamethasone 4 mg twice daily, will also give additional 1 L IV NS today. Defer cycle 2 given platelet count 51,183, will recheck counts early next week also. CBC Monday. RTC in 1 week with labs (start cycle 2 if platelet count 725,289 or above). CBC on day 1 and day 21 of each cycle. 9/15/2022: Patient seen for his recurrent GBM, ongoing treatments and toxicity management. His last temozolomide cycle was approximately 7 weeks ago. The last few weeks he has missed appointments related to weakness. He has seen radiation oncology Dr. Joaquina Russell in interim, with brain MRI 8/18/2022, overall felt to have responding disease though there was increased swelling for which he completed a tapering course of dexamethasone. Next brain MRI scheduled for 11/18/2022. Wife today accompanies patient and feels patient had clinical decline since completely coming off dexamethasone. He has been complaining of abdominal discomfort as well. Today appears to be, minimally involved in conversation, gait has also worsened. Blood work today with surprise finding of hemoglobin down to 7 range from normal range. Will defer next temozolomide cycle for now. We will place patient back on dexamethasone 2 mg daily for now with subsequent taper over time. We will also recheck H&H, start PPI twice daily. Initiate work-up for new significant anemia including smear, iron panel, B12, folate, MMA, HC, hemolysis labs, ESR, as well as CT AP without contrast to rule out retroperitoneal bleeding. Refer to GI. We will see him back in 1 week's time. 9/19/2022: Patient seen for his GBM, ongoing treatments and toxicity management, as well as recent anemia. Appears to have significantly improved clinically since going back on dexamethasone, more communicative today. Per wife has been taking dexamethasone 4 mg twice daily, advised to drop down to 2 mg twice daily. Labs from previous visit reviewed, no clear evidence of hemolysis, adequate iron, L24 and folic acid levels. CT AP without retroperitoneal bleeding. Has been referred to GI. Denies any overt bleeding. Reasonable to proceed with next cycle of temozolomide. We will see him back next week for tox check as well as repeat labs. 9/29/2022: Patient seen for his GBM, ongoing treatments and toxicity management. Completed 5 days of temozolomide over the weekend, some additional fatigue but has now improved. He has seen GI, he was offered endoscopic work-up however his preference is to hold off on this for now, this is reasonable. Blood work today with hemoglobin continuing to improve at 9.4, adequate ANC and platelet count. Currently on Dex 2 mg daily . Next brain MRI scheduled in 11/18/2022. Continue following with Rad-Onc as needed. We will see him back prior to next cycle. 10/17/2022: Patient seen for his GBM, ongoing treatments and toxicity management.   Reports earlier in the month having had a fall on his left side over walker, was seen in urgent care and diagnosed with rib fracture, conservatively managed, pain has not mostly resolved, breathing is at baseline. Denies any cough or phlegm. Has remained on dexamethasone as well as Keppra. Blood work today with chemistries unremarkable, however platelet count low at 48,000, will defer next cycle by week and proceed once platelet count over 359,010. CBC day 21. We will see him back in 5 weeks. He is also due for repeat brain MRI in 11/22. 1. RT temporal-parietal GBM s/p MSR & BCNU wafer placement 12/31/20  2. Fatigue    PLAN:  - As above. - S/p concurrent Temozolomide 75 mg/m2 daily w/ XRT (completed 3/26/21). On PCP prophylaxis w Bactrim DS MWF. Brain MRI w ID. Started (4/21) adjuvant Temozolomide 150 mg/m2 daily x 5 days every 28 days x 6 (completed 10/21). PD 6/22: s/p SBRT, now on single agent temozolomide 150 mg per metered squared daily x 5 every 28 days. Platelet count needs to be over 100,000 prior to each cycle. - Optune alternating electrical field therapy post chemoradiation (5/21-10/21). - Continue Keppra 500 bid. - MGMT methylation detected, IDH wild type. - Anemia: No clear evidence of hemolysis, CTAP without retroperitoneal bleeding. Iron panel, P22 and folic acid levels unremarkable. Referred to GI. Offered endoscopic work-up but patient preference to hold off for now. RTC in 5 week with labs. CBC on day 1 and day 21 of each cycle. Serial brain MRIs every 3 months. Thank you Dr Eleanor Herrera for allowing us to paticipate in care of this pleasant patient.  Please call w/ any alina Parada MD  [address] [place] [address],[address]  Hematology Oncology  [address] [place] [address]  Office : [phone]  Fax : [phone]

## 2022-10-17 NOTE — PATIENT INSTRUCTIONS
Patient Instructions from Today's Visit    Reason for Visit:  Follow up     Plan: Your platelets are low today so we want you to hold off on the next cycle of temodar. We will bring you back in one week for lab work and let you know whether to start the temodar then. You will have lab work 3 weeks after starting temodar as well. Continue decadron 2 mg daily. Follow Up: Follow up in 5 weeks    Recent Lab Results:  Hospital Outpatient Visit on 10/17/2022   Component Date Value Ref Range Status    WBC 10/17/2022 2.3 (A)  4.3 - 11.1 K/uL Final    PERIPHERAL REVIEW TO FOLLOW    RBC 10/17/2022 2.69 (A)  4.23 - 5.6 M/uL Final    Hemoglobin 10/17/2022 9.1 (A)  13.6 - 17.2 g/dL Final    Hematocrit 10/17/2022 27.3  % Final    MCV 10/17/2022 101.5  82.0 - 102.0 FL Final    MCH 10/17/2022 33.8 (A)  26.1 - 32.9 PG Final    MCHC 10/17/2022 33.3  31.4 - 35.0 g/dL Final    RDW 10/17/2022 14.5  11.9 - 14.6 % Final    Platelets 94/07/3918 48 (A)  150 - 450 K/uL Final    MPV 10/17/2022 12.6 (A)  9.4 - 12.3 FL Final    nRBC 10/17/2022 0.00  0.0 - 0.2 K/uL Final    **Note: Absolute NRBC parameter is now reported with Hemogram**    Differential Type 10/17/2022 PENDING   Incomplete    Sodium 10/17/2022 145 (A)  133 - 143 mmol/L Final    Potassium 10/17/2022 3.5  3.5 - 5.1 mmol/L Final    Chloride 10/17/2022 110  101 - 110 mmol/L Final    CO2 10/17/2022 31  21 - 32 mmol/L Final    Anion Gap 10/17/2022 4  2 - 11 mmol/L Final    Glucose 10/17/2022 168 (A)  65 - 100 mg/dL Final    BUN 10/17/2022 24 (A)  8 - 23 MG/DL Final    Creatinine 10/17/2022 1.30  0.8 - 1.5 MG/DL Final    Est, Glom Filt Rate 10/17/2022 56 (A)  >60 ml/min/1.73m2 Final    Comment:      Pediatric calculator link: Mina.at. org/professionals/kdoqi/gfr_calculatorped       Effective Oct 3, 2022       These results are not intended for use in patients <25years of age. eGFR results are calculated without a race factor using  the 2021 CKD-EPI equation. Careful clinical correlation is recommended, particularly when comparing to results calculated using previous equations. The CKD-EPI equation is less accurate in patients with extremes of muscle mass, extra-renal metabolism of creatinine, excessive creatine ingestion, or following therapy that affects renal tubular secretion. Calcium 10/17/2022 8.8  8.3 - 10.4 MG/DL Final    Total Bilirubin 10/17/2022 0.7  0.2 - 1.1 MG/DL Final    ALT 10/17/2022 23  12 - 65 U/L Final    AST 10/17/2022 11 (A)  15 - 37 U/L Final    Alk Phosphatase 10/17/2022 132  50 - 136 U/L Final    Total Protein 10/17/2022 6.4  6.3 - 8.2 g/dL Final    Albumin 10/17/2022 3.4  3.2 - 4.6 g/dL Final    Globulin 10/17/2022 3.0  2.8 - 4.5 g/dL Final    Albumin/Globulin Ratio 10/17/2022 1.1  0.4 - 1.6   Final    LD 10/17/2022 202  110 - 210 U/L Final        Treatment Summary has been discussed and given to patient: n/a        -------------------------------------------------------------------------------------------------------------------  Please call our office at [phone] if you have any  of the following symptoms:   Fever of 100.5 or greater  Chills  Shortness of breath  Swelling or pain in one leg    After office hours an answering service is available and will contact a provider for emergencies or if you are experiencing any of the above symptoms. Patient did express an interest in My Chart. My Chart log in information explained on the after visit summary printout at the [facility] [employer] desk.     Shanae Fraga RN

## 2022-10-24 ENCOUNTER — HOSPITAL ENCOUNTER (OUTPATIENT)
Dept: LAB | Age: 82
Discharge: HOME | End: 2022-10-27
Payer: MEDICARE

## 2022-10-24 DIAGNOSIS — C71.9 GLIOBLASTOMA (HCC): ICD-10-CM

## 2022-10-24 DIAGNOSIS — D69.6 THROMBOCYTOPENIA (HCC): ICD-10-CM

## 2022-10-24 LAB
BASOPHILS # BLD: 0 K/UL (ref 0–0.2)
BASOPHILS NFR BLD: 1 % (ref 0–2)
DIFFERENTIAL METHOD BLD: ABNORMAL
EOSINOPHIL # BLD: 0 K/UL (ref 0–0.8)
EOSINOPHIL NFR BLD: 0 % (ref 0.5–7.8)
ERYTHROCYTE [DISTWIDTH] IN BLOOD BY AUTOMATED COUNT: 15.6 % (ref 11.9–14.6)
HCT VFR BLD AUTO: 28.2 %
HGB BLD-MCNC: 9.4 G/DL (ref 13.6–17.2)
IMM GRANULOCYTES # BLD AUTO: 0 K/UL (ref 0–0.5)
IMM GRANULOCYTES NFR BLD AUTO: 2 % (ref 0–5)
LYMPHOCYTES # BLD: 0.7 K/UL (ref 0.5–4.6)
LYMPHOCYTES NFR BLD: 29 % (ref 13–44)
MCH RBC QN AUTO: 33.7 PG (ref 26.1–32.9)
MCHC RBC AUTO-ENTMCNC: 33.3 G/DL (ref 31.4–35)
MCV RBC AUTO: 101.1 FL (ref 82–102)
MONOCYTES # BLD: 0.5 K/UL (ref 0.1–1.3)
MONOCYTES NFR BLD: 21 % (ref 4–12)
NEUTS SEG # BLD: 1.1 K/UL (ref 1.7–8.2)
NEUTS SEG NFR BLD: 47 % (ref 43–78)
NRBC # BLD: 0.04 K/UL (ref 0–0.2)
PLATELET # BLD AUTO: 210 K/UL (ref 150–450)
PLATELET COMMENT: ADEQUATE
PMV BLD AUTO: 9.8 FL (ref 9.4–12.3)
RBC # BLD AUTO: 2.79 M/UL (ref 4.23–5.6)
RBC MORPH BLD: ABNORMAL
WBC # BLD AUTO: 2.3 K/UL (ref 4.3–11.1)
WBC MORPH BLD: ABNORMAL

## 2022-10-24 PROCEDURE — 36415 COLL VENOUS BLD VENIPUNCTURE: CPT

## 2022-10-24 PROCEDURE — 85025 COMPLETE CBC W/AUTO DIFF WBC: CPT

## 2022-10-27 RX ORDER — TEMOZOLOMIDE 100 MG/1
CAPSULE ORAL
Qty: 15 CAPSULE | Refills: 2 | Status: SHIPPED | OUTPATIENT
Start: 2022-10-27

## 2022-11-07 ENCOUNTER — TELEPHONE (OUTPATIENT)
Dept: ONCOLOGY | Age: 82
End: 2022-11-07

## 2022-11-18 ENCOUNTER — HOSPITAL ENCOUNTER (OUTPATIENT)
Dept: MRI IMAGING | Age: 82
Discharge: HOME | End: 2022-11-21
Payer: MEDICARE

## 2022-11-18 DIAGNOSIS — C71.9 MALIGNANT NEOPLASM OF BRAIN, UNSPECIFIED LOCATION (HCC): ICD-10-CM

## 2022-11-18 PROCEDURE — A9579 GAD-BASE MR CONTRAST NOS,1ML: HCPCS | Performed by: RADIOLOGY

## 2022-11-18 PROCEDURE — 70553 MRI BRAIN STEM W/O & W/DYE: CPT

## 2022-11-18 PROCEDURE — 2580000003 HC RX 258: Performed by: RADIOLOGY

## 2022-11-18 PROCEDURE — 6360000004 HC RX CONTRAST MEDICATION: Performed by: RADIOLOGY

## 2022-11-18 RX ORDER — SODIUM CHLORIDE 0.9 % (FLUSH) 0.9 %
20 SYRINGE (ML) INJECTION AS NEEDED
Status: DISCONTINUED | OUTPATIENT
Start: 2022-11-18 | End: 2022-11-22 | Stop reason: HOSPADM

## 2022-11-18 RX ADMIN — SODIUM CHLORIDE, PRESERVATIVE FREE 20 ML: 5 INJECTION INTRAVENOUS at 11:11

## 2022-11-18 RX ADMIN — GADOTERIDOL 17 ML: 279.3 INJECTION, SOLUTION INTRAVENOUS at 11:11

## 2022-11-21 ENCOUNTER — OFFICE VISIT (OUTPATIENT)
Dept: ONCOLOGY | Age: 82
End: 2022-11-21
Payer: MEDICARE

## 2022-11-21 ENCOUNTER — HOSPITAL ENCOUNTER (OUTPATIENT)
Dept: LAB | Age: 82
Discharge: HOME | End: 2022-11-24
Payer: MEDICARE

## 2022-11-21 VITALS
WEIGHT: 195.4 LBS | HEIGHT: 68 IN | TEMPERATURE: 98.6 F | BODY MASS INDEX: 29.61 KG/M2 | RESPIRATION RATE: 16 BRPM | DIASTOLIC BLOOD PRESSURE: 71 MMHG | HEART RATE: 71 BPM | OXYGEN SATURATION: 99 % | SYSTOLIC BLOOD PRESSURE: 149 MMHG

## 2022-11-21 DIAGNOSIS — D84.9 IMMUNOCOMPROMISED (HCC): ICD-10-CM

## 2022-11-21 DIAGNOSIS — D69.6 THROMBOCYTOPENIA (HCC): ICD-10-CM

## 2022-11-21 DIAGNOSIS — C71.9 GLIOBLASTOMA (HCC): ICD-10-CM

## 2022-11-21 DIAGNOSIS — Z87.898 HISTORY OF SEIZURE: ICD-10-CM

## 2022-11-21 DIAGNOSIS — Z51.81 THERAPEUTIC DRUG MONITORING: Primary | ICD-10-CM

## 2022-11-21 LAB
ALBUMIN SERPL-MCNC: 3.5 G/DL (ref 3.2–4.6)
ALBUMIN/GLOB SERPL: 1.2 {RATIO} (ref 0.4–1.6)
ALP SERPL-CCNC: 77 U/L (ref 50–136)
ALT SERPL-CCNC: 21 U/L (ref 12–65)
ANION GAP SERPL CALC-SCNC: 4 MMOL/L (ref 2–11)
AST SERPL-CCNC: 10 U/L (ref 15–37)
BASOPHILS # BLD: 0 K/UL (ref 0–0.2)
BASOPHILS NFR BLD: 0 % (ref 0–2)
BILIRUB SERPL-MCNC: 0.9 MG/DL (ref 0.2–1.1)
BUN SERPL-MCNC: 22 MG/DL (ref 8–23)
CALCIUM SERPL-MCNC: 8.8 MG/DL (ref 8.3–10.4)
CHLORIDE SERPL-SCNC: 108 MMOL/L (ref 101–110)
CO2 SERPL-SCNC: 30 MMOL/L (ref 21–32)
CREAT SERPL-MCNC: 1.2 MG/DL (ref 0.8–1.5)
DIFFERENTIAL METHOD BLD: ABNORMAL
EOSINOPHIL # BLD: 0.1 K/UL (ref 0–0.8)
EOSINOPHIL NFR BLD: 2 % (ref 0.5–7.8)
ERYTHROCYTE [DISTWIDTH] IN BLOOD BY AUTOMATED COUNT: 14.5 % (ref 11.9–14.6)
GLOBULIN SER CALC-MCNC: 3 G/DL (ref 2.8–4.5)
GLUCOSE SERPL-MCNC: 161 MG/DL (ref 65–100)
HCT VFR BLD AUTO: 32.3 %
HGB BLD-MCNC: 10.9 G/DL (ref 13.6–17.2)
IMM GRANULOCYTES # BLD AUTO: 0 K/UL (ref 0–0.5)
IMM GRANULOCYTES NFR BLD AUTO: 1 % (ref 0–5)
LYMPHOCYTES # BLD: 0.9 K/UL (ref 0.5–4.6)
LYMPHOCYTES NFR BLD: 14 % (ref 13–44)
MCH RBC QN AUTO: 33.5 PG (ref 26.1–32.9)
MCHC RBC AUTO-ENTMCNC: 33.7 G/DL (ref 31.4–35)
MCV RBC AUTO: 99.4 FL (ref 82–102)
MONOCYTES # BLD: 0.3 K/UL (ref 0.1–1.3)
MONOCYTES NFR BLD: 5 % (ref 4–12)
NEUTS SEG # BLD: 4.8 K/UL (ref 1.7–8.2)
NEUTS SEG NFR BLD: 78 % (ref 43–78)
NRBC # BLD: 0 K/UL (ref 0–0.2)
PLATELET # BLD AUTO: 39 K/UL (ref 150–450)
PMV BLD AUTO: 11.6 FL (ref 9.4–12.3)
POTASSIUM SERPL-SCNC: 2.9 MMOL/L (ref 3.5–5.1)
PROT SERPL-MCNC: 6.5 G/DL (ref 6.3–8.2)
RBC # BLD AUTO: 3.25 M/UL (ref 4.23–5.6)
SODIUM SERPL-SCNC: 142 MMOL/L (ref 133–143)
WBC # BLD AUTO: 6.1 K/UL (ref 4.3–11.1)

## 2022-11-21 PROCEDURE — 85025 COMPLETE CBC W/AUTO DIFF WBC: CPT

## 2022-11-21 PROCEDURE — G8427 DOCREV CUR MEDS BY ELIG CLIN: HCPCS | Performed by: INTERNAL MEDICINE

## 2022-11-21 PROCEDURE — 3078F DIAST BP <80 MM HG: CPT | Performed by: INTERNAL MEDICINE

## 2022-11-21 PROCEDURE — 1036F TOBACCO NON-USER: CPT | Performed by: INTERNAL MEDICINE

## 2022-11-21 PROCEDURE — 3074F SYST BP LT 130 MM HG: CPT | Performed by: INTERNAL MEDICINE

## 2022-11-21 PROCEDURE — 36415 COLL VENOUS BLD VENIPUNCTURE: CPT

## 2022-11-21 PROCEDURE — 1123F ACP DISCUSS/DSCN MKR DOCD: CPT | Performed by: INTERNAL MEDICINE

## 2022-11-21 PROCEDURE — 99215 OFFICE O/P EST HI 40 MIN: CPT | Performed by: INTERNAL MEDICINE

## 2022-11-21 PROCEDURE — G8417 CALC BMI ABV UP PARAM F/U: HCPCS | Performed by: INTERNAL MEDICINE

## 2022-11-21 PROCEDURE — 80053 COMPREHEN METABOLIC PANEL: CPT

## 2022-11-21 PROCEDURE — G8484 FLU IMMUNIZE NO ADMIN: HCPCS | Performed by: INTERNAL MEDICINE

## 2022-11-21 RX ORDER — DEXAMETHASONE 1 MG
1 TABLET ORAL
Qty: 90 TABLET | Refills: 1 | Status: SHIPPED | OUTPATIENT
Start: 2022-11-21

## 2022-11-21 ASSESSMENT — PATIENT HEALTH QUESTIONNAIRE - PHQ9
1. LITTLE INTEREST OR PLEASURE IN DOING THINGS: 0
SUM OF ALL RESPONSES TO PHQ9 QUESTIONS 1 & 2: 0
SUM OF ALL RESPONSES TO PHQ QUESTIONS 1-9: 0
SUM OF ALL RESPONSES TO PHQ QUESTIONS 1-9: 0
2. FEELING DOWN, DEPRESSED OR HOPELESS: 0
SUM OF ALL RESPONSES TO PHQ QUESTIONS 1-9: 0
SUM OF ALL RESPONSES TO PHQ QUESTIONS 1-9: 0

## 2022-11-21 NOTE — PROGRESS NOTES
Oral Chemotherapy Adherence:     Current Regimen:  Drug Name: temozolomide  Dose: 300 mg   Frequency: daily for 5 days every 4 weeks    Barriers to care identified including (financial, physical, psychosocial) : No    Missed doses reported: No    Patient verbalizes understanding of what to do in the event of a missed dose:  Yes    Adverse reactions/toxicities reported: thrombocytopenia, delay start of this cycle until platelets >/=057, lab scheduled for next week

## 2022-11-21 NOTE — PROGRESS NOTES
Data Source: Patient, [facility] record. 11/21/2022    11:54 AM    Jody Antunez [id number]    29 y.o. 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, history of depression/anxiety disorder, gout, insomnia, hernia repair, ankle fracture repair, lower back surgery with limited mobility thereafter, more recently admitted 12/25/2020 after being found unresponsive in bathroom by wife. Abnormal head CT leading to a brain MRI 12/25/2020 showing right temporal parietal mass 3.7 x 3.3 cm in size with adjacent cerebral edema, as well as associated hemorrhage and/or calcification. 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 CT CAP 12/27/2020 without evidence of malignancy or metastatic disease. Patient was seen by neurosurgery Dr. Isaak Loera and taken to the OR 12/31/2020. Underwent right parietal craniotomy with resection of right parietal tumor and placement of intraoperative BCNU wafers to surgical bed. 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 Recovering well, now referred to medical oncology as well as radiation oncology for further therapy. He is also undergoing speech therapy. On evaluation today using a walker to get around. Today reports some short term memory issues but otherwise doing well. Family support includes wife and one daughter who lives in [place] and was here for his surgery. Not currently on any seizure medicines and being tapered off of Decadron (will be done next Monday). Hospice Referral:  na  Interval History:  11/21/2022: Patient seen for GBM, ongoing treatments and management. Reports doing reasonably, good mobility and motor function, some fatigue.   Remains on dexamethasone 2 mg daily, discussed tapering down further, will attempt 1 mg daily and if clinically declines, understands to go back up to 2 mg daily. Recent brain MRI with stable disease. Labs with thrombocytopenia with platelet count 68,009, other counts adequate: We will defer next cycle by week (weekly CBC and restart next cycle once platelet count above 100,000). Repeat brain MRI in 3 months. Potassium low, will replace with po KCL. REVIEW OF SYSTEMS:  As mentioned above, all other systems were reviewed in full and are negative. Past Medical History:   Diagnosis Date    Anxiety disorder 12/9/2014    Arthritis 12/9/2014    Depression 12/9/2014    Glioblastoma ([facility] [address] 12/26/2020    Gout 12/9/2014    History of intracranial hemorrhage 12/25/2020    History of lumbar laminectomy for spinal cord decompression 2/18/2019    History of seizure 12/28/2020    HTN (hypertension), benign 2/15/2017    Hyperlipemia 12/9/2014    Hypertension     Idiopathic chronic gout of right ankle 12/9/2014    Insomnia 12/9/2014    Kidney stone     Mixed hyperlipidemia 12/9/2014    Panic disorder 12/9/2014    Primary insomnia 12/9/2014    Rosacea 2/15/2017       Past Surgical History:   Procedure Laterality Date    ANKLE FRACTURE SURGERY Left 6/1974    COLONOSCOPY  2007    CYST REMOVAL      CYST REMOVAL      pylonidal cyst    CYST REMOVAL      HERNIA REPAIR Bilateral     OTHER SURGICAL HISTORY      wrist    WRIST FRACTURE SURGERY Bilateral 6/1993    wrist plate/pin       Current Outpatient Medications   Medication Sig Dispense Refill    temozolomide (TEMODAR) 100 MG chemo capsule Take 3 capsules by mouth daily for 5 days every 28 days 15 capsule 2    sertraline (ZOLOFT) 100 MG tablet TAKE 1.5 TABLETS BY MOUTH DAILY.  ONE AND HALF TAB EVERY  tablet 1    esomeprazole Magnesium (NEXIUM) 20 MG PACK Take 20 mg by mouth daily      Multiple Vitamin (MULTIVITAMIN ADULT PO) Take by mouth      dexamethasone (DECADRON) 2 MG tablet Take 1 tablet by mouth daily (with breakfast) 30 tablet 1    furosemide (LASIX) 20 MG tablet TAKE 1 TABLET BY MOUTH EVERY DAY 90 tablet 1    atorvastatin (LIPITOR) 40 MG tablet TAKE 1 TABLET BY MOUTH EVERY DAY 90 tablet 1    vitamin D (CHOLECALCIFEROL) 25 MCG (1000 UT) TABS tablet Take 1,000 Units by mouth daily      levETIRAcetam (KEPPRA) 500 MG tablet Take 500 mg by mouth 2 times daily      L-Lysine 500 MG TABS Take 500 mg by mouth daily      ondansetron (ZOFRAN) 8 MG tablet Take 8 mg by mouth every 8 hours as needed      polyethylene glycol (GLYCOLAX) 17 GM/SCOOP powder Take 17 g by mouth daily as needed      potassium chloride (KLOR-CON) 10 MEQ extended release tablet TAKE 1 TABLET BY MOUTH EVERY DAY      pantoprazole (PROTONIX) 40 MG tablet Take 1 tablet by mouth every morning (before breakfast) (Patient not taking: Reported on 2022) 90 tablet 3    temazepam (RESTORIL) 15 MG capsule Take 1 capsule by mouth nightly as needed for Sleep for up to 90 days. (Patient not taking: Reported on 2022) 20 capsule 2    sulfamethoxazole-trimethoprim (BACTRIM DS;SEPTRA DS) 800-160 MG per tablet Take 1 tab on Monday, Wednesday, and Friday. (Patient not taking: Reported on 2022) 48 tablet 2    Calcium-Magnesium-Vitamin D (CALCIUM 1200+D3 PO) Take by mouth daily (Patient not taking: Reported on 10/17/2022)      tamsulosin (FLOMAX) 0.4 MG capsule Take 0.4 mg by mouth daily       No current facility-administered medications for this visit.      Facility-Administered Medications Ordered in Other Visits   Medication Dose Route Frequency Provider Last Rate Last Admin    sodium chloride flush 0.9 % injection 20 mL  20 mL IntraVENous PRN Misha Barrett MD   20 mL at 22 1111       Social History     Socioeconomic History    Marital status:      Spouse name: None    Number of children: None    Years of education: None    Highest education level: None   Tobacco Use    Smoking status: Former     Types: Cigarettes     Quit date: 1970     Years since quittin.9    Smokeless tobacco: Never   Substance and Sexual Activity    Alcohol use: Not Currently    Drug use: No       Family History   Problem Relation Age of Onset    Cancer Mother         lung ca    Hypertension Mother     Heart Disease Mother     Osteoarthritis Father     Cancer Brother         stomach       No Known Allergies    PHYSICAL EXAMINATION:  General Appearance: Healthy appearing patient in no acute distress  Vitals reviewed. BP (!) 149/71   Pulse 71   Temp 98.6 °F (37 °C)   Resp 16   Ht 5' 8\" (1.727 m)   Wt 195 lb 6.4 oz (88.6 kg)   SpO2 99%   BMI 29.71 kg/m²   HEENT: No oral or pharyngeal masses, ulceration or thrush noted, no sinus tenderness. Neck is supple with no thyromegaly or JVD noted. Lymph Nodes: No lymphadenopathy noted in the occipital, pre and post auricular, cervical, supra and infraclavicular, axillary, epitrochlear, inguinal, and popliteal region. Breasts: No palpable masses, nipple discharge or skin retraction  Lungs/Thorax: Clear to auscultation, no accessory muscles of respiration being used. Heart: Regular rate and rhythm, normal S1, S2, no appreciable murmurs, rubs, gallops  Abdomen: Soft, nontender, bowel sounds present, no appreciable hepatosplenomegaly, no palpable masses  Extremeties: Good pulses bilaterally, no peripheral edema. Skin: Normal skin tone with no rash, petechiae, ecchymosis noted.   Musculoskeletal: No pain on palpation over bony prominence, no edema, no evidence of gout, no joint or bony deformity  Neurologic: Grossly intact    LABS/IMAGING:    Lab Results   Component Value Date/Time    WBC 6.1 11/21/2022 10:37 AM    HGB 10.9 11/21/2022 10:37 AM    HCT 32.3 11/21/2022 10:37 AM    PLT 39 11/21/2022 10:37 AM    MCV 99.4 11/21/2022 10:37 AM       Lab Results   Component Value Date/Time     11/21/2022 10:37 AM    K 2.9 11/21/2022 10:37 AM     11/21/2022 10:37 AM    CO2 30 11/21/2022 10:37 AM    BUN 22 11/21/2022 10:37 AM    GFRAA >60 09/29/2022 11:19 AM    GLOB 3.0 11/21/2022 10:37 AM    ALT 21 11/21/2022 10:37 AM         Above results reviewed with patient. 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, history of depression/anxiety disorder, gout, insomnia, hernia repair, ankle fracture repair, lower back surgery with limited mobility thereafter, more recently admitted 12/25/2020 after being found unresponsive in bathroom by wife. Abnormal head CT leading to a brain MRI 12/25/2020 showing right temporal parietal mass 3.7 x 3.3 cm in size with adjacent cerebral edema, as well as associated hemorrhage and/or calcification. 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 CT CAP 12/27/2020 without evidence of malignancy or metastatic disease. Patient was seen by neurosurgery Dr. Megan Barillas and taken to the OR 12/31/2020. Underwent right parietal craniotomy with resection of right parietal tumor and placement of intraoperative BCNU wafers to surgical bed. 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 Recovering well, now referred to medical oncology as well as radiation oncology for further therapy. He is also undergoing speech therapy. On evaluation today using a walker to get around. Today reports some short term memory issues but otherwise doing well. Family support includes wife and one daughter who lives in [place] and was here for his surgery. Not currently on any seizure medicines and being tapered off of Decadron (will be done next Monday). 2/10/2021: Since last visit, significant course including recurrent seizure requiring ED visit, noncontrasted head CT with improved postoperative changes. He has since been placed on Keppra 500 twice daily. He was also treated for UTI though appears suboptimal specimen. We will simply repeat UA today, denies any symptoms. Performance status slowly improving, has completed physical therapy, currently undergoing speech therapy. PS 1 today.   Recent transient thrombocytopenia, wife reports briefly being prescribed trazodone which they have stopped now as it caused excessive sleepiness. Plan for concurrent chemo-XRT with temozolomide with shorter course XRT over 3 weeks. Repeat simulation and brain MRI 2/20/2021: Results will be followed. Once starts temozolomide, will also benefit with Bactrim 3 times a week for PCP prophylaxis. Weekly CBC once on therapy. RTC once ready to start therapy, weekly provider visits there on.    3/31/2021: Completed Temodar-XRT 3/26/2021. Tolerated reasonably. Ongoing fatigue, though mobility and speech appear improved. Remains on Keppra for past seizures per neurology. Now scheduled for follow-up brain MRI 4/23/2021. Will start adjuvant temozolomide at 150 mg per metered squared daily x5 days every 28 days at that point. We will also look into Optune therapy. RTC post brain MRI.    4/26/2021: Reports doing reasonably. Mobility has somewhat improved. Per wife takes multiple naps during the day. They are planning to  more regular outdoor walking. Labs today unremarkable. Recent brain MRI without disease progression ([place]), treatment related changes seen. Okay to proceed with adjuvant temozolomide. Repeat labs and provider visit in 2 weeks. Paperwork initiated for optune therapy. 5/24/2021:  appears to have tolerated adjuvant temozolomide cycle 1 well. Main complaint has remained ongoing fatigue for which he takes regular naps. Denies any new sensorimotor deficits, or seizure activity. Reasonable p.o. intake and mobility now. Also started Optune therapy a few weeks ago, tolerating reasonably, scalp without any rash. Okay to proceed with cycle 2 adjuvant temozolomide tomorrow, given labs without cytopenias, we will simply see him back with repeat labs in a month. Now scheduled for repeat brain MRI per radiation oncology in 8/21.    7/20/2021: Continues to tolerate therapy well.   Mobility has significantly improved, walking briskly in the room. Regular with his Keppra, as well as Bactrim. Blood work unremarkable, regular with Optune which he is tolerating well without any signs of scalp dermatitis. Okay to proceed with cycle 4. Scheduled for brain MRI and subsequent radiation oncology follow-up next month, will chart check imaging. Otherwise we will see him back in a month. 9/14/2021: Patient here for cycle 6 ( last cycle of adjuvant temozolomide). Performance status remains improved. Some fatigue though. Nausea better on days with Temodar since taking Zofran an hour before each dose. Also regular with his Keppra as well as Bactrim. Last brain MRI 8/21 with responding disease, now scheduled for repeat brain MRI in 11/17/2021. Labs today with thrombocytopenia with platelet count 31,634. Will defer next cycle by week, till counts recover. Discussed role of Optune therapy continuation beyond 6 cycles, they will discuss but are leaning towards continuing. 11/22/2021: More recently seeing primary care for sciatica as well as neck stiffness. Reasonable p.o. intake and mobility. Some scalp dermatitis, they have decided to forego further Optune therapy. This is reasonable. Blood work unremarkable, platelet count having normalized. Brain MRI with SD. Simple monitoring moving forward, will repeat brain imaging in 3 months time. 2/25/2022: Reports doing reasonably, some difficulty with time management in the day but otherwise mobile, has been in right now able to do things together, looking forward to [employer] in a few months. Has seen neurology, no further seizures, remains on Keppra 500 twice daily. Recent brain MRI with no clear evidence of disease progression at surgical site, T2 prolongation in right precentral gyrus and splenium of corpus callosum slowly worsened, possibly posttreatment change versus infiltrative tumor. Will get radiation oncology opinion also.   We will see him back in 3 months with repeat imaging    Addendum: Case discussed with Dr. Jaime Watson, felt to have therapy related changes rather than true progression. 6/6/2022: Repeat brain MRI with/without contrast 5/25/2022 with changes concerning for disease progression including new 1.6 x 1.1 cm enhancing focus anterior and superior to the right sided operative cavity. Patient has seen radiation oncology Dr. Jaime Watson as well as neurosurgery Dr. Lizbet Ayoub. The patient and family report being told not a candidate for repeat surgery, seeing radiation oncology later today. Will discuss case with Dr. Jaime Watson, likely repeat XRT followed by systemic therapy (likely temozolomide single agent given MGMT methylation status, and previous good tolerance). Today reports doing reasonably. Some fatigue but manageable. Blood work today with adequate counts, chemistries unremarkable. We will see him back in a few weeks. Addendum: Case discussed with Dr. Jaime Watson. Patient not a surgical candidate. Plan for SBRT to site of disease progression, will plan for temozolomide 150 mg per metered squared daily x 5 every 28 days thereafter. 7/21/22: Patient since last visit has completed 3 fractions SBRT to left temporal on 6/20/22. Restarted Temodar 150 mg/m2 x5 days, and is here for cycle 2. Restart Bactrim MWF prophylaxis as well. Today reports left-sided weakness is improved. Abruptly stopped his dexamethasone 8 mg twice daily as ran out 2 days ago, blood pressure on lower side today. Denies any fevers, chills or otherwise feeling unwell. Hypotension probably related to stopping steroids, will restart dexamethasone 4 mg twice daily, will also give additional 1 L IV NS today. Defer cycle 2 given platelet count 52,737, will recheck counts early next week also. CBC Monday. RTC in 1 week with labs (start cycle 2 if platelet count 057,771 or above). CBC on day 1 and day 21 of each cycle.     9/15/2022: Patient seen for his recurrent GBM, ongoing treatments and toxicity management. His last temozolomide cycle was approximately 7 weeks ago. The last few weeks he has missed appointments related to weakness. He has seen radiation oncology Dr. Nj Trevizo in interim, with brain MRI 8/18/2022, overall felt to have responding disease though there was increased swelling for which he completed a tapering course of dexamethasone. Next brain MRI scheduled for 11/18/2022. Wife today accompanies patient and feels patient had clinical decline since completely coming off dexamethasone. He has been complaining of abdominal discomfort as well. Today appears to be, minimally involved in conversation, gait has also worsened. Blood work today with surprise finding of hemoglobin down to 7 range from normal range. Will defer next temozolomide cycle for now. We will place patient back on dexamethasone 2 mg daily for now with subsequent taper over time. We will also recheck H&H, start PPI twice daily. Initiate work-up for new significant anemia including smear, iron panel, B12, folate, MMA, HC, hemolysis labs, ESR, as well as CT AP without contrast to rule out retroperitoneal bleeding. Refer to GI. We will see him back in 1 week's time. 9/19/2022: Patient seen for his GBM, ongoing treatments and toxicity management, as well as recent anemia. Appears to have significantly improved clinically since going back on dexamethasone, more communicative today. Per wife has been taking dexamethasone 4 mg twice daily, advised to drop down to 2 mg twice daily. Labs from previous visit reviewed, no clear evidence of hemolysis, adequate iron, O60 and folic acid levels. CT AP without retroperitoneal bleeding. Has been referred to GI. Denies any overt bleeding. Reasonable to proceed with next cycle of temozolomide. We will see him back next week for tox check as well as repeat labs. 9/29/2022: Patient seen for his GBM, ongoing treatments and toxicity management. Completed 5 days of temozolomide over the weekend, some additional fatigue but has now improved. He has seen GI, he was offered endoscopic work-up however his preference is to hold off on this for now, this is reasonable. Blood work today with hemoglobin continuing to improve at 9.4, adequate ANC and platelet count. Currently on Dex 2 mg daily . Next brain MRI scheduled in 11/18/2022. Continue following with Rad-Onc as needed. We will see him back prior to next cycle. 10/17/2022: Patient seen for his GBM, ongoing treatments and toxicity management. Reports earlier in the month having had a fall on his left side over walker, was seen in urgent care and diagnosed with rib fracture, conservatively managed, pain has not mostly resolved, breathing is at baseline. Denies any cough or phlegm. Has remained on dexamethasone as well as Keppra. Blood work today with chemistries unremarkable, however platelet count low at 48,000, will defer next cycle by week and proceed once platelet count over 387,482. CBC day 21. We will see him back in 5 weeks. He is also due for repeat brain MRI in 11/22.    11/21/2022: Patient seen for GBM, ongoing treatments and management. Reports doing reasonably, good mobility and motor function, some fatigue. Remains on dexamethasone 2 mg daily, discussed tapering down further, will attempt 1 mg daily and if clinically declines, understands to go back up to 2 mg daily. Recent brain MRI with stable disease. Labs with thrombocytopenia with platelet count 40,433, other counts adequate: We will defer next cycle by week (weekly CBC and restart next cycle once platelet count above 100,000). Repeat brain MRI in 3 months. Potassium low, will replace with po KCL. 1. RT temporal-parietal GBM s/p MSR & BCNU wafer placement 12/31/20  2. Fatigue    PLAN:  - As above. - S/p concurrent Temozolomide 75 mg/m2 daily w/ XRT (completed 3/26/21). On PCP prophylaxis w Bactrim DS MWF.  Brain MRI w MT. Started (4/21) adjuvant Temozolomide 150 mg/m2 daily x 5 days every 28 days x 6 (completed 10/21). PD 6/22: s/p SBRT, now on single agent temozolomide 150 mg per metered squared daily x 5 every 28 days. Platelet count needs to be over 100,000 prior to each cycle. - Optune alternating electrical field therapy post chemoradiation (5/21-10/21). - Continue Keppra 500 bid. - MGMT methylation detected, IDH wild type. - Anemia: No clear evidence of hemolysis, CTAP without retroperitoneal bleeding. Iron panel, A27 and folic acid levels unremarkable. Referred to GI. Offered endoscopic work-up but patient preference to hold off for now. RTC in 5 week with labs. CBC on day 1 of each cycle (their preference to avoid D21 labs). Serial brain MRIs every 3 months. Thank you Dr Kalie Fonseca for allowing us to paticipate in care of this pleasant patient.  Please call w/ any quesions      Yovani Junior MD  [facility]  Hematology Oncology  [address] [place] [address]  Office : [phone]  Fax : [phone]

## 2022-11-21 NOTE — PATIENT INSTRUCTIONS
Patient Instructions from Today's Visit    Reason for Visit:  Follow up    Plan:  MRI looked good! Decrease your prednisone to 1 mg daily. If you feel like you are getting more sluggish then go back to 2 mg daily. Your platelets are too low to start temodar today. We will schedule you for lab work next week and then we will call you to let you know whether to start the temodar. Follow Up:   Follow up in 5 weeks    Recent Lab Results:  Hospital Outpatient Visit on 11/21/2022   Component Date Value Ref Range Status    WBC 11/21/2022 6.1  4.3 - 11.1 K/uL Final    RBC 11/21/2022 3.25 (A)  4.23 - 5.6 M/uL Final    Hemoglobin 11/21/2022 10.9 (A)  13.6 - 17.2 g/dL Final    Hematocrit 11/21/2022 32.3  % Final    MCV 11/21/2022 99.4  82.0 - 102.0 FL Final    MCH 11/21/2022 33.5 (A)  26.1 - 32.9 PG Final    MCHC 11/21/2022 33.7  31.4 - 35.0 g/dL Final    RDW 11/21/2022 14.5  11.9 - 14.6 % Final    Platelets 71/84/3252 39 (A)  150 - 450 K/uL Final    MPV 11/21/2022 11.6  9.4 - 12.3 FL Final    nRBC 11/21/2022 0.00  0.0 - 0.2 K/uL Final    **Note: Absolute NRBC parameter is now reported with Hemogram**    Seg Neutrophils 11/21/2022 78  43 - 78 % Final    Lymphocytes 11/21/2022 14  13 - 44 % Final    Monocytes 11/21/2022 5  4.0 - 12.0 % Final    Eosinophils % 11/21/2022 2  0.5 - 7.8 % Final    Basophils 11/21/2022 0  0.0 - 2.0 % Final    Immature Granulocytes 11/21/2022 1  0.0 - 5.0 % Final    Segs Absolute 11/21/2022 4.8  1.7 - 8.2 K/UL Final    Absolute Lymph # 11/21/2022 0.9  0.5 - 4.6 K/UL Final    Absolute Mono # 11/21/2022 0.3  0.1 - 1.3 K/UL Final    Absolute Eos # 11/21/2022 0.1  0.0 - 0.8 K/UL Final    Basophils Absolute 11/21/2022 0.0  0.0 - 0.2 K/UL Final    Absolute Immature Granulocyte 11/21/2022 0.0  0.0 - 0.5 K/UL Final    Differential Type 11/21/2022 AUTOMATED    Final    Sodium 11/21/2022 142  133 - 143 mmol/L Final    Potassium 11/21/2022 2.9 (A)  3.5 - 5.1 mmol/L Final    Chloride 11/21/2022 108 101 - 110 mmol/L Final    CO2 11/21/2022 30  21 - 32 mmol/L Final    Anion Gap 11/21/2022 4  2 - 11 mmol/L Final    Glucose 11/21/2022 161 (A)  65 - 100 mg/dL Final    BUN 11/21/2022 22  8 - 23 MG/DL Final    Creatinine 11/21/2022 1.20  0.8 - 1.5 MG/DL Final    Est, Glom Filt Rate 11/21/2022 >60  >60 ml/min/1.73m2 Final    Comment:      Pediatric calculator link: [employer]. org/professionals/kdoqi/gfr_calculatorped       Effective Oct 3, 2022       These results are not intended for use in patients <25years of age. eGFR results are calculated without a race factor using  the 2021 CKD-EPI equation. Careful clinical correlation is recommended, particularly when comparing to results calculated using previous equations. The CKD-EPI equation is less accurate in patients with extremes of muscle mass, extra-renal metabolism of creatinine, excessive creatine ingestion, or following therapy that affects renal tubular secretion. Calcium 11/21/2022 8.8  8.3 - 10.4 MG/DL Final    Total Bilirubin 11/21/2022 0.9  0.2 - 1.1 MG/DL Final    ALT 11/21/2022 21  12 - 65 U/L Final    AST 11/21/2022 10 (A)  15 - 37 U/L Final    Alk Phosphatase 11/21/2022 77  50 - 136 U/L Final    Total Protein 11/21/2022 6.5  6.3 - 8.2 g/dL Final    Albumin 11/21/2022 3.5  3.2 - 4.6 g/dL Final    Globulin 11/21/2022 3.0  2.8 - 4.5 g/dL Final    Albumin/Globulin Ratio 11/21/2022 1.2  0.4 - 1.6   Final        Treatment Summary has been discussed and given to patient: n/a        -------------------------------------------------------------------------------------------------------------------  Please call our office at [phone] if you have any  of the following symptoms:   Fever of 100.5 or greater  Chills  Shortness of breath  Swelling or pain in one leg    After office hours an answering service is available and will contact a provider for emergencies or if you are experiencing any of the above symptoms.     Patient did express an interest in My Chart. My Chart log in information explained on the after visit summary printout at the [facility] desk.     Donald Garcia RN

## 2022-11-28 ENCOUNTER — HOSPITAL ENCOUNTER (OUTPATIENT)
Dept: LAB | Age: 82
Discharge: HOME | End: 2022-12-01
Payer: MEDICARE

## 2022-11-28 DIAGNOSIS — C71.9 GLIOBLASTOMA (HCC): ICD-10-CM

## 2022-11-28 LAB
BASOPHILS # BLD: 0 K/UL (ref 0–0.2)
BASOPHILS NFR BLD: 0 % (ref 0–2)
DIFFERENTIAL METHOD BLD: ABNORMAL
EOSINOPHIL # BLD: 0 K/UL (ref 0–0.8)
EOSINOPHIL NFR BLD: 1 % (ref 0.5–7.8)
ERYTHROCYTE [DISTWIDTH] IN BLOOD BY AUTOMATED COUNT: 15 % (ref 11.9–14.6)
HCT VFR BLD AUTO: 31.1 %
HGB BLD-MCNC: 10.6 G/DL (ref 13.6–17.2)
IMM GRANULOCYTES # BLD AUTO: 0 K/UL (ref 0–0.5)
IMM GRANULOCYTES NFR BLD AUTO: 1 % (ref 0–5)
LYMPHOCYTES # BLD: 0.8 K/UL (ref 0.5–4.6)
LYMPHOCYTES NFR BLD: 20 % (ref 13–44)
MCH RBC QN AUTO: 33.8 PG (ref 26.1–32.9)
MCHC RBC AUTO-ENTMCNC: 34.1 G/DL (ref 31.4–35)
MCV RBC AUTO: 99 FL (ref 82–102)
MONOCYTES # BLD: 0.4 K/UL (ref 0.1–1.3)
MONOCYTES NFR BLD: 9 % (ref 4–12)
NEUTS SEG # BLD: 3 K/UL (ref 1.7–8.2)
NEUTS SEG NFR BLD: 69 % (ref 43–78)
NRBC # BLD: 0 K/UL (ref 0–0.2)
PLATELET # BLD AUTO: 145 K/UL (ref 150–450)
PLATELET COMMENT: ADEQUATE
PMV BLD AUTO: 10.1 FL (ref 9.4–12.3)
RBC # BLD AUTO: 3.14 M/UL (ref 4.23–5.6)
RBC MORPH BLD: ABNORMAL
WBC # BLD AUTO: 4.2 K/UL (ref 4.3–11.1)
WBC MORPH BLD: ABNORMAL

## 2022-11-28 PROCEDURE — 36415 COLL VENOUS BLD VENIPUNCTURE: CPT

## 2022-11-28 PROCEDURE — 85025 COMPLETE CBC W/AUTO DIFF WBC: CPT

## 2022-12-07 RX ORDER — TEMOZOLOMIDE 100 MG/1
CAPSULE ORAL
Qty: 15 CAPSULE | Refills: 11 | Status: SHIPPED | OUTPATIENT
Start: 2022-12-07

## 2022-12-30 ENCOUNTER — HOSPITAL ENCOUNTER (OUTPATIENT)
Dept: LAB | Age: 82
Discharge: HOME | End: 2022-12-30
Payer: MEDICARE

## 2022-12-30 ENCOUNTER — OFFICE VISIT (OUTPATIENT)
Dept: ONCOLOGY | Age: 82
End: 2022-12-30
Payer: MEDICARE

## 2022-12-30 VITALS
RESPIRATION RATE: 16 BRPM | WEIGHT: 198 LBS | DIASTOLIC BLOOD PRESSURE: 62 MMHG | HEIGHT: 68 IN | TEMPERATURE: 98.6 F | BODY MASS INDEX: 30.01 KG/M2 | OXYGEN SATURATION: 97 % | SYSTOLIC BLOOD PRESSURE: 139 MMHG | HEART RATE: 71 BPM

## 2022-12-30 DIAGNOSIS — R53.83 FATIGUE DUE TO TREATMENT: ICD-10-CM

## 2022-12-30 DIAGNOSIS — D69.6 THROMBOCYTOPENIA (HCC): ICD-10-CM

## 2022-12-30 DIAGNOSIS — C71.9 GLIOBLASTOMA (HCC): Primary | ICD-10-CM

## 2022-12-30 DIAGNOSIS — C71.9 GLIOBLASTOMA (HCC): ICD-10-CM

## 2022-12-30 LAB
ALBUMIN SERPL-MCNC: 3.4 G/DL (ref 3.2–4.6)
ALBUMIN/GLOB SERPL: 1.1 {RATIO} (ref 0.4–1.6)
ALP SERPL-CCNC: 84 U/L (ref 50–136)
ALT SERPL-CCNC: 21 U/L (ref 12–65)
ANION GAP SERPL CALC-SCNC: 6 MMOL/L (ref 2–11)
AST SERPL-CCNC: 13 U/L (ref 15–37)
BASOPHILS # BLD: 0 K/UL (ref 0–0.2)
BASOPHILS NFR BLD: 0 % (ref 0–2)
BILIRUB SERPL-MCNC: 0.6 MG/DL (ref 0.2–1.1)
BUN SERPL-MCNC: 18 MG/DL (ref 8–23)
CALCIUM SERPL-MCNC: 8.9 MG/DL (ref 8.3–10.4)
CHLORIDE SERPL-SCNC: 108 MMOL/L (ref 101–110)
CO2 SERPL-SCNC: 29 MMOL/L (ref 21–32)
CREAT SERPL-MCNC: 1.3 MG/DL (ref 0.8–1.5)
DIFFERENTIAL METHOD BLD: ABNORMAL
EOSINOPHIL # BLD: 0.1 K/UL (ref 0–0.8)
EOSINOPHIL NFR BLD: 1 % (ref 0.5–7.8)
ERYTHROCYTE [DISTWIDTH] IN BLOOD BY AUTOMATED COUNT: 14.6 % (ref 11.9–14.6)
GLOBULIN SER CALC-MCNC: 3.2 G/DL (ref 2.8–4.5)
GLUCOSE SERPL-MCNC: 111 MG/DL (ref 65–100)
HCT VFR BLD AUTO: 31.4 %
HGB BLD-MCNC: 10.7 G/DL (ref 13.6–17.2)
IMM GRANULOCYTES # BLD AUTO: 0 K/UL (ref 0–0.5)
IMM GRANULOCYTES NFR BLD AUTO: 0 % (ref 0–5)
LYMPHOCYTES # BLD: 1 K/UL (ref 0.5–4.6)
LYMPHOCYTES NFR BLD: 17 % (ref 13–44)
MAGNESIUM SERPL-MCNC: 2.1 MG/DL (ref 1.8–2.4)
MCH RBC QN AUTO: 33.1 PG (ref 26.1–32.9)
MCHC RBC AUTO-ENTMCNC: 34.1 G/DL (ref 31.4–35)
MCV RBC AUTO: 97.2 FL (ref 82–102)
MONOCYTES # BLD: 0.5 K/UL (ref 0.1–1.3)
MONOCYTES NFR BLD: 8 % (ref 4–12)
NEUTS SEG # BLD: 4.1 K/UL (ref 1.7–8.2)
NEUTS SEG NFR BLD: 73 % (ref 43–78)
NRBC # BLD: 0 K/UL (ref 0–0.2)
PLATELET # BLD AUTO: 90 K/UL (ref 150–450)
PMV BLD AUTO: 10.2 FL (ref 9.4–12.3)
POTASSIUM SERPL-SCNC: 3 MMOL/L (ref 3.5–5.1)
PROT SERPL-MCNC: 6.6 G/DL (ref 6.3–8.2)
RBC # BLD AUTO: 3.23 M/UL (ref 4.23–5.6)
SODIUM SERPL-SCNC: 143 MMOL/L (ref 133–143)
WBC # BLD AUTO: 5.6 K/UL (ref 4.3–11.1)

## 2022-12-30 PROCEDURE — 99214 OFFICE O/P EST MOD 30 MIN: CPT | Performed by: NURSE PRACTITIONER

## 2022-12-30 PROCEDURE — G8427 DOCREV CUR MEDS BY ELIG CLIN: HCPCS | Performed by: NURSE PRACTITIONER

## 2022-12-30 PROCEDURE — G8484 FLU IMMUNIZE NO ADMIN: HCPCS | Performed by: NURSE PRACTITIONER

## 2022-12-30 PROCEDURE — 1036F TOBACCO NON-USER: CPT | Performed by: NURSE PRACTITIONER

## 2022-12-30 PROCEDURE — G8417 CALC BMI ABV UP PARAM F/U: HCPCS | Performed by: NURSE PRACTITIONER

## 2022-12-30 PROCEDURE — 3078F DIAST BP <80 MM HG: CPT | Performed by: NURSE PRACTITIONER

## 2022-12-30 PROCEDURE — 85025 COMPLETE CBC W/AUTO DIFF WBC: CPT

## 2022-12-30 PROCEDURE — 3075F SYST BP GE 130 - 139MM HG: CPT | Performed by: NURSE PRACTITIONER

## 2022-12-30 PROCEDURE — 1123F ACP DISCUSS/DSCN MKR DOCD: CPT | Performed by: NURSE PRACTITIONER

## 2022-12-30 PROCEDURE — 80053 COMPREHEN METABOLIC PANEL: CPT

## 2022-12-30 PROCEDURE — 83735 ASSAY OF MAGNESIUM: CPT

## 2022-12-30 PROCEDURE — 36415 COLL VENOUS BLD VENIPUNCTURE: CPT

## 2022-12-30 ASSESSMENT — PATIENT HEALTH QUESTIONNAIRE - PHQ9
SUM OF ALL RESPONSES TO PHQ9 QUESTIONS 1 & 2: 0
SUM OF ALL RESPONSES TO PHQ QUESTIONS 1-9: 0
2. FEELING DOWN, DEPRESSED OR HOPELESS: 0
1. LITTLE INTEREST OR PLEASURE IN DOING THINGS: 0
SUM OF ALL RESPONSES TO PHQ QUESTIONS 1-9: 0

## 2022-12-30 NOTE — PROGRESS NOTES
Data Source: [facility]. 12/30/2022    12:29 PM    Steve Gastelum [id number]    [de-identified] y.o. 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, history of depression/anxiety disorder, gout, insomnia, hernia repair, ankle fracture repair, lower back surgery with limited mobility thereafter, more recently admitted 12/25/2020 after being found unresponsive in bathroom by wife. Abnormal head CT leading to a brain MRI 12/25/2020 showing right temporal parietal mass 3.7 x 3.3 cm in size with adjacent cerebral edema, as well as associated hemorrhage and/or calcification. 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 CT CAP 12/27/2020 without evidence of malignancy or metastatic disease. Patient was seen by neurosurgery Dr. Tristen Banda and taken to the OR 12/31/2020. Underwent right parietal craniotomy with resection of right parietal tumor and placement of intraoperative BCNU wafers to surgical bed. 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 Recovering well, now referred to medical oncology as well as radiation oncology for further therapy. He is also undergoing speech therapy. On evaluation today using a walker to get around. Today reports some short term memory issues but otherwise doing well. Family support includes wife and one daughter who lives in [place] and was here for his surgery. Not currently on any seizure medicines and being tapered off of Decadron (will be done next Monday). Hospice Referral:  na  Interval History:  12/30/2022: He returns today with his wife for follow up prior to next cycle Temodar. Overall, he continues to tolerate treatment well. There were no GI or bowel complaints after last cycle or currently.   He is eating and drinking well.  Fatigue is ongoing. He is now on dex 1 mg daily and doing well on that dose. There are no headaches, vision changes, or seizures. He did have 1 fall in the bathroom last week when he grabbed his walker it began moving away from him and he was unable to stop it or himself, denies any injury. There is no cough or shortness of breath. BLE edema is stable, remains on lasix daily. Denies any pain, bleeding, fevers, or infectious symptoms. Labs reviewed, Hgb stable at 10.7, Plt 90K. His platelets historically rebound quickly, therefore should be adequate by Monday (1/2/23) to start his next cycle Temodar. Continue oral K+. REVIEW OF SYSTEMS:  As mentioned above, all other systems were reviewed in full and are negative.     Past Medical History:   Diagnosis Date    Anxiety disorder 12/9/2014    Arthritis 12/9/2014    Depression 12/9/2014    Glioblastoma ([facility] [address] 12/26/2020    Gout 12/9/2014    History of intracranial hemorrhage 12/25/2020    History of lumbar laminectomy for spinal cord decompression 2/18/2019    History of seizure 12/28/2020    HTN (hypertension), benign 2/15/2017    Hyperlipemia 12/9/2014    Hypertension     Idiopathic chronic gout of right ankle 12/9/2014    Insomnia 12/9/2014    Kidney stone     Mixed hyperlipidemia 12/9/2014    Panic disorder 12/9/2014    Primary insomnia 12/9/2014    Rosacea 2/15/2017       Past Surgical History:   Procedure Laterality Date    ANKLE FRACTURE SURGERY Left 6/1974    COLONOSCOPY  2007    CYST REMOVAL      CYST REMOVAL      pylonidal cyst    CYST REMOVAL      HERNIA REPAIR Bilateral     OTHER SURGICAL HISTORY      wrist    WRIST FRACTURE SURGERY Bilateral 6/1993    wrist plate/pin       Current Outpatient Medications   Medication Sig Dispense Refill    temozolomide (TEMODAR) 100 MG chemo capsule Take 3 capsules by mouth daily for 5 days every 28 days 15 capsule 11    dexamethasone (DECADRON) 1 MG tablet Take 1 tablet by mouth daily (with breakfast) 90 tablet 1    sertraline (ZOLOFT) 100 MG tablet TAKE 1.5 TABLETS BY MOUTH DAILY. ONE AND HALF TAB EVERY  tablet 1    esomeprazole Magnesium (NEXIUM) 20 MG PACK Take 20 mg by mouth daily      Multiple Vitamin (MULTIVITAMIN ADULT PO) Take by mouth      furosemide (LASIX) 20 MG tablet TAKE 1 TABLET BY MOUTH EVERY DAY 90 tablet 1    atorvastatin (LIPITOR) 40 MG tablet TAKE 1 TABLET BY MOUTH EVERY DAY 90 tablet 1    sulfamethoxazole-trimethoprim (BACTRIM DS;SEPTRA DS) 800-160 MG per tablet Take 1 tab on Monday, Wednesday, and Friday. 48 tablet 2    vitamin D (CHOLECALCIFEROL) 25 MCG (1000 UT) TABS tablet Take 1,000 Units by mouth daily      levETIRAcetam (KEPPRA) 500 MG tablet Take 500 mg by mouth 2 times daily      L-Lysine 500 MG TABS Take 500 mg by mouth daily      ondansetron (ZOFRAN) 8 MG tablet Take 8 mg by mouth every 8 hours as needed      polyethylene glycol (GLYCOLAX) 17 GM/SCOOP powder Take 17 g by mouth daily as needed      potassium chloride (KLOR-CON) 10 MEQ extended release tablet TAKE 1 TABLET BY MOUTH EVERY DAY      tamsulosin (FLOMAX) 0.4 MG capsule Take 0.4 mg by mouth daily      pantoprazole (PROTONIX) 40 MG tablet Take 1 tablet by mouth every morning (before breakfast) (Patient not taking: No sig reported) 90 tablet 3    Calcium-Magnesium-Vitamin D (CALCIUM 1200+D3 PO) Take by mouth daily (Patient not taking: No sig reported)       No current facility-administered medications for this visit.        Social History     Socioeconomic History    Marital status:      Spouse name: None    Number of children: None    Years of education: None    Highest education level: None   Tobacco Use    Smoking status: Former     Types: Cigarettes     Quit date: 1970     Years since quittin.0    Smokeless tobacco: Never   Substance and Sexual Activity    Alcohol use: Not Currently    Drug use: No       Family History   Problem Relation Age of Onset    Cancer Mother         lung ca    Hypertension Mother     Heart Disease Mother     Osteoarthritis Father     Cancer Brother         stomach       No Known Allergies    PHYSICAL EXAMINATION:  General Appearance: Healthy appearing patient in no acute distress  Vitals reviewed. /62   Pulse 71   Temp 98.6 °F (37 °C) (Oral)   Resp 16   Ht 5' 8\" (1.727 m)   Wt 198 lb (89.8 kg)   SpO2 97%   BMI 30.11 kg/m²   HEENT: Neck is supple with no thyromegaly or JVD noted. Lungs/Thorax: Clear to auscultation, no accessory muscles of respiration being used. Heart: Regular rate and rhythm, normal S1, S2, no appreciable murmurs, rubs, gallops  Abdomen: Soft, nontender, bowel sounds present, no appreciable hepatosplenomegaly, no palpable masses  Extremeties: + BLE peripheral edema. Skin: Normal skin tone with no rash, petechiae, ecchymosis noted. Musculoskeletal: No pain on palpation over bony prominence, no edema, no evidence of gout, no joint or bony deformity  Neurologic: Grossly intact    LABS/IMAGING:    Lab Results   Component Value Date/Time    WBC 5.6 12/30/2022 11:10 AM    HGB 10.7 12/30/2022 11:10 AM    HCT 31.4 12/30/2022 11:10 AM    PLT 90 12/30/2022 11:10 AM    MCV 97.2 12/30/2022 11:10 AM       Lab Results   Component Value Date/Time     12/30/2022 11:10 AM    K 3.0 12/30/2022 11:10 AM     12/30/2022 11:10 AM    CO2 29 12/30/2022 11:10 AM    BUN 18 12/30/2022 11:10 AM    GFRAA >60 09/29/2022 11:19 AM    GLOB 3.2 12/30/2022 11:10 AM    ALT 21 12/30/2022 11:10 AM         Above results reviewed with patient. 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, history of depression/anxiety disorder, gout, insomnia, hernia repair, ankle fracture repair, lower back surgery with limited mobility thereafter, more recently admitted 12/25/2020 after being found unresponsive in bathroom by wife.  Abnormal head CT leading to a brain MRI 12/25/2020 showing right temporal parietal mass 3.7 x 3.3 cm in size with adjacent cerebral edema, as well as associated hemorrhage and/or calcification. 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 CT CAP 12/27/2020 without evidence of malignancy or metastatic disease. Patient was seen by neurosurgery Dr. Isaak Loera and taken to the OR 12/31/2020. Underwent right parietal craniotomy with resection of right parietal tumor and placement of intraoperative BCNU wafers to surgical bed. 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 Recovering well, now referred to medical oncology as well as radiation oncology for further therapy. He is also undergoing speech therapy. On evaluation today using a walker to get around. Today reports some short term memory issues but otherwise doing well. Family support includes wife and one daughter who lives in [place] and was here for his surgery. Not currently on any seizure medicines and being tapered off of Decadron (will be done next Monday). 2/10/2021: Since last visit, significant course including recurrent seizure requiring ED visit, noncontrasted head CT with improved postoperative changes. He has since been placed on Keppra 500 twice daily. He was also treated for UTI though appears suboptimal specimen. We will simply repeat UA today, denies any symptoms. Performance status slowly improving, has completed physical therapy, currently undergoing speech therapy. PS 1 today. Recent transient thrombocytopenia, wife reports briefly being prescribed trazodone which they have stopped now as it caused excessive sleepiness. Plan for concurrent chemo-XRT with temozolomide with shorter course XRT over 3 weeks. Repeat simulation and brain MRI 2/20/2021: Results will be followed. Once starts temozolomide, will also benefit with Bactrim 3 times a week for PCP prophylaxis. Weekly CBC once on therapy. RTC once ready to start therapy, weekly provider visits there on.    3/31/2021: Completed Temodar-XRT 3/26/2021.   Tolerated reasonably. Ongoing fatigue, though mobility and speech appear improved. Remains on Keppra for past seizures per neurology. Now scheduled for follow-up brain MRI 4/23/2021. Will start adjuvant temozolomide at 150 mg per metered squared daily x5 days every 28 days at that point. We will also look into Optune therapy. RTC post brain MRI.    4/26/2021: Reports doing reasonably. Mobility has somewhat improved. Per wife takes multiple naps during the day. They are planning to  more regular outdoor walking. Labs today unremarkable. Recent brain MRI without disease progression ([place]), treatment related changes seen. Okay to proceed with adjuvant temozolomide. Repeat labs and provider visit in 2 weeks. Paperwork initiated for optune therapy. 5/24/2021:  appears to have tolerated adjuvant temozolomide cycle 1 well. Main complaint has remained ongoing fatigue for which he takes regular naps. Denies any new sensorimotor deficits, or seizure activity. Reasonable p.o. intake and mobility now. Also started Optune therapy a few weeks ago, tolerating reasonably, scalp without any rash. Okay to proceed with cycle 2 adjuvant temozolomide tomorrow, given labs without cytopenias, we will simply see him back with repeat labs in a month. Now scheduled for repeat brain MRI per radiation oncology in 8/21.    7/20/2021: Continues to tolerate therapy well. Mobility has significantly improved, walking briskly in the room. Regular with his Keppra, as well as Bactrim. Blood work unremarkable, regular with Optune which he is tolerating well without any signs of scalp dermatitis. Okay to proceed with cycle 4. Scheduled for brain MRI and subsequent radiation oncology follow-up next month, will chart check imaging. Otherwise we will see him back in a month. 9/14/2021: Patient here for cycle 6 ( last cycle of adjuvant temozolomide). Performance status remains improved. Some fatigue though.   Nausea better on days with Temodar since taking Zofran an hour before each dose. Also regular with his Keppra as well as Bactrim. Last brain MRI 8/21 with responding disease, now scheduled for repeat brain MRI in 11/17/2021. Labs today with thrombocytopenia with platelet count 78,267. Will defer next cycle by week, till counts recover. Discussed role of Optune therapy continuation beyond 6 cycles, they will discuss but are leaning towards continuing. 11/22/2021: More recently seeing primary care for sciatica as well as neck stiffness. Reasonable p.o. intake and mobility. Some scalp dermatitis, they have decided to forego further Optune therapy. This is reasonable. Blood work unremarkable, platelet count having normalized. Brain MRI with SD. Simple monitoring moving forward, will repeat brain imaging in 3 months time. 2/25/2022: Reports doing reasonably, some difficulty with time management in the day but otherwise mobile, has been in right now able to do things together, looking forward to Provade in a few months. Has seen neurology, no further seizures, remains on Keppra 500 twice daily. Recent brain MRI with no clear evidence of disease progression at surgical site, T2 prolongation in right precentral gyrus and splenium of corpus callosum slowly worsened, possibly posttreatment change versus infiltrative tumor. Will get radiation oncology opinion also. We will see him back in 3 months with repeat imaging    Addendum: Case discussed with Dr. Sia Ceron, felt to have therapy related changes rather than true progression. 6/6/2022: Repeat brain MRI with/without contrast 5/25/2022 with changes concerning for disease progression including new 1.6 x 1.1 cm enhancing focus anterior and superior to the right sided operative cavity. Patient has seen radiation oncology Dr. Sia Ceron as well as neurosurgery Dr. Kennedi Meier.   The patient and family report being told not a candidate for repeat surgery, seeing radiation oncology later today. Will discuss case with Dr. Keri Corona, likely repeat XRT followed by systemic therapy (likely temozolomide single agent given MGMT methylation status, and previous good tolerance). Today reports doing reasonably. Some fatigue but manageable. Blood work today with adequate counts, chemistries unremarkable. We will see him back in a few weeks. Addendum: Case discussed with Dr. Keri Corona. Patient not a surgical candidate. Plan for SBRT to site of disease progression, will plan for temozolomide 150 mg per metered squared daily x 5 every 28 days thereafter. 7/21/22: Patient since last visit has completed 3 fractions SBRT to left temporal on 6/20/22. Restarted Temodar 150 mg/m2 x5 days, and is here for cycle 2. Restart Bactrim MWF prophylaxis as well. Today reports left-sided weakness is improved. Abruptly stopped his dexamethasone 8 mg twice daily as ran out 2 days ago, blood pressure on lower side today. Denies any fevers, chills or otherwise feeling unwell. Hypotension probably related to stopping steroids, will restart dexamethasone 4 mg twice daily, will also give additional 1 L IV NS today. Defer cycle 2 given platelet count 53,555, will recheck counts early next week also. CBC Monday. RTC in 1 week with labs (start cycle 2 if platelet count 119,470 or above). CBC on day 1 and day 21 of each cycle. 9/15/2022: Patient seen for his recurrent GBM, ongoing treatments and toxicity management. His last temozolomide cycle was approximately 7 weeks ago. The last few weeks he has missed appointments related to weakness. He has seen radiation oncology Dr. Keri Corona in interim, with brain MRI 8/18/2022, overall felt to have responding disease though there was increased swelling for which he completed a tapering course of dexamethasone. Next brain MRI scheduled for 11/18/2022. Wife today accompanies patient and feels patient had clinical decline since completely coming off dexamethasone.   He has been complaining of abdominal discomfort as well. Today appears to be, minimally involved in conversation, gait has also worsened. Blood work today with surprise finding of hemoglobin down to 7 range from normal range. Will defer next temozolomide cycle for now. We will place patient back on dexamethasone 2 mg daily for now with subsequent taper over time. We will also recheck H&H, start PPI twice daily. Initiate work-up for new significant anemia including smear, iron panel, B12, folate, MMA, HC, hemolysis labs, ESR, as well as CT AP without contrast to rule out retroperitoneal bleeding. Refer to GI. We will see him back in 1 week's time. 9/19/2022: Patient seen for his GBM, ongoing treatments and toxicity management, as well as recent anemia. Appears to have significantly improved clinically since going back on dexamethasone, more communicative today. Per wife has been taking dexamethasone 4 mg twice daily, advised to drop down to 2 mg twice daily. Labs from previous visit reviewed, no clear evidence of hemolysis, adequate iron, A01 and folic acid levels. CT AP without retroperitoneal bleeding. Has been referred to GI. Denies any overt bleeding. Reasonable to proceed with next cycle of temozolomide. We will see him back next week for tox check as well as repeat labs. 9/29/2022: Patient seen for his GBM, ongoing treatments and toxicity management. Completed 5 days of temozolomide over the weekend, some additional fatigue but has now improved. He has seen GI, he was offered endoscopic work-up however his preference is to hold off on this for now, this is reasonable. Blood work today with hemoglobin continuing to improve at 9.4, adequate ANC and platelet count. Currently on Dex 2 mg daily . Next brain MRI scheduled in 11/18/2022. Continue following with Rad-Onc as needed. We will see him back prior to next cycle.     10/17/2022: Patient seen for his GBM, ongoing treatments and toxicity management. Reports earlier in the month having had a fall on his left side over walker, was seen in urgent care and diagnosed with rib fracture, conservatively managed, pain has not mostly resolved, breathing is at baseline. Denies any cough or phlegm. Has remained on dexamethasone as well as Keppra. Blood work today with chemistries unremarkable, however platelet count low at 48,000, will defer next cycle by week and proceed once platelet count over 376,952. CBC day 21. We will see him back in 5 weeks. He is also due for repeat brain MRI in 11/22.    11/21/2022: Patient seen for GBM, ongoing treatments and management. Reports doing reasonably, good mobility and motor function, some fatigue. Remains on dexamethasone 2 mg daily, discussed tapering down further, will attempt 1 mg daily and if clinically declines, understands to go back up to 2 mg daily. Recent brain MRI with stable disease. Labs with thrombocytopenia with platelet count 76,702, other counts adequate: We will defer next cycle by week (weekly CBC and restart next cycle once platelet count above 100,000). Repeat brain MRI in 3 months. Potassium low, will replace with po KCL. 12/30/2022: He returns today with his wife for follow up prior to next cycle Temodar. Overall, he continues to tolerate treatment well. There were no GI or bowel complaints after last cycle or currently. He is eating and drinking well. Fatigue is ongoing. He is now on dex 1 mg daily and doing well on that dose. There are no headaches, vision changes, or seizures. He did have 1 fall in the bathroom last week when he grabbed his walker it began moving away from him and he was unable to stop it or himself, denies any injury. There is no cough or shortness of breath. BLE edema is stable, remains on lasix daily. Denies any pain, bleeding, fevers, or infectious symptoms. Labs reviewed, Hgb stable at 10.7, Plt 90K.   His platelets historically rebound quickly, therefore should be adequate by Monday (1/2/23) to start his next cycle Temodar. Continue oral K+. 1. RT temporal-parietal GBM s/p MSR & BCNU wafer placement 12/31/20  2. Fatigue    PLAN:  - As above. - S/p concurrent Temozolomide 75 mg/m2 daily w/ XRT (completed 3/26/21). On PCP prophylaxis w Bactrim DS MWF. Brain MRI w IN. Started (4/21) adjuvant Temozolomide 150 mg/m2 daily x 5 days every 28 days x 6 (completed 10/21). PD 6/22: s/p SBRT, now on single agent temozolomide 150 mg per metered squared daily x 5 every 28 days. Platelet count needs to be over 100,000 prior to each cycle. - Optune alternating electrical field therapy post chemoradiation (5/21-10/21). - Continue Keppra 500 bid. - MGMT methylation detected, IDH wild type. - Anemia: No clear evidence of hemolysis, CTAP without retroperitoneal bleeding. Iron panel, K49 and folic acid levels unremarkable. Referred to GI. Offered endoscopic work-up but patient preference to hold off for now. RTC in 5 week with labs. CBC on day 1 of each cycle (their preference to avoid D21 labs). Serial brain MRIs every 3 months.         Lynnette Jaime) [address] [place] [address] Hematology and Oncology  [address] [place] [address]  Office : [phone]  Fax : [phone]

## 2023-01-01 ENCOUNTER — HOSPITAL ENCOUNTER (OUTPATIENT)
Dept: INFUSION THERAPY | Age: 83
End: 2023-01-01

## 2023-01-01 DIAGNOSIS — C71.9 GLIOBLASTOMA (HCC): Primary | ICD-10-CM

## 2023-01-01 DIAGNOSIS — Z79.899 HIGH RISK MEDICATION USE: ICD-10-CM

## 2023-01-01 RX ORDER — SODIUM CHLORIDE 0.9 % (FLUSH) 0.9 %
5-40 SYRINGE (ML) INJECTION PRN
Status: CANCELLED | OUTPATIENT
Start: 2023-01-01

## 2023-01-01 RX ORDER — SODIUM CHLORIDE 0.9 % (FLUSH) 0.9 %
5-40 SYRINGE (ML) INJECTION 2 TIMES DAILY
Status: CANCELLED | OUTPATIENT
Start: 2023-01-01

## 2023-01-20 DIAGNOSIS — C71.9 GLIOBLASTOMA (HCC): Primary | ICD-10-CM

## 2023-01-22 DIAGNOSIS — R60.0 LOCALIZED EDEMA: ICD-10-CM

## 2023-01-23 RX ORDER — FUROSEMIDE 20 MG/1
TABLET ORAL
Qty: 90 TABLET | Refills: 1 | Status: SHIPPED | OUTPATIENT
Start: 2023-01-23

## 2023-01-27 DIAGNOSIS — E78.2 MIXED HYPERLIPIDEMIA: ICD-10-CM

## 2023-01-27 RX ORDER — ATORVASTATIN CALCIUM 40 MG/1
TABLET, FILM COATED ORAL
Qty: 90 TABLET | Refills: 1 | Status: SHIPPED | OUTPATIENT
Start: 2023-01-27

## 2023-02-06 ENCOUNTER — OFFICE VISIT (OUTPATIENT)
Dept: ONCOLOGY | Age: 83
End: 2023-02-06
Payer: MEDICARE

## 2023-02-06 ENCOUNTER — HOSPITAL ENCOUNTER (OUTPATIENT)
Dept: LAB | Age: 83
Discharge: HOME | End: 2023-02-09
Payer: MEDICARE

## 2023-02-06 VITALS
TEMPERATURE: 98.6 F | DIASTOLIC BLOOD PRESSURE: 64 MMHG | SYSTOLIC BLOOD PRESSURE: 147 MMHG | WEIGHT: 198.3 LBS | OXYGEN SATURATION: 98 % | BODY MASS INDEX: 30.05 KG/M2 | RESPIRATION RATE: 16 BRPM | HEART RATE: 73 BPM | HEIGHT: 68 IN

## 2023-02-06 DIAGNOSIS — C71.9 GLIOBLASTOMA (HCC): Primary | ICD-10-CM

## 2023-02-06 DIAGNOSIS — S22.31XA CLOSED FRACTURE OF ONE RIB OF RIGHT SIDE, INITIAL ENCOUNTER: ICD-10-CM

## 2023-02-06 DIAGNOSIS — C71.9 GLIOBLASTOMA (HCC): ICD-10-CM

## 2023-02-06 DIAGNOSIS — R07.81 RIB PAIN: ICD-10-CM

## 2023-02-06 LAB
ALBUMIN SERPL-MCNC: 3.2 G/DL (ref 3.2–4.6)
ALBUMIN/GLOB SERPL: 0.9 (ref 0.4–1.6)
ALP SERPL-CCNC: 87 U/L (ref 50–136)
ALT SERPL-CCNC: 18 U/L (ref 12–65)
ANION GAP SERPL CALC-SCNC: 5 MMOL/L (ref 2–11)
AST SERPL-CCNC: 12 U/L (ref 15–37)
BASOPHILS # BLD: 0 K/UL (ref 0–0.2)
BASOPHILS NFR BLD: 0 % (ref 0–2)
BILIRUB SERPL-MCNC: 0.5 MG/DL (ref 0.2–1.1)
BUN SERPL-MCNC: 17 MG/DL (ref 8–23)
CALCIUM SERPL-MCNC: 8.9 MG/DL (ref 8.3–10.4)
CHLORIDE SERPL-SCNC: 109 MMOL/L (ref 101–110)
CO2 SERPL-SCNC: 28 MMOL/L (ref 21–32)
CREAT SERPL-MCNC: 1.2 MG/DL (ref 0.8–1.5)
DIFFERENTIAL METHOD BLD: ABNORMAL
EOSINOPHIL # BLD: 0.1 K/UL (ref 0–0.8)
EOSINOPHIL NFR BLD: 1 % (ref 0.5–7.8)
ERYTHROCYTE [DISTWIDTH] IN BLOOD BY AUTOMATED COUNT: 13.7 % (ref 11.9–14.6)
GLOBULIN SER CALC-MCNC: 3.5 G/DL (ref 2.8–4.5)
GLUCOSE SERPL-MCNC: 159 MG/DL (ref 65–100)
HCT VFR BLD AUTO: 33.7 % (ref 41.1–50.3)
HGB BLD-MCNC: 11.5 G/DL (ref 13.6–17.2)
IMM GRANULOCYTES # BLD AUTO: 0 K/UL (ref 0–0.5)
IMM GRANULOCYTES NFR BLD AUTO: 1 % (ref 0–5)
LDH SERPL L TO P-CCNC: 167 U/L (ref 110–210)
LYMPHOCYTES # BLD: 0.6 K/UL (ref 0.5–4.6)
LYMPHOCYTES NFR BLD: 8 % (ref 13–44)
MCH RBC QN AUTO: 32.7 PG (ref 26.1–32.9)
MCHC RBC AUTO-ENTMCNC: 34.1 G/DL (ref 31.4–35)
MCV RBC AUTO: 95.7 FL (ref 82–102)
MONOCYTES # BLD: 0.5 K/UL (ref 0.1–1.3)
MONOCYTES NFR BLD: 7 % (ref 4–12)
NEUTS SEG # BLD: 6.6 K/UL (ref 1.7–8.2)
NEUTS SEG NFR BLD: 83 % (ref 43–78)
NRBC # BLD: 0 K/UL (ref 0–0.2)
PLATELET # BLD AUTO: 129 K/UL (ref 150–450)
PMV BLD AUTO: 10 FL (ref 9.4–12.3)
POTASSIUM SERPL-SCNC: 3 MMOL/L (ref 3.5–5.1)
PROT SERPL-MCNC: 6.7 G/DL (ref 6.3–8.2)
RBC # BLD AUTO: 3.52 M/UL (ref 4.23–5.6)
SODIUM SERPL-SCNC: 142 MMOL/L (ref 133–143)
WBC # BLD AUTO: 7.9 K/UL (ref 4.3–11.1)

## 2023-02-06 PROCEDURE — 99214 OFFICE O/P EST MOD 30 MIN: CPT | Performed by: INTERNAL MEDICINE

## 2023-02-06 PROCEDURE — 85025 COMPLETE CBC W/AUTO DIFF WBC: CPT

## 2023-02-06 PROCEDURE — 80053 COMPREHEN METABOLIC PANEL: CPT

## 2023-02-06 PROCEDURE — 3077F SYST BP >= 140 MM HG: CPT | Performed by: INTERNAL MEDICINE

## 2023-02-06 PROCEDURE — 3078F DIAST BP <80 MM HG: CPT | Performed by: INTERNAL MEDICINE

## 2023-02-06 PROCEDURE — G8427 DOCREV CUR MEDS BY ELIG CLIN: HCPCS | Performed by: INTERNAL MEDICINE

## 2023-02-06 PROCEDURE — 83615 LACTATE (LD) (LDH) ENZYME: CPT

## 2023-02-06 PROCEDURE — G8484 FLU IMMUNIZE NO ADMIN: HCPCS | Performed by: INTERNAL MEDICINE

## 2023-02-06 PROCEDURE — 1123F ACP DISCUSS/DSCN MKR DOCD: CPT | Performed by: INTERNAL MEDICINE

## 2023-02-06 PROCEDURE — 36415 COLL VENOUS BLD VENIPUNCTURE: CPT

## 2023-02-06 PROCEDURE — G8417 CALC BMI ABV UP PARAM F/U: HCPCS | Performed by: INTERNAL MEDICINE

## 2023-02-06 PROCEDURE — 1036F TOBACCO NON-USER: CPT | Performed by: INTERNAL MEDICINE

## 2023-02-06 RX ORDER — HYDROCODONE BITARTRATE AND ACETAMINOPHEN 5; 325 MG/1; MG/1
1 TABLET ORAL EVERY 6 HOURS PRN
Qty: 30 TABLET | Refills: 0 | Status: SHIPPED | OUTPATIENT
Start: 2023-02-06 | End: 2023-02-14

## 2023-02-06 ASSESSMENT — PATIENT HEALTH QUESTIONNAIRE - PHQ9
SUM OF ALL RESPONSES TO PHQ QUESTIONS 1-9: 0
SUM OF ALL RESPONSES TO PHQ QUESTIONS 1-9: 0
SUM OF ALL RESPONSES TO PHQ9 QUESTIONS 1 & 2: 0
2. FEELING DOWN, DEPRESSED OR HOPELESS: 0
1. LITTLE INTEREST OR PLEASURE IN DOING THINGS: 0
SUM OF ALL RESPONSES TO PHQ QUESTIONS 1-9: 0
SUM OF ALL RESPONSES TO PHQ QUESTIONS 1-9: 0

## 2023-02-06 NOTE — PATIENT INSTRUCTIONS
Patient Instructions from Today's Visit    Reason for Visit:  Follow up    Plan: You can go ahead and proceed with next cycle of temodar daily for 5 days. Follow Up:   Follow up in 4 weeks    Recent Lab Results:  Hospital Outpatient Visit on 02/06/2023   Component Date Value Ref Range Status    WBC 02/06/2023 7.9  4.3 - 11.1 K/uL Final    RBC 02/06/2023 3.52 (A)  4.23 - 5.6 M/uL Final    Hemoglobin 02/06/2023 11.5 (A)  13.6 - 17.2 g/dL Final    Hematocrit 02/06/2023 33.7 (A)  41.1 - 50.3 % Final    MCV 02/06/2023 95.7  82.0 - 102.0 FL Final    MCH 02/06/2023 32.7  26.1 - 32.9 PG Final    MCHC 02/06/2023 34.1  31.4 - 35.0 g/dL Final    RDW 02/06/2023 13.7  11.9 - 14.6 % Final    Platelets 28/87/3119 129 (A)  150 - 450 K/uL Final    MPV 02/06/2023 10.0  9.4 - 12.3 FL Final    nRBC 02/06/2023 0.00  0.0 - 0.2 K/uL Final    **Note: Absolute NRBC parameter is now reported with Hemogram**    Seg Neutrophils 02/06/2023 83 (A)  43 - 78 % Final    Lymphocytes 02/06/2023 8 (A)  13 - 44 % Final    Monocytes 02/06/2023 7  4.0 - 12.0 % Final    Eosinophils % 02/06/2023 1  0.5 - 7.8 % Final    Basophils 02/06/2023 0  0.0 - 2.0 % Final    Immature Granulocytes 02/06/2023 1  0.0 - 5.0 % Final    Segs Absolute 02/06/2023 6.6  1.7 - 8.2 K/UL Final    Absolute Lymph # 02/06/2023 0.6  0.5 - 4.6 K/UL Final    Absolute Mono # 02/06/2023 0.5  0.1 - 1.3 K/UL Final    Absolute Eos # 02/06/2023 0.1  0.0 - 0.8 K/UL Final    Basophils Absolute 02/06/2023 0.0  0.0 - 0.2 K/UL Final    Absolute Immature Granulocyte 02/06/2023 0.0  0.0 - 0.5 K/UL Final    Differential Type 02/06/2023 AUTOMATED    Final        Treatment Summary has been discussed and given to patient: n/a        -------------------------------------------------------------------------------------------------------------------  Please call our office at [phone] if you have any  of the following symptoms:   Fever of 100.5 or greater  Chills  Shortness of breath  Swelling or pain in one leg    After office hours an answering service is available and will contact a provider for emergencies or if you are experiencing any of the above symptoms. Patient did express an interest in My Chart. My Chart log in information explained on the after visit summary printout at the [facility] desk.     Vida Smith RN

## 2023-02-06 NOTE — PROGRESS NOTES
Data Source: Patient, EPIC record. 2/6/2023    12:03 PM    Magdaleno Horner [id number]    [de-identified] y.o. 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, history of depression/anxiety disorder, gout, insomnia, hernia repair, ankle fracture repair, lower back surgery with limited mobility thereafter, more recently admitted 12/25/2020 after being found unresponsive in bathroom by wife. Abnormal head CT leading to a brain MRI 12/25/2020 showing right temporal parietal mass 3.7 x 3.3 cm in size with adjacent cerebral edema, as well as associated hemorrhage and/or calcification. 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 CT CAP 12/27/2020 without evidence of malignancy or metastatic disease. Patient was seen by neurosurgery Dr. Beatrix Goldmann and taken to the OR 12/31/2020. Underwent right parietal craniotomy with resection of right parietal tumor and placement of intraoperative BCNU wafers to surgical bed. 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 Recovering well, now referred to medical oncology as well as radiation oncology for further therapy. He is also undergoing speech therapy. On evaluation today using a walker to get around. Today reports some short term memory issues but otherwise doing well. Family support includes wife and one daughter who lives in [place] and was here for his surgery. Not currently on any seizure medicines and being tapered off of Decadron (will be done next Monday). Hospice Referral:  na  Interval History:  2/6/2023: He returns today for follow-up. Overall he has been quite stable over the past 2 months. 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   He has occasional pain from a fractured rib for which she has asked for a refill of his hydrocodone. His use of this medication has been quite limited but nevertheless he occasionally has twinges of rather severe discomfort from the fracture. He has no cough or shortness of breath there has been no sign or symptom of infection. He remains on prophylactic Bactrim while taking his Temodar. There are no other new complaints of significance. He has no difficulty with the Temodar. He takes the medications routinely and has not missed a dose; he is fully compliant. There has been no difficulty in obtaining the medications from the specialty pharmacy. Counts today are adequate for treatment. REVIEW OF SYSTEMS:  As mentioned above, all other systems were reviewed in full and are negative. Past Medical History:   Diagnosis Date    Anxiety disorder 12/9/2014    Arthritis 12/9/2014    Depression 12/9/2014    Glioblastoma ([facility] [address] 12/26/2020    Gout 12/9/2014    History of intracranial hemorrhage 12/25/2020    History of lumbar laminectomy for spinal cord decompression 2/18/2019    History of seizure 12/28/2020    HTN (hypertension), benign 2/15/2017    Hyperlipemia 12/9/2014    Hypertension     Idiopathic chronic gout of right ankle 12/9/2014    Insomnia 12/9/2014    Kidney stone     Mixed hyperlipidemia 12/9/2014    Panic disorder 12/9/2014    Primary insomnia 12/9/2014    Rosacea 2/15/2017       Past Surgical History:   Procedure Laterality Date    ANKLE FRACTURE SURGERY Left 6/1974    COLONOSCOPY  2007    CYST REMOVAL      CYST REMOVAL      pylonidal cyst    CYST REMOVAL      HERNIA REPAIR Bilateral     OTHER SURGICAL HISTORY      wrist    WRIST FRACTURE SURGERY Bilateral 6/1993    wrist plate/pin       Current Outpatient Medications   Medication Sig Dispense Refill    HYDROcodone-acetaminophen (LORCET) 5-325 MG per tablet Take 1 tablet by mouth every 6 hours as needed for Pain for up to 8 days.  Take lowest dose possible to manage pain 30 tablet 0    atorvastatin (LIPITOR) 40 MG tablet TAKE 1 TABLET BY MOUTH EVERY DAY 90 tablet 1    furosemide (LASIX) 20 MG tablet TAKE 1 TABLET BY MOUTH EVERY DAY 90 tablet 1    temozolomide (TEMODAR) 100 MG chemo capsule Take 3 capsules by mouth daily for 5 days every 28 days 15 capsule 11    dexamethasone (DECADRON) 1 MG tablet Take 1 tablet by mouth daily (with breakfast) 90 tablet 1    sertraline (ZOLOFT) 100 MG tablet TAKE 1.5 TABLETS BY MOUTH DAILY. ONE AND HALF TAB EVERY  tablet 1    esomeprazole Magnesium (NEXIUM) 20 MG PACK Take 20 mg by mouth daily      Multiple Vitamin (MULTIVITAMIN ADULT PO) Take by mouth      sulfamethoxazole-trimethoprim (BACTRIM DS;SEPTRA DS) 800-160 MG per tablet Take 1 tab on Monday, Wednesday, and Friday. 48 tablet 2    vitamin D (CHOLECALCIFEROL) 25 MCG (1000 UT) TABS tablet Take 1,000 Units by mouth daily      levETIRAcetam (KEPPRA) 500 MG tablet Take 500 mg by mouth 2 times daily      L-Lysine 500 MG TABS Take 500 mg by mouth daily      ondansetron (ZOFRAN) 8 MG tablet Take 8 mg by mouth every 8 hours as needed      polyethylene glycol (GLYCOLAX) 17 GM/SCOOP powder Take 17 g by mouth daily as needed      potassium chloride (KLOR-CON) 10 MEQ extended release tablet TAKE 1 TABLET BY MOUTH EVERY DAY      tamsulosin (FLOMAX) 0.4 MG capsule Take 0.4 mg by mouth daily      pantoprazole (PROTONIX) 40 MG tablet Take 1 tablet by mouth every morning (before breakfast) (Patient not taking: No sig reported) 90 tablet 3    Calcium-Magnesium-Vitamin D (CALCIUM 1200+D3 PO) Take by mouth daily (Patient not taking: No sig reported)       No current facility-administered medications for this visit.        Social History     Socioeconomic History    Marital status:      Spouse name: None    Number of children: None    Years of education: None    Highest education level: None   Tobacco Use    Smoking status: Former     Types: Cigarettes     Quit date: 1/1/1970 Years since quittin.1    Smokeless tobacco: Never   Substance and Sexual Activity    Alcohol use: Not Currently    Drug use: No       Family History   Problem Relation Age of Onset    Cancer Mother         lung ca    Hypertension Mother     Heart Disease Mother     Osteoarthritis Father     Cancer Brother         stomach       No Known Allergies    PHYSICAL EXAMINATION:  General Appearance: Ilene L appearing patient in no acute distress  Vitals reviewed. BP (!) 147/64   Pulse 73   Temp 98.6 °F (37 °C)   Resp 16   Ht 5' 8\" (1.727 m)   Wt 198 lb 4.8 oz (89.9 kg)   SpO2 98%   BMI 30.15 kg/m²   HEENT: Neck is supple with no thyromegaly or JVD noted. Lungs/Thorax: Clear to auscultation, no accessory muscles of respiration being used. Heart: Regular rate and rhythm, normal S1, S2, no appreciable murmurs, rubs, gallops  Abdomen: Soft, nontender, bowel sounds present, no appreciable hepatosplenomegaly, no palpable masses  Extremeties: + BLE peripheral edema. Skin: Normal skin tone with no rash, petechiae, ecchymosis noted. Musculoskeletal: No pain on palpation over bony prominence, no edema, no evidence of gout, no joint or bony deformity  Neurologic: Grossly intact    LABS/IMAGING:    Lab Results   Component Value Date/Time    WBC 7.9 2023 11:20 AM    HGB 11.5 2023 11:20 AM    HCT 33.7 2023 11:20 AM     2023 11:20 AM    MCV 95.7 2023 11:20 AM       Lab Results   Component Value Date/Time     2023 11:20 AM    K 3.0 2023 11:20 AM     2023 11:20 AM    CO2 28 2023 11:20 AM    BUN 17 2023 11:20 AM    GFRAA >60 2022 11:19 AM    GLOB 3.5 2023 11:20 AM    ALT 18 2023 11:20 AM         Above results reviewed with patient.        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, history of depression/anxiety disorder, gout, insomnia, hernia repair, ankle fracture repair, lower back surgery with limited mobility thereafter, more recently admitted 12/25/2020 after being found unresponsive in bathroom by wife. Abnormal head CT leading to a brain MRI 12/25/2020 showing right temporal parietal mass 3.7 x 3.3 cm in size with adjacent cerebral edema, as well as associated hemorrhage and/or calcification. 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 CT CAP 12/27/2020 without evidence of malignancy or metastatic disease. Patient was seen by neurosurgery Dr. Bobo Webb and taken to the OR 12/31/2020. Underwent right parietal craniotomy with resection of right parietal tumor and placement of intraoperative BCNU wafers to surgical bed. 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 Recovering well, now referred to medical oncology as well as radiation oncology for further therapy. He is also undergoing speech therapy. On evaluation today using a walker to get around. Today reports some short term memory issues but otherwise doing well. Family support includes wife and one daughter who lives in [place] and was here for his surgery. Not currently on any seizure medicines and being tapered off of Decadron (will be done next Monday). 2/10/2021: Since last visit, significant course including recurrent seizure requiring ED visit, noncontrasted head CT with improved postoperative changes. He has since been placed on Keppra 500 twice daily. He was also treated for UTI though appears suboptimal specimen. We will simply repeat UA today, denies any symptoms. Performance status slowly improving, has completed physical therapy, currently undergoing speech therapy. PS 1 today. Recent transient thrombocytopenia, wife reports briefly being prescribed trazodone which they have stopped now as it caused excessive sleepiness. Plan for concurrent chemo-XRT with temozolomide with shorter course XRT over 3 weeks. Repeat simulation and brain MRI 2/20/2021: Results will be followed. Once starts temozolomide, will also benefit with Bactrim 3 times a week for PCP prophylaxis. Weekly CBC once on therapy. RTC once ready to start therapy, weekly provider visits there on.    3/31/2021: Completed Temodar-XRT 3/26/2021. Tolerated reasonably. Ongoing fatigue, though mobility and speech appear improved. Remains on Keppra for past seizures per neurology. Now scheduled for follow-up brain MRI 4/23/2021. Will start adjuvant temozolomide at 150 mg per metered squared daily x5 days every 28 days at that point. We will also look into Optune therapy. RTC post brain MRI.    4/26/2021: Reports doing reasonably. Mobility has somewhat improved. Per wife takes multiple naps during the day. They are planning to  more regular outdoor walking. Labs today unremarkable. Recent brain MRI without disease progression (MD), treatment related changes seen. Okay to proceed with adjuvant temozolomide. Repeat labs and provider visit in 2 weeks. Paperwork initiated for optune therapy. 5/24/2021:  appears to have tolerated adjuvant temozolomide cycle 1 well. Main complaint has remained ongoing fatigue for which he takes regular naps. Denies any new sensorimotor deficits, or seizure activity. Reasonable p.o. intake and mobility now. Also started Optune therapy a few weeks ago, tolerating reasonably, scalp without any rash. Okay to proceed with cycle 2 adjuvant temozolomide tomorrow, given labs without cytopenias, we will simply see him back with repeat labs in a month. Now scheduled for repeat brain MRI per radiation oncology in 8/21.    7/20/2021: Continues to tolerate therapy well. Mobility has significantly improved, walking briskly in the room. Regular with his Keppra, as well as Bactrim. Blood work unremarkable, regular with Optune which he is tolerating well without any signs of scalp dermatitis. Okay to proceed with cycle 4.   Scheduled for brain MRI and subsequent radiation oncology follow-up next month, will chart check imaging. Otherwise we will see him back in a month. 9/14/2021: Patient here for cycle 6 ( last cycle of adjuvant temozolomide). Performance status remains improved. Some fatigue though. Nausea better on days with Temodar since taking Zofran an hour before each dose. Also regular with his Keppra as well as Bactrim. Last brain MRI 8/21 with responding disease, now scheduled for repeat brain MRI in 11/17/2021. Labs today with thrombocytopenia with platelet count 71,775. Will defer next cycle by week, till counts recover. Discussed role of Optune therapy continuation beyond 6 cycles, they will discuss but are leaning towards continuing. 11/22/2021: More recently seeing primary care for sciatica as well as neck stiffness. Reasonable p.o. intake and mobility. Some scalp dermatitis, they have decided to forego further Optune therapy. This is reasonable. Blood work unremarkable, platelet count having normalized. Brain MRI with SD. Simple monitoring moving forward, will repeat brain imaging in 3 months time. 2/25/2022: Reports doing reasonably, some difficulty with time management in the day but otherwise mobile, has been in right now able to do things together, looking forward to [place] in a few months. Has seen neurology, no further seizures, remains on Keppra 500 twice daily. Recent brain MRI with no clear evidence of disease progression at surgical site, T2 prolongation in right precentral gyrus and splenium of corpus callosum slowly worsened, possibly posttreatment change versus infiltrative tumor. Will get radiation oncology opinion also. We will see him back in 3 months with repeat imaging    Addendum: Case discussed with Dr. Michael Palm, felt to have therapy related changes rather than true progression.     6/6/2022: Repeat brain MRI with/without contrast 5/25/2022 with changes concerning for disease progression including new 1.6 x 1.1 cm enhancing focus anterior and superior to the right sided operative cavity. Patient has seen radiation oncology Dr. Daphney Rincon as well as neurosurgery Dr. Minna Verdugo. The patient and family report being told not a candidate for repeat surgery, seeing radiation oncology later today. Will discuss case with Dr. Daphney Rincon, likely repeat XRT followed by systemic therapy (likely temozolomide single agent given MGMT methylation status, and previous good tolerance). Today reports doing reasonably. Some fatigue but manageable. Blood work today with adequate counts, chemistries unremarkable. We will see him back in a few weeks. Addendum: Case discussed with Dr. Daphney Rincon. Patient not a surgical candidate. Plan for SBRT to site of disease progression, will plan for temozolomide 150 mg per metered squared daily x 5 every 28 days thereafter. 7/21/22: Patient since last visit has completed 3 fractions SBRT to left temporal on 6/20/22. Restarted Temodar 150 mg/m2 x5 days, and is here for cycle 2. Restart Bactrim MWF prophylaxis as well. Today reports left-sided weakness is improved. Abruptly stopped his dexamethasone 8 mg twice daily as ran out 2 days ago, blood pressure on lower side today. Denies any fevers, chills or otherwise feeling unwell. Hypotension probably related to stopping steroids, will restart dexamethasone 4 mg twice daily, will also give additional 1 L IV NS today. Defer cycle 2 given platelet count 57,075, will recheck counts early next week also. CBC Monday. RTC in 1 week with labs (start cycle 2 if platelet count 003,209 or above). CBC on day 1 and day 21 of each cycle. 9/15/2022: Patient seen for his recurrent GBM, ongoing treatments and toxicity management. His last temozolomide cycle was approximately 7 weeks ago. The last few weeks he has missed appointments related to weakness.   He has seen radiation oncology Dr. Daphney Rincon in interim, with brain MRI 8/18/2022, overall felt to have responding disease though there was increased swelling for which he completed a tapering course of dexamethasone. Next brain MRI scheduled for 11/18/2022. Wife today accompanies patient and feels patient had clinical decline since completely coming off dexamethasone. He has been complaining of abdominal discomfort as well. Today appears to be, minimally involved in conversation, gait has also worsened. Blood work today with surprise finding of hemoglobin down to 7 range from normal range. Will defer next temozolomide cycle for now. We will place patient back on dexamethasone 2 mg daily for now with subsequent taper over time. We will also recheck H&H, start PPI twice daily. Initiate work-up for new significant anemia including smear, iron panel, B12, folate, MMA, HC, hemolysis labs, ESR, as well as CT AP without contrast to rule out retroperitoneal bleeding. Refer to GI. We will see him back in 1 week's time. 9/19/2022: Patient seen for his GBM, ongoing treatments and toxicity management, as well as recent anemia. Appears to have significantly improved clinically since going back on dexamethasone, more communicative today. Per wife has been taking dexamethasone 4 mg twice daily, advised to drop down to 2 mg twice daily. Labs from previous visit reviewed, no clear evidence of hemolysis, adequate iron, R29 and folic acid levels. CT AP without retroperitoneal bleeding. Has been referred to GI. Denies any overt bleeding. Reasonable to proceed with next cycle of temozolomide. We will see him back next week for tox check as well as repeat labs. 9/29/2022: Patient seen for his GBM, ongoing treatments and toxicity management. Completed 5 days of temozolomide over the weekend, some additional fatigue but has now improved. He has seen GI, he was offered endoscopic work-up however his preference is to hold off on this for now, this is reasonable.   Blood work today with hemoglobin continuing to improve at 9.4, adequate ANC and platelet count. Currently on Dex 2 mg daily . Next brain MRI scheduled in 11/18/2022. Continue following with Rad-Onc as needed. We will see him back prior to next cycle. 10/17/2022: Patient seen for his GBM, ongoing treatments and toxicity management. Reports earlier in the month having had a fall on his left side over walker, was seen in urgent care and diagnosed with rib fracture, conservatively managed, pain has not mostly resolved, breathing is at baseline. Denies any cough or phlegm. Has remained on dexamethasone as well as Keppra. Blood work today with chemistries unremarkable, however platelet count low at 48,000, will defer next cycle by week and proceed once platelet count over 909,957. CBC day 21. We will see him back in 5 weeks. He is also due for repeat brain MRI in 11/22.    11/21/2022: Patient seen for GBM, ongoing treatments and management. Reports doing reasonably, good mobility and motor function, some fatigue. Remains on dexamethasone 2 mg daily, discussed tapering down further, will attempt 1 mg daily and if clinically declines, understands to go back up to 2 mg daily. Recent brain MRI with stable disease. Labs with thrombocytopenia with platelet count 67,875, other counts adequate: We will defer next cycle by week (weekly CBC and restart next cycle once platelet count above 100,000). Repeat brain MRI in 3 months. Potassium low, will replace with po KCL. 12/30/2022: He returns today with his wife for follow up prior to next cycle Temodar. Overall, he continues to tolerate treatment well. There were no GI or bowel complaints after last cycle or currently. He is eating and drinking well. Fatigue is ongoing. He is now on dex 1 mg daily and doing well on that dose. There are no headaches, vision changes, or seizures.   He did have 1 fall in the bathroom last week when he grabbed his walker it began moving away from him and he was unable to stop it or himself, denies any injury. There is no cough or shortness of breath. BLE edema is stable, remains on lasix daily. Denies any pain, bleeding, fevers, or infectious symptoms. Labs reviewed, Hgb stable at 10.7, Plt 90K. His platelets historically rebound quickly, therefore should be adequate by Monday (1/2/23) to start his next cycle Temodar. Continue oral K+.        2/6/2023: He returns today for follow-up. Overall he has been quite stable over the past 2 months. 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 He has occasional pain from a fractured rib for which she has asked for a refill of his hydrocodone. His use of this medication has been quite limited but nevertheless he occasionally has twinges of rather severe discomfort from the fracture. He has no cough or shortness of breath there has been no sign or symptom of infection. He remains on prophylactic Bactrim while taking his Temodar. There are no other new complaints of significance. He has no difficulty with the Temodar. He takes the medications routinely and has not missed a dose; he is fully compliant. There has been no difficulty in obtaining the medications from the specialty pharmacy. Counts today are adequate for treatment. 1. RT temporal-parietal GBM s/p MSR & BCNU wafer placement 12/31/20  2. Fatigue    PLAN:  - As above. - S/p concurrent Temozolomide 75 mg/m2 daily w/ XRT (completed 3/26/21). On PCP prophylaxis w Bactrim DS MWF. Brain MRI w CO. Started (4/21) adjuvant Temozolomide 150 mg/m2 daily x 5 days every 28 days x 6 (completed 10/21). PD 6/22: s/p SBRT, now on single agent temozolomide 150 mg per metered squared daily x 5 every 28 days. Platelet count needs to be over 100,000 prior to each cycle. He continues to tolerate this well. - Optune alternating electrical field therapy post chemoradiation (5/21-10/21). - Continue Keppra 500 bid.    - MGMT methylation detected, IDH wild type. - Anemia: No clear evidence of hemolysis, CTAP without retroperitoneal bleeding. Iron panel, J35 and folic acid levels unremarkable. Referred to GI. Offered endoscopic work-up but patient preference to hold off for now. RTC in 4 week with labs. CBC on day 1 of each cycle (their preference to avoid D21 labs). Serial brain MRIs every 3 months. He is scheduled to undergo a repeat MRI later this month.         Ela Loera MD, MD  [facility]  [address] [place] [address]  Office : [phone]  Fax : [phone]

## 2023-02-13 DIAGNOSIS — S22.31XA CLOSED FRACTURE OF ONE RIB OF RIGHT SIDE, INITIAL ENCOUNTER: ICD-10-CM

## 2023-02-13 DIAGNOSIS — R07.81 RIB PAIN: ICD-10-CM

## 2023-02-13 RX ORDER — HYDROCODONE BITARTRATE AND ACETAMINOPHEN 5; 325 MG/1; MG/1
1 TABLET ORAL EVERY 6 HOURS PRN
Qty: 30 TABLET | Refills: 0 | Status: SHIPPED | OUTPATIENT
Start: 2023-02-13 | End: 2023-02-21

## 2023-02-14 NOTE — PROGRESS NOTES
Received fax from [facility] regarding hydrocodone 5-325 mg is on backorder, notified clinical team.

## 2023-02-24 ENCOUNTER — OFFICE VISIT (OUTPATIENT)
Dept: INTERNAL MEDICINE CLINIC | Facility: CLINIC | Age: 83
End: 2023-02-24
Payer: MEDICARE

## 2023-02-24 VITALS
HEART RATE: 88 BPM | DIASTOLIC BLOOD PRESSURE: 60 MMHG | HEIGHT: 68 IN | WEIGHT: 194 LBS | RESPIRATION RATE: 17 BRPM | OXYGEN SATURATION: 98 % | BODY MASS INDEX: 29.4 KG/M2 | SYSTOLIC BLOOD PRESSURE: 124 MMHG

## 2023-02-24 DIAGNOSIS — N18.31 STAGE 3A CHRONIC KIDNEY DISEASE (HCC): ICD-10-CM

## 2023-02-24 DIAGNOSIS — R60.0 LOCALIZED EDEMA: ICD-10-CM

## 2023-02-24 DIAGNOSIS — E78.2 MIXED HYPERLIPIDEMIA: ICD-10-CM

## 2023-02-24 DIAGNOSIS — E87.6 HYPOKALEMIA: ICD-10-CM

## 2023-02-24 DIAGNOSIS — G40.909 SEIZURE DISORDER (HCC): Primary | ICD-10-CM

## 2023-02-24 DIAGNOSIS — F41.1 GENERALIZED ANXIETY DISORDER: ICD-10-CM

## 2023-02-24 DIAGNOSIS — D69.6 THROMBOCYTOPENIA (HCC): ICD-10-CM

## 2023-02-24 DIAGNOSIS — C71.9 GLIOBLASTOMA (HCC): ICD-10-CM

## 2023-02-24 PROCEDURE — G8417 CALC BMI ABV UP PARAM F/U: HCPCS | Performed by: FAMILY MEDICINE

## 2023-02-24 PROCEDURE — 3074F SYST BP LT 130 MM HG: CPT | Performed by: FAMILY MEDICINE

## 2023-02-24 PROCEDURE — 3078F DIAST BP <80 MM HG: CPT | Performed by: FAMILY MEDICINE

## 2023-02-24 PROCEDURE — G8427 DOCREV CUR MEDS BY ELIG CLIN: HCPCS | Performed by: FAMILY MEDICINE

## 2023-02-24 PROCEDURE — 99214 OFFICE O/P EST MOD 30 MIN: CPT | Performed by: FAMILY MEDICINE

## 2023-02-24 PROCEDURE — 1036F TOBACCO NON-USER: CPT | Performed by: FAMILY MEDICINE

## 2023-02-24 PROCEDURE — 1123F ACP DISCUSS/DSCN MKR DOCD: CPT | Performed by: FAMILY MEDICINE

## 2023-02-24 PROCEDURE — G8484 FLU IMMUNIZE NO ADMIN: HCPCS | Performed by: FAMILY MEDICINE

## 2023-02-24 RX ORDER — TAMSULOSIN HYDROCHLORIDE 0.4 MG/1
0.4 CAPSULE ORAL DAILY
Qty: 90 CAPSULE | Refills: 1 | Status: SHIPPED | OUTPATIENT
Start: 2023-02-24

## 2023-02-24 RX ORDER — SERTRALINE HYDROCHLORIDE 100 MG/1
TABLET, FILM COATED ORAL
Qty: 90 TABLET | Refills: 1 | Status: SHIPPED | OUTPATIENT
Start: 2023-02-24

## 2023-02-24 RX ORDER — FUROSEMIDE 20 MG/1
TABLET ORAL
Qty: 90 TABLET | Refills: 1 | Status: SHIPPED | OUTPATIENT
Start: 2023-02-24

## 2023-02-24 RX ORDER — POTASSIUM CHLORIDE 750 MG/1
TABLET, FILM COATED, EXTENDED RELEASE ORAL
Qty: 90 TABLET | Refills: 1 | Status: CANCELLED | OUTPATIENT
Start: 2023-02-24

## 2023-02-24 RX ORDER — LEVETIRACETAM 500 MG/1
500 TABLET ORAL 2 TIMES DAILY
Qty: 180 TABLET | Refills: 1 | Status: SHIPPED | OUTPATIENT
Start: 2023-02-24

## 2023-02-24 RX ORDER — POTASSIUM CHLORIDE 20MEQ/15ML
20 LIQUID (ML) ORAL DAILY
Qty: 900 ML | Refills: 1 | Status: SHIPPED | OUTPATIENT
Start: 2023-02-24

## 2023-02-24 RX ORDER — ATORVASTATIN CALCIUM 40 MG/1
TABLET, FILM COATED ORAL
Qty: 90 TABLET | Refills: 1 | Status: SHIPPED | OUTPATIENT
Start: 2023-02-24

## 2023-02-24 SDOH — ECONOMIC STABILITY: HOUSING INSECURITY
IN THE LAST 12 MONTHS, WAS THERE A TIME WHEN YOU DID NOT HAVE A STEADY PLACE TO SLEEP OR SLEPT IN A SHELTER (INCLUDING NOW)?: NO

## 2023-02-24 SDOH — ECONOMIC STABILITY: INCOME INSECURITY: HOW HARD IS IT FOR YOU TO PAY FOR THE VERY BASICS LIKE FOOD, HOUSING, MEDICAL CARE, AND HEATING?: NOT HARD AT ALL

## 2023-02-24 SDOH — ECONOMIC STABILITY: FOOD INSECURITY: WITHIN THE PAST 12 MONTHS, YOU WORRIED THAT YOUR FOOD WOULD RUN OUT BEFORE YOU GOT MONEY TO BUY MORE.: NEVER TRUE

## 2023-02-24 SDOH — ECONOMIC STABILITY: TRANSPORTATION INSECURITY
IN THE PAST 12 MONTHS, HAS LACK OF TRANSPORTATION KEPT YOU FROM MEETINGS, WORK, OR FROM GETTING THINGS NEEDED FOR DAILY LIVING?: NO

## 2023-02-24 SDOH — ECONOMIC STABILITY: FOOD INSECURITY: WITHIN THE PAST 12 MONTHS, THE FOOD YOU BOUGHT JUST DIDN'T LAST AND YOU DIDN'T HAVE MONEY TO GET MORE.: NEVER TRUE

## 2023-02-24 ASSESSMENT — PATIENT HEALTH QUESTIONNAIRE - PHQ9
SUM OF ALL RESPONSES TO PHQ QUESTIONS 1-9: 0
2. FEELING DOWN, DEPRESSED OR HOPELESS: 0
SUM OF ALL RESPONSES TO PHQ9 QUESTIONS 1 & 2: 0
SUM OF ALL RESPONSES TO PHQ QUESTIONS 1-9: 0
1. LITTLE INTEREST OR PLEASURE IN DOING THINGS: 0

## 2023-02-24 NOTE — PROGRESS NOTES
Pedro Miles DO  [employer] [address] [place] [address] Internal Medicine      Reuben Pickett (: 1940) is a [de-identified] y.o. male, here for evaluation of the following chief complaint(s):  Hypertension (/)       ASSESSMENT/PLAN:  1. Seizure disorder (HCC)  -     levETIRAcetam (KEPPRA) 500 MG tablet; Take 1 tablet by mouth 2 times daily, Disp-180 tablet, R-1Normal  2. Hypokalemia  -     potassium chloride 20 MEQ/15ML (10%) oral solution; Take 15 mLs by mouth daily, Disp-900 mL, R-1Normal  3. Mixed hyperlipidemia  -     atorvastatin (LIPITOR) 40 MG tablet; TAKE 1 TABLET BY MOUTH EVERY DAY, Disp-90 tablet, R-1Normal  4. Localized edema  -     furosemide (LASIX) 20 MG tablet; TAKE 1 TABLET BY MOUTH EVERY DAY, Disp-90 tablet, R-1Normal  5. Generalized anxiety disorder  -     sertraline (ZOLOFT) 100 MG tablet; TAKE 1 TABLET BY MOUTH DAILY. , Disp-90 tablet, R-1Normal  6. Thrombocytopenia (HCC)  7. Stage 3a chronic kidney disease ([facility]  8. Glioblastoma ([facility]     We will continue with Keppra is providing good clinical benefit no recent seizure activity. Switch over to potassium liquid and increase dose to 20meq daily. We will have blood drawn in 2 weeks at hematology/oncology and we can adjust further based on results. We will continue with a atorvastatin as tolerating well and providing good clinical benefit. Continue with sertraline at 100 mg at reduced dosing as he is doing well. Platelet counts remain variable, oncology following as well. We will continue to periodically monitor renal function encourage good hydration. Glioblastoma management per oncology. SUBJECTIVE/OBJECTIVE:  HPI:  Patient is here with his wife today. He has been doing well. He has had no recent seizure activity continues on Keppra. Has been noted to have hypokalemia on his last several chemistries drawn by oncology. He has difficulty taking the potassium pill.   Continues to tolerate atorvastatin without significant adverse effects. Wife states anxiety has been very well controlled she is only been giving him 100 mg of sertraline and he seems to be doing well with this. Moods are stable. Continues to have good urine output. No dysuria or hematuria. ROS negative except as noted above today. Social History     Tobacco Use    Smoking status: Former     Packs/day: 0.50     Years: 30.00     Pack years: 15.00     Types: Cigarettes     Quit date: 1970     Years since quittin.1    Smokeless tobacco: Never   Substance Use Topics    Alcohol use: Not Currently    Drug use: No     Vitals:    23 1133   BP: 124/60   Site: Left Upper Arm   Position: Sitting   Pulse: 88   Resp: 17   SpO2: 98%   Weight: 194 lb (88 kg)   Height: 5' 8\" (1.727 m)      Body mass index is 29.5 kg/m². Physical Exam  Vitals reviewed. Cardiovascular:      Rate and Rhythm: Normal rate and regular rhythm. Pulmonary:      Effort: Pulmonary effort is normal.      Breath sounds: Normal breath sounds. Skin:     General: Skin is warm and dry. Neurological:      General: No focal deficit present. Mental Status: He is alert. An electronic signature was used to authenticate this note.   Freda Schultz DO

## 2023-03-03 DIAGNOSIS — R29.898 LEFT HAND WEAKNESS: ICD-10-CM

## 2023-03-03 DIAGNOSIS — C71.9 GLIOBLASTOMA (HCC): Primary | ICD-10-CM

## 2023-03-07 ENCOUNTER — HOSPITAL ENCOUNTER (OUTPATIENT)
Dept: MRI IMAGING | Age: 83
Discharge: HOME | End: 2023-03-10
Payer: MEDICARE

## 2023-03-07 ENCOUNTER — TELEPHONE (OUTPATIENT)
Dept: ONCOLOGY | Age: 83
End: 2023-03-07

## 2023-03-07 DIAGNOSIS — R29.898 LEFT HAND WEAKNESS: ICD-10-CM

## 2023-03-07 DIAGNOSIS — C71.9 GLIOBLASTOMA (HCC): ICD-10-CM

## 2023-03-07 PROCEDURE — A9579 GAD-BASE MR CONTRAST NOS,1ML: HCPCS | Performed by: NURSE PRACTITIONER

## 2023-03-07 PROCEDURE — 70553 MRI BRAIN STEM W/O & W/DYE: CPT

## 2023-03-07 PROCEDURE — 6360000004 HC RX CONTRAST MEDICATION: Performed by: NURSE PRACTITIONER

## 2023-03-07 RX ORDER — DEXAMETHASONE 4 MG/1
4 TABLET ORAL 2 TIMES DAILY WITH MEALS
Qty: 60 TABLET | Refills: 0 | Status: SHIPPED | OUTPATIENT
Start: 2023-03-07 | End: 2023-04-06

## 2023-03-07 RX ADMIN — GADOTERIDOL 18 ML: 279.3 INJECTION, SOLUTION INTRAVENOUS at 13:55

## 2023-03-07 NOTE — TELEPHONE ENCOUNTER
Dr. Steward reviewed results of MRI, new order for decadron 4 mg bid. Will work in with MD ASAP. Patient wife notified.

## 2023-03-08 ENCOUNTER — TELEMEDICINE (OUTPATIENT)
Dept: ONCOLOGY | Age: 83
End: 2023-03-08
Payer: MEDICARE

## 2023-03-08 DIAGNOSIS — C71.9 GLIOBLASTOMA (HCC): Primary | ICD-10-CM

## 2023-03-08 PROCEDURE — 99214 OFFICE O/P EST MOD 30 MIN: CPT | Performed by: INTERNAL MEDICINE

## 2023-03-08 PROCEDURE — G8428 CUR MEDS NOT DOCUMENT: HCPCS | Performed by: INTERNAL MEDICINE

## 2023-03-08 PROCEDURE — 1123F ACP DISCUSS/DSCN MKR DOCD: CPT | Performed by: INTERNAL MEDICINE

## 2023-03-13 DIAGNOSIS — C71.9 GLIOBLASTOMA (HCC): Primary | ICD-10-CM

## 2023-03-14 DIAGNOSIS — C71.9 GLIOBLASTOMA (HCC): Primary | ICD-10-CM

## 2023-03-14 DIAGNOSIS — Z11.59 ENCOUNTER FOR SCREENING FOR OTHER VIRAL DISEASES: ICD-10-CM

## 2023-03-15 ENCOUNTER — ANESTHESIA EVENT (OUTPATIENT)
Dept: INTERVENTIONAL RADIOLOGY/VASCULAR | Age: 83
End: 2023-03-15

## 2023-03-15 RX ORDER — SODIUM CHLORIDE 0.9 % (FLUSH) 0.9 %
5-40 SYRINGE (ML) INJECTION EVERY 12 HOURS SCHEDULED
OUTPATIENT
Start: 2023-03-15

## 2023-03-15 RX ORDER — SODIUM CHLORIDE, SODIUM LACTATE, POTASSIUM CHLORIDE, CALCIUM CHLORIDE 600; 310; 30; 20 MG/100ML; MG/100ML; MG/100ML; MG/100ML
INJECTION, SOLUTION INTRAVENOUS CONTINUOUS
OUTPATIENT
Start: 2023-03-15

## 2023-03-15 RX ORDER — SODIUM CHLORIDE 0.9 % (FLUSH) 0.9 %
5-40 SYRINGE (ML) INJECTION PRN
OUTPATIENT
Start: 2023-03-15

## 2023-03-15 RX ORDER — OXYCODONE HYDROCHLORIDE 5 MG/1
5 TABLET ORAL
OUTPATIENT
Start: 2023-03-15 | End: 2023-03-16

## 2023-03-15 RX ORDER — HYDROMORPHONE HYDROCHLORIDE 2 MG/ML
0.25 INJECTION, SOLUTION INTRAMUSCULAR; INTRAVENOUS; SUBCUTANEOUS EVERY 5 MIN PRN
OUTPATIENT
Start: 2023-03-15

## 2023-03-15 RX ORDER — LIDOCAINE HYDROCHLORIDE 10 MG/ML
1 INJECTION, SOLUTION INFILTRATION; PERINEURAL
OUTPATIENT
Start: 2023-03-15 | End: 2023-03-16

## 2023-03-15 RX ORDER — HALOPERIDOL 5 MG/ML
1 INJECTION INTRAMUSCULAR
OUTPATIENT
Start: 2023-03-15 | End: 2023-03-16

## 2023-03-15 RX ORDER — SODIUM CHLORIDE 9 MG/ML
INJECTION, SOLUTION INTRAVENOUS PRN
OUTPATIENT
Start: 2023-03-15

## 2023-03-15 RX ORDER — DEXTROSE MONOHYDRATE 100 MG/ML
INJECTION, SOLUTION INTRAVENOUS CONTINUOUS PRN
OUTPATIENT
Start: 2023-03-15

## 2023-03-15 RX ORDER — ONDANSETRON 2 MG/ML
4 INJECTION INTRAMUSCULAR; INTRAVENOUS
OUTPATIENT
Start: 2023-03-15 | End: 2023-03-16

## 2023-03-16 ENCOUNTER — HOSPITAL ENCOUNTER (OUTPATIENT)
Dept: INTERVENTIONAL RADIOLOGY/VASCULAR | Age: 83
End: 2023-03-16
Payer: MEDICARE

## 2023-03-16 ENCOUNTER — ANESTHESIA (OUTPATIENT)
Dept: INTERVENTIONAL RADIOLOGY/VASCULAR | Age: 83
End: 2023-03-16

## 2023-03-16 VITALS
OXYGEN SATURATION: 96 % | DIASTOLIC BLOOD PRESSURE: 65 MMHG | RESPIRATION RATE: 16 BRPM | HEART RATE: 57 BPM | SYSTOLIC BLOOD PRESSURE: 114 MMHG | TEMPERATURE: 98 F

## 2023-03-16 DIAGNOSIS — C71.9 GLIOBLASTOMA (HCC): ICD-10-CM

## 2023-03-16 PROCEDURE — 6360000002 HC RX W HCPCS: Performed by: PHYSICIAN ASSISTANT

## 2023-03-16 PROCEDURE — 2500000003 HC RX 250 WO HCPCS: Performed by: PHYSICIAN ASSISTANT

## 2023-03-16 PROCEDURE — 3700000001 HC ADD 15 MINUTES (ANESTHESIA)

## 2023-03-16 PROCEDURE — 2580000003 HC RX 258: Performed by: NURSE ANESTHETIST, CERTIFIED REGISTERED

## 2023-03-16 PROCEDURE — 3700000000 HC ANESTHESIA ATTENDED CARE

## 2023-03-16 PROCEDURE — 76937 US GUIDE VASCULAR ACCESS: CPT

## 2023-03-16 PROCEDURE — 6360000002 HC RX W HCPCS: Performed by: NURSE ANESTHETIST, CERTIFIED REGISTERED

## 2023-03-16 RX ORDER — LIDOCAINE HYDROCHLORIDE AND EPINEPHRINE BITARTRATE 20; .01 MG/ML; MG/ML
INJECTION, SOLUTION SUBCUTANEOUS
Status: COMPLETED | OUTPATIENT
Start: 2023-03-16 | End: 2023-03-16

## 2023-03-16 RX ORDER — HEPARIN SODIUM (PORCINE) LOCK FLUSH IV SOLN 100 UNIT/ML 100 UNIT/ML
SOLUTION INTRAVENOUS
Status: COMPLETED | OUTPATIENT
Start: 2023-03-16 | End: 2023-03-16

## 2023-03-16 RX ORDER — PROPOFOL 10 MG/ML
INJECTION, EMULSION INTRAVENOUS PRN
Status: DISCONTINUED | OUTPATIENT
Start: 2023-03-16 | End: 2023-03-16 | Stop reason: SDUPTHER

## 2023-03-16 RX ORDER — SODIUM CHLORIDE, SODIUM LACTATE, POTASSIUM CHLORIDE, CALCIUM CHLORIDE 600; 310; 30; 20 MG/100ML; MG/100ML; MG/100ML; MG/100ML
INJECTION, SOLUTION INTRAVENOUS CONTINUOUS PRN
Status: DISCONTINUED | OUTPATIENT
Start: 2023-03-16 | End: 2023-03-16 | Stop reason: SDUPTHER

## 2023-03-16 RX ADMIN — Medication 500 UNITS: at 15:40

## 2023-03-16 RX ADMIN — LIDOCAINE HYDROCHLORIDE,EPINEPHRINE BITARTRATE 20 ML: 20; .01 INJECTION, SOLUTION INFILTRATION; PERINEURAL at 15:32

## 2023-03-16 RX ADMIN — SODIUM CHLORIDE, SODIUM LACTATE, POTASSIUM CHLORIDE, AND CALCIUM CHLORIDE: 600; 310; 30; 20 INJECTION, SOLUTION INTRAVENOUS at 15:22

## 2023-03-16 RX ADMIN — PROPOFOL 50 MG: 10 INJECTION, EMULSION INTRAVENOUS at 15:32

## 2023-03-16 RX ADMIN — Medication 2 G: at 15:27

## 2023-03-16 RX ADMIN — PROPOFOL 20 MG: 10 INJECTION, EMULSION INTRAVENOUS at 15:33

## 2023-03-16 NOTE — H&P
Department of Interventional Radiology  [phone]    History and Physical    Patient:  Jocelin Driscoll MRN:  [id number]  SSN:  [id number]    YOB: 1940  Age:  [de-identified] y.o. Sex:  male      Primary Care Provider:  Jorge A Costello DO  Referring Physician:  Bethany Davison MD    Subjective:     Chief Complaint: port    History of the Present Illness: The patient is a [de-identified] y.o. male with glioblastoma who presents for venous chest port placement. NPO. Past Medical History:   Diagnosis Date    Anxiety disorder 12/9/2014    Arthritis 12/9/2014    Depression 12/9/2014    Glioblastoma ([address] 12/26/2020    Gout 12/9/2014    History of intracranial hemorrhage 12/25/2020    History of lumbar laminectomy for spinal cord decompression 2/18/2019    History of seizure 12/28/2020    HTN (hypertension), benign 2/15/2017    Hyperlipemia 12/9/2014    Hypertension     Idiopathic chronic gout of right ankle 12/9/2014    Insomnia 12/9/2014    Kidney stone     Mixed hyperlipidemia 12/9/2014    Panic disorder 12/9/2014    Primary insomnia 12/9/2014    Rosacea 2/15/2017     Past Surgical History:   Procedure Laterality Date    ANKLE FRACTURE SURGERY Left 6/1974    COLONOSCOPY  2007    CYST REMOVAL      CYST REMOVAL      pylonidal cyst    CYST REMOVAL      HERNIA REPAIR Bilateral     OTHER SURGICAL HISTORY      wrist    WRIST FRACTURE SURGERY Bilateral 6/1993    wrist plate/pin        Review of Systems:    Pertinent items are noted in HPI. Prior to Admission medications    Medication Sig Start Date End Date Taking?  Authorizing Provider   dexamethasone (DECADRON) 4 MG tablet Take 1 tablet by mouth 2 times daily (with meals) 3/7/23 4/6/23  Yulia Morales MD   atorvastatin (LIPITOR) 40 MG tablet TAKE 1 TABLET BY MOUTH EVERY DAY 2/24/23   James Members Brothers, DO   furosemide (LASIX) 20 MG tablet TAKE 1 TABLET BY MOUTH EVERY DAY 2/24/23   James [facility] Brothers, DO   levETIRAcetam (KEPPRA) 500 MG tablet Take 1 tablet by mouth 2 times daily 2/24/23   Robles Salter Brothers, DO   tamsulosin [facility]) 0.4 MG capsule Take 1 capsule by mouth daily 2/24/23   Robles Salter Norm, DO   sertraline (ZOLOFT) 100 MG tablet TAKE 1 TABLET BY MOUTH DAILY. 2/24/23   Robles Salter Brothers, DO   potassium chloride 20 MEQ/15ML (10%) oral solution Take 15 mLs by mouth daily 2/24/23   Robles Zheng, DO   temozolomide (TEMODAR) 100 MG chemo capsule Take 3 capsules by mouth daily for 5 days every 28 days 12/7/22   Matthew Bella MD   dexamethasone (DECADRON) 1 MG tablet Take 1 tablet by mouth daily (with breakfast)  Patient not taking: No sig reported 11/21/22   Matthew Bella MD   esomeprazole Magnesium (NEXIUM) 20 MG PACK Take 20 mg by mouth daily  Patient not taking: No sig reported    Historical Provider, MD   Multiple Vitamin (MULTIVITAMIN ADULT PO) Take by mouth    Historical Provider, MD   pantoprazole (PROTONIX) 40 MG tablet Take 1 tablet by mouth every morning (before breakfast) 10/6/22   Matthew Bella MD   sulfamethoxazole-trimethoprim (BACTRIM DS;SEPTRA DS) 800-160 MG per tablet Take 1 tab on Monday, Wednesday, and Friday.  6/23/22   DYANA Gonzalez - CNP   Calcium-Magnesium-Vitamin D (CALCIUM 1200+D3 PO) Take by mouth daily    Historical Provider, MD   vitamin D (CHOLECALCIFEROL) 25 MCG (1000 UT) TABS tablet Take 1,000 Units by mouth daily    Ar Automatic Reconciliation   L-Lysine 500 MG TABS Take 500 mg by mouth daily    Ar Automatic Reconciliation   ondansetron (ZOFRAN) 8 MG tablet Take 8 mg by mouth every 8 hours as needed 4/26/21   Ar Automatic Reconciliation   polyethylene glycol (GLYCOLAX) 17 GM/SCOOP powder Take 17 g by mouth daily as needed 1/13/21   Ar Automatic Reconciliation        No Known Allergies    Family History   Problem Relation Age of Onset    Cancer Mother         lung ca    Hypertension Mother     Heart Disease Mother     Osteoarthritis Father     Cancer Brother         stomach     Social History     Tobacco Use    Smoking status: Former     Packs/day: 0.50     Years: 30.00     Pack years: 15.00     Types: Cigarettes     Quit date: 1970     Years since quittin.2    Smokeless tobacco: Never   Substance Use Topics    Alcohol use: Not Currently        Not in a hospital admission. Objective:       Physical Examination:    There were no vitals filed for this visit. Pain Assessment           denies                                          HEART: regular rate and rhythm  LUNG: clear to auscultation bilaterally  ABDOMEN: normal findings: soft, non-tender  EXTREMITIES: warm,no edema    Laboratory:     Lab Results   Component Value Date/Time     2023 11:20 AM     2022 11:10 AM    K 3.0 2023 11:20 AM    K 3.0 2022 11:10 AM     2023 11:20 AM     2022 11:10 AM    CO2 28 2023 11:20 AM    CO2 29 2022 11:10 AM    BUN 17 2023 11:20 AM    BUN 18 2022 11:10 AM    GFRAA >60 2022 11:19 AM    GFRAA >60 2022 11:19 AM    MG 2.1 2022 11:10 AM    MG 1.7 09/15/2022 11:29 AM    GLOB 3.5 2023 11:20 AM    GLOB 3.2 2022 11:10 AM    ALT 18 2023 11:20 AM    ALT 21 2022 11:10 AM     Lab Results   Component Value Date/Time    WBC 7.9 2023 11:20 AM    WBC 5.6 2022 11:10 AM    HGB 11.5 2023 11:20 AM    HGB 10.7 2022 11:10 AM    HCT 33.7 2023 11:20 AM    HCT 31.4 2022 11:10 AM     2023 11:20 AM    PLT 90 2022 11:10 AM     Lab Results   Component Value Date/Time    INR 1.0 2021 10:53 AM    INR 1.1 2020 06:27 AM    INR 1.9 2020 06:27 AM       Assessment:     glioblastoma    [unfilled]    Plan:     Planned Procedure:  port placement    Risks, benefits, and alternatives reviewed with patient and he agrees to proceed with the procedure.       Signed By: Marai Luisa Renee PA-C     2023

## 2023-03-16 NOTE — DISCHARGE INSTRUCTIONS
If you have any questions about your procedure, please call the Interventional Radiology department at [phone]. After business hours (5pm) and weekends, call the answering service at [phone] and ask for the Radiologist on call to be paged. Si tiene Preguntas acerca del procedimiento, por favor llame al departamento de Radiología Intervencional al [phone]. Después de horas de oficina (5 pm) y los fines de [place], llamar al Shireen Garcia al [phone] y pregunte por el Radiologo de [facility].

## 2023-03-16 NOTE — ANESTHESIA PRE PROCEDURE
Department of Anesthesiology  Preprocedure Note       Name:  Destini Duncan   Age:  [de-identified] y.o.  :  1940                                          MRN:  [id number]         Date:  3/16/2023      Surgeon: * No surgeons listed *    Procedure: * No procedures listed *    Medications prior to admission:   Prior to Admission medications    Medication Sig Start Date End Date Taking? Authorizing Provider   dexamethasone (DECADRON) 4 MG tablet Take 1 tablet by mouth 2 times daily (with meals) 3/7/23 4/6/23  Eleanor Harris MD   atorvastatin (LIPITOR) 40 MG tablet TAKE 1 TABLET BY MOUTH EVERY DAY 23   [facility], DO   furosemide (LASIX) 20 MG tablet TAKE 1 TABLET BY MOUTH EVERY DAY 23   [facility], DO   levETIRAcetam (KEPPRA) 500 MG tablet Take 1 tablet by mouth 2 times daily 23   [facility] Brothers, DO   tamsulosin (FLOMAX) 0.4 MG capsule Take 1 capsule by mouth daily 23   Franny Done T Brothers, DO   sertraline (ZOLOFT) 100 MG tablet TAKE 1 TABLET BY MOUTH DAILY. 23   Erika Erp Brothers, DO   potassium chloride 20 MEQ/15ML (10%) oral solution Take 15 mLs by mouth daily 23   [facility] Brothers, DO   temozolomide (TEMODAR) 100 MG chemo capsule Take 3 capsules by mouth daily for 5 days every 28 days 22   Juno Garsia MD   dexamethasone (DECADRON) 1 MG tablet Take 1 tablet by mouth daily (with breakfast)  Patient not taking: No sig reported 22   Juno Garsia MD   esomeprazole Magnesium (NEXIUM) 20 MG PACK Take 20 mg by mouth daily  Patient not taking: No sig reported    Historical Provider, MD   Multiple Vitamin (MULTIVITAMIN ADULT PO) Take by mouth    Historical Provider, MD   pantoprazole (PROTONIX) 40 MG tablet Take 1 tablet by mouth every morning (before breakfast) 10/6/22   Juno Garsia MD   sulfamethoxazole-trimethoprim (BACTRIM DS;SEPTRA DS) 800-160 MG per tablet Take 1 tab on Monday, Wednesday, and Friday.  22   DYANA Puri - CNP Calcium-Magnesium-Vitamin D (CALCIUM 1200+D3 PO) Take by mouth daily    Historical Provider, MD   vitamin D (CHOLECALCIFEROL) 25 MCG (1000 UT) TABS tablet Take 1,000 Units by mouth daily    Ar Automatic Reconciliation   L-Lysine 500 MG TABS Take 500 mg by mouth daily    Ar Automatic Reconciliation   ondansetron (ZOFRAN) 8 MG tablet Take 8 mg by mouth every 8 hours as needed 4/26/21   Ar Automatic Reconciliation   polyethylene glycol (GLYCOLAX) 17 GM/SCOOP powder Take 17 g by mouth daily as needed 1/13/21   Ar Automatic Reconciliation       Current medications:    Current Outpatient Medications   Medication Sig Dispense Refill    dexamethasone (DECADRON) 4 MG tablet Take 1 tablet by mouth 2 times daily (with meals) 60 tablet 0    atorvastatin (LIPITOR) 40 MG tablet TAKE 1 TABLET BY MOUTH EVERY DAY 90 tablet 1    furosemide (LASIX) 20 MG tablet TAKE 1 TABLET BY MOUTH EVERY DAY 90 tablet 1    levETIRAcetam (KEPPRA) 500 MG tablet Take 1 tablet by mouth 2 times daily 180 tablet 1    tamsulosin (FLOMAX) 0.4 MG capsule Take 1 capsule by mouth daily 90 capsule 1    sertraline (ZOLOFT) 100 MG tablet TAKE 1 TABLET BY MOUTH DAILY. 90 tablet 1    potassium chloride 20 MEQ/15ML (10%) oral solution Take 15 mLs by mouth daily 900 mL 1    temozolomide (TEMODAR) 100 MG chemo capsule Take 3 capsules by mouth daily for 5 days every 28 days 15 capsule 11    dexamethasone (DECADRON) 1 MG tablet Take 1 tablet by mouth daily (with breakfast) (Patient not taking: No sig reported) 90 tablet 1    esomeprazole Magnesium (NEXIUM) 20 MG PACK Take 20 mg by mouth daily (Patient not taking: No sig reported)      Multiple Vitamin (MULTIVITAMIN ADULT PO) Take by mouth      pantoprazole (PROTONIX) 40 MG tablet Take 1 tablet by mouth every morning (before breakfast) 90 tablet 3    sulfamethoxazole-trimethoprim (BACTRIM DS;SEPTRA DS) 800-160 MG per tablet Take 1 tab on Monday, Wednesday, and Friday.  48 tablet 2    Calcium-Magnesium-Vitamin D (CALCIUM 1200+D3 PO) Take by mouth daily      vitamin D (CHOLECALCIFEROL) 25 MCG (1000 UT) TABS tablet Take 1,000 Units by mouth daily      L-Lysine 500 MG TABS Take 500 mg by mouth daily      ondansetron (ZOFRAN) 8 MG tablet Take 8 mg by mouth every 8 hours as needed      polyethylene glycol (GLYCOLAX) 17 GM/SCOOP powder Take 17 g by mouth daily as needed       No current facility-administered medications for this encounter.        Allergies:  No Known Allergies    Problem List:    Patient Active Problem List   Diagnosis Code    History of lumbar laminectomy for spinal cord decompression Z98.890    Mixed hyperlipidemia E78.2    Panic disorder F41.0    History of seizure Z87.898    Idiopathic scoliosis M41.20    Essential hypertension I10    Primary insomnia F51.01    ANAND (generalized anxiety disorder) F41.1    Glioblastoma ([facility] C71.9    Arthritis M19.90    Idiopathic chronic gout of right ankle M1A.0710    Benign prostatic hyperplasia without lower urinary tract symptoms N40.0    Rosacea L71.9    History of intracranial hemorrhage Z86.79    Chronic renal disease, stage III ([facility] [814348] N18.30    Orthostatic hypotension I95.1    Gastroesophageal reflux disease K21.9    Age-related cataract of both eyes H25.9    Thrombocytopenia ([facility] D69.6       Past Medical History:        Diagnosis Date    Anxiety disorder 12/9/2014    Arthritis 12/9/2014    Depression 12/9/2014    Glioblastoma ([facility] 12/26/2020    Gout 12/9/2014    History of intracranial hemorrhage 12/25/2020    History of lumbar laminectomy for spinal cord decompression 2/18/2019    History of seizure 12/28/2020    HTN (hypertension), benign 2/15/2017    Hyperlipemia 12/9/2014    Hypertension     Idiopathic chronic gout of right ankle 12/9/2014    Insomnia 12/9/2014    Kidney stone     Mixed hyperlipidemia 12/9/2014    Panic disorder 12/9/2014    Primary insomnia 12/9/2014    Rosacea 2/15/2017       Past Surgical History:        Procedure Laterality Date    ANKLE FRACTURE SURGERY Left 1974    COLONOSCOPY  2007    CYST REMOVAL      CYST REMOVAL      pylonidal cyst    CYST REMOVAL      HERNIA REPAIR Bilateral     OTHER SURGICAL HISTORY      wrist    WRIST FRACTURE SURGERY Bilateral 1993    wrist plate/pin       Social History:    Social History     Tobacco Use    Smoking status: Former     Packs/day: 0.50     Years: 30.00     Pack years: 15.00     Types: Cigarettes     Quit date: 1970     Years since quittin.2    Smokeless tobacco: Never   Substance Use Topics    Alcohol use: Not Currently                                Counseling given: Not Answered      Vital Signs (Current): There were no vitals filed for this visit.                                            BP Readings from Last 3 Encounters:   23 124/60   23 (!) 147/64   22 139/62       NPO Status:                                                   Date of last liquid consumption: 23                        Date of last solid food consumption: 23    BMI:   Wt Readings from Last 3 Encounters:   23 194 lb (88 kg)   23 194 lb (88 kg)   23 198 lb 4.8 oz (89.9 kg)     There is no height or weight on file to calculate BMI.    CBC:   Lab Results   Component Value Date/Time    WBC 7.9 2023 11:20 AM    RBC 3.52 2023 11:20 AM    HGB 11.5 2023 11:20 AM    HCT 33.7 2023 11:20 AM    MCV 95.7 2023 11:20 AM    RDW 13.7 2023 11:20 AM     2023 11:20 AM       CMP:   Lab Results   Component Value Date/Time     2023 11:20 AM    K 3.0 2023 11:20 AM     2023 11:20 AM    CO2 28 2023 11:20 AM    BUN 17 2023 11:20 AM    CREATININE 1.20 2023 11:20 AM    GFRAA >60 2022 11:19 AM    AGRATIO 0.9 2022 10:17 AM    LABGLOM >60 2023 11:20 AM    GLUCOSE 159 2023 11:20 AM    PROT 6.7 02/06/2023 11:20 AM    CALCIUM 8.9 02/06/2023 11:20 AM    BILITOT 0.5 02/06/2023 11:20 AM    ALKPHOS 87 02/06/2023 11:20 AM    ALKPHOS 102 02/25/2022 10:17 AM    AST 12 02/06/2023 11:20 AM    ALT 18 02/06/2023 11:20 AM       POC Tests: No results for input(s): POCGLU, POCNA, POCK, POCCL, POCBUN, POCHEMO, POCHCT in the last 72 hours.     Coags:   Lab Results   Component Value Date/Time    PROTIME 13.2 02/12/2021 10:53 AM    INR 1.0 02/12/2021 10:53 AM       HCG (If Applicable): No results found for: PREGTESTUR, PREGSERUM, HCG, HCGQUANT     ABGs:   Lab Results   Component Value Date/Time    PHART 7.48 12/31/2020 09:12 AM    PO2ART 115 12/31/2020 09:12 AM    ZGB0OQM 36.3 12/31/2020 09:12 AM    TPW8CZQ 26.7 12/31/2020 09:12 AM    BEART 3 12/31/2020 09:12 AM        Type & Screen (If Applicable):  No results found for: LABABO, LABRH    Drug/Infectious Status (If Applicable):  No results found for: HIV, HEPCAB    COVID-19 Screening (If Applicable):   Lab Results   Component Value Date/Time    COVID19 Not detected 12/30/2020 01:59 PM    COVID19 Negative 12/30/2020 01:59 PM           Anesthesia Evaluation  Patient summary reviewed and Nursing notes reviewed no history of anesthetic complications:   Airway: Mallampati: II  TM distance: >3 FB   Neck ROM: full  Mouth opening: > = 3 FB   Dental:    (+) upper dentures, lower dentures and partials      Pulmonary:Negative Pulmonary ROS breath sounds clear to auscultation                             Cardiovascular:  Exercise tolerance: poor (<4 METS), Ambulates with walker  (+) hypertension:, hyperlipidemia        Rhythm: regular  Rate: normal                    Neuro/Psych:   (+) seizures (prior to tumor resection 2020): well controlled,              ROS comment: Glioblastoma - s/p craniotomy with resection 2020, now with recurrence (1-2 mm midline shift)  On Keppra and Decadron GI/Hepatic/Renal:   (+) GERD: well controlled,           Endo/Other: Negative Endo/Other ROS Abdominal:             Vascular: negative vascular ROS. Other Findings:           Anesthesia Plan      TIVA     ASA 3       Induction: intravenous. Anesthetic plan and risks discussed with patient and spouse.                         Virginia Kerr MD   3/16/2023

## 2023-03-16 NOTE — BRIEF OP NOTE
Department of Interventional Radiology  [phone]        Interventional Radiology Brief Procedure Note    Patient: Antonio Sinclair MRN: [id number]  SSN: [id number]    YOB: 1940  Age: [de-identified] y.o.   Sex: male      Date of Procedure: 3/16/2023    Pre-Procedure Diagnosis: glioblastoma    Post-Procedure Diagnosis: SAME    Procedure(s): Venous Chest Port Placement    Brief Description of Procedure: as above    Performed By: Jacque David PA-C     Assistants: None    Anesthesia:TIVS/MAC    Estimated Blood Loss: Less than 10ml    Specimens:  None    Implants:  Subcutaneous Port    Findings: catheter tip in right atrium     Complications: None    Recommendations: ok to use port     Follow Up: prn    Signed By: Jacque David PA-C     March 16, 2023

## 2023-03-16 NOTE — PROGRESS NOTES
Patient in IR 1 for procedure. Patient's name and  verified. Patient secured to procedure table. Anesthesia has assumed care of patient for the duration of procedure. Please see anesthesia record for documentation.

## 2023-03-16 NOTE — ANESTHESIA POSTPROCEDURE EVALUATION
Department of Anesthesiology  Postprocedure Note    Patient: Rosemarie Dickinson  MRN: [id number]  YOB: 1940  Date of evaluation: 3/16/2023      Procedure Summary     Date: 03/16/23 Room / Location: [facility]    Anesthesia Start: 0309 Anesthesia Stop: 5927    Procedure: IR PORT PLACEMENT EQUAL OR GREATER THAN 5 YEARS Diagnosis: Glioblastoma ([facility]    Scheduled Providers: DYANA Hodges CRNA; Yusuf Yuan MD Responsible Provider: Yusuf Yuan MD    Anesthesia Type: TIVA ASA Status: 3          Anesthesia Type: No value filed.     Radha Phase I: Radha Score: 10    Radha Phase II:        Anesthesia Post Evaluation    Patient location during evaluation: PACU  Patient participation: complete - patient participated  Level of consciousness: awake and awake and alert  Airway patency: patent  Nausea & Vomiting: no nausea  Complications: no  Cardiovascular status: hemodynamically stable  Respiratory status: acceptable  Hydration status: euvolemic  Multimodal analgesia pain management approach

## 2023-03-16 NOTE — PROGRESS NOTES
Recovery period without difficulty. Pt alert and oriented and denies pain. Dressing is clean, dry, and intact. Reviewed discharge instructions with patient and spouse, both verbalized understanding. Pt escorted to lobby discharge area via wheelchair. Vital signs and Radha score completed.

## 2023-03-21 ENCOUNTER — HOSPITAL ENCOUNTER (OUTPATIENT)
Dept: INFUSION THERAPY | Age: 83
Discharge: HOME | End: 2023-03-21
Payer: MEDICARE

## 2023-03-21 ENCOUNTER — OFFICE VISIT (OUTPATIENT)
Dept: ONCOLOGY | Age: 83
End: 2023-03-21
Payer: MEDICARE

## 2023-03-21 ENCOUNTER — HOSPITAL ENCOUNTER (OUTPATIENT)
Dept: LAB | Age: 83
Discharge: HOME | End: 2023-03-24
Payer: MEDICARE

## 2023-03-21 VITALS
BODY MASS INDEX: 28.82 KG/M2 | DIASTOLIC BLOOD PRESSURE: 65 MMHG | SYSTOLIC BLOOD PRESSURE: 126 MMHG | TEMPERATURE: 97.9 F | HEART RATE: 78 BPM | WEIGHT: 190.2 LBS | OXYGEN SATURATION: 95 % | RESPIRATION RATE: 14 BRPM | HEIGHT: 68 IN

## 2023-03-21 DIAGNOSIS — C71.9 GLIOBLASTOMA (HCC): ICD-10-CM

## 2023-03-21 DIAGNOSIS — Z11.59 ENCOUNTER FOR SCREENING FOR OTHER VIRAL DISEASES: Primary | ICD-10-CM

## 2023-03-21 DIAGNOSIS — Z11.59 ENCOUNTER FOR SCREENING FOR OTHER VIRAL DISEASES: ICD-10-CM

## 2023-03-21 LAB
ALBUMIN SERPL-MCNC: 3.2 G/DL (ref 3.2–4.6)
ALBUMIN/GLOB SERPL: 1.1 (ref 0.4–1.6)
ALP SERPL-CCNC: 95 U/L (ref 50–136)
ALT SERPL-CCNC: 22 U/L (ref 12–65)
ANION GAP SERPL CALC-SCNC: 7 MMOL/L (ref 2–11)
AST SERPL-CCNC: 8 U/L (ref 15–37)
BASOPHILS # BLD: 0 K/UL (ref 0–0.2)
BASOPHILS NFR BLD: 0 % (ref 0–2)
BILIRUB SERPL-MCNC: 0.4 MG/DL (ref 0.2–1.1)
BUN SERPL-MCNC: 35 MG/DL (ref 8–23)
CALCIUM SERPL-MCNC: 8.6 MG/DL (ref 8.3–10.4)
CHLORIDE SERPL-SCNC: 106 MMOL/L (ref 101–110)
CO2 SERPL-SCNC: 26 MMOL/L (ref 21–32)
CREAT SERPL-MCNC: 1.5 MG/DL (ref 0.8–1.5)
DIFFERENTIAL METHOD BLD: ABNORMAL
EOSINOPHIL # BLD: 0 K/UL (ref 0–0.8)
EOSINOPHIL NFR BLD: 0 % (ref 0.5–7.8)
ERYTHROCYTE [DISTWIDTH] IN BLOOD BY AUTOMATED COUNT: 14 % (ref 11.9–14.6)
GLOBULIN SER CALC-MCNC: 3 G/DL (ref 2.8–4.5)
GLUCOSE SERPL-MCNC: 219 MG/DL (ref 65–100)
HBV SURFACE AB SERPL IA-ACNC: <3.1 MIU/ML
HBV SURFACE AG SER QL: NONREACTIVE
HCT VFR BLD AUTO: 38.2 % (ref 41.1–50.3)
HGB BLD-MCNC: 12.9 G/DL (ref 13.6–17.2)
IMM GRANULOCYTES # BLD AUTO: 0.1 K/UL (ref 0–0.5)
IMM GRANULOCYTES NFR BLD AUTO: 1 % (ref 0–5)
LYMPHOCYTES # BLD: 0.6 K/UL (ref 0.5–4.6)
LYMPHOCYTES NFR BLD: 7 % (ref 13–44)
MCH RBC QN AUTO: 32.3 PG (ref 26.1–32.9)
MCHC RBC AUTO-ENTMCNC: 33.8 G/DL (ref 31.4–35)
MCV RBC AUTO: 95.7 FL (ref 82–102)
MONOCYTES # BLD: 0.5 K/UL (ref 0.1–1.3)
MONOCYTES NFR BLD: 6 % (ref 4–12)
NEUTS SEG # BLD: 7.4 K/UL (ref 1.7–8.2)
NEUTS SEG NFR BLD: 86 % (ref 43–78)
NRBC # BLD: 0 K/UL (ref 0–0.2)
PLATELET # BLD AUTO: 96 K/UL (ref 150–450)
PMV BLD AUTO: 9.7 FL (ref 9.4–12.3)
POTASSIUM SERPL-SCNC: 4.1 MMOL/L (ref 3.5–5.1)
PROT SERPL-MCNC: 6.2 G/DL (ref 6.3–8.2)
PROT UR-MCNC: 14 MG/DL
RBC # BLD AUTO: 3.99 M/UL (ref 4.23–5.6)
SODIUM SERPL-SCNC: 139 MMOL/L (ref 133–143)
WBC # BLD AUTO: 8.6 K/UL (ref 4.3–11.1)

## 2023-03-21 PROCEDURE — 86706 HEP B SURFACE ANTIBODY: CPT

## 2023-03-21 PROCEDURE — G8427 DOCREV CUR MEDS BY ELIG CLIN: HCPCS | Performed by: INTERNAL MEDICINE

## 2023-03-21 PROCEDURE — 3074F SYST BP LT 130 MM HG: CPT | Performed by: INTERNAL MEDICINE

## 2023-03-21 PROCEDURE — 87340 HEPATITIS B SURFACE AG IA: CPT

## 2023-03-21 PROCEDURE — 96413 CHEMO IV INFUSION 1 HR: CPT

## 2023-03-21 PROCEDURE — G8417 CALC BMI ABV UP PARAM F/U: HCPCS | Performed by: INTERNAL MEDICINE

## 2023-03-21 PROCEDURE — 1123F ACP DISCUSS/DSCN MKR DOCD: CPT | Performed by: INTERNAL MEDICINE

## 2023-03-21 PROCEDURE — 99214 OFFICE O/P EST MOD 30 MIN: CPT | Performed by: INTERNAL MEDICINE

## 2023-03-21 PROCEDURE — 85025 COMPLETE CBC W/AUTO DIFF WBC: CPT

## 2023-03-21 PROCEDURE — 86704 HEP B CORE ANTIBODY TOTAL: CPT

## 2023-03-21 PROCEDURE — 3078F DIAST BP <80 MM HG: CPT | Performed by: INTERNAL MEDICINE

## 2023-03-21 PROCEDURE — 2580000003 HC RX 258: Performed by: INTERNAL MEDICINE

## 2023-03-21 PROCEDURE — 36591 DRAW BLOOD OFF VENOUS DEVICE: CPT

## 2023-03-21 PROCEDURE — 84156 ASSAY OF PROTEIN URINE: CPT

## 2023-03-21 PROCEDURE — 6360000002 HC RX W HCPCS: Performed by: INTERNAL MEDICINE

## 2023-03-21 PROCEDURE — G8484 FLU IMMUNIZE NO ADMIN: HCPCS | Performed by: INTERNAL MEDICINE

## 2023-03-21 PROCEDURE — 1036F TOBACCO NON-USER: CPT | Performed by: INTERNAL MEDICINE

## 2023-03-21 PROCEDURE — 80053 COMPREHEN METABOLIC PANEL: CPT

## 2023-03-21 RX ORDER — HEPARIN SODIUM (PORCINE) LOCK FLUSH IV SOLN 100 UNIT/ML 100 UNIT/ML
500 SOLUTION INTRAVENOUS PRN
Status: CANCELLED | OUTPATIENT
Start: 2023-03-21

## 2023-03-21 RX ORDER — EPINEPHRINE 1 MG/ML
0.3 INJECTION, SOLUTION, CONCENTRATE INTRAVENOUS PRN
Status: CANCELLED | OUTPATIENT
Start: 2023-03-21

## 2023-03-21 RX ORDER — LIDOCAINE AND PRILOCAINE 25; 25 MG/G; MG/G
CREAM TOPICAL
Qty: 30 G | Refills: 2 | Status: SHIPPED | OUTPATIENT
Start: 2023-03-21

## 2023-03-21 RX ORDER — SODIUM CHLORIDE 0.9 % (FLUSH) 0.9 %
10 SYRINGE (ML) INJECTION PRN
Status: DISCONTINUED | OUTPATIENT
Start: 2023-03-21 | End: 2023-03-22 | Stop reason: HOSPADM

## 2023-03-21 RX ORDER — DIPHENHYDRAMINE HYDROCHLORIDE 50 MG/ML
50 INJECTION INTRAMUSCULAR; INTRAVENOUS
Status: DISCONTINUED | OUTPATIENT
Start: 2023-03-21 | End: 2023-03-22 | Stop reason: HOSPADM

## 2023-03-21 RX ORDER — SODIUM CHLORIDE 9 MG/ML
5-250 INJECTION, SOLUTION INTRAVENOUS PRN
Status: CANCELLED | OUTPATIENT
Start: 2023-03-21

## 2023-03-21 RX ORDER — MEPERIDINE HYDROCHLORIDE 50 MG/ML
12.5 INJECTION INTRAMUSCULAR; INTRAVENOUS; SUBCUTANEOUS PRN
Status: CANCELLED | OUTPATIENT
Start: 2023-03-21

## 2023-03-21 RX ORDER — SODIUM CHLORIDE 9 MG/ML
5-250 INJECTION, SOLUTION INTRAVENOUS PRN
Status: DISCONTINUED | OUTPATIENT
Start: 2023-03-21 | End: 2023-03-22 | Stop reason: HOSPADM

## 2023-03-21 RX ORDER — ONDANSETRON 2 MG/ML
8 INJECTION INTRAMUSCULAR; INTRAVENOUS
Status: CANCELLED | OUTPATIENT
Start: 2023-03-21

## 2023-03-21 RX ORDER — ALBUTEROL SULFATE 90 UG/1
4 AEROSOL, METERED RESPIRATORY (INHALATION) PRN
Status: CANCELLED | OUTPATIENT
Start: 2023-03-21

## 2023-03-21 RX ORDER — SODIUM CHLORIDE 9 MG/ML
5-40 INJECTION INTRAVENOUS PRN
Status: DISCONTINUED | OUTPATIENT
Start: 2023-03-21 | End: 2023-03-22 | Stop reason: HOSPADM

## 2023-03-21 RX ORDER — ONDANSETRON 2 MG/ML
8 INJECTION INTRAMUSCULAR; INTRAVENOUS
Status: DISCONTINUED | OUTPATIENT
Start: 2023-03-21 | End: 2023-03-22 | Stop reason: HOSPADM

## 2023-03-21 RX ORDER — ACETAMINOPHEN 325 MG/1
650 TABLET ORAL
Status: CANCELLED | OUTPATIENT
Start: 2023-03-21

## 2023-03-21 RX ORDER — SODIUM CHLORIDE 9 MG/ML
INJECTION, SOLUTION INTRAVENOUS CONTINUOUS
Status: CANCELLED | OUTPATIENT
Start: 2023-03-21

## 2023-03-21 RX ORDER — FAMOTIDINE 10 MG/ML
20 INJECTION, SOLUTION INTRAVENOUS
Status: CANCELLED | OUTPATIENT
Start: 2023-03-21

## 2023-03-21 RX ORDER — SODIUM CHLORIDE 9 MG/ML
5-40 INJECTION INTRAVENOUS PRN
Status: CANCELLED | OUTPATIENT
Start: 2023-03-21

## 2023-03-21 RX ORDER — DIPHENHYDRAMINE HYDROCHLORIDE 50 MG/ML
50 INJECTION INTRAMUSCULAR; INTRAVENOUS
Status: CANCELLED | OUTPATIENT
Start: 2023-03-21

## 2023-03-21 RX ORDER — MEPERIDINE HYDROCHLORIDE 25 MG/ML
12.5 INJECTION INTRAMUSCULAR; INTRAVENOUS; SUBCUTANEOUS PRN
Status: DISCONTINUED | OUTPATIENT
Start: 2023-03-21 | End: 2023-03-22 | Stop reason: HOSPADM

## 2023-03-21 RX ADMIN — SODIUM CHLORIDE, PRESERVATIVE FREE 10 ML: 5 INJECTION INTRAVENOUS at 14:20

## 2023-03-21 RX ADMIN — SODIUM CHLORIDE, PRESERVATIVE FREE 10 ML: 5 INJECTION INTRAVENOUS at 13:00

## 2023-03-21 RX ADMIN — SODIUM CHLORIDE 900 MG: 9 INJECTION, SOLUTION INTRAVENOUS at 13:35

## 2023-03-21 RX ADMIN — SODIUM CHLORIDE 100 ML/HR: 9 INJECTION, SOLUTION INTRAVENOUS at 13:00

## 2023-03-21 RX ADMIN — SODIUM CHLORIDE, PRESERVATIVE FREE 10 ML: 5 INJECTION INTRAVENOUS at 10:38

## 2023-03-21 ASSESSMENT — PATIENT HEALTH QUESTIONNAIRE - PHQ9
SUM OF ALL RESPONSES TO PHQ QUESTIONS 1-9: 0
SUM OF ALL RESPONSES TO PHQ QUESTIONS 1-9: 0
1. LITTLE INTEREST OR PLEASURE IN DOING THINGS: 0
2. FEELING DOWN, DEPRESSED OR HOPELESS: 0
SUM OF ALL RESPONSES TO PHQ QUESTIONS 1-9: 0
SUM OF ALL RESPONSES TO PHQ9 QUESTIONS 1 & 2: 0
SUM OF ALL RESPONSES TO PHQ QUESTIONS 1-9: 0

## 2023-03-21 NOTE — PATIENT INSTRUCTIONS
Patient Instructions from Today's Visit    Reason for Visit:  Follow up    Plan:  Proceed with Avastin every 2 weeks. We will plan on doing another MRI     Follow Up:   Follow up in 2 weeks    Recent Lab Results:  Hospital Outpatient Visit on 03/21/2023   Component Date Value Ref Range Status    WBC 03/21/2023 8.6  4.3 - 11.1 K/uL Final    RBC 03/21/2023 3.99 (A)  4.23 - 5.6 M/uL Final    Hemoglobin 03/21/2023 12.9 (A)  13.6 - 17.2 g/dL Final    Hematocrit 03/21/2023 38.2 (A)  41.1 - 50.3 % Final    MCV 03/21/2023 95.7  82.0 - 102.0 FL Final    MCH 03/21/2023 32.3  26.1 - 32.9 PG Final    MCHC 03/21/2023 33.8  31.4 - 35.0 g/dL Final    RDW 03/21/2023 14.0  11.9 - 14.6 % Final    Platelets 51/25/1707 96 (A)  150 - 450 K/uL Final    MPV 03/21/2023 9.7  9.4 - 12.3 FL Final    nRBC 03/21/2023 0.00  0.0 - 0.2 K/uL Final    **Note: Absolute NRBC parameter is now reported with Hemogram**    Seg Neutrophils 03/21/2023 86 (A)  43 - 78 % Final    Lymphocytes 03/21/2023 7 (A)  13 - 44 % Final    Monocytes 03/21/2023 6  4.0 - 12.0 % Final    Eosinophils % 03/21/2023 0 (A)  0.5 - 7.8 % Final    Basophils 03/21/2023 0  0.0 - 2.0 % Final    Immature Granulocytes 03/21/2023 1  0.0 - 5.0 % Final    Segs Absolute 03/21/2023 7.4  1.7 - 8.2 K/UL Final    Absolute Lymph # 03/21/2023 0.6  0.5 - 4.6 K/UL Final    Absolute Mono # 03/21/2023 0.5  0.1 - 1.3 K/UL Final    Absolute Eos # 03/21/2023 0.0  0.0 - 0.8 K/UL Final    Basophils Absolute 03/21/2023 0.0  0.0 - 0.2 K/UL Final    Absolute Immature Granulocyte 03/21/2023 0.1  0.0 - 0.5 K/UL Final    Differential Type 03/21/2023 AUTOMATED    Final    Sodium 03/21/2023 139  133 - 143 mmol/L Final    Potassium 03/21/2023 4.1  3.5 - 5.1 mmol/L Final    Chloride 03/21/2023 106  101 - 110 mmol/L Final    CO2 03/21/2023 26  21 - 32 mmol/L Final    Anion Gap 03/21/2023 7  2 - 11 mmol/L Final    Glucose 03/21/2023 219 (A)  65 - 100 mg/dL Final    BUN 03/21/2023 35 (A)  8 - 23 MG/DL Final

## 2023-03-21 NOTE — PLAN OF CARE
Problem: Infection - Adult  Goal: Absence of infection at discharge  Outcome: Progressing     Problem: Metabolic/Fluid and Electrolytes - Adult  Goal: Electrolytes maintained within normal limits  Outcome: Progressing  Goal: Hemodynamic stability and optimal renal function maintained  Outcome: Progressing     Problem: Hematologic - Adult  Goal: Maintains hematologic stability  Outcome: Progressing     Problem: Pain  Goal: Verbalizes/displays adequate comfort level or baseline comfort level  Outcome: Progressing

## 2023-03-21 NOTE — PROGRESS NOTES
tomorrow, given labs without cytopenias, we will simply see him back with repeat labs in a month. Now scheduled for repeat brain MRI per radiation oncology in 8/21.    7/20/2021: Continues to tolerate therapy well. Mobility has significantly improved, walking briskly in the room. Regular with his Keppra, as well as Bactrim. Blood work unremarkable, regular with Optune which he is tolerating well without any signs of scalp dermatitis. Okay to proceed with cycle 4. Scheduled for brain MRI and subsequent radiation oncology follow-up next month, will chart check imaging. Otherwise we will see him back in a month. 9/14/2021: Patient here for cycle 6 ( last cycle of adjuvant temozolomide). Performance status remains improved. Some fatigue though. Nausea better on days with Temodar since taking Zofran an hour before each dose. Also regular with his Keppra as well as Bactrim. Last brain MRI 8/21 with responding disease, now scheduled for repeat brain MRI in 11/17/2021. Labs today with thrombocytopenia with platelet count 59,694. Will defer next cycle by week, till counts recover. Discussed role of Optune therapy continuation beyond 6 cycles, they will discuss but are leaning towards continuing. 11/22/2021: More recently seeing primary care for sciatica as well as neck stiffness. Reasonable p.o. intake and mobility. Some scalp dermatitis, they have decided to forego further Optune therapy. This is reasonable. Blood work unremarkable, platelet count having normalized. Brain MRI with SD. Simple monitoring moving forward, will repeat brain imaging in 3 months time. 2/25/2022: Reports doing reasonably, some difficulty with time management in the day but otherwise mobile, has been in right now able to do things together, looking forward to GEOC[facility]ms in a few months. Has seen neurology, no further seizures, remains on Keppra 500 twice daily.   Recent brain MRI with no clear evidence of

## 2023-03-21 NOTE — PROGRESS NOTES
Port accessed using sterile technique. Lab draw via port per protocol. Port remains accessed. Pt discharged ambulatory.

## 2023-03-21 NOTE — PROGRESS NOTES
Arrived to the [facility] with spouse. D1C1 Avastin completed. Patient tolerated well. Any issues or concerns during appointment: none. Patient aware of next infusion appointment on 4/4 at 1:30 pm.   Patient instructed to call provider with temperature of 100.4 or greater or nausea/vomiting/ diarrhea or pain not controlled by medications  Discharged via wheelchair to home.

## 2023-03-22 LAB — HBV CORE AB SERPL QL IA: NEGATIVE

## 2023-04-03 DIAGNOSIS — Z79.899 HIGH RISK MEDICATION USE: ICD-10-CM

## 2023-04-03 DIAGNOSIS — C71.9 GLIOBLASTOMA (HCC): Primary | ICD-10-CM

## 2023-04-04 ENCOUNTER — OFFICE VISIT (OUTPATIENT)
Dept: ONCOLOGY | Age: 83
End: 2023-04-04
Payer: MEDICARE

## 2023-04-04 ENCOUNTER — HOSPITAL ENCOUNTER (OUTPATIENT)
Dept: INFUSION THERAPY | Age: 83
Discharge: HOME | End: 2023-04-04
Payer: MEDICARE

## 2023-04-04 ENCOUNTER — PATIENT MESSAGE (OUTPATIENT)
Dept: INTERNAL MEDICINE CLINIC | Facility: CLINIC | Age: 83
End: 2023-04-04

## 2023-04-04 VITALS
HEART RATE: 69 BPM | SYSTOLIC BLOOD PRESSURE: 124 MMHG | DIASTOLIC BLOOD PRESSURE: 66 MMHG | TEMPERATURE: 97.9 F | WEIGHT: 190.5 LBS | RESPIRATION RATE: 14 BRPM | OXYGEN SATURATION: 98 % | HEIGHT: 68 IN | BODY MASS INDEX: 28.87 KG/M2

## 2023-04-04 DIAGNOSIS — C71.9 GLIOBLASTOMA (HCC): ICD-10-CM

## 2023-04-04 DIAGNOSIS — C71.9 GLIOBLASTOMA (HCC): Primary | ICD-10-CM

## 2023-04-04 DIAGNOSIS — G40.89 OTHER SEIZURES (HCC): ICD-10-CM

## 2023-04-04 DIAGNOSIS — R56.9 CONVULSIONS, UNSPECIFIED CONVULSION TYPE (HCC): ICD-10-CM

## 2023-04-04 DIAGNOSIS — Z11.59 ENCOUNTER FOR SCREENING FOR OTHER VIRAL DISEASES: Primary | ICD-10-CM

## 2023-04-04 DIAGNOSIS — Z79.899 HIGH RISK MEDICATION USE: ICD-10-CM

## 2023-04-04 DIAGNOSIS — Z11.59 ENCOUNTER FOR SCREENING FOR OTHER VIRAL DISEASES: ICD-10-CM

## 2023-04-04 LAB
ALBUMIN SERPL-MCNC: 3.1 G/DL (ref 3.2–4.6)
ALBUMIN/GLOB SERPL: 1.1 (ref 0.4–1.6)
ALP SERPL-CCNC: 76 U/L (ref 50–136)
ALT SERPL-CCNC: 28 U/L (ref 12–65)
ANION GAP SERPL CALC-SCNC: 5 MMOL/L (ref 2–11)
AST SERPL-CCNC: 11 U/L (ref 15–37)
BASOPHILS # BLD: 0 K/UL (ref 0–0.2)
BASOPHILS NFR BLD: 0 % (ref 0–2)
BILIRUB SERPL-MCNC: 0.7 MG/DL (ref 0.2–1.1)
BUN SERPL-MCNC: 24 MG/DL (ref 8–23)
CALCIUM SERPL-MCNC: 8.3 MG/DL (ref 8.3–10.4)
CHLORIDE SERPL-SCNC: 110 MMOL/L (ref 101–110)
CO2 SERPL-SCNC: 26 MMOL/L (ref 21–32)
CREAT SERPL-MCNC: 1.4 MG/DL (ref 0.8–1.5)
DIFFERENTIAL METHOD BLD: ABNORMAL
EOSINOPHIL # BLD: 0 K/UL (ref 0–0.8)
EOSINOPHIL NFR BLD: 0 % (ref 0.5–7.8)
ERYTHROCYTE [DISTWIDTH] IN BLOOD BY AUTOMATED COUNT: 13.8 % (ref 11.9–14.6)
GLOBULIN SER CALC-MCNC: 2.8 G/DL (ref 2.8–4.5)
GLUCOSE SERPL-MCNC: 206 MG/DL (ref 65–100)
HCT VFR BLD AUTO: 38.7 % (ref 41.1–50.3)
HGB BLD-MCNC: 13.2 G/DL (ref 13.6–17.2)
IMM GRANULOCYTES # BLD AUTO: 0.1 K/UL (ref 0–0.5)
IMM GRANULOCYTES NFR BLD AUTO: 1 % (ref 0–5)
LYMPHOCYTES # BLD: 0.4 K/UL (ref 0.5–4.6)
LYMPHOCYTES NFR BLD: 5 % (ref 13–44)
MAGNESIUM SERPL-MCNC: 2.3 MG/DL (ref 1.8–2.4)
MCH RBC QN AUTO: 32.4 PG (ref 26.1–32.9)
MCHC RBC AUTO-ENTMCNC: 34.1 G/DL (ref 31.4–35)
MCV RBC AUTO: 94.9 FL (ref 82–102)
MONOCYTES # BLD: 0.5 K/UL (ref 0.1–1.3)
MONOCYTES NFR BLD: 6 % (ref 4–12)
NEUTS SEG # BLD: 7.3 K/UL (ref 1.7–8.2)
NEUTS SEG NFR BLD: 88 % (ref 43–78)
NRBC # BLD: 0.02 K/UL (ref 0–0.2)
PLATELET # BLD AUTO: 74 K/UL (ref 150–450)
PMV BLD AUTO: 9.7 FL (ref 9.4–12.3)
POTASSIUM SERPL-SCNC: 4.3 MMOL/L (ref 3.5–5.1)
PROT SERPL-MCNC: 5.9 G/DL (ref 6.3–8.2)
PROT UR-MCNC: 21 MG/DL
RBC # BLD AUTO: 4.08 M/UL (ref 4.23–5.6)
SODIUM SERPL-SCNC: 141 MMOL/L (ref 133–143)
WBC # BLD AUTO: 8.3 K/UL (ref 4.3–11.1)

## 2023-04-04 PROCEDURE — 80053 COMPREHEN METABOLIC PANEL: CPT

## 2023-04-04 PROCEDURE — 1123F ACP DISCUSS/DSCN MKR DOCD: CPT | Performed by: INTERNAL MEDICINE

## 2023-04-04 PROCEDURE — 85025 COMPLETE CBC W/AUTO DIFF WBC: CPT

## 2023-04-04 PROCEDURE — 84156 ASSAY OF PROTEIN URINE: CPT

## 2023-04-04 PROCEDURE — 2580000003 HC RX 258

## 2023-04-04 PROCEDURE — 3074F SYST BP LT 130 MM HG: CPT | Performed by: INTERNAL MEDICINE

## 2023-04-04 PROCEDURE — 1036F TOBACCO NON-USER: CPT | Performed by: INTERNAL MEDICINE

## 2023-04-04 PROCEDURE — 83735 ASSAY OF MAGNESIUM: CPT

## 2023-04-04 PROCEDURE — G8417 CALC BMI ABV UP PARAM F/U: HCPCS | Performed by: INTERNAL MEDICINE

## 2023-04-04 PROCEDURE — 2580000003 HC RX 258: Performed by: INTERNAL MEDICINE

## 2023-04-04 PROCEDURE — G8427 DOCREV CUR MEDS BY ELIG CLIN: HCPCS | Performed by: INTERNAL MEDICINE

## 2023-04-04 PROCEDURE — 6360000002 HC RX W HCPCS: Performed by: INTERNAL MEDICINE

## 2023-04-04 PROCEDURE — 99213 OFFICE O/P EST LOW 20 MIN: CPT | Performed by: INTERNAL MEDICINE

## 2023-04-04 PROCEDURE — 3078F DIAST BP <80 MM HG: CPT | Performed by: INTERNAL MEDICINE

## 2023-04-04 PROCEDURE — 96413 CHEMO IV INFUSION 1 HR: CPT

## 2023-04-04 PROCEDURE — 36591 DRAW BLOOD OFF VENOUS DEVICE: CPT

## 2023-04-04 RX ORDER — SODIUM CHLORIDE 9 MG/ML
5-250 INJECTION, SOLUTION INTRAVENOUS PRN
OUTPATIENT
Start: 2023-04-04

## 2023-04-04 RX ORDER — FAMOTIDINE 10 MG/ML
20 INJECTION, SOLUTION INTRAVENOUS
OUTPATIENT
Start: 2023-04-04

## 2023-04-04 RX ORDER — SODIUM CHLORIDE 0.9 % (FLUSH) 0.9 %
5-40 SYRINGE (ML) INJECTION PRN
Status: DISCONTINUED | OUTPATIENT
Start: 2023-04-04 | End: 2023-04-05 | Stop reason: HOSPADM

## 2023-04-04 RX ORDER — ACETAMINOPHEN 325 MG/1
650 TABLET ORAL
OUTPATIENT
Start: 2023-04-04

## 2023-04-04 RX ORDER — SODIUM CHLORIDE 9 MG/ML
5-250 INJECTION, SOLUTION INTRAVENOUS PRN
Status: CANCELLED | OUTPATIENT
Start: 2023-04-04

## 2023-04-04 RX ORDER — ALBUTEROL SULFATE 90 UG/1
4 AEROSOL, METERED RESPIRATORY (INHALATION) PRN
OUTPATIENT
Start: 2023-04-04

## 2023-04-04 RX ORDER — SODIUM CHLORIDE 9 MG/ML
5-40 INJECTION INTRAVENOUS PRN
Status: DISCONTINUED | OUTPATIENT
Start: 2023-04-04 | End: 2023-04-05 | Stop reason: HOSPADM

## 2023-04-04 RX ORDER — ONDANSETRON 2 MG/ML
8 INJECTION INTRAMUSCULAR; INTRAVENOUS
OUTPATIENT
Start: 2023-04-04

## 2023-04-04 RX ORDER — DIPHENHYDRAMINE HYDROCHLORIDE 50 MG/ML
50 INJECTION INTRAMUSCULAR; INTRAVENOUS
OUTPATIENT
Start: 2023-04-04

## 2023-04-04 RX ORDER — HEPARIN SODIUM (PORCINE) LOCK FLUSH IV SOLN 100 UNIT/ML 100 UNIT/ML
500 SOLUTION INTRAVENOUS PRN
OUTPATIENT
Start: 2023-04-04

## 2023-04-04 RX ORDER — SODIUM CHLORIDE 9 MG/ML
5-40 INJECTION INTRAVENOUS PRN
Status: CANCELLED | OUTPATIENT
Start: 2023-04-04

## 2023-04-04 RX ORDER — MEPERIDINE HYDROCHLORIDE 50 MG/ML
12.5 INJECTION INTRAMUSCULAR; INTRAVENOUS; SUBCUTANEOUS PRN
OUTPATIENT
Start: 2023-04-04

## 2023-04-04 RX ORDER — EPINEPHRINE 1 MG/ML
0.3 INJECTION, SOLUTION, CONCENTRATE INTRAVENOUS PRN
OUTPATIENT
Start: 2023-04-04

## 2023-04-04 RX ORDER — SODIUM CHLORIDE 9 MG/ML
5-250 INJECTION, SOLUTION INTRAVENOUS PRN
Status: DISCONTINUED | OUTPATIENT
Start: 2023-04-04 | End: 2023-04-05 | Stop reason: HOSPADM

## 2023-04-04 RX ORDER — SODIUM CHLORIDE 9 MG/ML
INJECTION, SOLUTION INTRAVENOUS CONTINUOUS
OUTPATIENT
Start: 2023-04-04

## 2023-04-04 RX ADMIN — SODIUM CHLORIDE 900 MG: 9 INJECTION, SOLUTION INTRAVENOUS at 14:58

## 2023-04-04 RX ADMIN — SODIUM CHLORIDE, PRESERVATIVE FREE 10 ML: 5 INJECTION INTRAVENOUS at 13:10

## 2023-04-04 RX ADMIN — SODIUM CHLORIDE, PRESERVATIVE FREE 10 ML: 5 INJECTION INTRAVENOUS at 14:41

## 2023-04-04 RX ADMIN — SODIUM CHLORIDE 100 ML/HR: 9 INJECTION, SOLUTION INTRAVENOUS at 14:41

## 2023-04-04 ASSESSMENT — PATIENT HEALTH QUESTIONNAIRE - PHQ9
1. LITTLE INTEREST OR PLEASURE IN DOING THINGS: 0
SUM OF ALL RESPONSES TO PHQ QUESTIONS 1-9: 0
SUM OF ALL RESPONSES TO PHQ9 QUESTIONS 1 & 2: 0
2. FEELING DOWN, DEPRESSED OR HOPELESS: 0
SUM OF ALL RESPONSES TO PHQ QUESTIONS 1-9: 0

## 2023-04-04 NOTE — PROGRESS NOTES
Patient arrived to port lab for port access and lab draw   Port accessed and labs drawn per protocol   *Port remains accessed.  Patient discharged from port lab via wheelchair.

## 2023-04-04 NOTE — PATIENT INSTRUCTIONS
breath  Swelling or pain in one leg    After office hours an answering service is available and will contact a provider for emergencies or if you are experiencing any of the above symptoms. Patient did express an interest in My Chart. My Chart log in information explained on the after visit summary printout at the [facility] desk.     Darlin Julio RN

## 2023-04-04 NOTE — LETTER
April 4, 2023      Buyt.Inlyn William, DO  [address]  [place] [facility] [address]      Patient: Gordon Marte   MR Number: [id number]   YOB: 1940   Date of Visit: 4/4/2023       Dear Ana Garcia Brothers: Thank you for referring Nida Hood to me for evaluation/treatment. Below are the relevant portions of my assessment and plan of care. If you have questions, please do not hesitate to call me. I look forward to following Jaylan  along with you.     Sincerely,        Joe Duckworth MD

## 2023-04-04 NOTE — PROGRESS NOTES
Tobacco Use    Smoking status: Former     Packs/day: 0.50     Years: 30.00     Pack years: 15.00     Types: Cigarettes     Quit date: 1970     Years since quittin.2    Smokeless tobacco: Never   Substance and Sexual Activity    Alcohol use: Not Currently    Drug use: No     Social Determinants of Health     Financial Resource Strain: Low Risk     Difficulty of Paying Living Expenses: Not hard at all   Food Insecurity: No Food Insecurity    Worried About Running Out of Food in the Last Year: Never true    Ran Out of Food in the Last Year: Never true   Transportation Needs: Unknown    Lack of Transportation (Non-Medical): No   Housing Stability: Unknown    Unstable Housing in the Last Year: No       Family History   Problem Relation Age of Onset    Cancer Mother         lung ca    Hypertension Mother     Heart Disease Mother     Osteoarthritis Father     Cancer Brother         stomach       No Known Allergies    PHYSICAL EXAMINATION:  General Appearance: Ilene L appearing patient in no acute distress  Vitals reviewed. /66 Comment: standing  Pulse 69   Temp 97.9 °F (36.6 °C) (Oral)   Resp 14   Ht 5' 8\" (1.727 m)   Wt 190 lb 8 oz (86.4 kg)   SpO2 98%   BMI 28.97 kg/m²   HEENT: Neck is supple with no thyromegaly or JVD noted. Lungs/Thorax: Clear to auscultation, no accessory muscles of respiration being used. Heart: Regular rate and rhythm, normal S1, S2, no appreciable murmurs, rubs, gallops  Abdomen: Soft, nontender, bowel sounds present, no appreciable hepatosplenomegaly, no palpable masses  Extremeties: + BLE peripheral edema. Skin: Normal skin tone with no rash, petechiae, ecchymosis noted.   Musculoskeletal: No pain on palpation over bony prominence, no edema, no evidence of gout, no joint or bony deformity  Neurologic: Grossly intact    LABS/IMAGING:    Lab Results   Component Value Date/Time    WBC 8.3 2023 12:57 PM    HGB 13.2 2023 12:57 PM    HCT 38.7 2023 12:57 PM

## 2023-04-04 NOTE — PROGRESS NOTES
Arrived to the [facility]. Zirabekevon completed. Patient tolerated well. Any issues or concerns during appointment: no.  Patient aware of next infusion appointment on 4/18/23 (date) at 36 (time). Patient aware of next lab and [facility] office visit on 4/18/23 (date) at [phone] (time). Patient instructed to call provider with temperature of 100.4 or greater or nausea/vomiting/ diarrhea or pain not controlled by medications  Discharged via wheelchair.

## 2023-04-17 DIAGNOSIS — R79.9 ABNORMAL FINDING OF BLOOD CHEMISTRY, UNSPECIFIED: ICD-10-CM

## 2023-04-17 DIAGNOSIS — C71.9 GLIOBLASTOMA (HCC): Primary | ICD-10-CM

## 2023-04-18 ENCOUNTER — HOSPITAL ENCOUNTER (OUTPATIENT)
Dept: INFUSION THERAPY | Age: 83
Discharge: HOME | End: 2023-04-18
Payer: MEDICARE

## 2023-04-18 ENCOUNTER — OFFICE VISIT (OUTPATIENT)
Dept: ONCOLOGY | Age: 83
End: 2023-04-18
Payer: MEDICARE

## 2023-04-18 VITALS
DIASTOLIC BLOOD PRESSURE: 66 MMHG | BODY MASS INDEX: 28.54 KG/M2 | WEIGHT: 188.3 LBS | HEART RATE: 74 BPM | SYSTOLIC BLOOD PRESSURE: 146 MMHG | RESPIRATION RATE: 14 BRPM | HEIGHT: 68 IN | TEMPERATURE: 97.6 F | OXYGEN SATURATION: 98 %

## 2023-04-18 DIAGNOSIS — C71.9 GLIOBLASTOMA (HCC): Primary | ICD-10-CM

## 2023-04-18 DIAGNOSIS — R53.83 FATIGUE DUE TO TREATMENT: ICD-10-CM

## 2023-04-18 DIAGNOSIS — C71.9 GLIOBLASTOMA (HCC): ICD-10-CM

## 2023-04-18 DIAGNOSIS — Z79.899 HIGH RISK MEDICATION USE: ICD-10-CM

## 2023-04-18 DIAGNOSIS — R79.9 ABNORMAL FINDING OF BLOOD CHEMISTRY, UNSPECIFIED: ICD-10-CM

## 2023-04-18 DIAGNOSIS — Z11.59 ENCOUNTER FOR SCREENING FOR OTHER VIRAL DISEASES: Primary | ICD-10-CM

## 2023-04-18 LAB
ALBUMIN SERPL-MCNC: 3.2 G/DL (ref 3.2–4.6)
ALBUMIN/GLOB SERPL: 1.2 (ref 0.4–1.6)
ALP SERPL-CCNC: 63 U/L (ref 50–136)
ALT SERPL-CCNC: 28 U/L (ref 12–65)
ANION GAP SERPL CALC-SCNC: 7 MMOL/L (ref 2–11)
AST SERPL-CCNC: 11 U/L (ref 15–37)
BASOPHILS # BLD: 0 K/UL (ref 0–0.2)
BASOPHILS NFR BLD: 0 % (ref 0–2)
BILIRUB SERPL-MCNC: 0.6 MG/DL (ref 0.2–1.1)
BUN SERPL-MCNC: 26 MG/DL (ref 8–23)
CALCIUM SERPL-MCNC: 8.4 MG/DL (ref 8.3–10.4)
CHLORIDE SERPL-SCNC: 113 MMOL/L (ref 101–110)
CO2 SERPL-SCNC: 24 MMOL/L (ref 21–32)
CREAT SERPL-MCNC: 1.3 MG/DL (ref 0.8–1.5)
DIFFERENTIAL METHOD BLD: ABNORMAL
EOSINOPHIL # BLD: 0 K/UL (ref 0–0.8)
EOSINOPHIL NFR BLD: 1 % (ref 0.5–7.8)
ERYTHROCYTE [DISTWIDTH] IN BLOOD BY AUTOMATED COUNT: 13.8 % (ref 11.9–14.6)
GLOBULIN SER CALC-MCNC: 2.7 G/DL (ref 2.8–4.5)
GLUCOSE SERPL-MCNC: 176 MG/DL (ref 65–100)
HCT VFR BLD AUTO: 36 % (ref 41.1–50.3)
HGB BLD-MCNC: 12.3 G/DL (ref 13.6–17.2)
IMM GRANULOCYTES # BLD AUTO: 0.1 K/UL (ref 0–0.5)
IMM GRANULOCYTES NFR BLD AUTO: 1 % (ref 0–5)
LYMPHOCYTES # BLD: 0.7 K/UL (ref 0.5–4.6)
LYMPHOCYTES NFR BLD: 12 % (ref 13–44)
MAGNESIUM SERPL-MCNC: 2.1 MG/DL (ref 1.8–2.4)
MCH RBC QN AUTO: 32.1 PG (ref 26.1–32.9)
MCHC RBC AUTO-ENTMCNC: 34.2 G/DL (ref 31.4–35)
MCV RBC AUTO: 94 FL (ref 82–102)
MONOCYTES # BLD: 0.5 K/UL (ref 0.1–1.3)
MONOCYTES NFR BLD: 8 % (ref 4–12)
NEUTS SEG # BLD: 4.7 K/UL (ref 1.7–8.2)
NEUTS SEG NFR BLD: 78 % (ref 43–78)
NRBC # BLD: 0.02 K/UL (ref 0–0.2)
PLATELET # BLD AUTO: 69 K/UL (ref 150–450)
PMV BLD AUTO: 9.6 FL (ref 9.4–12.3)
POTASSIUM SERPL-SCNC: 3.6 MMOL/L (ref 3.5–5.1)
PROT SERPL-MCNC: 5.9 G/DL (ref 6.3–8.2)
PROT UR-MCNC: 46 MG/DL
RBC # BLD AUTO: 3.83 M/UL (ref 4.23–5.6)
SODIUM SERPL-SCNC: 144 MMOL/L (ref 133–143)
WBC # BLD AUTO: 5.9 K/UL (ref 4.3–11.1)

## 2023-04-18 PROCEDURE — 6360000002 HC RX W HCPCS: Performed by: NURSE PRACTITIONER

## 2023-04-18 PROCEDURE — 1123F ACP DISCUSS/DSCN MKR DOCD: CPT | Performed by: NURSE PRACTITIONER

## 2023-04-18 PROCEDURE — 2580000003 HC RX 258: Performed by: NURSE PRACTITIONER

## 2023-04-18 PROCEDURE — 96413 CHEMO IV INFUSION 1 HR: CPT

## 2023-04-18 PROCEDURE — 83735 ASSAY OF MAGNESIUM: CPT

## 2023-04-18 PROCEDURE — 84156 ASSAY OF PROTEIN URINE: CPT

## 2023-04-18 PROCEDURE — 36591 DRAW BLOOD OFF VENOUS DEVICE: CPT

## 2023-04-18 PROCEDURE — 80053 COMPREHEN METABOLIC PANEL: CPT

## 2023-04-18 PROCEDURE — 85025 COMPLETE CBC W/AUTO DIFF WBC: CPT

## 2023-04-18 PROCEDURE — G8417 CALC BMI ABV UP PARAM F/U: HCPCS | Performed by: NURSE PRACTITIONER

## 2023-04-18 PROCEDURE — G8427 DOCREV CUR MEDS BY ELIG CLIN: HCPCS | Performed by: NURSE PRACTITIONER

## 2023-04-18 PROCEDURE — 3075F SYST BP GE 130 - 139MM HG: CPT | Performed by: NURSE PRACTITIONER

## 2023-04-18 PROCEDURE — 99214 OFFICE O/P EST MOD 30 MIN: CPT | Performed by: NURSE PRACTITIONER

## 2023-04-18 PROCEDURE — 1036F TOBACCO NON-USER: CPT | Performed by: NURSE PRACTITIONER

## 2023-04-18 PROCEDURE — 3078F DIAST BP <80 MM HG: CPT | Performed by: NURSE PRACTITIONER

## 2023-04-18 RX ORDER — SODIUM CHLORIDE 9 MG/ML
5-250 INJECTION, SOLUTION INTRAVENOUS PRN
Status: CANCELLED | OUTPATIENT
Start: 2023-04-18

## 2023-04-18 RX ORDER — ACETAMINOPHEN 325 MG/1
650 TABLET ORAL
Status: CANCELLED | OUTPATIENT
Start: 2023-04-18

## 2023-04-18 RX ORDER — SODIUM CHLORIDE 0.9 % (FLUSH) 0.9 %
5-40 SYRINGE (ML) INJECTION PRN
Status: CANCELLED | OUTPATIENT
Start: 2023-04-18

## 2023-04-18 RX ORDER — ALBUTEROL SULFATE 90 UG/1
4 AEROSOL, METERED RESPIRATORY (INHALATION) PRN
Status: CANCELLED | OUTPATIENT
Start: 2023-04-18

## 2023-04-18 RX ORDER — SODIUM CHLORIDE 0.9 % (FLUSH) 0.9 %
5-40 SYRINGE (ML) INJECTION PRN
Status: DISCONTINUED | OUTPATIENT
Start: 2023-04-18 | End: 2023-04-19 | Stop reason: HOSPADM

## 2023-04-18 RX ORDER — ONDANSETRON 2 MG/ML
8 INJECTION INTRAMUSCULAR; INTRAVENOUS
Status: CANCELLED | OUTPATIENT
Start: 2023-04-18

## 2023-04-18 RX ORDER — EPINEPHRINE 1 MG/ML
0.3 INJECTION, SOLUTION, CONCENTRATE INTRAVENOUS PRN
Status: CANCELLED | OUTPATIENT
Start: 2023-04-18

## 2023-04-18 RX ORDER — HEPARIN SODIUM (PORCINE) LOCK FLUSH IV SOLN 100 UNIT/ML 100 UNIT/ML
500 SOLUTION INTRAVENOUS PRN
Status: CANCELLED | OUTPATIENT
Start: 2023-04-18

## 2023-04-18 RX ORDER — SODIUM CHLORIDE 9 MG/ML
5-250 INJECTION, SOLUTION INTRAVENOUS PRN
Status: DISCONTINUED | OUTPATIENT
Start: 2023-04-18 | End: 2023-04-19 | Stop reason: HOSPADM

## 2023-04-18 RX ORDER — FAMOTIDINE 10 MG/ML
20 INJECTION, SOLUTION INTRAVENOUS
Status: CANCELLED | OUTPATIENT
Start: 2023-04-18

## 2023-04-18 RX ORDER — SODIUM CHLORIDE 0.9 % (FLUSH) 0.9 %
10 SYRINGE (ML) INJECTION PRN
Status: DISCONTINUED | OUTPATIENT
Start: 2023-04-18 | End: 2023-04-19 | Stop reason: HOSPADM

## 2023-04-18 RX ORDER — SODIUM CHLORIDE 9 MG/ML
INJECTION, SOLUTION INTRAVENOUS CONTINUOUS
Status: CANCELLED | OUTPATIENT
Start: 2023-04-18

## 2023-04-18 RX ORDER — MEPERIDINE HYDROCHLORIDE 50 MG/ML
12.5 INJECTION INTRAMUSCULAR; INTRAVENOUS; SUBCUTANEOUS PRN
Status: CANCELLED | OUTPATIENT
Start: 2023-04-18

## 2023-04-18 RX ORDER — DIPHENHYDRAMINE HYDROCHLORIDE 50 MG/ML
50 INJECTION INTRAMUSCULAR; INTRAVENOUS
Status: CANCELLED | OUTPATIENT
Start: 2023-04-18

## 2023-04-18 RX ORDER — HEPARIN SODIUM (PORCINE) LOCK FLUSH IV SOLN 100 UNIT/ML 100 UNIT/ML
500 SOLUTION INTRAVENOUS PRN
Status: DISCONTINUED | OUTPATIENT
Start: 2023-04-18 | End: 2023-04-19 | Stop reason: HOSPADM

## 2023-04-18 RX ADMIN — SODIUM CHLORIDE, PRESERVATIVE FREE 10 ML: 5 INJECTION INTRAVENOUS at 13:45

## 2023-04-18 RX ADMIN — SODIUM CHLORIDE, PRESERVATIVE FREE 10 ML: 5 INJECTION INTRAVENOUS at 11:13

## 2023-04-18 RX ADMIN — SODIUM CHLORIDE 50 ML/HR: 9 INJECTION, SOLUTION INTRAVENOUS at 12:30

## 2023-04-18 RX ADMIN — SODIUM CHLORIDE 900 MG: 9 INJECTION, SOLUTION INTRAVENOUS at 12:46

## 2023-04-18 ASSESSMENT — PATIENT HEALTH QUESTIONNAIRE - PHQ9
2. FEELING DOWN, DEPRESSED OR HOPELESS: 0
SUM OF ALL RESPONSES TO PHQ9 QUESTIONS 1 & 2: 0
SUM OF ALL RESPONSES TO PHQ QUESTIONS 1-9: 0
SUM OF ALL RESPONSES TO PHQ QUESTIONS 1-9: 0
1. LITTLE INTEREST OR PLEASURE IN DOING THINGS: 0
SUM OF ALL RESPONSES TO PHQ QUESTIONS 1-9: 0
SUM OF ALL RESPONSES TO PHQ QUESTIONS 1-9: 0

## 2023-04-18 NOTE — PROGRESS NOTES
placement 12/31/20  2. Fatigue    PLAN:  - As above. - S/p concurrent Temozolomide 75 mg/m2 daily w/ XRT (completed 3/26/21). On PCP prophylaxis w Bactrim DS MWF. Brain MRI w OH. Started (4/21) adjuvant Temozolomide 150 mg/m2 daily x 5 days every 28 days x 6 (completed 10/21). PD 6/22: s/p SBRT followed by a single agent temozolomide through March 2023. Single agent Avastin begun at that time. Proceed with cycle 3.   - Decrease dexamethasone to 2 mg in the morning and 1 mg in the evening.   - Optune alternating electrical field therapy post chemoradiation (5/21-10/21). - Continue Keppra 500 bid. - MGMT methylation detected, IDH wild type. - Anemia: No clear evidence of hemolysis, CTAP without retroperitoneal bleeding. Iron panel, Q49 and folic acid levels unremarkable. Referred to GI. Offered endoscopic work-up but patient preference to hold off for now. - thrombocytopenia - Platelets down to 86N - continue to monitor. He will return to clinic in 2 weeks to be seen by a nurse practitioner and in 4 weeks to be seen by Dr. Ginny Partida.         DREW Huang  [facility] Hematology and Oncology  [id number] [place] [address]  Office : [phone]  Fax : [phone]

## 2023-04-18 NOTE — PROGRESS NOTES
Arrived to the infusion center. Avastin completed without signs of adverse reaction. No new issues or concerns voiced during the visit. Aware of his next infusion appointment on 5/2 at 1130.  Discharged ambulatory in satisfactory condition

## 2023-05-02 ENCOUNTER — HOSPITAL ENCOUNTER (OUTPATIENT)
Dept: INFUSION THERAPY | Age: 83
Discharge: HOME | End: 2023-05-02
Payer: MEDICARE

## 2023-05-02 VITALS
SYSTOLIC BLOOD PRESSURE: 160 MMHG | BODY MASS INDEX: 29.35 KG/M2 | OXYGEN SATURATION: 95 % | WEIGHT: 193 LBS | DIASTOLIC BLOOD PRESSURE: 74 MMHG | TEMPERATURE: 97.6 F | RESPIRATION RATE: 16 BRPM | HEART RATE: 68 BPM

## 2023-05-02 DIAGNOSIS — Z11.59 ENCOUNTER FOR SCREENING FOR OTHER VIRAL DISEASES: ICD-10-CM

## 2023-05-02 DIAGNOSIS — C71.9 GLIOBLASTOMA (HCC): Primary | ICD-10-CM

## 2023-05-02 LAB
ALBUMIN SERPL-MCNC: 3 G/DL (ref 3.2–4.6)
ALBUMIN/GLOB SERPL: 1.2 (ref 0.4–1.6)
ALP SERPL-CCNC: 50 U/L (ref 50–136)
ALT SERPL-CCNC: 26 U/L (ref 12–65)
ANION GAP SERPL CALC-SCNC: 6 MMOL/L (ref 2–11)
APPEARANCE UR: CLEAR
AST SERPL-CCNC: 14 U/L (ref 15–37)
BACTERIA URNS QL MICRO: ABNORMAL /HPF
BASOPHILS # BLD: 0 K/UL (ref 0–0.2)
BASOPHILS NFR BLD: 0 % (ref 0–2)
BILIRUB SERPL-MCNC: 0.8 MG/DL (ref 0.2–1.1)
BILIRUB UR QL: NEGATIVE
BUN SERPL-MCNC: 20 MG/DL (ref 8–23)
CALCIUM SERPL-MCNC: 8.2 MG/DL (ref 8.3–10.4)
CASTS URNS QL MICRO: ABNORMAL /LPF
CHLORIDE SERPL-SCNC: 112 MMOL/L (ref 101–110)
CO2 SERPL-SCNC: 27 MMOL/L (ref 21–32)
COLOR UR: YELLOW
CREAT SERPL-MCNC: 1.1 MG/DL (ref 0.8–1.5)
CRYSTALS URNS QL MICRO: 0 /LPF
DIFFERENTIAL METHOD BLD: ABNORMAL
EOSINOPHIL # BLD: 0 K/UL (ref 0–0.8)
EOSINOPHIL NFR BLD: 1 % (ref 0.5–7.8)
EPI CELLS #/AREA URNS HPF: ABNORMAL /HPF
ERYTHROCYTE [DISTWIDTH] IN BLOOD BY AUTOMATED COUNT: 15.1 % (ref 11.9–14.6)
GLOBULIN SER CALC-MCNC: 2.6 G/DL (ref 2.8–4.5)
GLUCOSE SERPL-MCNC: 164 MG/DL (ref 65–100)
GLUCOSE UR STRIP.AUTO-MCNC: NEGATIVE MG/DL
HCT VFR BLD AUTO: 32.8 % (ref 41.1–50.3)
HGB BLD-MCNC: 11.1 G/DL (ref 13.6–17.2)
HGB UR QL STRIP: ABNORMAL
IMM GRANULOCYTES # BLD AUTO: 0.1 K/UL (ref 0–0.5)
IMM GRANULOCYTES NFR BLD AUTO: 1 % (ref 0–5)
KETONES UR QL STRIP.AUTO: NEGATIVE MG/DL
LEUKOCYTE ESTERASE UR QL STRIP.AUTO: NEGATIVE
LYMPHOCYTES # BLD: 1 K/UL (ref 0.5–4.6)
LYMPHOCYTES NFR BLD: 16 % (ref 13–44)
MCH RBC QN AUTO: 31.6 PG (ref 26.1–32.9)
MCHC RBC AUTO-ENTMCNC: 33.8 G/DL (ref 31.4–35)
MCV RBC AUTO: 93.4 FL (ref 82–102)
MONOCYTES # BLD: 0.5 K/UL (ref 0.1–1.3)
MONOCYTES NFR BLD: 8 % (ref 4–12)
MUCOUS THREADS URNS QL MICRO: ABNORMAL /LPF
NEUTS SEG # BLD: 4.4 K/UL (ref 1.7–8.2)
NEUTS SEG NFR BLD: 74 % (ref 43–78)
NITRITE UR QL STRIP.AUTO: NEGATIVE
NRBC # BLD: 0.02 K/UL (ref 0–0.2)
PH UR STRIP: 5.5 (ref 5–9)
PLATELET # BLD AUTO: 69 K/UL (ref 150–450)
PMV BLD AUTO: 10.2 FL (ref 9.4–12.3)
POTASSIUM SERPL-SCNC: 2.9 MMOL/L (ref 3.5–5.1)
PROT SERPL-MCNC: 5.6 G/DL (ref 6.3–8.2)
PROT UR STRIP-MCNC: NEGATIVE MG/DL
RBC # BLD AUTO: 3.51 M/UL (ref 4.23–5.6)
RBC #/AREA URNS HPF: ABNORMAL /HPF
SODIUM SERPL-SCNC: 145 MMOL/L (ref 133–143)
SP GR UR REFRACTOMETRY: 1.02 (ref 1–1.02)
UROBILINOGEN UR QL STRIP.AUTO: 0.2 EU/DL
WBC # BLD AUTO: 5.9 K/UL (ref 4.3–11.1)
WBC URNS QL MICRO: ABNORMAL /HPF

## 2023-05-02 PROCEDURE — 2580000003 HC RX 258: Performed by: NURSE PRACTITIONER

## 2023-05-02 PROCEDURE — 85025 COMPLETE CBC W/AUTO DIFF WBC: CPT

## 2023-05-02 PROCEDURE — 6370000000 HC RX 637 (ALT 250 FOR IP): Performed by: NURSE PRACTITIONER

## 2023-05-02 PROCEDURE — 96413 CHEMO IV INFUSION 1 HR: CPT

## 2023-05-02 PROCEDURE — 81001 URINALYSIS AUTO W/SCOPE: CPT

## 2023-05-02 PROCEDURE — 6360000002 HC RX W HCPCS: Performed by: NURSE PRACTITIONER

## 2023-05-02 PROCEDURE — 80053 COMPREHEN METABOLIC PANEL: CPT

## 2023-05-02 RX ORDER — ONDANSETRON 2 MG/ML
8 INJECTION INTRAMUSCULAR; INTRAVENOUS
Status: DISCONTINUED | OUTPATIENT
Start: 2023-05-02 | End: 2023-05-03 | Stop reason: HOSPADM

## 2023-05-02 RX ORDER — ACETAMINOPHEN 325 MG/1
650 TABLET ORAL
Status: DISCONTINUED | OUTPATIENT
Start: 2023-05-02 | End: 2023-05-03 | Stop reason: HOSPADM

## 2023-05-02 RX ORDER — DIPHENHYDRAMINE HYDROCHLORIDE 50 MG/ML
50 INJECTION INTRAMUSCULAR; INTRAVENOUS
Status: DISCONTINUED | OUTPATIENT
Start: 2023-05-02 | End: 2023-05-03 | Stop reason: HOSPADM

## 2023-05-02 RX ORDER — SODIUM CHLORIDE 9 MG/ML
INJECTION, SOLUTION INTRAVENOUS CONTINUOUS
Status: CANCELLED | OUTPATIENT
Start: 2023-05-02

## 2023-05-02 RX ORDER — EPINEPHRINE 1 MG/ML
0.3 INJECTION, SOLUTION, CONCENTRATE INTRAVENOUS PRN
Status: DISCONTINUED | OUTPATIENT
Start: 2023-05-02 | End: 2023-05-03 | Stop reason: HOSPADM

## 2023-05-02 RX ORDER — SODIUM CHLORIDE 9 MG/ML
5-250 INJECTION, SOLUTION INTRAVENOUS PRN
Status: CANCELLED | OUTPATIENT
Start: 2023-05-02

## 2023-05-02 RX ORDER — ALBUTEROL SULFATE 90 UG/1
4 AEROSOL, METERED RESPIRATORY (INHALATION) PRN
Status: DISCONTINUED | OUTPATIENT
Start: 2023-05-02 | End: 2023-05-03 | Stop reason: HOSPADM

## 2023-05-02 RX ORDER — SODIUM CHLORIDE 9 MG/ML
5-250 INJECTION, SOLUTION INTRAVENOUS PRN
Status: DISCONTINUED | OUTPATIENT
Start: 2023-05-02 | End: 2023-05-03 | Stop reason: HOSPADM

## 2023-05-02 RX ORDER — SODIUM CHLORIDE 0.9 % (FLUSH) 0.9 %
5-40 SYRINGE (ML) INJECTION PRN
Status: DISCONTINUED | OUTPATIENT
Start: 2023-05-02 | End: 2023-05-03 | Stop reason: HOSPADM

## 2023-05-02 RX ORDER — DEXAMETHASONE 2 MG/1
2 TABLET ORAL
Qty: 90 TABLET | Refills: 0 | Status: SHIPPED | OUTPATIENT
Start: 2023-05-02

## 2023-05-02 RX ORDER — MEPERIDINE HYDROCHLORIDE 25 MG/ML
12.5 INJECTION INTRAMUSCULAR; INTRAVENOUS; SUBCUTANEOUS PRN
Status: DISCONTINUED | OUTPATIENT
Start: 2023-05-02 | End: 2023-05-03 | Stop reason: HOSPADM

## 2023-05-02 RX ORDER — POTASSIUM CHLORIDE 750 MG/1
40 TABLET, EXTENDED RELEASE ORAL ONCE
Status: COMPLETED | OUTPATIENT
Start: 2023-05-02 | End: 2023-05-02

## 2023-05-02 RX ORDER — HEPARIN SODIUM (PORCINE) LOCK FLUSH IV SOLN 100 UNIT/ML 100 UNIT/ML
500 SOLUTION INTRAVENOUS PRN
Status: CANCELLED | OUTPATIENT
Start: 2023-05-02

## 2023-05-02 RX ADMIN — SODIUM CHLORIDE 25 ML/HR: 9 INJECTION, SOLUTION INTRAVENOUS at 13:03

## 2023-05-02 RX ADMIN — SODIUM CHLORIDE 900 MG: 9 INJECTION, SOLUTION INTRAVENOUS at 13:19

## 2023-05-02 RX ADMIN — SODIUM CHLORIDE, PRESERVATIVE FREE 10 ML: 5 INJECTION INTRAVENOUS at 13:06

## 2023-05-02 RX ADMIN — POTASSIUM CHLORIDE 40 MEQ: 750 TABLET, EXTENDED RELEASE ORAL at 13:15

## 2023-05-02 NOTE — PROGRESS NOTES
Arrived to the [facility]. Avastin completed. Patient tolerated well. Any issues or concerns during appointment: none. Patient aware of next infusion appointment on 5/16/23 (date) at 15 PM (time). Patient instructed to call provider with temperature of 100.4 or greater or nausea/vomiting/diarrhea or pain not controlled by medications  Discharged via wheelchair with wife.

## 2023-05-15 ENCOUNTER — HOSPITAL ENCOUNTER (INPATIENT)
Age: 83
LOS: 6 days | Discharge: NURSING, REHAB OR LONG-TERM CARE | DRG: 101 | End: 2023-05-22
Attending: EMERGENCY MEDICINE | Admitting: INTERNAL MEDICINE
Payer: MEDICARE

## 2023-05-15 ENCOUNTER — APPOINTMENT (OUTPATIENT)
Dept: GENERAL RADIOLOGY | Age: 83
DRG: 101 | End: 2023-05-15
Payer: MEDICARE

## 2023-05-15 ENCOUNTER — TELEPHONE (OUTPATIENT)
Dept: ONCOLOGY | Age: 83
End: 2023-05-15

## 2023-05-15 ENCOUNTER — APPOINTMENT (OUTPATIENT)
Dept: CT IMAGING | Age: 83
DRG: 101 | End: 2023-05-15
Payer: MEDICARE

## 2023-05-15 DIAGNOSIS — R56.9 SEIZURE (HCC): Primary | ICD-10-CM

## 2023-05-15 DIAGNOSIS — C71.9 GLIOBLASTOMA (HCC): ICD-10-CM

## 2023-05-15 DIAGNOSIS — R56.9 POSTICTAL STATE (HCC): ICD-10-CM

## 2023-05-15 DIAGNOSIS — C71.9 GLIOBLASTOMA (HCC): Primary | ICD-10-CM

## 2023-05-15 PROBLEM — N40.0 BENIGN PROSTATIC HYPERPLASIA WITHOUT LOWER URINARY TRACT SYMPTOMS: Chronic | Status: ACTIVE | Noted: 2022-02-04

## 2023-05-15 PROBLEM — K21.9 GASTROESOPHAGEAL REFLUX DISEASE: Status: RESOLVED | Noted: 2022-08-05 | Resolved: 2023-01-01

## 2023-05-15 PROBLEM — L71.9 ROSACEA: Status: RESOLVED | Noted: 2017-02-15 | Resolved: 2023-05-15

## 2023-05-15 PROBLEM — N18.30 CHRONIC RENAL DISEASE, STAGE III (HCC): Status: RESOLVED | Noted: 2022-07-07 | Resolved: 2023-05-15

## 2023-05-15 PROBLEM — Z87.898 HISTORY OF SEIZURE: Status: RESOLVED | Noted: 2020-12-28 | Resolved: 2023-05-15

## 2023-05-15 PROBLEM — M41.20 IDIOPATHIC SCOLIOSIS: Status: RESOLVED | Noted: 2018-06-07 | Resolved: 2023-05-15

## 2023-05-15 PROBLEM — H25.9 AGE-RELATED CATARACT OF BOTH EYES: Status: RESOLVED | Noted: 2022-08-05 | Resolved: 2023-05-15

## 2023-05-15 PROBLEM — Z98.890 HISTORY OF LUMBAR LAMINECTOMY FOR SPINAL CORD DECOMPRESSION: Status: RESOLVED | Noted: 2019-02-18 | Resolved: 2023-05-15

## 2023-05-15 PROBLEM — Z86.79 HISTORY OF INTRACRANIAL HEMORRHAGE: Status: RESOLVED | Noted: 2020-12-25 | Resolved: 2023-01-01

## 2023-05-15 PROBLEM — I10 ESSENTIAL HYPERTENSION: Status: ACTIVE | Noted: 2020-12-26

## 2023-05-15 LAB
ALBUMIN SERPL-MCNC: 3.6 G/DL (ref 3.2–4.6)
ALBUMIN/GLOB SERPL: 1.1 (ref 0.4–1.6)
ALP SERPL-CCNC: 58 U/L (ref 50–136)
ALT SERPL-CCNC: 30 U/L (ref 12–65)
ANION GAP SERPL CALC-SCNC: 6 MMOL/L (ref 2–11)
AST SERPL-CCNC: 16 U/L (ref 15–37)
BASOPHILS # BLD: 0 K/UL (ref 0–0.2)
BASOPHILS NFR BLD: 0 % (ref 0–2)
BILIRUB SERPL-MCNC: 0.9 MG/DL (ref 0.2–1.1)
BUN SERPL-MCNC: 18 MG/DL (ref 8–23)
CALCIUM SERPL-MCNC: 9.2 MG/DL (ref 8.3–10.4)
CHLORIDE SERPL-SCNC: 108 MMOL/L (ref 101–110)
CO2 SERPL-SCNC: 26 MMOL/L (ref 21–32)
CREAT SERPL-MCNC: 1.2 MG/DL (ref 0.8–1.5)
DIFFERENTIAL METHOD BLD: ABNORMAL
EOSINOPHIL # BLD: 0 K/UL (ref 0–0.8)
EOSINOPHIL NFR BLD: 0 % (ref 0.5–7.8)
ERYTHROCYTE [DISTWIDTH] IN BLOOD BY AUTOMATED COUNT: 15.8 % (ref 11.9–14.6)
GLOBULIN SER CALC-MCNC: 3.3 G/DL (ref 2.8–4.5)
GLUCOSE SERPL-MCNC: 161 MG/DL (ref 65–100)
HCT VFR BLD AUTO: 37.6 % (ref 41.1–50.3)
HGB BLD-MCNC: 12.9 G/DL (ref 13.6–17.2)
IMM GRANULOCYTES # BLD AUTO: 0.1 K/UL (ref 0–0.5)
IMM GRANULOCYTES NFR BLD AUTO: 1 % (ref 0–5)
LYMPHOCYTES # BLD: 0.4 K/UL (ref 0.5–4.6)
LYMPHOCYTES NFR BLD: 5 % (ref 13–44)
MAGNESIUM SERPL-MCNC: 1.9 MG/DL (ref 1.8–2.4)
MCH RBC QN AUTO: 32.3 PG (ref 26.1–32.9)
MCHC RBC AUTO-ENTMCNC: 34.3 G/DL (ref 31.4–35)
MCV RBC AUTO: 94 FL (ref 82–102)
MONOCYTES # BLD: 0.4 K/UL (ref 0.1–1.3)
MONOCYTES NFR BLD: 5 % (ref 4–12)
NEUTS SEG # BLD: 7.6 K/UL (ref 1.7–8.2)
NEUTS SEG NFR BLD: 89 % (ref 43–78)
NRBC # BLD: 0 K/UL (ref 0–0.2)
PHOSPHATE SERPL-MCNC: 2.4 MG/DL (ref 2.3–3.7)
PLATELET # BLD AUTO: 102 K/UL (ref 150–450)
PMV BLD AUTO: 10.4 FL (ref 9.4–12.3)
POTASSIUM SERPL-SCNC: 3.4 MMOL/L (ref 3.5–5.1)
PROT SERPL-MCNC: 6.9 G/DL (ref 6.3–8.2)
RBC # BLD AUTO: 4 M/UL (ref 4.23–5.6)
SODIUM SERPL-SCNC: 140 MMOL/L (ref 133–143)
WBC # BLD AUTO: 8.5 K/UL (ref 4.3–11.1)

## 2023-05-15 PROCEDURE — 83735 ASSAY OF MAGNESIUM: CPT

## 2023-05-15 PROCEDURE — G0378 HOSPITAL OBSERVATION PER HR: HCPCS

## 2023-05-15 PROCEDURE — 6360000002 HC RX W HCPCS: Performed by: EMERGENCY MEDICINE

## 2023-05-15 PROCEDURE — 72170 X-RAY EXAM OF PELVIS: CPT

## 2023-05-15 PROCEDURE — 71045 X-RAY EXAM CHEST 1 VIEW: CPT

## 2023-05-15 PROCEDURE — 85025 COMPLETE CBC W/AUTO DIFF WBC: CPT

## 2023-05-15 PROCEDURE — 70450 CT HEAD/BRAIN W/O DYE: CPT

## 2023-05-15 PROCEDURE — 96375 TX/PRO/DX INJ NEW DRUG ADDON: CPT

## 2023-05-15 PROCEDURE — 93005 ELECTROCARDIOGRAM TRACING: CPT | Performed by: EMERGENCY MEDICINE

## 2023-05-15 PROCEDURE — 6360000002 HC RX W HCPCS: Performed by: FAMILY MEDICINE

## 2023-05-15 PROCEDURE — 99285 EMERGENCY DEPT VISIT HI MDM: CPT

## 2023-05-15 PROCEDURE — 84100 ASSAY OF PHOSPHORUS: CPT

## 2023-05-15 PROCEDURE — 80053 COMPREHEN METABOLIC PANEL: CPT

## 2023-05-15 PROCEDURE — 2580000003 HC RX 258: Performed by: FAMILY MEDICINE

## 2023-05-15 PROCEDURE — 96374 THER/PROPH/DIAG INJ IV PUSH: CPT

## 2023-05-15 RX ORDER — ENOXAPARIN SODIUM 100 MG/ML
40 INJECTION SUBCUTANEOUS DAILY
Status: DISCONTINUED | OUTPATIENT
Start: 2023-05-16 | End: 2023-05-22 | Stop reason: HOSPADM

## 2023-05-15 RX ORDER — PANTOPRAZOLE SODIUM 40 MG/1
40 TABLET, DELAYED RELEASE ORAL
Status: DISCONTINUED | OUTPATIENT
Start: 2023-05-16 | End: 2023-05-22 | Stop reason: HOSPADM

## 2023-05-15 RX ORDER — LEVETIRACETAM 500 MG/1
750 TABLET ORAL 2 TIMES DAILY
Status: DISCONTINUED | OUTPATIENT
Start: 2023-05-16 | End: 2023-05-16

## 2023-05-15 RX ORDER — MAGNESIUM HYDROXIDE/ALUMINUM HYDROXICE/SIMETHICONE 120; 1200; 1200 MG/30ML; MG/30ML; MG/30ML
30 SUSPENSION ORAL EVERY 6 HOURS PRN
Status: DISCONTINUED | OUTPATIENT
Start: 2023-05-15 | End: 2023-05-22 | Stop reason: HOSPADM

## 2023-05-15 RX ORDER — POLYETHYLENE GLYCOL 3350 17 G/17G
17 POWDER, FOR SOLUTION ORAL DAILY
Status: DISCONTINUED | OUTPATIENT
Start: 2023-05-15 | End: 2023-05-22 | Stop reason: HOSPADM

## 2023-05-15 RX ORDER — LEVETIRACETAM 500 MG/5ML
1000 INJECTION, SOLUTION, CONCENTRATE INTRAVENOUS
Status: COMPLETED | OUTPATIENT
Start: 2023-05-15 | End: 2023-05-15

## 2023-05-15 RX ORDER — SERTRALINE HYDROCHLORIDE 100 MG/1
100 TABLET, FILM COATED ORAL DAILY
Status: DISCONTINUED | OUTPATIENT
Start: 2023-05-15 | End: 2023-05-22 | Stop reason: HOSPADM

## 2023-05-15 RX ORDER — MORPHINE SULFATE 4 MG/ML
4 INJECTION INTRAVENOUS EVERY 4 HOURS PRN
Status: DISCONTINUED | OUTPATIENT
Start: 2023-05-15 | End: 2023-05-22 | Stop reason: HOSPADM

## 2023-05-15 RX ORDER — LANOLIN ALCOHOL/MO/W.PET/CERES
6 CREAM (GRAM) TOPICAL NIGHTLY PRN
Status: DISCONTINUED | OUTPATIENT
Start: 2023-05-15 | End: 2023-05-22 | Stop reason: HOSPADM

## 2023-05-15 RX ORDER — SODIUM CHLORIDE 0.9 % (FLUSH) 0.9 %
5-40 SYRINGE (ML) INJECTION EVERY 12 HOURS SCHEDULED
Status: DISCONTINUED | OUTPATIENT
Start: 2023-05-15 | End: 2023-05-22 | Stop reason: HOSPADM

## 2023-05-15 RX ORDER — FUROSEMIDE 20 MG/1
20 TABLET ORAL DAILY
Status: DISCONTINUED | OUTPATIENT
Start: 2023-05-16 | End: 2023-05-22 | Stop reason: HOSPADM

## 2023-05-15 RX ORDER — DEXAMETHASONE SODIUM PHOSPHATE 10 MG/ML
10 INJECTION INTRAMUSCULAR; INTRAVENOUS
Status: COMPLETED | OUTPATIENT
Start: 2023-05-15 | End: 2023-05-15

## 2023-05-15 RX ORDER — ACETAMINOPHEN 325 MG/1
650 TABLET ORAL EVERY 6 HOURS PRN
Status: DISCONTINUED | OUTPATIENT
Start: 2023-05-15 | End: 2023-05-22 | Stop reason: HOSPADM

## 2023-05-15 RX ORDER — ACETAMINOPHEN 650 MG/1
650 SUPPOSITORY RECTAL EVERY 6 HOURS PRN
Status: DISCONTINUED | OUTPATIENT
Start: 2023-05-15 | End: 2023-05-22 | Stop reason: HOSPADM

## 2023-05-15 RX ORDER — POTASSIUM CHLORIDE 20 MEQ/1
40 TABLET, EXTENDED RELEASE ORAL PRN
Status: DISCONTINUED | OUTPATIENT
Start: 2023-05-15 | End: 2023-05-22 | Stop reason: HOSPADM

## 2023-05-15 RX ORDER — ONDANSETRON 2 MG/ML
4 INJECTION INTRAMUSCULAR; INTRAVENOUS EVERY 6 HOURS PRN
Status: DISCONTINUED | OUTPATIENT
Start: 2023-05-15 | End: 2023-05-22 | Stop reason: HOSPADM

## 2023-05-15 RX ORDER — SULFAMETHOXAZOLE AND TRIMETHOPRIM 800; 160 MG/1; MG/1
1 TABLET ORAL
Status: DISCONTINUED | OUTPATIENT
Start: 2023-05-15 | End: 2023-05-22 | Stop reason: HOSPADM

## 2023-05-15 RX ORDER — SODIUM CHLORIDE 0.9 % (FLUSH) 0.9 %
5-40 SYRINGE (ML) INJECTION PRN
Status: DISCONTINUED | OUTPATIENT
Start: 2023-05-15 | End: 2023-05-22 | Stop reason: HOSPADM

## 2023-05-15 RX ORDER — OXYCODONE HYDROCHLORIDE 5 MG/1
5 TABLET ORAL EVERY 4 HOURS PRN
Status: DISCONTINUED | OUTPATIENT
Start: 2023-05-15 | End: 2023-05-22 | Stop reason: HOSPADM

## 2023-05-15 RX ORDER — BISACODYL 5 MG/1
5 TABLET, DELAYED RELEASE ORAL DAILY PRN
Status: DISCONTINUED | OUTPATIENT
Start: 2023-05-15 | End: 2023-05-22 | Stop reason: HOSPADM

## 2023-05-15 RX ORDER — SODIUM CHLORIDE 9 MG/ML
INJECTION, SOLUTION INTRAVENOUS PRN
Status: DISCONTINUED | OUTPATIENT
Start: 2023-05-15 | End: 2023-05-22 | Stop reason: HOSPADM

## 2023-05-15 RX ORDER — MAGNESIUM SULFATE IN WATER 40 MG/ML
2000 INJECTION, SOLUTION INTRAVENOUS PRN
Status: DISCONTINUED | OUTPATIENT
Start: 2023-05-15 | End: 2023-05-22 | Stop reason: HOSPADM

## 2023-05-15 RX ORDER — DEXAMETHASONE 0.5 MG/1
2 TABLET ORAL
Status: DISCONTINUED | OUTPATIENT
Start: 2023-05-16 | End: 2023-05-17

## 2023-05-15 RX ORDER — ATORVASTATIN CALCIUM 40 MG/1
40 TABLET, FILM COATED ORAL NIGHTLY
Status: DISCONTINUED | OUTPATIENT
Start: 2023-05-15 | End: 2023-05-22 | Stop reason: HOSPADM

## 2023-05-15 RX ORDER — TAMSULOSIN HYDROCHLORIDE 0.4 MG/1
0.4 CAPSULE ORAL DAILY
Status: DISCONTINUED | OUTPATIENT
Start: 2023-05-16 | End: 2023-05-22 | Stop reason: HOSPADM

## 2023-05-15 RX ORDER — POTASSIUM CHLORIDE 7.45 MG/ML
10 INJECTION INTRAVENOUS PRN
Status: DISCONTINUED | OUTPATIENT
Start: 2023-05-15 | End: 2023-05-22 | Stop reason: HOSPADM

## 2023-05-15 RX ORDER — PROMETHAZINE HYDROCHLORIDE 25 MG/1
12.5 TABLET ORAL EVERY 6 HOURS PRN
Status: DISCONTINUED | OUTPATIENT
Start: 2023-05-15 | End: 2023-05-22 | Stop reason: HOSPADM

## 2023-05-15 RX ADMIN — SODIUM CHLORIDE, PRESERVATIVE FREE 10 ML: 5 INJECTION INTRAVENOUS at 21:06

## 2023-05-15 RX ADMIN — LEVETIRACETAM 1000 MG: 500 INJECTION, SOLUTION INTRAVENOUS at 15:15

## 2023-05-15 RX ADMIN — MORPHINE SULFATE 4 MG: 4 INJECTION INTRAVENOUS at 21:06

## 2023-05-15 RX ADMIN — DEXAMETHASONE SODIUM PHOSPHATE 10 MG: 10 INJECTION INTRAMUSCULAR; INTRAVENOUS at 15:15

## 2023-05-15 ASSESSMENT — ENCOUNTER SYMPTOMS
DIARRHEA: 0
COUGH: 0
VOMITING: 0
CHEST TIGHTNESS: 0
EYE DISCHARGE: 0
EYE ITCHING: 0
NAUSEA: 0
SHORTNESS OF BREATH: 0

## 2023-05-16 ENCOUNTER — APPOINTMENT (OUTPATIENT)
Dept: MRI IMAGING | Age: 83
DRG: 101 | End: 2023-05-16
Payer: MEDICARE

## 2023-05-16 ENCOUNTER — HOSPITAL ENCOUNTER (OUTPATIENT)
Dept: INFUSION THERAPY | Age: 83
End: 2023-05-16

## 2023-05-16 PROBLEM — R56.9 SEIZURE (HCC): Status: ACTIVE | Noted: 2023-01-01

## 2023-05-16 PROBLEM — G93.40 ENCEPHALOPATHY: Status: ACTIVE | Noted: 2023-05-16

## 2023-05-16 LAB
ANION GAP SERPL CALC-SCNC: ABNORMAL MMOL/L (ref 2–11)
BASOPHILS # BLD: 0 K/UL (ref 0–0.2)
BASOPHILS NFR BLD: 0 % (ref 0–2)
BUN SERPL-MCNC: 18 MG/DL (ref 8–23)
CALCIUM SERPL-MCNC: 9.1 MG/DL (ref 8.3–10.4)
CHLORIDE SERPL-SCNC: 110 MMOL/L (ref 101–110)
CO2 SERPL-SCNC: 29 MMOL/L (ref 21–32)
CREAT SERPL-MCNC: 1 MG/DL (ref 0.8–1.5)
DIFFERENTIAL METHOD BLD: ABNORMAL
EKG ATRIAL RATE: 102 BPM
EKG DIAGNOSIS: NORMAL
EKG P AXIS: 36 DEGREES
EKG P-R INTERVAL: 171 MS
EKG Q-T INTERVAL: 362 MS
EKG QRS DURATION: 87 MS
EKG QTC CALCULATION (BAZETT): 472 MS
EKG R AXIS: 32 DEGREES
EKG T AXIS: 16 DEGREES
EKG VENTRICULAR RATE: 102 BPM
EOSINOPHIL # BLD: 0 K/UL (ref 0–0.8)
EOSINOPHIL NFR BLD: 0 % (ref 0.5–7.8)
ERYTHROCYTE [DISTWIDTH] IN BLOOD BY AUTOMATED COUNT: 15.8 % (ref 11.9–14.6)
GLUCOSE SERPL-MCNC: 129 MG/DL (ref 65–100)
HCT VFR BLD AUTO: 35.3 % (ref 41.1–50.3)
HGB BLD-MCNC: 11.9 G/DL (ref 13.6–17.2)
IMM GRANULOCYTES # BLD AUTO: 0 K/UL (ref 0–0.5)
IMM GRANULOCYTES NFR BLD AUTO: 1 % (ref 0–5)
LYMPHOCYTES # BLD: 0.8 K/UL (ref 0.5–4.6)
LYMPHOCYTES NFR BLD: 9 % (ref 13–44)
MCH RBC QN AUTO: 31.7 PG (ref 26.1–32.9)
MCHC RBC AUTO-ENTMCNC: 33.7 G/DL (ref 31.4–35)
MCV RBC AUTO: 94.1 FL (ref 82–102)
MONOCYTES # BLD: 0.8 K/UL (ref 0.1–1.3)
MONOCYTES NFR BLD: 9 % (ref 4–12)
NEUTS SEG # BLD: 7.2 K/UL (ref 1.7–8.2)
NEUTS SEG NFR BLD: 82 % (ref 43–78)
NRBC # BLD: 0 K/UL (ref 0–0.2)
PLATELET # BLD AUTO: 98 K/UL (ref 150–450)
PMV BLD AUTO: 10.5 FL (ref 9.4–12.3)
POTASSIUM SERPL-SCNC: 3.7 MMOL/L (ref 3.5–5.1)
RBC # BLD AUTO: 3.75 M/UL (ref 4.23–5.6)
SODIUM SERPL-SCNC: 136 MMOL/L (ref 133–143)
WBC # BLD AUTO: 8.8 K/UL (ref 4.3–11.1)

## 2023-05-16 PROCEDURE — 85025 COMPLETE CBC W/AUTO DIFF WBC: CPT

## 2023-05-16 PROCEDURE — APPSS60 APP SPLIT SHARED TIME 46-60 MINUTES: Performed by: NURSE PRACTITIONER

## 2023-05-16 PROCEDURE — 96376 TX/PRO/DX INJ SAME DRUG ADON: CPT

## 2023-05-16 PROCEDURE — 80048 BASIC METABOLIC PNL TOTAL CA: CPT

## 2023-05-16 PROCEDURE — 6360000002 HC RX W HCPCS: Performed by: FAMILY MEDICINE

## 2023-05-16 PROCEDURE — 96372 THER/PROPH/DIAG INJ SC/IM: CPT

## 2023-05-16 PROCEDURE — 1100000000 HC RM PRIVATE

## 2023-05-16 PROCEDURE — G0378 HOSPITAL OBSERVATION PER HR: HCPCS

## 2023-05-16 PROCEDURE — 36415 COLL VENOUS BLD VENIPUNCTURE: CPT

## 2023-05-16 PROCEDURE — 92610 EVALUATE SWALLOWING FUNCTION: CPT

## 2023-05-16 PROCEDURE — 6360000002 HC RX W HCPCS: Performed by: INTERNAL MEDICINE

## 2023-05-16 PROCEDURE — 2580000003 HC RX 258: Performed by: FAMILY MEDICINE

## 2023-05-16 PROCEDURE — 6360000004 HC RX CONTRAST MEDICATION: Performed by: FAMILY MEDICINE

## 2023-05-16 PROCEDURE — 6370000000 HC RX 637 (ALT 250 FOR IP): Performed by: FAMILY MEDICINE

## 2023-05-16 PROCEDURE — 70553 MRI BRAIN STEM W/O & W/DYE: CPT

## 2023-05-16 PROCEDURE — A9579 GAD-BASE MR CONTRAST NOS,1ML: HCPCS | Performed by: FAMILY MEDICINE

## 2023-05-16 RX ORDER — LEVETIRACETAM 500 MG/5ML
750 INJECTION, SOLUTION, CONCENTRATE INTRAVENOUS EVERY 12 HOURS
Status: DISCONTINUED | OUTPATIENT
Start: 2023-05-16 | End: 2023-05-22 | Stop reason: HOSPADM

## 2023-05-16 RX ORDER — SODIUM CHLORIDE 0.9 % (FLUSH) 0.9 %
10 SYRINGE (ML) INJECTION AS NEEDED
Status: DISCONTINUED | OUTPATIENT
Start: 2023-05-16 | End: 2023-05-22 | Stop reason: HOSPADM

## 2023-05-16 RX ADMIN — ATORVASTATIN CALCIUM 40 MG: 40 TABLET, FILM COATED ORAL at 20:07

## 2023-05-16 RX ADMIN — MORPHINE SULFATE 4 MG: 4 INJECTION INTRAVENOUS at 02:23

## 2023-05-16 RX ADMIN — LEVETIRACETAM 750 MG: 500 INJECTION, SOLUTION INTRAVENOUS at 10:02

## 2023-05-16 RX ADMIN — GADOTERIDOL 18 ML: 279.3 INJECTION, SOLUTION INTRAVENOUS at 15:13

## 2023-05-16 RX ADMIN — SODIUM CHLORIDE, PRESERVATIVE FREE 10 ML: 5 INJECTION INTRAVENOUS at 20:08

## 2023-05-16 RX ADMIN — MORPHINE SULFATE 4 MG: 4 INJECTION INTRAVENOUS at 22:21

## 2023-05-16 RX ADMIN — SODIUM CHLORIDE, PRESERVATIVE FREE 10 ML: 5 INJECTION INTRAVENOUS at 08:43

## 2023-05-16 RX ADMIN — SODIUM CHLORIDE, PRESERVATIVE FREE 10 ML: 5 INJECTION INTRAVENOUS at 15:13

## 2023-05-16 RX ADMIN — ENOXAPARIN SODIUM 40 MG: 100 INJECTION SUBCUTANEOUS at 08:51

## 2023-05-16 RX ADMIN — LEVETIRACETAM 750 MG: 500 INJECTION, SOLUTION INTRAVENOUS at 20:07

## 2023-05-16 ASSESSMENT — PAIN DESCRIPTION - FREQUENCY: FREQUENCY: CONTINUOUS

## 2023-05-16 ASSESSMENT — PAIN DESCRIPTION - PAIN TYPE: TYPE: ACUTE PAIN

## 2023-05-16 ASSESSMENT — PAIN DESCRIPTION - ONSET: ONSET: GRADUAL

## 2023-05-16 ASSESSMENT — PAIN SCALES - GENERAL
PAINLEVEL_OUTOF10: 7
PAINLEVEL_OUTOF10: 0
PAINLEVEL_OUTOF10: 0

## 2023-05-16 ASSESSMENT — PAIN DESCRIPTION - DESCRIPTORS: DESCRIPTORS: ACHING

## 2023-05-16 ASSESSMENT — PAIN - FUNCTIONAL ASSESSMENT: PAIN_FUNCTIONAL_ASSESSMENT: ACTIVITIES ARE NOT PREVENTED

## 2023-05-16 ASSESSMENT — PAIN DESCRIPTION - LOCATION: LOCATION: BACK

## 2023-05-16 ASSESSMENT — PAIN DESCRIPTION - ORIENTATION: ORIENTATION: MID

## 2023-05-17 LAB
AMMONIA PLAS-SCNC: 17 UMOL/L (ref 11–32)
APPEARANCE UR: CLEAR
BACTERIA URNS QL MICRO: NEGATIVE /HPF
BILIRUB UR QL: NEGATIVE
CASTS URNS QL MICRO: ABNORMAL /LPF
COLOR UR: ABNORMAL
EPI CELLS #/AREA URNS HPF: ABNORMAL /HPF
GLUCOSE UR STRIP.AUTO-MCNC: NEGATIVE MG/DL
HGB UR QL STRIP: ABNORMAL
KETONES UR QL STRIP.AUTO: ABNORMAL MG/DL
LEUKOCYTE ESTERASE UR QL STRIP.AUTO: NEGATIVE
NITRITE UR QL STRIP.AUTO: NEGATIVE
PH UR STRIP: 5.5 (ref 5–9)
PROT UR STRIP-MCNC: ABNORMAL MG/DL
RBC #/AREA URNS HPF: ABNORMAL /HPF
SP GR UR REFRACTOMETRY: 1.02 (ref 1–1.02)
UROBILINOGEN UR QL STRIP.AUTO: 1 EU/DL (ref 0.2–1)
WBC URNS QL MICRO: ABNORMAL /HPF

## 2023-05-17 PROCEDURE — 6360000002 HC RX W HCPCS: Performed by: INTERNAL MEDICINE

## 2023-05-17 PROCEDURE — 97112 NEUROMUSCULAR REEDUCATION: CPT

## 2023-05-17 PROCEDURE — 1100000000 HC RM PRIVATE

## 2023-05-17 PROCEDURE — 97535 SELF CARE MNGMENT TRAINING: CPT

## 2023-05-17 PROCEDURE — 2500000003 HC RX 250 WO HCPCS: Performed by: FAMILY MEDICINE

## 2023-05-17 PROCEDURE — 6370000000 HC RX 637 (ALT 250 FOR IP): Performed by: FAMILY MEDICINE

## 2023-05-17 PROCEDURE — 97162 PT EVAL MOD COMPLEX 30 MIN: CPT

## 2023-05-17 PROCEDURE — 95819 EEG AWAKE AND ASLEEP: CPT

## 2023-05-17 PROCEDURE — 97165 OT EVAL LOW COMPLEX 30 MIN: CPT

## 2023-05-17 PROCEDURE — 6360000002 HC RX W HCPCS: Performed by: NURSE PRACTITIONER

## 2023-05-17 PROCEDURE — 2580000003 HC RX 258: Performed by: FAMILY MEDICINE

## 2023-05-17 PROCEDURE — 2500000003 HC RX 250 WO HCPCS: Performed by: INTERNAL MEDICINE

## 2023-05-17 PROCEDURE — 97530 THERAPEUTIC ACTIVITIES: CPT

## 2023-05-17 PROCEDURE — 99232 SBSQ HOSP IP/OBS MODERATE 35: CPT | Performed by: INTERNAL MEDICINE

## 2023-05-17 PROCEDURE — 36415 COLL VENOUS BLD VENIPUNCTURE: CPT

## 2023-05-17 PROCEDURE — 81001 URINALYSIS AUTO W/SCOPE: CPT

## 2023-05-17 PROCEDURE — 82140 ASSAY OF AMMONIA: CPT

## 2023-05-17 PROCEDURE — APPSS45 APP SPLIT SHARED TIME 31-45 MINUTES: Performed by: NURSE PRACTITIONER

## 2023-05-17 PROCEDURE — 99232 SBSQ HOSP IP/OBS MODERATE 35: CPT | Performed by: NURSE PRACTITIONER

## 2023-05-17 PROCEDURE — 6360000002 HC RX W HCPCS: Performed by: FAMILY MEDICINE

## 2023-05-17 RX ORDER — DEXAMETHASONE SODIUM PHOSPHATE 4 MG/ML
1 INJECTION, SOLUTION INTRA-ARTICULAR; INTRALESIONAL; INTRAMUSCULAR; INTRAVENOUS; SOFT TISSUE EVERY EVENING
Status: DISCONTINUED | OUTPATIENT
Start: 2023-05-17 | End: 2023-05-22 | Stop reason: HOSPADM

## 2023-05-17 RX ORDER — DEXAMETHASONE SODIUM PHOSPHATE 4 MG/ML
2 INJECTION, SOLUTION INTRA-ARTICULAR; INTRALESIONAL; INTRAMUSCULAR; INTRAVENOUS; SOFT TISSUE EVERY MORNING
Status: DISCONTINUED | OUTPATIENT
Start: 2023-05-17 | End: 2023-05-22 | Stop reason: HOSPADM

## 2023-05-17 RX ADMIN — POTASSIUM BICARBONATE 20 MEQ: 391 TABLET, EFFERVESCENT ORAL at 09:01

## 2023-05-17 RX ADMIN — ATORVASTATIN CALCIUM 40 MG: 40 TABLET, FILM COATED ORAL at 19:41

## 2023-05-17 RX ADMIN — FUROSEMIDE 20 MG: 20 TABLET ORAL at 09:03

## 2023-05-17 RX ADMIN — WATER 5 MG: 1 INJECTION INTRAMUSCULAR; INTRAVENOUS; SUBCUTANEOUS at 20:35

## 2023-05-17 RX ADMIN — DEXAMETHASONE SODIUM PHOSPHATE 1 MG: 4 INJECTION, SOLUTION INTRAMUSCULAR; INTRAVENOUS at 17:28

## 2023-05-17 RX ADMIN — SODIUM CHLORIDE, PRESERVATIVE FREE 10 ML: 5 INJECTION INTRAVENOUS at 19:41

## 2023-05-17 RX ADMIN — SODIUM CHLORIDE, PRESERVATIVE FREE 10 ML: 5 INJECTION INTRAVENOUS at 09:18

## 2023-05-17 RX ADMIN — LEVETIRACETAM 750 MG: 500 INJECTION, SOLUTION INTRAVENOUS at 09:14

## 2023-05-17 RX ADMIN — TAMSULOSIN HYDROCHLORIDE 0.4 MG: 0.4 CAPSULE ORAL at 09:03

## 2023-05-17 RX ADMIN — SERTRALINE 100 MG: 100 TABLET, FILM COATED ORAL at 09:02

## 2023-05-17 RX ADMIN — ENOXAPARIN SODIUM 40 MG: 100 INJECTION SUBCUTANEOUS at 09:25

## 2023-05-17 RX ADMIN — SULFAMETHOXAZOLE AND TRIMETHOPRIM 1 TABLET: 800; 160 TABLET ORAL at 09:03

## 2023-05-17 RX ADMIN — DEXAMETHASONE SODIUM PHOSPHATE 2 MG: 4 INJECTION, SOLUTION INTRAMUSCULAR; INTRAVENOUS at 09:03

## 2023-05-17 RX ADMIN — LEVETIRACETAM 750 MG: 500 INJECTION, SOLUTION INTRAVENOUS at 19:41

## 2023-05-17 RX ADMIN — POLYETHYLENE GLYCOL 3350 17 G: 17 POWDER, FOR SOLUTION ORAL at 09:01

## 2023-05-17 RX ADMIN — MORPHINE SULFATE 4 MG: 4 INJECTION INTRAVENOUS at 20:06

## 2023-05-17 RX ADMIN — TUBERCULIN PURIFIED PROTEIN DERIVATIVE 5 UNITS: 5 INJECTION, SOLUTION INTRADERMAL at 15:11

## 2023-05-17 ASSESSMENT — PAIN DESCRIPTION - DESCRIPTORS: DESCRIPTORS: ACHING

## 2023-05-17 ASSESSMENT — PAIN SCALES - GENERAL
PAINLEVEL_OUTOF10: 0
PAINLEVEL_OUTOF10: 8
PAINLEVEL_OUTOF10: 0

## 2023-05-17 ASSESSMENT — PAIN DESCRIPTION - ORIENTATION: ORIENTATION: MID

## 2023-05-17 ASSESSMENT — PAIN DESCRIPTION - PAIN TYPE: TYPE: ACUTE PAIN

## 2023-05-17 ASSESSMENT — PAIN DESCRIPTION - FREQUENCY: FREQUENCY: CONTINUOUS

## 2023-05-17 ASSESSMENT — PAIN DESCRIPTION - ONSET: ONSET: PROGRESSIVE

## 2023-05-17 ASSESSMENT — PAIN - FUNCTIONAL ASSESSMENT: PAIN_FUNCTIONAL_ASSESSMENT: ACTIVITIES ARE NOT PREVENTED

## 2023-05-17 ASSESSMENT — PAIN DESCRIPTION - LOCATION: LOCATION: BACK

## 2023-05-18 LAB
ANION GAP SERPL CALC-SCNC: 2 MMOL/L (ref 2–11)
BUN SERPL-MCNC: 18 MG/DL (ref 8–23)
CALCIUM SERPL-MCNC: 8.6 MG/DL (ref 8.3–10.4)
CHLORIDE SERPL-SCNC: 108 MMOL/L (ref 101–110)
CO2 SERPL-SCNC: 28 MMOL/L (ref 21–32)
CREAT SERPL-MCNC: 1 MG/DL (ref 0.8–1.5)
ERYTHROCYTE [DISTWIDTH] IN BLOOD BY AUTOMATED COUNT: 15.4 % (ref 11.9–14.6)
GLUCOSE SERPL-MCNC: 117 MG/DL (ref 65–100)
HCT VFR BLD AUTO: 34.4 % (ref 41.1–50.3)
HGB BLD-MCNC: 12 G/DL (ref 13.6–17.2)
MAGNESIUM SERPL-MCNC: 2.2 MG/DL (ref 1.8–2.4)
MCH RBC QN AUTO: 32.5 PG (ref 26.1–32.9)
MCHC RBC AUTO-ENTMCNC: 34.9 G/DL (ref 31.4–35)
MCV RBC AUTO: 93.2 FL (ref 82–102)
MM INDURATION, POC: 0 MM (ref 0–5)
NRBC # BLD: 0 K/UL (ref 0–0.2)
PLATELET # BLD AUTO: 100 K/UL (ref 150–450)
PMV BLD AUTO: 9.8 FL (ref 9.4–12.3)
POTASSIUM SERPL-SCNC: 3.3 MMOL/L (ref 3.5–5.1)
PPD, POC: NEGATIVE
RBC # BLD AUTO: 3.69 M/UL (ref 4.23–5.6)
SODIUM SERPL-SCNC: 138 MMOL/L (ref 133–143)
WBC # BLD AUTO: 7.3 K/UL (ref 4.3–11.1)

## 2023-05-18 PROCEDURE — 2580000003 HC RX 258: Performed by: FAMILY MEDICINE

## 2023-05-18 PROCEDURE — 6370000000 HC RX 637 (ALT 250 FOR IP): Performed by: FAMILY MEDICINE

## 2023-05-18 PROCEDURE — 6360000002 HC RX W HCPCS: Performed by: INTERNAL MEDICINE

## 2023-05-18 PROCEDURE — 99232 SBSQ HOSP IP/OBS MODERATE 35: CPT | Performed by: INTERNAL MEDICINE

## 2023-05-18 PROCEDURE — 80048 BASIC METABOLIC PNL TOTAL CA: CPT

## 2023-05-18 PROCEDURE — 99231 SBSQ HOSP IP/OBS SF/LOW 25: CPT | Performed by: NURSE PRACTITIONER

## 2023-05-18 PROCEDURE — 6360000002 HC RX W HCPCS: Performed by: FAMILY MEDICINE

## 2023-05-18 PROCEDURE — 6360000002 HC RX W HCPCS: Performed by: NURSE PRACTITIONER

## 2023-05-18 PROCEDURE — APPSS45 APP SPLIT SHARED TIME 31-45 MINUTES: Performed by: NURSE PRACTITIONER

## 2023-05-18 PROCEDURE — 83735 ASSAY OF MAGNESIUM: CPT

## 2023-05-18 PROCEDURE — 36591 DRAW BLOOD OFF VENOUS DEVICE: CPT

## 2023-05-18 PROCEDURE — 1100000000 HC RM PRIVATE

## 2023-05-18 PROCEDURE — 85027 COMPLETE CBC AUTOMATED: CPT

## 2023-05-18 RX ADMIN — FUROSEMIDE 20 MG: 20 TABLET ORAL at 09:09

## 2023-05-18 RX ADMIN — ATORVASTATIN CALCIUM 40 MG: 40 TABLET, FILM COATED ORAL at 22:58

## 2023-05-18 RX ADMIN — SODIUM CHLORIDE, PRESERVATIVE FREE 10 ML: 5 INJECTION INTRAVENOUS at 23:00

## 2023-05-18 RX ADMIN — DEXAMETHASONE SODIUM PHOSPHATE 2 MG: 4 INJECTION, SOLUTION INTRAMUSCULAR; INTRAVENOUS at 09:11

## 2023-05-18 RX ADMIN — LEVETIRACETAM 750 MG: 500 INJECTION, SOLUTION INTRAVENOUS at 22:58

## 2023-05-18 RX ADMIN — DEXAMETHASONE SODIUM PHOSPHATE 1 MG: 4 INJECTION, SOLUTION INTRAMUSCULAR; INTRAVENOUS at 17:19

## 2023-05-18 RX ADMIN — SERTRALINE 100 MG: 100 TABLET, FILM COATED ORAL at 09:09

## 2023-05-18 RX ADMIN — POTASSIUM BICARBONATE 20 MEQ: 391 TABLET, EFFERVESCENT ORAL at 09:08

## 2023-05-18 RX ADMIN — POLYETHYLENE GLYCOL 3350 17 G: 17 POWDER, FOR SOLUTION ORAL at 09:07

## 2023-05-18 RX ADMIN — ENOXAPARIN SODIUM 40 MG: 100 INJECTION SUBCUTANEOUS at 09:09

## 2023-05-18 RX ADMIN — SODIUM CHLORIDE, PRESERVATIVE FREE 10 ML: 5 INJECTION INTRAVENOUS at 09:15

## 2023-05-18 RX ADMIN — TAMSULOSIN HYDROCHLORIDE 0.4 MG: 0.4 CAPSULE ORAL at 09:09

## 2023-05-18 RX ADMIN — LEVETIRACETAM 750 MG: 500 INJECTION, SOLUTION INTRAVENOUS at 09:13

## 2023-05-18 ASSESSMENT — PAIN SCALES - GENERAL
PAINLEVEL_OUTOF10: 0
PAINLEVEL_OUTOF10: 0

## 2023-05-19 LAB
MM INDURATION, POC: 0 MM (ref 0–5)
PPD, POC: NEGATIVE

## 2023-05-19 PROCEDURE — 97530 THERAPEUTIC ACTIVITIES: CPT

## 2023-05-19 PROCEDURE — 6370000000 HC RX 637 (ALT 250 FOR IP): Performed by: FAMILY MEDICINE

## 2023-05-19 PROCEDURE — 1100000000 HC RM PRIVATE

## 2023-05-19 PROCEDURE — 99232 SBSQ HOSP IP/OBS MODERATE 35: CPT | Performed by: INTERNAL MEDICINE

## 2023-05-19 PROCEDURE — 97535 SELF CARE MNGMENT TRAINING: CPT

## 2023-05-19 PROCEDURE — 6360000002 HC RX W HCPCS: Performed by: FAMILY MEDICINE

## 2023-05-19 PROCEDURE — APPSS45 APP SPLIT SHARED TIME 31-45 MINUTES: Performed by: NURSE PRACTITIONER

## 2023-05-19 PROCEDURE — 6360000002 HC RX W HCPCS: Performed by: INTERNAL MEDICINE

## 2023-05-19 PROCEDURE — 92526 ORAL FUNCTION THERAPY: CPT

## 2023-05-19 PROCEDURE — 97116 GAIT TRAINING THERAPY: CPT

## 2023-05-19 PROCEDURE — 6360000002 HC RX W HCPCS: Performed by: NURSE PRACTITIONER

## 2023-05-19 PROCEDURE — 2580000003 HC RX 258: Performed by: FAMILY MEDICINE

## 2023-05-19 RX ORDER — LORAZEPAM 2 MG/ML
1 INJECTION INTRAMUSCULAR ONCE
Status: COMPLETED | OUTPATIENT
Start: 2023-05-19 | End: 2023-05-19

## 2023-05-19 RX ADMIN — FUROSEMIDE 20 MG: 20 TABLET ORAL at 09:08

## 2023-05-19 RX ADMIN — ENOXAPARIN SODIUM 40 MG: 100 INJECTION SUBCUTANEOUS at 09:06

## 2023-05-19 RX ADMIN — DEXAMETHASONE SODIUM PHOSPHATE 2 MG: 4 INJECTION, SOLUTION INTRAMUSCULAR; INTRAVENOUS at 09:08

## 2023-05-19 RX ADMIN — LORAZEPAM 1 MG: 2 INJECTION INTRAMUSCULAR; INTRAVENOUS at 23:03

## 2023-05-19 RX ADMIN — ATORVASTATIN CALCIUM 40 MG: 40 TABLET, FILM COATED ORAL at 20:38

## 2023-05-19 RX ADMIN — LEVETIRACETAM 750 MG: 500 INJECTION, SOLUTION INTRAVENOUS at 09:08

## 2023-05-19 RX ADMIN — POTASSIUM BICARBONATE 20 MEQ: 391 TABLET, EFFERVESCENT ORAL at 09:07

## 2023-05-19 RX ADMIN — SODIUM CHLORIDE, PRESERVATIVE FREE 10 ML: 5 INJECTION INTRAVENOUS at 20:41

## 2023-05-19 RX ADMIN — LEVETIRACETAM 750 MG: 500 INJECTION, SOLUTION INTRAVENOUS at 20:37

## 2023-05-19 RX ADMIN — POLYETHYLENE GLYCOL 3350 17 G: 17 POWDER, FOR SOLUTION ORAL at 09:07

## 2023-05-19 RX ADMIN — SERTRALINE 100 MG: 100 TABLET, FILM COATED ORAL at 09:08

## 2023-05-19 RX ADMIN — DEXAMETHASONE SODIUM PHOSPHATE 1 MG: 4 INJECTION, SOLUTION INTRAMUSCULAR; INTRAVENOUS at 17:04

## 2023-05-19 RX ADMIN — TAMSULOSIN HYDROCHLORIDE 0.4 MG: 0.4 CAPSULE ORAL at 09:08

## 2023-05-19 RX ADMIN — SODIUM CHLORIDE, PRESERVATIVE FREE 10 ML: 5 INJECTION INTRAVENOUS at 09:09

## 2023-05-19 RX ADMIN — SULFAMETHOXAZOLE AND TRIMETHOPRIM 1 TABLET: 800; 160 TABLET ORAL at 09:08

## 2023-05-19 ASSESSMENT — PAIN SCALES - GENERAL
PAINLEVEL_OUTOF10: 0
PAINLEVEL_OUTOF10: 0

## 2023-05-20 LAB
MM INDURATION, POC: 0 MM (ref 0–5)
PPD, POC: NEGATIVE

## 2023-05-20 PROCEDURE — 1100000000 HC RM PRIVATE

## 2023-05-20 PROCEDURE — 6360000002 HC RX W HCPCS: Performed by: INTERNAL MEDICINE

## 2023-05-20 PROCEDURE — 6370000000 HC RX 637 (ALT 250 FOR IP): Performed by: FAMILY MEDICINE

## 2023-05-20 PROCEDURE — 6360000002 HC RX W HCPCS: Performed by: FAMILY MEDICINE

## 2023-05-20 PROCEDURE — 2580000003 HC RX 258: Performed by: FAMILY MEDICINE

## 2023-05-20 PROCEDURE — 6360000002 HC RX W HCPCS: Performed by: NURSE PRACTITIONER

## 2023-05-20 RX ADMIN — DEXAMETHASONE SODIUM PHOSPHATE 1 MG: 4 INJECTION, SOLUTION INTRAMUSCULAR; INTRAVENOUS at 18:40

## 2023-05-20 RX ADMIN — DEXAMETHASONE SODIUM PHOSPHATE 2 MG: 4 INJECTION, SOLUTION INTRAMUSCULAR; INTRAVENOUS at 08:08

## 2023-05-20 RX ADMIN — POTASSIUM BICARBONATE 20 MEQ: 391 TABLET, EFFERVESCENT ORAL at 08:05

## 2023-05-20 RX ADMIN — LEVETIRACETAM 750 MG: 500 INJECTION, SOLUTION INTRAVENOUS at 20:28

## 2023-05-20 RX ADMIN — POLYETHYLENE GLYCOL 3350 17 G: 17 POWDER, FOR SOLUTION ORAL at 08:04

## 2023-05-20 RX ADMIN — ATORVASTATIN CALCIUM 40 MG: 40 TABLET, FILM COATED ORAL at 20:28

## 2023-05-20 RX ADMIN — TAMSULOSIN HYDROCHLORIDE 0.4 MG: 0.4 CAPSULE ORAL at 08:07

## 2023-05-20 RX ADMIN — SODIUM CHLORIDE, PRESERVATIVE FREE 10 ML: 5 INJECTION INTRAVENOUS at 20:42

## 2023-05-20 RX ADMIN — ENOXAPARIN SODIUM 40 MG: 100 INJECTION SUBCUTANEOUS at 09:00

## 2023-05-20 RX ADMIN — SODIUM CHLORIDE, PRESERVATIVE FREE 10 ML: 5 INJECTION INTRAVENOUS at 08:06

## 2023-05-20 RX ADMIN — SERTRALINE 100 MG: 100 TABLET, FILM COATED ORAL at 08:07

## 2023-05-20 RX ADMIN — LEVETIRACETAM 750 MG: 500 INJECTION, SOLUTION INTRAVENOUS at 09:00

## 2023-05-20 RX ADMIN — FUROSEMIDE 20 MG: 20 TABLET ORAL at 08:06

## 2023-05-20 ASSESSMENT — PAIN SCALES - GENERAL
PAINLEVEL_OUTOF10: 0

## 2023-05-21 PROCEDURE — 6360000002 HC RX W HCPCS: Performed by: INTERNAL MEDICINE

## 2023-05-21 PROCEDURE — 6370000000 HC RX 637 (ALT 250 FOR IP): Performed by: FAMILY MEDICINE

## 2023-05-21 PROCEDURE — 6360000002 HC RX W HCPCS: Performed by: NURSE PRACTITIONER

## 2023-05-21 PROCEDURE — 6360000002 HC RX W HCPCS: Performed by: FAMILY MEDICINE

## 2023-05-21 PROCEDURE — 2580000003 HC RX 258: Performed by: FAMILY MEDICINE

## 2023-05-21 PROCEDURE — 1100000000 HC RM PRIVATE

## 2023-05-21 RX ADMIN — DEXAMETHASONE SODIUM PHOSPHATE 1 MG: 4 INJECTION, SOLUTION INTRAMUSCULAR; INTRAVENOUS at 18:42

## 2023-05-21 RX ADMIN — FUROSEMIDE 20 MG: 20 TABLET ORAL at 08:35

## 2023-05-21 RX ADMIN — ATORVASTATIN CALCIUM 40 MG: 40 TABLET, FILM COATED ORAL at 21:07

## 2023-05-21 RX ADMIN — TAMSULOSIN HYDROCHLORIDE 0.4 MG: 0.4 CAPSULE ORAL at 08:35

## 2023-05-21 RX ADMIN — LEVETIRACETAM 750 MG: 500 INJECTION, SOLUTION INTRAVENOUS at 09:01

## 2023-05-21 RX ADMIN — ENOXAPARIN SODIUM 40 MG: 100 INJECTION SUBCUTANEOUS at 09:03

## 2023-05-21 RX ADMIN — SERTRALINE 100 MG: 100 TABLET, FILM COATED ORAL at 08:35

## 2023-05-21 RX ADMIN — SODIUM CHLORIDE, PRESERVATIVE FREE 10 ML: 5 INJECTION INTRAVENOUS at 21:16

## 2023-05-21 RX ADMIN — DEXAMETHASONE SODIUM PHOSPHATE 2 MG: 4 INJECTION, SOLUTION INTRAMUSCULAR; INTRAVENOUS at 08:59

## 2023-05-21 RX ADMIN — LEVETIRACETAM 750 MG: 500 INJECTION, SOLUTION INTRAVENOUS at 21:08

## 2023-05-21 RX ADMIN — POLYETHYLENE GLYCOL 3350 17 G: 17 POWDER, FOR SOLUTION ORAL at 08:36

## 2023-05-21 RX ADMIN — SODIUM CHLORIDE, PRESERVATIVE FREE 10 ML: 5 INJECTION INTRAVENOUS at 09:03

## 2023-05-21 RX ADMIN — POTASSIUM BICARBONATE 20 MEQ: 391 TABLET, EFFERVESCENT ORAL at 08:36

## 2023-05-21 ASSESSMENT — PAIN SCALES - GENERAL
PAINLEVEL_OUTOF10: 0
PAINLEVEL_OUTOF10: 0

## 2023-05-22 VITALS
SYSTOLIC BLOOD PRESSURE: 131 MMHG | TEMPERATURE: 97.6 F | HEIGHT: 68 IN | HEART RATE: 81 BPM | DIASTOLIC BLOOD PRESSURE: 71 MMHG | RESPIRATION RATE: 18 BRPM | BODY MASS INDEX: 27.39 KG/M2 | WEIGHT: 180.75 LBS | OXYGEN SATURATION: 95 %

## 2023-05-22 LAB
SARS-COV-2 RDRP RESP QL NAA+PROBE: NOT DETECTED
SOURCE: NORMAL

## 2023-05-22 PROCEDURE — 6360000002 HC RX W HCPCS: Performed by: NURSE PRACTITIONER

## 2023-05-22 PROCEDURE — 97530 THERAPEUTIC ACTIVITIES: CPT

## 2023-05-22 PROCEDURE — 6360000002 HC RX W HCPCS: Performed by: INTERNAL MEDICINE

## 2023-05-22 PROCEDURE — 6360000002 HC RX W HCPCS: Performed by: FAMILY MEDICINE

## 2023-05-22 PROCEDURE — 2580000003 HC RX 258: Performed by: FAMILY MEDICINE

## 2023-05-22 PROCEDURE — 6370000000 HC RX 637 (ALT 250 FOR IP): Performed by: FAMILY MEDICINE

## 2023-05-22 PROCEDURE — 87635 SARS-COV-2 COVID-19 AMP PRB: CPT

## 2023-05-22 RX ORDER — HEPARIN SODIUM (PORCINE) LOCK FLUSH IV SOLN 100 UNIT/ML 100 UNIT/ML
100 SOLUTION INTRAVENOUS PRN
Status: DISCONTINUED | OUTPATIENT
Start: 2023-05-22 | End: 2023-05-22 | Stop reason: HOSPADM

## 2023-05-22 RX ORDER — LORAZEPAM 2 MG/ML
0.5 INJECTION INTRAMUSCULAR ONCE
Status: COMPLETED | OUTPATIENT
Start: 2023-05-22 | End: 2023-05-22

## 2023-05-22 RX ORDER — KETOROLAC TROMETHAMINE 30 MG/ML
15 INJECTION, SOLUTION INTRAMUSCULAR; INTRAVENOUS ONCE
Status: COMPLETED | OUTPATIENT
Start: 2023-05-22 | End: 2023-05-22

## 2023-05-22 RX ORDER — LORAZEPAM 1 MG/1
1 TABLET ORAL ONCE
Status: DISCONTINUED | OUTPATIENT
Start: 2023-05-22 | End: 2023-05-22 | Stop reason: HOSPADM

## 2023-05-22 RX ADMIN — DEXAMETHASONE SODIUM PHOSPHATE 2 MG: 4 INJECTION, SOLUTION INTRAMUSCULAR; INTRAVENOUS at 09:23

## 2023-05-22 RX ADMIN — LORAZEPAM 0.5 MG: 2 INJECTION INTRAMUSCULAR; INTRAVENOUS at 01:23

## 2023-05-22 RX ADMIN — ENOXAPARIN SODIUM 40 MG: 100 INJECTION SUBCUTANEOUS at 09:29

## 2023-05-22 RX ADMIN — SERTRALINE 100 MG: 100 TABLET, FILM COATED ORAL at 09:21

## 2023-05-22 RX ADMIN — LEVETIRACETAM 750 MG: 500 INJECTION, SOLUTION INTRAVENOUS at 09:24

## 2023-05-22 RX ADMIN — SODIUM CHLORIDE, PRESERVATIVE FREE 10 ML: 5 INJECTION INTRAVENOUS at 09:23

## 2023-05-22 RX ADMIN — FUROSEMIDE 20 MG: 20 TABLET ORAL at 09:21

## 2023-05-22 RX ADMIN — TAMSULOSIN HYDROCHLORIDE 0.4 MG: 0.4 CAPSULE ORAL at 09:21

## 2023-05-22 RX ADMIN — SULFAMETHOXAZOLE AND TRIMETHOPRIM 1 TABLET: 800; 160 TABLET ORAL at 09:21

## 2023-05-22 RX ADMIN — POLYETHYLENE GLYCOL 3350 17 G: 17 POWDER, FOR SOLUTION ORAL at 09:16

## 2023-05-22 RX ADMIN — KETOROLAC TROMETHAMINE 15 MG: 30 INJECTION, SOLUTION INTRAMUSCULAR; INTRAVENOUS at 10:14

## 2023-05-22 RX ADMIN — PANTOPRAZOLE SODIUM 40 MG: 40 TABLET, DELAYED RELEASE ORAL at 09:29

## 2023-05-22 RX ADMIN — POTASSIUM BICARBONATE 20 MEQ: 391 TABLET, EFFERVESCENT ORAL at 09:18

## 2023-05-22 ASSESSMENT — PAIN - FUNCTIONAL ASSESSMENT: PAIN_FUNCTIONAL_ASSESSMENT: PREVENTS OR INTERFERES SOME ACTIVE ACTIVITIES AND ADLS

## 2023-05-22 ASSESSMENT — PAIN DESCRIPTION - ORIENTATION: ORIENTATION: LOWER;MID

## 2023-05-22 ASSESSMENT — PAIN DESCRIPTION - DESCRIPTORS: DESCRIPTORS: ACHING;CRAMPING;TENDER

## 2023-05-22 ASSESSMENT — PAIN SCALES - GENERAL: PAINLEVEL_OUTOF10: 7

## 2023-05-22 ASSESSMENT — PAIN DESCRIPTION - LOCATION: LOCATION: BACK

## 2023-05-22 NOTE — CARE COORDINATION
Pt to d/c today to [facility] for STR. Room: [facility]. Unit 2. Report: [phone]. [facility] 84 for 1315. No other supportive care needs identified. Pt's spouse agrees with d/c plan. Milestones met. LOS = 6 days       05/16/23 1145   Service Assessment   Patient Orientation Alert and Oriented;Person;Self   Cognition Alert   History Provided By Significant Other;Medical Record   Primary Caregiver Spouse   Support Systems Spouse/Significant Other;Children   Patient's Healthcare Decision Maker is: Legal Next of Kin   PCP Verified by CM Yes  [place] Doctor PAO Zheng, DO)   Last Visit to PCP Within last 6 months   Prior Functional Level Assistance with the following:;Mobility   Current Functional Level Assistance with the following:;Mobility   Can patient return to prior living arrangement Other (see comment)  (Yes but requires STR first)   Ability to make needs known: Fair   Family able to assist with home care needs: Yes   Would you like for me to discuss the discharge plan with any other family members/significant others, and if so, who? Yes  (Spouse: Yolis Babb)   Lluvia Segura; Other (Comment)  (BCBS as secondary)   Community Resources None   Social/Functional History   Lives With Spouse   Type of [address] One level   [facility], [address] [employer] From [employer]. 23.   Ambulation Assistance Needs assistance   Transfer Assistance Independent   Active  No   Patient's  Info Family   Mode of Transportation Family   Occupation Retired   Discharge Planning   Type of [address]  ([facility])   Living Arrangements Spouse/Significant Other   Current Services Prior To Admission Durable Medical Equipment   Current DME Prior to [facility]   DME

## 2023-05-22 NOTE — PLAN OF CARE
Problem: Pain  Goal: Verbalizes/displays adequate comfort level or baseline comfort level  Outcome: Progressing     Problem: Safety - Adult  Goal: Free from fall injury  Outcome: Progressing  Flowsheets  Taken 5/22/2023 0115  Free From Fall Injury: Instruct family/caregiver on patient safety  Taken 5/21/2023 2045  Free From Fall Injury: Instruct family/caregiver on patient safety     Problem: ABCDS Injury Assessment  Goal: Absence of physical injury  Outcome: Progressing

## 2023-05-22 NOTE — DISCHARGE SUMMARY
Hospitalist Discharge Summary   Admit Date:  5/15/2023  2:33 PM   DC Note date: 2023  Name:  Hilario Brooks   Age:  [de-identified] y.o. Sex:  male  :  1940   MRN:  [id number]   Room:  [facility]  PCP:  Pete Monday,     Presenting Complaint: Seizures     Initial Admission Diagnosis: Seizure ([address] [R56.9]  Glioblastoma ([address] [C71.9]  Postictal state ([address] [R56.9]  Encephalopathy [G93.40]     Problem List for this Hospitalization (present on admission):    Principal Problem:    Postictal state [facility])  Active Problems:    Essential hypertension    Glioblastoma ([address]    Seizure ([address]    Encephalopathy  Resolved Problems:    * No resolved hospital problems. *    Hilario Brooks is a  [de-identified] y.o. male with medical history of glioblastoma status post craniotomy and chemotherapy, seizure disorder, and hypertension, who presented with tonic-clonic seizure at home. Interval History (): Patient examined at bedside. Had some agitation and delirium again overnight and received IV Ativan. Sleepy but answers questions appropriately. Tolerating diet well. Denies fevers/chills, chest pain, or shortness of breath. Hospital Course:  Patient admitted for tonic-clonic seizures at home, he was seen and evaluated by neurology and placed on Keppra as well as Decadron. He has a known history of glioblastoma s/p postcraniotomy and chemotherapy. He has improved clinically and is currently hemodynamically stable for hospital discharge to SNF. Details of hospitalization have been discussed with patient and his wife at bedside who both understands and agree with plan of care and discharge. Return precautions provided. All questions answered. Disposition: SNF  Diet: ADULT DIET; Dysphagia - Minced and Moist  Code Status: Full Code    Follow Ups:   Contact information for follow-up providers     Lary Gasca MD Follow up on 2023.     Specialties: Hematology, Hematology and Oncology, Internal Medicine,   Oncology  Why:

## 2023-05-22 NOTE — PROGRESS NOTES
End of shift summary:    -PT A&O to person and situation only. Became agitated and attempted to get out of bed several times/calling out for wife. RN attempted to verbally redirect pt w/o success. PO ativan x1 ordered but Pt refused. IV ativan x1 ordered and given. Pt rested well after. No c/o pain. No acute distress.

## 2023-05-22 NOTE — PROGRESS NOTES
ACUTE PHYSICAL THERAPY GOALS:   (Developed with and agreed upon by patient and/or caregiver.)    (1.) Odalis Jarquin  will move from supine to sit and sit to supine  with STAND BY ASSIST within 7 treatment day(s). (2.) Odalis Jarquin will transfer from bed to chair and chair to bed with CONTACT GUARD ASSIST using the least restrictive device within 7 treatment day(s). (3.) Odalis Jarquin will ambulate with CONTACT GUARD ASSIST for 250 feet with the least restrictive device within 7 treatment day(s). (4.) Odalis Jarquin will perform standing static and dynamic balance activities x 20 minutes with MINIMAL ASSIST to improve safety within 7 treatment day(s). (5.) Odalis Jarquin will perform therapeutic exercises x 20 min for HEP with STAND BY ASSIST to improve strength, endurance, and functional mobility within 7 treatment day(s). PHYSICAL THERAPY: Daily Note AM   (Link to Caseload Tracking: PT Visit Days : 3  Time In/Out PT Charge Capture  Rehab Caseload Tracker  Orders    Odalis Jarquin is a [de-identified] y.o. male   PRIMARY DIAGNOSIS: Postictal state ([address]  Seizure ([address] [R56.9]  Glioblastoma ([address] [C71.9]  Postictal state ([address] [R56.9]  Encephalopathy [G93.40]       Inpatient: Payor: MEDICARE / Plan: MEDICARE PART A AND B / Product Type: *No Product type* /     ASSESSMENT:     REHAB RECOMMENDATIONS:   Recommendation to date pending progress:  Setting:  Short-term Rehab    Equipment:    To Be Determined     ASSESSMENT:  Mr. Rain López is making slow progress toward goals. The patient was seated EOB with PCT present from ADL and agreeable to ambulation. He performed STS with min A and ambulated using the RW. He did present with forward posture and shuffled gait, improved some with cueing. He fatigued quickly and required a chair follow for safety. Positioned back in the bed with needs in reach.       SUBJECTIVE:   Mr. Rain López states, \"Sure\"     Social/Functional Lives With:

## 2023-05-23 ENCOUNTER — CARE COORDINATION (OUTPATIENT)
Dept: CARE COORDINATION | Facility: CLINIC | Age: 83
End: 2023-05-23

## 2023-05-23 NOTE — CARE COORDINATION
Patient is currently enrolled in Post-Acute Episode:   Start day 5/23/2023  Risk for Admission or ED Visit: low  Please see patient outreach encounters for further details. For questions contact:     JOSE MANUEL Robles, RN, CCM, ACM-RN/Post Acute Care Manager  [facility] Team - [place], [address] [facility]   Mobile: [email or web address]

## 2023-05-23 NOTE — CARE COORDINATION
Patient discharged to a SNF Preferred Provider Network facility. Patient will be included in weekly care coordination calls. Message sent to King Quevedo RN SNF Preferred Provider [address]

## 2023-05-26 ENCOUNTER — CARE COORDINATION (OUTPATIENT)
Dept: CARE COORDINATION | Facility: CLINIC | Age: 83
End: 2023-05-26

## 2023-05-26 NOTE — CARE COORDINATION
Post Acute Facility Update    Care Transitions Post Acute Facility Update    Care Transitions Interventions  Post Acute Facility Update  Reported Nursing Issues: Admitted on 5/22/23 DX:Epilepsy. Fall on 5/23/22 in bathroom trying to get up unassisted. Brusing to right eyebrow and hip, no other injury noted, neuro checks WNL, started on Dexamethasone 2mg daily, continue on Bactrim 1 tab on Mon, Wed, and Fri., started Magnesium 200mg daily. V/S stable     ADLs: Maximum Assistance   Bed Mobility: Moderate Assistance   Transfer Assistance: Minimal Assistance   Ambulation Assistance: Minimal Assistance   How far (in feet) is the patient ambulating?: 50   Does patient use an assistive device?: Yes   Assistive Devices: RW   Anticipated discharge services: Clement Cline at d/c              Next IDT Planned Review: 6/1/23    Future Appointments   Date Time Provider Den Payton   5/30/2023  1:00 PM LAB CK [address]   5/30/2023  1:30 PM Tony Newton MD [facility] G[facility] AMB   5/30/2023  2:00 PM  [address]   8/28/2023 11:00 AM Mac Jain DO TRIM GVL AMB         Matt Alfonso, GREG

## 2023-05-30 ENCOUNTER — HOSPITAL ENCOUNTER (OUTPATIENT)
Dept: INFUSION THERAPY | Age: 83
End: 2023-05-30

## 2023-06-01 ENCOUNTER — CARE COORDINATION (OUTPATIENT)
Dept: CARE COORDINATION | Facility: CLINIC | Age: 83
End: 2023-06-01

## 2023-06-01 NOTE — CARE COORDINATION
Post Acute Facility Update    Care Transitions Post Acute Facility Update    Care Transitions Interventions  Post Acute Facility Update  Reported Nursing Issues: New orders for Tramadol 50mg q6hr PRN for pain, Senna 2 tabs  HS, Fleet enema daily PRN for severe constipation, labs CBC with diff, CMP, TSH, Keppra and mag level done on 5/31/23. V/S stable,Mechanical soft diet. Fluctuating cognitive status. ADLs: Maximum Assistance (Comment: Mod to min  assist for UB self care and max to mod assist for LB self care.)   Bed Mobility: Contact Guard Assist - Hands on patient for balance   Transfer Assistance: Minimal Assistance   Ambulation Assistance: Contact Guard Assist - Hands on patient for balance   How far (in feet) is the patient ambulating?: 40   Does patient use an assistive device?: Yes (Comment: RW)   Anticipated date for discharge: 6/14/23    Anticipated discharge services: Plan is for the patient to stay short term and to return home with his spouse. Wife states that the patient was independent with ADL's with the exception of supervision getting into the shower and lower body dressing assistance. Patient does not drive. Their home is a 1 story with basement to park the car. There are 15 steps to go from the garage to the main level of the house. DME includes: a RW, BSC and walk-in shower with a shower seat. There is no Home Health preference.    [facility] services at discharge              Next IDT Planned Review: 6/8/23    Future Appointments   Date Time Provider Den Payton   8/28/2023 11:00 AM Mian Flowers DO TRIM GVL ANGÉLICA Orlando RN

## 2023-06-22 ENCOUNTER — CARE COORDINATION (OUTPATIENT)
Dept: CARE COORDINATION | Facility: CLINIC | Age: 83
End: 2023-06-22

## 2023-06-22 NOTE — CARE COORDINATION
LAB CK [facility]   7/26/2023  1:00 PM Leonel Kirby, NP-C [facility] AMB   7/26/2023  1:30 PM BMT1 [facility]   8/9/2023 11:00 AM LAB CK [facility]   8/9/2023 11:30 AM Jeronimo Munoz MD [facility]VL AMB   8/9/2023  1:30 PM POD3C [facility]   8/28/2023 11:00 AM Chai Zheng DO [facility] GVL AMB       Post Acute Care Manager provided contact information. No further follow-up call indicated based on severity of symptoms and risk factors.       Bradly Rodríguez RN

## 2023-06-27 ENCOUNTER — OFFICE VISIT (OUTPATIENT)
Dept: ONCOLOGY | Age: 83
End: 2023-06-27
Payer: MEDICARE

## 2023-06-27 ENCOUNTER — HOSPITAL ENCOUNTER (OUTPATIENT)
Dept: INFUSION THERAPY | Age: 83
Discharge: HOME | End: 2023-06-27
Payer: MEDICARE

## 2023-06-27 VITALS
HEIGHT: 68 IN | OXYGEN SATURATION: 96 % | SYSTOLIC BLOOD PRESSURE: 129 MMHG | RESPIRATION RATE: 23 BRPM | WEIGHT: 174.5 LBS | TEMPERATURE: 97.5 F | BODY MASS INDEX: 26.45 KG/M2 | DIASTOLIC BLOOD PRESSURE: 73 MMHG | HEART RATE: 78 BPM

## 2023-06-27 DIAGNOSIS — Z79.899 HIGH RISK MEDICATION USE: ICD-10-CM

## 2023-06-27 DIAGNOSIS — R53.83 FATIGUE DUE TO TREATMENT: ICD-10-CM

## 2023-06-27 DIAGNOSIS — C71.9 GLIOBLASTOMA (HCC): Primary | ICD-10-CM

## 2023-06-27 DIAGNOSIS — C71.9 GLIOBLASTOMA (HCC): ICD-10-CM

## 2023-06-27 DIAGNOSIS — Z11.59 ENCOUNTER FOR SCREENING FOR OTHER VIRAL DISEASES: Primary | ICD-10-CM

## 2023-06-27 DIAGNOSIS — Z11.59 ENCOUNTER FOR SCREENING FOR OTHER VIRAL DISEASES: ICD-10-CM

## 2023-06-27 LAB
ALBUMIN SERPL-MCNC: 3.1 G/DL (ref 3.2–4.6)
ALBUMIN/GLOB SERPL: 1 (ref 0.4–1.6)
ALP SERPL-CCNC: 64 U/L (ref 50–136)
ALT SERPL-CCNC: 30 U/L (ref 12–65)
ANION GAP SERPL CALC-SCNC: 6 MMOL/L (ref 2–11)
APPEARANCE UR: CLEAR
AST SERPL-CCNC: 16 U/L (ref 15–37)
BASOPHILS # BLD: 0 K/UL (ref 0–0.2)
BASOPHILS NFR BLD: 0 % (ref 0–2)
BILIRUB SERPL-MCNC: 0.5 MG/DL (ref 0.2–1.1)
BILIRUB UR QL: NEGATIVE
BUN SERPL-MCNC: 15 MG/DL (ref 8–23)
CALCIUM SERPL-MCNC: 8.6 MG/DL (ref 8.3–10.4)
CHLORIDE SERPL-SCNC: 111 MMOL/L (ref 101–110)
CO2 SERPL-SCNC: 26 MMOL/L (ref 21–32)
COLOR UR: YELLOW
CREAT SERPL-MCNC: 1 MG/DL (ref 0.8–1.5)
DIFFERENTIAL METHOD BLD: ABNORMAL
EOSINOPHIL # BLD: 0 K/UL (ref 0–0.8)
EOSINOPHIL NFR BLD: 0 % (ref 0.5–7.8)
ERYTHROCYTE [DISTWIDTH] IN BLOOD BY AUTOMATED COUNT: 14.8 % (ref 11.9–14.6)
GLOBULIN SER CALC-MCNC: 3.1 G/DL (ref 2.8–4.5)
GLUCOSE SERPL-MCNC: 109 MG/DL (ref 65–100)
GLUCOSE UR STRIP.AUTO-MCNC: NEGATIVE MG/DL
HCT VFR BLD AUTO: 36.1 % (ref 41.1–50.3)
HGB BLD-MCNC: 11.8 G/DL (ref 13.6–17.2)
HGB UR QL STRIP: NEGATIVE
IMM GRANULOCYTES # BLD AUTO: 0.1 K/UL (ref 0–0.5)
IMM GRANULOCYTES NFR BLD AUTO: 1 % (ref 0–5)
KETONES UR QL STRIP.AUTO: NEGATIVE MG/DL
LEUKOCYTE ESTERASE UR QL STRIP.AUTO: NEGATIVE
LYMPHOCYTES # BLD: 0.9 K/UL (ref 0.5–4.6)
LYMPHOCYTES NFR BLD: 10 % (ref 13–44)
MCH RBC QN AUTO: 30.7 PG (ref 26.1–32.9)
MCHC RBC AUTO-ENTMCNC: 32.7 G/DL (ref 31.4–35)
MCV RBC AUTO: 94 FL (ref 82–102)
MONOCYTES # BLD: 0.6 K/UL (ref 0.1–1.3)
MONOCYTES NFR BLD: 7 % (ref 4–12)
NEUTS SEG # BLD: 7.5 K/UL (ref 1.7–8.2)
NEUTS SEG NFR BLD: 83 % (ref 43–78)
NITRITE UR QL STRIP.AUTO: NEGATIVE
NRBC # BLD: 0 K/UL (ref 0–0.2)
PH UR STRIP: 5.5 (ref 5–9)
PLATELET # BLD AUTO: 131 K/UL (ref 150–450)
PMV BLD AUTO: 10.1 FL (ref 9.4–12.3)
POTASSIUM SERPL-SCNC: 3.4 MMOL/L (ref 3.5–5.1)
PROT SERPL-MCNC: 6.2 G/DL (ref 6.3–8.2)
PROT UR STRIP-MCNC: ABNORMAL MG/DL
PROT UR-MCNC: 62 MG/DL
RBC # BLD AUTO: 3.84 M/UL (ref 4.23–5.6)
SODIUM SERPL-SCNC: 143 MMOL/L (ref 133–143)
SP GR UR REFRACTOMETRY: >=1.03 (ref 1–1.02)
UROBILINOGEN UR QL STRIP.AUTO: 0.2 EU/DL (ref 0.2–1)
WBC # BLD AUTO: 9.1 K/UL (ref 4.3–11.1)

## 2023-06-27 PROCEDURE — 84156 ASSAY OF PROTEIN URINE: CPT

## 2023-06-27 PROCEDURE — 1123F ACP DISCUSS/DSCN MKR DOCD: CPT | Performed by: NURSE PRACTITIONER

## 2023-06-27 PROCEDURE — 81003 URINALYSIS AUTO W/O SCOPE: CPT

## 2023-06-27 PROCEDURE — 2580000003 HC RX 258: Performed by: INTERNAL MEDICINE

## 2023-06-27 PROCEDURE — 3074F SYST BP LT 130 MM HG: CPT | Performed by: NURSE PRACTITIONER

## 2023-06-27 PROCEDURE — 2580000003 HC RX 258: Performed by: NURSE PRACTITIONER

## 2023-06-27 PROCEDURE — 80053 COMPREHEN METABOLIC PANEL: CPT

## 2023-06-27 PROCEDURE — 99214 OFFICE O/P EST MOD 30 MIN: CPT | Performed by: NURSE PRACTITIONER

## 2023-06-27 PROCEDURE — 36591 DRAW BLOOD OFF VENOUS DEVICE: CPT

## 2023-06-27 PROCEDURE — 3078F DIAST BP <80 MM HG: CPT | Performed by: NURSE PRACTITIONER

## 2023-06-27 PROCEDURE — G8427 DOCREV CUR MEDS BY ELIG CLIN: HCPCS | Performed by: NURSE PRACTITIONER

## 2023-06-27 PROCEDURE — 85025 COMPLETE CBC W/AUTO DIFF WBC: CPT

## 2023-06-27 PROCEDURE — 1036F TOBACCO NON-USER: CPT | Performed by: NURSE PRACTITIONER

## 2023-06-27 PROCEDURE — 6360000002 HC RX W HCPCS: Performed by: NURSE PRACTITIONER

## 2023-06-27 PROCEDURE — 96413 CHEMO IV INFUSION 1 HR: CPT

## 2023-06-27 PROCEDURE — G8417 CALC BMI ABV UP PARAM F/U: HCPCS | Performed by: NURSE PRACTITIONER

## 2023-06-27 RX ORDER — SODIUM CHLORIDE 9 MG/ML
INJECTION, SOLUTION INTRAVENOUS CONTINUOUS
OUTPATIENT
Start: 2023-06-27

## 2023-06-27 RX ORDER — SODIUM CHLORIDE 9 MG/ML
5-250 INJECTION, SOLUTION INTRAVENOUS PRN
Status: DISCONTINUED | OUTPATIENT
Start: 2023-06-27 | End: 2023-06-28 | Stop reason: HOSPADM

## 2023-06-27 RX ORDER — ACETAMINOPHEN 325 MG/1
650 TABLET ORAL
OUTPATIENT
Start: 2023-06-27

## 2023-06-27 RX ORDER — DIPHENHYDRAMINE HYDROCHLORIDE 50 MG/ML
50 INJECTION INTRAMUSCULAR; INTRAVENOUS
Status: DISCONTINUED | OUTPATIENT
Start: 2023-06-27 | End: 2023-06-28 | Stop reason: HOSPADM

## 2023-06-27 RX ORDER — MEPERIDINE HYDROCHLORIDE 50 MG/ML
12.5 INJECTION INTRAMUSCULAR; INTRAVENOUS; SUBCUTANEOUS PRN
OUTPATIENT
Start: 2023-06-27

## 2023-06-27 RX ORDER — ONDANSETRON 2 MG/ML
8 INJECTION INTRAMUSCULAR; INTRAVENOUS
OUTPATIENT
Start: 2023-06-27

## 2023-06-27 RX ORDER — SODIUM CHLORIDE 0.9 % (FLUSH) 0.9 %
5-40 SYRINGE (ML) INJECTION PRN
Status: DISCONTINUED | OUTPATIENT
Start: 2023-06-27 | End: 2023-06-28 | Stop reason: HOSPADM

## 2023-06-27 RX ORDER — SODIUM CHLORIDE 0.9 % (FLUSH) 0.9 %
5-40 SYRINGE (ML) INJECTION PRN
Status: CANCELLED | OUTPATIENT
Start: 2023-06-27

## 2023-06-27 RX ORDER — EPINEPHRINE 1 MG/ML
0.3 INJECTION, SOLUTION, CONCENTRATE INTRAVENOUS PRN
OUTPATIENT
Start: 2023-06-27

## 2023-06-27 RX ORDER — SODIUM CHLORIDE 9 MG/ML
5-250 INJECTION, SOLUTION INTRAVENOUS PRN
Status: CANCELLED | OUTPATIENT
Start: 2023-06-27

## 2023-06-27 RX ORDER — ALBUTEROL SULFATE 90 UG/1
4 AEROSOL, METERED RESPIRATORY (INHALATION) PRN
OUTPATIENT
Start: 2023-06-27

## 2023-06-27 RX ORDER — FAMOTIDINE 10 MG/ML
20 INJECTION, SOLUTION INTRAVENOUS
Status: CANCELLED | OUTPATIENT
Start: 2023-06-27

## 2023-06-27 RX ORDER — DIPHENHYDRAMINE HYDROCHLORIDE 50 MG/ML
50 INJECTION INTRAMUSCULAR; INTRAVENOUS
Status: CANCELLED | OUTPATIENT
Start: 2023-06-27

## 2023-06-27 RX ORDER — SODIUM CHLORIDE 9 MG/ML
5-250 INJECTION, SOLUTION INTRAVENOUS PRN
OUTPATIENT
Start: 2023-06-27

## 2023-06-27 RX ORDER — HEPARIN SODIUM (PORCINE) LOCK FLUSH IV SOLN 100 UNIT/ML 100 UNIT/ML
500 SOLUTION INTRAVENOUS PRN
OUTPATIENT
Start: 2023-06-27

## 2023-06-27 RX ADMIN — SODIUM CHLORIDE 50 ML/HR: 9 INJECTION, SOLUTION INTRAVENOUS at 14:25

## 2023-06-27 RX ADMIN — SODIUM CHLORIDE, PRESERVATIVE FREE 10 ML: 5 INJECTION INTRAVENOUS at 13:14

## 2023-06-27 RX ADMIN — SODIUM CHLORIDE 900 MG: 9 INJECTION, SOLUTION INTRAVENOUS at 14:51

## 2023-06-27 RX ADMIN — SODIUM CHLORIDE, PRESERVATIVE FREE 10 ML: 5 INJECTION INTRAVENOUS at 14:20

## 2023-06-27 ASSESSMENT — PATIENT HEALTH QUESTIONNAIRE - PHQ9
SUM OF ALL RESPONSES TO PHQ QUESTIONS 1-9: 0
1. LITTLE INTEREST OR PLEASURE IN DOING THINGS: 0
SUM OF ALL RESPONSES TO PHQ QUESTIONS 1-9: 0
SUM OF ALL RESPONSES TO PHQ QUESTIONS 1-9: 0
SUM OF ALL RESPONSES TO PHQ9 QUESTIONS 1 & 2: 0
SUM OF ALL RESPONSES TO PHQ QUESTIONS 1-9: 0
2. FEELING DOWN, DEPRESSED OR HOPELESS: 0

## 2023-07-05 ENCOUNTER — OFFICE VISIT (OUTPATIENT)
Dept: INTERNAL MEDICINE CLINIC | Facility: CLINIC | Age: 83
End: 2023-07-05
Payer: MEDICARE

## 2023-07-05 VITALS
SYSTOLIC BLOOD PRESSURE: 118 MMHG | TEMPERATURE: 97.9 F | DIASTOLIC BLOOD PRESSURE: 70 MMHG | WEIGHT: 173 LBS | BODY MASS INDEX: 26.3 KG/M2 | HEART RATE: 62 BPM | OXYGEN SATURATION: 99 %

## 2023-07-05 DIAGNOSIS — C71.9 GLIOBLASTOMA (HCC): ICD-10-CM

## 2023-07-05 DIAGNOSIS — N40.0 BENIGN PROSTATIC HYPERPLASIA WITHOUT LOWER URINARY TRACT SYMPTOMS: Chronic | ICD-10-CM

## 2023-07-05 DIAGNOSIS — I95.1 ORTHOSTATIC HYPOTENSION: Primary | ICD-10-CM

## 2023-07-05 DIAGNOSIS — R53.81 PHYSICAL DECONDITIONING: ICD-10-CM

## 2023-07-05 DIAGNOSIS — R13.10 DYSPHAGIA, UNSPECIFIED TYPE: ICD-10-CM

## 2023-07-05 PROBLEM — G93.40 ENCEPHALOPATHY: Status: RESOLVED | Noted: 2023-01-01 | Resolved: 2023-01-01

## 2023-07-05 PROBLEM — R56.9 POSTICTAL STATE (HCC): Status: RESOLVED | Noted: 2023-05-15 | Resolved: 2023-07-05

## 2023-07-05 PROCEDURE — G8417 CALC BMI ABV UP PARAM F/U: HCPCS | Performed by: FAMILY MEDICINE

## 2023-07-05 PROCEDURE — 3078F DIAST BP <80 MM HG: CPT | Performed by: FAMILY MEDICINE

## 2023-07-05 PROCEDURE — 3074F SYST BP LT 130 MM HG: CPT | Performed by: FAMILY MEDICINE

## 2023-07-05 PROCEDURE — 1123F ACP DISCUSS/DSCN MKR DOCD: CPT | Performed by: FAMILY MEDICINE

## 2023-07-05 PROCEDURE — 99214 OFFICE O/P EST MOD 30 MIN: CPT | Performed by: FAMILY MEDICINE

## 2023-07-05 PROCEDURE — 1036F TOBACCO NON-USER: CPT | Performed by: FAMILY MEDICINE

## 2023-07-05 PROCEDURE — G8427 DOCREV CUR MEDS BY ELIG CLIN: HCPCS | Performed by: FAMILY MEDICINE

## 2023-07-05 NOTE — PROGRESS NOTES
Margaret Valles DO  Diplomate of the [employer] [address] Internal Medicine      Yany Bassett (: 1940) is a 82 y.o. male, here for evaluation of the following chief complaint(s):  Follow-Up from Hospital       ASSESSMENT/PLAN:  1. Orthostatic hypotension  2. Physical deconditioning  3. Glioblastoma ([address])  4. Benign prostatic hyperplasia without lower urinary tract symptoms  5. Dysphagia, unspecified type     -We will continue to monitor blood pressure. I have asked the white to start checking at home. If he continues to have hypotension associated with dizziness we may consider midodrine or Florinef for orthostatic hypotension. Encourage good fluid intake as well.  -Continue with PT/OT for physical deconditioning.  -Seizures have been controlled with Keppra, continue.  -We will continue with tamsulosin for BPH. He is getting good clinical benefit from this and tolerating well. -Aspiration precautions. He does not like the thickened liquids. We will continue with speech therapy for dysphagia. SUBJECTIVE/OBJECTIVE:  HPI:  -Patient is here today with his wife. He recently got out of rehab after being hospitalized due to seizures and general physical deconditioning. Doing well, but she is noticing his blood pressure sometimes runs on the soft side and he does become somewhat dizzy. She has not been checking regularly at home since he was discharged from rehab. He is receiving home physical therapy.  -Is currently receiving a Avastin for glioblastoma. Tolerating well. He has not had any more seizure activity or new neurologic symptoms.  -Has been doing well as far as urination is concerned. No dysuria or hematuria.  -Has been having some difficulties with dysphagia. He is receiving home speech therapy for this. Has not had any overt aspiration. ROS negative except as noted above today.     Social History     Tobacco Use    Smoking status: Former

## 2023-07-12 ENCOUNTER — OFFICE VISIT (OUTPATIENT)
Dept: ONCOLOGY | Age: 83
End: 2023-07-12
Payer: MEDICARE

## 2023-07-12 ENCOUNTER — HOSPITAL ENCOUNTER (OUTPATIENT)
Dept: INFUSION THERAPY | Age: 83
Discharge: HOME | End: 2023-07-12
Payer: MEDICARE

## 2023-07-12 VITALS
SYSTOLIC BLOOD PRESSURE: 129 MMHG | HEIGHT: 68 IN | BODY MASS INDEX: 26.31 KG/M2 | TEMPERATURE: 97.6 F | RESPIRATION RATE: 14 BRPM | DIASTOLIC BLOOD PRESSURE: 66 MMHG | WEIGHT: 173.6 LBS | HEART RATE: 66 BPM | OXYGEN SATURATION: 95 %

## 2023-07-12 DIAGNOSIS — C71.9 GLIOBLASTOMA (HCC): ICD-10-CM

## 2023-07-12 DIAGNOSIS — Z11.59 ENCOUNTER FOR SCREENING FOR OTHER VIRAL DISEASES: Primary | ICD-10-CM

## 2023-07-12 DIAGNOSIS — Z11.59 ENCOUNTER FOR SCREENING FOR OTHER VIRAL DISEASES: ICD-10-CM

## 2023-07-12 DIAGNOSIS — R53.83 FATIGUE DUE TO TREATMENT: ICD-10-CM

## 2023-07-12 DIAGNOSIS — C71.9 GLIOBLASTOMA (HCC): Primary | ICD-10-CM

## 2023-07-12 DIAGNOSIS — E87.6 HYPOKALEMIA: ICD-10-CM

## 2023-07-12 LAB
ALBUMIN SERPL-MCNC: 3.1 G/DL (ref 3.2–4.6)
ALBUMIN/GLOB SERPL: 1 (ref 0.4–1.6)
ALP SERPL-CCNC: 63 U/L (ref 50–136)
ALT SERPL-CCNC: 27 U/L (ref 12–65)
ANION GAP SERPL CALC-SCNC: 6 MMOL/L (ref 2–11)
AST SERPL-CCNC: 13 U/L (ref 15–37)
BASOPHILS # BLD: 0 K/UL (ref 0–0.2)
BASOPHILS NFR BLD: 0 % (ref 0–2)
BILIRUB SERPL-MCNC: 0.5 MG/DL (ref 0.2–1.1)
BUN SERPL-MCNC: 16 MG/DL (ref 8–23)
CALCIUM SERPL-MCNC: 8.6 MG/DL (ref 8.3–10.4)
CHLORIDE SERPL-SCNC: 112 MMOL/L (ref 101–110)
CO2 SERPL-SCNC: 26 MMOL/L (ref 21–32)
CREAT SERPL-MCNC: 1.1 MG/DL (ref 0.8–1.5)
DIFFERENTIAL METHOD BLD: ABNORMAL
EOSINOPHIL # BLD: 0.1 K/UL (ref 0–0.8)
EOSINOPHIL NFR BLD: 1 % (ref 0.5–7.8)
ERYTHROCYTE [DISTWIDTH] IN BLOOD BY AUTOMATED COUNT: 14.5 % (ref 11.9–14.6)
GLOBULIN SER CALC-MCNC: 3 G/DL (ref 2.8–4.5)
GLUCOSE SERPL-MCNC: 117 MG/DL (ref 65–100)
HCT VFR BLD AUTO: 38.6 % (ref 41.1–50.3)
HGB BLD-MCNC: 12.8 G/DL (ref 13.6–17.2)
IMM GRANULOCYTES # BLD AUTO: 0.1 K/UL (ref 0–0.5)
IMM GRANULOCYTES NFR BLD AUTO: 1 % (ref 0–5)
LYMPHOCYTES # BLD: 1 K/UL (ref 0.5–4.6)
LYMPHOCYTES NFR BLD: 11 % (ref 13–44)
MCH RBC QN AUTO: 30.6 PG (ref 26.1–32.9)
MCHC RBC AUTO-ENTMCNC: 33.2 G/DL (ref 31.4–35)
MCV RBC AUTO: 92.3 FL (ref 82–102)
MONOCYTES # BLD: 0.6 K/UL (ref 0.1–1.3)
MONOCYTES NFR BLD: 7 % (ref 4–12)
NEUTS SEG # BLD: 7 K/UL (ref 1.7–8.2)
NEUTS SEG NFR BLD: 80 % (ref 43–78)
NRBC # BLD: 0 K/UL (ref 0–0.2)
PLATELET # BLD AUTO: 97 K/UL (ref 150–450)
PMV BLD AUTO: 10 FL (ref 9.4–12.3)
POTASSIUM SERPL-SCNC: 3.1 MMOL/L (ref 3.5–5.1)
PROT SERPL-MCNC: 6.1 G/DL (ref 6.3–8.2)
PROT UR-MCNC: 53 MG/DL
RBC # BLD AUTO: 4.18 M/UL (ref 4.23–5.6)
SODIUM SERPL-SCNC: 144 MMOL/L (ref 133–143)
WBC # BLD AUTO: 8.7 K/UL (ref 4.3–11.1)

## 2023-07-12 PROCEDURE — 36591 DRAW BLOOD OFF VENOUS DEVICE: CPT

## 2023-07-12 PROCEDURE — G8417 CALC BMI ABV UP PARAM F/U: HCPCS | Performed by: NURSE PRACTITIONER

## 2023-07-12 PROCEDURE — 99214 OFFICE O/P EST MOD 30 MIN: CPT | Performed by: NURSE PRACTITIONER

## 2023-07-12 PROCEDURE — 1123F ACP DISCUSS/DSCN MKR DOCD: CPT | Performed by: NURSE PRACTITIONER

## 2023-07-12 PROCEDURE — 6360000002 HC RX W HCPCS: Performed by: NURSE PRACTITIONER

## 2023-07-12 PROCEDURE — 80053 COMPREHEN METABOLIC PANEL: CPT

## 2023-07-12 PROCEDURE — 3074F SYST BP LT 130 MM HG: CPT | Performed by: NURSE PRACTITIONER

## 2023-07-12 PROCEDURE — 2580000003 HC RX 258: Performed by: INTERNAL MEDICINE

## 2023-07-12 PROCEDURE — 96413 CHEMO IV INFUSION 1 HR: CPT

## 2023-07-12 PROCEDURE — 85025 COMPLETE CBC W/AUTO DIFF WBC: CPT

## 2023-07-12 PROCEDURE — G8427 DOCREV CUR MEDS BY ELIG CLIN: HCPCS | Performed by: NURSE PRACTITIONER

## 2023-07-12 PROCEDURE — 84156 ASSAY OF PROTEIN URINE: CPT

## 2023-07-12 PROCEDURE — 1036F TOBACCO NON-USER: CPT | Performed by: NURSE PRACTITIONER

## 2023-07-12 PROCEDURE — 2580000003 HC RX 258: Performed by: NURSE PRACTITIONER

## 2023-07-12 PROCEDURE — 3078F DIAST BP <80 MM HG: CPT | Performed by: NURSE PRACTITIONER

## 2023-07-12 RX ORDER — ACETAMINOPHEN 325 MG/1
650 TABLET ORAL
Status: CANCELLED | OUTPATIENT
Start: 2023-07-12

## 2023-07-12 RX ORDER — EPINEPHRINE 1 MG/ML
0.3 INJECTION, SOLUTION, CONCENTRATE INTRAVENOUS PRN
Status: DISCONTINUED | OUTPATIENT
Start: 2023-07-12 | End: 2023-07-13 | Stop reason: HOSPADM

## 2023-07-12 RX ORDER — FAMOTIDINE 10 MG/ML
20 INJECTION, SOLUTION INTRAVENOUS
Status: CANCELLED | OUTPATIENT
Start: 2023-07-12

## 2023-07-12 RX ORDER — DIPHENHYDRAMINE HYDROCHLORIDE 50 MG/ML
50 INJECTION INTRAMUSCULAR; INTRAVENOUS
Status: CANCELLED | OUTPATIENT
Start: 2023-07-12

## 2023-07-12 RX ORDER — MEPERIDINE HYDROCHLORIDE 25 MG/ML
12.5 INJECTION INTRAMUSCULAR; INTRAVENOUS; SUBCUTANEOUS PRN
Status: DISCONTINUED | OUTPATIENT
Start: 2023-07-12 | End: 2023-07-13 | Stop reason: HOSPADM

## 2023-07-12 RX ORDER — MEPERIDINE HYDROCHLORIDE 50 MG/ML
12.5 INJECTION INTRAMUSCULAR; INTRAVENOUS; SUBCUTANEOUS PRN
Status: CANCELLED | OUTPATIENT
Start: 2023-07-12

## 2023-07-12 RX ORDER — SODIUM CHLORIDE 9 MG/ML
INJECTION, SOLUTION INTRAVENOUS CONTINUOUS
Status: DISCONTINUED | OUTPATIENT
Start: 2023-07-12 | End: 2023-07-13 | Stop reason: HOSPADM

## 2023-07-12 RX ORDER — ONDANSETRON 2 MG/ML
8 INJECTION INTRAMUSCULAR; INTRAVENOUS
Status: DISCONTINUED | OUTPATIENT
Start: 2023-07-12 | End: 2023-07-13 | Stop reason: HOSPADM

## 2023-07-12 RX ORDER — POTASSIUM CHLORIDE 7.45 MG/ML
10 INJECTION INTRAVENOUS
Status: ACTIVE | OUTPATIENT
Start: 2023-07-12 | End: 2023-07-12

## 2023-07-12 RX ORDER — SODIUM CHLORIDE 9 MG/ML
5-250 INJECTION, SOLUTION INTRAVENOUS PRN
Status: CANCELLED | OUTPATIENT
Start: 2023-07-12

## 2023-07-12 RX ORDER — SODIUM CHLORIDE 9 MG/ML
INJECTION, SOLUTION INTRAVENOUS CONTINUOUS
Status: CANCELLED | OUTPATIENT
Start: 2023-07-12

## 2023-07-12 RX ORDER — ONDANSETRON 2 MG/ML
8 INJECTION INTRAMUSCULAR; INTRAVENOUS
Status: CANCELLED | OUTPATIENT
Start: 2023-07-12

## 2023-07-12 RX ORDER — SODIUM CHLORIDE 0.9 % (FLUSH) 0.9 %
5-40 SYRINGE (ML) INJECTION PRN
Status: DISCONTINUED | OUTPATIENT
Start: 2023-07-12 | End: 2023-07-13 | Stop reason: HOSPADM

## 2023-07-12 RX ORDER — ALBUTEROL SULFATE 90 UG/1
4 AEROSOL, METERED RESPIRATORY (INHALATION) PRN
Status: CANCELLED | OUTPATIENT
Start: 2023-07-12

## 2023-07-12 RX ORDER — SODIUM CHLORIDE 0.9 % (FLUSH) 0.9 %
5-40 SYRINGE (ML) INJECTION PRN
Status: CANCELLED | OUTPATIENT
Start: 2023-07-12

## 2023-07-12 RX ORDER — EPINEPHRINE 1 MG/ML
0.3 INJECTION, SOLUTION, CONCENTRATE INTRAVENOUS PRN
Status: CANCELLED | OUTPATIENT
Start: 2023-07-12

## 2023-07-12 RX ORDER — HEPARIN SODIUM (PORCINE) LOCK FLUSH IV SOLN 100 UNIT/ML 100 UNIT/ML
500 SOLUTION INTRAVENOUS PRN
Status: CANCELLED | OUTPATIENT
Start: 2023-07-12

## 2023-07-12 RX ORDER — ACETAMINOPHEN 325 MG/1
650 TABLET ORAL
Status: DISCONTINUED | OUTPATIENT
Start: 2023-07-12 | End: 2023-07-13 | Stop reason: HOSPADM

## 2023-07-12 RX ORDER — SODIUM CHLORIDE 9 MG/ML
5-250 INJECTION, SOLUTION INTRAVENOUS PRN
Status: DISCONTINUED | OUTPATIENT
Start: 2023-07-12 | End: 2023-07-13 | Stop reason: HOSPADM

## 2023-07-12 RX ORDER — ALBUTEROL SULFATE 90 UG/1
4 AEROSOL, METERED RESPIRATORY (INHALATION) PRN
Status: DISCONTINUED | OUTPATIENT
Start: 2023-07-12 | End: 2023-07-13 | Stop reason: HOSPADM

## 2023-07-12 RX ORDER — POTASSIUM CHLORIDE 7.45 MG/ML
10 INJECTION INTRAVENOUS
Status: CANCELLED
Start: 2023-07-12

## 2023-07-12 RX ORDER — SODIUM CHLORIDE 0.9 % (FLUSH) 0.9 %
10 SYRINGE (ML) INJECTION PRN
Status: DISCONTINUED | OUTPATIENT
Start: 2023-07-12 | End: 2023-07-13 | Stop reason: HOSPADM

## 2023-07-12 RX ORDER — DIPHENHYDRAMINE HYDROCHLORIDE 50 MG/ML
50 INJECTION INTRAMUSCULAR; INTRAVENOUS
Status: DISCONTINUED | OUTPATIENT
Start: 2023-07-12 | End: 2023-07-13 | Stop reason: HOSPADM

## 2023-07-12 RX ADMIN — SODIUM CHLORIDE, PRESERVATIVE FREE 10 ML: 5 INJECTION INTRAVENOUS at 15:06

## 2023-07-12 RX ADMIN — SODIUM CHLORIDE, PRESERVATIVE FREE 10 ML: 5 INJECTION INTRAVENOUS at 12:28

## 2023-07-12 RX ADMIN — SODIUM CHLORIDE 800 MG: 9 INJECTION, SOLUTION INTRAVENOUS at 14:23

## 2023-07-12 RX ADMIN — SODIUM CHLORIDE 50 ML/HR: 9 INJECTION, SOLUTION INTRAVENOUS at 14:23

## 2023-07-12 NOTE — PROGRESS NOTES
Patient arrived to port lab for port access and lab draw   [address] accessed and labs drawn per protocol   *Port remains accessed.  Patient discharged from port lab in wheelchair*

## 2023-07-12 NOTE — PROGRESS NOTES
Data Source: Patient, [facility] record. 7/12/2023    1:58 PM    Benny Oneal [id number]    82 y.o. Patient Encounter: [address] Visit    Cancer Diagnosis:  GBM  thGthrthathdtheth:th th5th Performance Status:  1  Code Status:  Not discussed  Onc History (Copied from prior):   68 male ex smoker, baseline performance status 1, retired physicist for [employer], history of depression/anxiety disorder, gout, insomnia, hernia repair, ankle fracture repair, lower back surgery with limited mobility thereafter, more recently admitted 12/25/2020 after being found unresponsive in bathroom by wife. Abnormal head CT leading to a brain MRI 12/25/2020 showing right temporal parietal mass 3.7 x 3.3 cm in size with adjacent cerebral edema, as well as associated hemorrhage and/or calcification. 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 CT CAP 12/27/2020 without evidence of malignancy or metastatic disease. Patient was seen by neurosurgery Dr. Annabelle Aaron and taken to the OR 12/31/2020. Underwent right parietal craniotomy with resection of right parietal tumor and placement of intraoperative BCNU wafers to surgical bed. 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 Recovering well, now referred to medical oncology as well as radiation oncology for further therapy. He is also undergoing speech therapy. On evaluation today using a walker to get around. Today reports some short term memory issues but otherwise doing well. Family support includes wife and one daughter who lives in [place] and was here for his surgery. Not currently on any seizure medicines and being tapered off of Decadron (will be done next Monday). Hospice Referral:  na  Interval History:  7/12/2023:  He is here today for follow up and consideration of Avastin. On Dex 2 mg BID. He has been stable overall since last seen. He continues working with PT/OT and speech therapy.   He is working on balance with PT/OT and also

## 2023-07-12 NOTE — PROGRESS NOTES
Pt arrived via w/c. Pt wife did not want to stay for 20 meq KCL bolus, so Fidencio Shannon NP notified. Wife instructed to increase oral potassium to BID x 3 days and then resume once a day per NP. Wife verbalized understanding. Leonarda Gaming completed without complications. Pt and wife aware  of next appt 7/26 at 1330. Discharged via w/c, no distress noted.   Patient instructed to call provider with temperature of 100.4 or greater or nausea/vomiting/ diarrhea or pain not controlled by medications

## 2023-07-26 ENCOUNTER — OFFICE VISIT (OUTPATIENT)
Dept: ONCOLOGY | Age: 83
End: 2023-07-26
Payer: MEDICARE

## 2023-07-26 ENCOUNTER — HOSPITAL ENCOUNTER (OUTPATIENT)
Dept: INFUSION THERAPY | Age: 83
Discharge: HOME | End: 2023-07-26
Payer: MEDICARE

## 2023-07-26 VITALS
OXYGEN SATURATION: 96 % | HEART RATE: 71 BPM | DIASTOLIC BLOOD PRESSURE: 76 MMHG | RESPIRATION RATE: 18 BRPM | SYSTOLIC BLOOD PRESSURE: 127 MMHG | BODY MASS INDEX: 26.7 KG/M2 | HEIGHT: 68 IN | WEIGHT: 176.2 LBS | TEMPERATURE: 97.4 F

## 2023-07-26 DIAGNOSIS — Z11.59 ENCOUNTER FOR SCREENING FOR OTHER VIRAL DISEASES: ICD-10-CM

## 2023-07-26 DIAGNOSIS — C71.9 GLIOBLASTOMA (HCC): Primary | ICD-10-CM

## 2023-07-26 DIAGNOSIS — Z87.898 HISTORY OF SEIZURE: ICD-10-CM

## 2023-07-26 DIAGNOSIS — Z11.59 ENCOUNTER FOR SCREENING FOR OTHER VIRAL DISEASES: Primary | ICD-10-CM

## 2023-07-26 DIAGNOSIS — R53.1 LEFT-SIDED WEAKNESS: ICD-10-CM

## 2023-07-26 DIAGNOSIS — R53.83 FATIGUE DUE TO TREATMENT: ICD-10-CM

## 2023-07-26 DIAGNOSIS — E87.6 HYPOKALEMIA: ICD-10-CM

## 2023-07-26 DIAGNOSIS — C71.9 GLIOBLASTOMA (HCC): ICD-10-CM

## 2023-07-26 DIAGNOSIS — Z79.899 HIGH RISK MEDICATION USE: ICD-10-CM

## 2023-07-26 DIAGNOSIS — D69.6 THROMBOCYTOPENIA (HCC): ICD-10-CM

## 2023-07-26 LAB
ALBUMIN SERPL-MCNC: 3 G/DL (ref 3.2–4.6)
ALBUMIN/GLOB SERPL: 1 (ref 0.4–1.6)
ALP SERPL-CCNC: 76 U/L (ref 50–136)
ALT SERPL-CCNC: 24 U/L (ref 12–65)
ANION GAP SERPL CALC-SCNC: 8 MMOL/L (ref 2–11)
APPEARANCE UR: CLEAR
AST SERPL-CCNC: 13 U/L (ref 15–37)
BASOPHILS # BLD: 0 K/UL (ref 0–0.2)
BASOPHILS NFR BLD: 0 % (ref 0–2)
BILIRUB SERPL-MCNC: 0.4 MG/DL (ref 0.2–1.1)
BILIRUB UR QL: NEGATIVE
BUN SERPL-MCNC: 23 MG/DL (ref 8–23)
CALCIUM SERPL-MCNC: 8.4 MG/DL (ref 8.3–10.4)
CHLORIDE SERPL-SCNC: 110 MMOL/L (ref 101–110)
CO2 SERPL-SCNC: 27 MMOL/L (ref 21–32)
COLOR UR: YELLOW
CREAT SERPL-MCNC: 1.1 MG/DL (ref 0.8–1.5)
CRYSTALS URNS QL MICRO: NORMAL /LPF
DIFFERENTIAL METHOD BLD: ABNORMAL
EOSINOPHIL # BLD: 0 K/UL (ref 0–0.8)
EOSINOPHIL NFR BLD: 0 % (ref 0.5–7.8)
ERYTHROCYTE [DISTWIDTH] IN BLOOD BY AUTOMATED COUNT: 14.3 % (ref 11.9–14.6)
GLOBULIN SER CALC-MCNC: 3.1 G/DL (ref 2.8–4.5)
GLUCOSE SERPL-MCNC: 132 MG/DL (ref 65–100)
GLUCOSE UR STRIP.AUTO-MCNC: NEGATIVE MG/DL
HCT VFR BLD AUTO: 39.9 % (ref 41.1–50.3)
HGB BLD-MCNC: 13.1 G/DL (ref 13.6–17.2)
HGB UR QL STRIP: NEGATIVE
IMM GRANULOCYTES # BLD AUTO: 0.1 K/UL (ref 0–0.5)
IMM GRANULOCYTES NFR BLD AUTO: 1 % (ref 0–5)
KETONES UR QL STRIP.AUTO: NEGATIVE MG/DL
LEUKOCYTE ESTERASE UR QL STRIP.AUTO: NEGATIVE
LYMPHOCYTES # BLD: 0.5 K/UL (ref 0.5–4.6)
LYMPHOCYTES NFR BLD: 7 % (ref 13–44)
MCH RBC QN AUTO: 30.3 PG (ref 26.1–32.9)
MCHC RBC AUTO-ENTMCNC: 32.8 G/DL (ref 31.4–35)
MCV RBC AUTO: 92.1 FL (ref 82–102)
MONOCYTES # BLD: 0.5 K/UL (ref 0.1–1.3)
MONOCYTES NFR BLD: 6 % (ref 4–12)
NEUTS SEG # BLD: 7 K/UL (ref 1.7–8.2)
NEUTS SEG NFR BLD: 86 % (ref 43–78)
NITRITE UR QL STRIP.AUTO: NEGATIVE
NRBC # BLD: 0 K/UL (ref 0–0.2)
PH UR STRIP: 5.5 (ref 5–9)
PLATELET # BLD AUTO: 76 K/UL (ref 150–450)
PMV BLD AUTO: 10.3 FL (ref 9.4–12.3)
POTASSIUM SERPL-SCNC: 3.7 MMOL/L (ref 3.5–5.1)
PROT SERPL-MCNC: 6.1 G/DL (ref 6.3–8.2)
PROT UR STRIP-MCNC: ABNORMAL MG/DL
PROT UR-MCNC: 53 MG/DL
RBC # BLD AUTO: 4.33 M/UL (ref 4.23–5.6)
SODIUM SERPL-SCNC: 145 MMOL/L (ref 133–143)
SP GR UR REFRACTOMETRY: >=1.03 (ref 1–1.02)
UROBILINOGEN UR QL STRIP.AUTO: 0.2 EU/DL
WBC # BLD AUTO: 8.1 K/UL (ref 4.3–11.1)

## 2023-07-26 PROCEDURE — 2580000003 HC RX 258: Performed by: NURSE PRACTITIONER

## 2023-07-26 PROCEDURE — 3078F DIAST BP <80 MM HG: CPT | Performed by: NURSE PRACTITIONER

## 2023-07-26 PROCEDURE — 84156 ASSAY OF PROTEIN URINE: CPT

## 2023-07-26 PROCEDURE — 36591 DRAW BLOOD OFF VENOUS DEVICE: CPT

## 2023-07-26 PROCEDURE — 6360000002 HC RX W HCPCS: Performed by: NURSE PRACTITIONER

## 2023-07-26 PROCEDURE — 1123F ACP DISCUSS/DSCN MKR DOCD: CPT | Performed by: NURSE PRACTITIONER

## 2023-07-26 PROCEDURE — 3074F SYST BP LT 130 MM HG: CPT | Performed by: NURSE PRACTITIONER

## 2023-07-26 PROCEDURE — G8417 CALC BMI ABV UP PARAM F/U: HCPCS | Performed by: NURSE PRACTITIONER

## 2023-07-26 PROCEDURE — 81003 URINALYSIS AUTO W/O SCOPE: CPT

## 2023-07-26 PROCEDURE — 85025 COMPLETE CBC W/AUTO DIFF WBC: CPT

## 2023-07-26 PROCEDURE — 1036F TOBACCO NON-USER: CPT | Performed by: NURSE PRACTITIONER

## 2023-07-26 PROCEDURE — 2580000003 HC RX 258: Performed by: INTERNAL MEDICINE

## 2023-07-26 PROCEDURE — 99214 OFFICE O/P EST MOD 30 MIN: CPT | Performed by: NURSE PRACTITIONER

## 2023-07-26 PROCEDURE — 96413 CHEMO IV INFUSION 1 HR: CPT

## 2023-07-26 PROCEDURE — 80053 COMPREHEN METABOLIC PANEL: CPT

## 2023-07-26 PROCEDURE — G8427 DOCREV CUR MEDS BY ELIG CLIN: HCPCS | Performed by: NURSE PRACTITIONER

## 2023-07-26 RX ORDER — ALBUTEROL SULFATE 90 UG/1
4 AEROSOL, METERED RESPIRATORY (INHALATION) PRN
Status: CANCELLED | OUTPATIENT
Start: 2023-07-26

## 2023-07-26 RX ORDER — ONDANSETRON 2 MG/ML
8 INJECTION INTRAMUSCULAR; INTRAVENOUS
Status: CANCELLED | OUTPATIENT
Start: 2023-07-26

## 2023-07-26 RX ORDER — SODIUM CHLORIDE 9 MG/ML
5-250 INJECTION, SOLUTION INTRAVENOUS PRN
Status: CANCELLED | OUTPATIENT
Start: 2023-07-26

## 2023-07-26 RX ORDER — SODIUM CHLORIDE 0.9 % (FLUSH) 0.9 %
5-40 SYRINGE (ML) INJECTION 2 TIMES DAILY
Status: DISCONTINUED | OUTPATIENT
Start: 2023-07-26 | End: 2023-07-27 | Stop reason: HOSPADM

## 2023-07-26 RX ORDER — SODIUM CHLORIDE 0.9 % (FLUSH) 0.9 %
5-40 SYRINGE (ML) INJECTION PRN
Status: DISCONTINUED | OUTPATIENT
Start: 2023-07-26 | End: 2023-07-27 | Stop reason: HOSPADM

## 2023-07-26 RX ORDER — HEPARIN SODIUM (PORCINE) LOCK FLUSH IV SOLN 100 UNIT/ML 100 UNIT/ML
500 SOLUTION INTRAVENOUS PRN
Status: CANCELLED | OUTPATIENT
Start: 2023-07-26

## 2023-07-26 RX ORDER — ACETAMINOPHEN 325 MG/1
650 TABLET ORAL
Status: CANCELLED | OUTPATIENT
Start: 2023-07-26

## 2023-07-26 RX ORDER — FAMOTIDINE 10 MG/ML
20 INJECTION, SOLUTION INTRAVENOUS
Status: CANCELLED | OUTPATIENT
Start: 2023-07-26

## 2023-07-26 RX ORDER — MEPERIDINE HYDROCHLORIDE 50 MG/ML
12.5 INJECTION INTRAMUSCULAR; INTRAVENOUS; SUBCUTANEOUS PRN
Status: CANCELLED | OUTPATIENT
Start: 2023-07-26

## 2023-07-26 RX ORDER — EPINEPHRINE 1 MG/ML
0.3 INJECTION, SOLUTION, CONCENTRATE INTRAVENOUS PRN
Status: CANCELLED | OUTPATIENT
Start: 2023-07-26

## 2023-07-26 RX ORDER — SODIUM CHLORIDE 0.9 % (FLUSH) 0.9 %
5-40 SYRINGE (ML) INJECTION PRN
Status: CANCELLED | OUTPATIENT
Start: 2023-07-26

## 2023-07-26 RX ORDER — DIPHENHYDRAMINE HYDROCHLORIDE 50 MG/ML
50 INJECTION INTRAMUSCULAR; INTRAVENOUS
Status: DISCONTINUED | OUTPATIENT
Start: 2023-07-26 | End: 2023-07-27 | Stop reason: HOSPADM

## 2023-07-26 RX ORDER — SODIUM CHLORIDE 9 MG/ML
INJECTION, SOLUTION INTRAVENOUS CONTINUOUS
Status: CANCELLED | OUTPATIENT
Start: 2023-07-26

## 2023-07-26 RX ORDER — DIPHENHYDRAMINE HYDROCHLORIDE 50 MG/ML
50 INJECTION INTRAMUSCULAR; INTRAVENOUS
Status: CANCELLED | OUTPATIENT
Start: 2023-07-26

## 2023-07-26 RX ORDER — SODIUM CHLORIDE 9 MG/ML
5-250 INJECTION, SOLUTION INTRAVENOUS PRN
Status: DISCONTINUED | OUTPATIENT
Start: 2023-07-26 | End: 2023-07-27 | Stop reason: HOSPADM

## 2023-07-26 RX ADMIN — SODIUM CHLORIDE 800 MG: 9 INJECTION, SOLUTION INTRAVENOUS at 14:50

## 2023-07-26 RX ADMIN — SODIUM CHLORIDE, PRESERVATIVE FREE 10 ML: 5 INJECTION INTRAVENOUS at 11:20

## 2023-07-26 RX ADMIN — SODIUM CHLORIDE 25 ML/HR: 9 INJECTION, SOLUTION INTRAVENOUS at 14:20

## 2023-07-26 RX ADMIN — SODIUM CHLORIDE, PRESERVATIVE FREE 10 ML: 5 INJECTION INTRAVENOUS at 14:03

## 2023-07-26 RX ADMIN — SODIUM CHLORIDE, PRESERVATIVE FREE 10 ML: 5 INJECTION INTRAVENOUS at 15:28

## 2023-07-26 ASSESSMENT — PATIENT HEALTH QUESTIONNAIRE - PHQ9
2. FEELING DOWN, DEPRESSED OR HOPELESS: 0
SUM OF ALL RESPONSES TO PHQ QUESTIONS 1-9: 0
SUM OF ALL RESPONSES TO PHQ9 QUESTIONS 1 & 2: 0
SUM OF ALL RESPONSES TO PHQ QUESTIONS 1-9: 0
1. LITTLE INTEREST OR PLEASURE IN DOING THINGS: 0

## 2023-07-26 NOTE — PROGRESS NOTES
Arrived to the [address] [place] [address]. Avastin completed. Patient tolerated well. Any issues or concerns during appointment: none. Patient aware of next infusion appointment on 8/9/23 (date) at 1:30pm (time). Patient aware of next lab and [facility] office visit on 8/9/23 (date) at 11:00am (time). Patient instructed to call provider with temperature of 100.4 or greater or nausea/vomiting/ diarrhea or pain not controlled by medications  Discharged via [facility] with caregiver.

## 2023-07-26 NOTE — PROGRESS NOTES
Data Source: Patient, Connect[facility] record. 7/26/2023    12:27 PM    Carmelina Gastelum [id number]    82 y.o. 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, history of depression/anxiety disorder, gout, insomnia, hernia repair, ankle fracture repair, lower back surgery with limited mobility thereafter, more recently admitted 12/25/2020 after being found unresponsive in bathroom by wife. Abnormal head CT leading to a brain MRI 12/25/2020 showing right temporal parietal mass 3.7 x 3.3 cm in size with adjacent cerebral edema, as well as associated hemorrhage and/or calcification. 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 CT CAP 12/27/2020 without evidence of malignancy or metastatic disease. Patient was seen by neurosurgery Dr. Natacha Anthony and taken to the OR 12/31/2020. Underwent right parietal craniotomy with resection of right parietal tumor and placement of intraoperative BCNU wafers to surgical bed. 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 Recovering well, now referred to medical oncology as well as radiation oncology for further therapy. He is also undergoing speech therapy. On evaluation today using a walker to get around. Today reports some short term memory issues but otherwise doing well. Family support includes wife and one daughter who lives in [place] and was here for his surgery. Not currently on any seizure medicines and being tapered off of Decadron (will be done next Monday). Hospice Referral:  Na    Interval History:  7/26/2023:  He is here today for follow up regarding his history of glioblastoma and consideration of next Avastin. He is stable overall. Left sided weakness ongoing but stable. Working with PT/OT.  He continues on Decadron 2 mg BID

## 2023-07-26 NOTE — PROGRESS NOTES
Patient arrived to port lab for port access and lab draw   [address] accessed and labs drawn per protocol   Port remains accessed  Patient discharged from port lab ambulatory

## 2023-08-01 DIAGNOSIS — C71.9 GLIOBLASTOMA (HCC): Primary | ICD-10-CM

## 2023-08-01 DIAGNOSIS — Z79.899 HIGH RISK MEDICATION USE: ICD-10-CM

## 2023-08-03 ENCOUNTER — TELEPHONE (OUTPATIENT)
Dept: ONCOLOGY | Age: 83
End: 2023-08-03

## 2023-08-03 NOTE — TELEPHONE ENCOUNTER
Contacted radiology scheduling to determine whether we could just extend the expiration date rather than entering an entirely new order - they verified we could extend exp date. Pushed expiration date to end of month.

## 2023-08-03 NOTE — TELEPHONE ENCOUNTER
Physician provider: Vik Parr MD  Reason for today's call: new order   Last office visit:7/26/2023    Patient notified that their information will be routed to the [facility] clinical triage team for review. Patient is advised that they will receive a phone call from the triage department. Lizeth from [facility] radiology scheduling calling on the behalf of the PT. Requesting for a new order for an MRI [address] due to the PT MRI being pushed out to next week.     Fax number: [phone]

## 2023-08-09 ENCOUNTER — HOSPITAL ENCOUNTER (OUTPATIENT)
Dept: INFUSION THERAPY | Age: 83
Discharge: HOME | End: 2023-08-09
Payer: MEDICARE

## 2023-08-09 ENCOUNTER — OFFICE VISIT (OUTPATIENT)
Dept: ONCOLOGY | Age: 83
End: 2023-08-09
Payer: MEDICARE

## 2023-08-09 VITALS
HEART RATE: 62 BPM | HEIGHT: 68 IN | WEIGHT: 177.6 LBS | BODY MASS INDEX: 26.92 KG/M2 | TEMPERATURE: 98.1 F | SYSTOLIC BLOOD PRESSURE: 145 MMHG | DIASTOLIC BLOOD PRESSURE: 75 MMHG | RESPIRATION RATE: 16 BRPM | OXYGEN SATURATION: 97 %

## 2023-08-09 DIAGNOSIS — C71.9 GLIOBLASTOMA (HCC): ICD-10-CM

## 2023-08-09 DIAGNOSIS — C71.9 GLIOBLASTOMA (HCC): Primary | ICD-10-CM

## 2023-08-09 DIAGNOSIS — E87.6 HYPOKALEMIA: ICD-10-CM

## 2023-08-09 DIAGNOSIS — Z11.59 ENCOUNTER FOR SCREENING FOR OTHER VIRAL DISEASES: ICD-10-CM

## 2023-08-09 DIAGNOSIS — Z11.59 ENCOUNTER FOR SCREENING FOR OTHER VIRAL DISEASES: Primary | ICD-10-CM

## 2023-08-09 DIAGNOSIS — Z87.898 HISTORY OF SEIZURE: ICD-10-CM

## 2023-08-09 DIAGNOSIS — R53.83 FATIGUE DUE TO TREATMENT: ICD-10-CM

## 2023-08-09 DIAGNOSIS — Z79.899 HIGH RISK MEDICATION USE: ICD-10-CM

## 2023-08-09 LAB
ALBUMIN SERPL-MCNC: 3.2 G/DL (ref 3.2–4.6)
ALBUMIN/GLOB SERPL: 1.1 (ref 0.4–1.6)
ALP SERPL-CCNC: 61 U/L (ref 50–136)
ALT SERPL-CCNC: 26 U/L (ref 12–65)
ANION GAP SERPL CALC-SCNC: 6 MMOL/L (ref 2–11)
AST SERPL-CCNC: 13 U/L (ref 15–37)
BASOPHILS # BLD: 0 K/UL (ref 0–0.2)
BASOPHILS NFR BLD: 0 % (ref 0–2)
BILIRUB SERPL-MCNC: 0.4 MG/DL (ref 0.2–1.1)
BUN SERPL-MCNC: 20 MG/DL (ref 8–23)
CALCIUM SERPL-MCNC: 8.6 MG/DL (ref 8.3–10.4)
CHLORIDE SERPL-SCNC: 113 MMOL/L (ref 101–110)
CO2 SERPL-SCNC: 27 MMOL/L (ref 21–32)
CREAT SERPL-MCNC: 1 MG/DL (ref 0.8–1.5)
DIFFERENTIAL METHOD BLD: ABNORMAL
EOSINOPHIL # BLD: 0.1 K/UL (ref 0–0.8)
EOSINOPHIL NFR BLD: 1 % (ref 0.5–7.8)
ERYTHROCYTE [DISTWIDTH] IN BLOOD BY AUTOMATED COUNT: 14.5 % (ref 11.9–14.6)
GLOBULIN SER CALC-MCNC: 2.8 G/DL (ref 2.8–4.5)
GLUCOSE SERPL-MCNC: 113 MG/DL (ref 65–100)
HCT VFR BLD AUTO: 38.6 % (ref 41.1–50.3)
HGB BLD-MCNC: 12.7 G/DL (ref 13.6–17.2)
IMM GRANULOCYTES # BLD AUTO: 0.1 K/UL (ref 0–0.5)
IMM GRANULOCYTES NFR BLD AUTO: 1 % (ref 0–5)
LYMPHOCYTES # BLD: 1 K/UL (ref 0.5–4.6)
LYMPHOCYTES NFR BLD: 15 % (ref 13–44)
MAGNESIUM SERPL-MCNC: 2.1 MG/DL (ref 1.8–2.4)
MCH RBC QN AUTO: 29.7 PG (ref 26.1–32.9)
MCHC RBC AUTO-ENTMCNC: 32.9 G/DL (ref 31.4–35)
MCV RBC AUTO: 90.2 FL (ref 82–102)
MONOCYTES # BLD: 0.6 K/UL (ref 0.1–1.3)
MONOCYTES NFR BLD: 8 % (ref 4–12)
NEUTS SEG # BLD: 5.2 K/UL (ref 1.7–8.2)
NEUTS SEG NFR BLD: 75 % (ref 43–78)
NRBC # BLD: 0.02 K/UL (ref 0–0.2)
PLATELET # BLD AUTO: 80 K/UL (ref 150–450)
PMV BLD AUTO: 9.4 FL (ref 9.4–12.3)
POTASSIUM SERPL-SCNC: 3.4 MMOL/L (ref 3.5–5.1)
PROT SERPL-MCNC: 6 G/DL (ref 6.3–8.2)
PROT UR-MCNC: 40 MG/DL
RBC # BLD AUTO: 4.28 M/UL (ref 4.23–5.6)
SODIUM SERPL-SCNC: 146 MMOL/L (ref 133–143)
WBC # BLD AUTO: 6.9 K/UL (ref 4.3–11.1)

## 2023-08-09 PROCEDURE — 1036F TOBACCO NON-USER: CPT | Performed by: INTERNAL MEDICINE

## 2023-08-09 PROCEDURE — 2580000003 HC RX 258: Performed by: INTERNAL MEDICINE

## 2023-08-09 PROCEDURE — 85025 COMPLETE CBC W/AUTO DIFF WBC: CPT

## 2023-08-09 PROCEDURE — 6360000002 HC RX W HCPCS: Performed by: INTERNAL MEDICINE

## 2023-08-09 PROCEDURE — 80053 COMPREHEN METABOLIC PANEL: CPT

## 2023-08-09 PROCEDURE — G8417 CALC BMI ABV UP PARAM F/U: HCPCS | Performed by: INTERNAL MEDICINE

## 2023-08-09 PROCEDURE — 36591 DRAW BLOOD OFF VENOUS DEVICE: CPT

## 2023-08-09 PROCEDURE — 3078F DIAST BP <80 MM HG: CPT | Performed by: INTERNAL MEDICINE

## 2023-08-09 PROCEDURE — 83735 ASSAY OF MAGNESIUM: CPT

## 2023-08-09 PROCEDURE — 96413 CHEMO IV INFUSION 1 HR: CPT

## 2023-08-09 PROCEDURE — 3077F SYST BP >= 140 MM HG: CPT | Performed by: INTERNAL MEDICINE

## 2023-08-09 PROCEDURE — G8428 CUR MEDS NOT DOCUMENT: HCPCS | Performed by: INTERNAL MEDICINE

## 2023-08-09 PROCEDURE — 84156 ASSAY OF PROTEIN URINE: CPT

## 2023-08-09 PROCEDURE — 1123F ACP DISCUSS/DSCN MKR DOCD: CPT | Performed by: INTERNAL MEDICINE

## 2023-08-09 PROCEDURE — 99215 OFFICE O/P EST HI 40 MIN: CPT | Performed by: INTERNAL MEDICINE

## 2023-08-09 RX ORDER — DIPHENHYDRAMINE HYDROCHLORIDE 50 MG/ML
50 INJECTION INTRAMUSCULAR; INTRAVENOUS
OUTPATIENT
Start: 2023-08-23

## 2023-08-09 RX ORDER — SODIUM CHLORIDE 9 MG/ML
INJECTION, SOLUTION INTRAVENOUS CONTINUOUS
OUTPATIENT
Start: 2023-08-23

## 2023-08-09 RX ORDER — ACETAMINOPHEN 325 MG/1
650 TABLET ORAL
OUTPATIENT
Start: 2023-08-23

## 2023-08-09 RX ORDER — ALBUTEROL SULFATE 90 UG/1
4 AEROSOL, METERED RESPIRATORY (INHALATION) PRN
Status: CANCELLED | OUTPATIENT
Start: 2023-08-09

## 2023-08-09 RX ORDER — SODIUM CHLORIDE 9 MG/ML
5-250 INJECTION, SOLUTION INTRAVENOUS PRN
OUTPATIENT
Start: 2023-08-23

## 2023-08-09 RX ORDER — MEPERIDINE HYDROCHLORIDE 50 MG/ML
12.5 INJECTION INTRAMUSCULAR; INTRAVENOUS; SUBCUTANEOUS PRN
OUTPATIENT
Start: 2023-08-23

## 2023-08-09 RX ORDER — HEPARIN SODIUM (PORCINE) LOCK FLUSH IV SOLN 100 UNIT/ML 100 UNIT/ML
500 SOLUTION INTRAVENOUS PRN
OUTPATIENT
Start: 2023-08-23

## 2023-08-09 RX ORDER — EPINEPHRINE 1 MG/ML
0.3 INJECTION, SOLUTION, CONCENTRATE INTRAVENOUS PRN
OUTPATIENT
Start: 2023-08-23

## 2023-08-09 RX ORDER — EPINEPHRINE 1 MG/ML
0.3 INJECTION, SOLUTION, CONCENTRATE INTRAVENOUS PRN
Status: CANCELLED | OUTPATIENT
Start: 2023-08-09

## 2023-08-09 RX ORDER — ONDANSETRON 2 MG/ML
8 INJECTION INTRAMUSCULAR; INTRAVENOUS
OUTPATIENT
Start: 2023-08-23

## 2023-08-09 RX ORDER — SODIUM CHLORIDE 9 MG/ML
5-250 INJECTION, SOLUTION INTRAVENOUS PRN
Status: CANCELLED | OUTPATIENT
Start: 2023-08-09

## 2023-08-09 RX ORDER — FAMOTIDINE 10 MG/ML
20 INJECTION, SOLUTION INTRAVENOUS
Status: CANCELLED | OUTPATIENT
Start: 2023-08-09

## 2023-08-09 RX ORDER — SODIUM CHLORIDE 9 MG/ML
INJECTION, SOLUTION INTRAVENOUS CONTINUOUS
Status: CANCELLED | OUTPATIENT
Start: 2023-08-09

## 2023-08-09 RX ORDER — ACETAMINOPHEN 325 MG/1
650 TABLET ORAL
Status: CANCELLED | OUTPATIENT
Start: 2023-08-09

## 2023-08-09 RX ORDER — MEPERIDINE HYDROCHLORIDE 50 MG/ML
12.5 INJECTION INTRAMUSCULAR; INTRAVENOUS; SUBCUTANEOUS PRN
Status: CANCELLED | OUTPATIENT
Start: 2023-08-09

## 2023-08-09 RX ORDER — FAMOTIDINE 10 MG/ML
20 INJECTION, SOLUTION INTRAVENOUS
OUTPATIENT
Start: 2023-08-23

## 2023-08-09 RX ORDER — SODIUM CHLORIDE 0.9 % (FLUSH) 0.9 %
5-40 SYRINGE (ML) INJECTION PRN
Status: DISCONTINUED | OUTPATIENT
Start: 2023-08-09 | End: 2023-08-10 | Stop reason: HOSPADM

## 2023-08-09 RX ORDER — ALBUTEROL SULFATE 90 UG/1
4 AEROSOL, METERED RESPIRATORY (INHALATION) PRN
OUTPATIENT
Start: 2023-08-23

## 2023-08-09 RX ORDER — HEPARIN SODIUM (PORCINE) LOCK FLUSH IV SOLN 100 UNIT/ML 100 UNIT/ML
500 SOLUTION INTRAVENOUS PRN
Status: CANCELLED | OUTPATIENT
Start: 2023-08-09

## 2023-08-09 RX ORDER — SODIUM CHLORIDE 0.9 % (FLUSH) 0.9 %
10 SYRINGE (ML) INJECTION PRN
Status: DISCONTINUED | OUTPATIENT
Start: 2023-08-09 | End: 2023-08-10 | Stop reason: HOSPADM

## 2023-08-09 RX ORDER — DIPHENHYDRAMINE HYDROCHLORIDE 50 MG/ML
50 INJECTION INTRAMUSCULAR; INTRAVENOUS
Status: CANCELLED | OUTPATIENT
Start: 2023-08-09

## 2023-08-09 RX ORDER — ONDANSETRON 2 MG/ML
8 INJECTION INTRAMUSCULAR; INTRAVENOUS
Status: CANCELLED | OUTPATIENT
Start: 2023-08-09

## 2023-08-09 RX ORDER — SODIUM CHLORIDE 0.9 % (FLUSH) 0.9 %
5-40 SYRINGE (ML) INJECTION PRN
OUTPATIENT
Start: 2023-08-23

## 2023-08-09 RX ORDER — SODIUM CHLORIDE 9 MG/ML
5-250 INJECTION, SOLUTION INTRAVENOUS PRN
Status: DISCONTINUED | OUTPATIENT
Start: 2023-08-09 | End: 2023-08-10 | Stop reason: HOSPADM

## 2023-08-09 RX ORDER — SODIUM CHLORIDE 0.9 % (FLUSH) 0.9 %
5-40 SYRINGE (ML) INJECTION PRN
Status: CANCELLED | OUTPATIENT
Start: 2023-08-09

## 2023-08-09 RX ADMIN — SODIUM CHLORIDE, PRESERVATIVE FREE 10 ML: 5 INJECTION INTRAVENOUS at 14:53

## 2023-08-09 RX ADMIN — SODIUM CHLORIDE 800 MG: 9 INJECTION, SOLUTION INTRAVENOUS at 14:08

## 2023-08-09 RX ADMIN — SODIUM CHLORIDE, PRESERVATIVE FREE 10 ML: 5 INJECTION INTRAVENOUS at 11:25

## 2023-08-09 ASSESSMENT — PATIENT HEALTH QUESTIONNAIRE - PHQ9
SUM OF ALL RESPONSES TO PHQ QUESTIONS 1-9: 0
SUM OF ALL RESPONSES TO PHQ9 QUESTIONS 1 & 2: 0
1. LITTLE INTEREST OR PLEASURE IN DOING THINGS: 0
SUM OF ALL RESPONSES TO PHQ QUESTIONS 1-9: 0
2. FEELING DOWN, DEPRESSED OR HOPELESS: 0
SUM OF ALL RESPONSES TO PHQ QUESTIONS 1-9: 0
SUM OF ALL RESPONSES TO PHQ QUESTIONS 1-9: 0

## 2023-08-09 NOTE — PROGRESS NOTES
Data Source: Patient, [facility] record. 8/9/2023    12:00 PM    Chinmay Pang [facility] [id number]    77 y.o. 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, history of depression/anxiety disorder, gout, insomnia, hernia repair, ankle fracture repair, lower back surgery with limited mobility thereafter, more recently admitted 12/25/2020 after being found unresponsive in bathroom by wife. Abnormal head CT leading to a brain MRI 12/25/2020 showing right temporal parietal mass 3.7 x 3.3 cm in size with adjacent cerebral edema, as well as associated hemorrhage and/or calcification. 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 CT CAP 12/27/2020 without evidence of malignancy or metastatic disease. Patient was seen by neurosurgery Dr. Tammy Anthony and taken to the OR 12/31/2020. Underwent right parietal craniotomy with resection of right parietal tumor and placement of intraoperative BCNU wafers to surgical bed. 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 Recovering well, now referred to medical oncology as well as radiation oncology for further therapy. He is also undergoing speech therapy. On evaluation today using a walker to get around. Today reports some short term memory issues but otherwise doing well. Family support includes wife and one daughter who lives in [place] and was here for his surgery. Not currently on any seizure medicines and being tapered off of Decadron (will be done next Monday). Hospice Referral:  na  Interval History:  8/9/2023: Patient seen for GBM, ongoing treatments and toxicity management. Tolerating Avastin reasonably. Remains on dexamethasone 2 mg twice daily. MRI machine broke down, scheduled for repeat later this month.   With adequate

## 2023-08-09 NOTE — PATIENT INSTRUCTIONS
Patient Instructions from Today's Visit    Reason for Visit:  Follow up    Plan:      Follow Up:   Follow up in 2 weeks    Recent Lab Results:  Hospital Outpatient Visit on 08/09/2023   Component Date Value Ref Range Status    WBC 08/09/2023 6.9  4.3 - 11.1 K/uL Final    RBC 08/09/2023 4.28  4.23 - 5.6 M/uL Final    Hemoglobin 08/09/2023 12.7 (L)  13.6 - 17.2 g/dL Final    Hematocrit 08/09/2023 38.6 (L)  41.1 - 50.3 % Final    MCV 08/09/2023 90.2  82.0 - 102.0 FL Final    MCH 08/09/2023 29.7  26.1 - 32.9 PG Final    MCHC 08/09/2023 32.9  31.4 - 35.0 g/dL Final    RDW 08/09/2023 14.5  11.9 - 14.6 % Final    Platelets 42/51/3242 80 (L)  150 - 450 K/uL Final    MPV 08/09/2023 9.4  9.4 - 12.3 FL Final    nRBC 08/09/2023 0.02  0.0 - 0.2 K/uL Final    **Note: Absolute NRBC parameter is now reported with Hemogram**    Neutrophils % 08/09/2023 75  43 - 78 % Final    Lymphocytes % 08/09/2023 15  13 - 44 % Final    Monocytes % 08/09/2023 8  4.0 - 12.0 % Final    Eosinophils % 08/09/2023 1  0.5 - 7.8 % Final    Basophils % 08/09/2023 0  0.0 - 2.0 % Final    Immature Granulocytes 08/09/2023 1  0.0 - 5.0 % Final    Neutrophils Absolute 08/09/2023 5.2  1.7 - 8.2 K/UL Final    Lymphocytes Absolute 08/09/2023 1.0  0.5 - 4.6 K/UL Final    Monocytes Absolute 08/09/2023 0.6  0.1 - 1.3 K/UL Final    Eosinophils Absolute 08/09/2023 0.1  0.0 - 0.8 K/UL Final    Basophils Absolute 08/09/2023 0.0  0.0 - 0.2 K/UL Final    Absolute Immature Granulocyte 08/09/2023 0.1  0.0 - 0.5 K/UL Final    Differential Type 08/09/2023 AUTOMATED    Final    Sodium 08/09/2023 146 (H)  133 - 143 mmol/L Final    Potassium 08/09/2023 3.4 (L)  3.5 - 5.1 mmol/L Final    Chloride 08/09/2023 113 (H)  101 - 110 mmol/L Final    CO2 08/09/2023 27  21 - 32 mmol/L Final    Anion Gap 08/09/2023 6  2 - 11 mmol/L Final    Glucose 08/09/2023 113 (H)  65 - 100 mg/dL Final    BUN 08/09/2023 20  8 - 23 MG/DL Final    Creatinine 08/09/2023 1.00  0.8 - 1.5 MG/DL Final    Est,

## 2023-08-14 ENCOUNTER — TELEPHONE (OUTPATIENT)
Dept: INTERNAL MEDICINE CLINIC | Facility: CLINIC | Age: 83
End: 2023-08-14

## 2023-08-14 NOTE — TELEPHONE ENCOUNTER
Marcella with [facility] called with an Sylvia Viramontes on the patient. Spring states that she is discharging the patient from Speech therapy today but wanted to let Dr. Jesse Donnelly know that the patient fell on Saturday with no injuries.
Wife already contacted to let us know.  See other encounter (mychart)
Normal for race

## 2023-08-18 ENCOUNTER — TELEPHONE (OUTPATIENT)
Dept: ONCOLOGY | Age: 83
End: 2023-08-18

## 2023-08-18 DIAGNOSIS — C71.9 GLIOBLASTOMA (HCC): Primary | ICD-10-CM

## 2023-08-18 NOTE — TELEPHONE ENCOUNTER
Patient wife says patient is having increased weakness, noticeable from home health staff. Also patient is having headaches and \"doesn't feel well\". See [employer] message for full details. Unable to move up MRI brain any sooner due to availability. Reviewed by Dr. Thierno Carpenter, would recommend palliative care referral while we wait for MRI.  Wife agreeable

## 2023-08-20 ENCOUNTER — HOSPITAL ENCOUNTER (INPATIENT)
Age: 83
LOS: 5 days | Discharge: NURSING, REHAB OR LONG-TERM CARE | End: 2023-08-25
Attending: EMERGENCY MEDICINE | Admitting: HOSPITALIST
Payer: MEDICARE

## 2023-08-20 ENCOUNTER — APPOINTMENT (OUTPATIENT)
Dept: CT IMAGING | Age: 83
End: 2023-08-20
Payer: MEDICARE

## 2023-08-20 ENCOUNTER — APPOINTMENT (OUTPATIENT)
Dept: GENERAL RADIOLOGY | Age: 83
End: 2023-08-20
Payer: MEDICARE

## 2023-08-20 DIAGNOSIS — I10 UNCONTROLLED HYPERTENSION: ICD-10-CM

## 2023-08-20 DIAGNOSIS — R10.9 ABDOMINAL PAIN, UNSPECIFIED ABDOMINAL LOCATION: Primary | ICD-10-CM

## 2023-08-20 DIAGNOSIS — Z78.9 SELF-CARE DEFICIT: ICD-10-CM

## 2023-08-20 DIAGNOSIS — R60.0 LOWER EXTREMITY EDEMA: ICD-10-CM

## 2023-08-20 PROBLEM — R53.2 FUNCTIONAL QUADRIPLEGIA (HCC): Status: ACTIVE | Noted: 2023-08-20

## 2023-08-20 LAB
ALBUMIN SERPL-MCNC: 3.1 G/DL (ref 3.2–4.6)
ALBUMIN/GLOB SERPL: 1.3 (ref 0.4–1.6)
ALP SERPL-CCNC: 65 U/L (ref 50–136)
ALT SERPL-CCNC: 24 U/L (ref 12–65)
ANION GAP SERPL CALC-SCNC: 2 MMOL/L (ref 2–11)
APPEARANCE UR: CLEAR
AST SERPL-CCNC: 10 U/L (ref 15–37)
B PERT DNA SPEC QL NAA+PROBE: NOT DETECTED
BACTERIA URNS QL MICRO: 0 /HPF
BASOPHILS # BLD: 0 K/UL (ref 0–0.2)
BASOPHILS NFR BLD: 0 % (ref 0–2)
BILIRUB SERPL-MCNC: 0.7 MG/DL (ref 0.2–1.1)
BILIRUB UR QL: NEGATIVE
BORDETELLA PARAPERTUSSIS BY PCR: NOT DETECTED
BUN SERPL-MCNC: 20 MG/DL (ref 8–23)
C PNEUM DNA SPEC QL NAA+PROBE: NOT DETECTED
CALCIUM SERPL-MCNC: 8.4 MG/DL (ref 8.3–10.4)
CHLORIDE SERPL-SCNC: 113 MMOL/L (ref 101–110)
CO2 SERPL-SCNC: 32 MMOL/L (ref 21–32)
COLOR UR: ABNORMAL
CREAT SERPL-MCNC: 1 MG/DL (ref 0.8–1.5)
DIFFERENTIAL METHOD BLD: ABNORMAL
EKG ATRIAL RATE: 82 BPM
EKG DIAGNOSIS: NORMAL
EKG P AXIS: 51 DEGREES
EKG P-R INTERVAL: 53 MS
EKG Q-T INTERVAL: 420 MS
EKG QRS DURATION: 91 MS
EKG QTC CALCULATION (BAZETT): 485 MS
EKG R AXIS: 41 DEGREES
EKG T AXIS: 4 DEGREES
EKG VENTRICULAR RATE: 80 BPM
EOSINOPHIL # BLD: 0 K/UL (ref 0–0.8)
EOSINOPHIL NFR BLD: 0 % (ref 0.5–7.8)
EPI CELLS #/AREA URNS HPF: ABNORMAL /HPF
ERYTHROCYTE [DISTWIDTH] IN BLOOD BY AUTOMATED COUNT: 14.8 % (ref 11.9–14.6)
FLUAV SUBTYP SPEC NAA+PROBE: NOT DETECTED
FLUBV RNA SPEC QL NAA+PROBE: NOT DETECTED
FOLATE SERPL-MCNC: 8.1 NG/ML (ref 3.1–17.5)
GLOBULIN SER CALC-MCNC: 2.4 G/DL (ref 2.8–4.5)
GLUCOSE SERPL-MCNC: 99 MG/DL (ref 65–100)
GLUCOSE UR STRIP.AUTO-MCNC: NEGATIVE MG/DL
HADV DNA SPEC QL NAA+PROBE: NOT DETECTED
HCOV 229E RNA SPEC QL NAA+PROBE: NOT DETECTED
HCOV HKU1 RNA SPEC QL NAA+PROBE: NOT DETECTED
HCOV NL63 RNA SPEC QL NAA+PROBE: NOT DETECTED
HCOV OC43 RNA SPEC QL NAA+PROBE: NOT DETECTED
HCT VFR BLD AUTO: 38.4 % (ref 41.1–50.3)
HGB BLD-MCNC: 13 G/DL (ref 13.6–17.2)
HGB UR QL STRIP: ABNORMAL
HMPV RNA SPEC QL NAA+PROBE: NOT DETECTED
HPIV1 RNA SPEC QL NAA+PROBE: NOT DETECTED
HPIV2 RNA SPEC QL NAA+PROBE: NOT DETECTED
HPIV3 RNA SPEC QL NAA+PROBE: NOT DETECTED
HPIV4 RNA SPEC QL NAA+PROBE: NOT DETECTED
IMM GRANULOCYTES # BLD AUTO: 0.1 K/UL (ref 0–0.5)
IMM GRANULOCYTES NFR BLD AUTO: 2 % (ref 0–5)
KETONES UR QL STRIP.AUTO: NEGATIVE MG/DL
LACTATE SERPL-SCNC: 1.2 MMOL/L (ref 0.4–2)
LEUKOCYTE ESTERASE UR QL STRIP.AUTO: NEGATIVE
LIPASE SERPL-CCNC: 213 U/L (ref 73–393)
LYMPHOCYTES # BLD: 0.9 K/UL (ref 0.5–4.6)
LYMPHOCYTES NFR BLD: 12 % (ref 13–44)
M PNEUMO DNA SPEC QL NAA+PROBE: NOT DETECTED
MCH RBC QN AUTO: 30.5 PG (ref 26.1–32.9)
MCHC RBC AUTO-ENTMCNC: 33.9 G/DL (ref 31.4–35)
MCV RBC AUTO: 90.1 FL (ref 82–102)
MONOCYTES # BLD: 0.7 K/UL (ref 0.1–1.3)
MONOCYTES NFR BLD: 10 % (ref 4–12)
NEUTS SEG # BLD: 5.3 K/UL (ref 1.7–8.2)
NEUTS SEG NFR BLD: 76 % (ref 43–78)
NITRITE UR QL STRIP.AUTO: NEGATIVE
NRBC # BLD: 0 K/UL (ref 0–0.2)
OTHER OBSERVATIONS: ABNORMAL
PH UR STRIP: 5.5 (ref 5–9)
PLATELET # BLD AUTO: 82 K/UL (ref 150–450)
PMV BLD AUTO: 11 FL (ref 9.4–12.3)
POTASSIUM SERPL-SCNC: 3.6 MMOL/L (ref 3.5–5.1)
PROT SERPL-MCNC: 5.5 G/DL (ref 6.3–8.2)
PROT UR STRIP-MCNC: NEGATIVE MG/DL
RBC # BLD AUTO: 4.26 M/UL (ref 4.23–5.6)
RBC #/AREA URNS HPF: ABNORMAL /HPF
RSV RNA SPEC QL NAA+PROBE: NOT DETECTED
RV+EV RNA SPEC QL NAA+PROBE: NOT DETECTED
SARS-COV-2 RNA RESP QL NAA+PROBE: NOT DETECTED
SODIUM SERPL-SCNC: 147 MMOL/L (ref 133–143)
SP GR UR REFRACTOMETRY: 1.02 (ref 1–1.02)
UROBILINOGEN UR QL STRIP.AUTO: 1 EU/DL (ref 0.2–1)
VIT B12 SERPL-MCNC: 232 PG/ML (ref 193–986)
WBC # BLD AUTO: 7.1 K/UL (ref 4.3–11.1)
WBC URNS QL MICRO: ABNORMAL /HPF

## 2023-08-20 PROCEDURE — 2500000003 HC RX 250 WO HCPCS: Performed by: HOSPITALIST

## 2023-08-20 PROCEDURE — 83605 ASSAY OF LACTIC ACID: CPT

## 2023-08-20 PROCEDURE — 80177 DRUG SCRN QUAN LEVETIRACETAM: CPT

## 2023-08-20 PROCEDURE — 0202U NFCT DS 22 TRGT SARS-COV-2: CPT

## 2023-08-20 PROCEDURE — 74177 CT ABD & PELVIS W/CONTRAST: CPT

## 2023-08-20 PROCEDURE — 83690 ASSAY OF LIPASE: CPT

## 2023-08-20 PROCEDURE — 87040 BLOOD CULTURE FOR BACTERIA: CPT

## 2023-08-20 PROCEDURE — 2580000003 HC RX 258: Performed by: HOSPITALIST

## 2023-08-20 PROCEDURE — 36415 COLL VENOUS BLD VENIPUNCTURE: CPT

## 2023-08-20 PROCEDURE — 6370000000 HC RX 637 (ALT 250 FOR IP): Performed by: HOSPITALIST

## 2023-08-20 PROCEDURE — 2580000003 HC RX 258: Performed by: FAMILY MEDICINE

## 2023-08-20 PROCEDURE — 71045 X-RAY EXAM CHEST 1 VIEW: CPT

## 2023-08-20 PROCEDURE — 82746 ASSAY OF FOLIC ACID SERUM: CPT

## 2023-08-20 PROCEDURE — 85025 COMPLETE CBC W/AUTO DIFF WBC: CPT

## 2023-08-20 PROCEDURE — 51798 US URINE CAPACITY MEASURE: CPT

## 2023-08-20 PROCEDURE — 81001 URINALYSIS AUTO W/SCOPE: CPT

## 2023-08-20 PROCEDURE — 6360000004 HC RX CONTRAST MEDICATION: Performed by: EMERGENCY MEDICINE

## 2023-08-20 PROCEDURE — 6360000002 HC RX W HCPCS: Performed by: FAMILY MEDICINE

## 2023-08-20 PROCEDURE — 6360000002 HC RX W HCPCS: Performed by: HOSPITALIST

## 2023-08-20 PROCEDURE — 99285 EMERGENCY DEPT VISIT HI MDM: CPT

## 2023-08-20 PROCEDURE — 93005 ELECTROCARDIOGRAM TRACING: CPT | Performed by: EMERGENCY MEDICINE

## 2023-08-20 PROCEDURE — 82607 VITAMIN B-12: CPT

## 2023-08-20 PROCEDURE — 93010 ELECTROCARDIOGRAM REPORT: CPT | Performed by: INTERNAL MEDICINE

## 2023-08-20 PROCEDURE — 80053 COMPREHEN METABOLIC PANEL: CPT

## 2023-08-20 PROCEDURE — 1100000000 HC RM PRIVATE

## 2023-08-20 RX ORDER — ACETAMINOPHEN 650 MG/1
650 SUPPOSITORY RECTAL EVERY 6 HOURS PRN
Status: DISCONTINUED | OUTPATIENT
Start: 2023-08-20 | End: 2023-08-25 | Stop reason: HOSPADM

## 2023-08-20 RX ORDER — ENOXAPARIN SODIUM 100 MG/ML
40 INJECTION SUBCUTANEOUS EVERY 24 HOURS
Status: DISCONTINUED | OUTPATIENT
Start: 2023-08-20 | End: 2023-08-25 | Stop reason: HOSPADM

## 2023-08-20 RX ORDER — PANTOPRAZOLE SODIUM 40 MG/1
40 TABLET, DELAYED RELEASE ORAL
Status: DISCONTINUED | OUTPATIENT
Start: 2023-08-21 | End: 2023-08-25 | Stop reason: HOSPADM

## 2023-08-20 RX ORDER — VITAMIN B COMPLEX
1000 TABLET ORAL DAILY
Status: DISCONTINUED | OUTPATIENT
Start: 2023-08-21 | End: 2023-08-20 | Stop reason: SDUPTHER

## 2023-08-20 RX ORDER — POTASSIUM CHLORIDE 20 MEQ/1
40 TABLET, EXTENDED RELEASE ORAL PRN
Status: DISCONTINUED | OUTPATIENT
Start: 2023-08-20 | End: 2023-08-25 | Stop reason: HOSPADM

## 2023-08-20 RX ORDER — FUROSEMIDE 20 MG/1
20 TABLET ORAL DAILY
Status: DISCONTINUED | OUTPATIENT
Start: 2023-08-20 | End: 2023-08-25 | Stop reason: HOSPADM

## 2023-08-20 RX ORDER — ONDANSETRON 4 MG/1
4 TABLET, ORALLY DISINTEGRATING ORAL EVERY 8 HOURS PRN
Status: DISCONTINUED | OUTPATIENT
Start: 2023-08-20 | End: 2023-08-25 | Stop reason: HOSPADM

## 2023-08-20 RX ORDER — ACETAMINOPHEN 325 MG/1
650 TABLET ORAL EVERY 6 HOURS PRN
Status: DISCONTINUED | OUTPATIENT
Start: 2023-08-20 | End: 2023-08-25 | Stop reason: HOSPADM

## 2023-08-20 RX ORDER — VITAMIN B COMPLEX
1000 TABLET ORAL DAILY
Status: DISCONTINUED | OUTPATIENT
Start: 2023-08-21 | End: 2023-08-25 | Stop reason: HOSPADM

## 2023-08-20 RX ORDER — DEXAMETHASONE 4 MG/1
2 TABLET ORAL 2 TIMES DAILY WITH MEALS
Status: DISCONTINUED | OUTPATIENT
Start: 2023-08-20 | End: 2023-08-25 | Stop reason: HOSPADM

## 2023-08-20 RX ORDER — ONDANSETRON 2 MG/ML
4 INJECTION INTRAMUSCULAR; INTRAVENOUS EVERY 6 HOURS PRN
Status: DISCONTINUED | OUTPATIENT
Start: 2023-08-20 | End: 2023-08-25 | Stop reason: HOSPADM

## 2023-08-20 RX ORDER — HYOSCYAMINE SULFATE 0.12 MG/1
1 TABLET SUBLINGUAL 3 TIMES DAILY PRN
Qty: 20 EACH | Refills: 0 | Status: SHIPPED | OUTPATIENT
Start: 2023-08-20 | End: 2023-08-25 | Stop reason: SDUPTHER

## 2023-08-20 RX ORDER — SODIUM CHLORIDE 9 MG/ML
INJECTION, SOLUTION INTRAVENOUS PRN
Status: DISCONTINUED | OUTPATIENT
Start: 2023-08-20 | End: 2023-08-25 | Stop reason: HOSPADM

## 2023-08-20 RX ORDER — SULFAMETHOXAZOLE AND TRIMETHOPRIM 800; 160 MG/1; MG/1
1 TABLET ORAL
Status: DISCONTINUED | OUTPATIENT
Start: 2023-08-21 | End: 2023-08-25 | Stop reason: HOSPADM

## 2023-08-20 RX ORDER — LEVETIRACETAM 500 MG/1
500 TABLET ORAL 2 TIMES DAILY
Status: DISCONTINUED | OUTPATIENT
Start: 2023-08-20 | End: 2023-08-21 | Stop reason: ALTCHOICE

## 2023-08-20 RX ORDER — SODIUM CHLORIDE 0.9 % (FLUSH) 0.9 %
5-40 SYRINGE (ML) INJECTION EVERY 12 HOURS SCHEDULED
Status: DISCONTINUED | OUTPATIENT
Start: 2023-08-20 | End: 2023-08-25 | Stop reason: HOSPADM

## 2023-08-20 RX ORDER — DEXAMETHASONE SODIUM PHOSPHATE 10 MG/ML
4 INJECTION INTRAMUSCULAR; INTRAVENOUS ONCE
Status: COMPLETED | OUTPATIENT
Start: 2023-08-20 | End: 2023-08-20

## 2023-08-20 RX ORDER — ATORVASTATIN CALCIUM 40 MG/1
40 TABLET, FILM COATED ORAL NIGHTLY
Status: DISCONTINUED | OUTPATIENT
Start: 2023-08-20 | End: 2023-08-25 | Stop reason: HOSPADM

## 2023-08-20 RX ORDER — POLYETHYLENE GLYCOL 3350 17 G/17G
17 POWDER, FOR SOLUTION ORAL DAILY PRN
Status: DISCONTINUED | OUTPATIENT
Start: 2023-08-20 | End: 2023-08-25 | Stop reason: HOSPADM

## 2023-08-20 RX ORDER — DEXTROSE MONOHYDRATE 50 MG/ML
INJECTION, SOLUTION INTRAVENOUS CONTINUOUS
Status: ACTIVE | OUTPATIENT
Start: 2023-08-20 | End: 2023-08-21

## 2023-08-20 RX ORDER — POTASSIUM CHLORIDE 7.45 MG/ML
10 INJECTION INTRAVENOUS PRN
Status: DISCONTINUED | OUTPATIENT
Start: 2023-08-20 | End: 2023-08-25 | Stop reason: HOSPADM

## 2023-08-20 RX ORDER — TAMSULOSIN HYDROCHLORIDE 0.4 MG/1
0.4 CAPSULE ORAL DAILY
Status: DISCONTINUED | OUTPATIENT
Start: 2023-08-21 | End: 2023-08-25 | Stop reason: HOSPADM

## 2023-08-20 RX ORDER — MAGNESIUM SULFATE IN WATER 40 MG/ML
2000 INJECTION, SOLUTION INTRAVENOUS PRN
Status: DISCONTINUED | OUTPATIENT
Start: 2023-08-20 | End: 2023-08-25 | Stop reason: HOSPADM

## 2023-08-20 RX ORDER — POLYETHYLENE GLYCOL 3350 17 G/17G
17 POWDER, FOR SOLUTION ORAL DAILY
Status: DISCONTINUED | OUTPATIENT
Start: 2023-08-20 | End: 2023-08-22

## 2023-08-20 RX ORDER — SODIUM CHLORIDE 0.9 % (FLUSH) 0.9 %
5-40 SYRINGE (ML) INJECTION PRN
Status: DISCONTINUED | OUTPATIENT
Start: 2023-08-20 | End: 2023-08-25 | Stop reason: HOSPADM

## 2023-08-20 RX ORDER — SERTRALINE HYDROCHLORIDE 100 MG/1
100 TABLET, FILM COATED ORAL DAILY
Status: DISCONTINUED | OUTPATIENT
Start: 2023-08-21 | End: 2023-08-25 | Stop reason: HOSPADM

## 2023-08-20 RX ADMIN — ATORVASTATIN CALCIUM 40 MG: 40 TABLET, FILM COATED ORAL at 22:05

## 2023-08-20 RX ADMIN — LEVETIRACETAM 500 MG: 500 TABLET, FILM COATED ORAL at 22:05

## 2023-08-20 RX ADMIN — DEXAMETHASONE SODIUM PHOSPHATE 4 MG: 10 INJECTION INTRAMUSCULAR; INTRAVENOUS at 17:16

## 2023-08-20 RX ADMIN — ZIPRASIDONE MESYLATE 10 MG: 20 INJECTION, POWDER, LYOPHILIZED, FOR SOLUTION INTRAMUSCULAR at 20:00

## 2023-08-20 RX ADMIN — POLYETHYLENE GLYCOL 3350 17 G: 17 POWDER, FOR SOLUTION ORAL at 17:16

## 2023-08-20 RX ADMIN — IOPAMIDOL 100 ML: 755 INJECTION, SOLUTION INTRAVENOUS at 08:01

## 2023-08-20 RX ADMIN — TUBERCULIN PURIFIED PROTEIN DERIVATIVE 5 UNITS: 5 INJECTION, SOLUTION INTRADERMAL at 15:01

## 2023-08-20 RX ADMIN — ENOXAPARIN SODIUM 40 MG: 100 INJECTION SUBCUTANEOUS at 15:01

## 2023-08-20 RX ADMIN — DEXTROSE MONOHYDRATE: 50 INJECTION, SOLUTION INTRAVENOUS at 11:44

## 2023-08-20 ASSESSMENT — ENCOUNTER SYMPTOMS
DIARRHEA: 0
ABDOMINAL PAIN: 1
VOMITING: 0
FACIAL SWELLING: 0
SHORTNESS OF BREATH: 0
NAUSEA: 1

## 2023-08-20 ASSESSMENT — PAIN SCALES - GENERAL
PAINLEVEL_OUTOF10: 0
PAINLEVEL_OUTOF10: 0

## 2023-08-20 NOTE — H&P
Hospitalist History and Physical   Admit Date:  2023  7:07 AM   Name:  Leslie Ennis [facility]   Age:  82 y.o. Sex:  male  :  1940   MRN:  [id number]   Room:  /    Presenting/Chief Complaint: Abdominal Pain     Reason(s) for Admission: Functional quadriplegia ([address]) [R53.2]  Uncontrolled hypertension [I10]  Abdominal pain, unspecified abdominal location [R10.9]  Self-care deficit [Z78.9]     History of Present Illness:   81 y/o  Male h/o Glioblastoma Multiforme, has had worsening cognitive and functional decline over the past week, last night he had abdominal pain and was asking multiple times for his wife to help with getting him up to the bathroom, which now he is requiring more assistance then she can offer Alone, she called EMS and he was brought to the ED. CT Head was negative UA Negative, CT ABD No acute findings are seen to suggest an etiology for the patient's  abdominal pain. Specifically, no bowel changes, free air, abnormal fluid. Na 147,  , GLU 99, Afebrile. Assessment & Plan:     Glioblastoma Multiforme   Seizures   --On Avatin now w responding disease. --Post chemoradiation, Underwent right parietal craniotomy with resection of right parietal tumor    --Oncology recommends Serial brain MRIs every 3 months he is over due  --Continue Keppra 500 bid. Decadron was recently increased to  2 mg bid. On PCP prophylaxis w Bactrim DS   --MRI Brain WWO, Keppra Level, CXR     Hypernatremia  --Chronic  --   -- , start D5W    ABD Pain  --CT ABD No acute findings are seen to suggest an etiology for the patient's  abdominal pain. Specifically, no bowel changes, free air, abnormal fluid  collections or focal inflammatory changes are seen.   --? Constipation he has not had  a BM in 3 days   --Start Miralax     Functional Decline  Cognitive Decline  Adult Failure to Thrive  --He has declined over the course of the last week, he can not use the walker any longer nor

## 2023-08-20 NOTE — ED PROVIDER NOTES
Emergency Department Provider Note       PCP: Shruthi Cottrell DO   Age: 82 y.o. Sex: male     DISPOSITION Decision To Discharge 08/20/2023 09:19:09 AM       ICD-10-CM    1. Abdominal pain, unspecified abdominal location  R10.9           Medical Decision Making     Complexity of Problems Addressed:  1 or more acute illnesses that pose a threat to life or bodily function. Data Reviewed and Analyzed:   I independently ordered and reviewed each unique test.  I reviewed external records: ED visit note from an outside group. I reviewed external records: provider visit note from PCP. I reviewed external records: provider visit note from outside specialist.  I reviewed external records: previous EKG including cardiologist interpretation. I reviewed external records: previous lab results from outside ED. I reviewed external records: previous imaging study including radiologist interpretation. The patients assessment required an independent historian: EMS. The reason they were needed is important historical information not provided by the patient. I independently ordered and interpreted the ED EKG in the absence of a Cardiologist.    Rate: 80  EKG Interpretation: EKG Interpretation: sinus rhythm, no evidence of arrhythmia  ST Segments: ST segment depression - NO STEMI  I interpreted the CT Scan renal cysts, no acute intra-abdominal process appreciated. Discussion of management or test interpretation. Minimal reproducible abdominal tenderness, most significant to the right upper quadrant    Work-up unremarkable. Will prescribe Levsin. Patient has remained comfortable and has not wanted anything for pain. 9:34 AM  Wife and daughter arrived. Wife states he has become too weak for her to care for him at home. She has been calling [facility] for placement, but was told he needed a work-up first.  She states he is too weak for her to get him into a wheelchair.   Discussed with hospitalist for

## 2023-08-20 NOTE — ED TRIAGE NOTES
Per EMS: wife called indicating patient have been fighting with abdominal pain for last 3 days. Patient have not been sleeping related pain. Patient has brain cancer with speech difficulties, swallowing difficulties, cognitive difficulties. Alert and oriented x2.    VS  Bgl 120, 170/90, 80, 95%RA

## 2023-08-20 NOTE — PROGRESS NOTES
TRANSFER - IN REPORT:    Verbal report received from GREG Fletcher on [address]  being received from ED for routine progression of patient care      Report consisted of patient's Situation, Background, Assessment and   Recommendations(SBAR). Information from the following report(s) Nurse Handoff Report was reviewed with the receiving nurse. Opportunity for questions and clarification was provided. Assessment completed upon patient's arrival to unit and care assumed.

## 2023-08-20 NOTE — DISCHARGE INSTRUCTIONS
Take Levsin as needed for continued pain. Return for worsening or concerning symptoms. Follow-up with your doctor for recheck.

## 2023-08-21 ENCOUNTER — APPOINTMENT (OUTPATIENT)
Dept: MRI IMAGING | Age: 83
End: 2023-08-21
Payer: MEDICARE

## 2023-08-21 ENCOUNTER — APPOINTMENT (OUTPATIENT)
Dept: ULTRASOUND IMAGING | Age: 83
End: 2023-08-21
Payer: MEDICARE

## 2023-08-21 PROBLEM — C71.9 GLIOBLASTOMA (HCC): Chronic | Status: ACTIVE | Noted: 2020-12-26

## 2023-08-21 PROBLEM — E87.0 HYPERNATREMIA: Status: ACTIVE | Noted: 2023-01-01

## 2023-08-21 LAB
ANION GAP SERPL CALC-SCNC: 4 MMOL/L (ref 2–11)
BUN SERPL-MCNC: 12 MG/DL (ref 8–23)
CALCIUM SERPL-MCNC: 8.6 MG/DL (ref 8.3–10.4)
CHLORIDE SERPL-SCNC: 110 MMOL/L (ref 101–110)
CO2 SERPL-SCNC: 32 MMOL/L (ref 21–32)
CREAT SERPL-MCNC: 0.9 MG/DL (ref 0.8–1.5)
GLUCOSE SERPL-MCNC: 126 MG/DL (ref 65–100)
MAGNESIUM SERPL-MCNC: 2.2 MG/DL (ref 1.8–2.4)
MM INDURATION, POC: 0 MM (ref 0–5)
POTASSIUM SERPL-SCNC: 3.4 MMOL/L (ref 3.5–5.1)
PPD, POC: NEGATIVE
SODIUM SERPL-SCNC: 146 MMOL/L (ref 133–143)

## 2023-08-21 PROCEDURE — 6360000002 HC RX W HCPCS: Performed by: HOSPITALIST

## 2023-08-21 PROCEDURE — 80048 BASIC METABOLIC PNL TOTAL CA: CPT

## 2023-08-21 PROCEDURE — 97535 SELF CARE MNGMENT TRAINING: CPT

## 2023-08-21 PROCEDURE — 93971 EXTREMITY STUDY: CPT

## 2023-08-21 PROCEDURE — 51798 US URINE CAPACITY MEASURE: CPT

## 2023-08-21 PROCEDURE — 97112 NEUROMUSCULAR REEDUCATION: CPT

## 2023-08-21 PROCEDURE — 70553 MRI BRAIN STEM W/O & W/DYE: CPT

## 2023-08-21 PROCEDURE — 2580000003 HC RX 258: Performed by: HOSPITALIST

## 2023-08-21 PROCEDURE — 6360000002 HC RX W HCPCS: Performed by: FAMILY MEDICINE

## 2023-08-21 PROCEDURE — 97166 OT EVAL MOD COMPLEX 45 MIN: CPT

## 2023-08-21 PROCEDURE — 83735 ASSAY OF MAGNESIUM: CPT

## 2023-08-21 PROCEDURE — 1100000000 HC RM PRIVATE

## 2023-08-21 PROCEDURE — 92610 EVALUATE SWALLOWING FUNCTION: CPT

## 2023-08-21 PROCEDURE — 97162 PT EVAL MOD COMPLEX 30 MIN: CPT

## 2023-08-21 PROCEDURE — 6360000004 HC RX CONTRAST MEDICATION: Performed by: HOSPITALIST

## 2023-08-21 PROCEDURE — A9579 GAD-BASE MR CONTRAST NOS,1ML: HCPCS | Performed by: HOSPITALIST

## 2023-08-21 PROCEDURE — 97530 THERAPEUTIC ACTIVITIES: CPT

## 2023-08-21 PROCEDURE — 6370000000 HC RX 637 (ALT 250 FOR IP): Performed by: FAMILY MEDICINE

## 2023-08-21 PROCEDURE — 6370000000 HC RX 637 (ALT 250 FOR IP): Performed by: HOSPITALIST

## 2023-08-21 RX ORDER — QUETIAPINE FUMARATE 25 MG/1
25 TABLET, FILM COATED ORAL 2 TIMES DAILY
Status: DISCONTINUED | OUTPATIENT
Start: 2023-08-21 | End: 2023-08-25 | Stop reason: HOSPADM

## 2023-08-21 RX ORDER — HALOPERIDOL 5 MG/ML
5 INJECTION INTRAMUSCULAR ONCE
Status: COMPLETED | OUTPATIENT
Start: 2023-08-21 | End: 2023-08-21

## 2023-08-21 RX ORDER — LEVETIRACETAM 100 MG/ML
500 SOLUTION ORAL 2 TIMES DAILY
Status: DISCONTINUED | OUTPATIENT
Start: 2023-08-21 | End: 2023-08-25 | Stop reason: HOSPADM

## 2023-08-21 RX ADMIN — SULFAMETHOXAZOLE AND TRIMETHOPRIM 1 TABLET: 800; 160 TABLET ORAL at 09:43

## 2023-08-21 RX ADMIN — ONDANSETRON 4 MG: 4 TABLET, ORALLY DISINTEGRATING ORAL at 21:45

## 2023-08-21 RX ADMIN — DEXAMETHASONE 2 MG: 4 TABLET ORAL at 15:52

## 2023-08-21 RX ADMIN — LEVETIRACETAM 500 MG: 500 TABLET, FILM COATED ORAL at 09:42

## 2023-08-21 RX ADMIN — ENOXAPARIN SODIUM 40 MG: 100 INJECTION SUBCUTANEOUS at 15:52

## 2023-08-21 RX ADMIN — VITAMIN D, TAB 1000IU (100/BT) 1000 UNITS: 25 TAB at 09:42

## 2023-08-21 RX ADMIN — TAMSULOSIN HYDROCHLORIDE 0.4 MG: 0.4 CAPSULE ORAL at 09:42

## 2023-08-21 RX ADMIN — LEVETIRACETAM 500 MG: 100 SOLUTION ORAL at 21:45

## 2023-08-21 RX ADMIN — SODIUM CHLORIDE, PRESERVATIVE FREE 10 ML: 5 INJECTION INTRAVENOUS at 22:02

## 2023-08-21 RX ADMIN — POLYETHYLENE GLYCOL 3350 17 G: 17 POWDER, FOR SOLUTION ORAL at 09:42

## 2023-08-21 RX ADMIN — QUETIAPINE FUMARATE 25 MG: 25 TABLET ORAL at 11:29

## 2023-08-21 RX ADMIN — SERTRALINE HYDROCHLORIDE 100 MG: 100 TABLET ORAL at 09:42

## 2023-08-21 RX ADMIN — GADOTERIDOL 18 ML: 279.3 INJECTION, SOLUTION INTRAVENOUS at 09:03

## 2023-08-21 RX ADMIN — ATORVASTATIN CALCIUM 40 MG: 40 TABLET, FILM COATED ORAL at 21:45

## 2023-08-21 RX ADMIN — DEXAMETHASONE 2 MG: 4 TABLET ORAL at 09:42

## 2023-08-21 RX ADMIN — SODIUM CHLORIDE, PRESERVATIVE FREE 10 ML: 5 INJECTION INTRAVENOUS at 09:54

## 2023-08-21 RX ADMIN — HALOPERIDOL LACTATE 5 MG: 5 INJECTION, SOLUTION INTRAMUSCULAR at 02:58

## 2023-08-21 RX ADMIN — POTASSIUM CHLORIDE 40 MEQ: 1500 TABLET, EXTENDED RELEASE ORAL at 09:42

## 2023-08-21 RX ADMIN — QUETIAPINE FUMARATE 25 MG: 25 TABLET ORAL at 21:45

## 2023-08-21 ASSESSMENT — PAIN SCALES - GENERAL
PAINLEVEL_OUTOF10: 0
PAINLEVEL_OUTOF10: 0

## 2023-08-21 NOTE — PROGRESS NOTES
SPEECH PATHOLOGY NOTE:    Speech therapy consult received and appreciated. Attempted to see patient for bedside swallow evaluation. Patient is currently off the floor. Will re-attempt at later time/date as patient becomes available and schedule permits.        Carlos Manuel Angel, [facility], CCC-SLP

## 2023-08-21 NOTE — PROGRESS NOTES
Patient is currently screaming and is confused. Attempted to reorientate multiple times. Hospitalist notified, see new orders.

## 2023-08-21 NOTE — PROGRESS NOTES
Color, UA YELLOW/STRAW      Appearance CLEAR      Specific Gravity, UA 1.019 1.001 - 1.023      pH, Urine 5.5 5.0 - 9.0      Protein, UA Negative NEG mg/dL    Glucose, UA Negative mg/dL    Ketones, Urine Negative NEG mg/dL    Bilirubin Urine Negative NEG      Blood, Urine TRACE (A) NEG      Urobilinogen, Urine 1.0 0.2 - 1.0 EU/dL    Nitrite, Urine Negative NEG      Leukocyte Esterase, Urine Negative NEG      WBC, UA 5-10 0 /hpf    RBC, UA 0-3 0 /hpf    Epithelial Cells UA 0-3 0 /hpf    BACTERIA, URINE 0 0 /hpf    Other observations RESULTS VERIFIED MANUALLY     EKG 12 Lead (Select if Upper Abd Pain, or SOB, Diaphoresis or Tachy)    Collection Time: 08/20/23  8:40 AM   Result Value Ref Range    Ventricular Rate 80 BPM    Atrial Rate 82 BPM    P-R Interval 53 ms    QRS Duration 91 ms    Q-T Interval 420 ms    QTc Calculation (Bazett) 485 ms    P Axis 51 degrees    R Axis 41 degrees    T Axis 4 degrees    Diagnosis       Sinus rhythm  Short VT interval  Borderline ST depression, lateral leads  Borderline prolonged QT interval    Confirmed by SELENE LISA, HARRY GEE ([id number]) on 8/20/2023 10:53:15 AM     Respiratory Panel, Molecular, with COVID-19 (Restricted: peds pts or suitable admitted adults)    Collection Time: 08/20/23  7:10 PM    Specimen: Nasopharyngeal   Result Value Ref Range    Adenovirus by PCR NOT DETECTED NOTDET      Coronavirus 229E by PCR NOT DETECTED NOTDET      Coronavirus HKU1 by PCR NOT DETECTED NOTDET      Coronavirus NL63 by PCR NOT DETECTED NOTDET      Coronavirus OC43 by PCR NOT DETECTED NOTDET      SARS-CoV-2, PCR NOT DETECTED NOTDET      Human Metapneumovirus by PCR NOT DETECTED NOTDET      Rhinovirus Enterovirus PCR NOT DETECTED NOTDET      Influenza A by PCR NOT DETECTED NOTDET      Influenza B PCR NOT DETECTED NOTDET      Parainfluenza 1 PCR NOT DETECTED NOTDET      Parainfluenza 2 PCR NOT DETECTED NOTDET      Parainfluenza 3 PCR NOT DETECTED NOTDET      Parainfluenza 4 PCR NOT DETECTED NOTDET Respiratory Syncytial Virus by PCR NOT DETECTED NOTDET      Bordetella parapertussis by PCR NOT DETECTED NOTDET      Bordetella pertussis by PCR NOT DETECTED NOTDET      Chlamydophila Pneumonia PCR NOT DETECTED NOTDET      Mycoplasma pneumo by PCR NOT DETECTED NOTDET     Basic Metabolic Panel w/ Reflex to MG    Collection Time: 08/21/23  4:49 AM   Result Value Ref Range    Sodium 146 (H) 133 - 143 mmol/L    Potassium 3.4 (L) 3.5 - 5.1 mmol/L    Chloride 110 101 - 110 mmol/L    CO2 32 21 - 32 mmol/L    Anion Gap 4 2 - 11 mmol/L    Glucose 126 (H) 65 - 100 mg/dL    BUN 12 8 - 23 MG/DL    Creatinine 0.90 0.8 - 1.5 MG/DL    Est, Glom Filt Rate >60 >60 ml/min/1.73m2    Calcium 8.6 8.3 - 10.4 MG/DL   Magnesium    Collection Time: 08/21/23  4:49 AM   Result Value Ref Range    Magnesium 2.2 1.8 - 2.4 mg/dL       Current Meds:  Current Facility-Administered Medications   Medication Dose Route Frequency    atorvastatin (LIPITOR) tablet 40 mg  40 mg Oral Nightly    dexamethasone (DECADRON) tablet 2 mg  2 mg Oral BID WC    [Held by provider] furosemide (LASIX) tablet 20 mg  20 mg Oral Daily    levETIRAcetam (KEPPRA) tablet 500 mg  500 mg Oral BID    pantoprazole (PROTONIX) tablet 40 mg  40 mg Oral QAM AC    sertraline (ZOLOFT) tablet 100 mg  100 mg Oral Daily    sulfamethoxazole-trimethoprim (BACTRIM DS;SEPTRA DS) 800-160 MG per tablet 1 tablet  1 tablet Oral Once per day on Mon Wed Fri    tamsulosin (FLOMAX) capsule 0.4 mg  0.4 mg Oral Daily    sodium chloride flush 0.9 % injection 5-40 mL  5-40 mL IntraVENous 2 times per day    sodium chloride flush 0.9 % injection 5-40 mL  5-40 mL IntraVENous PRN    0.9 % sodium chloride infusion   IntraVENous PRN    enoxaparin (LOVENOX) injection 40 mg  40 mg SubCUTAneous Q24H    ondansetron (ZOFRAN-ODT) disintegrating tablet 4 mg  4 mg Oral Q8H PRN    Or    ondansetron (ZOFRAN) injection 4 mg  4 mg IntraVENous Q6H PRN    polyethylene glycol (GLYCOLAX) packet 17 g  17 g Oral Daily PRN

## 2023-08-21 NOTE — THERAPY EVALUATION
ACUTE PHYSICAL THERAPY GOALS:   (Developed with and agreed upon by patient and/or caregiver.)  (1.) Chichi Klein  will move from supine to sit and sit to supine , scoot up and down, and roll side to side with CONTACT GUARD ASSIST within 7 treatment day(s). (2.) Chichi Klein will transfer from bed to chair and chair to bed with CONTACT GUARD ASSIST using the least restrictive device within 7 treatment day(s). (3.) Chichi Klein will ambulate with CONTACT GUARD ASSIST for 100 feet with the least restrictive device within 7 treatment day(s). (4.) Chichi Klein will perform standing static and dynamic balance activities x 10 minutes with CONTACT GUARD ASSIST to improve safety within 7 treatment day(s). (5.) Chichi Klein will perform therapeutic exercises x 20 min for HEP with INDEPENDENCE to improve strength, endurance, and functional mobility within 7 treatment day(s).      PHYSICAL THERAPY Initial Assessment, Daily Note, and AM  (Link to Caseload Tracking: PT Visit Days : 1  Acknowledge Orders  Time In/Out  PT Charge Capture  Rehab Caseload Tracker    Chichi Klein is a 82 y.o. male   PRIMARY DIAGNOSIS: Functional quadriplegia ([address])  Functional quadriplegia ([address]) [R53.2]  Uncontrolled hypertension [I10]  Abdominal pain, unspecified abdominal location [R10.9]  Self-care deficit [Z78.9]       Reason for Referral: Generalized Muscle Weakness (M62.81)  Other lack of cordination (R27.8)  Difficulty in walking, Not elsewhere classified (R26.2)  Other abnormalities of gait and mobility (R26.89)  Inpatient: Payor: MEDICARE / Plan: MEDICARE PART A AND B / Product Type: *No Product type* /     ASSESSMENT:     REHAB RECOMMENDATIONS:   Recommendation to date pending progress:  Setting:  Short-term Rehab    Equipment:    To Be Determined     ASSESSMENT:  Mr. Jackie Mckeon is an 82year old M who presents to hospital with functional decline over the past week; wife has been having [address] [facility]  Minutes: [address] [facility], S.W[address]en Aguila

## 2023-08-21 NOTE — PROGRESS NOTES
LTG: patient will tolerate safest, least restrictive oral diet without s/sx airway compromise  STG: Patient will tolerate minced/moist diet and thin liquids without overt s/sx aspiration  STG: Patient will tolerate ongoing po trials in efforts to advance diet  STG: Patient will participate in instrumental swallowing assessment to objectively assess oropharyngeal swallow if clinically indicated      SPEECH LANGUAGE PATHOLOGY: DYSPHAGIA  Initial Assessment    NAME: Deborah Aldridge  : 1940  MRN: [id number]    ADMISSION DATE: 2023  PRIMARY DIAGNOSIS: Functional quadriplegia ([address])  Functional quadriplegia (HCC) [R53.2]  Uncontrolled hypertension [I10]  Abdominal pain, unspecified abdominal location [R10.9]  Self-care deficit [Z78.9]    ICD-10: Treatment Diagnosis: R13.12 Dysphagia, Oropharyngeal Phase    RECOMMENDATIONS   Diet:  Diet Solids Recommendation: Minced & Moist  Liquid Consistency Recommendation: Thin    Medications: Crushed in puree as able     Compensatory Swallowing Strategies: Upright as possible for all oral intake;Remain upright for 30-45 minutes after meals;Eat/Feed slowly        Therapeutic Interventions: Diet tolerance monitoring; Therapeutic PO trials with SLP;Patient/Family education    Referrals: consider Registered Dietician consult   Patient continues to require skilled intervention: Yes. Recommend ongoing speech therapy services during this hospitalization and next level of care        ASSESSMENT    Dysphagia Diagnosis: Suspected needs further assessment  Patient presents with possible oropharyngeal dysphagia; however, limited intake as patient declining majority of po trials. Recommend minced/moist diet and thin liquids. Assist with meals.          GENERAL    History of Present Injury/Illness: Mr. Umang Betancourt  has a past medical history of Age-related cataract of both eyes, Anxiety disorder, Arthritis, Chronic renal disease, stage III ([address] ) [[id number]], Depression, Gastroesophageal supervision/cueing. One - two diet consistencies restricted  Interpretation of Tool: The Dysphagia Outcome and Severity Scale (REAGAN) is a simple, easy-to-use, 7-point scale developed to systematically rate the functional severity of dysphagia based on objective assessment and make recommendations for diet level, independence level, and type of nutrition. Normal(7), Functional(6), Mild(5), Mild-Moderate(4), Moderate(3), Moderate-Severe(2), Severe(1)  PLAN    Duration/Frequency: Continue to follow patient 4x/week for duration of hospitalization and/or until goals met    Speech Therapy Prognosis  Prognosis: Good    Education: Patient, RN  Patient Education: role of SLP, aspiration precautions, diet recommendations. Patient Education Response: Needs reinforcement, Verbalizes understanding    PRECAUTIONS/ALLERGIES: Patient has no known allergies. Safety Devices in place: Yes  Type of devices: Call light within reach; Left in bed;Nurse notified      Therapy Time  SLP Individual Minutes  Time In: 3702  Time Out: [address]  Minutes: [address], [facility], [facility] CLAUDETTE BERNARDO, [address] [facility]  8/21/2023 10:59 AM

## 2023-08-21 NOTE — PROGRESS NOTES
Patient continues to be confused and agitated. Attempted to reorientate multiple times. Hospitalist notified, see new orders.

## 2023-08-21 NOTE — ACP (ADVANCE CARE PLANNING)
[employer] [facility]  At the heart of better care     Advance Care Planning   Admit Date:  2023  7:07 AM   Name:  Zaheer Macias [facility]   Age:  82 y.o. Sex:  male  :  1940   MRN:  [id number]   Room:  [facility]/    Lissette Fraser has capacity to make his own decisions:   No    If pt unable to make decisions, POA/surrogate decision maker:  Spouse Doren Mates    Other people present:   Daughter    Patient / surrogate decision-maker directed code status:  Full Code, but does not want long term life support     Other ACP topics discussed, if applicable:   Discussed possibility of hospice or comfort measures, artifcial feeding     Patient or surrogate consented to discussion of the current conditions, workup, management plans, prognosis, and the risk for further deterioration. Time spent: 16 minutes in direct discussion.       Signed:  Kaylee Vidal MD

## 2023-08-21 NOTE — PROGRESS NOTES
Occupational Therapy Note:    Attempted to see patient this AM for occupational therapy evaluation session. Patient AMOR for ultrasound. Will follow and re-attempt as schedule permits/patient available.  Thank you,    Alanna Xiong, OT    Rehab Caseload Tracker

## 2023-08-21 NOTE — PROGRESS NOTES
have been discussed with the patient.)  Self Care Training  Therapeutic Activity  Therapeutic Exercise/HEP  Neuromuscular Re-education  Manual Therapy  Education         TREATMENT:     EVALUATION: MODERATE COMPLEXITY: (Untimed Charge)    TREATMENT:   Co-Treatment PT/OT necessary due to patient's decreased overall endurance/tolerance levels, as well as need for high level skilled assistance to complete functional transfers/mobility and functional tasks  Neuromuscular Re-education (17 Minutes): Patient participated in neuromuscular re-education including functional reaching, weight shifting, postural training, standing tolerance activity , and sitting balance activity   with minimal and moderate assistance, verbal cues, tactile cues, education, and adaptive equipment to improve sitting balance, standing balance, coordination, static balance, and dynamic balance in order to prepare for functional task, prepare for functional transfer, increase safety awareness, and prepare for self care. .   Self Care (13 minutes): Patient participated in toileting, lower body dressing, and grooming ADLs in supported sitting and standing with moderate visual, verbal, and manual cueing to increase independence, increase activity tolerance, and increase safety awareness. Patient also participated in functional mobility and functional transfer training to increase independence, increase activity tolerance, and increase safety awareness. TREATMENT GRID:  N/A    AFTER TREATMENT PRECAUTIONS: Alarm Activated, Bed/Chair Locked, Call light within reach, Chair, Needs within reach, and Visitors at bedside    INTERDISCIPLINARY COLLABORATION:  RN/ PCT, PT/ PTA, and OT/ RUVALCABA    EDUCATION:  Education Given To: Patient; Family  Education Provided: Role of Therapy;Plan of Care; Fall Prevention Strategies;Transfer Training  Education Method: Verbal  Barriers to Learning: Cognition  Education Outcome: Continued education needed    TOTAL TREATMENT

## 2023-08-21 NOTE — PROGRESS NOTES
Pt had not voided during shift today. Pt bladder scanned at 10:45 with 205 ml. At 16:00 bladder scan was 235ml. Notified hospitalist and in/out cath ordered. RN and PCT were able to stand pt and pt voided into breif/urinal. Post void residual was 70ml. Straight Cath orders were discontinued.

## 2023-08-22 ENCOUNTER — APPOINTMENT (OUTPATIENT)
Dept: GENERAL RADIOLOGY | Age: 83
End: 2023-08-22
Payer: MEDICARE

## 2023-08-22 PROBLEM — Z78.9 SELF-CARE DEFICIT: Status: ACTIVE | Noted: 2023-01-01

## 2023-08-22 LAB
ALBUMIN SERPL-MCNC: 2.8 G/DL (ref 3.2–4.6)
ANION GAP SERPL CALC-SCNC: 2 MMOL/L (ref 2–11)
BUN SERPL-MCNC: 18 MG/DL (ref 8–23)
CALCIUM SERPL-MCNC: 8.5 MG/DL (ref 8.3–10.4)
CHLORIDE SERPL-SCNC: 110 MMOL/L (ref 101–110)
CO2 SERPL-SCNC: 31 MMOL/L (ref 21–32)
CREAT SERPL-MCNC: 1.1 MG/DL (ref 0.8–1.5)
ERYTHROCYTE [DISTWIDTH] IN BLOOD BY AUTOMATED COUNT: 14.7 % (ref 11.9–14.6)
GLUCOSE SERPL-MCNC: 109 MG/DL (ref 65–100)
HCT VFR BLD AUTO: 39.2 % (ref 41.1–50.3)
HGB BLD-MCNC: 12.9 G/DL (ref 13.6–17.2)
LEVETIRACETAM SERPL-MCNC: 3.3 UG/ML (ref 10–40)
MCH RBC QN AUTO: 29.9 PG (ref 26.1–32.9)
MCHC RBC AUTO-ENTMCNC: 32.9 G/DL (ref 31.4–35)
MCV RBC AUTO: 91 FL (ref 82–102)
MM INDURATION, POC: 0 MM (ref 0–5)
NRBC # BLD: 0 K/UL (ref 0–0.2)
PHOSPHATE SERPL-MCNC: 3.5 MG/DL (ref 2.3–3.7)
PLATELET # BLD AUTO: 72 K/UL (ref 150–450)
PMV BLD AUTO: 9.9 FL (ref 9.4–12.3)
POTASSIUM SERPL-SCNC: 3.9 MMOL/L (ref 3.5–5.1)
PPD, POC: NEGATIVE
RBC # BLD AUTO: 4.31 M/UL (ref 4.23–5.6)
SODIUM SERPL-SCNC: 143 MMOL/L (ref 133–143)
WBC # BLD AUTO: 6.5 K/UL (ref 4.3–11.1)

## 2023-08-22 PROCEDURE — 85027 COMPLETE CBC AUTOMATED: CPT

## 2023-08-22 PROCEDURE — 84100 ASSAY OF PHOSPHORUS: CPT

## 2023-08-22 PROCEDURE — 6370000000 HC RX 637 (ALT 250 FOR IP): Performed by: FAMILY MEDICINE

## 2023-08-22 PROCEDURE — 82040 ASSAY OF SERUM ALBUMIN: CPT

## 2023-08-22 PROCEDURE — APPSS45 APP SPLIT SHARED TIME 31-45 MINUTES: Performed by: NURSE PRACTITIONER

## 2023-08-22 PROCEDURE — 92526 ORAL FUNCTION THERAPY: CPT

## 2023-08-22 PROCEDURE — 6360000002 HC RX W HCPCS: Performed by: NURSE PRACTITIONER

## 2023-08-22 PROCEDURE — 74018 RADEX ABDOMEN 1 VIEW: CPT

## 2023-08-22 PROCEDURE — 80048 BASIC METABOLIC PNL TOTAL CA: CPT

## 2023-08-22 PROCEDURE — 92523 SPEECH SOUND LANG COMPREHEN: CPT

## 2023-08-22 PROCEDURE — 1100000000 HC RM PRIVATE

## 2023-08-22 PROCEDURE — 97530 THERAPEUTIC ACTIVITIES: CPT

## 2023-08-22 PROCEDURE — 51798 US URINE CAPACITY MEASURE: CPT

## 2023-08-22 PROCEDURE — 97535 SELF CARE MNGMENT TRAINING: CPT

## 2023-08-22 PROCEDURE — 99222 1ST HOSP IP/OBS MODERATE 55: CPT | Performed by: INTERNAL MEDICINE

## 2023-08-22 PROCEDURE — 2580000003 HC RX 258: Performed by: HOSPITALIST

## 2023-08-22 PROCEDURE — 2580000003 HC RX 258: Performed by: FAMILY MEDICINE

## 2023-08-22 PROCEDURE — 6370000000 HC RX 637 (ALT 250 FOR IP): Performed by: HOSPITALIST

## 2023-08-22 PROCEDURE — 6360000002 HC RX W HCPCS: Performed by: HOSPITALIST

## 2023-08-22 PROCEDURE — 6370000000 HC RX 637 (ALT 250 FOR IP): Performed by: NURSE PRACTITIONER

## 2023-08-22 RX ORDER — SIMETHICONE 80 MG
80 TABLET,CHEWABLE ORAL 4 TIMES DAILY PRN
Status: DISCONTINUED | OUTPATIENT
Start: 2023-08-22 | End: 2023-08-25 | Stop reason: HOSPADM

## 2023-08-22 RX ORDER — BISACODYL 5 MG/1
10 TABLET, DELAYED RELEASE ORAL ONCE
Status: COMPLETED | OUTPATIENT
Start: 2023-08-22 | End: 2023-08-23

## 2023-08-22 RX ORDER — POLYETHYLENE GLYCOL 3350 17 G/17G
17 POWDER, FOR SOLUTION ORAL 2 TIMES DAILY
Status: DISCONTINUED | OUTPATIENT
Start: 2023-08-23 | End: 2023-08-25 | Stop reason: HOSPADM

## 2023-08-22 RX ORDER — KETOROLAC TROMETHAMINE 30 MG/ML
30 INJECTION, SOLUTION INTRAMUSCULAR; INTRAVENOUS ONCE
Status: COMPLETED | OUTPATIENT
Start: 2023-08-22 | End: 2023-08-22

## 2023-08-22 RX ORDER — SODIUM CHLORIDE, SODIUM LACTATE, POTASSIUM CHLORIDE, AND CALCIUM CHLORIDE .6; .31; .03; .02 G/100ML; G/100ML; G/100ML; G/100ML
1000 INJECTION, SOLUTION INTRAVENOUS ONCE
Status: COMPLETED | OUTPATIENT
Start: 2023-08-22 | End: 2023-08-22

## 2023-08-22 RX ORDER — BISACODYL 10 MG
10 SUPPOSITORY, RECTAL RECTAL ONCE
Status: COMPLETED | OUTPATIENT
Start: 2023-08-22 | End: 2023-08-23

## 2023-08-22 RX ADMIN — POLYETHYLENE GLYCOL 3350 17 G: 17 POWDER, FOR SOLUTION ORAL at 20:19

## 2023-08-22 RX ADMIN — ATORVASTATIN CALCIUM 40 MG: 40 TABLET, FILM COATED ORAL at 20:20

## 2023-08-22 RX ADMIN — LEVETIRACETAM 500 MG: 100 SOLUTION ORAL at 22:00

## 2023-08-22 RX ADMIN — VITAMIN D, TAB 1000IU (100/BT) 1000 UNITS: 25 TAB at 09:49

## 2023-08-22 RX ADMIN — PANTOPRAZOLE SODIUM 40 MG: 40 TABLET, DELAYED RELEASE ORAL at 06:20

## 2023-08-22 RX ADMIN — TAMSULOSIN HYDROCHLORIDE 0.4 MG: 0.4 CAPSULE ORAL at 09:49

## 2023-08-22 RX ADMIN — ENOXAPARIN SODIUM 40 MG: 100 INJECTION SUBCUTANEOUS at 15:42

## 2023-08-22 RX ADMIN — DEXAMETHASONE 2 MG: 4 TABLET ORAL at 09:49

## 2023-08-22 RX ADMIN — QUETIAPINE FUMARATE 25 MG: 25 TABLET ORAL at 09:49

## 2023-08-22 RX ADMIN — SODIUM CHLORIDE, POTASSIUM CHLORIDE, SODIUM LACTATE AND CALCIUM CHLORIDE 1000 ML: 600; 310; 30; 20 INJECTION, SOLUTION INTRAVENOUS at 12:25

## 2023-08-22 RX ADMIN — SIMETHICONE 80 MG: 80 TABLET, CHEWABLE ORAL at 22:01

## 2023-08-22 RX ADMIN — KETOROLAC TROMETHAMINE 30 MG: 30 INJECTION, SOLUTION INTRAMUSCULAR at 22:00

## 2023-08-22 RX ADMIN — SODIUM CHLORIDE, PRESERVATIVE FREE 5 ML: 5 INJECTION INTRAVENOUS at 22:03

## 2023-08-22 RX ADMIN — ONDANSETRON 4 MG: 2 INJECTION INTRAMUSCULAR; INTRAVENOUS at 17:55

## 2023-08-22 RX ADMIN — SERTRALINE HYDROCHLORIDE 100 MG: 100 TABLET ORAL at 09:49

## 2023-08-22 RX ADMIN — POLYETHYLENE GLYCOL 3350 17 G: 17 POWDER, FOR SOLUTION ORAL at 09:50

## 2023-08-22 RX ADMIN — LEVETIRACETAM 500 MG: 100 SOLUTION ORAL at 09:49

## 2023-08-22 RX ADMIN — SODIUM CHLORIDE, PRESERVATIVE FREE 5 ML: 5 INJECTION INTRAVENOUS at 09:50

## 2023-08-22 RX ADMIN — QUETIAPINE FUMARATE 25 MG: 25 TABLET ORAL at 20:19

## 2023-08-22 RX ADMIN — DEXAMETHASONE 2 MG: 4 TABLET ORAL at 17:55

## 2023-08-22 ASSESSMENT — PAIN SCALES - GENERAL: PAINLEVEL_OUTOF10: 0

## 2023-08-22 NOTE — PROGRESS NOTES
ACUTE PHYSICAL THERAPY GOALS:   (Developed with and agreed upon by patient and/or caregiver.)  (1.) Dale Rich  will move from supine to sit and sit to supine , scoot up and down, and roll side to side with CONTACT GUARD ASSIST within 7 treatment day(s). (2.) Dale Rich will transfer from bed to chair and chair to bed with CONTACT GUARD ASSIST using the least restrictive device within 7 treatment day(s). (3.) Dale Rich will ambulate with CONTACT GUARD ASSIST for 100 feet with the least restrictive device within 7 treatment day(s). (4.) Dale Rich will perform standing static and dynamic balance activities x 10 minutes with CONTACT GUARD ASSIST to improve safety within 7 treatment day(s). (5.) Dale Rich will perform therapeutic exercises x 20 min for HEP with INDEPENDENCE to improve strength, endurance, and functional mobility within 7 treatment day(s). PHYSICAL THERAPY: Daily Note AM   (Link to Caseload Tracking: PT Visit Days : 2  Time In/Out PT Charge Capture  Rehab Caseload Tracker  Orders    Dale Rich is a 82 y.o. male   PRIMARY DIAGNOSIS: Functional quadriplegia ([address])  Functional quadriplegia ([address]) [R53.2]  Uncontrolled hypertension [I10]  Abdominal pain, unspecified abdominal location [R10.9]  Self-care deficit [Z78.9]       Inpatient: Payor: MEDICARE / Plan: MEDICARE PART A AND B / Product Type: *No Product type* /     ASSESSMENT:     REHAB RECOMMENDATIONS:   Recommendation to date pending progress:  Setting:  Short-term Rehab    Equipment:    To Be Determined     ASSESSMENT:  Mr. Glenis Rai presents resting in bed, agreeable to therapy.  Appears to be in good spirits this AM, was able to eat some of his breakfast.   PT facilitated therapeutic activities including bed mobility Mod Ax2, sitting balance activities, sit <>stand transfers, stand pivot transfer bed > bedside commode, standing balance activities to perform hygiene tasks, and

## 2023-08-22 NOTE — PROGRESS NOTES
Inpatient Rehab-Pt. Evaluated for IRC we do not feel pt.helder be able to tolerate our level of care  Appropriate level of care would be subacute rehab.  Thank you for referral.

## 2023-08-22 NOTE — PROGRESS NOTES
ACUTE OCCUPATIONAL THERAPY GOALS:   (Developed with and agreed upon by patient and/or caregiver.)  1. Patient will complete lower body bathing and dressing with MINIMAL ASSIST and adaptive equipment as needed. 2. Patient will complete toileting with MINIMAL ASSIST. 3. Patient will complete grooming ADL standing at sink with MINIMAL ASSIST. 4. Patient will tolerate 15 minutes of OT treatment with 2-3 rest breaks to increase activity tolerance for ADLs. 5. Patient will complete functional transfers with CONTACT GUARD ASSIST and adaptive equipment as needed. 6. Patient will tolerate 10 minutes BUE exercises to increase strength for safe, functional transfers. Timeframe: 7 visits      OCCUPATIONAL THERAPY: Daily Note AM   OT Visit Days: 2   Time In/Out  OT Charge Capture  Rehab Caseload Tracker  OT Orders    Nilo Daily is a 82 y.o. male   PRIMARY DIAGNOSIS: Functional quadriplegia ([address])  Functional quadriplegia ([address]) [R53.2]  Uncontrolled hypertension [I10]  Abdominal pain, unspecified abdominal location [R10.9]  Self-care deficit [Z78.9]       Inpatient: Payor: MEDICARE / Plan: MEDICARE PART A AND B / Product Type: *No Product type* /     ASSESSMENT:     REHAB RECOMMENDATIONS: CURRENT LEVEL OF FUNCTION:  (Most Recently Demonstrated)   Recommendation to date pending progress:  Setting:  Short-term Rehab    Equipment:    To Be Determined Bathing: Moderate Assist  Dressing:  Maximal Assist  Feeding/Grooming:  Minimal Assist  Toileting:  Maximal Assist  Functional Mobility:  Minimal Assist     ASSESSMENT:  Mr. Delia Enciso is progressing well towards OT goals. Pt alert and received supine in bed, agreeable to participate in the session. Pt in good spirits upon arrival and reports he was able to eat his breakfast this AM. Pt demonstrates improvements in functional transfers, overall requiring Min A x1-2 and RW/gait belt. Pt performed bed mobility with Mod A x1-2.  Pt performed transfer to [facility] using RW [] [x] [] [] [] [x]    Scooting [] [] [] [] [] [] [x] [x] [] [] [x]    Sit to Supine [] [] [] [] [] [] [] [] [] [x] []    Transfers    Sit to Stand [] [] [] [] [] [x] [] [] [] [] [x]    Bed to Chair [] [] [] [] [] [x] [] [] [] [] [x]    Stand to Sit [] [] [] [] [] [x] [] [] [] [] [x]    Tub/Shower [] [] [] [] [] [] [] [] [] [x] []     Toilet [] [] [] [] [] [x] [] [] [] [] [x]  Stand-pivot to [facility]    [] [] [] [] [] [] [] [] [] [] []    I=Independent, Mod I=Modified Independent, S=Supervision/Setup, SBA=Standby Assistance, CGA=Contact Guard Assistance, Min=Minimal Assistance, Mod=Moderate Assistance, Max=Maximal Assistance, Total=Total Assistance, NT=Not Tested    ACTIVITIES OF DAILY LIVING: I Mod I S SBA CGA Min Mod Max Total NT Comments   BASIC ADLs:              Upper Body   Bathing [] [] [] [] [] [] [x] [] [] []  Seated    Lower Body Bathing [] [] [] [] [] [] [x] [] [] []  Seated    Toileting [] [] [] [] [] [] [] [x] [] [] BSC--assist with clothing management and guillermo hygiene   Upper Body Dressing [] [] [] [] [] [x] [] [] [] [] Gown   Lower Body Dressing [] [] [] [] [] [] [] [x] [] [] Briefs   Feeding [] [] [] [] [] [] [] [] [] [x]    Grooming [] [] [] [] [] [x] [] [] [] [] Increased cueing to attend to/use L UE   Personal Device Care [] [] [] [] [] [] [] [] [] [x]    Functional Mobility [] [] [] [] [] [x] [] [] [] [] X2 with RW/gait belt   I=Independent, Mod I=Modified Independent, S=Supervision/Setup, SBA=Standby Assistance, CGA=Contact Guard Assistance, Min=Minimal Assistance, Mod=Moderate Assistance, Max=Maximal Assistance, Total=Total Assistance, NT=Not Tested    BALANCE: Good Fair+ Fair Fair- Poor NT Comments   Sitting Static [] [x] [] [] [] []    Sitting Dynamic [] [x] [] [] [] []              Standing Static [] [] [x] [] [] []    Standing Dynamic [] [] [] [x] [] []        PLAN:     FREQUENCY/DURATION   OT Plan of Care: 3 times/week for duration of hospital stay or until stated goals are met, whichever comes

## 2023-08-22 NOTE — CONSULTS
[facility]ematology & On[facility]        Inpatient Hematology / Oncology Consult    Reason for Consult:  Functional quadriplegia [facility]) [R53.2]  Uncontrolled hypertension [I10]  Abdominal pain, unspecified abdominal location [R10.9]  Self-care deficit [Z78.9]  Referring Physician:  Wil Berg MD    History of Present Illness:  Mr. Elda Lange is a 82 y.o. male admitted on 8/20/2023. The primary encounter diagnosis was Abdominal pain, unspecified abdominal location. Diagnoses of Uncontrolled hypertension, Self-care deficit, and Lower extremity edema were also pertinent to this visit. .      Mr Elda Lange has PMH of CKD, HTN, HLD, gout. He is a patient of Dr Brown Bustamante with hx of GBM. He is s/p multiple therapies including craniotomy to R parietal lesion followed by adjuvant XRT/TMZ with TMZ maintenance through 10/2021. He had POD in 6/2022 and received TMZ/XRT and again had POD 3/2023 when he started Avastin. He completed C8 Avastin 8/9/23. Mr Elda Lange presented on day of admission after he was noted to have worsening cognitive and functional decline in the last week and requiring more assistance than wife able to provide at home. MRI completed during this admission and showed concern for POD with new lesion 1.7cm around R frontal lobe. No note made of vasogenic edema. He is currently on dexamethasone 2mg BID. Mr Elda Lange is currently awaiting rehab placement. Oncology consulted for recommendations. Review of Systems: Unable to fully obtain secondary to patient condition.       No Known Allergies  Past Medical History:   Diagnosis Date    Age-related cataract of both eyes 8/5/2022    Anxiety disorder 12/9/2014    Arthritis 12/9/2014    Chronic renal disease, stage III [facility]) [201730] 7/7/2022    Depression 12/9/2014    Gastroesophageal reflux disease 8/5/2022    Glioblastoma ([address]) 12/26/2020    Gout 12/9/2014    History of intracranial hemorrhage 12/25/2020    History of lumbar laminectomy for spinal cord decompression 03/07/2023    Findings: The examination is degraded due to motion. There are postoperative  changes of right parietal craniotomy. There are encephalomalacic changes  underlying the defect along with areas of intrinsic T1 hyperintensity. No  appreciable enhancement at this location is identified. There is a newly  visualized region of enhancement along the right parasagittal frontal lobe  measuring approximately 1.7 x 1.3 x 1.2 cm in AP, transverse and craniocaudal  dimensions, respectively. There is no significant surrounding edema. There is mild ex vacuo dilatation of the atria of the right lateral ventricle. Ventricles are stable in size. There are cystic encephalomalacia changes within  the left thalamus similar to that seen previously. Normal flow voids are present  within all of the major intracranial vessels. Patchy pontine and supratentorial  T2 hyperintensity appears unchanged allowing for differences in technique. There is a small right maxillary sinus retention cyst in addition to an  opacified posterior right lung airspace without significant change. Impression  1. Compromised examination due to motion. 2. Intrinsic areas of T1 hyperintensity without definite enhancement at the  operative site suggesting laminar necrosis. There are components of restricted  diffusion relatively stable which can be seen in the setting of active tumor. 3. Newly visualized focus of enhancement within the right parasagittal frontal  lobe near the vertex measuring 1.7 cm. This is somewhat removed from the  operative site but would be most suspicious for primary or metastatic disease. ASSESSMENT:      ICD-10-CM    1. Abdominal pain, unspecified abdominal location  R10.9       2. Uncontrolled hypertension  I10       3. Self-care deficit  Z78.9       4.  Lower extremity edema  R60.0 Vascular duplex lower extremity venous left     Vascular duplex lower extremity venous left

## 2023-08-22 NOTE — CARE COORDINATION
CM reviewed chart to assist with discharge planning. Patient admitted after he continues to have multiple falls and increasing debility. MRI was completed and oncology consulted and following with recommendations. Therapy recommending STR at discharge. Patient was current with [facility] prior to admissions. Liaison notified and added to Epic to follow. Patient has STR admit at [facility] from 5/22/2023-6/17/2023; referral was sent to define bed availability and possible OOP cost for patient/spouse. Patient is noted to have a [facility] insurance policy and per [facility] liaison, patient's spouse is in the process of hiring non-medical caregivers. CM in to see patient and spouse at bedside, however, patient is resting and no visitors noted. Call placed to patient's spouse and HIPPA compliant VM left. PPD is in process and if patient and spouse are agreeable to return to [facility], patient would not require covid testing. CM also has IRC reviewing for possible appropriateness. 08/22/23 1530   Service Assessment   Patient Orientation Unable to Assess  (Patient resting)   Cognition Other (see comment)  (Patient resting)   History Provided By Medical Record   Primary Caregiver Family   Accompanied By/Relationship No one at bedside   [address] [facility] [place] is: Legal Next of [address] [place] [address]   PCP Verified by CM Yes   Prior Functional Level Assistance with the following:;Bathing;Dressing; Toileting;Feeding;Housework; Shopping;Mobility   Current Functional Level Assistance with the following:;Bathing;Mobility; Shopping;Housework;Feeding; Toileting;Dressing   Can patient return to prior living arrangement No   Ability to make needs known: Fair   Family able to assist with home care needs: Yes   Would you like for me to discuss the discharge plan with any other family members/significant others, and if so, who?  Yes  (CM will plan to review with patient and

## 2023-08-22 NOTE — PROGRESS NOTES
LTG: patient will tolerate safest, least restrictive oral diet without s/sx airway compromise  STG: Patient will tolerate minced/moist diet and thin liquids without overt s/sx aspiration  STG: Patient will tolerate ongoing po trials in efforts to advance diet  STG: Patient will participate in instrumental swallowing assessment to objectively assess oropharyngeal swallow if clinically indicated    LTG: Patient will increase receptive/expressive language skills demonstrated by the ability to communicate basic wants/needs across environments. STG: Patient will follow 2 step commands with 80% accuracy given min-mod cueing  STG: Patient will complete responsive naming tasks with 80% accuracy given moderate cues. STG: Patient will name common objects with 90% accuracy given minimal cues. STG: Patient will complete basic problem solving/reasoning tasks with 80 accuracy given moderate cueing       SPEECH LANGUAGE PATHOLOGY: COMBINED Dysphagia Daily Note AND Cognitive Communication Initial Assessment     MRN: [id number]    ADMISSION DATE: 8/20/2023  PRIMARY DIAGNOSIS: Functional quadriplegia (HCC)  Functional quadriplegia (HCC) [R53.2]  Uncontrolled hypertension [I10]  Abdominal pain, unspecified abdominal location [R10.9]  Self-care deficit [Z78.9]    ICD-10: Treatment Diagnosis: R13.12 Dysphagia, Oropharyngeal Phase  F80.2 Mixed Receptive-Expressive Language Disorder    RECOMMENDATIONS   Diet Solids Recommendation: Soft & Bite Sized  Liquid Consistency Recommendation: Thin  Recommended Form of Meds: floated or crushed in puree as able     Additional Recommendations:   Compensatory Swallowing Strategies : Upright as possible for all oral intake;Remain upright for 30-45 minutes after meals;Eat/Feed slowly  Therapeutic Interventions: Diet tolerance monitoring;Patient/Family education     Patient continues to require skilled intervention: Yes.  Recommend ongoing speech therapy services during this hospitalization and next follow patient 4x/week for duration of hospitalization and/or until goals met    Speech Therapy Prognosis  Prognosis: Good    Dysphagia Outcome and Severity Scale (REAGAN)  Dysphagia Outcome Severity Scale: Level 5: Mild dysphagia- Distant supervision. May need one diet consistency restricted  Interpretation of Tool: The Dysphagia Outcome and Severity Scale (REAGAN) is a simple, easy-to-use, 7-point scale developed to systematically rate the functional severity of dysphagia based on objective assessment and make recommendations for diet level, independence level, and type of nutrition. Normal(7), Functional(6), Mild(5), Mild-Moderate(4), Moderate(3), Moderate-Severe(2), Severe(1)    MODIFIED MIKAL SCALE (mRS) SCORE: 4  Interpretation of Tool: The Modified Dewey Scale is a scale used to quantify level of disability as it relates to a patient's functional abilities. No Symptoms(0); No significant disability despite symptoms; able to carry out all usual duties and activities(1); Slight disability; unable to carry out all previous activities but able to look after own affairs without assistance(2); Moderate disability; requiring some help but able to walk without assistance(3); Moderately severe disability; unable to walk without assistance and unable to attend to own bodily needs without assistance(4); Severe disability; bedridden, incontinent, and requiring constant nursing care and attention(5)    Education: Patient  Patient Education: SLP role, safe swallowing precautions  Patient Education Response: Needs reinforcement, Verbalizes understanding    PRECAUTIONS/ALLERGIES: Patient has no known allergies.       Safety Devices in place: Yes  Type of devices: Call light within reach, Left in bed    Therapy Time  SLP Individual Minutes  Time In: 0809  Time Out: 4604  Minutes: [address] [facility]  8/22/2023 8:58 AM

## 2023-08-22 NOTE — PROGRESS NOTES
Influenza A by PCR NOT DETECTED NOTDET      Influenza B PCR NOT DETECTED NOTDET      Parainfluenza 1 PCR NOT DETECTED NOTDET      Parainfluenza 2 PCR NOT DETECTED NOTDET      Parainfluenza 3 PCR NOT DETECTED NOTDET      Parainfluenza 4 PCR NOT DETECTED NOTDET      Respiratory Syncytial Virus by PCR NOT DETECTED NOTDET      Bordetella parapertussis by PCR NOT DETECTED NOTDET      Bordetella pertussis by PCR NOT DETECTED NOTDET      Chlamydophila Pneumonia PCR NOT DETECTED NOTDET      Mycoplasma pneumo by PCR NOT DETECTED NOTDET     Basic Metabolic Panel w/ Reflex to MG    Collection Time: 08/21/23  4:49 AM   Result Value Ref Range    Sodium 146 (H) 133 - 143 mmol/L    Potassium 3.4 (L) 3.5 - 5.1 mmol/L    Chloride 110 101 - 110 mmol/L    CO2 32 21 - 32 mmol/L    Anion Gap 4 2 - 11 mmol/L    Glucose 126 (H) 65 - 100 mg/dL    BUN 12 8 - 23 MG/DL    Creatinine 0.90 0.8 - 1.5 MG/DL    Est, Glom Filt Rate >60 >60 ml/min/1.73m2    Calcium 8.6 8.3 - 10.4 MG/DL   Magnesium    Collection Time: 08/21/23  4:49 AM   Result Value Ref Range    Magnesium 2.2 1.8 - 2.4 mg/dL   PLEASE READ & DOCUMENT PPD TEST IN 24 HRS    Collection Time: 08/21/23  3:01 PM   Result Value Ref Range    PPD, (POC) Negative Negative    mm Induration 0 0 - 5 mm   Phosphorus    Collection Time: 08/22/23  6:27 AM   Result Value Ref Range    Phosphorus 3.5 2.3 - 3.7 MG/DL   Albumin    Collection Time: 08/22/23  6:27 AM   Result Value Ref Range    Albumin 2.8 (L) 3.2 - 4.6 g/dL   Basic Metabolic Panel w/ Reflex to MG    Collection Time: 08/22/23  6:27 AM   Result Value Ref Range    Sodium 143 133 - 143 mmol/L    Potassium 3.9 3.5 - 5.1 mmol/L    Chloride 110 101 - 110 mmol/L    CO2 31 21 - 32 mmol/L    Anion Gap 2 2 - 11 mmol/L    Glucose 109 (H) 65 - 100 mg/dL    BUN 18 8 - 23 MG/DL    Creatinine 1.10 0.8 - 1.5 MG/DL    Est, Glom Filt Rate >60 >60 ml/min/1.73m2    Calcium 8.5 8.3 - 10.4 MG/DL   CBC    Collection Time: 08/22/23  6:27 AM   Result Value Ref Range    WBC 6.5 4.3 - 11.1 K/uL    RBC 4.31 4.23 - 5.6 M/uL    Hemoglobin 12.9 (L) 13.6 - 17.2 g/dL    Hematocrit 39.2 (L) 41.1 - 50.3 %    MCV 91.0 82 - 102 FL    MCH 29.9 26.1 - 32.9 PG    MCHC 32.9 31.4 - 35.0 g/dL    RDW 14.7 (H) 11.9 - 14.6 %    Platelets 72 (L) 804 - 450 K/uL    MPV 9.9 9.4 - 12.3 FL    nRBC 0.00 0.0 - 0.2 K/uL       Current Meds:  Current Facility-Administered Medications   Medication Dose Route Frequency    QUEtiapine (SEROQUEL) tablet 25 mg  25 mg Oral BID    levETIRAcetam (KEPPRA) 100 MG/ML solution 500 mg  500 mg Oral BID    atorvastatin (LIPITOR) tablet 40 mg  40 mg Oral Nightly    dexamethasone (DECADRON) tablet 2 mg  2 mg Oral BID WC    [Held by provider] furosemide (LASIX) tablet 20 mg  20 mg Oral Daily    pantoprazole (PROTONIX) tablet 40 mg  40 mg Oral QAM AC    sertraline (ZOLOFT) tablet 100 mg  100 mg Oral Daily    sulfamethoxazole-trimethoprim (BACTRIM DS;SEPTRA DS) 800-160 MG per tablet 1 tablet  1 tablet Oral Once per day on Mon Wed Fri    tamsulosin (FLOMAX) capsule 0.4 mg  0.4 mg Oral Daily    sodium chloride flush 0.9 % injection 5-40 mL  5-40 mL IntraVENous 2 times per day    sodium chloride flush 0.9 % injection 5-40 mL  5-40 mL IntraVENous PRN    0.9 % sodium chloride infusion   IntraVENous PRN    enoxaparin (LOVENOX) injection 40 mg  40 mg SubCUTAneous Q24H    ondansetron (ZOFRAN-ODT) disintegrating tablet 4 mg  4 mg Oral Q8H PRN    Or    ondansetron (ZOFRAN) injection 4 mg  4 mg IntraVENous Q6H PRN    polyethylene glycol (GLYCOLAX) packet 17 g  17 g Oral Daily PRN    acetaminophen (TYLENOL) tablet 650 mg  650 mg Oral Q6H PRN    Or    acetaminophen (TYLENOL) suppository 650 mg  650 mg Rectal Q6H PRN    potassium chloride (KLOR-CON M) extended release tablet 40 mEq  40 mEq Oral PRN    Or    potassium bicarb-citric acid (EFFER-K) effervescent tablet 40 mEq  40 mEq Oral PRN    Or    potassium chloride 10 mEq/100 mL IVPB (Peripheral Line)  10 mEq IntraVENous PRN

## 2023-08-23 ENCOUNTER — HOSPITAL ENCOUNTER (OUTPATIENT)
Dept: INFUSION THERAPY | Age: 83
End: 2023-08-23

## 2023-08-23 LAB
ALBUMIN SERPL-MCNC: 3 G/DL (ref 3.2–4.6)
ANION GAP SERPL CALC-SCNC: 5 MMOL/L (ref 2–11)
BUN SERPL-MCNC: 20 MG/DL (ref 8–23)
CALCIUM SERPL-MCNC: 8.8 MG/DL (ref 8.3–10.4)
CHLORIDE SERPL-SCNC: 110 MMOL/L (ref 101–110)
CO2 SERPL-SCNC: 30 MMOL/L (ref 21–32)
CREAT SERPL-MCNC: 1.2 MG/DL (ref 0.8–1.5)
ERYTHROCYTE [DISTWIDTH] IN BLOOD BY AUTOMATED COUNT: 14.9 % (ref 11.9–14.6)
GLUCOSE SERPL-MCNC: 98 MG/DL (ref 65–100)
HCT VFR BLD AUTO: 39.6 % (ref 41.1–50.3)
HGB BLD-MCNC: 13.1 G/DL (ref 13.6–17.2)
MCH RBC QN AUTO: 30.3 PG (ref 26.1–32.9)
MCHC RBC AUTO-ENTMCNC: 33.1 G/DL (ref 31.4–35)
MCV RBC AUTO: 91.5 FL (ref 82–102)
NRBC # BLD: 0 K/UL (ref 0–0.2)
PHOSPHATE SERPL-MCNC: 3.8 MG/DL (ref 2.3–3.7)
PLATELET # BLD AUTO: 67 K/UL (ref 150–450)
PMV BLD AUTO: 9.9 FL (ref 9.4–12.3)
POTASSIUM SERPL-SCNC: 3.8 MMOL/L (ref 3.5–5.1)
RBC # BLD AUTO: 4.33 M/UL (ref 4.23–5.6)
SODIUM SERPL-SCNC: 145 MMOL/L (ref 133–143)
WBC # BLD AUTO: 6.1 K/UL (ref 4.3–11.1)

## 2023-08-23 PROCEDURE — 84100 ASSAY OF PHOSPHORUS: CPT

## 2023-08-23 PROCEDURE — 97535 SELF CARE MNGMENT TRAINING: CPT

## 2023-08-23 PROCEDURE — 6370000000 HC RX 637 (ALT 250 FOR IP): Performed by: HOSPITALIST

## 2023-08-23 PROCEDURE — 2580000003 HC RX 258: Performed by: HOSPITALIST

## 2023-08-23 PROCEDURE — 6370000000 HC RX 637 (ALT 250 FOR IP): Performed by: FAMILY MEDICINE

## 2023-08-23 PROCEDURE — 6370000000 HC RX 637 (ALT 250 FOR IP): Performed by: NURSE PRACTITIONER

## 2023-08-23 PROCEDURE — 6360000002 HC RX W HCPCS: Performed by: HOSPITALIST

## 2023-08-23 PROCEDURE — 85027 COMPLETE CBC AUTOMATED: CPT

## 2023-08-23 PROCEDURE — 97112 NEUROMUSCULAR REEDUCATION: CPT

## 2023-08-23 PROCEDURE — 92507 TX SP LANG VOICE COMM INDIV: CPT

## 2023-08-23 PROCEDURE — 1100000000 HC RM PRIVATE

## 2023-08-23 PROCEDURE — 97530 THERAPEUTIC ACTIVITIES: CPT

## 2023-08-23 PROCEDURE — 80048 BASIC METABOLIC PNL TOTAL CA: CPT

## 2023-08-23 PROCEDURE — 82040 ASSAY OF SERUM ALBUMIN: CPT

## 2023-08-23 RX ADMIN — POLYETHYLENE GLYCOL 3350 17 G: 17 POWDER, FOR SOLUTION ORAL at 20:50

## 2023-08-23 RX ADMIN — FUROSEMIDE 20 MG: 20 TABLET ORAL at 08:19

## 2023-08-23 RX ADMIN — SULFAMETHOXAZOLE AND TRIMETHOPRIM 1 TABLET: 800; 160 TABLET ORAL at 08:21

## 2023-08-23 RX ADMIN — QUETIAPINE FUMARATE 25 MG: 25 TABLET ORAL at 08:19

## 2023-08-23 RX ADMIN — LEVETIRACETAM 500 MG: 100 SOLUTION ORAL at 08:18

## 2023-08-23 RX ADMIN — QUETIAPINE FUMARATE 25 MG: 25 TABLET ORAL at 20:50

## 2023-08-23 RX ADMIN — SODIUM CHLORIDE, PRESERVATIVE FREE 5 ML: 5 INJECTION INTRAVENOUS at 08:22

## 2023-08-23 RX ADMIN — LEVETIRACETAM 500 MG: 100 SOLUTION ORAL at 20:49

## 2023-08-23 RX ADMIN — ATORVASTATIN CALCIUM 40 MG: 40 TABLET, FILM COATED ORAL at 20:50

## 2023-08-23 RX ADMIN — TAMSULOSIN HYDROCHLORIDE 0.4 MG: 0.4 CAPSULE ORAL at 08:18

## 2023-08-23 RX ADMIN — DEXAMETHASONE 2 MG: 4 TABLET ORAL at 16:16

## 2023-08-23 RX ADMIN — PANTOPRAZOLE SODIUM 40 MG: 40 TABLET, DELAYED RELEASE ORAL at 06:09

## 2023-08-23 RX ADMIN — BISACODYL 10 MG: 10 SUPPOSITORY RECTAL at 00:57

## 2023-08-23 RX ADMIN — POLYETHYLENE GLYCOL 3350 17 G: 17 POWDER, FOR SOLUTION ORAL at 08:20

## 2023-08-23 RX ADMIN — DEXAMETHASONE 2 MG: 4 TABLET ORAL at 08:18

## 2023-08-23 RX ADMIN — SODIUM CHLORIDE, PRESERVATIVE FREE 10 ML: 5 INJECTION INTRAVENOUS at 20:51

## 2023-08-23 RX ADMIN — SERTRALINE HYDROCHLORIDE 100 MG: 100 TABLET ORAL at 08:19

## 2023-08-23 RX ADMIN — VITAMIN D, TAB 1000IU (100/BT) 1000 UNITS: 25 TAB at 08:20

## 2023-08-23 RX ADMIN — ENOXAPARIN SODIUM 40 MG: 100 INJECTION SUBCUTANEOUS at 14:54

## 2023-08-23 ASSESSMENT — PAIN SCALES - GENERAL: PAINLEVEL_OUTOF10: 0

## 2023-08-23 NOTE — PROGRESS NOTES
LTG: patient will tolerate safest, least restrictive oral diet without s/sx airway compromise  STG: Patient will tolerate minced/moist diet and thin liquids without overt s/sx aspiration  STG: Patient will tolerate ongoing po trials in efforts to advance diet  STG: Patient will participate in instrumental swallowing assessment to objectively assess oropharyngeal swallow if clinically indicated     LTG: Patient will increase receptive/expressive language skills demonstrated by the ability to communicate basic wants/needs across environments. STG: Patient will follow 2 step commands with 80% accuracy given min-mod cueing  STG: Patient will complete responsive naming tasks with 80% accuracy given moderate cues. STG: Patient will name common objects with 90% accuracy given minimal cues. STG: Patient will complete basic problem solving/reasoning tasks with 80 accuracy given moderate cueing     SPEECH LANGUAGE PATHOLOGY: COMBINED DYSPHAGIA AND COMMUNICATION Daily Note #1    MRN: [id number]    ADMISSION DATE: 8/20/2023  PRIMARY DIAGNOSIS: Functional quadriplegia (HCC)  Functional quadriplegia (HCC) [R53.2]  Uncontrolled hypertension [I10]  Abdominal pain, unspecified abdominal location [R10.9]  Self-care deficit [Z78.9]    ICD-10: Treatment Diagnosis: R13.12 Dysphagia, Oropharyngeal Phase  R41.841 Cognitive-Communication Deficit  F80.2 Mixed Receptive-Expressive Language Disorder    RECOMMENDATIONS    DIET: continue soft/bite sized diet and thin liquids      Meds: floated or crushed in puree as able     Additional Recommendations:   cognitive communication treatment  Diet tolerance/ongoing po trials to advance as appropriate      Patient continues to require skilled intervention: Yes. Recommend ongoing speech therapy services during this hospitalization and next level of care      ASSESSMENT       Dysphagia: not addressed this session as patient just ate and declined more po trials.      Communication: patient presents functional severity of dysphagia based on objective assessment and make recommendations for diet level, independence level, and type of nutrition. Normal(7), Functional(6), Mild(5), Mild-Moderate(4), Moderate(3), Moderate-Severe(2), Severe(1)    MODIFIED MIKAL SCALE (mRS) SCORE: 4  Interpretation of Tool: The Modified Garland Scale is a scale used to quantify level of disability as it relates to a patient's functional abilities. No Symptoms(0); No significant disability despite symptoms; able to carry out all usual duties and activities(1); Slight disability; unable to carry out all previous activities but able to look after own affairs without assistance(2); Moderate disability; requiring some help but able to walk without assistance(3); Moderately severe disability; unable to walk without assistance and unable to attend to own bodily needs without assistance(4); Severe disability; bedridden, incontinent, and requiring constant nursing care and attention(5)    Education: Patient  Patient Education: cognition, swallowing, role of SLP  Patient Education Response: Needs reinforcement, Verbalizes understanding    PRECAUTIONS/ALLERGIES: Patient has no known allergies.       Safety Devices in place: Yes  Type of devices: Call light within reach, Left in bed    Therapy Time  SLP Individual Minutes  Time In: 1593  Time Out: 0940  Minutes: [id number] [facility]  8/23/2023 10:58 AM

## 2023-08-23 NOTE — PROGRESS NOTES
ACUTE OCCUPATIONAL THERAPY GOALS:   (Developed with and agreed upon by patient and/or caregiver.)  1. Patient will complete lower body bathing and dressing with MINIMAL ASSIST and adaptive equipment as needed. 2. Patient will complete toileting with MINIMAL ASSIST. 3. Patient will complete grooming ADL standing at sink with MINIMAL ASSIST. 4. Patient will tolerate 15 minutes of OT treatment with 2-3 rest breaks to increase activity tolerance for ADLs. 5. Patient will complete functional transfers with CONTACT GUARD ASSIST and adaptive equipment as needed. 6. Patient will tolerate 10 minutes BUE exercises to increase strength for safe, functional transfers. Timeframe: 7 visits      OCCUPATIONAL THERAPY: Daily Note AM   OT Visit Days: 3   Time In/Out  OT Charge Capture  Rehab Caseload Tracker  OT Orders    Meghan Palomino is a 82 y.o. male   PRIMARY DIAGNOSIS: Functional quadriplegia ([address])  Functional quadriplegia ([address]) [R53.2]  Uncontrolled hypertension [I10]  Abdominal pain, unspecified abdominal location [R10.9]  Self-care deficit [Z78.9]       Inpatient: Payor: MEDICARE / Plan: MEDICARE PART A AND B / Product Type: *No Product type* /     ASSESSMENT:     REHAB RECOMMENDATIONS: CURRENT LEVEL OF FUNCTION:  (Most Recently Demonstrated)   Recommendation to date pending progress:  Setting:  Short-term Rehab    Equipment:    To Be Determined Bathing: Moderate Assist  Dressing:  Maximal Assist  Feeding/Grooming:  Minimal Assist  Toileting:  Maximal Assist  Functional Mobility:  Minimal Assist     ASSESSMENT:  Mr. Dora Thompson received supine in bed eating lunch, agreeable to participate in the session. Pt reports he is more tired this afternoon. Minimal progress toward OT goals this date. Pt performed bed mobility transfers with Max A x2. Pt presented with poor sitting balance, requiring maximal assistance from therapist to correct. Pt performed transfer to chair using RW and Mod A x2.  Maximal verbal, Mobility    Rolling [] [] [] [] [] [] [] [x] [] [] [x]    Supine to Sit [] [] [] [] [] [] [] [x] [] [] [x]    Scooting [] [] [] [] [] [] [] [x] [] [] [x]    Sit to Supine [] [] [] [] [] [] [] [] [] [x] []    Transfers    Sit to Stand [] [] [] [] [] [] [x] [] [] [] [x]    Bed to Chair [] [] [] [] [] [] [x] [] [] [] [x]    Stand to Sit [] [] [] [] [] [] [x] [] [] [] [x]    Tub/Shower [] [] [] [] [] [] [] [] [] [x] []     Toilet [] [] [] [] [] [] [] [] [] [x] []     [] [] [] [] [] [] [] [] [] [] []    I=Independent, Mod I=Modified Independent, S=Supervision/Setup, SBA=Standby Assistance, CGA=Contact Guard Assistance, Min=Minimal Assistance, Mod=Moderate Assistance, Max=Maximal Assistance, Total=Total Assistance, NT=Not Tested    ACTIVITIES OF DAILY LIVING: I Mod I S SBA CGA Min Mod Max Total NT Comments   BASIC ADLs:              Upper Body   Bathing [] [] [] [] [] [] [] [] [] [x]    Lower Body Bathing [] [] [] [] [] [] [] [] [] [x]     Toileting [] [] [] [] [] [] [] [x] [] [] Alia hygiene, clothing management in standing   Upper Body Dressing [] [] [] [] [] [] [] [] [] [x]    Lower Body Dressing [] [] [] [] [] [] [] [x] [] [] Briefs   Feeding [] [] [x] [] [] [] [] [] [] []    Grooming [] [] [] [] [] [] [x] [] [] [] Seated in recliner--increased cueing to attend to/use L UE for face washing   Personal Device Care [] [] [] [] [] [] [] [] [] [x]    Functional Mobility [] [] [] [] [] [] [x] [x] [] [] X2 with RW/gait belt   I=Independent, Mod I=Modified Independent, S=Supervision/Setup, SBA=Standby Assistance, CGA=Contact Guard Assistance, Min=Minimal Assistance, Mod=Moderate Assistance, Max=Maximal Assistance, Total=Total Assistance, NT=Not Tested    BALANCE: Good Fair+ Fair Fair- Poor NT Comments   Sitting Static [] [] [] [x] [] []    Sitting Dynamic [] [] [] [] [x] []              Standing Static [] [] [] [x] [] []    Standing Dynamic [] [] [] [x] [x] []        PLAN:     FREQUENCY/DURATION   OT Plan of Care: 3

## 2023-08-23 NOTE — PROGRESS NOTES
2015  Patient yelling out \"help me, help me. \"  When asked what is wrong pt states \"my stomach hurts. \"  Pt unable to describe his pain just continues to yell out for help. Bladder scan 45mL. Refused PRN tylenol when offered. PRN Glycolax administered for constipation. Repositioned for comfort. Kashif Falling. APRN notified. Order for KUB obtained. 2300 One time dose IV toradol and PRN simethicone administered with positive results. 0057  One time dose dulcolax suppository administered per order. Tolerated well. Will continue to monitor.

## 2023-08-23 NOTE — CARE COORDINATION
Patient were discussed in [facility] and patient has been denied for [facility]. CM will continue to follow.

## 2023-08-23 NOTE — SIGNIFICANT EVENT
Message from nursing, co abd pain, yelling. Per nursing, bladder scan 45 ml, no recent BM. Miralax with TY to be given. KUB ordered. Review of chart, ongoing abd pain reported 8/20 with benign CT noted. Will order Toradol IV x 1. Addendum:  KUB shows constipation.  Dulcolax supp and tab x 1 and increase miralax to bid

## 2023-08-23 NOTE — PROGRESS NOTES
ACUTE PHYSICAL THERAPY GOALS:   (Developed with and agreed upon by patient and/or caregiver.)  (1.) Pipo Alberto  will move from supine to sit and sit to supine , scoot up and down, and roll side to side with CONTACT GUARD ASSIST within 7 treatment day(s). (2.) Pipo Alberto will transfer from bed to chair and chair to bed with CONTACT GUARD ASSIST using the least restrictive device within 7 treatment day(s). (3.) Pipo Alberto will ambulate with CONTACT GUARD ASSIST for 100 feet with the least restrictive device within 7 treatment day(s). (4.) Pipo Alberto will perform standing static and dynamic balance activities x 10 minutes with CONTACT GUARD ASSIST to improve safety within 7 treatment day(s). (5.) Pipo Alberto will perform therapeutic exercises x 20 min for HEP with INDEPENDENCE to improve strength, endurance, and functional mobility within 7 treatment day(s). PHYSICAL THERAPY: Daily Note PM   (Link to Caseload Tracking: PT Visit Days : 3  Time In/Out PT Charge Capture  Rehab Caseload Tracker  Orders    Pipo Alberto is a 82 y.o. male   PRIMARY DIAGNOSIS: Functional quadriplegia ([address])  Functional quadriplegia ([address]) [R53.2]  Uncontrolled hypertension [I10]  Abdominal pain, unspecified abdominal location [R10.9]  Self-care deficit [Z78.9]       Inpatient: Payor: MEDICARE / Plan: MEDICARE PART A AND B / Product Type: *No Product type* /     ASSESSMENT:     REHAB RECOMMENDATIONS:   Recommendation to date pending progress:  Setting:  Short-term Rehab    Equipment:    To Be Determined     ASSESSMENT:  Mr. Adina Hernandez presents resting in bed, agreeable to therapy. He is very tired today, appears to have increased confusion. PT facilitated therapeutic activities including bed mobility Max Ax2, pt with poor sitting balance control with tendency to lean posteriorly, Max A to correct and maintain upright balance control.  SI t<> stand with Mod Ax2, BUE support at RW, heavy cues for placement of BUE and BLE and safe sequencing and progression. Ambulation to chair Mod Ax2, pt with shuffled, short steps and slowed crystal. Sit <> stand transfer training and standing balance activities with BUE support at [address] [place] [address] with Mod-Max Ax2, supported standing in order to perform hygiene tasks and change brief. Pt with limited progress, requiring increased assistance with mobility today. Overall goals remain appropriate. Pt presents as functioning below his baseline, with deficits in mobility including transfers, gait, balance, and activity tolerance. Pt will benefit from skilled therapy services to address stated deficits to promote return to highest level of function, independence, and safety. Will continue therapy efforts.      SUBJECTIVE:   Mr. Sanjeev Hughes states, \"I am tired\"     Social/Functional Lives With: Spouse  Type of Home: House  Home Layout: One level  Bathroom Shower/Tub: Walk-in shower  Bathroom Equipment: Shower chair, Grab bars in shower  Home Equipment: Tracey Monteiro, [address], [address] Wan  Has the patient had two or more falls in the past year or any fall with injury in the past year?: Yes  Receives Help From: Family  ADL Assistance: Needs assistance  Homemaking Responsibilities: No  Ambulation Assistance: Needs assistance  Transfer Assistance: Needs assistance  Active : No  OBJECTIVE:     PAIN: Lajean Deiters / O2: Valri Riding / Mike Garcia / Quincy Purpura:   Pre Treatment: 0/10         Post Treatment: 0/10 Vitals        Oxygen    None    RESTRICTIONS/PRECAUTIONS:  Restrictions/Precautions  Restrictions/Precautions: Fall Risk, Bed Alarm  Restrictions/Precautions: Fall Risk, Bed Alarm     MOBILITY: I Mod I S SBA CGA Min Mod Max Total  NT x2 Comments:   Bed Mobility    Rolling [] [] [] [] [] [] [] [] [] [] []    Supine to Sit [] [] [] [] [] [] [] [x] [] [] [x]    Scooting [] [] [] [] [] [] [] [x] [] [] [x]    Sit to Supine [] [] [] [] [] [] [] [] [] [] []    Transfers    Sit to Stand [] [] [] [] []

## 2023-08-23 NOTE — CARE COORDINATION
CM met with patient's spouse at bedside. Patient is resting soundly. Patient's spouse states that she would like patient to discharge to [facility] as they had a positive experience there previously. Bed offer accepted and selected. 72 hour PPD will be completed today. No Covid testing will be necessary prior to discharge.

## 2023-08-23 NOTE — PROGRESS NOTES
Hospitalist Progress Note   Admit Date:  2023  7:07 AM   Name:  Yissel Current [facility]   Age:  82 y.o. Sex:  male  :  1940   MRN:  [id number]   Room:  720/01    Presenting/Chief Complaint: Abdominal Pain     Reason(s) for Admission: Functional quadriplegia ([address]) [R53.2]  Uncontrolled hypertension [I10]  Abdominal pain, unspecified abdominal location [R10.9]  Self-care deficit [Z78.9]     Hospital Course:   82M PMHx Glioblastoma Multiforme, has had worsening cognitive and functional decline over the past week, last night he had abdominal pain and was asking multiple times for his wife to help with getting him up to the bathroom, which now he is requiring more assistance then she can offer Alone, she called EMS and he was brought to the ED. CT Head was negative UA Negative, CT ABD No acute findings are seen to suggest an etiology for the patient's  abdominal pain. Specifically, no bowel changes, free air, abnormal fluid. Na 147,  , GLU 99, Afebrile. Subjective & 24hr Events:   Patient seen at bedside, resting in bed. He is alert and awake, AO x2, on room air. Patient is reporting of having some throat pain. Denies any chest pain, abdominal pain, nausea vomiting, headache. No reported fever, diarrhea per nursing staff. No other overnight events. Patient waiting for rehab placement. ROS:  10 point review system is negative except for what mentioned above. Assessment & Plan:   Functional quadriplegia due to glioblastoma  Recent decline in mentation and mobility  -long term care  -dexamethasone  -quetiapine  -  Patient was evaluated by oncology team on . Oncology recommended to continue a Avastin every 2 weeks with possible addition of lomustine or metronomic Temodar. Oncology team emphasizing on goals of care discussion but no acute intervention or recommendations from oncology standpoint at this time.   Patient to follow-up with oncology as Phosphorus 3.5 2.3 - 3.7 MG/DL   Albumin    Collection Time: 08/22/23  6:27 AM   Result Value Ref Range    Albumin 2.8 (L) 3.2 - 4.6 g/dL   Basic Metabolic Panel w/ Reflex to MG    Collection Time: 08/22/23  6:27 AM   Result Value Ref Range    Sodium 143 133 - 143 mmol/L    Potassium 3.9 3.5 - 5.1 mmol/L    Chloride 110 101 - 110 mmol/L    CO2 31 21 - 32 mmol/L    Anion Gap 2 2 - 11 mmol/L    Glucose 109 (H) 65 - 100 mg/dL    BUN 18 8 - 23 MG/DL    Creatinine 1.10 0.8 - 1.5 MG/DL    Est, Glom Filt Rate >60 >60 ml/min/1.73m2    Calcium 8.5 8.3 - 10.4 MG/DL   CBC    Collection Time: 08/22/23  6:27 AM   Result Value Ref Range    WBC 6.5 4.3 - 11.1 K/uL    RBC 4.31 4.23 - 5.6 M/uL    Hemoglobin 12.9 (L) 13.6 - 17.2 g/dL    Hematocrit 39.2 (L) 41.1 - 50.3 %    MCV 91.0 82 - 102 FL    MCH 29.9 26.1 - 32.9 PG    MCHC 32.9 31.4 - 35.0 g/dL    RDW 14.7 (H) 11.9 - 14.6 %    Platelets 72 (L) 344 - 450 K/uL    MPV 9.9 9.4 - 12.3 FL    nRBC 0.00 0.0 - 0.2 K/uL   PLEASE READ & DOCUMENT PPD TEST IN 48 HRS    Collection Time: 08/22/23  3:01 PM   Result Value Ref Range    PPD, (POC) Negative Negative    mm Induration 0 0 - 5 mm   Phosphorus    Collection Time: 08/23/23  5:50 AM   Result Value Ref Range    Phosphorus 3.8 (H) 2.3 - 3.7 MG/DL   Albumin    Collection Time: 08/23/23  5:50 AM   Result Value Ref Range    Albumin 3.0 (L) 3.2 - 4.6 g/dL   Basic Metabolic Panel w/ Reflex to MG    Collection Time: 08/23/23  5:50 AM   Result Value Ref Range    Sodium 145 (H) 133 - 143 mmol/L    Potassium 3.8 3.5 - 5.1 mmol/L    Chloride 110 101 - 110 mmol/L    CO2 30 21 - 32 mmol/L    Anion Gap 5 2 - 11 mmol/L    Glucose 98 65 - 100 mg/dL    BUN 20 8 - 23 MG/DL    Creatinine 1.20 0.8 - 1.5 MG/DL    Est, Glom Filt Rate >60 >60 ml/min/1.73m2    Calcium 8.8 8.3 - 10.4 MG/DL   CBC    Collection Time: 08/23/23  5:50 AM   Result Value Ref Range    WBC 6.1 4.3 - 11.1 K/uL    RBC 4.33 4.23 - 5.6 M/uL    Hemoglobin 13.1 (L) 13.6 - 17.2 g/dL

## 2023-08-23 NOTE — PROGRESS NOTES
Pt told therapy he was having chest pain, I went to assess pt he says he is not having chest pain, Md notified Dr Mady Felder as he was doing rounds pt said its not having pain in his chest its his throat, Magic mouthwash ordered per verbal orders from MD.

## 2023-08-24 LAB
ALBUMIN SERPL-MCNC: 3.1 G/DL (ref 3.2–4.6)
ANION GAP SERPL CALC-SCNC: 2 MMOL/L (ref 2–11)
BUN SERPL-MCNC: 21 MG/DL (ref 8–23)
CALCIUM SERPL-MCNC: 8.9 MG/DL (ref 8.3–10.4)
CHLORIDE SERPL-SCNC: 109 MMOL/L (ref 101–110)
CO2 SERPL-SCNC: 31 MMOL/L (ref 21–32)
CREAT SERPL-MCNC: 1.4 MG/DL (ref 0.8–1.5)
ERYTHROCYTE [DISTWIDTH] IN BLOOD BY AUTOMATED COUNT: 14.7 % (ref 11.9–14.6)
GLUCOSE SERPL-MCNC: 91 MG/DL (ref 65–100)
HCT VFR BLD AUTO: 39.3 % (ref 41.1–50.3)
HGB BLD-MCNC: 12.9 G/DL (ref 13.6–17.2)
MCH RBC QN AUTO: 30.1 PG (ref 26.1–32.9)
MCHC RBC AUTO-ENTMCNC: 32.8 G/DL (ref 31.4–35)
MCV RBC AUTO: 91.6 FL (ref 82–102)
NRBC # BLD: 0 K/UL (ref 0–0.2)
PHOSPHATE SERPL-MCNC: 4 MG/DL (ref 2.3–3.7)
PLATELET # BLD AUTO: 71 K/UL (ref 150–450)
PMV BLD AUTO: 10.6 FL (ref 9.4–12.3)
POTASSIUM SERPL-SCNC: 4 MMOL/L (ref 3.5–5.1)
RBC # BLD AUTO: 4.29 M/UL (ref 4.23–5.6)
SODIUM SERPL-SCNC: 142 MMOL/L (ref 133–143)
WBC # BLD AUTO: 6.4 K/UL (ref 4.3–11.1)

## 2023-08-24 PROCEDURE — 6360000002 HC RX W HCPCS: Performed by: HOSPITALIST

## 2023-08-24 PROCEDURE — 36415 COLL VENOUS BLD VENIPUNCTURE: CPT

## 2023-08-24 PROCEDURE — 84100 ASSAY OF PHOSPHORUS: CPT

## 2023-08-24 PROCEDURE — 2580000003 HC RX 258: Performed by: HOSPITALIST

## 2023-08-24 PROCEDURE — 6370000000 HC RX 637 (ALT 250 FOR IP): Performed by: HOSPITALIST

## 2023-08-24 PROCEDURE — 80048 BASIC METABOLIC PNL TOTAL CA: CPT

## 2023-08-24 PROCEDURE — 1100000000 HC RM PRIVATE

## 2023-08-24 PROCEDURE — 82040 ASSAY OF SERUM ALBUMIN: CPT

## 2023-08-24 PROCEDURE — 6370000000 HC RX 637 (ALT 250 FOR IP): Performed by: NURSE PRACTITIONER

## 2023-08-24 PROCEDURE — 85027 COMPLETE CBC AUTOMATED: CPT

## 2023-08-24 PROCEDURE — 6370000000 HC RX 637 (ALT 250 FOR IP): Performed by: FAMILY MEDICINE

## 2023-08-24 PROCEDURE — 92526 ORAL FUNCTION THERAPY: CPT

## 2023-08-24 RX ADMIN — SERTRALINE HYDROCHLORIDE 100 MG: 100 TABLET ORAL at 09:56

## 2023-08-24 RX ADMIN — ENOXAPARIN SODIUM 40 MG: 100 INJECTION SUBCUTANEOUS at 15:51

## 2023-08-24 RX ADMIN — QUETIAPINE FUMARATE 25 MG: 25 TABLET ORAL at 09:56

## 2023-08-24 RX ADMIN — SODIUM CHLORIDE, PRESERVATIVE FREE 10 ML: 5 INJECTION INTRAVENOUS at 22:08

## 2023-08-24 RX ADMIN — QUETIAPINE FUMARATE 25 MG: 25 TABLET ORAL at 22:01

## 2023-08-24 RX ADMIN — VITAMIN D, TAB 1000IU (100/BT) 1000 UNITS: 25 TAB at 09:55

## 2023-08-24 RX ADMIN — SODIUM CHLORIDE, PRESERVATIVE FREE 10 ML: 5 INJECTION INTRAVENOUS at 09:55

## 2023-08-24 RX ADMIN — NYSTATIN 15 ML: 100000 SUSPENSION ORAL at 10:10

## 2023-08-24 RX ADMIN — LEVETIRACETAM 500 MG: 100 SOLUTION ORAL at 09:58

## 2023-08-24 RX ADMIN — ATORVASTATIN CALCIUM 40 MG: 40 TABLET, FILM COATED ORAL at 22:00

## 2023-08-24 RX ADMIN — TAMSULOSIN HYDROCHLORIDE 0.4 MG: 0.4 CAPSULE ORAL at 09:56

## 2023-08-24 RX ADMIN — DEXAMETHASONE 2 MG: 4 TABLET ORAL at 09:56

## 2023-08-24 RX ADMIN — LEVETIRACETAM 500 MG: 100 SOLUTION ORAL at 22:00

## 2023-08-24 RX ADMIN — POLYETHYLENE GLYCOL 3350 17 G: 17 POWDER, FOR SOLUTION ORAL at 22:00

## 2023-08-24 RX ADMIN — DEXAMETHASONE 2 MG: 4 TABLET ORAL at 18:27

## 2023-08-24 RX ADMIN — PANTOPRAZOLE SODIUM 40 MG: 40 TABLET, DELAYED RELEASE ORAL at 05:37

## 2023-08-24 RX ADMIN — POLYETHYLENE GLYCOL 3350 17 G: 17 POWDER, FOR SOLUTION ORAL at 09:56

## 2023-08-24 RX ADMIN — FUROSEMIDE 20 MG: 20 TABLET ORAL at 09:56

## 2023-08-24 ASSESSMENT — PAIN SCALES - GENERAL: PAINLEVEL_OUTOF10: 0

## 2023-08-24 NOTE — PROGRESS NOTES
SPEECH LANGUAGE PATHOLOGY NOTE    Notified by nursing that patient's wife no longer wants MBSS. Spoke with wife who states she is aware of patient's swallowing difficulty and understands aspiration risk, but does not want to put him through another test. Discussed potential benefit of MBSS including identifying diet modifications to mitigate aspiration risk; however, she wishes to resume recommendation from recent Fiberoptic endoscopic evaluation of swallowing (FEES): minced/moist diet and thin liquids using compensatory strategies (small sips, chin tuck with liquids) and aspiration precautions. Will cancel MBSS. Notified RN and Dr. Radha Gomes.           Regina Omer, [facility], [facility]-SL[facility]

## 2023-08-24 NOTE — ACP (ADVANCE CARE PLANNING)
[facility]  At the heart of better care     Advance Care Planning   Admit Date:  2023  7:07 AM   Name:  Carmen French [facility]   Age:  82 y.o. Sex:  male  :  1940   MRN:  [id number]   Room:  [address] has capacity to make his own decisions:   No    If pt unable to make decisions, POA/surrogate decision maker:  Spouse    Other people present:   None    Patient / surrogate decision-maker directed code status:  DNR/DNI    Other ACP topics discussed, if applicable:   Discussed artificial feeding: no to PEG tube/TF    Patient or surrogate consented to discussion of the current conditions, workup, management plans, prognosis, and the risk for further deterioration. Time spent: 24 minutes in direct discussion.       Signed:  Rogers Banks MD

## 2023-08-24 NOTE — PROGRESS NOTES
LTG: patient will tolerate safest, least restrictive oral diet without s/sx airway compromise  STG: Patient will tolerate minced/moist diet and thin liquids without overt s/sx aspiration. Discontinued   STG: Patient will tolerate ongoing po trials in efforts to advance diet  STG: Patient will participate in instrumental swallowing assessment to objectively assess oropharyngeal swallow if clinically indicated         SPEECH LANGUAGE PATHOLOGY: DYSPHAGIA  Daily Note #2    NAME: Kirstin Rangel  : 1940  MRN: [id number]    ADMISSION DATE: 2023  PRIMARY DIAGNOSIS: Functional quadriplegia ([address])  Functional quadriplegia (HCC) [R53.2]  Uncontrolled hypertension [I10]  Abdominal pain, unspecified abdominal location [R10.9]  Self-care deficit [Z78.9]    ICD-10: Treatment Diagnosis: R13.12 Dysphagia, Oropharyngeal Phase    RECOMMENDATIONS   Diet:  Diet Solids Recommendation: NPO  Liquid Consistency Recommendation: NPO, Ice chips after oral care (sips of water after oral care)    Medications: Crushed in puree as able     Compensatory Swallowing Strategies: Upright as possible for all oral intake;Remain upright for 30-45 minutes after meals;Eat/Feed slowly; Small bites/sips   Recommendations: Modified barium swallow study    Therapeutic Interventions: Diet tolerance monitoring;Patient/Family education   Patient continues to require skilled intervention:  Yes. Recommend ongoing speech therapy services during this hospitalization and next level of care      ASSESSMENT    Dysphagia Diagnosis: Suspected needs further assessment  Wife at bedside this date and provided information regarding patient's dysphagia history. Per wife, patient had Fiberoptic endoscopic evaluation of swallowing (FEES) ~3 weeks ago and was placed on a modified diet of minced/moist textures and thin liquids with use of chin tuck.  She reports patient has difficult time recalling the strategy, and effectively implementing even with cues due to confusion and decreased recall. Today, patient demonstrates intermittent throat clearing and coughing with liquids this date regardless of attempting strategies recommended on prior instrumental assessment. Wife agreeable to keeping patient NPO except meds crushed in puree and sips of water for pleasure until further assessment completed in efforts to decrease risk of aspiration complications. Will plan for MBSS 8/25 to objectively assess and identify safest oral diet. GENERAL    History of Present Injury/Illness: Mr. Umang Betancourt  has a past medical history of Age-related cataract of both eyes, Anxiety disorder, Arthritis, Chronic renal disease, stage III ([address]) [137473], Depression, Gastroesophageal reflux disease, Glioblastoma ([address]), Gout, History of intracranial hemorrhage, History of lumbar laminectomy for spinal cord decompression, History of seizure, HTN (hypertension), benign, Hyperlipemia, Hypertension, Idiopathic chronic gout of right ankle, Idiopathic scoliosis, Insomnia, Kidney stone, Mixed hyperlipidemia, Panic disorder, Primary insomnia, Rosacea, and Unspecified convulsions. . He also  has a past surgical history that includes other surgical history; cyst removal; cyst removal; hernia repair (Bilateral); Ankle fracture surgery (Left, 6/1974); Wrist fracture surgery (Bilateral, 6/1993); cyst removal; Colonoscopy (2007); and IR PORT PLACEMENT > 5 YEARS (3/16/2023). Subjective: awake in bed, awake with wife present. wife reports patient got significantly choked towards end of meal on piece of sausage. Prior Dysphagia History: patient seen 5/2023-minced/moist diet and thin liquids. 8/22 SBS/thin;   wife at bedside 8/24 and reports patient on minced/moist diet and thin liquids with chin tuck per recent FEES as nursing facility ~3 weeks ago. reports difficutly recalling using strategy and implementing effectively.      Current Diet : Soft and Bite-Sized  Current Liquid Diet : Thin

## 2023-08-25 VITALS
WEIGHT: 179.7 LBS | TEMPERATURE: 97.3 F | RESPIRATION RATE: 16 BRPM | OXYGEN SATURATION: 95 % | DIASTOLIC BLOOD PRESSURE: 85 MMHG | HEART RATE: 62 BPM | BODY MASS INDEX: 27.32 KG/M2 | SYSTOLIC BLOOD PRESSURE: 149 MMHG

## 2023-08-25 LAB
BACTERIA SPEC CULT: NORMAL
BACTERIA SPEC CULT: NORMAL
MM INDURATION, POC: 0 MM (ref 0–5)
PPD, POC: NEGATIVE
SARS-COV-2 RDRP RESP QL NAA+PROBE: NOT DETECTED
SERVICE CMNT-IMP: NORMAL
SERVICE CMNT-IMP: NORMAL
SOURCE: NORMAL

## 2023-08-25 PROCEDURE — 2580000003 HC RX 258: Performed by: HOSPITALIST

## 2023-08-25 PROCEDURE — 6370000000 HC RX 637 (ALT 250 FOR IP): Performed by: FAMILY MEDICINE

## 2023-08-25 PROCEDURE — 6360000002 HC RX W HCPCS: Performed by: HOSPITALIST

## 2023-08-25 PROCEDURE — 97530 THERAPEUTIC ACTIVITIES: CPT

## 2023-08-25 PROCEDURE — 87635 SARS-COV-2 COVID-19 AMP PRB: CPT

## 2023-08-25 PROCEDURE — 6370000000 HC RX 637 (ALT 250 FOR IP): Performed by: HOSPITALIST

## 2023-08-25 PROCEDURE — 97535 SELF CARE MNGMENT TRAINING: CPT

## 2023-08-25 RX ORDER — HYOSCYAMINE SULFATE 0.12 MG/1
1 TABLET SUBLINGUAL 3 TIMES DAILY PRN
Qty: 20 EACH | Refills: 0
Start: 2023-08-25

## 2023-08-25 RX ORDER — LEVETIRACETAM 100 MG/ML
500 SOLUTION ORAL 2 TIMES DAILY
Qty: 300 ML | Refills: 5
Start: 2023-08-25 | End: 2023-09-24

## 2023-08-25 RX ORDER — HEPARIN 100 UNIT/ML
300 SYRINGE INTRAVENOUS ONCE
Status: COMPLETED | OUTPATIENT
Start: 2023-08-25 | End: 2023-08-25

## 2023-08-25 RX ORDER — QUETIAPINE FUMARATE 25 MG/1
25 TABLET, FILM COATED ORAL 2 TIMES DAILY
Qty: 60 TABLET | Refills: 3
Start: 2023-08-25

## 2023-08-25 RX ADMIN — SODIUM CHLORIDE, PRESERVATIVE FREE 10 ML: 5 INJECTION INTRAVENOUS at 08:38

## 2023-08-25 RX ADMIN — LEVETIRACETAM 500 MG: 100 SOLUTION ORAL at 08:37

## 2023-08-25 RX ADMIN — QUETIAPINE FUMARATE 25 MG: 25 TABLET ORAL at 08:38

## 2023-08-25 RX ADMIN — DEXAMETHASONE 2 MG: 4 TABLET ORAL at 08:37

## 2023-08-25 RX ADMIN — VITAMIN D, TAB 1000IU (100/BT) 1000 UNITS: 25 TAB at 08:38

## 2023-08-25 RX ADMIN — HEPARIN 300 UNITS: 100 SYRINGE at 13:11

## 2023-08-25 RX ADMIN — SERTRALINE HYDROCHLORIDE 100 MG: 100 TABLET ORAL at 08:38

## 2023-08-25 RX ADMIN — PANTOPRAZOLE SODIUM 40 MG: 40 TABLET, DELAYED RELEASE ORAL at 05:36

## 2023-08-25 RX ADMIN — FUROSEMIDE 20 MG: 20 TABLET ORAL at 08:38

## 2023-08-25 RX ADMIN — TAMSULOSIN HYDROCHLORIDE 0.4 MG: 0.4 CAPSULE ORAL at 08:38

## 2023-08-25 RX ADMIN — POLYETHYLENE GLYCOL 3350 17 G: 17 POWDER, FOR SOLUTION ORAL at 08:38

## 2023-08-25 RX ADMIN — SULFAMETHOXAZOLE AND TRIMETHOPRIM 1 TABLET: 800; 160 TABLET ORAL at 08:37

## 2023-08-25 NOTE — CARE COORDINATION
Patient is medically ready for discharge per attending. Patient will discharge at 1400 via [address] transport to room 215B. Primary RN provided report information. 2nd ILM has been completed.

## 2023-08-25 NOTE — PROGRESS NOTES
ACUTE PHYSICAL THERAPY GOALS:   (Developed with and agreed upon by patient and/or caregiver.)  (1.) Conner Byrd  will move from supine to sit and sit to supine , scoot up and down, and roll side to side with CONTACT GUARD ASSIST within 7 treatment day(s). (2.) Conner Byrd will transfer from bed to chair and chair to bed with CONTACT GUARD ASSIST using the least restrictive device within 7 treatment day(s). (3.) Conner Byrd will ambulate with CONTACT GUARD ASSIST for 100 feet with the least restrictive device within 7 treatment day(s). (4.) Conner Byrd will perform standing static and dynamic balance activities x 10 minutes with CONTACT GUARD ASSIST to improve safety within 7 treatment day(s). (5.) Conner Byrd will perform therapeutic exercises x 20 min for HEP with INDEPENDENCE to improve strength, endurance, and functional mobility within 7 treatment day(s). PHYSICAL THERAPY: Daily Note AM   (Link to Caseload Tracking: PT Visit Days : 4  Time In/Out PT Charge Capture  Rehab Caseload Tracker  Orders    Conner Byrd is a 82 y.o. male   PRIMARY DIAGNOSIS: Functional quadriplegia ([address])  Functional quadriplegia ([address]) [R53.2]  Uncontrolled hypertension [I10]  Abdominal pain, unspecified abdominal location [R10.9]  Self-care deficit [Z78.9]       Inpatient: Payor: MEDICARE / Plan: MEDICARE PART A AND B / Product Type: *No Product type* /     ASSESSMENT:     REHAB RECOMMENDATIONS:   Recommendation to date pending progress:  Setting:  Short-term Rehab    Equipment:    To Be Determined     ASSESSMENT:  Mr. Sanjeev Hughes is very drowsy and lethargic today. He is not following commands and requires mod/max Ax2 for all mobility. Performed several reps of rolling with mod/max A x2 and cueing for hand placement. Unsafe to sit up today as patient is falling asleep during treatment. Bed positioned with head elevated to improve alertness.       SUBJECTIVE:   Mr. Sanjeev Hugehs

## 2023-08-25 NOTE — PROGRESS NOTES
ACUTE OCCUPATIONAL THERAPY GOALS:   (Developed with and agreed upon by patient and/or caregiver.)  1. Patient will complete lower body bathing and dressing with MINIMAL ASSIST and adaptive equipment as needed. 2. Patient will complete toileting with MINIMAL ASSIST. 3. Patient will complete grooming ADL standing at sink with MINIMAL ASSIST. 4. Patient will tolerate 15 minutes of OT treatment with 2-3 rest breaks to increase activity tolerance for ADLs. 5. Patient will complete functional transfers with CONTACT GUARD ASSIST and adaptive equipment as needed. 6. Patient will tolerate 10 minutes BUE exercises to increase strength for safe, functional transfers. Timeframe: 7 visits      OCCUPATIONAL THERAPY: Daily Note AM   OT Visit Days: 4   Time In/Out  OT Charge Capture  Rehab Caseload Tracker  OT Orders    Aaron Campbell is a 82 y.o. male   PRIMARY DIAGNOSIS: Functional quadriplegia ([address])  Functional quadriplegia ([address]) [R53.2]  Uncontrolled hypertension [I10]  Abdominal pain, unspecified abdominal location [R10.9]  Self-care deficit [Z78.9]       Inpatient: Payor: MEDICARE / Plan: MEDICARE PART A AND B / Product Type: *No Product type* /     ASSESSMENT:     REHAB RECOMMENDATIONS: CURRENT LEVEL OF FUNCTION:  (Most Recently Demonstrated)   Recommendation to date pending progress:  Setting:  Short-term Rehab    Equipment:    To Be Determined Bathing: Moderate Assist  Dressing:  Maximal Assist  Feeding/Grooming:  Minimal Assist  Toileting:  Maximal Assist  Functional Mobility:  Minimal Assist     ASSESSMENT:  Mr. Xenia Armstrong presents supine on entry on RA and asleep. Pt difficulty to arouse initially. Pt A/O x 2. Increased verbal, visual, tactile stimuli to arouse and maintain arousal. Pt reported feeling \"wet\". Pt solied. Pt completd bed rolling Max A x 2. Pt completed toielting Max A with hand-over-hand and increased cues for attempted penile hygiene. Pt spouse presented during session.  Pt toelrated

## 2023-08-25 NOTE — DISCHARGE SUMMARY
Hospitalist Discharge Summary   Admit Date:  2023  7:07 AM   DC Note date: 2023  Name:  Madelyn Friedman   Age:  82 y.o. Sex:  male  :  1940   MRN:  [id number]   Room:  6518  PCP:  Fabio Randall DO    Presenting Complaint: Abdominal Pain     Initial Admission Diagnosis: Functional quadriplegia ([address]) [R53.2]  Uncontrolled hypertension [I10]  Abdominal pain, unspecified abdominal location [R10.9]  Self-care deficit [Z78.9]     Problem List for this Hospitalization (present on admission):    Principal Problem:    Functional quadriplegia ([address])  Active Problems: Thrombocytopenia (HCC)    Glioblastoma ([address])    Seizure ([address])    Hypernatremia    Self-care deficit  Resolved Problems:    * No resolved hospital problems. *      Hospital Course:  82M PMHx Glioblastoma Multiforme, has had worsening cognitive and functional decline over the past week, last night he had abdominal pain and was asking multiple times for his wife to help with getting him up to the bathroom, which now he is requiring more assistance then she can offer Alone, she called EMS and he was brought to the ED. CT Head was negative UA Negative, CT ABD No acute findings are seen to suggest an etiology for the patient's  abdominal pain. Specifically, no bowel changes, free air, abnormal fluid. Na 147,  , GLU 99, Afebrile. Functional quadriplegia due to glioblastoma  Recent decline in mentation and mobility  -long term care  -dexamethasone  -quetiapine  -  Patient was evaluated by oncology team on . Oncology recommended to continue a Avastin every 2 weeks with possible addition of lomustine or metronomic Temodar. Oncology team emphasizing on goals of care discussion but no acute intervention or recommendations from oncology standpoint at this time. Patient to follow-up with oncology as outpatient. Hypernatremia  2023: Sodium 142 today. Continue Lasix.      Seizure   -levetiracetam      [facility] 08/20/2023 07:38 AM    LABCAST 0-2 05/17/2023 08:47 AM    MUCUS 1+ 05/02/2023 11:13 AM        Microbiology:  Results       Procedure Component Value Units Date/Time    Respiratory Panel, Molecular, with COVID-19 (Restricted: peds pts or suitable admitted adults) [[id number]] Collected: 08/20/23 1910    Order Status: Completed Specimen: Nasopharyngeal Updated: 08/20/23 2030     Adenovirus by PCR NOT DETECTED        Coronavirus 229E by PCR NOT DETECTED        Coronavirus HKU1 by PCR NOT DETECTED        Coronavirus NL63 by PCR NOT DETECTED        Coronavirus OC43 by PCR NOT DETECTED        SARS-CoV-2, PCR NOT DETECTED        Human Metapneumovirus by PCR NOT DETECTED        Rhinovirus Enterovirus PCR NOT DETECTED        Influenza A by PCR NOT DETECTED        Influenza B PCR NOT DETECTED        Parainfluenza 1 PCR NOT DETECTED        Parainfluenza 2 PCR NOT DETECTED        Parainfluenza 3 PCR NOT DETECTED        Parainfluenza 4 PCR NOT DETECTED        Respiratory Syncytial Virus by PCR NOT DETECTED        Bordetella parapertussis by PCR NOT DETECTED        Bordetella pertussis by PCR NOT DETECTED        Chlamydophila Pneumonia PCR NOT DETECTED        Mycoplasma pneumo by PCR NOT DETECTED       Culture, Blood 2 [[id number]] Collected: 08/20/23 1718    Order Status: Completed Specimen: Blood Updated: 08/24/23 0931     Special Requests --        RIGHT  ARM       Culture NO GROWTH 4 DAYS       Culture, Blood 1 [[id number]] Collected: 08/20/23 1659    Order Status: Completed Specimen: Blood Updated: 08/24/23 0931     Special Requests --        RIGHT  Antecubital       Culture NO GROWTH 4 DAYS               All Labs from Last 24 Hrs:  No results found for this or any previous visit (from the past 24 hour(s)).     No Known Allergies  Immunization History   Administered Date(s) Administered    Influenza Virus Vaccine 01/24/2013, 11/24/2014, 10/09/2019    Influenza, FLUAD, (age 72 y+), Adjuvanted, 0.5mL 09/28/2020    Influenza,

## 2023-08-25 NOTE — CARE COORDINATION
Patient were discussed in [facility] and patient has been denied. Thanks for the referral.  IF any changes please make contact with CM.

## 2023-08-28 ENCOUNTER — CARE COORDINATION (OUTPATIENT)
Dept: CARE COORDINATION | Facility: CLINIC | Age: 83
End: 2023-08-28

## 2023-08-28 NOTE — CARE COORDINATION
Patient discharged to a SNF Preferred Provider [facility]. Patient will be included in weekly care coordination calls. Message sent to Ahmet Cool RN [facility] Preferred Provider [address] [place] [address].

## 2023-08-29 ENCOUNTER — CARE COORDINATION (OUTPATIENT)
Dept: CARE COORDINATION | Facility: CLINIC | Age: 83
End: 2023-08-29

## 2023-08-29 NOTE — CARE COORDINATION
Patient is currently enrolled in Post-Acute Episode:   Start day 8/29/2023  Risk for Admission or ED Visit: medium  Please see patient outreach encounters for further details. For questions contact: CLARK JensenN, RN, CCM, ACM-RN/Post Acute Care Manager  [facility]   Mobile: [email or web address]

## 2023-08-30 ENCOUNTER — CARE COORDINATION (OUTPATIENT)
Dept: CARE COORDINATION | Facility: CLINIC | Age: 83
End: 2023-08-30

## 2023-08-31 ENCOUNTER — CARE COORDINATION (OUTPATIENT)
Dept: CARE COORDINATION | Facility: CLINIC | Age: 83
End: 2023-08-31

## 2023-08-31 NOTE — CARE COORDINATION
Post Acute Facility Update    Care Transitions Post Acute Facility Update    Care Transitions Interventions      Post Acute Facility Update   Post Acute Facility: [facility]          Nursing   Prescribed diet: SLP - Minced and moist    Nutrition intake assessment: poor at times   Wounds: Neg   Skilled Medication therapies: Chemo-Glioblastoma   Disease specific clinical considerations: Glioblastoma -worsening medical condition   Reported Nursing Updates: V/S stable, Patient is stable. No concerns regarding med rec. Family meeting this week to discuss goals for the patient. Possible hospice consult. Rehab/Functional   Prior Level of Functioning: Needs mod A for all ADL   Cognitive function assessment: Glioblastoma affecting cognition. ADLs: Dependent   Bed Mobility: Maximum Assistance   Transfer Assistance: Maximum Assistance   Ambulation Assistance: Maximum Assistance, Dependent   Rehab Notes: Family meeting to discuss plan and goals this week. Possible hospice consult. LTC ? SW/Discharge Planning   Palliative/Hospice Referral indicated: Pos   Discharge Planning / Barriers: Spouse unable to care for patient at home at this level of care.  Hospice vs. LTC   Next IDT Planned Review: 9/7/23           Next IDT Planned Review: 9/7/23    Future Appointments   Date Time Provider [address]   [facility]   9/6/2023 11:30 AM Brenda Crespo MD [facility]   9/6/2023 11:30 AM INFUSION [address] [place] [address]   9/6/2023  1:00 PM DYANA Coley [facility]   9/20/2023 11:30 AM INFUSION [address] [place] [address]   10/20/2023  1:45 PM Matrínez Stover DO [facility]           Helen Zavaleta RN

## 2023-09-06 ENCOUNTER — CARE COORDINATION (OUTPATIENT)
Dept: CARE COORDINATION | Facility: CLINIC | Age: 83
End: 2023-09-06

## 2023-09-06 ENCOUNTER — HOSPITAL ENCOUNTER (OUTPATIENT)
Dept: INFUSION THERAPY | Age: 83
End: 2023-09-06

## 2023-09-20 ENCOUNTER — HOSPITAL ENCOUNTER (OUTPATIENT)
Dept: INFUSION THERAPY | Age: 83
End: 2023-09-20

## 2025-06-09 NOTE — PROGRESS NOTES
OT Daily Note    Time In 0747   Time Out 0835     Subjective: \"I slept pretty good last night. \" Pt was agreeable to tx. Pain: Pt reported no pain. Mobility   Score Comments   Supine to Sit 6: Independent    Sit to Stand 4: Supervision or touching A    Transfer Assist 4: Supervision or touching A Transfer Type: SPT   Equipment: Rolling Walker   Comments: SBA     Activities of Daily Living    Score Comments   Eating 6: Independent    Bathing 4: Supervision or touching A Type of Shower: Shower  Position: Supported sitting   Adaptive  Equipment: Tub Transfer Bench, Grab Bars and W/C  Comments: S   Upper Body  Dressing 5: S/U or clean-up assist Items Applied: Pullover  Position: Supported Sitting   Lower Body Dressing 4: Supervision or touching A Items Applied: Underwear and Pants with fasteners  Position: Supported sitting and Standing PRN  Adaptive Equipment: W/C  Comments: Steadying A in standing   Donning/[place] Footwear 4: Supervision or touching A Items Applied: Socks and Shoes with fasteners   Adaptive Equipment: N/A  Comments: A to adjust L sock. S/U for shoes   Toilet Transfer 4: Supervision or touching A Transfer Type: SPT   Equipment: Grab Bars   Comments: SBA   Toileting Hygiene 4: Supervision or touching A Output: Urine  Comments: S   Education  Benefits of OT. Interdisciplinary Communication: Communicated with PT about progress towards goals. Summary of Session: Pt demonstrated good participation in OT tasks during this session. Pt's performance with ADL is reflected in above chart. Pt was left in w/c with call bell within reach and all needs met. Plan: Continue per OT POC.        Janelle Burgos MS OTR/L  1/8/2021 Cardiovascular/Pulmonary/Abdominal/Metabolic/Other Cardiovascular/Pulmonary/Abdominal/Metabolic/Other Cardiovascular/Pulmonary/Abdominal/Metabolic/Other Cardiovascular/Pulmonary/Abdominal/Metabolic/Other

## (undated) DEVICE — BONE WAX WHITE: Brand: BONE WAX WHITE

## (undated) DEVICE — CONTAINER,SPECIMEN,O.R.STRL,4.5OZ: Brand: MEDLINE

## (undated) DEVICE — SPONGE,NEURO,0.5"X0.5",XR,STRL,10/PK: Brand: MEDLINE

## (undated) DEVICE — Device

## (undated) DEVICE — CRANIOTOMY: Brand: MEDLINE INDUSTRIES, INC.

## (undated) DEVICE — DRAPE MICSCP W51XL150IN FOR LEICA M680 WILD OHS

## (undated) DEVICE — TUBING, SUCTION, 1/4" X 10', STRAIGHT: Brand: MEDLINE

## (undated) DEVICE — PACKING 8004002 NEURAY 200PK 13X25MM: Brand: NEURAY ®

## (undated) DEVICE — INTENDED FOR TISSUE SEPARATION, AND OTHER PROCEDURES THAT REQUIRE A SHARP SURGICAL BLADE TO PUNCTURE OR CUT.: Brand: BARD-PARKER SAFETY BLADES SIZE 10, STERILE

## (undated) DEVICE — 2000CC GUARDIAN II: Brand: GUARDIAN

## (undated) DEVICE — ADPT INTERMEDIC LL 16G --

## (undated) DEVICE — BAND RUB 1/8X2.5IN STRL --

## (undated) DEVICE — PACKING 8004007 NEURAY 200PK 13X76MM: Brand: NEURAY ®

## (undated) DEVICE — SYR 10ML LUER LOK 1/5ML GRAD --

## (undated) DEVICE — INTENDED FOR TISSUE SEPARATION, AND OTHER PROCEDURES THAT REQUIRE A SHARP SURGICAL BLADE TO PUNCTURE OR CUT.: Brand: BARD-PARKER SAFETY BLADES SIZE 15, STERILE

## (undated) DEVICE — PAD,NON-ADHERENT,3X8,STERILE,LF,1/PK: Brand: MEDLINE

## (undated) DEVICE — ZYPHR DISPOSABLE CRANIAL PERFORATOR, LARGE 14/11MM: Brand: ZYPHR

## (undated) DEVICE — SUTURE ETHLN SZ 3-0 L30IN NONABSORBABLE BLK L36MM FSLX 3/8 1673BH

## (undated) DEVICE — CODMAN VERSATRU™ STANDARD DISPOSABLE NON-STICK BIPOLAR FORCEPS 8" (20CM), 1.0MM TIP: Brand: CODMAN VERSATRU™

## (undated) DEVICE — SYRINGE IRRIG 60ML SFT PLIABLE BLB EZ TO GRP 1 HND USE W/

## (undated) DEVICE — MARKER UTIL W/RULER AND LBL -- STRL

## (undated) DEVICE — AGENT HEMSTAT W2XL14IN OXIDIZED REGENERATED CELOS ABSRB FOR

## (undated) DEVICE — BANDAGE,GAUZE,BULKEE II,4.5"X4.1YD,STRL: Brand: MEDLINE

## (undated) DEVICE — CARBIDE MATCH HEAD

## (undated) DEVICE — SUTURE VCRL SZ 2-0 L18IN ABSRB UD L26MM CP-2 1/2 CIR REV J762D

## (undated) DEVICE — DRSG TELFA 4X5IN LF STRL

## (undated) DEVICE — BUTTON SWITCH PENCIL BLADE ELECTRODE, HOLSTER: Brand: EDGE

## (undated) DEVICE — CODMAN® RANEY SCALP CLIPS 20 UNITS OF 10 CLIPS: Brand: CODMAN®

## (undated) DEVICE — Device: Brand: SNAP ON SPHERZ

## (undated) DEVICE — REM POLYHESIVE ADULT PATIENT RETURN ELECTRODE: Brand: VALLEYLAB

## (undated) DEVICE — SYRINGE MED 3ML NDL 23GA L1IN PLAS N CTRL LUERLOCK TIP REG

## (undated) DEVICE — BIPOLAR IRRIGATOR INTEGRATED TUBING AND BIPOLAR CORD SET, DISPOSABLE

## (undated) DEVICE — DRAPE TWL SURG 16X26IN BLU ORB04] ALLCARE INC]

## (undated) DEVICE — HOOKS ELASTIC STAY 12MM BLUNT -- PK/8

## (undated) DEVICE — TOTAL TRAY, DB, 100% SILI FOLEY, 16FR 10: Brand: MEDLINE

## (undated) DEVICE — PREP SKN CHLRAPRP APL 26ML STR --

## (undated) DEVICE — SUTURE NRLN SZ 4-0 L18IN NONABSORBABLE BLK L13MM TF 1/2 CIR C584D

## (undated) DEVICE — INSULATED BLADE ELECTRODE: Brand: EDGE

## (undated) DEVICE — 2.3MM SPIRAL ROUTER

## (undated) DEVICE — MAYFIELD® DISPOSABLE ADULT SKULL PIN (PLASTIC BASE): Brand: MAYFIELD®

## (undated) DEVICE — SURGIFOAM SPNG SZ 100

## (undated) DEVICE — SOL IRR SOD CL 0.9% 1000ML BTL --

## (undated) DEVICE — NEEDLE HYPO 25GA L1.5IN BLU POLYPR HUB S STL REG BVL STR

## (undated) DEVICE — AGENT HEMSTAT 8ML FLX TIP MTRX + DISP SURGIFLO